# Patient Record
Sex: MALE | Race: WHITE | NOT HISPANIC OR LATINO | Employment: OTHER | URBAN - NONMETROPOLITAN AREA
[De-identification: names, ages, dates, MRNs, and addresses within clinical notes are randomized per-mention and may not be internally consistent; named-entity substitution may affect disease eponyms.]

---

## 2017-01-23 ENCOUNTER — MEDICAL CORRESPONDENCE (OUTPATIENT)
Dept: HEALTH INFORMATION MANAGEMENT | Facility: HOSPITAL | Age: 72
End: 2017-01-23

## 2017-01-26 ENCOUNTER — ANTICOAGULATION THERAPY VISIT (OUTPATIENT)
Dept: ANTICOAGULATION | Facility: OTHER | Age: 72
End: 2017-01-26
Attending: FAMILY MEDICINE
Payer: MEDICARE

## 2017-01-26 DIAGNOSIS — I82.409 ACUTE THROMBOEMBOLISM OF DEEP VEINS OF LOWER EXTREMITY, UNSPECIFIED LATERALITY (H): ICD-10-CM

## 2017-01-26 DIAGNOSIS — I26.99 PULMONARY EMBOLISM AND INFARCTION (H): ICD-10-CM

## 2017-01-26 DIAGNOSIS — Z79.01 LONG-TERM (CURRENT) USE OF ANTICOAGULANTS: Primary | ICD-10-CM

## 2017-01-26 LAB — INR POINT OF CARE: 2.3 (ref 0.86–1.14)

## 2017-01-26 PROCEDURE — 85610 PROTHROMBIN TIME: CPT | Mod: QW

## 2017-01-26 NOTE — PROGRESS NOTES
ANTICOAGULATION FOLLOW-UP CLINIC VISIT    Patient Name:  Clement Voss  Date:  1/26/2017  Contact Type:  Face to Face    SUBJECTIVE:     Patient Findings     Positives No Problem Findings           OBJECTIVE    INR PROTIME   Date Value Ref Range Status   01/26/2017 2.3* 0.86 - 1.14 Final       ASSESSMENT / PLAN  INR assessment THER    Recheck INR In: 6 WEEKS    INR Location Clinic      Anticoagulation Summary as of 1/26/2017     INR goal 2.0-3.0   Selected INR 2.3 (1/26/2017)   Maintenance plan 2.5 mg (5 mg x 0.5) on Mon, Wed, Fri; 5 mg (5 mg x 1) all other days   Full instructions 2.5 mg on Mon, Wed, Fri; 5 mg all other days   Weekly total 27.5 mg   No change documented Jodie Gill RN   Plan last modified Jodie Gill RN (8/4/2016)   Next INR check 3/9/2017   Priority INR   Target end date Indefinite    Indications   Pulmonary embolism and infarction (H) [I26.99]  Acute thromboembolism of deep veins of lower extremity (H) [I82.409]  Long-term (current) use of anticoagulants [Z79.01] [Z79.01]         Anticoagulation Episode Summary     INR check location     Preferred lab     Send INR reminders to [facility] POOL    Comments       Anticoagulation Care Providers     Provider Role Specialty Phone number    Lorelei Felix MD [facility] [phone]            See the Encounter Report to view Anticoagulation Flowsheet and Dosing Calendar (Go to Encounters tab in chart review, and find the Anticoagulation Therapy Visit)        Jodie Gill RN

## 2017-01-26 NOTE — MR AVS SNAPSHOT
Clement SOCRATES Voss   1/26/2017 8:30 AM   Anticoagulation Therapy Visit    Description:  71 year old male   Provider:  [facility] ANTI COAGULATION   Department:  [facility]           INR as of 1/26/2017     Selected INR 2.3 (1/26/2017)      Anticoagulation Summary as of 1/26/2017     INR goal 2.0-3.0   Selected INR 2.3 (1/26/2017)   Full instructions 2.5 mg on Mon, Wed, Fri; 5 mg all other days   Next INR check 3/9/2017    Indications   Pulmonary embolism and infarction (H) [I26.99]  Acute thromboembolism of deep veins of lower extremity (H) [I82.409]  Long-term (current) use of anticoagulants [Z79.01] [Z79.01]         Your next Anticoagulation Clinic appointment(s)     Jan 26, 2017  8:30 AM   Anticoagulation Visit with [facility]TI COAGUL[facility]   [facility] ([facility] )    [address]  [facility] [address]   [phone]              January 2017 Details    Sun Mon Tue Wed Thu Fri Sat     1               2               3               4               5               6               7                 8               9               10               11               12               13               14                 15               16               17               18               19               20               21                 22               23               24               25               26      5 mg   See details      27      2.5 mg         28      5 mg           29      5 mg         30      2.5 mg         31      5 mg              Date Details   01/26 This INR check               How to take your warfarin dose     To take:  2.5 mg Take 0.5 of a 5 mg tablet.    To take:  5 mg Take 1 of the 5 mg tablets.           February 2017 Details    Sun Mon Tue Wed Thu Fri Sat        1      2.5 mg         2      5 mg         3      2.5 mg         4      5 mg           5      5 mg         6      2.5 mg         7      5 mg         8      2.5 mg         9      5 mg         10       2.5 mg         11      5 mg           12      5 mg         13      2.5 mg         14      5 mg         15      2.5 mg         16      5 mg         17      2.5 mg         18      5 mg           19      5 mg         20      2.5 mg         21      5 mg         22      2.5 mg         23      5 mg         24      2.5 mg         25      5 mg           26      5 mg         27      2.5 mg         28      5 mg              Date Details   No additional details            How to take your warfarin dose     To take:  2.5 mg Take 0.5 of a 5 mg tablet.    To take:  5 mg Take 1 of the 5 mg tablets.           March 2017 Details    Sun Mon Tue Wed Thu Fri Sat        1      2.5 mg         2      5 mg         3      2.5 mg         4      5 mg           5      5 mg         6      2.5 mg         7      5 mg         8      2.5 mg         9            10               11                 12               13               14               15               16               17               18                 19               20               21               22               23               24               25                 26               27               28               29               30               31                 Date Details   No additional details    Date of next INR:  3/9/2017         How to take your warfarin dose     To take:  2.5 mg Take 0.5 of a 5 mg tablet.    To take:  5 mg Take 1 of the 5 mg tablets.

## 2017-02-23 ENCOUNTER — TELEPHONE (OUTPATIENT)
Dept: FAMILY MEDICINE | Facility: OTHER | Age: 72
End: 2017-02-23

## 2017-02-23 ENCOUNTER — APPOINTMENT (OUTPATIENT)
Dept: LAB | Facility: OTHER | Age: 72
End: 2017-02-23
Attending: FAMILY MEDICINE
Payer: MEDICARE

## 2017-02-23 DIAGNOSIS — E11.9 TYPE 2 DIABETES MELLITUS WITHOUT COMPLICATION, WITHOUT LONG-TERM CURRENT USE OF INSULIN (H): Primary | ICD-10-CM

## 2017-02-23 DIAGNOSIS — E78.5 HYPERLIPIDEMIA, UNSPECIFIED HYPERLIPIDEMIA TYPE: ICD-10-CM

## 2017-02-23 DIAGNOSIS — I10 BENIGN ESSENTIAL HYPERTENSION: ICD-10-CM

## 2017-02-23 LAB
ANION GAP SERPL CALCULATED.3IONS-SCNC: 9 MMOL/L (ref 3–14)
BUN SERPL-MCNC: 17 MG/DL (ref 7–30)
CALCIUM SERPL-MCNC: 9.1 MG/DL (ref 8.5–10.1)
CHLORIDE SERPL-SCNC: 109 MMOL/L (ref 94–109)
CHOLEST SERPL-MCNC: 166 MG/DL
CO2 SERPL-SCNC: 25 MMOL/L (ref 20–32)
CREAT SERPL-MCNC: 1.63 MG/DL (ref 0.66–1.25)
EST. AVERAGE GLUCOSE BLD GHB EST-MCNC: 200 MG/DL
GFR SERPL CREATININE-BSD FRML MDRD: 42 ML/MIN/1.7M2
GLUCOSE SERPL-MCNC: 229 MG/DL (ref 70–99)
HBA1C MFR BLD: 8.6 % (ref 4.3–6)
HDLC SERPL-MCNC: 39 MG/DL
LDLC SERPL CALC-MCNC: 82 MG/DL
NONHDLC SERPL-MCNC: 127 MG/DL
POTASSIUM SERPL-SCNC: 4.3 MMOL/L (ref 3.4–5.3)
SODIUM SERPL-SCNC: 143 MMOL/L (ref 133–144)
TRIGL SERPL-MCNC: 226 MG/DL

## 2017-02-23 PROCEDURE — 83036 HEMOGLOBIN GLYCOSYLATED A1C: CPT | Performed by: FAMILY MEDICINE

## 2017-02-23 PROCEDURE — 80048 BASIC METABOLIC PNL TOTAL CA: CPT | Performed by: FAMILY MEDICINE

## 2017-02-23 PROCEDURE — 80061 LIPID PANEL: CPT | Performed by: FAMILY MEDICINE

## 2017-02-23 PROCEDURE — 36415 COLL VENOUS BLD VENIPUNCTURE: CPT | Performed by: FAMILY MEDICINE

## 2017-02-23 PROCEDURE — 40000788 ZZHCL STATISTIC ESTIMATED AVERAGE GLUCOSE: Performed by: FAMILY MEDICINE

## 2017-02-28 ENCOUNTER — OFFICE VISIT (OUTPATIENT)
Dept: FAMILY MEDICINE | Facility: OTHER | Age: 72
End: 2017-02-28
Attending: FAMILY MEDICINE
Payer: COMMERCIAL

## 2017-02-28 VITALS
RESPIRATION RATE: 14 BRPM | HEART RATE: 76 BPM | BODY MASS INDEX: 35.41 KG/M2 | WEIGHT: 225.6 LBS | HEIGHT: 67 IN | TEMPERATURE: 97.8 F | SYSTOLIC BLOOD PRESSURE: 140 MMHG | DIASTOLIC BLOOD PRESSURE: 62 MMHG

## 2017-02-28 DIAGNOSIS — Z13.6 SCREENING FOR AAA (ABDOMINAL AORTIC ANEURYSM): ICD-10-CM

## 2017-02-28 DIAGNOSIS — E78.5 HYPERLIPIDEMIA, UNSPECIFIED HYPERLIPIDEMIA TYPE: ICD-10-CM

## 2017-02-28 DIAGNOSIS — I10 BENIGN ESSENTIAL HYPERTENSION: ICD-10-CM

## 2017-02-28 DIAGNOSIS — E11.9 TYPE 2 DIABETES MELLITUS WITHOUT COMPLICATION, WITHOUT LONG-TERM CURRENT USE OF INSULIN (H): Primary | ICD-10-CM

## 2017-02-28 DIAGNOSIS — Z12.11 COLON CANCER SCREENING: ICD-10-CM

## 2017-02-28 PROCEDURE — 99214 OFFICE O/P EST MOD 30 MIN: CPT | Performed by: FAMILY MEDICINE

## 2017-02-28 PROCEDURE — 99212 OFFICE O/P EST SF 10 MIN: CPT

## 2017-02-28 ASSESSMENT — PAIN SCALES - GENERAL: PAINLEVEL: NO PAIN (0)

## 2017-02-28 NOTE — NURSING NOTE
"Chief Complaint   Patient presents with     Diabetes     3 month follow up     *_* Health Care Directive *_*     on file       Initial /62 (BP Location: Left arm, Patient Position: Chair, Cuff Size: Adult Large)  Pulse 76  Temp 97.8  F (36.6  C) (Tympanic)  Resp 14  Ht 5' 7\" (1.702 m)  Wt 225 lb 9.6 oz (102.3 kg)  BMI 35.33 kg/m2 Estimated body mass index is 35.33 kg/(m^2) as calculated from the following:    Height as of this encounter: 5' 7\" (1.702 m).    Weight as of this encounter: 225 lb 9.6 oz (102.3 kg).  Medication Reconciliation: heber ARREDONDO      "

## 2017-02-28 NOTE — MR AVS SNAPSHOT
After Visit Summary   2/28/2017    Clement Voss    MRN: [id number]           Patient Information     Date Of Birth          1945        Visit Information        Provider Department      2/28/2017 10:15 AM Lorelei Felix MD [facility]        Today's Diagnoses     Type 2 diabetes mellitus without complication, without long-term current use of insulin (H)    -  1    Benign essential hypertension        Hyperlipidemia, unspecified hyperlipidemia type        Screening for AAA (abdominal aortic aneurysm)        Colon cancer screening          Care Instructions    There are a couple of newer medications on the market that can help with blood glucose readings.  They are NOT insulin.  The two that we use are Bydureon and Trulicity.  They are medications that you inject once a week and they can really help with blood glucose readings.  We would add this to your current medications.  If this is something you are willing to start, please let me know and I can send in a prescription.        Follow-ups after your visit        Follow-up notes from your care team     Return in about 3 months (around 5/28/2017).      Your next 10 appointments already scheduled     Mar 09, 2017  8:30 AM CST   Anticoagulation Visit with [facility] ANTI COAGULATION   [facility] ([facility] )    [address]  [place] [address]   [phone]            May 26, 2017 10:15 AM CDT   (Arrive by 10:00 AM)   SHORT with Lorelei Felix MD   [facility] ([facility] )    [address]  [place] [address]   [phone]              Future tests that were ordered for you today     Open Future Orders        Priority Expected Expires Ordered    Basic metabolic panel Routine 5/28/2017 2/28/2018 2/28/2017    Hemoglobin A1c Routine 5/28/2017 2/28/2018 2/28/2017    Lipid Profile Routine 5/28/2017 2/28/2018 2/28/2017    Immunos occult blood Routine   "2018            Who to contact     If you have questions or need follow up information about today's clinic visit or your schedule please contact [facility] ALINA directly at [phone].  Normal or non-critical lab and imaging results will be communicated to you by MyChart, letter or phone within 4 business days after the clinic has received the results. If you do not hear from us within 7 days, please contact the clinic through MyChart or phone. If you have a critical or abnormal lab result, we will notify you by phone as soon as possible.  Submit refill requests through [employer] or call your pharmacy and they will forward the refill request to us. Please allow 3 business days for your refill to be completed.          Additional Information About Your Visit        [employer] Information     [employer]isten lets you send messages to your doctor, view your test results, renew your prescriptions, schedule appointments and more. To sign up, go to [email or web address] . Click on \"Log in\" on the left side of the screen, which will take you to the Welcome page. Then click on \"Sign up Now\" on the right side of the page.     You will be asked to enter the access code listed below, as well as some personal information. Please follow the directions to create your username and password.     Your access code is: [email or web address]  Expires: 2017 10:45 AM     Your access code will  in 90 days. If you need help or a new code, please call your [facility] or [phone].        Care EveryWhere ID     This is your Care EveryWhere ID. This could be used by other organizations to access your [facility] medical records  [id number]        Your Vitals Were     Pulse Temperature Respirations Height BMI (Body Mass Index)       76 97.8  F (36.6  C) (Tympanic) 14 5' 7\" (1.702 m) 35.33 kg/m2        Blood Pressure from Last 3 Encounters:   17 140/62   16 134/58   16 120/62    Weight from Last 3 " Encounters:   02/28/17 225 lb 9.6 oz (102.3 kg)   11/28/16 226 lb (102.5 kg)   08/26/16 218 lb (98.9 kg)              We Performed the Following     FOOT EXAM     US Aortic Imaging        Primary Care Provider Office Phone # Fax #    Lorelei Felix [phone]       [facility] [address] [address]        Thank you!     Thank you for choosing [facility]  for your care. Our goal is always to provide you with excellent care. Hearing back from our patients is one way we can continue to improve our services. Please take a few minutes to complete the written survey that you may receive in the mail after your visit with us. Thank you!             Your Updated Medication List - Protect others around you: Learn how to safely use, store and throw away your medicines at www.disposemymeds.org.          This list is accurate as of: 2/28/17  1:57 PM.  Always use your most recent med list.                   Brand Name Dispense Instructions for use    ACCU-CHEK VLAD PLUS test strip   Generic drug:  blood glucose monitoring     100 each    USE TO TEST BLOOD SUGAR THREE TIMES DAILY       acetaminophen 500 MG tablet    TYLENOL     Take 1-2 tablets (500-1,000 mg) by mouth every 6 hours as needed       amLODIPine 10 MG tablet    NORVASC    90 tablet    Take 1 tablet (10 mg) by mouth daily       aspirin 81 MG EC tablet     90 tablet    Take 1 tablet (81 mg) by mouth daily       CLARITIN 10 MG tablet   Generic drug:  loratadine      Take 10 mg by mouth daily.       COMBIGAN 0.2-0.5 % ophthalmic solution   Generic drug:  brimonidine-timolol      1 drop 2 times daily Morning and evening, 12 hours apart       fenofibrate 48 MG tablet     90 tablet    Take 1 tablet (48 mg) by mouth daily       fish oil-omega-3 fatty acids 1000 MG capsule      Take 1 capsule by mouth 3 times daily.       fluticasone 50 MCG/ACT spray    FLONASE    1 Package    Spray 2 sprays into both  nostrils daily       glyBURIDE 5 MG tablet    DIABETA /MICRONASE    360 tablet    Take 2 tablets (10 mg) by mouth 2 times daily (with meals)       indomethacin 50 MG capsule    INDOCIN    30 capsule    Take 1 capsule (50 mg) by mouth 3 times daily With food as needed       lisinopril 40 MG tablet    PRINIVIL/ZESTRIL    90 tablet    Take 1 tablet (40 mg) by mouth daily       pilocarpine 1 % ophthalmic solution    PILOCAR     Place 1 drop into both eyes 4 times daily       sitagliptin 100 MG tablet    JANUVIA    90 tablet    Take 1 tablet (100 mg) by mouth daily       STATIN NOT PRESCRIBED (INTENTIONAL)     0 each    Statin not prescribed intentionally due to Intolerance (with supporting documentation of trying a statin at least once within the last 5 years)       terazosin 10 MG capsule    HYTRIN    90 capsule    Take 1 capsule (10 mg) by mouth At Bedtime       TRAVATAN Z 0.004 % ophthalmic solution   Generic drug:  travoprost (BAK Free)      Instill 1 drop by ophthalmic route every day into affected eye(s) in the evening       warfarin 5 MG tablet    COUMADIN    90 tablet    Take 2.5mg Mon/Wed/Fri; 5mg all other days or as directed by [facility]

## 2017-02-28 NOTE — LETTER
[facility]  [address]  [facility] [address]  [phone]      March 27, 2017      Clement Voss  [address] [facility] [address]        Dear Clement,      Normal/negative results.     Results for orders placed or performed in visit on 02/28/17   US Aortic Imaging    Narrative    ABDOMINAL AORTA ULTRASOUND    FINDINGS:  The abdominal aorta is normally tapering.  There is no  evidence of abdominal aortic aneurysm.  Exam Date: Mar 24, 2017 11:33:00 AM  Author: MELY KIRK  This report is final and signed             Sincerely, DR WILDE

## 2017-02-28 NOTE — PATIENT INSTRUCTIONS
There are a couple of newer medications on the market that can help with blood glucose readings.  They are NOT insulin.  The two that we use are Bydureon and Trulicity.  They are medications that you inject once a week and they can really help with blood glucose readings.  We would add this to your current medications.  If this is something you are willing to start, please let me know and I can send in a prescription.

## 2017-02-28 NOTE — PROGRESS NOTES
SUBJECTIVE:                                                    Clement Voss is a 71 year old male who presents to clinic today for the following health issues:        Diabetes Follow-up    Patient is checking blood sugars: twice daily.    Blood sugar testing frequency justification: Uncontrolled diabetes  Results are as follows:         See scanned report    Diabetic concerns: None and blood sugar frequently over 200     Symptoms of hypoglycemia (low blood sugar): shaky, dizzy     Paresthesias (numbness or burning in feet) or sores: No     Date of last diabetic eye exam: 4/2016     Hyperlipidemia Follow-Up      Rate your low fat/cholesterol diet?: good    Taking statin?  Refuses    Other lipid medications/supplements?:  Fish oil/Omega 3, without side effects     Hypertension Follow-up      Outpatient blood pressures are not being checked.    Low Salt Diet: no added salt         Amount of exercise or physical activity: tries to be active daily    Problems taking medications regularly: No    Medication side effects: none  Diet: diabetic    Patient is due for AAA and colon cancer screening.  He refuses colonoscopy, but is willing to complete ifobt.    Problem list and histories reviewed & adjusted, as indicated.  Additional history: as documented    Patient Active Problem List   Diagnosis     Diabetes mellitus, type 2 (H)     Hyperlipidemia     Benign essential hypertension     Gout     Pulmonary embolism and infarction (H)     Acute thromboembolism of deep veins of lower extremity (H)     Advanced care planning/counseling discussion     Long-term (current) use of anticoagulants [Z79.01]     Past Surgical History   Procedure Laterality Date     Appendectomy  2008     Cholecystectomy  1984     History of pe of right lung 3/2013[         Social History   Substance Use Topics     Smoking status: Former Smoker     Types: Cigarettes     Quit date: 8/12/1964     Smokeless tobacco: Never Used     Alcohol use No     Family  History   Problem Relation Age of Onset     HEART DISEASE Father 71     heart disease; cause of death     Other - See Comments Mother 79     old age; cause of death         Current Outpatient Prescriptions   Medication Sig Dispense Refill     amLODIPine (NORVASC) 10 MG tablet Take 1 tablet (10 mg) by mouth daily 90 tablet 3     lisinopril (PRINIVIL,ZESTRIL) 40 MG tablet Take 1 tablet (40 mg) by mouth daily 90 tablet 3     terazosin (HYTRIN) 10 MG capsule Take 1 capsule (10 mg) by mouth At Bedtime 90 capsule 3     warfarin (COUMADIN) 5 MG tablet Take 2.5mg Mon/Wed/Fri; 5mg all other days or as directed by [facility] 90 tablet 4     pilocarpine (PILOCAR) 1 % ophthalmic solution Place 1 drop into both eyes 4 times daily       fenofibrate 48 MG tablet Take 1 tablet (48 mg) by mouth daily 90 tablet 3     sitagliptin (JANUVIA) 100 MG tablet Take 1 tablet (100 mg) by mouth daily 90 tablet 3     glyBURIDE (DIABETA / MICRONASE) 5 MG tablet Take 2 tablets (10 mg) by mouth 2 times daily (with meals) 360 tablet 3     ACCU-CHEK VLAD PLUS test strip USE TO TEST BLOOD SUGAR THREE TIMES DAILY 100 each 10     STATIN NOT PRESCRIBED, INTENTIONAL, Statin not prescribed intentionally due to Intolerance (with supporting documentation of trying a statin at least once within the last 5 years) 0 each 0     fluticasone (FLONASE) 50 MCG/ACT nasal spray Spray 2 sprays into both nostrils daily (Patient taking differently: Spray 2 sprays into both nostrils daily ) 1 Package 0     indomethacin (INDOCIN) 50 MG capsule Take 1 capsule (50 mg) by mouth 3 times daily With food as needed 30 capsule 1     brimonidine-timolol (COMBIGAN) 0.2-0.5 % ophthalmic solution 1 drop 2 times daily Morning and evening, 12 hours apart       acetaminophen (TYLENOL) 500 MG tablet Take 1-2 tablets (500-1,000 mg) by mouth every 6 hours as needed       aspirin 81 MG EC tablet Take 1 tablet (81 mg) by mouth daily 90 tablet 3     loratadine (CLARITIN) 10 MG tablet  "Take 10 mg by mouth daily.       fish oil-omega-3 fatty acids (FISH OIL) 1000 MG capsule Take 1 capsule by mouth 3 times daily.       TRAVATAN Z (TRAVATAN Z) 0.004 % ophthalmic solution Instill 1 drop by ophthalmic route every day into affected eye(s) in the evening       Allergies   Allergen Reactions     Atorvastatin Calcium Other (See Comments)     Lipitor  Increase CR     Iodine      Penicillins      Sulfa Drugs      Sulfa (sulfonamide antibiotics)       Reviewed and updated as needed this visit by clinical staff  Tobacco  Allergies  Meds       Reviewed and updated as needed this visit by Provider         ROS:  Constitutional, HEENT, cardiovascular, pulmonary, gi and gu systems are negative, except as otherwise noted.    OBJECTIVE:                                                    /62 (BP Location: Left arm, Patient Position: Chair, Cuff Size: Adult Large)  Pulse 76  Temp 97.8  F (36.6  C) (Tympanic)  Resp 14  Ht 5' 7\" (1.702 m)  Wt 225 lb 9.6 oz (102.3 kg)  BMI 35.33 kg/m2  Body mass index is 35.33 kg/(m^2).  GENERAL: healthy, alert and no distress  PSYCH: mentation appears normal, affect normal/bright  Diabetic foot exam: normal DP and PT pulses, no trophic changes or ulcerative lesions and normal monofilament exam    Diagnostic Test Results:  Results for orders placed or performed in visit on 02/23/17   Basic metabolic panel   Result Value Ref [facility]    Sodium 143 133 - 144 mmol/L    Potassium 4.3 3.4 - 5.3 mmol/L    Chloride 109 94 - 109 mmol/L    Carbon Dioxide 25 20 - 32 mmol/L    Anion Gap 9 3 - 14 mmol/L    Glucose 229 (H) 70 - 99 mg/dL    Urea Nitrogen 17 7 - 30 mg/dL    Creatinine 1.63 (H) 0.66 - 1.25 mg/dL    GFR Estimate 42 (L) >60 mL/min/1.7m2    GFR Estimate If Black 50 (L) >60 mL/min/1.7m2    Calcium 9.1 8.5 - 10.1 mg/dL   Hemoglobin A1c   Result Value Ref Range    Hemoglobin A1C 8.6 (H) 4.3 - 6.0 %   Lipid Profile   Result Value Ref Range    Cholesterol 166 <200 mg/dL    Triglycerides " 226 (H) <150 mg/dL    HDL Cholesterol 39 (L) >39 mg/dL    LDL Cholesterol Calculated 82 <100 mg/dL    Non HDL Cholesterol 127 <130 mg/dL   Estimated Average Glucose   Result Value Ref Range    Estimated Average Glucose 200 mg/dL        ASSESSMENT/PLAN:                                                    Diabetes Type II, A1c Uncontrolled, non-insulin dependent   Associated with the following complications    Renal Complications:  None    Ophthalmologic Complications: None    Neurologic Complications: None    Peripheral Vascular Complications:  None    Other: None   Plan:  I would recommend starting either Bydureon or Trulicity.  Note written in AVS for patient and his wife to review.  They are to let me know if they are willing to try one of those medications.    Hyperlipidemia; controlled   Plan:  No changes in the patient's current treatment plan    Hypertension; controlled   Associated with the following complications:    Diabetes   Plan:  No changes in the patient's current treatment plan          ICD-10-CM    1. Type 2 diabetes mellitus without complication, without long-term current use of insulin (H) E11.9 FOOT EXAM     Hemoglobin A1c   2. Benign essential hypertension I10 Basic metabolic panel   3. Hyperlipidemia, unspecified hyperlipidemia type E78.5 Lipid Profile   4. Screening for AAA (abdominal aortic aneurysm) Z13.6 US Aortic Imaging     US Aortic Imaging     CANCELED: US Abdominal Aorta Limited     CANCELED: US Abdominal Aorta Limited   5. Colon cancer screening Z12.11 Immunos occult blood       FUTURE APPOINTMENTS:       - Follow-up visit in 3 months, future labs orders already placed.      Lorelei Felix MD  [facility]

## 2017-03-01 DIAGNOSIS — Z12.11 COLON CANCER SCREENING: ICD-10-CM

## 2017-03-01 LAB — HEMOCCULT SP1 STL QL: NEGATIVE

## 2017-03-01 PROCEDURE — 82274 ASSAY TEST FOR BLOOD FECAL: CPT | Performed by: FAMILY MEDICINE

## 2017-03-05 ENCOUNTER — HOSPITAL ENCOUNTER (EMERGENCY)
Facility: HOSPITAL | Age: 72
Discharge: HOME | End: 2017-03-05
Attending: PHYSICIAN ASSISTANT | Admitting: PHYSICIAN ASSISTANT
Payer: MEDICARE

## 2017-03-05 VITALS
HEIGHT: 67 IN | SYSTOLIC BLOOD PRESSURE: 144 MMHG | RESPIRATION RATE: 16 BRPM | DIASTOLIC BLOOD PRESSURE: 66 MMHG | HEART RATE: 97 BPM | OXYGEN SATURATION: 93 % | TEMPERATURE: 100.7 F

## 2017-03-05 DIAGNOSIS — J10.1 INFLUENZA A: ICD-10-CM

## 2017-03-05 LAB
FLUAV+FLUBV AG SPEC QL: ABNORMAL
FLUAV+FLUBV AG SPEC QL: ABNORMAL
SPECIMEN SOURCE: ABNORMAL

## 2017-03-05 PROCEDURE — 71020 ZZHC CHEST TWO VIEWS, FRONT/LAT: CPT | Mod: TC

## 2017-03-05 PROCEDURE — 99284 EMERGENCY DEPT VISIT MOD MDM: CPT | Performed by: PHYSICIAN ASSISTANT

## 2017-03-05 PROCEDURE — 87804 INFLUENZA ASSAY W/OPTIC: CPT | Mod: 59 | Performed by: FAMILY MEDICINE

## 2017-03-05 PROCEDURE — 99284 EMERGENCY DEPT VISIT MOD MDM: CPT | Mod: 25

## 2017-03-05 RX ORDER — OSELTAMIVIR PHOSPHATE 30 MG/1
30 CAPSULE ORAL 2 TIMES DAILY
Qty: 10 CAPSULE | Refills: 0 | Status: SHIPPED | OUTPATIENT
Start: 2017-03-05 | End: 2017-03-05

## 2017-03-05 RX ORDER — OSELTAMIVIR PHOSPHATE 30 MG/1
30 CAPSULE ORAL 2 TIMES DAILY
Qty: 10 CAPSULE | Refills: 0 | Status: SHIPPED | OUTPATIENT
Start: 2017-03-05 | End: 2017-03-16

## 2017-03-05 ASSESSMENT — ENCOUNTER SYMPTOMS
FATIGUE: 1
WEAKNESS: 1
SORE THROAT: 1
CHEST TIGHTNESS: 0
MYALGIAS: 1
RHINORRHEA: 1
NAUSEA: 0
PHOTOPHOBIA: 0
HEADACHES: 1
CHILLS: 1
SINUS PRESSURE: 1
LIGHT-HEADEDNESS: 0
SHORTNESS OF BREATH: 0
BRUISES/BLEEDS EASILY: 1
ABDOMINAL PAIN: 0
APPETITE CHANGE: 0
DIZZINESS: 0
COUGH: 1
ACTIVITY CHANGE: 1
EYE REDNESS: 0
VOMITING: 0
FEVER: 1

## 2017-03-05 NOTE — ED AVS SNAPSHOT
[facility]    [address]    [place] [address]    Phone:  [phone]                                       Clement Voss   MRN: [id number]    Department:  [facility]   Date of Visit:  3/5/2017           Patient Information     Date Of Birth          1945        Your diagnoses for this visit were:     Influenza A        You were seen by Justino Kunz PA-C.      Follow-up Information     Follow up with Lorelei Felix MD.    Specialty:  Family Practice    Why:  As needed    Contact information:    [facility]  [address] [facility] [id number] [phone]          Follow up with [facility].    Specialty:  EMERGENCY MEDICINE    Why:  If symptoms worsen    Contact information:    [address]  [place] [address] [phone]    Additional information:    From [facility]: Take [address]. Turn left at [address]. Turn left at the first stoplight on [address]. At the first stop sign, take a right onto [address]. Take a left into the parking lot and continue through until you reach the North enterance of the building.       From [place]: Take [address]. Take the [address] ramp towards [place]. Turn left onto [address]. Take a slight right onto [address]. Turn left at the first stoplight on [address]. At the first stop sign, take a right onto [address]. Take a left into the parking lot and continue through until you reach the North enterance of the building.       From [place]: Take [address]. Take a right at [address]. At the first stop sign, take a right onto [address]. Take a left into the parking lot and continue through until you reach the North enterance of the building.         Discharge Instructions         Influenza (Adult)    Influenza is also called the flu. It is a viral illness that affects the air  passages of your lungs. It is different from the common cold. The flu can easily be passed from one to person to another. It may be spread through the air by coughing and sneezing. Or it can be spread by touching the sick person and then touching your own eyes, nose, or mouth.  The flu starts 1 to 3 days after you are exposed to the flu virus. It may last for 1 to 2 weeks. You usually don t need to take antibiotics unless you have a complication. This might be an ear or sinus infection or pneumonia.  Symptoms of the flu may be mild or severe. They can include extreme tiredness (wanting to stay in bed all day), chills, fevers, muscle aches, soreness with eye movement, headache, and a dry, hacking cough.  Home care  Follow these guidelines when caring for yourself at home:    Avoid being around cigarette smoke, whether yours or other people s.    Acetaminophen or ibuprofen will help ease your fever, muscle aches, and headache. Don t give aspirin to anyone younger than 18 who has the flu. Aspirin can harm the liver.    Nausea and loss of appetite are common with the flu. Eat light meals. Drink 6 to 8 glasses of liquids every day. Good choices are water, sport drinks, soft drinks without caffeine, juices, tea, and soup. Extra fluids will also help loosen secretions in your nose and lungs.    Over-the-counter cold medicines will not make the flu go away faster. But the medicines may help with coughing, sore throat, and congestion in your nose and sinuses. Don t use a decongestant if you have high blood pressure.    Stay home until your fever has been gone for at least 24 hours without using medicine to reduce fever.  Follow-up care  Follow up with your health care provider, or as advised, if you are not getting better over the next week.  If you are 65 or older, talk with your provider about getting a pneumococcal vaccine every 5 years. You should also get this vaccine if you have chronic asthma or COPD. All adults  should get a flu vaccine every fall. Ask your provider about this.  When to seek medical advice  Call your health care provider right away if any of these occur:    Cough with lots of colored sputum (mucus) or blood in your sputum    Chest pain, shortness of breath, wheezing, or difficulty breathing    Severe headache, or face, neck, or ear pain    New rash with fever    Fever of 101 F (38 C) oral or higher that doesn t get better with fever medicine    Confusion, behavior change, or seizure    Severe weakness or dizziness    You get a fever or cough after getting better for a few days       6338-0323 [employer]. [address] [place] [address]. All rights reserved. This information is not intended as a substitute for professional medical care. Always follow your healthcare professional's instructions.        Please follow-up with Dr. Wang as needed.     Tamiflu as prescribed.     Good hand hygiene is very important.     Please return HERE for any worsening symptoms.     Rest and stay hydrated.         Future Appointments        Provider Department Dept Phone Center    3/8/2017 10:00 AM [facility] Ultrasound 2 [facility] Ul[facility]ound [phone] [place] [facility]    3/9/2017 8:30 AM [place] ANTI COAGULATION [facility] [phone] [facility]    5/26/2017 10:15 AM Lorelei Felix MD [facility] [phone] [facility]         Review of your medicines      START taking        Dose / Directions Last dose taken    oseltamivir 30 MG capsule   Commonly known as:  TAMIFLU   Dose:  30 mg   Quantity:  10 capsule        Take 1 capsule (30 mg) by mouth 2 times daily   Refills:  0          Our records show that you are taking the medicines listed below. If these are incorrect, please call your family doctor or clinic.        Dose / Directions Last dose taken    ACCU-CHEK VLAD PLUS test strip   Quantity:  100 each   Generic drug:  blood glucose monitoring        USE TO TEST BLOOD SUGAR THREE  TIMES DAILY   Refills:  10        acetaminophen 500 MG tablet   Commonly known as:  TYLENOL   Dose:  500-1000 mg        Take 1-2 tablets (500-1,000 mg) by mouth every 6 hours as needed   Refills:  0        amLODIPine 10 MG tablet   Commonly known as:  NORVASC   Dose:  10 mg   Quantity:  90 tablet        Take 1 tablet (10 mg) by mouth daily   Refills:  3        aspirin 81 MG EC tablet   Dose:  81 mg   Quantity:  90 tablet        Take 1 tablet (81 mg) by mouth daily   Refills:  3        CLARITIN 10 MG tablet   Dose:  10 mg   Generic drug:  loratadine        Take 10 mg by mouth daily.   Refills:  0        COMBIGAN 0.2-0.5 % ophthalmic solution   Dose:  1 drop   Generic drug:  brimonidine-timolol        1 drop 2 times daily Morning and evening, 12 hours apart   Refills:  0        fenofibrate 48 MG tablet   Dose:  48 mg   Quantity:  90 tablet        Take 1 tablet (48 mg) by mouth daily   Refills:  3        fish oil-omega-3 fatty acids 1000 MG capsule   Dose:  1 capsule        Take 1 capsule by mouth 3 times daily.   Refills:  0        fluticasone 50 MCG/ACT spray   Commonly known as:  FLONASE   Dose:  2 spray   Quantity:  1 Package        Spray 2 sprays into both nostrils daily   Refills:  0        glyBURIDE 5 MG tablet   Commonly known as:  DIABETA /MICRONASE   Dose:  10 mg   Quantity:  360 tablet        Take 2 tablets (10 mg) by mouth 2 times daily (with meals)   Refills:  3        indomethacin 50 MG capsule   Commonly known as:  INDOCIN   Dose:  50 mg   Quantity:  30 capsule        Take 1 capsule (50 mg) by mouth 3 times daily With food as needed   Refills:  1        lisinopril 40 MG tablet   Commonly known as:  PRINIVIL/ZESTRIL   Dose:  40 mg   Quantity:  90 tablet        Take 1 tablet (40 mg) by mouth daily   Refills:  3        pilocarpine 1 % ophthalmic solution   Commonly known as:  PILOCAR   Dose:  1 drop        Place 1 drop into both eyes 4 times daily   Refills:  0        sitagliptin 100 MG tablet   Commonly  known as:  JANUVIA   Dose:  100 mg   Quantity:  90 tablet        Take 1 tablet (100 mg) by mouth daily   Refills:  3        STATIN NOT PRESCRIBED (INTENTIONAL)   Quantity:  0 each        Statin not prescribed intentionally due to Intolerance (with supporting documentation of trying a statin at least once within the last 5 years)   Refills:  0        terazosin 10 MG capsule   Commonly known as:  HYTRIN   Dose:  10 mg   Quantity:  90 capsule        Take 1 capsule (10 mg) by mouth At Bedtime   Refills:  3        TRAVATAN Z 0.004 % ophthalmic solution   Generic drug:  travoprost (DARION Free)        Instill 1 drop by ophthalmic route every day into affected eye(s) in the evening   Refills:  0        warfarin 5 MG tablet   Commonly known as:  COUMADIN   Quantity:  90 tablet        Take 2.5mg Mon/Wed/Fri; 5mg all other days or as directed by [facility]   Refills:  4                Prescriptions were sent or printed at these locations (1 Prescription)                   [facility] [address] [place] [address] [address] [facility] [address]   [address] [facility] [address]    Telephone:  [phone]   Fax:  [phone]   Hours:                  E-Prescribed (1 of 1)         oseltamivir (TAMIFLU) 30 MG capsule                Procedures and tests performed during your visit     Influenza A/B antigen    XR Chest 2 Views      Orders Needing Specimen Collection     None      Pending Results     Date and Time Order Name Status Description    3/5/2017 1017 XR Chest 2 Views In process             Pending Culture Results     No orders found from 3/3/2017 to 3/6/2017.            Thank you for choosing [facility]       Thank you for choosing [facility] for your care. Our goal is always to provide you with excellent care. Hearing back from our patients is one way we can continue to improve our services. Please take a few minutes to complete the written survey that you may receive in the mail after  "you visit with us. Thank you!        [facility] [employer] Information     [employer] lets you send messages to your doctor, view your test results, renew your prescriptions, schedule appointments and more. To sign up, go to [email or web address] . Click on \"Log in\" on the left side of the screen, which will take you to the Welcome page. Then click on \"Sign up Now\" on the right side of the page.     You will be asked to enter the access code listed below, as well as some personal information. Please follow the directions to create your username and password.     Your access code is: [email or web address]  Expires: 2017 10:45 AM     Your access code will  in 90 days. If you need help or a new code, please call your [facility] clinic or [phone].        Care EveryWhere ID     This is your Care EveryWhere ID. This could be used by other organizations to access your [facility] medical records  [id number]        After Visit Summary       This is your record. Keep this with you and show to your community pharmacist(s) and doctor(s) at your next visit.                  "

## 2017-03-05 NOTE — DISCHARGE INSTRUCTIONS
Influenza (Adult)    Influenza is also called the flu. It is a viral illness that affects the air passages of your lungs. It is different from the common cold. The flu can easily be passed from one to person to another. It may be spread through the air by coughing and sneezing. Or it can be spread by touching the sick person and then touching your own eyes, nose, or mouth.  The flu starts 1 to 3 days after you are exposed to the flu virus. It may last for 1 to 2 weeks. You usually don t need to take antibiotics unless you have a complication. This might be an ear or sinus infection or pneumonia.  Symptoms of the flu may be mild or severe. They can include extreme tiredness (wanting to stay in bed all day), chills, fevers, muscle aches, soreness with eye movement, headache, and a dry, hacking cough.  Home care  Follow these guidelines when caring for yourself at home:    Avoid being around cigarette smoke, whether yours or other people s.    Acetaminophen or ibuprofen will help ease your fever, muscle aches, and headache. Don t give aspirin to anyone younger than 18 who has the flu. Aspirin can harm the liver.    Nausea and loss of appetite are common with the flu. Eat light meals. Drink 6 to 8 glasses of liquids every day. Good choices are water, sport drinks, soft drinks without caffeine, juices, tea, and soup. Extra fluids will also help loosen secretions in your nose and lungs.    Over-the-counter cold medicines will not make the flu go away faster. But the medicines may help with coughing, sore throat, and congestion in your nose and sinuses. Don t use a decongestant if you have high blood pressure.    Stay home until your fever has been gone for at least 24 hours without using medicine to reduce fever.  Follow-up care  Follow up with your health care provider, or as advised, if you are not getting better over the next week.  If you are 65 or older, talk with your provider about getting a pneumococcal vaccine  every 5 years. You should also get this vaccine if you have chronic asthma or COPD. All adults should get a flu vaccine every fall. Ask your provider about this.  When to seek medical advice  Call your health care provider right away if any of these occur:    Cough with lots of colored sputum (mucus) or blood in your sputum    Chest pain, shortness of breath, wheezing, or difficulty breathing    Severe headache, or face, neck, or ear pain    New rash with fever    Fever of 101 F (38 C) oral or higher that doesn t get better with fever medicine    Confusion, behavior change, or seizure    Severe weakness or dizziness    You get a fever or cough after getting better for a few days       3150-5895 The [email or web address]. [address] [place] [address], [place] [address]. All rights reserved. This information is not intended as a substitute for professional medical care. Always follow your healthcare professional's instructions.        Please follow-up with Dr. Wang as needed.     Tamiflu as prescribed.     Good hand hygiene is very important.     Please return HERE for any worsening symptoms.     Rest and stay hydrated.

## 2017-03-05 NOTE — ED PROVIDER NOTES
History     Chief Complaint   Patient presents with     Fever     fever for a couple weeks,  headache, for one week     The history is provided by the patient.     Clement Voss is a 71 year old male who presented to the ED ambulatory along with wife for evaluation of 2 weeks of URI symptoms.  These symptoms have been mild and were improving steadily.  Yesterday he developed acute onset of fevers, cough, headaches, body aches, and sore throat.  He presented here with his wife and daughter who have identical symptoms.  All three have tested positive for Influenza A here.  He has been eating and drinking fine.  States that he developed some mild flashes yesterday.  NO vision changes.  No current symptoms. No decreases in sight.  Has underlying glaucoma.  No eye pain or redness.     Past Medical History   Diagnosis Date     DVT (deep venous thrombosis) 9/24/2012     DVT (deep venous thrombosis) (H)      Gout, unspecified 1/10/2011     Other and unspecified hyperlipidemia 10/10/2005     Pulmonary embolism 9/24/2012     Pulmonary embolism (H)      Type II or unspecified type diabetes mellitus without mention of complication, not stated as uncontrolled 8/2/2004     Unspecified essential hypertension 1/3/2005      Past Surgical History   Procedure Laterality Date     Appendectomy  2008     Cholecystectomy  1984     History of pe of right lung 3/2013[        Social History     Social History     Marital status:      Spouse name: N/A     Number of children: N/A     Years of education: N/A     Occupational History      [employer]      Retired     Social History Main Topics     Smoking status: Former Smoker     Types: Cigarettes     Quit date: 8/12/1964     Smokeless tobacco: Never Used     Alcohol use No     Drug use: No     Sexual activity: No     Other Topics Concern      Service No     Blood Transfusions Yes     Permits if needed     Caffeine Concern Yes     coffee - 4 cups daily     Occupational  "Exposure No     Hobby Hazards No     Sleep Concern No     Stress Concern No     Weight Concern No     Special Diet No     Back Care Yes     chronic back pain due to arthritis     Exercise Yes     walking     Bike Helmet Yes     Seat Belt Yes     Self-Exams Yes     Parent/Sibling W/ Cabg, Mi Or Angioplasty Before 65f 55m? No     Social History Narrative      Family History   Problem Relation Age of Onset     HEART DISEASE Father 71     heart disease; cause of death     Other - See Comments Mother 79     old age; cause of death       I have reviewed the Medications, Allergies, Past Medical and Surgical History, and Social History in the Epic system.    Review of Systems   Constitutional: Positive for activity change, chills, fatigue and fever. Negative for appetite change.   HENT: Positive for congestion, rhinorrhea, sinus pressure and sore throat.    Eyes: Positive for visual disturbance. Negative for photophobia and redness.        Some mild flashes yesterday that have completely resolved    Respiratory: Positive for cough. Negative for chest tightness and shortness of breath.    Cardiovascular: Negative for chest pain.   Gastrointestinal: Negative for abdominal pain, nausea and vomiting.   Genitourinary: Negative.    Musculoskeletal: Positive for myalgias.   Skin: Negative.    Neurological: Positive for weakness and headaches. Negative for dizziness and light-headedness.   Hematological: Bruises/bleeds easily.       Physical Exam   BP: 137/72  Pulse: 97  Heart Rate: 97  Temp: 100.3  F (37.9  C)  Resp: 22  Height: 170.2 cm (5' 7\")  SpO2: 93 %  Physical Exam   Constitutional: He is oriented to person, place, and time. He appears well-developed and well-nourished. No distress.   HENT:   Head: Normocephalic and atraumatic.   Mild posterior pharyngitis    Eyes:   Extraocular movements are intact and smooth throughout the H.  Conjunctiva are normal.  There is no horizontal or vertical nystagmus.  Pupils are equil in size " and reactive to light. Lids are unremarkable.  Reports normal visual fields    Neck: Normal range of motion. Neck supple.   Cardiovascular: Normal rate and regular rhythm.    Pulmonary/Chest: Effort normal and breath sounds normal. No respiratory distress. He has no wheezes.   Abdominal: Soft. There is no tenderness. There is no guarding.   Musculoskeletal: He exhibits no edema.   Neurological: He is alert and oriented to person, place, and time.   Skin: Skin is warm and dry. No rash noted.   Psychiatric: He has a normal mood and affect.   Nursing note and vitals reviewed.      ED Course     ED Course     Procedures        CXR is negative for PTX or focal consolidation.      Critical Care time:  none               Labs Ordered and Resulted from Time of ED Arrival Up to the Time of Departure from the ED   INFLUENZA A/B ANTIGEN - Abnormal; Notable for the following:        Result Value    Influenza A   (*)     Value: Positive   Critical Value called to and read back by  Lenore Del Toro at 0955 by SG      All other components within normal limits       Assessments & Plan (with Medical Decision Making)   Findings as above.  Family has exact symptoms.  I believe that Clement and his wife each had a mild URI over the last few weeks that was actually improving nicely.  I also believe that they developed symptoms of the influenza yesterday and fit criteria for Tamiflu.  I stressed rest and hydration.  He needs close clinic follow-up. I stressed returning here for ANY worsening symptoms or other concerns whatsoever.  He voiced complete understanding and was agreeable.     I have reviewed the nursing notes.    I have reviewed the findings, diagnosis, plan and need for follow up with the patient.    New Prescriptions    OSELTAMIVIR (TAMIFLU) 30 MG CAPSULE    Take 1 capsule (30 mg) by mouth 2 times daily       Final diagnoses:   Influenza A       3/5/2017   [facility]     Justino Kunz PA-C  03/05/17 1107

## 2017-03-05 NOTE — ED NOTES
To ED with cough for the last few days and painful in chest when he coughs, non-productive cough. Fevers since Friday evening and got worse yesterday. Reports he has glaucoma and has had a few flashes in his eye, denies floaters, denies any eye pain and no problems with visual problems. Patient reports he is drinking and eating adequately. Denies diarrhea

## 2017-03-05 NOTE — ED NOTES
Pt comes to the ER with a week of a headache, fever for a few days, sore throat and cough. Generalized body aches. No nausea/vomiting. Flashing lights in his eyes couple times last night.

## 2017-03-05 NOTE — ED NOTES
Discharge instructions reviewed with patient. Encouraged to return with new or worsening symptoms.  No questions or concerns. Prescription e-scribed to pharmacy of choice.

## 2017-03-05 NOTE — ED AVS SNAPSHOT
[facility]    [address] [address]    Phone:  [phone]                                       Clement Voss   MRN: [id number]    Department:  [facility]   Date of Visit:  3/5/2017           After Visit Summary Signature Page     I have received my discharge instructions, and my questions have been answered. I have discussed any challenges I see with this plan with the nurse or doctor.    ..........................................................................................................................................  Patient/Patient Representative Signature      ..........................................................................................................................................  Patient Representative Print Name and Relationship to Patient    ..................................................               ................................................  Date                                            Time    ..........................................................................................................................................  Reviewed by Signature/Title    ...................................................              ..............................................  Date                                                            Time

## 2017-03-08 NOTE — PROGRESS NOTES
Chest X-Ray report routed to Dr Felix, IMPRESSION:  MINIMAL LEFT BASILAR ATELECTASIS, LESS LIKELY VERY SUBTLE PNEUMONIA. Prescribed Tamiflue, advised that he needs close follow up in the clinic.

## 2017-03-16 ENCOUNTER — ANTICOAGULATION THERAPY VISIT (OUTPATIENT)
Dept: ANTICOAGULATION | Facility: OTHER | Age: 72
End: 2017-03-16
Attending: FAMILY MEDICINE
Payer: MEDICARE

## 2017-03-16 DIAGNOSIS — I82.409 ACUTE THROMBOEMBOLISM OF DEEP VEINS OF LOWER EXTREMITY, UNSPECIFIED LATERALITY (H): ICD-10-CM

## 2017-03-16 DIAGNOSIS — I26.99 PULMONARY EMBOLISM AND INFARCTION (H): ICD-10-CM

## 2017-03-16 DIAGNOSIS — Z79.01 LONG-TERM (CURRENT) USE OF ANTICOAGULANTS: ICD-10-CM

## 2017-03-16 LAB — INR POINT OF CARE: 3.5 (ref 0.86–1.14)

## 2017-03-16 PROCEDURE — 85610 PROTHROMBIN TIME: CPT | Mod: QW

## 2017-03-16 NOTE — PROGRESS NOTES
ANTICOAGULATION FOLLOW-UP CLINIC VISIT    Patient Name:  Clement Voss  Date:  3/16/2017  Contact Type:  Face to Face    SUBJECTIVE:     Patient Findings     Positives Change in diet/appetite, Other complaints, Activity level change    Comments Has been ill with Influenza, cough, lack of energy/activity recently.  States has not been eating many greens, eating mostly soups with little greens.             OBJECTIVE    INR Protime   Date Value Ref Range Status   03/16/2017 3.5 (A) 0.86 - 1.14 Final       ASSESSMENT / PLAN  INR assessment SUPRA    Recheck INR In: 2 WEEKS    INR Location Clinic      Anticoagulation Summary as of 3/16/2017     INR goal 2.0-3.0   Today's INR 3.5!   Maintenance plan 2.5 mg (5 mg x 0.5) on Mon, Wed, Fri; 5 mg (5 mg x 1) all other days   Full instructions 3/16: Hold; Otherwise 2.5 mg on Mon, Wed, Fri; 5 mg all other days   Weekly total 27.5 mg   Plan last modified Jodie Gill RN (8/4/2016)   Next INR check 3/30/2017   Priority INR   Target end date Indefinite    Indications   Pulmonary embolism and infarction (H) [I26.99]  Acute thromboembolism of deep veins of lower extremity (H) [I82.409]  Long-term (current) use of anticoagulants [Z79.01] [Z79.01]         Anticoagulation Episode Summary     INR check location     Preferred lab     Send INR reminders to  ANTICOAG POOL    Comments       Anticoagulation Care Providers     Provider Role Specialty Phone number    Lorelei Felix MD [facility] [phone]            See the Encounter Report to view Anticoagulation Flowsheet and Dosing Calendar (Go to Encounters tab in chart review, and find the Anticoagulation Therapy Visit)        Jodie Gill RN

## 2017-03-16 NOTE — MR AVS SNAPSHOT
Clement Englandi   3/16/2017 10:30 AM   Anticoagulation Therapy Visit    Description:  71 year old male   Provider:  [facility]   Department:  [facility]           INR as of 3/16/2017     Today's INR 3.5!      Anticoagulation Summary as of 3/16/2017     INR goal 2.0-3.0   Today's INR 3.5!   Full instructions 3/16: Hold; Otherwise 2.5 mg on Mon, Wed, Fri; 5 mg all other days   Next INR check 3/30/2017    Indications   Pulmonary embolism and infarction (H) [I26.99]  Acute thromboembolism of deep veins of lower extremity (H) [I82.409]  Long-term (current) use of anticoagulants [Z79.01] [Z79.01]         Your next Anticoagulation Clinic appointment(s)     Mar 30, 2017 10:30 AM CDT   Anticoagulation Visit with [facility]   [facility]    [address]  [facility] [address]   [phone]              March 2017 Details    Sun Mon Tue Wed Thu Fri Sat        1               2               3               4                 5               6               7               8               9               10               11                 12               13               14               15               16      Hold   See details      17      2.5 mg         18      5 mg           19      5 mg         20      2.5 mg         21      5 mg         22      2.5 mg         23      5 mg         24      2.5 mg         25      5 mg           26      5 mg         27      2.5 mg         28      5 mg         29      2.5 mg         30            31                 Date Details   03/16 This INR check       Date of next INR:  3/30/2017         How to take your warfarin dose     To take:  2.5 mg Take 0.5 of a 5 mg tablet.    To take:  5 mg Take 1 of the 5 mg tablets.    Hold Do not take your warfarin dose. See the Details table to the right for additional instructions.

## 2017-03-24 ENCOUNTER — HOSPITAL ENCOUNTER (OUTPATIENT)
Dept: ULTRASOUND IMAGING | Facility: HOSPITAL | Age: 72
Discharge: HOME | End: 2017-03-24
Attending: FAMILY MEDICINE | Admitting: FAMILY MEDICINE
Payer: MEDICARE

## 2017-03-24 PROCEDURE — 76775 US EXAM ABDO BACK WALL LIM: CPT | Mod: TC

## 2017-03-30 ENCOUNTER — ANTICOAGULATION THERAPY VISIT (OUTPATIENT)
Dept: ANTICOAGULATION | Facility: OTHER | Age: 72
End: 2017-03-30
Attending: FAMILY MEDICINE
Payer: MEDICARE

## 2017-03-30 DIAGNOSIS — I26.99 PULMONARY EMBOLISM AND INFARCTION (H): ICD-10-CM

## 2017-03-30 DIAGNOSIS — Z79.01 LONG-TERM (CURRENT) USE OF ANTICOAGULANTS: ICD-10-CM

## 2017-03-30 DIAGNOSIS — I82.409 ACUTE THROMBOEMBOLISM OF DEEP VEINS OF LOWER EXTREMITY, UNSPECIFIED LATERALITY (H): ICD-10-CM

## 2017-03-30 LAB — INR POINT OF CARE: 2.8 (ref 0.86–1.14)

## 2017-03-30 PROCEDURE — 85610 PROTHROMBIN TIME: CPT | Mod: QW

## 2017-03-30 NOTE — PROGRESS NOTES
ANTICOAGULATION FOLLOW-UP CLINIC VISIT    Patient Name:  Clement Voss  Date:  3/30/2017  Contact Type:  Face to Face    SUBJECTIVE:     Patient Findings     Positives Change in diet/appetite, Intentional hold of therapy    Comments States increased greens to 2-3x/wk again.  Discussed the difference between lettuces and cabbage.  Pt thought iceberg lettuce had Vit K.             OBJECTIVE    INR Protime   Date Value Ref Range Status   03/30/2017 2.8 (A) 0.86 - 1.14 Final       ASSESSMENT / PLAN  INR assessment THER    Recheck INR In: 6 WEEKS    INR Location Clinic      Anticoagulation Summary as of 3/30/2017     INR goal 2.0-3.0   Today's INR 2.8   Maintenance plan 2.5 mg (5 mg x 0.5) on Mon, Wed, Fri; 5 mg (5 mg x 1) all other days   Full instructions 2.5 mg on Mon, Wed, Fri; 5 mg all other days   Weekly total 27.5 mg   No change documented Jodie Gill RN   Plan last modified Jodie Gill RN (8/4/2016)   Next INR check 5/11/2017   Priority INR   Target end date Indefinite    Indications   Pulmonary embolism and infarction (H) [I26.99]  Acute thromboembolism of deep veins of lower extremity (H) [I82.409]  Long-term (current) use of anticoagulants [Z79.01] [Z79.01]         Anticoagulation Episode Summary     INR check location     Preferred lab     Send INR reminders to  [facility] POOL    Comments       Anticoagulation Care Providers     Provider Role Specialty Phone number    Lorelei Felix MD [facility] [phone]            See the Encounter Report to view Anticoagulation Flowsheet and Dosing Calendar (Go to Encounters tab in chart review, and find the Anticoagulation Therapy Visit)        Jodie Gill RN

## 2017-03-30 NOTE — MR AVS SNAPSHOT
Clement R Shanika   3/30/2017 10:30 AM   Anticoagulation Therapy Visit    Description:  71 year old male   Provider:  [facility]   Department:  [facility]           INR as of 3/30/2017     Today's INR 2.8      Anticoagulation Summary as of 3/30/2017     INR goal 2.0-3.0   Today's INR 2.8   Full instructions 2.5 mg on Mon, Wed, Fri; 5 mg all other days   Next INR check 5/11/2017    Indications   Pulmonary embolism and infarction (H) [I26.99]  Acute thromboembolism of deep veins of lower extremity (H) [I82.409]  Long-term (current) use of anticoagulants [Z79.01] [Z79.01]         Your next Anticoagulation Clinic appointment(s)     May 11, 2017 10:30 AM CDT   Anticoagulation Visit with [facility]   [facility]    [address] [address]   [phone]              March 2017 Details    Sun Mon Tue Wed Thu Fri Sat        1               2               3               4                 5               6               7               8               9               10               11                 12               13               14               15               16               17               18                 19               20               21               22               23               24               25                 26               27               28               29               30      5 mg   See details      31      2.5 mg           Date Details   03/30 This INR check               How to take your warfarin dose     To take:  2.5 mg Take 0.5 of a 5 mg tablet.    To take:  5 mg Take 1 of the 5 mg tablets.           April 2017 Details    Sun Mon Tue Wed Thu Fri Sat           1      5 mg           2      5 mg         3      2.5 mg         4      5 mg         5      2.5 mg         6      5 mg         7      2.5 mg         8      5 mg           9      5 mg         10      2.5 mg         11      5 mg         12       2.5 mg         13      5 mg         14      2.5 mg         15      5 mg           16      5 mg         17      2.5 mg         18      5 mg         19      2.5 mg         20      5 mg         21      2.5 mg         22      5 mg           23      5 mg         24      2.5 mg         25      5 mg         26      2.5 mg         27      5 mg         28      2.5 mg         29      5 mg           30      5 mg                Date Details   No additional details            How to take your warfarin dose     To take:  2.5 mg Take 0.5 of a 5 mg tablet.    To take:  5 mg Take 1 of the 5 mg tablets.           May 2017 Details    Sun Mon Tue Wed Thu Fri Sat      1      2.5 mg         2      5 mg         3      2.5 mg         4      5 mg         5      2.5 mg         6      5 mg           7      5 mg         8      2.5 mg         9      5 mg         10      2.5 mg         11            12               13                 14               15               16               17               18               19               20                 21               22               23               24               25               26               27                 28               29               30               31                   Date Details   No additional details    Date of next INR:  5/11/2017         How to take your warfarin dose     To take:  2.5 mg Take 0.5 of a 5 mg tablet.    To take:  5 mg Take 1 of the 5 mg tablets.

## 2017-04-20 ENCOUNTER — TRANSFERRED RECORDS (OUTPATIENT)
Dept: HEALTH INFORMATION MANAGEMENT | Facility: HOSPITAL | Age: 72
End: 2017-04-20

## 2017-05-11 ENCOUNTER — ANTICOAGULATION THERAPY VISIT (OUTPATIENT)
Dept: ANTICOAGULATION | Facility: OTHER | Age: 72
End: 2017-05-11
Attending: FAMILY MEDICINE
Payer: MEDICARE

## 2017-05-11 DIAGNOSIS — Z79.01 LONG-TERM (CURRENT) USE OF ANTICOAGULANTS: ICD-10-CM

## 2017-05-11 DIAGNOSIS — I26.99 PULMONARY EMBOLISM AND INFARCTION (H): ICD-10-CM

## 2017-05-11 DIAGNOSIS — I82.409 ACUTE THROMBOEMBOLISM OF DEEP VEINS OF LOWER EXTREMITY, UNSPECIFIED LATERALITY (H): ICD-10-CM

## 2017-05-11 LAB — INR POINT OF CARE: 2.1 (ref 0.86–1.14)

## 2017-05-11 PROCEDURE — 85610 PROTHROMBIN TIME: CPT | Mod: QW,ZL

## 2017-05-11 NOTE — PROGRESS NOTES
ANTICOAGULATION FOLLOW-UP CLINIC VISIT    Patient Name:  Clement Voss  Date:  5/11/2017  Contact Type:  Face to Face    SUBJECTIVE:     Patient Findings     Positives No Problem Findings           OBJECTIVE    INR Protime   Date Value Ref Range Status   05/11/2017 2.1 (A) 0.86 - 1.14 Final       ASSESSMENT / PLAN  INR assessment THER    Recheck INR In: 6 WEEKS    INR Location Clinic      Anticoagulation Summary as of 5/11/2017     INR goal 2.0-3.0   Today's INR 2.1   Maintenance plan 2.5 mg (5 mg x 0.5) on Mon, Wed, Fri; 5 mg (5 mg x 1) all other days   Full instructions 2.5 mg on Mon, Wed, Fri; 5 mg all other days   Weekly total 27.5 mg   No change documented Jodie Gill RN   Plan last modified Jodie Gill RN (8/4/2016)   Next INR check 6/22/2017   Priority INR   Target end date Indefinite    Indications   Pulmonary embolism and infarction (H) [I26.99]  Acute thromboembolism of deep veins of lower extremity (H) [I82.409]  Long-term (current) use of anticoagulants [Z79.01] [Z79.01]         Anticoagulation Episode Summary     INR check location     Preferred lab     Send INR reminders to  [facility] POOL    Comments       Anticoagulation Care Providers     Provider Role Specialty Phone number    Lorelei Felix MD [facility] [phone]            See the Encounter Report to view Anticoagulation Flowsheet and Dosing Calendar (Go to Encounters tab in chart review, and find the Anticoagulation Therapy Visit)        Jodie Gill RN

## 2017-05-11 NOTE — MR AVS SNAPSHOT
Clement SOCRATES Voss   5/11/2017 10:30 AM   Anticoagulation Therapy Visit    Description:  71 year old male   Provider:  [facility]   Department:  [facility]           INR as of 5/11/2017     Today's INR 2.1      Anticoagulation Summary as of 5/11/2017     INR goal 2.0-3.0   Today's INR 2.1   Full instructions 2.5 mg on Mon, Wed, Fri; 5 mg all other days   Next INR check 6/22/2017    Indications   Pulmonary embolism and infarction (H) [I26.99]  Acute thromboembolism of deep veins of lower extremity (H) [I82.409]  Long-term (current) use of anticoagulants [Z79.01] [Z79.01]         Your next Anticoagulation Clinic appointment(s)     Jun 22, 2017 10:30 AM CDT   Anticoagulation Visit with [facility]   [facility] ([facility]    [address] [address]   [phone]              May 2017 Details    Sun Mon Tue Wed Thu Fri Sat      1               2               3               4               5               6                 7               8               9               10               11      5 mg   See details      12      2.5 mg         13      5 mg           14      5 mg         15      2.5 mg         16      5 mg         17      2.5 mg         18      5 mg         19      2.5 mg         20      5 mg           21      5 mg         22      2.5 mg         23      5 mg         24      2.5 mg         25      5 mg         26      2.5 mg         27      5 mg           28      5 mg         29      2.5 mg         30      5 mg         31      2.5 mg             Date Details   05/11 This INR check               How to take your warfarin dose     To take:  2.5 mg Take 0.5 of a 5 mg tablet.    To take:  5 mg Take 1 of the 5 mg tablets.           June 2017 Details    Sun Mon Tue Wed Thu Fri Sat         1      5 mg         2      2.5 mg         3      5 mg           4      5 mg         5      2.5 mg         6      5 mg         7      2.5 mg         8       5 mg         9      2.5 mg         10      5 mg           11      5 mg         12      2.5 mg         13      5 mg         14      2.5 mg         15      5 mg         16      2.5 mg         17      5 mg           18      5 mg         19      2.5 mg         20      5 mg         21      2.5 mg         22            23               24                 25               26               27               28               29               30                 Date Details   No additional details    Date of next INR:  6/22/2017         How to take your warfarin dose     To take:  2.5 mg Take 0.5 of a 5 mg tablet.    To take:  5 mg Take 1 of the 5 mg tablets.

## 2017-05-19 DIAGNOSIS — I10 BENIGN ESSENTIAL HYPERTENSION: ICD-10-CM

## 2017-05-19 DIAGNOSIS — E11.9 TYPE 2 DIABETES MELLITUS WITHOUT COMPLICATION, WITHOUT LONG-TERM CURRENT USE OF INSULIN (H): ICD-10-CM

## 2017-05-19 DIAGNOSIS — E78.5 HYPERLIPIDEMIA, UNSPECIFIED HYPERLIPIDEMIA TYPE: ICD-10-CM

## 2017-05-19 LAB
ANION GAP SERPL CALCULATED.3IONS-SCNC: 9 MMOL/L (ref 3–14)
BUN SERPL-MCNC: 17 MG/DL (ref 7–30)
CALCIUM SERPL-MCNC: 8.8 MG/DL (ref 8.5–10.1)
CHLORIDE SERPL-SCNC: 110 MMOL/L (ref 94–109)
CHOLEST SERPL-MCNC: 171 MG/DL
CO2 SERPL-SCNC: 24 MMOL/L (ref 20–32)
CREAT SERPL-MCNC: 1.61 MG/DL (ref 0.66–1.25)
EST. AVERAGE GLUCOSE BLD GHB EST-MCNC: 203 MG/DL
GFR SERPL CREATININE-BSD FRML MDRD: 42 ML/MIN/1.7M2
GLUCOSE SERPL-MCNC: 230 MG/DL (ref 70–99)
HBA1C MFR BLD: 8.7 % (ref 4.3–6)
HDLC SERPL-MCNC: 32 MG/DL
LDLC SERPL CALC-MCNC: 83 MG/DL
NONHDLC SERPL-MCNC: 139 MG/DL
POTASSIUM SERPL-SCNC: 4.3 MMOL/L (ref 3.4–5.3)
SODIUM SERPL-SCNC: 143 MMOL/L (ref 133–144)
TRIGL SERPL-MCNC: 279 MG/DL

## 2017-05-19 PROCEDURE — 80048 BASIC METABOLIC PNL TOTAL CA: CPT | Mod: ZL | Performed by: FAMILY MEDICINE

## 2017-05-19 PROCEDURE — 40000788 ZZHCL STATISTIC ESTIMATED AVERAGE GLUCOSE: Mod: ZL | Performed by: FAMILY MEDICINE

## 2017-05-19 PROCEDURE — 80061 LIPID PANEL: CPT | Mod: ZL | Performed by: FAMILY MEDICINE

## 2017-05-19 PROCEDURE — 83036 HEMOGLOBIN GLYCOSYLATED A1C: CPT | Mod: ZL | Performed by: FAMILY MEDICINE

## 2017-05-19 PROCEDURE — 36415 COLL VENOUS BLD VENIPUNCTURE: CPT | Mod: ZL | Performed by: FAMILY MEDICINE

## 2017-05-26 ENCOUNTER — OFFICE VISIT (OUTPATIENT)
Dept: FAMILY MEDICINE | Facility: OTHER | Age: 72
End: 2017-05-26
Attending: FAMILY MEDICINE
Payer: COMMERCIAL

## 2017-05-26 VITALS
HEIGHT: 67 IN | WEIGHT: 224 LBS | SYSTOLIC BLOOD PRESSURE: 118 MMHG | DIASTOLIC BLOOD PRESSURE: 50 MMHG | RESPIRATION RATE: 14 BRPM | HEART RATE: 60 BPM | TEMPERATURE: 98 F | BODY MASS INDEX: 35.16 KG/M2

## 2017-05-26 DIAGNOSIS — E78.5 HYPERLIPIDEMIA, UNSPECIFIED HYPERLIPIDEMIA TYPE: ICD-10-CM

## 2017-05-26 DIAGNOSIS — I10 BENIGN ESSENTIAL HYPERTENSION: ICD-10-CM

## 2017-05-26 DIAGNOSIS — E11.65 TYPE 2 DIABETES MELLITUS WITH HYPERGLYCEMIA, WITHOUT LONG-TERM CURRENT USE OF INSULIN (H): Primary | ICD-10-CM

## 2017-05-26 PROBLEM — E66.01 MORBID OBESITY (H): Status: ACTIVE | Noted: 2017-05-26

## 2017-05-26 PROCEDURE — 99212 OFFICE O/P EST SF 10 MIN: CPT

## 2017-05-26 PROCEDURE — 99214 OFFICE O/P EST MOD 30 MIN: CPT | Performed by: FAMILY MEDICINE

## 2017-05-26 NOTE — PROGRESS NOTES
SUBJECTIVE:                                                    Clement Voss is a 72 year old male who presents to clinic today for the following health issues:      Diabetes Follow-up    Patient is checking blood sugars: twice daily.    Blood sugar testing frequency justification: Uncontrolled diabetes  Results are as follows:         am - 176-218              postprandial after lunch- 146-190    Diabetic concerns: blood sugar frequently over 200     Symptoms of hypoglycemia (low blood sugar): shaky, dizzy     Paresthesias (numbness or burning in feet) or sores: No     Date of last diabetic eye exam: 12/2016     Hyperlipidemia Follow-Up      Rate your low fat/cholesterol diet?: fair    Taking statin?  No    Other lipid medications/supplements?:  Fenofibrate, without side effects     Hypertension Follow-up      Outpatient blood pressures are not being checked.    Low Salt Diet: no added salt         Problem list and histories reviewed & adjusted, as indicated.  Additional history: as documented    Patient Active Problem List   Diagnosis     Diabetes mellitus, type 2 (H)     Hyperlipidemia     Benign essential hypertension     Gout     Pulmonary embolism and infarction (H)     Acute thromboembolism of deep veins of lower extremity (H)     Advanced care planning/counseling discussion     Long-term (current) use of anticoagulants [Z79.01]     Morbid obesity (H)     Past Surgical History:   Procedure Laterality Date     APPENDECTOMY  2008     CHOLECYSTECTOMY  1984     History of PE of right lung 3/2013[         Social History   Substance Use Topics     Smoking status: Former Smoker     Types: Cigarettes     Quit date: 8/12/1964     Smokeless tobacco: Never Used     Alcohol use No     Family History   Problem Relation Age of Onset     HEART DISEASE Father 71     heart disease; cause of death     Other - See Comments Mother 79     old age; cause of death         Current Outpatient Prescriptions   Medication Sig Dispense  Refill     exenatide ER (BYDUREON) 2 MG pen Inject 2 mg Subcutaneous every 7 days 4 each 5     amLODIPine (NORVASC) 10 MG tablet Take 1 tablet (10 mg) by mouth daily 90 tablet 3     lisinopril (PRINIVIL,ZESTRIL) 40 MG tablet Take 1 tablet (40 mg) by mouth daily 90 tablet 3     terazosin (HYTRIN) 10 MG capsule Take 1 capsule (10 mg) by mouth At Bedtime 90 capsule 3     warfarin (COUMADIN) 5 MG tablet Take 2.5mg Mon/Wed/Fri; 5mg all other days or as directed by [facility] 90 tablet 4     pilocarpine (PILOCAR) 1 % ophthalmic solution Place 1 drop into both eyes 4 times daily       fenofibrate 48 MG tablet Take 1 tablet (48 mg) by mouth daily 90 tablet 3     sitagliptin (JANUVIA) 100 MG tablet Take 1 tablet (100 mg) by mouth daily 90 tablet 3     glyBURIDE (DIABETA / MICRONASE) 5 MG tablet Take 2 tablets (10 mg) by mouth 2 times daily (with meals) 360 tablet 3     ACCU-CHEK VLAD PLUS test strip USE TO TEST BLOOD SUGAR THREE TIMES DAILY 100 each 10     STATIN NOT PRESCRIBED, INTENTIONAL, Statin not prescribed intentionally due to Intolerance (with supporting documentation of trying a statin at least once within the last 5 years) 0 each 0     fluticasone (FLONASE) 50 MCG/ACT nasal spray Spray 2 sprays into both nostrils daily (Patient taking differently: Spray 2 sprays into both nostrils daily ) 1 Package 0     indomethacin (INDOCIN) 50 MG capsule Take 1 capsule (50 mg) by mouth 3 times daily With food as needed 30 capsule 1     brimonidine-timolol (COMBIGAN) 0.2-0.5 % ophthalmic solution 1 drop 2 times daily Morning and evening, 12 hours apart       acetaminophen (TYLENOL) 500 MG tablet Take 1-2 tablets (500-1,000 mg) by mouth every 6 hours as needed       aspirin 81 MG EC tablet Take 1 tablet (81 mg) by mouth daily 90 tablet 3     loratadine (CLARITIN) 10 MG tablet Take 10 mg by mouth daily.       fish oil-omega-3 fatty acids (FISH OIL) 1000 MG capsule Take 1 capsule by mouth 3 times daily.       TRAVATAN Z  "(TRAVATAN Z) 0.004 % ophthalmic solution Instill 1 drop by ophthalmic route every day into affected eye(s) in the evening       Allergies   Allergen Reactions     Atorvastatin Calcium Other (See Comments)     Lipitor  Increase CR     Iodine      Penicillins      Sulfa Drugs      Sulfa (sulfonamide antibiotics)       Reviewed and updated as needed this visit by clinical staff  Tobacco  Allergies  Meds  Problems  Med Hx  Surg Hx  Fam Hx  Soc Hx        Reviewed and updated as needed this visit by Provider  Allergies  Meds  Problems         ROS:  Constitutional, HEENT, cardiovascular, pulmonary, gi and gu systems are negative, except as otherwise noted.    OBJECTIVE:                                                    /50 (BP Location: Left arm, Patient Position: Chair, Cuff Size: Adult Large)  Pulse 60  Temp 98  F (36.7  C)  Resp 14  Ht 5' 7\" (1.702 m)  Wt 224 lb (101.6 kg)  BMI 35.08 kg/m2  Body mass index is 35.08 kg/(m^2).  GENERAL: healthy, alert and no distress  PSYCH: mentation appears normal, affect normal/bright    Diagnostic Test Results:  Results for orders placed or performed in visit on 05/19/17   Basic metabolic panel   Result Value Ref Range    Sodium 143 133 - 144 mmol/L    Potassium 4.3 3.4 - 5.3 mmol/L    Chloride 110 (H) 94 - 109 mmol/L    Carbon Dioxide 24 20 - 32 mmol/L    Anion Gap 9 3 - 14 mmol/L    Glucose 230 (H) 70 - 99 mg/dL    Urea Nitrogen 17 7 - 30 mg/dL    Creatinine 1.61 (H) 0.66 - 1.25 mg/dL    GFR Estimate 42 (L) >60 mL/min/1.7m2    GFR Estimate If Black 51 (L) >60 mL/min/1.7m2    Calcium 8.8 8.5 - 10.1 mg/dL   Hemoglobin A1c   Result Value Ref Range    Hemoglobin A1C 8.7 (H) 4.3 - 6.0 %   Lipid Profile   Result Value Ref Range    Cholesterol 171 <200 mg/dL    Triglycerides 279 (H) <150 mg/dL    HDL Cholesterol 32 (L) >39 mg/dL    LDL Cholesterol Calculated 83 <100 mg/dL    Non HDL Cholesterol 139 (H) <130 mg/dL   Estimated Average Glucose   Result Value Ref Range "    Estimated Average Glucose 203 mg/dL        ASSESSMENT/PLAN:                                                    Diabetes Type II, A1c Uncontrolled, non-insulin dependent   Associated with the following complications    Renal Complications:  None    Ophthalmologic Complications: None    Neurologic Complications: None    Peripheral Vascular Complications:  None    Other: None   Plan:  The following changes are made - Will add weekly or daily non-insulin injectable, wife called insurance company with names of medication for review with plan before prescribing.    Hyperlipidemia; controlled   Plan:  No changes in the patient's current treatment plan    Hypertension; controlled   Associated with the following complications:    Diabetes   Plan:  No changes in the patient's current treatment plan          ICD-10-CM    1. Type 2 diabetes mellitus with hyperglycemia, without long-term current use of insulin (H) E11.65 exenatide ER (BYDUREON) 2 MG pen     DIABETES EDUCATION REFERRAL ([place])     Hemoglobin A1c   2. Benign essential hypertension I10 Basic metabolic panel   3. Hyperlipidemia, unspecified hyperlipidemia type E78.5 Lipid Profile       FUTURE APPOINTMENTS:       - Follow-up visit in 3 months, sooner as needed.      Lorelei Felix MD  [facility]

## 2017-05-26 NOTE — MR AVS SNAPSHOT
After Visit Summary   5/26/2017    Clement Voss    MRN: [id number]           Patient Information     Date Of Birth          1945        Visit Information        Provider Department      5/26/2017 10:15 AM Lorelei Felix MD [facility]        Today's Diagnoses     Type 2 diabetes mellitus with hyperglycemia, without long-term current use of insulin (H)    -  1    Benign essential hypertension        Hyperlipidemia, unspecified hyperlipidemia type           Follow-ups after your visit        Additional Services     DIABETES EDUCATION REFERRAL ([facility])       DIABETES SELF-MANAGEMENT TRAINING (DSMT)  Type of training and number of hours requested:  Injection training - likely Bydureon        Please add if the patient has special educational need: None  (Patients with special needs requiring individual DSMT)    Please include the following DMST content: Medications      Additional Services Provided:  >>A1c will be completed upon referral and completion of program unless completed in clinic.  >>Influenza vaccination assessment (form #N228) as applicable.  >>Order for diabetes supplies will be faxed to patient's pharmacy.  >>If on insulin: Insulin dose adjustment per staged Diabetes Mgmt. Protocols    DIABETES RESOURCE CENTER  [facility]  Telephone:  [phone]   Fax:  [phone]                  Follow-up notes from your care team     Return in about 3 months (around 8/26/2017).      Your next 10 appointments already scheduled     Jun 22, 2017  9:00 AM CDT   LAB with [facility]   [facility] ([facility] )    [address]  [facility] [address]   [phone]              Who to contact     If you have questions or need follow up information about today's clinic visit or your schedule please contact [facility] directly at [phone].  Normal or non-critical lab and imaging results will be communicated to you by  "MyChart, letter or phone within 4 business days after the clinic has received the results. If you do not hear from us within 7 days, please contact the clinic through [email or web address] or phone. If you have a critical or abnormal lab result, we will notify you by phone as soon as possible.  Submit refill requests through AfterShip or call your pharmacy and they will forward the refill request to us. Please allow 3 business days for your refill to be completed.          Additional Information About Your Visit        [email or web address] Information     AfterShip lets you send messages to your doctor, view your test results, renew your prescriptions, schedule appointments and more. To sign up, go to [email or web address] . Click on \"Log in\" on the left side of the screen, which will take you to the Welcome page. Then click on \"Sign up Now\" on the right side of the page.     You will be asked to enter the access code listed below, as well as some personal information. Please follow the directions to create your username and password.     Your access code is: [email or web address]  Expires: 2017  5:16 PM     Your access code will  in 90 days. If you need help or a new code, please call your [place] clinic or [phone].        Care EveryWhere ID     This is your Care EveryWhere ID. This could be used by other organizations to access your [place] medical records  [id number]        Your Vitals Were     Pulse Temperature Respirations Height BMI (Body Mass Index)       60 98  F (36.7  C) 14 5' 7\" (1.702 m) 35.08 kg/m2        Blood Pressure from Last 3 Encounters:   17 118/50   17 144/66   17 140/62    Weight from Last 3 Encounters:   17 224 lb (101.6 kg)   17 225 lb 9.6 oz (102.3 kg)   16 226 lb (102.5 kg)              We Performed the Following     DIABETES EDUCATION REFERRAL ([place])          Today's Medication Changes          These changes are accurate as of: 17 11:59 PM.  If you have any " questions, ask your nurse or doctor.               Start taking these medicines.        Dose/Directions    exenatide ER 2 MG pen   Commonly known as:  BYDUREON   Used for:  Type 2 diabetes mellitus with hyperglycemia, without long-term current use of insulin (H)   Started by:  Lorelei Felix MD        Dose:  2 mg   Inject 2 mg Subcutaneous every 7 days   Quantity:  4 each   Refills:  5            Where to get your medicines      These medications were sent to [facility] [address]  AT [facility] [address] [place] [address]     Phone:  [phone]     exenatide ER 2 MG pen                Primary Care Provider Office Phone # Fax #    Lorelei Felix [phone]       [facility] [address] [facility] [address]        Thank you!     Thank you for choosing [facility]  for your care. Our goal is always to provide you with excellent care. Hearing back from our patients is one way we can continue to improve our services. Please take a few minutes to complete the written survey that you may receive in the mail after your visit with us. Thank you!             Your Updated Medication List - Protect others around you: Learn how to safely use, store and throw away your medicines at www.disposemymeds.org.          This list is accurate as of: 5/26/17 11:59 PM.  Always use your most recent med list.                   Brand Name Dispense Instructions for use    ACCU-CHEK VLAD PLUS test strip   Generic drug:  blood glucose monitoring     100 each    USE TO TEST BLOOD SUGAR THREE TIMES DAILY       acetaminophen 500 MG tablet    TYLENOL     Take 1-2 tablets (500-1,000 mg) by mouth every 6 hours as needed       amLODIPine 10 MG tablet    NORVASC    90 tablet    Take 1 tablet (10 mg) by mouth daily       aspirin 81 MG EC tablet     90 tablet    Take 1 tablet (81 mg) by mouth daily       CLARITIN  10 MG tablet   Generic drug:  loratadine      Take 10 mg by mouth daily.       COMBIGAN 0.2-0.5 % ophthalmic solution   Generic drug:  brimonidine-timolol      1 drop 2 times daily Morning and evening, 12 hours apart       exenatide ER 2 MG pen    BYDUREON    4 each    Inject 2 mg Subcutaneous every 7 days       fenofibrate 48 MG tablet     90 tablet    Take 1 tablet (48 mg) by mouth daily       fish oil-omega-3 fatty acids 1000 MG capsule      Take 1 capsule by mouth 3 times daily.       fluticasone 50 MCG/ACT spray    FLONASE    1 Package    Spray 2 sprays into both nostrils daily       glyBURIDE 5 MG tablet    DIABETA /MICRONASE    360 tablet    Take 2 tablets (10 mg) by mouth 2 times daily (with meals)       indomethacin 50 MG capsule    INDOCIN    30 capsule    Take 1 capsule (50 mg) by mouth 3 times daily With food as needed       lisinopril 40 MG tablet    PRINIVIL/ZESTRIL    90 tablet    Take 1 tablet (40 mg) by mouth daily       pilocarpine 1 % ophthalmic solution    PILOCAR     Place 1 drop into both eyes 4 times daily       sitagliptin 100 MG tablet    JANUVIA    90 tablet    Take 1 tablet (100 mg) by mouth daily       STATIN NOT PRESCRIBED (INTENTIONAL)     0 each    Statin not prescribed intentionally due to Intolerance (with supporting documentation of trying a statin at least once within the last 5 years)       terazosin 10 MG capsule    HYTRIN    90 capsule    Take 1 capsule (10 mg) by mouth At Bedtime       TRAVATAN Z 0.004 % ophthalmic solution   Generic drug:  travoprost (BAK Free)      Instill 1 drop by ophthalmic route every day into affected eye(s) in the evening       warfarin 5 MG tablet    COUMADIN    90 tablet    Take 2.5mg Mon/Wed/Fri; 5mg all other days or as directed by [facility]

## 2017-05-26 NOTE — NURSING NOTE
"Chief Complaint   Patient presents with     Chronic Disease Management     morning blood sugars, upper 100's to low 200's       Initial /50 (BP Location: Left arm, Patient Position: Chair, Cuff Size: Adult Large)  Pulse 60  Temp 98  F (36.7  C)  Resp 14  Ht 5' 7\" (1.702 m)  Wt 224 lb (101.6 kg)  BMI 35.08 kg/m2 Estimated body mass index is 35.08 kg/(m^2) as calculated from the following:    Height as of this encounter: 5' 7\" (1.702 m).    Weight as of this encounter: 224 lb (101.6 kg).  Medication Reconciliation: complete     Maryjane Salinas      "

## 2017-05-27 ASSESSMENT — PATIENT HEALTH QUESTIONNAIRE - PHQ9: SUM OF ALL RESPONSES TO PHQ QUESTIONS 1-9: 0

## 2017-06-06 ENCOUNTER — HOSPITAL ENCOUNTER (OUTPATIENT)
Dept: EDUCATION SERVICES | Facility: HOSPITAL | Age: 72
Discharge: HOME | End: 2017-06-06
Attending: FAMILY MEDICINE | Admitting: FAMILY MEDICINE
Payer: MEDICARE

## 2017-06-06 VITALS
HEIGHT: 67 IN | OXYGEN SATURATION: 94 % | BODY MASS INDEX: 34.75 KG/M2 | SYSTOLIC BLOOD PRESSURE: 138 MMHG | DIASTOLIC BLOOD PRESSURE: 58 MMHG | HEART RATE: 79 BPM | WEIGHT: 221.4 LBS

## 2017-06-06 DIAGNOSIS — E11.65 TYPE 2 DIABETES MELLITUS WITH HYPERGLYCEMIA, WITHOUT LONG-TERM CURRENT USE OF INSULIN (H): Primary | ICD-10-CM

## 2017-06-06 PROCEDURE — G0108 DIAB MANAGE TRN  PER INDIV: HCPCS

## 2017-06-06 ASSESSMENT — PAIN SCALES - GENERAL: PAINLEVEL: MILD PAIN (2)

## 2017-06-06 NOTE — IP AVS SNAPSHOT
MRN:[id number]                      After Visit Summary   6/6/2017    Clement Voss    MRN: [id number]           Thank you!     Thank you for choosing [facility] for your care. Our goal is always to provide you with excellent care. Hearing back from our patients is one way we can continue to improve our services. Please take a few minutes to complete the written survey that you may receive in the mail after you visit with us. Thank you!        Patient Information     Date Of Birth          1945        About your hospital stay     You were admitted on:  June 6, 2017 You last received care in the:  [facility]    You were discharged on:  June 6, 2017       Who to Call     For medical emergencies, please call 911.  For non-urgent questions about your medical care, please call your primary care provider or clinic, [phone]          Attending Provider     Provider Specialty    Lorelei Felix MD Family Practice       Primary Care Provider Office Phone # Fax #    Lorelei Felix [phone]      Your next 10 appointments already scheduled     Jun 22, 2017  9:00 AM CDT   LAB with [facility] ([facility] )    [address]  [facility] [address]   [phone]              Further instructions from your care team       I will contact Dr. Wang about instructions for stopping your Glyburide and when to start Bydureon.    When you start Bydureon, you will take one injection of 2 mg once a week (pick a day of the week).    Please contact Dr. Wang or Anamaria ([phone]) if you have any side effects, questions or concerns.        Pending Results     No orders found from 6/4/2017 to 6/7/2017.            Admission Information     Date & Time Provider Department Dept. Phone    6/6/2017 Lorelei Felix MD [facility] [phone]      Your Vitals Were     Blood Pressure Pulse Height Weight Pulse  "Oximetry BMI (Body Mass Index)    138/58 (BP Location: Right arm, Patient Position: Chair, Cuff Size: Adult Large) 79 1.702 m (5' 7\") 100.4 kg (221 lb 6.4 oz) 94% 34.68 kg/m2      [employer] Information     [employer] lets you send messages to your doctor, view your test results, renew your prescriptions, schedule appointments and more. To sign up, go to [email or web address] . Click on \"Log in\" on the left side of the screen, which will take you to the Welcome page. Then click on \"Sign up Now\" on the right side of the page.     You will be asked to enter the access code listed below, as well as some personal information. Please follow the directions to create your username and password.     Your access code is: [email or web address]  Expires: 2017  5:16 PM     Your access code will  in 90 days. If you need help or a new code, please call your [place] clinic or [phone].        Care EveryWhere ID     This is your Care EveryWhere ID. This could be used by other organizations to access your [place] medical records  [id number]           Review of your medicines      UNREVIEWED medicines. Ask your doctor about these medicines        Dose / Directions    acetaminophen 500 MG tablet   Commonly known as:  TYLENOL   Used for:  Pulmonary embolism (H)        Dose:  500-1000 mg   Take 1-2 tablets (500-1,000 mg) by mouth every 6 hours as needed   Refills:  0       amLODIPine 10 MG tablet   Commonly known as:  NORVASC   Used for:  Benign essential hypertension        Dose:  10 mg   Take 1 tablet (10 mg) by mouth daily   Quantity:  90 tablet   Refills:  3       aspirin 81 MG EC tablet   Used for:  Diabetes mellitus, type 2 (H)        Dose:  81 mg   Take 1 tablet (81 mg) by mouth daily   Quantity:  90 tablet   Refills:  3       CLARITIN 10 MG tablet   Generic drug:  loratadine        Dose:  10 mg   Take 10 mg by mouth daily.   Refills:  0       COMBIGAN 0.2-0.5 % ophthalmic solution   Generic drug:  brimonidine-timolol     "    Dose:  1 drop   1 drop 2 times daily Morning and evening, 12 hours apart   Refills:  0       exenatide ER 2 MG pen   Commonly known as:  BYDUREON   Used for:  Type 2 diabetes mellitus with hyperglycemia, without long-term current use of insulin (H)        Dose:  2 mg   Inject 2 mg Subcutaneous every 7 days   Quantity:  4 each   Refills:  5       fenofibrate 48 MG tablet   Used for:  Hyperlipidemia, unspecified hyperlipidemia type        Dose:  48 mg   Take 1 tablet (48 mg) by mouth daily   Quantity:  90 tablet   Refills:  3       fish oil-omega-3 fatty acids 1000 MG capsule        Dose:  1 capsule   Take 1 capsule by mouth 3 times daily.   Refills:  0       fluticasone 50 MCG/ACT spray   Commonly known as:  FLONASE   Used for:  Acute maxillary sinusitis        Dose:  2 spray   Spray 2 sprays into both nostrils daily   Quantity:  1 Package   Refills:  0       glyBURIDE 5 MG tablet   Commonly known as:  DIABETA /MICRONASE   Used for:  Type 2 diabetes mellitus without complication (H)        Dose:  10 mg   Take 2 tablets (10 mg) by mouth 2 times daily (with meals)   Quantity:  360 tablet   Refills:  3       indomethacin 50 MG capsule   Commonly known as:  INDOCIN   Used for:  Gout, unspecified        Dose:  50 mg   Take 1 capsule (50 mg) by mouth 3 times daily With food as needed   Quantity:  30 capsule   Refills:  1       lisinopril 40 MG tablet   Commonly known as:  PRINIVIL/ZESTRIL   Used for:  Benign essential hypertension        Dose:  40 mg   Take 1 tablet (40 mg) by mouth daily   Quantity:  90 tablet   Refills:  3       pilocarpine 1 % ophthalmic solution   Commonly known as:  PILOCAR        Dose:  1 drop   Place 1 drop into both eyes 4 times daily   Refills:  0       sitagliptin 100 MG tablet   Commonly known as:  JANUVIA   Used for:  Type 2 diabetes mellitus without complication (H)        Dose:  100 mg   Take 1 tablet (100 mg) by mouth daily   Quantity:  90 tablet   Refills:  3       STATIN NOT PRESCRIBED  (INTENTIONAL)   Used for:  Hyperlipidemia, unspecified hyperlipidemia        Statin not prescribed intentionally due to Intolerance (with supporting documentation of trying a statin at least once within the last 5 years)   Quantity:  0 each   Refills:  0       terazosin 10 MG capsule   Commonly known as:  HYTRIN   Used for:  Benign essential hypertension        Dose:  10 mg   Take 1 capsule (10 mg) by mouth At Bedtime   Quantity:  90 capsule   Refills:  3       TRAVATAN Z 0.004 % ophthalmic solution   Generic drug:  travoprost (BAK Free)        Instill 1 drop by ophthalmic route every day into affected eye(s) in the evening   Refills:  0       warfarin 5 MG tablet   Commonly known as:  COUMADIN   Used for:  Pulmonary embolism and infarction (H)        Take 2.5mg Mon/Wed/Fri; 5mg all other days or as directed by [facility]   Quantity:  90 tablet   Refills:  4         CONTINUE these medicines which have NOT CHANGED        Dose / Directions    ACCU-CHEK VLAD PLUS test strip   Used for:  Diabetes mellitus, type 2 (H)   Generic drug:  blood glucose monitoring        USE TO TEST BLOOD SUGAR THREE TIMES DAILY   Quantity:  100 each   Refills:  10                Protect others around you: Learn how to safely use, store and throw away your medicines at www.disposemymeds.org.             Medication List: This is a list of all your medications and when to take them. Check marks below indicate your daily home schedule. Keep this list as a reference.      Medications           Morning Afternoon Evening Bedtime As Needed    ACCU-CHEK VLAD PLUS test strip   USE TO TEST BLOOD SUGAR THREE TIMES DAILY   Generic drug:  blood glucose monitoring                                acetaminophen 500 MG tablet   Commonly known as:  TYLENOL   Take 1-2 tablets (500-1,000 mg) by mouth every 6 hours as needed                                amLODIPine 10 MG tablet   Commonly known as:  NORVASC   Take 1 tablet (10 mg) by mouth daily                                 aspirin 81 MG EC tablet   Take 1 tablet (81 mg) by mouth daily                                CLARITIN 10 MG tablet   Take 10 mg by mouth daily.   Generic drug:  loratadine                                COMBIGAN 0.2-0.5 % ophthalmic solution   1 drop 2 times daily Morning and evening, 12 hours apart   Generic drug:  brimonidine-timolol                                exenatide ER 2 MG pen   Commonly known as:  BYDUREON   Inject 2 mg Subcutaneous every 7 days                                fenofibrate 48 MG tablet   Take 1 tablet (48 mg) by mouth daily                                fish oil-omega-3 fatty acids 1000 MG capsule   Take 1 capsule by mouth 3 times daily.                                fluticasone 50 MCG/ACT spray   Commonly known as:  FLONASE   Spray 2 sprays into both nostrils daily                                glyBURIDE 5 MG tablet   Commonly known as:  DIABETA /MICRONASE   Take 2 tablets (10 mg) by mouth 2 times daily (with meals)                                indomethacin 50 MG capsule   Commonly known as:  INDOCIN   Take 1 capsule (50 mg) by mouth 3 times daily With food as needed                                lisinopril 40 MG tablet   Commonly known as:  PRINIVIL/ZESTRIL   Take 1 tablet (40 mg) by mouth daily                                pilocarpine 1 % ophthalmic solution   Commonly known as:  PILOCAR   Place 1 drop into both eyes 4 times daily                                sitagliptin 100 MG tablet   Commonly known as:  JANUVIA   Take 1 tablet (100 mg) by mouth daily                                STATIN NOT PRESCRIBED (INTENTIONAL)   Statin not prescribed intentionally due to Intolerance (with supporting documentation of trying a statin at least once within the last 5 years)                                terazosin 10 MG capsule   Commonly known as:  HYTRIN   Take 1 capsule (10 mg) by mouth At Bedtime                                TRAVATAN Z 0.004 % ophthalmic  solution   Instill 1 drop by ophthalmic route every day into affected eye(s) in the evening   Generic drug:  travoprost (DARION Free)                                warfarin 5 MG tablet   Commonly known as:  COUMADIN   Take 2.5mg Mon/Wed/Fri; 5mg all other days or as directed by [facility]

## 2017-06-06 NOTE — NURSING NOTE
"Chief Complaint   Patient presents with     Diabetes     annual       Initial /58 (BP Location: Right arm, Patient Position: Chair, Cuff Size: Adult Large)  Pulse 79  Ht 1.702 m (5' 7\")  Wt 100.4 kg (221 lb 6.4 oz)  SpO2 94%  BMI 34.68 kg/m2 Estimated body mass index is 34.68 kg/(m^2) as calculated from the following:    Height as of this encounter: 1.702 m (5' 7\").    Weight as of this encounter: 100.4 kg (221 lb 6.4 oz).  Medication Reconciliation: complete   Jodie Gomez      "

## 2017-06-06 NOTE — DISCHARGE INSTRUCTIONS
I will contact Dr. Wang about instructions for stopping your Glyburide and when to start Bydureon.    When you start Bydureon, you will take one injection of 2 mg once a week (pick a day of the week).    Please contact Dr. Wang or Anamaria ([phone]) if you have any side effects, questions or concerns.

## 2017-06-08 ENCOUNTER — TELEPHONE (OUTPATIENT)
Dept: EDUCATION SERVICES | Facility: HOSPITAL | Age: 72
End: 2017-06-08

## 2017-06-08 NOTE — TELEPHONE ENCOUNTER
Called Clement to tell him that he is to stop his Glyburide when he starts his Bydureon. I told him that his prescription should be at the pharmacy. He plans to take it every Wednesday.

## 2017-06-08 NOTE — TELEPHONE ENCOUNTER
I have instructed Clement about Bydureon. Do you want to decrease or stop his Glyburide when he starts the Bydureon? Thanks!

## 2017-06-08 NOTE — TELEPHONE ENCOUNTER
We will stop Glyburide when starting Bydureon, can restart if blood glucose still high after a few weeks.  Thank you.

## 2017-06-09 NOTE — PROGRESS NOTES
"Clement is here today to start Bydureon. /58 (BP Location: Right arm, Patient Position: Chair, Cuff Size: Adult Large)  Pulse 79  Ht 1.702 m (5' 7\")  Wt 100.4 kg (221 lb 6.4 oz)  SpO2 94%  BMI 34.68 kg/m2  He is currently taking Glyburide 10 mg bid and Januvia 100 mg daily. Readings range as follows: fastin-218, pre-supper: 134-223.  Lab Results   Component Value Date    A1C 8.7 2017    A1C 8.6 2017    A1C 8.0 2016    A1C 7.6 2016    A1C 7.5 2016     Instructed Clement about the storage of Bydureon, it's action, disposal of pen/pen needles, how to use the Bydureon pen and to be sure that it is well mixed, potential side effects, when to call. Discussed the potential for low BG readings. Clement states that Dr. Wang discussed with him that we probably will stop his Glyburide. Clement is comfortable giving himself injections as he had taken Lovenox in the past. Reviewed hypoglycemia symptoms and treatment.    Plan:  I will contact Dr. Wang about instructions for stopping your Glyburide and when to start Bydureon.    When you start Bydureon, you will take one injection of 2 mg once a week (pick a day of the week).    Please contact Dr. Wang or Anamaria ([phone]) if you have any side effects, questions or concerns.    Time spent with patient 45 minutes.      "

## 2017-06-19 ENCOUNTER — TELEPHONE (OUTPATIENT)
Dept: EDUCATION SERVICES | Facility: HOSPITAL | Age: 72
End: 2017-06-19

## 2017-06-19 DIAGNOSIS — E11.9 DIABETES MELLITUS, TYPE 2 (H): ICD-10-CM

## 2017-06-19 NOTE — TELEPHONE ENCOUNTER
Called Clement to see how he is doing with his Bydureon injections. He states that he took his second weekly injection today. He feels that he is doing well. States has had a little nausea. Clement tells me that his BG readings really haven't come down. I asked him to give it a couple more weeks. I will call him again after July 4th. I did ask him to me or Dr. Wang if he has concerns.

## 2017-06-22 ENCOUNTER — ANTICOAGULATION THERAPY VISIT (OUTPATIENT)
Dept: ANTICOAGULATION | Facility: OTHER | Age: 72
End: 2017-06-22

## 2017-06-22 DIAGNOSIS — I26.99 PULMONARY EMBOLISM AND INFARCTION (H): ICD-10-CM

## 2017-06-22 DIAGNOSIS — Z79.01 LONG-TERM (CURRENT) USE OF ANTICOAGULANTS: ICD-10-CM

## 2017-06-22 DIAGNOSIS — I82.409 ACUTE THROMBOEMBOLISM OF DEEP VEINS OF LOWER EXTREMITY, UNSPECIFIED LATERALITY (H): ICD-10-CM

## 2017-06-22 LAB — INR BLD: 2.9 (ref 0.86–1.14)

## 2017-06-22 PROCEDURE — 85610 PROTHROMBIN TIME: CPT | Mod: QW,ZL | Performed by: FAMILY MEDICINE

## 2017-06-22 PROCEDURE — 36416 COLLJ CAPILLARY BLOOD SPEC: CPT | Mod: ZL | Performed by: FAMILY MEDICINE

## 2017-06-22 RX ORDER — BLOOD SUGAR DIAGNOSTIC
STRIP MISCELLANEOUS
Qty: 100 STRIP | Refills: 2 | Status: SHIPPED | OUTPATIENT
Start: 2017-06-22 | End: 2018-01-08

## 2017-06-22 NOTE — MR AVS SNAPSHOT
Clement CROWELL Shanika   6/22/2017   Anticoagulation Therapy Visit    Description:  72 year old male   Provider:  Lorelei Felix MD   Department:  [facility] Anti Coagulation           INR as of 6/22/2017     Today's INR 2.9      Anticoagulation Summary as of 6/22/2017     INR goal 2.0-3.0   Today's INR 2.9   Full instructions 2.5 mg on Mon, Wed, Fri; 5 mg all other days   Next INR check 8/3/2017    Indications   Pulmonary embolism and infarction (H) [I26.99]  Acute thromboembolism of deep veins of lower extremity (H) [I82.409]  Long-term (current) use of anticoagulants [Z79.01] [Z79.01]         June 2017 Details    Sun Mon Tue Wed Thu Fri Sat         1               2               3                 4               5               6               7               8               9               10                 11               12               13               14               15               16               17                 18               19               20               21               22      5 mg   See details      23      2.5 mg         24      5 mg           25      5 mg         26      2.5 mg         27      5 mg         28      2.5 mg         29      5 mg         30      2.5 mg           Date Details   06/22 This INR check               How to take your warfarin dose     To take:  2.5 mg Take 0.5 of a 5 mg tablet.    To take:  5 mg Take 1 of the 5 mg tablets.           July 2017 Details    Sun Mon Tue Wed Thu Fri Sat           1      5 mg           2      5 mg         3      2.5 mg         4      5 mg         5      2.5 mg         6      5 mg         7      2.5 mg         8      5 mg           9      5 mg         10      2.5 mg         11      5 mg         12      2.5 mg         13      5 mg         14      2.5 mg         15      5 mg           16      5 mg         17      2.5 mg         18      5 mg         19      2.5 mg         20      5 mg         21      2.5 mg         22      5 mg           23       5 mg         24      2.5 mg         25      5 mg         26      2.5 mg         27      5 mg         28      2.5 mg         29      5 mg           30      5 mg         31      2.5 mg               Date Details   No additional details            How to take your warfarin dose     To take:  2.5 mg Take 0.5 of a 5 mg tablet.    To take:  5 mg Take 1 of the 5 mg tablets.           August 2017 Details    Sun Mon Tue Wed Thu Fri Sat       1      5 mg         2      2.5 mg         3            4               5                 6               7               8               9               10               11               12                 13               14               15               16               17               18               19                 20               21               22               23               24               25               26                 27               28               29               30               31                  Date Details   No additional details    Date of next INR:  8/3/2017         How to take your warfarin dose     To take:  2.5 mg Take 0.5 of a 5 mg tablet.    To take:  5 mg Take 1 of the 5 mg tablets.

## 2017-07-03 ENCOUNTER — TELEPHONE (OUTPATIENT)
Dept: FAMILY MEDICINE | Facility: OTHER | Age: 72
End: 2017-07-03

## 2017-07-03 NOTE — TELEPHONE ENCOUNTER
12:53 PM    Reason for Call: Phone Call    Description: Pt called and stated his glucose reading have been going up instead of down since starting the BYDUREON. Please call him back at [phone]    Was an appointment offered for this call? No    Preferred method for responding to this message: Telephone Call    If we cannot reach you directly, may we leave a detailed response at the number you provided? Yes    Can this message wait until your PCP/provider returns, if available today? Not applicable    Lenore Martins

## 2017-07-03 NOTE — TELEPHONE ENCOUNTER
Call back to patient and his average has gone up from 165 to 180, transfer to scheduling with appt with rene newton Wednesday morning

## 2017-07-05 ENCOUNTER — OFFICE VISIT (OUTPATIENT)
Dept: FAMILY MEDICINE | Facility: OTHER | Age: 72
End: 2017-07-05
Attending: NURSE PRACTITIONER
Payer: COMMERCIAL

## 2017-07-05 VITALS
WEIGHT: 213.6 LBS | BODY MASS INDEX: 33.53 KG/M2 | SYSTOLIC BLOOD PRESSURE: 124 MMHG | HEIGHT: 67 IN | HEART RATE: 74 BPM | TEMPERATURE: 97.2 F | OXYGEN SATURATION: 95 % | RESPIRATION RATE: 16 BRPM | DIASTOLIC BLOOD PRESSURE: 54 MMHG

## 2017-07-05 DIAGNOSIS — N18.30 TYPE 2 DIABETES MELLITUS WITH STAGE 3 CHRONIC KIDNEY DISEASE, WITH LONG-TERM CURRENT USE OF INSULIN (H): Primary | ICD-10-CM

## 2017-07-05 DIAGNOSIS — E11.22 TYPE 2 DIABETES MELLITUS WITH STAGE 3 CHRONIC KIDNEY DISEASE, WITH LONG-TERM CURRENT USE OF INSULIN (H): Primary | ICD-10-CM

## 2017-07-05 DIAGNOSIS — Z79.4 TYPE 2 DIABETES MELLITUS WITH STAGE 3 CHRONIC KIDNEY DISEASE, WITH LONG-TERM CURRENT USE OF INSULIN (H): Primary | ICD-10-CM

## 2017-07-05 PROCEDURE — 99212 OFFICE O/P EST SF 10 MIN: CPT

## 2017-07-05 PROCEDURE — 99213 OFFICE O/P EST LOW 20 MIN: CPT | Performed by: NURSE PRACTITIONER

## 2017-07-05 ASSESSMENT — PAIN SCALES - GENERAL: PAINLEVEL: NO PAIN (0)

## 2017-07-05 NOTE — MR AVS SNAPSHOT
After Visit Summary   7/5/2017    Clement Voss    MRN: [id number]           Patient Information     Date Of Birth          1945        Visit Information        Provider Department      7/5/2017 9:30 AM Portia Joseph NP [facility]        Today's Diagnoses     Type 2 diabetes mellitus with stage 3 chronic kidney disease, with long-term current use of insulin (H)    -  1      Care Instructions      ASSESSMENT/PLAN:         1. Type 2 diabetes mellitus with stage 3 chronic kidney disease, with long-term current use of insulin (H)  Stop bydureon  Continue januvia  - start insulin glargine (LANTUS SOLOSTAR) 100 UNIT/ML injection; Inject 14 Units Subcutaneous At Bedtime  Dispense: 15 mL; Refill: 1  - insulin pen needle 31G X 6 MM; Use 1 daily or as directed.  Dispense: 100 each; Refill: prn  - DIABETES EDUCATION REFERRAL ([place]) for insulin start and management    FUTURE APPOINTMENTS:       - Follow-up visit in 1 month or as needed for acute concerns.     Portia Joseph NP  [facility]            Follow-ups after your visit        Additional Services     DIABETES EDUCATION REFERRAL ([place])       DIABETES SELF-MANAGEMENT TRAINING (DSMT)  Type of training and number of hours requested:  Follow-up insulin start and titration; 10    (Medicare will cover:10 hours initial DSMT in 12-month period, plus 2 hours follow-up DSMT annually)    Please add if the patient has special educational need: Additional Insulin Training  (Patients with special needs requiring individual DSMT)    Please include the following DMST content: All ten content areas, as appropriate and Insulin start/management    Patient has the following:Hypertension and Renal Disease    Please begin Medical Nutritional Therapy.  Annual Follow Up Medical Nutitional Therapy (MNT)  ([employer] allows 3 hours initial MNT in the first calendar year, plus two hours follow up MNT annually.  Additional MNT  "hours are available for change in medical condition treatment and/or diagnosis)    Additional Services Provided:  >>A1c will be completed upon referral and completion of program unless completed in clinic.  >>Influenza vaccination assessment (form #N228) as applicable.  >>Order for diabetes supplies will be faxed to patient's pharmacy.  >>If on insulin: Insulin dose adjustment per staged Diabetes Mgmt. Protocols    [facility]  [facility]  Telephone:  [phone]   Fax:  [phone]                  Your next 10 appointments already scheduled     Aug 31, 2017 11:00 AM CDT   (Arrive by 10:45 AM)   SHORT with Lorelei Felix MD   [facility] ([facility] )    [address]  [facility] [address]   [phone]              Who to contact     If you have questions or need follow up information about today's clinic visit or your schedule please contact [facility] directly at [phone].  Normal or non-critical lab and imaging results will be communicated to you by [employer], letter or phone within 4 business days after the clinic has received the results. If you do not hear from us within 7 days, please contact the clinic through [employer] or phone. If you have a critical or abnormal lab result, we will notify you by phone as soon as possible.  Submit refill requests through [email or web address] or call your pharmacy and they will forward the refill request to us. Please allow 3 business days for your refill to be completed.          Additional Information About Your Visit        [email or web address] Information     [email or web address] lets you send messages to your doctor, view your test results, renew your prescriptions, schedule appointments and more. To sign up, go to [email or web address] . Click on \"Log in\" on the left side of the screen, which will take you to the Welcome page. Then click on \"Sign up Now\" on the right side of the page.     You will " "be asked to enter the access code listed below, as well as some personal information. Please follow the directions to create your username and password.     Your access code is: [email or web address]  Expires: 2017  5:16 PM     Your access code will  in 90 days. If you need help or a new code, please call your [facility] clinic or [phone].        Care EveryWhere ID     This is your Care EveryWhere ID. This could be used by other organizations to access your [facility] medical records  [id number]        Your Vitals Were     Pulse Temperature Respirations Height Pulse Oximetry BMI (Body Mass Index)    74 97.2  F (36.2  C) (Tympanic) 16 5' 7\" (1.702 m) 95% 33.45 kg/m2       Blood Pressure from Last 3 Encounters:   17 124/54   17 138/58   17 118/50    Weight from Last 3 Encounters:   17 213 lb 9.6 oz (96.9 kg)   17 221 lb 6.4 oz (100.4 kg)   17 224 lb (101.6 kg)              We Performed the Following     DIABETES EDUCATION REFERRAL ([place])          Today's Medication Changes          These changes are accurate as of: 17 10:01 AM.  If you have any questions, ask your nurse or doctor.               Start taking these medicines.        Dose/Directions    insulin glargine 100 UNIT/ML injection   Commonly known as:  LANTUS SOLOSTAR   Used for:  Type 2 diabetes mellitus with stage 3 chronic kidney disease, with long-term current use of insulin (H)   Started by:  Portia Joseph NP        Dose:  14 Units   Inject 14 Units Subcutaneous At Bedtime   Quantity:  15 mL   Refills:  1       insulin pen needle 31G X 6 MM   Used for:  Type 2 diabetes mellitus with stage 3 chronic kidney disease, with long-term current use of insulin (H)   Started by:  Portia Joseph NP        Use 1 daily or as directed.   Quantity:  100 each   Refills:  prn         Stop taking these medicines if you haven't already. Please contact your care team if you have questions.     exenatide ER " 2 MG pen   Commonly known as:  BYDUREON   Stopped by:  Portia Joseph NP           glyBURIDE 5 MG tablet   Commonly known as:  DIABETA /MICRONASE   Stopped by:  Portia Joseph NP                Where to get your medicines      These medications were sent to [facility] [id number] [place] [address]  AT [facility] [address] [facility] [address]     Phone:  [phone]     insulin glargine 100 UNIT/ML injection    insulin pen needle 31G X 6 MM                Primary Care Provider Office Phone # Fax #    Lorelei GILLESPIE [facility] [phone]       [facility] [address] [place] [address]        Equal Access to Services     CHRISTAL LUCAS AH: Hadii robb lao hadasho Soomaali, waaxda luqadaha, qaybta kaalmada adeegyada, waxay andrein hayesperanza aviles. So [facility] [phone].    ATENCIÓN: Si habla español, tiene a breaux disposición servicios gratuitos de asistencia lingüística. Haroon al [phone].    We comply with applicable federal civil rights laws and [place] laws. We do not discriminate on the basis of race, color, national origin, age, disability sex, sexual orientation or gender identity.            Thank you!     Thank you for choosing [facility]  for your care. Our goal is always to provide you with excellent care. Hearing back from our patients is one way we can continue to improve our services. Please take a few minutes to complete the written survey that you may receive in the mail after your visit with us. Thank you!             Your Updated Medication List - Protect others around you: Learn how to safely use, store and throw away your medicines at www.disposemymeds.org.          This list is accurate as of: 7/5/17 10:01 AM.  Always use your most recent med list.                   Brand Name Dispense Instructions for use Diagnosis    ACCU-CHEK VLAD PLUS test strip   Generic drug:   blood glucose monitoring     100 strip    USE TO TEST BLOOD SUGARS THREE TIMES DAILY    Diabetes mellitus, type 2 (H)       acetaminophen 500 MG tablet    TYLENOL     Take 1-2 tablets (500-1,000 mg) by mouth every 6 hours as needed    Pulmonary embolism (H)       amLODIPine 10 MG tablet    NORVASC    90 tablet    Take 1 tablet (10 mg) by mouth daily    Benign essential hypertension       aspirin 81 MG EC tablet     90 tablet    Take 1 tablet (81 mg) by mouth daily    Diabetes mellitus, type 2 (H)       CLARITIN 10 MG tablet   Generic drug:  loratadine      Take 10 mg by mouth daily.        COMBIGAN 0.2-0.5 % ophthalmic solution   Generic drug:  brimonidine-timolol      1 drop 2 times daily Morning and evening, 12 hours apart        fenofibrate 48 MG tablet     90 tablet    Take 1 tablet (48 mg) by mouth daily    Hyperlipidemia, unspecified hyperlipidemia type       fish oil-omega-3 fatty acids 1000 MG capsule      Take 1 capsule by mouth 3 times daily.        fluticasone 50 MCG/ACT spray    FLONASE    1 Package    Spray 2 sprays into both nostrils daily    Acute maxillary sinusitis       indomethacin 50 MG capsule    INDOCIN    30 capsule    Take 1 capsule (50 mg) by mouth 3 times daily With food as needed    Gout, unspecified       insulin glargine 100 UNIT/ML injection    LANTUS SOLOSTAR    15 mL    Inject 14 Units Subcutaneous At Bedtime    Type 2 diabetes mellitus with stage 3 chronic kidney disease, with long-term current use of insulin (H)       insulin pen needle 31G X 6 MM     100 each    Use 1 daily or as directed.    Type 2 diabetes mellitus with stage 3 chronic kidney disease, with long-term current use of insulin (H)       lisinopril 40 MG tablet    PRINIVIL/ZESTRIL    90 tablet    Take 1 tablet (40 mg) by mouth daily    Benign essential hypertension       pilocarpine 1 % ophthalmic solution    PILOCAR     Place 1 drop into both eyes 4 times daily        sitagliptin 100 MG tablet    JANUVIA    90 tablet     Take 1 tablet (100 mg) by mouth daily    Type 2 diabetes mellitus without complication (H)       STATIN NOT PRESCRIBED (INTENTIONAL)     0 each    Statin not prescribed intentionally due to Intolerance (with supporting documentation of trying a statin at least once within the last 5 years)    Hyperlipidemia, unspecified hyperlipidemia       terazosin 10 MG capsule    HYTRIN    90 capsule    Take 1 capsule (10 mg) by mouth At Bedtime    Benign essential hypertension       TRAVATAN Z 0.004 % ophthalmic solution   Generic drug:  travoprost (DARINO Free)      Instill 1 drop by ophthalmic route every day into affected eye(s) in the evening        warfarin 5 MG tablet    COUMADIN    90 tablet    Take 2.5mg Mon/Wed/Fri; 5mg all other days or as directed by [facility]    Pulmonary embolism and infarction (H)

## 2017-07-05 NOTE — PATIENT INSTRUCTIONS
ASSESSMENT/PLAN:         1. Type 2 diabetes mellitus with stage 3 chronic kidney disease, with long-term current use of insulin (H)  Stop bydureon  Continue januvia  - start insulin glargine (LANTUS SOLOSTAR) 100 UNIT/ML injection; Inject 14 Units Subcutaneous At Bedtime  Dispense: 15 mL; Refill: 1  - insulin pen needle 31G X 6 MM; Use 1 daily or as directed.  Dispense: 100 each; Refill: prn  - DIABETES EDUCATION REFERRAL ([place]) for insulin start and management    FUTURE APPOINTMENTS:       - Follow-up visit in 1 month or as needed for acute concerns.     Portia Joseph, NP  [facility]

## 2017-07-05 NOTE — PROGRESS NOTES
SUBJECTIVE:                                                    Clement Voss is a 72 year old male who presents to clinic today for the following health issues:  Chief Complaint   Patient presents with     Diabetes     has been taking bydurin had constipation and then diarrhea. and bs going up and not eating no appetite          Diabetes Follow-up    Patient is checking blood sugars: twice daily.    Blood sugar testing frequency justification: Uncontrolled diabetes  Results are as follows:         am - 194-248         suppertime - 192-466    Diabetic concerns: other - side effects     Symptoms of hypoglycemia (low blood sugar): none     Paresthesias (numbness or burning in feet) or sores: No     Date of last diabetic eye exam: current  He is wondering other treatment options.    Problem list and histories reviewed & adjusted, as indicated.  Additional history: as documented    Patient Active Problem List   Diagnosis     Diabetes mellitus, type 2 (H)     Hyperlipidemia     Benign essential hypertension     Gout     Pulmonary embolism and infarction (H)     Acute thromboembolism of deep veins of lower extremity (H)     Advanced care planning/counseling discussion     Long-term (current) use of anticoagulants [Z79.01]     Morbid obesity (H)     Past Surgical History:   Procedure Laterality Date     APPENDECTOMY  2008     CHOLECYSTECTOMY  1984     History of PE of right lung 3/2013[         Social History   Substance Use Topics     Smoking status: Former Smoker     Types: Cigarettes     Quit date: 8/12/1964     Smokeless tobacco: Never Used     Alcohol use No     Family History   Problem Relation Age of Onset     HEART DISEASE Father 71     heart disease; cause of death     Other - See Comments Mother 79     old age; cause of death         Current Outpatient Prescriptions   Medication Sig Dispense Refill     ACCU-CHEK VLAD PLUS test strip USE TO TEST BLOOD SUGARS THREE TIMES DAILY 100 strip 2     exenatide ER  (BYDUREON) 2 MG pen Inject 2 mg Subcutaneous every 7 days 4 each 5     amLODIPine (NORVASC) 10 MG tablet Take 1 tablet (10 mg) by mouth daily 90 tablet 3     lisinopril (PRINIVIL,ZESTRIL) 40 MG tablet Take 1 tablet (40 mg) by mouth daily 90 tablet 3     terazosin (HYTRIN) 10 MG capsule Take 1 capsule (10 mg) by mouth At Bedtime 90 capsule 3     warfarin (COUMADIN) 5 MG tablet Take 2.5mg Mon/Wed/Fri; 5mg all other days or as directed by [facility] 90 tablet 4     pilocarpine (PILOCAR) 1 % ophthalmic solution Place 1 drop into both eyes 4 times daily       fenofibrate 48 MG tablet Take 1 tablet (48 mg) by mouth daily 90 tablet 3     sitagliptin (JANUVIA) 100 MG tablet Take 1 tablet (100 mg) by mouth daily 90 tablet 3     STATIN NOT PRESCRIBED, INTENTIONAL, Statin not prescribed intentionally due to Intolerance (with supporting documentation of trying a statin at least once within the last 5 years) 0 each 0     fluticasone (FLONASE) 50 MCG/ACT nasal spray Spray 2 sprays into both nostrils daily (Patient taking differently: Spray 2 sprays into both nostrils daily ) 1 Package 0     indomethacin (INDOCIN) 50 MG capsule Take 1 capsule (50 mg) by mouth 3 times daily With food as needed 30 capsule 1     brimonidine-timolol (COMBIGAN) 0.2-0.5 % ophthalmic solution 1 drop 2 times daily Morning and evening, 12 hours apart       acetaminophen (TYLENOL) 500 MG tablet Take 1-2 tablets (500-1,000 mg) by mouth every 6 hours as needed       aspirin 81 MG EC tablet Take 1 tablet (81 mg) by mouth daily 90 tablet 3     loratadine (CLARITIN) 10 MG tablet Take 10 mg by mouth daily.       fish oil-omega-3 fatty acids (FISH OIL) 1000 MG capsule Take 1 capsule by mouth 3 times daily.       TRAVATAN Z (TRAVATAN Z) 0.004 % ophthalmic solution Instill 1 drop by ophthalmic route every day into affected eye(s) in the evening       glyBURIDE (DIABETA / MICRONASE) 5 MG tablet Take 2 tablets (10 mg) by mouth 2 times daily (with meals)  "(Patient not taking: Reported on 7/5/2017) 360 tablet 3     Allergies   Allergen Reactions     Atorvastatin Calcium Other (See Comments)     Lipitor  Increase CR     Iodine      Penicillins      Sulfa Drugs      Sulfa (sulfonamide antibiotics)     Recent Labs   Lab Test  05/19/17   0832  02/23/17   0815  11/28/16   0811  08/22/16   0903  07/12/16   1502   11/18/15   0938   A1C  8.7*  8.6*  8.0*  7.6*   --    < >  7.9*   LDL  83  82  77  77   --    < >  79   HDL  32*  39*  36*  37*   --    < >  38*   TRIG  279*  226*  296*  222*   --    < >  156*   ALT   --    --   32  30  32   < >  30   CR  1.61*  1.63*  1.46*  1.45*  1.47*   < >  1.47*   GFRESTIMATED  42*  42*  47*  48*  47*   < >  47*   GFRESTBLACK  51*  50*  57*  58*  57*   < >  57*   POTASSIUM  4.3  4.3  3.9  4.0  4.0   < >  4.4   TSH   --    --   3.17   --    --    --   2.79    < > = values in this interval not displayed.      BP Readings from Last 3 Encounters:   07/05/17 124/54   06/06/17 138/58   05/26/17 118/50    Wt Readings from Last 3 Encounters:   07/05/17 213 lb 9.6 oz (96.9 kg)   06/06/17 221 lb 6.4 oz (100.4 kg)   05/26/17 224 lb (101.6 kg)                    Reviewed and updated as needed this visit by clinical staff  Tobacco  Allergies  Meds  Problems       Reviewed and updated as needed this visit by Provider         ROS:  Constitutional, HEENT, cardiovascular, pulmonary, gi and gu systems are negative, except as otherwise noted.    OBJECTIVE:     /54 (BP Location: Left arm, Patient Position: Chair, Cuff Size: Adult Regular)  Pulse 74  Temp 97.2  F (36.2  C) (Tympanic)  Resp 16  Ht 5' 7\" (1.702 m)  Wt 213 lb 9.6 oz (96.9 kg)  SpO2 95%  BMI 33.45 kg/m2  Body mass index is 33.45 kg/(m^2).  GENERAL: healthy, alert and no distress  NECK: no adenopathy, no asymmetry, masses, or scars and thyroid normal to palpation  RESP: lungs clear to auscultation - no rales, rhonchi or wheezes  CV: regular rate and rhythm, normal S1 S2, no S3 or S4, " no murmur, click or rub, no peripheral edema and peripheral pulses strong  MS: no gross musculoskeletal defects noted, no edema  PSYCH: mentation appears normal, affect normal/bright      ASSESSMENT/PLAN:     1. Type 2 diabetes mellitus with stage 3 chronic kidney disease, with long-term current use of insulin (H)  Stop bydureon  Continue januvia  - start insulin glargine (LANTUS SOLOSTAR) 100 UNIT/ML injection; Inject 14 Units Subcutaneous At Bedtime  Dispense: 15 mL; Refill: 1  - insulin pen needle 31G X 6 MM; Use 1 daily or as directed.  Dispense: 100 each; Refill: prn  - DIABETES EDUCATION REFERRAL ([place]) for insulin start and management    FUTURE APPOINTMENTS:       - Follow-up visit in 1 month or as needed for acute concerns.     Portia Joseph, NP  [facility]

## 2017-07-05 NOTE — NURSING NOTE
"Chief Complaint   Patient presents with     Diabetes     has been taking bydurin had constipation and then diarrhea. and bs going up and not eating no appetite        Initial /54 (BP Location: Left arm, Patient Position: Chair, Cuff Size: Adult Regular)  Pulse 74  Temp 97.2  F (36.2  C) (Tympanic)  Resp 16  Ht 5' 7\" (1.702 m)  Wt 213 lb 9.6 oz (96.9 kg)  SpO2 95%  BMI 33.45 kg/m2 Estimated body mass index is 33.45 kg/(m^2) as calculated from the following:    Height as of this encounter: 5' 7\" (1.702 m).    Weight as of this encounter: 213 lb 9.6 oz (96.9 kg).  Medication Reconciliation: complete   Pamela M Lechevalier LPN      "

## 2017-07-07 ENCOUNTER — HOSPITAL ENCOUNTER (OUTPATIENT)
Dept: EDUCATION SERVICES | Facility: HOSPITAL | Age: 72
Discharge: HOME | End: 2017-07-07
Attending: FAMILY MEDICINE | Admitting: FAMILY MEDICINE
Payer: MEDICARE

## 2017-07-07 VITALS
DIASTOLIC BLOOD PRESSURE: 58 MMHG | HEART RATE: 71 BPM | HEIGHT: 67 IN | WEIGHT: 217.2 LBS | SYSTOLIC BLOOD PRESSURE: 132 MMHG | BODY MASS INDEX: 34.09 KG/M2 | OXYGEN SATURATION: 92 %

## 2017-07-07 DIAGNOSIS — E11.65 TYPE 2 DIABETES MELLITUS WITH HYPERGLYCEMIA, WITHOUT LONG-TERM CURRENT USE OF INSULIN (H): Primary | ICD-10-CM

## 2017-07-07 PROCEDURE — G0108 DIAB MANAGE TRN  PER INDIV: HCPCS

## 2017-07-07 ASSESSMENT — PAIN SCALES - GENERAL: PAINLEVEL: NO PAIN (0)

## 2017-07-07 NOTE — NURSING NOTE
"Chief Complaint   Patient presents with     Diabetes     insulin adjustment       Initial /58 (BP Location: Right arm, Patient Position: Chair, Cuff Size: Adult Large)  Pulse 71  Ht 1.702 m (5' 7\")  Wt 98.5 kg (217 lb 3.2 oz)  SpO2 92%  BMI 34.02 kg/m2 Estimated body mass index is 34.02 kg/(m^2) as calculated from the following:    Height as of this encounter: 1.702 m (5' 7\").    Weight as of this encounter: 98.5 kg (217 lb 3.2 oz).  Medication Reconciliation: complete   Jodie Gomez      "

## 2017-07-07 NOTE — DISCHARGE INSTRUCTIONS
Start Lantus 14 units daily at bedtime.    Please check your BG every morning and later in the day: alternate between before supper and 2 hours after supper.    Beata will call you on Tuesday, 7/11/17 for your BG readings so she can adjust your Lantus

## 2017-07-07 NOTE — IP AVS SNAPSHOT
MRN:[id number]                      After Visit Summary   7/7/2017    Clement Voss    MRN: [id number]           Thank you!     Thank you for choosing [facility] for your care. Our goal is always to provide you with excellent care. Hearing back from our patients is one way we can continue to improve our services. Please take a few minutes to complete the written survey that you may receive in the mail after you visit with us. Thank you!        Patient Information     Date Of Birth          1945        About your hospital stay     You were admitted on:  July 7, 2017 You last received care in the:  [facility]    You were discharged on:  July 7, 2017       Who to Call     For medical emergencies, please call 911.  For non-urgent questions about your medical care, please call your primary care provider or clinic, [phone]          Attending Provider     Provider Specialty    Lorelei Felix MD [facility]       Primary Care Provider Office Phone # Fax #    Lorelei Felix [phone]      Your next 10 appointments already scheduled     Jul 21, 2017 11:30 AM CDT   (Arrive by 11:15 AM)   Diabetic Education with Anamaria Benítez RN   [facility] ([facility] )    [address] [place] [id number] [phone]            Aug 31, 2017 11:00 AM CDT   (Arrive by 10:45 AM)   SHORT with Lorelei Felix MD   [facility] ([facility] )    [address] [facility] [id number] [phone]              Further instructions from your care team       Start Lantus 14 units daily at bedtime.    Please check your BG every morning and later in the day: alternate between before supper and 2 hours after supper.    Beata will call you on Tuesday, 7/11/17 for your BG readings so she can adjust your Lantus        Pending Results     No orders found from 7/5/2017 to 7/8/2017.            Admission  "Information     Date & Time Provider Department Dept. Phone    2017 Lorelei Felix MD [facility] [phone]      Your Vitals Were     Blood Pressure Pulse Height Weight Pulse Oximetry BMI (Body Mass Index)    132/58 (BP Location: Right arm, Patient Position: Chair, Cuff Size: Adult Large) 71 1.702 m (5' 7\") 98.5 kg (217 lb 3.2 oz) 92% 34.02 kg/m2      [employer] Information     [employer] lets you send messages to your doctor, view your test results, renew your prescriptions, schedule appointments and more. To sign up, go to [email or web address] . Click on \"Log in\" on the left side of the screen, which will take you to the Welcome page. Then click on \"Sign up Now\" on the right side of the page.     You will be asked to enter the access code listed below, as well as some personal information. Please follow the directions to create your username and password.     Your access code is: [email or web address]  Expires: 2017  5:16 PM     Your access code will  in 90 days. If you need help or a new code, please call your [facility] clinic or [phone].        Care EveryWhere ID     This is your Care EveryWhere ID. This could be used by other organizations to access your [facility] medical records  [id number]        Equal Access to Services     [facility] AH: Hadii robb de luna Sokerrie, waaxda luqadaha, qaybta kaalmada christiano, manan campbell . So [facility] [phone].    ATENCIÓN: Si habla español, tiene a breaux disposición servicios gratuitos de asistencia lingüística. Haroon al [phone].    We comply with applicable federal civil rights laws and [place] laws. We do not discriminate on the basis of race, color, national origin, age, disability sex, sexual orientation or gender identity.               Review of your medicines      UNREVIEWED medicines. Ask your doctor about these medicines        Dose / Directions    acetaminophen 500 MG tablet   Commonly known as:  " TYLENOL   Used for:  Pulmonary embolism (H)        Dose:  500-1000 mg   Take 1-2 tablets (500-1,000 mg) by mouth every 6 hours as needed   Refills:  0       amLODIPine 10 MG tablet   Commonly known as:  NORVASC   Used for:  Benign essential hypertension        Dose:  10 mg   Take 1 tablet (10 mg) by mouth daily   Quantity:  90 tablet   Refills:  3       aspirin 81 MG EC tablet   Used for:  Diabetes mellitus, type 2 (H)        Dose:  81 mg   Take 1 tablet (81 mg) by mouth daily   Quantity:  90 tablet   Refills:  3       CLARITIN 10 MG tablet   Generic drug:  loratadine        Dose:  10 mg   Take 10 mg by mouth daily.   Refills:  0       COMBIGAN 0.2-0.5 % ophthalmic solution   Generic drug:  brimonidine-timolol        Dose:  1 drop   1 drop 2 times daily Morning and evening, 12 hours apart   Refills:  0       fenofibrate 48 MG tablet   Used for:  Hyperlipidemia, unspecified hyperlipidemia type        Dose:  48 mg   Take 1 tablet (48 mg) by mouth daily   Quantity:  90 tablet   Refills:  3       fish oil-omega-3 fatty acids 1000 MG capsule        Dose:  1 capsule   Take 1 capsule by mouth 3 times daily.   Refills:  0       fluticasone 50 MCG/ACT spray   Commonly known as:  FLONASE   Used for:  Acute maxillary sinusitis        Dose:  2 spray   Spray 2 sprays into both nostrils daily   Quantity:  1 Package   Refills:  0       indomethacin 50 MG capsule   Commonly known as:  INDOCIN   Used for:  Gout, unspecified        Dose:  50 mg   Take 1 capsule (50 mg) by mouth 3 times daily With food as needed   Quantity:  30 capsule   Refills:  1       insulin glargine 100 UNIT/ML injection   Commonly known as:  LANTUS SOLOSTAR   Used for:  Type 2 diabetes mellitus with stage 3 chronic kidney disease, with long-term current use of insulin (H)        Dose:  14 Units   Inject 14 Units Subcutaneous At Bedtime   Quantity:  15 mL   Refills:  1       lisinopril 40 MG tablet   Commonly known as:  PRINIVIL/ZESTRIL   Used for:  Benign  essential hypertension        Dose:  40 mg   Take 1 tablet (40 mg) by mouth daily   Quantity:  90 tablet   Refills:  3       pilocarpine 1 % ophthalmic solution   Commonly known as:  PILOCAR        Dose:  1 drop   Place 1 drop into both eyes 4 times daily   Refills:  0       sitagliptin 100 MG tablet   Commonly known as:  JANUVIA   Used for:  Type 2 diabetes mellitus without complication (H)        Dose:  100 mg   Take 1 tablet (100 mg) by mouth daily   Quantity:  90 tablet   Refills:  3       STATIN NOT PRESCRIBED (INTENTIONAL)   Used for:  Hyperlipidemia, unspecified hyperlipidemia        Statin not prescribed intentionally due to Intolerance (with supporting documentation of trying a statin at least once within the last 5 years)   Quantity:  0 each   Refills:  0       terazosin 10 MG capsule   Commonly known as:  HYTRIN   Used for:  Benign essential hypertension        Dose:  10 mg   Take 1 capsule (10 mg) by mouth At Bedtime   Quantity:  90 capsule   Refills:  3       TRAVATAN Z 0.004 % ophthalmic solution   Generic drug:  travoprost (DARION Free)        Instill 1 drop by ophthalmic route every day into affected eye(s) in the evening   Refills:  0       warfarin 5 MG tablet   Commonly known as:  COUMADIN   Used for:  Pulmonary embolism and infarction (H)        Take 2.5mg Mon/Wed/Fri; 5mg all other days or as directed by [facility] the [facility]   Quantity:  90 tablet   Refills:  4         CONTINUE these medicines which have NOT CHANGED        Dose / Directions    ACCU-CHEK VLAD PLUS test strip   Used for:  Diabetes mellitus, type 2 (H)   Generic drug:  blood glucose monitoring        USE TO TEST BLOOD SUGARS THREE TIMES DAILY   Quantity:  100 strip   Refills:  2       insulin pen needle 31G X 6 MM   Used for:  Type 2 diabetes mellitus with stage 3 chronic kidney disease, with long-term current use of insulin (H)        Use 1 daily or as directed.   Quantity:  100 each   Refills:  prn                Protect others  around you: Learn how to safely use, store and throw away your medicines at www.disposemymeds.org.             Medication List: This is a list of all your medications and when to take them. Check marks below indicate your daily home schedule. Keep this list as a reference.      Medications           Morning Afternoon Evening Bedtime As Needed    ACCU-CHEK VLAD PLUS test strip   USE TO TEST BLOOD SUGARS THREE TIMES DAILY   Generic drug:  blood glucose monitoring                                acetaminophen 500 MG tablet   Commonly known as:  TYLENOL   Take 1-2 tablets (500-1,000 mg) by mouth every 6 hours as needed                                amLODIPine 10 MG tablet   Commonly known as:  NORVASC   Take 1 tablet (10 mg) by mouth daily                                aspirin 81 MG EC tablet   Take 1 tablet (81 mg) by mouth daily                                CLARITIN 10 MG tablet   Take 10 mg by mouth daily.   Generic drug:  loratadine                                COMBIGAN 0.2-0.5 % ophthalmic solution   1 drop 2 times daily Morning and evening, 12 hours apart   Generic drug:  brimonidine-timolol                                fenofibrate 48 MG tablet   Take 1 tablet (48 mg) by mouth daily                                fish oil-omega-3 fatty acids 1000 MG capsule   Take 1 capsule by mouth 3 times daily.                                fluticasone 50 MCG/ACT spray   Commonly known as:  FLONASE   Spray 2 sprays into both nostrils daily                                indomethacin 50 MG capsule   Commonly known as:  INDOCIN   Take 1 capsule (50 mg) by mouth 3 times daily With food as needed                                insulin glargine 100 UNIT/ML injection   Commonly known as:  LANTUS SOLOSTAR   Inject 14 Units Subcutaneous At Bedtime                                insulin pen needle 31G X 6 MM   Use 1 daily or as directed.                                lisinopril 40 MG tablet   Commonly known as:   PRINIVIL/ZESTRIL   Take 1 tablet (40 mg) by mouth daily                                pilocarpine 1 % ophthalmic solution   Commonly known as:  PILOCAR   Place 1 drop into both eyes 4 times daily                                sitagliptin 100 MG tablet   Commonly known as:  JANUVIA   Take 1 tablet (100 mg) by mouth daily                                STATIN NOT PRESCRIBED (INTENTIONAL)   Statin not prescribed intentionally due to Intolerance (with supporting documentation of trying a statin at least once within the last 5 years)                                terazosin 10 MG capsule   Commonly known as:  HYTRIN   Take 1 capsule (10 mg) by mouth At Bedtime                                TRAVATAN Z 0.004 % ophthalmic solution   Instill 1 drop by ophthalmic route every day into affected eye(s) in the evening   Generic drug:  travoprost (DARION Free)                                warfarin 5 MG tablet   Commonly known as:  COUMADIN   Take 2.5mg Mon/Wed/Fri; 5mg all other days or as directed by [facility]

## 2017-07-08 NOTE — PROGRESS NOTES
"Clement is here for an insulin start. /58 (BP Location: Right arm, Patient Position: Chair, Cuff Size: Adult Large)  Pulse 71  Ht 1.702 m (5' 7\")  Wt 98.5 kg (217 lb 3.2 oz)  SpO2 92%  BMI 34.02 kg/m2 He had been recently taking Bydureon and it has been discontinued as it did not decrease his BG readings. He continues to take Januvia 100 mg daily. Readings range as follows: fastin, 211-248, pre-supper: 191-262.    Instructed milagros on insulin action, dosage, injection technique, site rotation, sharps disposal, insulin storage, hypoglycemia symptoms and treatment and when to call. Clement does carry glucose tablets.  Clement appropriately demonstrated proper injection technique with minimal cueing.       Plan:  Start Lantus 14 units daily at bedtime.    Please check your BG every morning and later in the day: alternate between before supper and 2 hours after supper.    Beata will call you on Tuesday, 17 for your BG readings so she can adjust your Lantus      Time spent with patient 40 minutes.  "

## 2017-07-10 DIAGNOSIS — E11.9 TYPE 2 DIABETES MELLITUS WITHOUT COMPLICATION (H): ICD-10-CM

## 2017-07-11 ENCOUNTER — TELEPHONE (OUTPATIENT)
Dept: EDUCATION SERVICES | Facility: HOSPITAL | Age: 72
End: 2017-07-11

## 2017-07-11 RX ORDER — SITAGLIPTIN 100 MG/1
TABLET, FILM COATED ORAL
Qty: 90 TABLET | Refills: 0 | Status: SHIPPED | OUTPATIENT
Start: 2017-07-11 | End: 2017-10-10

## 2017-07-11 NOTE — TELEPHONE ENCOUNTER
Called pt regarding glucose levels.  Current diabetes medications:   Januvia 100 mg am, Lantus 14 units hs.  Current glucose levels:  Fasting-147, 164, 136, 150  Before supper-11, 131, 102  Pt will increase Lantus to 16 units and we will speak with him again next week.

## 2017-07-12 ENCOUNTER — TELEPHONE (OUTPATIENT)
Dept: FAMILY MEDICINE | Facility: OTHER | Age: 72
End: 2017-07-12

## 2017-07-12 NOTE — TELEPHONE ENCOUNTER
Wife came in and was upset that medication Januvia was so expensive.  Did pay pay for 3 months worth wants pcp to think about medications he's on and if he should change back to Amaryl she states does not want new medications that came on the market. That does not have a generic.    Pamela M Lechevalier LPN

## 2017-07-21 ENCOUNTER — TELEPHONE (OUTPATIENT)
Dept: EDUCATION SERVICES | Facility: HOSPITAL | Age: 72
End: 2017-07-21

## 2017-07-21 NOTE — TELEPHONE ENCOUNTER
Called Clement for BG review. He is taking Lantus 16 units daily and Januvia 100 mg daily. Readings range as follows: fastin (most recent reading) , 128, 142-149, pre-supper: 125-141 and post-supper: 209, 229. No change in insulin dose. Will follow by phone in 2 weeks.

## 2017-07-24 ENCOUNTER — HOSPITAL ENCOUNTER (EMERGENCY)
Facility: HOSPITAL | Age: 72
Discharge: ANOTHER HOSPITAL | End: 2017-07-24
Attending: EMERGENCY MEDICINE | Admitting: EMERGENCY MEDICINE
Payer: MEDICARE

## 2017-07-24 ENCOUNTER — TELEPHONE (OUTPATIENT)
Dept: FAMILY MEDICINE | Facility: OTHER | Age: 72
End: 2017-07-24

## 2017-07-24 ENCOUNTER — TRANSFERRED RECORDS (OUTPATIENT)
Dept: HEALTH INFORMATION MANAGEMENT | Facility: HOSPITAL | Age: 72
End: 2017-07-24

## 2017-07-24 VITALS
OXYGEN SATURATION: 93 % | TEMPERATURE: 97.5 F | HEIGHT: 67 IN | HEART RATE: 75 BPM | DIASTOLIC BLOOD PRESSURE: 75 MMHG | SYSTOLIC BLOOD PRESSURE: 135 MMHG | RESPIRATION RATE: 20 BRPM

## 2017-07-24 DIAGNOSIS — I20.0 UNSTABLE ANGINA (H): ICD-10-CM

## 2017-07-24 LAB
ANION GAP SERPL CALCULATED.3IONS-SCNC: 9 MMOL/L (ref 3–14)
BASOPHILS # BLD AUTO: 0 10E9/L (ref 0–0.2)
BASOPHILS NFR BLD AUTO: 0.5 %
BUN SERPL-MCNC: 13 MG/DL (ref 7–30)
CALCIUM SERPL-MCNC: 8.9 MG/DL (ref 8.5–10.1)
CHLORIDE SERPL-SCNC: 111 MMOL/L (ref 94–109)
CO2 SERPL-SCNC: 23 MMOL/L (ref 20–32)
CREAT SERPL-MCNC: 1.4 MG/DL (ref 0.66–1.25)
D DIMER PPP DDU-MCNC: 207 NG/ML D-DU (ref 0–300)
DIFFERENTIAL METHOD BLD: ABNORMAL
EOSINOPHIL # BLD AUTO: 0.2 10E9/L (ref 0–0.7)
EOSINOPHIL NFR BLD AUTO: 4.3 %
ERYTHROCYTE [DISTWIDTH] IN BLOOD BY AUTOMATED COUNT: 14.6 % (ref 10–15)
GFR SERPL CREATININE-BSD FRML MDRD: 50 ML/MIN/1.7M2
GLUCOSE SERPL-MCNC: 116 MG/DL (ref 70–99)
HCT VFR BLD AUTO: 35.1 % (ref 40–53)
HGB BLD-MCNC: 12.8 G/DL (ref 13.3–17.7)
IMM GRANULOCYTES # BLD: 0 10E9/L (ref 0–0.4)
IMM GRANULOCYTES NFR BLD: 0.4 %
INR PPP: 1.58 (ref 0.8–1.2)
LYMPHOCYTES # BLD AUTO: 0.9 10E9/L (ref 0.8–5.3)
LYMPHOCYTES NFR BLD AUTO: 16.1 %
MCH RBC QN AUTO: 33.4 PG (ref 26.5–33)
MCHC RBC AUTO-ENTMCNC: 36.5 G/DL (ref 31.5–36.5)
MCV RBC AUTO: 92 FL (ref 78–100)
MONOCYTES # BLD AUTO: 0.4 10E9/L (ref 0–1.3)
MONOCYTES NFR BLD AUTO: 7.7 %
NEUTROPHILS # BLD AUTO: 4 10E9/L (ref 1.6–8.3)
NEUTROPHILS NFR BLD AUTO: 71 %
NRBC # BLD AUTO: 0 10*3/UL
NRBC BLD AUTO-RTO: 0 /100
PLATELET # BLD AUTO: 108 10E9/L (ref 150–450)
POTASSIUM SERPL-SCNC: 3.9 MMOL/L (ref 3.4–5.3)
RBC # BLD AUTO: 3.83 10E12/L (ref 4.4–5.9)
SODIUM SERPL-SCNC: 143 MMOL/L (ref 133–144)
TROPONIN I SERPL-MCNC: NORMAL UG/L (ref 0–0.04)
WBC # BLD AUTO: 5.6 10E9/L (ref 4–11)

## 2017-07-24 PROCEDURE — 80048 BASIC METABOLIC PNL TOTAL CA: CPT | Performed by: EMERGENCY MEDICINE

## 2017-07-24 PROCEDURE — 99285 EMERGENCY DEPT VISIT HI MDM: CPT | Mod: 25

## 2017-07-24 PROCEDURE — 85025 COMPLETE CBC W/AUTO DIFF WBC: CPT | Performed by: EMERGENCY MEDICINE

## 2017-07-24 PROCEDURE — 93005 ELECTROCARDIOGRAM TRACING: CPT

## 2017-07-24 PROCEDURE — 25000128 H RX IP 250 OP 636

## 2017-07-24 PROCEDURE — 99285 EMERGENCY DEPT VISIT HI MDM: CPT | Performed by: EMERGENCY MEDICINE

## 2017-07-24 PROCEDURE — 71010 ZZHC CHEST ONE VIEW: CPT | Mod: TC

## 2017-07-24 PROCEDURE — 36415 COLL VENOUS BLD VENIPUNCTURE: CPT | Performed by: EMERGENCY MEDICINE

## 2017-07-24 PROCEDURE — 84484 ASSAY OF TROPONIN QUANT: CPT | Performed by: EMERGENCY MEDICINE

## 2017-07-24 PROCEDURE — 85610 PROTHROMBIN TIME: CPT | Performed by: EMERGENCY MEDICINE

## 2017-07-24 PROCEDURE — 96365 THER/PROPH/DIAG IV INF INIT: CPT

## 2017-07-24 PROCEDURE — 85379 FIBRIN DEGRADATION QUANT: CPT | Performed by: EMERGENCY MEDICINE

## 2017-07-24 PROCEDURE — 25000132 ZZH RX MED GY IP 250 OP 250 PS 637: Mod: GY | Performed by: EMERGENCY MEDICINE

## 2017-07-24 PROCEDURE — 93010 ELECTROCARDIOGRAM REPORT: CPT | Performed by: INTERNAL MEDICINE

## 2017-07-24 PROCEDURE — A9270 NON-COVERED ITEM OR SERVICE: HCPCS | Mod: GY | Performed by: EMERGENCY MEDICINE

## 2017-07-24 RX ORDER — NITROGLYCERIN 20 MG/100ML
INJECTION INTRAVENOUS
Status: COMPLETED
Start: 2017-07-24 | End: 2017-07-24

## 2017-07-24 RX ORDER — ASPIRIN 81 MG/1
324 TABLET, CHEWABLE ORAL DAILY
Status: DISCONTINUED | OUTPATIENT
Start: 2017-07-24 | End: 2017-07-24 | Stop reason: HOSPADM

## 2017-07-24 RX ORDER — NITROGLYCERIN 20 MG/100ML
0.07 INJECTION INTRAVENOUS CONTINUOUS
Status: DISCONTINUED | OUTPATIENT
Start: 2017-07-24 | End: 2017-07-24 | Stop reason: HOSPADM

## 2017-07-24 RX ORDER — NITROGLYCERIN 0.4 MG/1
0.4 TABLET SUBLINGUAL EVERY 5 MIN PRN
Status: DISCONTINUED | OUTPATIENT
Start: 2017-07-24 | End: 2017-07-24 | Stop reason: HOSPADM

## 2017-07-24 RX ADMIN — NITROGLYCERIN 0.07 MCG/KG/MIN: 20 INJECTION INTRAVENOUS at 19:49

## 2017-07-24 RX ADMIN — ASPIRIN 81 MG 324 MG: 81 TABLET ORAL at 17:15

## 2017-07-24 RX ADMIN — NITROGLYCERIN 0.4 MG: 0.4 TABLET SUBLINGUAL at 17:17

## 2017-07-24 ASSESSMENT — ENCOUNTER SYMPTOMS
CONSTITUTIONAL NEGATIVE: 1
MUSCULOSKELETAL NEGATIVE: 1
ABDOMINAL PAIN: 0
PALPITATIONS: 0
NEUROLOGICAL NEGATIVE: 1
PSYCHIATRIC NEGATIVE: 1
ABDOMINAL DISTENTION: 0
RESPIRATORY NEGATIVE: 1
GASTROINTESTINAL NEGATIVE: 1
EYES NEGATIVE: 1
HEMATOLOGIC/LYMPHATIC NEGATIVE: 1
ENDOCRINE NEGATIVE: 1
SHORTNESS OF BREATH: 0

## 2017-07-24 NOTE — ED NOTES
First dose of Nitro given. Patient rating pain 1/10. He states its more like pressure around chest and jaw.

## 2017-07-24 NOTE — TELEPHONE ENCOUNTER
8:55 AM    Reason for Call: Phone Call    Description: wife of patient would like to discuss a few things that are concerning her, please call    Was an appointment offered for this call? No    Preferred method for responding to this message: Telephone Call    If we cannot reach you directly, may we leave a detailed response at the number you provided? Yes    Can this message wait until your PCP/provider returns, if available today? Not applicable, provider in    Eleanor Puri

## 2017-07-24 NOTE — ED NOTES
"Patient presents accompanied by wife with complaint of chest pain. Patient reports the pain comes and goes over the last week. Today the patient says the pain is more of a \"pressure.\" Currently rating it 1/10, and radiates to jaw and shoulder at times. Patient's wife called his PCP who recommended he come in. Patient denies palpitations, dizziness. Patient does have mild shortness of breath, sats 90-91% on RA. Denies a cardiac history. IV placed, attached to monitors, and call light within reach.   "

## 2017-07-24 NOTE — ED PROVIDER NOTES
History     Chief Complaint   Patient presents with     Chest Pain     States has been having chest pain on and off during the past week. States radiation to jaw within the last hour. States activity makes pain worse.     HPI  Clement Voss is a 72 year old male who presents with cc as above. Pain largely resolved at this time but states he has some residual pain in his neck. Patient has been in usual state of health. States he has never had chest pain of this type. No additional complaints.Denies any recent trauma.     I have reviewed the Medications, Allergies, Past Medical and Surgical History, and Social History in the Epic system.    Allergies:   Allergies   Allergen Reactions     Atorvastatin Calcium Other (See Comments)     Lipitor  Increase CR     Iodine      Penicillins      Sulfa Drugs      Sulfa (sulfonamide antibiotics)         No current facility-administered medications on file prior to encounter.   Current Outpatient Prescriptions on File Prior to Encounter:  JANUVIA 100 MG tablet TAKE 1 TABLET(100 MG) BY MOUTH DAILY   insulin glargine (LANTUS SOLOSTAR) 100 UNIT/ML injection Inject 14 Units Subcutaneous At Bedtime   insulin pen needle 31G X 6 MM Use 1 daily or as directed.   ACCU-CHEK VLAD PLUS test strip USE TO TEST BLOOD SUGARS THREE TIMES DAILY   amLODIPine (NORVASC) 10 MG tablet Take 1 tablet (10 mg) by mouth daily   lisinopril (PRINIVIL,ZESTRIL) 40 MG tablet Take 1 tablet (40 mg) by mouth daily   terazosin (HYTRIN) 10 MG capsule Take 1 capsule (10 mg) by mouth At Bedtime   warfarin (COUMADIN) 5 MG tablet Take 2.5mg Mon/Wed/Fri; 5mg all other days or as directed by [facility]   pilocarpine (PILOCAR) 1 % ophthalmic solution Place 1 drop into both eyes 4 times daily   fenofibrate 48 MG tablet Take 1 tablet (48 mg) by mouth daily   fluticasone (FLONASE) 50 MCG/ACT nasal spray Spray 2 sprays into both nostrils daily (Patient taking differently: Spray 2 sprays into both nostrils daily )    brimonidine-timolol (COMBIGAN) 0.2-0.5 % ophthalmic solution 1 drop 2 times daily Morning and evening, 12 hours apart   acetaminophen (TYLENOL) 500 MG tablet Take 1-2 tablets (500-1,000 mg) by mouth every 6 hours as needed   aspirin 81 MG EC tablet Take 1 tablet (81 mg) by mouth daily   loratadine (CLARITIN) 10 MG tablet Take 10 mg by mouth daily.   fish oil-omega-3 fatty acids (FISH OIL) 1000 MG capsule Take 1 capsule by mouth 3 times daily.   TRAVATAN Z (TRAVATAN Z) 0.004 % ophthalmic solution Instill 1 drop by ophthalmic route every day into affected eye(s) in the evening   STATIN NOT PRESCRIBED, INTENTIONAL, Statin not prescribed intentionally due to Intolerance (with supporting documentation of trying a statin at least once within the last 5 years)   indomethacin (INDOCIN) 50 MG capsule Take 1 capsule (50 mg) by mouth 3 times daily With food as needed       Patient Active Problem List   Diagnosis     Type 2 diabetes mellitus with chronic kidney disease, with long-term current use of insulin (H)     Hyperlipidemia     Benign essential hypertension     Gout     Pulmonary embolism and infarction (H)     Acute thromboembolism of deep veins of lower extremity (H)     Advanced care planning/counseling discussion     Long-term (current) use of anticoagulants [Z79.01]     Morbid obesity (H)       Past Surgical History:   Procedure Laterality Date     APPENDECTOMY  2008     CHOLECYSTECTOMY  1984     History of PE of right lung 3/2013[         Social History   Substance Use Topics     Smoking status: Former Smoker     Types: Cigarettes     Quit date: 8/12/1964     Smokeless tobacco: Never Used     Alcohol use No       Most Recent Immunizations   Administered Date(s) Administered     Influenza (High Dose) 3 valent vaccine 11/17/2013     Influenza (IIV3) 09/24/2012     Pneumococcal (PCV 13) 11/28/2016     Pneumococcal 23 valent 05/13/2014     TDAP Vaccine (Boostrix) 11/09/2012       BMI: Estimated body mass index is  "34.02 kg/(m^2) as calculated from the following:    Height as of 7/7/17: 1.702 m (5' 7\").    Weight as of 7/7/17: 98.5 kg (217 lb 3.2 oz).      Review of Systems   Constitutional: Negative.    HENT: Negative.    Eyes: Negative.    Respiratory: Negative.  Negative for shortness of breath.    Cardiovascular: Positive for chest pain. Negative for palpitations and leg swelling.   Gastrointestinal: Negative.  Negative for abdominal distention and abdominal pain.   Endocrine: Negative.    Genitourinary: Negative.    Musculoskeletal: Negative.    Neurological: Negative.    Hematological: Negative.    Psychiatric/Behavioral: Negative.        Physical Exam   BP: 138/65  Heart Rate: 77  Temp: 97.5  F (36.4  C)  Resp: 16  Height: 170.2 cm (5' 7\")  SpO2: 97 %  Physical Exam   Constitutional: He is oriented to person, place, and time. He appears well-developed and well-nourished.   HENT:   Head: Normocephalic and atraumatic.   Eyes: EOM are normal.   Neck: Normal range of motion. Neck supple.   Cardiovascular: Normal rate, regular rhythm and normal heart sounds.    Pulmonary/Chest: Effort normal and breath sounds normal. No respiratory distress. He has no wheezes. He has no rales.   Abdominal: Soft. He exhibits no distension. There is no tenderness. There is no rebound.   Musculoskeletal: Normal range of motion.   Neurological: He is alert and oriented to person, place, and time. No cranial nerve deficit. Coordination normal.   Skin: Skin is warm and dry.   Psychiatric: He has a normal mood and affect. His behavior is normal. Judgment and thought content normal.       ED Course     ED Course     Procedures        Ekg: NSR with 1st degree AV block.   CXR: No acute infiltrate or process seen (unofficial)       Labs Ordered and Resulted from Time of ED Arrival Up to the Time of Departure from the ED   CBC WITH PLATELETS DIFFERENTIAL - Abnormal; Notable for the following:        Result Value    RBC Count 3.83 (*)     Hemoglobin 12.8 " (*)     Hematocrit 35.1 (*)     MCH 33.4 (*)     Platelet Count 108 (*)     All other components within normal limits   INR - Abnormal; Notable for the following:     INR 1.58 (*)     All other components within normal limits   BASIC METABOLIC PANEL - Abnormal; Notable for the following:     Chloride 111 (*)     Glucose 116 (*)     Creatinine 1.40 (*)     GFR Estimate 50 (*)     GFR Estimate If Black 60 (*)     All other components within normal limits   TROPONIN I   D-DIMER (FV RANGE)       Assessments & Plan (with Medical Decision Making)     I have reviewed the nursing notes.    I have reviewed the findings, diagnosis, plan and need for follow up with the patient.    New Prescriptions    No medications on file       Final diagnoses:   Unstable angina (H)       7/24/2017   [facility]  72 year old with complaints of intermittent chest pain. Risk factors include HTN and diabetes. Of note, patient does have a history of blood clots and has never been risk stratified. At 19.24: Spoke with Dr. Marc of [facility]. Plan: Admit for evaluation and workup. To be started on nitro drip to be titrated to pain.      Lennox Marin MD  07/24/17 3559

## 2017-07-24 NOTE — TELEPHONE ENCOUNTER
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

## 2017-07-25 ENCOUNTER — TRANSFERRED RECORDS (OUTPATIENT)
Dept: HEALTH INFORMATION MANAGEMENT | Facility: HOSPITAL | Age: 72
End: 2017-07-25

## 2017-07-25 LAB — HBA1C MFR BLD: 8 % (ref 4–6)

## 2017-07-25 NOTE — ED NOTES
Face to face report given with opportunity to observe patient.    Report given to ROBI Cummins   7/24/2017  7:14 PM

## 2017-07-25 NOTE — ED NOTES
Images pushed to [facility] and disk being made to be sent with pt.

## 2017-07-25 NOTE — ED NOTES
Notified [facility] that pt is leaving now. Spouse with follow to [facility].  Pt has glasses, 's license, insurance card, shoes and jeans with him.

## 2017-07-31 ENCOUNTER — ANTICOAGULATION THERAPY VISIT (OUTPATIENT)
Dept: ANTICOAGULATION | Facility: OTHER | Age: 72
End: 2017-07-31

## 2017-07-31 ENCOUNTER — OFFICE VISIT (OUTPATIENT)
Dept: FAMILY MEDICINE | Facility: OTHER | Age: 72
End: 2017-07-31
Attending: FAMILY MEDICINE
Payer: COMMERCIAL

## 2017-07-31 VITALS
TEMPERATURE: 96.4 F | RESPIRATION RATE: 16 BRPM | HEART RATE: 80 BPM | BODY MASS INDEX: 32.49 KG/M2 | DIASTOLIC BLOOD PRESSURE: 54 MMHG | SYSTOLIC BLOOD PRESSURE: 120 MMHG | OXYGEN SATURATION: 94 % | WEIGHT: 207 LBS | HEIGHT: 67 IN

## 2017-07-31 DIAGNOSIS — G89.29 CHRONIC BILATERAL LOW BACK PAIN WITHOUT SCIATICA: Primary | ICD-10-CM

## 2017-07-31 DIAGNOSIS — I26.99 PULMONARY EMBOLISM AND INFARCTION (H): ICD-10-CM

## 2017-07-31 DIAGNOSIS — Z79.01 LONG-TERM (CURRENT) USE OF ANTICOAGULANTS: ICD-10-CM

## 2017-07-31 DIAGNOSIS — I82.409 ACUTE THROMBOEMBOLISM OF DEEP VEINS OF LOWER EXTREMITY, UNSPECIFIED LATERALITY (H): ICD-10-CM

## 2017-07-31 DIAGNOSIS — M54.50 CHRONIC BILATERAL LOW BACK PAIN WITHOUT SCIATICA: Primary | ICD-10-CM

## 2017-07-31 LAB — INR BLD: 2.2 (ref 0.86–1.14)

## 2017-07-31 PROCEDURE — 36416 COLLJ CAPILLARY BLOOD SPEC: CPT | Mod: ZL | Performed by: FAMILY MEDICINE

## 2017-07-31 PROCEDURE — 85610 PROTHROMBIN TIME: CPT | Mod: QW,ZL | Performed by: FAMILY MEDICINE

## 2017-07-31 PROCEDURE — 99213 OFFICE O/P EST LOW 20 MIN: CPT

## 2017-07-31 PROCEDURE — 99214 OFFICE O/P EST MOD 30 MIN: CPT | Performed by: FAMILY MEDICINE

## 2017-07-31 ASSESSMENT — ANXIETY QUESTIONNAIRES
2. NOT BEING ABLE TO STOP OR CONTROL WORRYING: NOT AT ALL
IF YOU CHECKED OFF ANY PROBLEMS ON THIS QUESTIONNAIRE, HOW DIFFICULT HAVE THESE PROBLEMS MADE IT FOR YOU TO DO YOUR WORK, TAKE CARE OF THINGS AT HOME, OR GET ALONG WITH OTHER PEOPLE: NOT DIFFICULT AT ALL
5. BEING SO RESTLESS THAT IT IS HARD TO SIT STILL: NOT AT ALL
4. TROUBLE RELAXING: NOT AT ALL
GAD7 TOTAL SCORE: 1
7. FEELING AFRAID AS IF SOMETHING AWFUL MIGHT HAPPEN: NOT AT ALL
1. FEELING NERVOUS, ANXIOUS, OR ON EDGE: NOT AT ALL
3. WORRYING TOO MUCH ABOUT DIFFERENT THINGS: NOT AT ALL
6. BECOMING EASILY ANNOYED OR IRRITABLE: SEVERAL DAYS

## 2017-07-31 NOTE — PROGRESS NOTES
SUBJECTIVE:                                                    Clement Voss is a 72 year old male who presents to clinic today for the following health issues:        Hospital Follow-up Visit:    Hospital/Nursing Home/IP Rehab Facility: [facility]  Date of Admission: 7/24/17  Date of Discharge: 7/26/17  Reason(s) for Admission: Chest pain            Problems taking medications regularly:  None       Medication changes since discharge: None       Problems adhering to non-medication therapy:  None    Summary of hospitalization:  See outside records, reviewed and scanned  Diagnostic Tests/Treatments reviewed.  Follow up needed: none  Other Healthcare Providers Involved in Patient s Care:         None  Update since discharge: improved.     Post Discharge Medication Reconciliation: discharge medications reconciled, continue medications without change.  Plan of care communicated with patient and family     Coding guidelines for this visit:  Type of Medical   Decision Making Face-to-Face Visit       within 7 Days of discharge Face-to-Face Visit        within 14 days of discharge   Moderate Complexity [id number]   High Complexity [id number]              Patient states his back has really been acting up since the ambulance ride to [place].  He notes his back has been worsening for quite some time.  His wife is concerned that patient is unable to be active due to pain, and she is also afraid he will fall as his legs are often weak in the morning.  They both agree that his back needs to be looked into further.    Problem list and histories reviewed & adjusted, as indicated.  Additional history: as documented    Patient Active Problem List   Diagnosis     Type 2 diabetes mellitus with chronic kidney disease, with long-term current use of insulin (H)     Hyperlipidemia     Benign essential hypertension     Gout     Pulmonary embolism and infarction (H)     Acute thromboembolism of deep veins of lower extremity  (H)     ACP (advance care planning)     Long-term (current) use of anticoagulants [Z79.01]     Morbid obesity (H)     Past Surgical History:   Procedure Laterality Date     APPENDECTOMY  2008     CHOLECYSTECTOMY  1984     History of PE of right lung 3/2013[         Social History   Substance Use Topics     Smoking status: Former Smoker     Types: Cigarettes     Quit date: 8/12/1964     Smokeless tobacco: Never Used     Alcohol use No     Family History   Problem Relation Age of Onset     HEART DISEASE Father 71     heart disease; cause of death     Other - See Comments Mother 79     old age; cause of death         Current Outpatient Prescriptions   Medication Sig Dispense Refill     JANUVIA 100 MG tablet TAKE 1 TABLET(100 MG) BY MOUTH DAILY 90 tablet 0     insulin glargine (LANTUS SOLOSTAR) 100 UNIT/ML injection Inject 14 Units Subcutaneous At Bedtime (Patient taking differently: Inject 16 Units Subcutaneous At Bedtime ) 15 mL 1     insulin pen needle 31G X 6 MM Use 1 daily or as directed. 100 each prn     ACCU-CHEK VLAD PLUS test strip USE TO TEST BLOOD SUGARS THREE TIMES DAILY 100 strip 2     amLODIPine (NORVASC) 10 MG tablet Take 1 tablet (10 mg) by mouth daily 90 tablet 3     lisinopril (PRINIVIL,ZESTRIL) 40 MG tablet Take 1 tablet (40 mg) by mouth daily 90 tablet 3     terazosin (HYTRIN) 10 MG capsule Take 1 capsule (10 mg) by mouth At Bedtime 90 capsule 3     warfarin (COUMADIN) 5 MG tablet Take 2.5mg Mon/Wed/Fri; 5mg all other days or as directed by [facility] 90 tablet 4     pilocarpine (PILOCAR) 1 % ophthalmic solution Place 1 drop into both eyes 4 times daily       fenofibrate 48 MG tablet Take 1 tablet (48 mg) by mouth daily 90 tablet 3     STATIN NOT PRESCRIBED, INTENTIONAL, Statin not prescribed intentionally due to Intolerance (with supporting documentation of trying a statin at least once within the last 5 years) 0 each 0     fluticasone (FLONASE) 50 MCG/ACT nasal spray Spray 2 sprays into  "both nostrils daily (Patient taking differently: Spray 2 sprays into both nostrils daily as needed ) 1 Package 0     indomethacin (INDOCIN) 50 MG capsule Take 1 capsule (50 mg) by mouth 3 times daily With food as needed 30 capsule 1     brimonidine-timolol (COMBIGAN) 0.2-0.5 % ophthalmic solution 1 drop 2 times daily Morning and evening, 12 hours apart       acetaminophen (TYLENOL) 500 MG tablet Take 1-2 tablets (500-1,000 mg) by mouth every 6 hours as needed       aspirin 81 MG EC tablet Take 1 tablet (81 mg) by mouth daily 90 tablet 3     loratadine (CLARITIN) 10 MG tablet Take 10 mg by mouth daily.       fish oil-omega-3 fatty acids (FISH OIL) 1000 MG capsule Take 1 capsule by mouth 3 times daily.       TRAVATAN Z (TRAVATAN Z) 0.004 % ophthalmic solution Instill 1 drop by ophthalmic route every day into affected eye(s) in the evening       Allergies   Allergen Reactions     Atorvastatin Calcium Other (See Comments)     Lipitor  Increase CR     Iodine      Penicillins      Sulfa Drugs      Sulfa (sulfonamide antibiotics)         Reviewed and updated as needed this visit by clinical staffTobacco  Allergies  Meds  Problems  Med Hx  Surg Hx  Fam Hx  Soc Hx        Reviewed and updated as needed this visit by Provider         ROS:  Constitutional, HEENT, cardiovascular, pulmonary, gi and gu systems are negative, except as otherwise noted.      OBJECTIVE:   /54 (BP Location: Left arm, Patient Position: Sitting, Cuff Size: Adult Regular)  Pulse 80  Temp 96.4  F (35.8  C) (Tympanic)  Resp 16  Ht 5' 7\" (1.702 m)  Wt 207 lb (93.9 kg)  SpO2 94%  BMI 32.42 kg/m2  Body mass index is 32.42 kg/(m^2).  GENERAL: healthy, alert and no distress  PSYCH: mentation appears normal, affect normal/bright    Diagnostic Test Results:  none     ASSESSMENT/PLAN:     1. Chronic bilateral low back pain without sciatica  MRI ordered, follow-up with results when available.  - MR Lumbar Spine w/o Contrast; Future  - MR Lumbar " Spine w/o Contrast    2. Long-term (current) use of anticoagulants  - INR point of care ( [facility] clinic ONLY)    3. Pulmonary embolism and infarction (H)  - INR point of care ( [facility] clinic ONLY)    4. Acute thromboembolism of deep veins of lower extremity, unspecified laterality (H)  - INR point of care ( [facility] clinic ONLY)    Over 25 minutes are spent with the patient, over 50% of which was in education and counseling regarding current conditions and treatment/therapy options/risks/benefits/etc.      Lorelei Felix MD  [facility]

## 2017-07-31 NOTE — MR AVS SNAPSHOT
"              After Visit Summary   7/31/2017    Clement Voss    MRN: [id number]           Patient Information     Date Of Birth          1945        Visit Information        Provider Department      7/31/2017 2:30 PM Lorelei Felix MD [facility]        Today's Diagnoses     Chronic bilateral low back pain without sciatica    -  1    Long-term (current) use of anticoagulants        Pulmonary embolism and infarction (H)        Acute thromboembolism of deep veins of lower extremity, unspecified laterality (H)           Follow-ups after your visit        Your next 10 appointments already scheduled     Aug 31, 2017 11:00 AM CDT   (Arrive by 10:45 AM)   SHORT with Lorelei Felix MD   [facility] ([facility] )    [address]  [place] [address]   [phone]              Who to contact     If you have questions or need follow up information about today's clinic visit or your schedule please contact [facility] directly at [phone].  Normal or non-critical lab and imaging results will be communicated to you by [employer], letter or phone within 4 business days after the clinic has received the results. If you do not hear from us within 7 days, please contact the clinic through [employer] or phone. If you have a critical or abnormal lab result, we will notify you by phone as soon as possible.  Submit refill requests through [employer] or call your pharmacy and they will forward the refill request to us. Please allow 3 business days for your refill to be completed.          Additional Information About Your Visit        [employer] Information     Sykio lets you send messages to your doctor, view your test results, renew your prescriptions, schedule appointments and more. To sign up, go to [email or web address] . Click on \"Log in\" on the left side of the screen, which will take you to the Welcome page. Then click on \"Sign up Now\" " "on the right side of the page.     You will be asked to enter the access code listed below, as well as some personal information. Please follow the directions to create your username and password.     Your access code is: [email or web address]  Expires: 2017  5:16 PM     Your access code will  in 90 days. If you need help or a new code, please call your [facility] clinic or [phone].        Care EveryWhere ID     This is your Care EveryWhere ID. This could be used by other organizations to access your [facility] medical records  [id number]        Your Vitals Were     Pulse Temperature Respirations Height Pulse Oximetry BMI (Body Mass Index)    80 96.4  F (35.8  C) (Tympanic) 16 5' 7\" (1.702 m) 94% 32.42 kg/m2       Blood Pressure from Last 3 Encounters:   17 120/54   17 135/75   17 132/58    Weight from Last 3 Encounters:   17 207 lb (93.9 kg)   17 217 lb 3.2 oz (98.5 kg)   17 213 lb 9.6 oz (96.9 kg)              We Performed the Following     INR point of care (  clinic ONLY)          Today's Medication Changes          These changes are accurate as of: 17  3:53 PM.  If you have any questions, ask your nurse or doctor.               These medicines have changed or have updated prescriptions.        Dose/Directions    fluticasone 50 MCG/ACT spray   Commonly known as:  FLONASE   This may have changed:    - when to take this  - reasons to take this   Used for:  Acute maxillary sinusitis        Dose:  2 spray   Spray 2 sprays into both nostrils daily   Quantity:  1 Package   Refills:  0       insulin glargine 100 UNIT/ML injection   Commonly known as:  LANTUS SOLOSTAR   This may have changed:  how much to take   Used for:  Type 2 diabetes mellitus with stage 3 chronic kidney disease, with long-term current use of insulin (H)        Dose:  14 Units   Inject 14 Units Subcutaneous At Bedtime   Quantity:  15 mL   Refills:  1                Primary Care Provider Office Phone # " Fax #    Lorelei VERNA Felix [phone]       [facility] [address] [address]        Equal Access to Services     CHRISTAL LUCAS : Cassi Marinelli, waelliotda kearaadaha, qaybta kaalmada christiano, manan andrein hayaaolivia mraquesacostalouise aviles. So [facility] [phone].    ATENCIÓN: Si habla español, tiene a breaux disposición servicios gratuitos de asistencia lingüística. Llame al [phone].    We comply with applicable federal civil rights laws and [place] laws. We do not discriminate on the basis of race, color, national origin, age, disability sex, sexual orientation or gender identity.            Thank you!     Thank you for choosing [facility]  for your care. Our goal is always to provide you with excellent care. Hearing back from our patients is one way we can continue to improve our services. Please take a few minutes to complete the written survey that you may receive in the mail after your visit with us. Thank you!             Your Updated Medication List - Protect others around you: Learn how to safely use, store and throw away your medicines at www.disposemymeds.org.          This list is accurate as of: 7/31/17  3:53 PM.  Always use your most recent med list.                   Brand Name Dispense Instructions for use Diagnosis    ACCU-CHEK VLAD PLUS test strip   Generic drug:  blood glucose monitoring     100 strip    USE TO TEST BLOOD SUGARS THREE TIMES DAILY    Diabetes mellitus, type 2 (H)       acetaminophen 500 MG tablet    TYLENOL     Take 1-2 tablets (500-1,000 mg) by mouth every 6 hours as needed    Pulmonary embolism (H)       amLODIPine 10 MG tablet    NORVASC    90 tablet    Take 1 tablet (10 mg) by mouth daily    Benign essential hypertension       aspirin 81 MG EC tablet     90 tablet    Take 1 tablet (81 mg) by mouth daily    Diabetes mellitus, type 2 (H)       CLARITIN 10 MG tablet   Generic drug:  loratadine      Take  10 mg by mouth daily.        COMBIGAN 0.2-0.5 % ophthalmic solution   Generic drug:  brimonidine-timolol      1 drop 2 times daily Morning and evening, 12 hours apart        fenofibrate 48 MG tablet     90 tablet    Take 1 tablet (48 mg) by mouth daily    Hyperlipidemia, unspecified hyperlipidemia type       fish oil-omega-3 fatty acids 1000 MG capsule      Take 1 capsule by mouth 3 times daily.        fluticasone 50 MCG/ACT spray    FLONASE    1 Package    Spray 2 sprays into both nostrils daily    Acute maxillary sinusitis       indomethacin 50 MG capsule    INDOCIN    30 capsule    Take 1 capsule (50 mg) by mouth 3 times daily With food as needed    Gout, unspecified       insulin glargine 100 UNIT/ML injection    LANTUS SOLOSTAR    15 mL    Inject 14 Units Subcutaneous At Bedtime    Type 2 diabetes mellitus with stage 3 chronic kidney disease, with long-term current use of insulin (H)       insulin pen needle 31G X 6 MM     100 each    Use 1 daily or as directed.    Type 2 diabetes mellitus with stage 3 chronic kidney disease, with long-term current use of insulin (H)       JANUVIA 100 MG tablet   Generic drug:  sitagliptin     90 tablet    TAKE 1 TABLET(100 MG) BY MOUTH DAILY    Type 2 diabetes mellitus without complication (H)       lisinopril 40 MG tablet    PRINIVIL/ZESTRIL    90 tablet    Take 1 tablet (40 mg) by mouth daily    Benign essential hypertension       pilocarpine 1 % ophthalmic solution    PILOCAR     Place 1 drop into both eyes 4 times daily        STATIN NOT PRESCRIBED (INTENTIONAL)     0 each    Statin not prescribed intentionally due to Intolerance (with supporting documentation of trying a statin at least once within the last 5 years)    Hyperlipidemia, unspecified hyperlipidemia       terazosin 10 MG capsule    HYTRIN    90 capsule    Take 1 capsule (10 mg) by mouth At Bedtime    Benign essential hypertension       TRAVATAN Z 0.004 % ophthalmic solution   Generic drug:  travoprost (BAK  Free)      Instill 1 drop by ophthalmic route every day into affected eye(s) in the evening        warfarin 5 MG tablet    COUMADIN    90 tablet    Take 2.5mg Mon/Wed/Fri; 5mg all other days or as directed by [facility]    Pulmonary embolism and infarction (H)

## 2017-07-31 NOTE — LETTER
August 8, 2017        Clement ESTES   [address] [address]        Dear Clement,   Results for orders placed or performed in visit on 07/31/17   MR Lumbar Spine w/o Contrast    Narrative    MRI OF LUMBAR SPINE    CLINICAL HISTORY:  A 72-year-old male with chronic intermittent back  pain and right leg discomfort.    COMPARISON:  Today's study is compared to a prior examination which is  dated August 6, 2012.    FINDINGS:  Lumbar spine demonstrates a normal lordotic curvature.  Conus medullaris is again seen at T12-L1 and is normal.    There is again mild to moderate disk and endplate degenerative changes  at T11-T12 and T12-L1.    L1-L2:  Normal disk, small L2 superior endplate Schmorl's node and  mild degenerative changes of the facet joints are similar in  appearance.    L2-L3:  Normally contoured disk. Mild bilateral facet joint  degeneration.    L3-L4:  Chronic bulge is again seen.  Endplate osteophytic spurring is  worsened slightly.  There is now left lateral recess narrowing with  impingement of the intrathecal portions of the left L4 nerve roots.  Neural foramina are mildly narrowed, with bilateral L3 ganglionic  abutment, asymmetrically worse on the left.    L4-L5:  Moderate disk degeneration with slight bulge is again seen.  Endplate osteophytic spurring is also similar in appearance.  Hypertrophic changes of the ligamentum flavum and facet joints in  addition to the annular bulge again contribute to moderately-severe  central canal stenosis and lateral recess narrowing, with impingement  of the exiting L5 root sleeves.   The right neural foramen is mildly  narrowed, with right L4 ganglionic abutment.    Continued on Page 2    L5-S1:  Moderate to severe disk degeneration has worsened and there  has been interval enlargement of the chronic bulge. Annular fissuring  is again seen along the dorsal aspect of the disk.  The right neural  foramen is mildly narrowed.  Left neural foramen is  moderately  narrowed, with left L5 ganglionic impingement and right L5 ganglionic  abutment.    Sacroiliac joints are congruent.  Paraspinous musculature demonstrates  mild atrophy.  Retroperitoneal soft tissues are grossly normal.    IMPRESSION:  MODERATE MULTILEVEL DEGENERATIVE CHANGE THAT HAS WORSENED  WHEN COMPARED TO THE PREVIOUS STUDY FROM 2012.  CHRONIC  BULGING AS WELL AS HYPERTROPHIC CHANGE OF THE ENDPLATES AND FACET  JOINTS CONTRIBUTE TO A NUMBER OF SITES OF POTENTIAL NERVE ROOT  ABUTMENT/IMPINGEMENT INCLUDING THE FOLLOWIN.  MODERATE LEFT AND MILD RIGHT FORAMINAL STENOSIS AT L5-S1 WITH LEFT  L4 GANGLIONIC IMPINGEMENT AND RIGHT L5 GANGLIONIC ABUTMENT.    2.  MILD BILATERAL FORAMINAL STENOSIS AT L4-L5 AND L3-L4 WITH  BILATERAL L4 AND L3 GANGLIONIC ABUTMENT AT EACH OF THESE LEVELS  RESPECTIVELY.    3.  WORSENING LEFT LATERAL RECESS NARROWING AT L3-L4 WITH IMPINGEMENT  OF THE INTRATHECAL PORTIONS OF THE LEFT L4 NERVE ROOTS.    4.  LATERAL RECESS NARROWING AT L4-L5 WITH IMPINGEMENT OF THE EXITING  L5 ROOT SLEEVES.      Continued on Page 3      IMPRESSION (continued):    5.  THE PATIENT MAY BENEFIT FROM AN L4-L5 INTERLAMINAR EPIDURAL  STEROID INJECTION.  THE MEDICATION ADMINISTERED AT L4-L5 TYPICALLY  WILL DEMONSTRATE DISPERSAL BOTH CEPHALAD AND CAUDAL, ADDRESSING ISSUES  AT THE L3-L4 AND L5-S1 LEVELS IN ADDITION TO L4-L5.  Exam Date: Aug 07, 2017 11:19:07 AM  Author: BALBIR COLE  This report is final and signed     INR point of care ( [facility] clinic ONLY)   Result Value Ref Range    INR Point of Care 2.2 (H) 0.86 - 1.14             Sincerely,        Lorelei Felix MD

## 2017-07-31 NOTE — NURSING NOTE
"Chief Complaint   Patient presents with     Hospital F/U     needs INR draw       Initial /54 (BP Location: Left arm, Patient Position: Sitting, Cuff Size: Adult Regular)  Pulse 80  Temp 96.4  F (35.8  C) (Tympanic)  Resp 16  Ht 5' 7\" (1.702 m)  Wt 207 lb (93.9 kg)  SpO2 94%  BMI 32.42 kg/m2 Estimated body mass index is 32.42 kg/(m^2) as calculated from the following:    Height as of this encounter: 5' 7\" (1.702 m).    Weight as of this encounter: 207 lb (93.9 kg).  Medication Reconciliation: complete     Carol Sheehan      "

## 2017-07-31 NOTE — PROGRESS NOTES
ANTICOAGULATION FOLLOW-UP CLINIC VISIT    Patient Name:  Clement Voss  Date:  7/31/2017  Contact Type:  Telephone/ message left on patient home phone    SUBJECTIVE:     Patient Findings     Comments Patient called and message left on home phone re: INR result, warfarin dosing and INR recheck date. He is to call if he has any bleeding/bruising, changes in diet/meds/activity or questions.            OBJECTIVE    INR Point of Care   Date Value Ref Range Status   07/31/2017 2.2 (H) 0.86 - 1.14 Final     Comment:     This test is intended for monitoring Coumadin therapy.  Results are not   accurate   in patients with prolonged INR due to factor deficiency.         ASSESSMENT / PLAN  INR assessment THER    Recheck INR In: 3 WEEKS    INR Location Clinic      Anticoagulation Summary as of 7/31/2017     INR goal 2.0-3.0   Today's INR 2.2   Maintenance plan 2.5 mg (5 mg x 0.5) on Mon, Wed, Fri; 5 mg (5 mg x 1) all other days   Full instructions 2.5 mg on Mon, Wed, Fri; 5 mg all other days   Weekly total 27.5 mg   Plan last modified Jodie Gill RN (8/4/2016)   Next INR check 8/21/2017   Priority INR   Target end date Indefinite    Indications   Pulmonary embolism and infarction (H) [I26.99]  Acute thromboembolism of deep veins of lower extremity (H) [I82.409]  Long-term (current) use of anticoagulants [Z79.01] [Z79.01]         Anticoagulation Episode Summary     INR check location     Preferred lab     Send INR reminders to  ANTICOAG POOL    Comments       Anticoagulation Care Providers     Provider Role Specialty Phone number    Lorelei Felix MD [facility] [phone]            See the Encounter Report to view Anticoagulation Flowsheet and Dosing Calendar (Go to Encounters tab in chart review, and find the Anticoagulation Therapy Visit)        Jodie Elena RN

## 2017-07-31 NOTE — MR AVS SNAPSHOT
Clement CROWELL Shanika   7/31/2017   Anticoagulation Therapy Visit    Description:  72 year old male   Provider:  Lorelei Felix MD   Department:  H[facility] Anti Coagulation           INR as of 7/31/2017     Today's INR 2.2      Anticoagulation Summary as of 7/31/2017     INR goal 2.0-3.0   Today's INR 2.2   Full instructions 2.5 mg on Mon, Wed, Fri; 5 mg all other days   Next INR check 8/21/2017    Indications   Pulmonary embolism and infarction (H) [I26.99]  Acute thromboembolism of deep veins of lower extremity (H) [I82.409]  Long-term (current) use of anticoagulants [Z79.01] [Z79.01]         July 2017 Details    Sun Mon Tue Wed Thu Fri Sat           1                 2               3               4               5               6               7               8                 9               10               11               12               13               14               15                 16               17               18               19               20               21               22                 23               24               25               26               27               28               29                 30               31      2.5 mg   See details            Date Details   07/31 This INR check               How to take your warfarin dose     To take:  2.5 mg Take 0.5 of a 5 mg tablet.           August 2017 Details    Sun Mon Tue Wed Thu Fri Sat       1      5 mg         2      2.5 mg         3      5 mg         4      2.5 mg         5      5 mg           6      5 mg         7      2.5 mg         8      5 mg         9      2.5 mg         10      5 mg         11      2.5 mg         12      5 mg           13      5 mg         14      2.5 mg         15      5 mg         16      2.5 mg         17      5 mg         18      2.5 mg         19      5 mg           20      5 mg         21            22               23               24               25               26                 27                28               29               30               31                  Date Details   No additional details    Date of next INR:  8/21/2017         How to take your warfarin dose     To take:  2.5 mg Take 0.5 of a 5 mg tablet.    To take:  5 mg Take 1 of the 5 mg tablets.

## 2017-08-01 ASSESSMENT — PATIENT HEALTH QUESTIONNAIRE - PHQ9: SUM OF ALL RESPONSES TO PHQ QUESTIONS 1-9: 2

## 2017-08-01 ASSESSMENT — ANXIETY QUESTIONNAIRES: GAD7 TOTAL SCORE: 1

## 2017-08-04 ENCOUNTER — TELEPHONE (OUTPATIENT)
Dept: EDUCATION SERVICES | Facility: HOSPITAL | Age: 72
End: 2017-08-04

## 2017-08-04 DIAGNOSIS — N18.30 TYPE 2 DIABETES MELLITUS WITH STAGE 3 CHRONIC KIDNEY DISEASE, WITH LONG-TERM CURRENT USE OF INSULIN (H): ICD-10-CM

## 2017-08-04 DIAGNOSIS — Z79.4 TYPE 2 DIABETES MELLITUS WITH STAGE 3 CHRONIC KIDNEY DISEASE, WITH LONG-TERM CURRENT USE OF INSULIN (H): ICD-10-CM

## 2017-08-04 DIAGNOSIS — E11.22 TYPE 2 DIABETES MELLITUS WITH STAGE 3 CHRONIC KIDNEY DISEASE, WITH LONG-TERM CURRENT USE OF INSULIN (H): ICD-10-CM

## 2017-08-04 NOTE — TELEPHONE ENCOUNTER
Called Clement for BG review. He is taking Lantus 16 units daily and Januvia 100 mg daily. Readings range as follows: fastin-165, pre-supper: 109, 118, 168 and post-supper: 255. Will increase Lantus to 18 units daily and follow by phone in 1 week.

## 2017-08-07 ENCOUNTER — HOSPITAL ENCOUNTER (OUTPATIENT)
Dept: MRI IMAGING | Facility: HOSPITAL | Age: 72
Discharge: HOME | End: 2017-08-07
Attending: FAMILY MEDICINE | Admitting: FAMILY MEDICINE
Payer: MEDICARE

## 2017-08-07 PROCEDURE — 72148 MRI LUMBAR SPINE W/O DYE: CPT | Mod: TC

## 2017-08-22 DIAGNOSIS — E78.5 HYPERLIPIDEMIA, UNSPECIFIED HYPERLIPIDEMIA TYPE: ICD-10-CM

## 2017-08-22 RX ORDER — FENOFIBRATE 48 MG/1
TABLET, COATED ORAL
Qty: 90 TABLET | Refills: 2 | Status: SHIPPED | OUTPATIENT
Start: 2017-08-22 | End: 2018-05-23

## 2017-08-22 NOTE — TELEPHONE ENCOUNTER
fenofibrate 48 MG tablet     Last Written Prescription Date: 8/26/16  Last Fill Quantity: 90, # refills: 0    Last Office Visit with G, P or [facility] prescribing provider:  7/31/17   Future Office Visit:    Next 5 appointments (look out 90 days)     Aug 31, 2017 11:00 AM CDT   (Arrive by 10:45 AM)   SHORT with Lorelei Felix MD   [facility] ([facility] )    [address]  [facility] [address]   [phone]                  Cholesterol   Date Value Ref Range Status   05/19/2017 171 <200 mg/dL Final     HDL Cholesterol   Date Value Ref Range Status   05/19/2017 32 (L) >39 mg/dL Final     LDL Cholesterol Calculated   Date Value Ref Range Status   05/19/2017 83 <100 mg/dL Final     Comment:     Desirable:       <100 mg/dl     Triglycerides   Date Value Ref Range Status   05/19/2017 279 (H) <150 mg/dL Final     Comment:     Borderline high:  150-199 mg/dl   High:             200-499 mg/dl   Very high:       >499 mg/dl   Fasting specimen       Cholesterol/HDL Ratio   Date Value Ref Range Status   08/14/2015 4.4 0.0 - 5.0 Final     ALT   Date Value Ref Range Status   11/28/2016 32 0 - 70 U/L Final

## 2017-08-24 ENCOUNTER — ANTICOAGULATION THERAPY VISIT (OUTPATIENT)
Dept: ANTICOAGULATION | Facility: OTHER | Age: 72
End: 2017-08-24

## 2017-08-24 DIAGNOSIS — E11.65 TYPE 2 DIABETES MELLITUS WITH HYPERGLYCEMIA, WITHOUT LONG-TERM CURRENT USE OF INSULIN (H): ICD-10-CM

## 2017-08-24 DIAGNOSIS — I26.99 PULMONARY EMBOLISM AND INFARCTION (H): ICD-10-CM

## 2017-08-24 DIAGNOSIS — I10 BENIGN ESSENTIAL HYPERTENSION: ICD-10-CM

## 2017-08-24 DIAGNOSIS — Z79.01 LONG-TERM (CURRENT) USE OF ANTICOAGULANTS: ICD-10-CM

## 2017-08-24 DIAGNOSIS — I82.409 ACUTE THROMBOEMBOLISM OF DEEP VEINS OF LOWER EXTREMITY, UNSPECIFIED LATERALITY (H): ICD-10-CM

## 2017-08-24 DIAGNOSIS — E78.5 HYPERLIPIDEMIA, UNSPECIFIED HYPERLIPIDEMIA TYPE: ICD-10-CM

## 2017-08-24 LAB
ANION GAP SERPL CALCULATED.3IONS-SCNC: 6 MMOL/L (ref 3–14)
BUN SERPL-MCNC: 13 MG/DL (ref 7–30)
CALCIUM SERPL-MCNC: 8.8 MG/DL (ref 8.5–10.1)
CHLORIDE SERPL-SCNC: 110 MMOL/L (ref 94–109)
CHOLEST SERPL-MCNC: 131 MG/DL
CO2 SERPL-SCNC: 27 MMOL/L (ref 20–32)
CREAT SERPL-MCNC: 1.51 MG/DL (ref 0.66–1.25)
EST. AVERAGE GLUCOSE BLD GHB EST-MCNC: 180 MG/DL
GFR SERPL CREATININE-BSD FRML MDRD: 45 ML/MIN/1.7M2
GLUCOSE SERPL-MCNC: 120 MG/DL (ref 70–99)
HBA1C MFR BLD: 7.9 % (ref 4.3–6)
HDLC SERPL-MCNC: 36 MG/DL
INR BLD: 2.8 (ref 0.86–1.14)
LDLC SERPL CALC-MCNC: 69 MG/DL
NONHDLC SERPL-MCNC: 95 MG/DL
POTASSIUM SERPL-SCNC: 4 MMOL/L (ref 3.4–5.3)
SODIUM SERPL-SCNC: 143 MMOL/L (ref 133–144)
TRIGL SERPL-MCNC: 131 MG/DL

## 2017-08-24 PROCEDURE — 80061 LIPID PANEL: CPT | Mod: ZL | Performed by: FAMILY MEDICINE

## 2017-08-24 PROCEDURE — 36416 COLLJ CAPILLARY BLOOD SPEC: CPT | Mod: ZL | Performed by: FAMILY MEDICINE

## 2017-08-24 PROCEDURE — 85610 PROTHROMBIN TIME: CPT | Mod: QW,ZL | Performed by: FAMILY MEDICINE

## 2017-08-24 PROCEDURE — 36415 COLL VENOUS BLD VENIPUNCTURE: CPT | Mod: ZL | Performed by: FAMILY MEDICINE

## 2017-08-24 PROCEDURE — 83036 HEMOGLOBIN GLYCOSYLATED A1C: CPT | Mod: ZL | Performed by: FAMILY MEDICINE

## 2017-08-24 PROCEDURE — 40000788 ZZHCL STATISTIC ESTIMATED AVERAGE GLUCOSE: Mod: ZL | Performed by: FAMILY MEDICINE

## 2017-08-24 PROCEDURE — 80048 BASIC METABOLIC PNL TOTAL CA: CPT | Mod: ZL | Performed by: FAMILY MEDICINE

## 2017-08-24 RX ORDER — GLYBURIDE 5 MG/1
TABLET ORAL
COMMUNITY
Start: 2014-10-28 | End: 2017-11-28

## 2017-08-24 NOTE — PROGRESS NOTES
ANTICOAGULATION FOLLOW-UP CLINIC VISIT    Patient Name:  Clement Voss  Date:  8/24/2017  Contact Type:  Telephone/ message left on patient home phone    SUBJECTIVE:     Patient Findings     Comments Call placed to patient home phone and message left re: INR result, warfarin dosing and INR recheck date. He is to notify warfarin clinic if any bleeding/bruising, changes in diet/meds/activity or questions.            OBJECTIVE    INR Point of Care   Date Value Ref Range Status   08/24/2017 2.8 (H) 0.86 - 1.14 Final     Comment:     This test is intended for monitoring Coumadin therapy.  Results are not   accurate in patients with prolonged INR due to factor deficiency.         ASSESSMENT / PLAN  INR assessment THER    Recheck INR In: 6 WEEKS    INR Location Clinic      Anticoagulation Summary as of 8/24/2017     INR goal 2.0-3.0   Today's INR 2.8   Maintenance plan 2.5 mg (5 mg x 0.5) on Mon, Wed, Fri; 5 mg (5 mg x 1) all other days   Full instructions 2.5 mg on Mon, Wed, Fri; 5 mg all other days   Weekly total 27.5 mg   No change documented Jodie Elena RN   Plan last modified Jodie Gill RN (8/4/2016)   Next INR check 10/5/2017   Priority INR   Target end date Indefinite    Indications   Pulmonary embolism and infarction (H) [I26.99]  Acute thromboembolism of deep veins of lower extremity (H) [I82.409]  Long-term (current) use of anticoagulants [Z79.01] [Z79.01]         Anticoagulation Episode Summary     INR check location     Preferred lab     Send INR reminders to  ANTICOAG POOL    Comments       Anticoagulation Care Providers     Provider Role Specialty Phone number    Lorelei Felix MD [facility] [phone]            See the Encounter Report to view Anticoagulation Flowsheet and Dosing Calendar (Go to Encounters tab in chart review, and find the Anticoagulation Therapy Visit)        Jodie Elena RN

## 2017-08-24 NOTE — MR AVS SNAPSHOT
Clement CROWELL Shanika   8/24/2017   Anticoagulation Therapy Visit    Description:  72 year old male   Provider:  Lorelei Felix MD   Department:  [facility] Anti Coagulation           INR as of 8/24/2017     Today's INR 2.8      Anticoagulation Summary as of 8/24/2017     INR goal 2.0-3.0   Today's INR 2.8   Full instructions 2.5 mg on Mon, Wed, Fri; 5 mg all other days   Next INR check 10/5/2017    Indications   Pulmonary embolism and infarction (H) [I26.99]  Acute thromboembolism of deep veins of lower extremity (H) [I82.409]  Long-term (current) use of anticoagulants [Z79.01] [Z79.01]         August 2017 Details    Sun Mon Tue Wed Thu Fri Sat       1               2               3               4               5                 6               7               8               9               10               11               12                 13               14               15               16               17               18               19                 20               21               22               23               24      5 mg   See details      25      2.5 mg         26      5 mg           27      5 mg         28      2.5 mg         29      5 mg         30      2.5 mg         31      5 mg            Date Details   08/24 This INR check               How to take your warfarin dose     To take:  2.5 mg Take 0.5 of a 5 mg tablet.    To take:  5 mg Take 1 of the 5 mg tablets.           September 2017 Details    Sun Mon Tue Wed Thu Fri Sat          1      2.5 mg         2      5 mg           3      5 mg         4      2.5 mg         5      5 mg         6      2.5 mg         7      5 mg         8      2.5 mg         9      5 mg           10      5 mg         11      2.5 mg         12      5 mg         13      2.5 mg         14      5 mg         15      2.5 mg         16      5 mg           17      5 mg         18      2.5 mg         19      5 mg         20      2.5 mg         21      5 mg         22      2.5  mg         23      5 mg           24      5 mg         25      2.5 mg         26      5 mg         27      2.5 mg         28      5 mg         29      2.5 mg         30      5 mg          Date Details   No additional details            How to take your warfarin dose     To take:  2.5 mg Take 0.5 of a 5 mg tablet.    To take:  5 mg Take 1 of the 5 mg tablets.           October 2017 Details    Sun Mon Tue Wed Thu Fri Sat     1      5 mg         2      2.5 mg         3      5 mg         4      2.5 mg         5            6               7                 8               9               10               11               12               13               14                 15               16               17               18               19               20               21                 22               23               24               25               26               27               28                 29               30               31                    Date Details   No additional details    Date of next INR:  10/5/2017         How to take your warfarin dose     To take:  2.5 mg Take 0.5 of a 5 mg tablet.    To take:  5 mg Take 1 of the 5 mg tablets.

## 2017-08-31 ENCOUNTER — OFFICE VISIT (OUTPATIENT)
Dept: FAMILY MEDICINE | Facility: OTHER | Age: 72
End: 2017-08-31
Attending: FAMILY MEDICINE
Payer: COMMERCIAL

## 2017-08-31 VITALS
HEART RATE: 56 BPM | WEIGHT: 216 LBS | RESPIRATION RATE: 16 BRPM | DIASTOLIC BLOOD PRESSURE: 52 MMHG | BODY MASS INDEX: 33.9 KG/M2 | OXYGEN SATURATION: 96 % | HEIGHT: 67 IN | TEMPERATURE: 97.6 F | SYSTOLIC BLOOD PRESSURE: 130 MMHG

## 2017-08-31 DIAGNOSIS — E78.5 HYPERLIPIDEMIA, UNSPECIFIED HYPERLIPIDEMIA TYPE: ICD-10-CM

## 2017-08-31 DIAGNOSIS — N18.30 TYPE 2 DIABETES MELLITUS WITH STAGE 3 CHRONIC KIDNEY DISEASE, WITH LONG-TERM CURRENT USE OF INSULIN (H): Primary | ICD-10-CM

## 2017-08-31 DIAGNOSIS — E11.22 TYPE 2 DIABETES MELLITUS WITH STAGE 3 CHRONIC KIDNEY DISEASE, WITH LONG-TERM CURRENT USE OF INSULIN (H): Primary | ICD-10-CM

## 2017-08-31 DIAGNOSIS — I10 BENIGN ESSENTIAL HYPERTENSION: ICD-10-CM

## 2017-08-31 DIAGNOSIS — Z79.4 TYPE 2 DIABETES MELLITUS WITH STAGE 3 CHRONIC KIDNEY DISEASE, WITH LONG-TERM CURRENT USE OF INSULIN (H): Primary | ICD-10-CM

## 2017-08-31 PROCEDURE — 99212 OFFICE O/P EST SF 10 MIN: CPT

## 2017-08-31 PROCEDURE — 99214 OFFICE O/P EST MOD 30 MIN: CPT | Performed by: FAMILY MEDICINE

## 2017-08-31 ASSESSMENT — ANXIETY QUESTIONNAIRES
GAD7 TOTAL SCORE: 0
2. NOT BEING ABLE TO STOP OR CONTROL WORRYING: NOT AT ALL
4. TROUBLE RELAXING: NOT AT ALL
1. FEELING NERVOUS, ANXIOUS, OR ON EDGE: NOT AT ALL
7. FEELING AFRAID AS IF SOMETHING AWFUL MIGHT HAPPEN: NOT AT ALL
5. BEING SO RESTLESS THAT IT IS HARD TO SIT STILL: NOT AT ALL
6. BECOMING EASILY ANNOYED OR IRRITABLE: NOT AT ALL
3. WORRYING TOO MUCH ABOUT DIFFERENT THINGS: NOT AT ALL

## 2017-08-31 ASSESSMENT — PATIENT HEALTH QUESTIONNAIRE - PHQ9: SUM OF ALL RESPONSES TO PHQ QUESTIONS 1-9: 0

## 2017-08-31 NOTE — NURSING NOTE
"Chief Complaint   Patient presents with     Diabetes     brought in list of blood sugar reading     Hypertension     Lipids       Initial /52 (BP Location: Left arm, Patient Position: Sitting, Cuff Size: Adult Regular)  Pulse 56  Temp 97.6  F (36.4  C) (Tympanic)  Resp 16  Ht 5' 7\" (1.702 m)  Wt 216 lb (98 kg)  SpO2 96%  BMI 33.83 kg/m2 Estimated body mass index is 33.83 kg/(m^2) as calculated from the following:    Height as of this encounter: 5' 7\" (1.702 m).    Weight as of this encounter: 216 lb (98 kg).  Medication Reconciliation: complete     Carol Sheehan      "

## 2017-08-31 NOTE — MR AVS SNAPSHOT
After Visit Summary   8/31/2017    Clement Voss    MRN: [id number]           Patient Information     Date Of Birth          1945        Visit Information        Provider Department      8/31/2017 11:00 AM Lorelei Felix MD [facility]        Today's Diagnoses     Type 2 diabetes mellitus with stage 3 chronic kidney disease, with long-term current use of insulin (H)    -  1    Benign essential hypertension        Hyperlipidemia, unspecified hyperlipidemia type           Follow-ups after your visit        Follow-up notes from your care team     Return in about 3 months (around 11/30/2017).      Your next 10 appointments already scheduled     Nov 30, 2017 10:15 AM CST   (Arrive by 10:00 AM)   SHORT with Lorelei Feilx MD   [facility] ([facility] )    [address]  [facility] [place] [address]   [phone]              Future tests that were ordered for you today     Open Future Orders        Priority Expected Expires Ordered    Albumin Random Urine Quantitative with Creat Ratio Routine 11/30/2017 8/31/2018 8/31/2017    Basic metabolic panel Routine 11/30/2017 8/31/2018 8/31/2017    Hemoglobin A1c Routine 11/30/2017 8/31/2018 8/31/2017    Lipid Profile Routine 11/30/2017 8/31/2018 8/31/2017    TSH Routine 11/30/2017 8/31/2018 8/31/2017            Who to contact     If you have questions or need follow up information about today's clinic visit or your schedule please contact [facility] directly at [phone].  Normal or non-critical lab and imaging results will be communicated to you by MyChart, letter or phone within 4 business days after the clinic has received the results. If you do not hear from us within 7 days, please contact the clinic through MyChart or phone. If you have a critical or abnormal lab result, we will notify you by phone as soon as possible.  Submit refill requests through [employer] or call your  "pharmacy and they will forward the refill request to us. Please allow 3 business days for your refill to be completed.          Additional Information About Your Visit        MyChart Information     [employer] lets you send messages to your doctor, view your test results, renew your prescriptions, schedule appointments and more. To sign up, go to [email or web address] . Click on \"Log in\" on the left side of the screen, which will take you to the Welcome page. Then click on \"Sign up Now\" on the right side of the page.     You will be asked to enter the access code listed below, as well as some personal information. Please follow the directions to create your username and password.     Your access code is: [email or web address]  Expires: 2017 11:50 AM     Your access code will  in 90 days. If you need help or a new code, please call your [place] clinic or [phone].        Care EveryWhere ID     This is your Care EveryWhere ID. This could be used by other organizations to access your [place] medical records  [id number]        Your Vitals Were     Pulse Temperature Respirations Height Pulse Oximetry BMI (Body Mass Index)    56 97.6  F (36.4  C) (Tympanic) 16 5' 7\" (1.702 m) 96% 33.83 kg/m2       Blood Pressure from Last 3 Encounters:   17 130/52   17 120/54   17 135/75    Weight from Last 3 Encounters:   17 216 lb (98 kg)   17 207 lb (93.9 kg)   17 217 lb 3.2 oz (98.5 kg)                 Today's Medication Changes          These changes are accurate as of: 17 11:50 AM.  If you have any questions, ask your nurse or doctor.               These medicines have changed or have updated prescriptions.        Dose/Directions    fluticasone 50 MCG/ACT spray   Commonly known as:  FLONASE   This may have changed:    - when to take this  - reasons to take this   Used for:  Acute maxillary sinusitis        Dose:  2 spray   Spray 2 sprays into both nostrils daily   Quantity:  1 " Package   Refills:  0                Primary Care Provider Office Phone # Fax #    Lorelei GILLESPIE [address] [phone] [address] [facility] [address]        Equal Access to Services     CHRISTAL LUCAS : Hadii aad ku hadblanquitangoc Ciaramarisabelali, waaxda luqadaha, qaybta kaalmada adeyeyo, manan jaimesolivia marquesacostalouise aviles. So [facility] [phone].    ATENCIÓN: Si habla español, tiene a breaux disposición servicios gratuitos de asistencia lingüística. Llame al [phone].    We comply with applicable federal civil rights laws and [place] laws. We do not discriminate on the basis of race, color, national origin, age, disability sex, sexual orientation or gender identity.            Thank you!     Thank you for choosing [facility]  for your care. Our goal is always to provide you with excellent care. Hearing back from our patients is one way we can continue to improve our services. Please take a few minutes to complete the written survey that you may receive in the mail after your visit with us. Thank you!             Your Updated Medication List - Protect others around you: Learn how to safely use, store and throw away your medicines at www.disposemymeds.org.          This list is accurate as of: 8/31/17 11:50 AM.  Always use your most recent med list.                   Brand Name Dispense Instructions for use Diagnosis    ACCU-CHEK VLAD PLUS test strip   Generic drug:  blood glucose monitoring     100 strip    USE TO TEST BLOOD SUGARS THREE TIMES DAILY    Diabetes mellitus, type 2 (H)       acetaminophen 500 MG tablet    TYLENOL     Take 1-2 tablets (500-1,000 mg) by mouth every 6 hours as needed    Pulmonary embolism (H)       amLODIPine 10 MG tablet    NORVASC    90 tablet    Take 1 tablet (10 mg) by mouth daily    Benign essential hypertension       aspirin 81 MG EC tablet     90 tablet    Take 1 tablet (81 mg) by mouth daily    Diabetes mellitus, type 2 (H)       CLARITIN  10 MG tablet   Generic drug:  loratadine      Take 10 mg by mouth daily.        COMBIGAN 0.2-0.5 % ophthalmic solution   Generic drug:  brimonidine-timolol      1 drop 2 times daily Morning and evening, 12 hours apart        fenofibrate 48 MG tablet     90 tablet    TAKE 1 TABLET(48 MG) BY MOUTH DAILY    Hyperlipidemia, unspecified hyperlipidemia type       fish oil-omega-3 fatty acids 1000 MG capsule      Take 1 capsule by mouth 3 times daily.        fluticasone 50 MCG/ACT spray    FLONASE    1 Package    Spray 2 sprays into both nostrils daily    Acute maxillary sinusitis       glyBURIDE 5 MG tablet    DIABETA /MICRONASE     1 tablet in the am 1 tablet in the pm        indomethacin 50 MG capsule    INDOCIN    30 capsule    Take 1 capsule (50 mg) by mouth 3 times daily With food as needed    Gout, unspecified       insulin glargine 100 UNIT/ML injection    LANTUS SOLOSTAR    15 mL    Inject 18 Units Subcutaneous At Bedtime    Type 2 diabetes mellitus with stage 3 chronic kidney disease, with long-term current use of insulin (H)       insulin pen needle 31G X 6 MM     100 each    Use 1 daily or as directed.    Type 2 diabetes mellitus with stage 3 chronic kidney disease, with long-term current use of insulin (H)       JANUVIA 100 MG tablet   Generic drug:  sitagliptin     90 tablet    TAKE 1 TABLET(100 MG) BY MOUTH DAILY    Type 2 diabetes mellitus without complication (H)       lisinopril 40 MG tablet    PRINIVIL/ZESTRIL    90 tablet    Take 1 tablet (40 mg) by mouth daily    Benign essential hypertension       pilocarpine 1 % ophthalmic solution    PILOCAR     Place 1 drop into both eyes 4 times daily        STATIN NOT PRESCRIBED (INTENTIONAL)     0 each    Statin not prescribed intentionally due to Intolerance (with supporting documentation of trying a statin at least once within the last 5 years)    Hyperlipidemia, unspecified hyperlipidemia       terazosin 10 MG capsule    HYTRIN    90 capsule    Take 1 capsule  (10 mg) by mouth At Bedtime    Benign essential hypertension       TRAVATAN Z 0.004 % ophthalmic solution   Generic drug:  travoprost (DARION Free)      Instill 1 drop by ophthalmic route every day into affected eye(s) in the evening        warfarin 5 MG tablet    COUMADIN    90 tablet    Take 2.5mg Mon/Wed/Fri; 5mg all other days or as directed by [facility]    Pulmonary embolism and infarction (H)

## 2017-09-01 ASSESSMENT — ANXIETY QUESTIONNAIRES: GAD7 TOTAL SCORE: 0

## 2017-09-18 ENCOUNTER — OFFICE VISIT (OUTPATIENT)
Dept: FAMILY MEDICINE | Facility: OTHER | Age: 72
End: 2017-09-18
Attending: NURSE PRACTITIONER
Payer: COMMERCIAL

## 2017-09-18 VITALS
SYSTOLIC BLOOD PRESSURE: 138 MMHG | TEMPERATURE: 95.6 F | DIASTOLIC BLOOD PRESSURE: 66 MMHG | HEART RATE: 72 BPM | WEIGHT: 216 LBS | HEIGHT: 67 IN | BODY MASS INDEX: 33.9 KG/M2 | RESPIRATION RATE: 16 BRPM

## 2017-09-18 DIAGNOSIS — L03.032 CELLULITIS OF GREAT TOE OF LEFT FOOT: ICD-10-CM

## 2017-09-18 DIAGNOSIS — L60.0 INGROWING NAIL, LEFT GREAT TOE: Primary | ICD-10-CM

## 2017-09-18 PROCEDURE — 99212 OFFICE O/P EST SF 10 MIN: CPT

## 2017-09-18 PROCEDURE — 99213 OFFICE O/P EST LOW 20 MIN: CPT | Performed by: NURSE PRACTITIONER

## 2017-09-18 RX ORDER — CEPHALEXIN 500 MG/1
500 CAPSULE ORAL 3 TIMES DAILY
Qty: 30 CAPSULE | Refills: 0 | Status: SHIPPED | OUTPATIENT
Start: 2017-09-18 | End: 2017-10-26

## 2017-09-18 ASSESSMENT — PATIENT HEALTH QUESTIONNAIRE - PHQ9
SUM OF ALL RESPONSES TO PHQ QUESTIONS 1-9: 0
5. POOR APPETITE OR OVEREATING: NOT AT ALL

## 2017-09-18 ASSESSMENT — ANXIETY QUESTIONNAIRES
7. FEELING AFRAID AS IF SOMETHING AWFUL MIGHT HAPPEN: NOT AT ALL
6. BECOMING EASILY ANNOYED OR IRRITABLE: NOT AT ALL
3. WORRYING TOO MUCH ABOUT DIFFERENT THINGS: NOT AT ALL
GAD7 TOTAL SCORE: 0
1. FEELING NERVOUS, ANXIOUS, OR ON EDGE: NOT AT ALL
2. NOT BEING ABLE TO STOP OR CONTROL WORRYING: NOT AT ALL
IF YOU CHECKED OFF ANY PROBLEMS ON THIS QUESTIONNAIRE, HOW DIFFICULT HAVE THESE PROBLEMS MADE IT FOR YOU TO DO YOUR WORK, TAKE CARE OF THINGS AT HOME, OR GET ALONG WITH OTHER PEOPLE: NOT DIFFICULT AT ALL
5. BEING SO RESTLESS THAT IT IS HARD TO SIT STILL: NOT AT ALL

## 2017-09-18 ASSESSMENT — PAIN SCALES - GENERAL: PAINLEVEL: MODERATE PAIN (4)

## 2017-09-18 NOTE — PROGRESS NOTES
SUBJECTIVE:                                                    Clement Voss is a 72 year old male who presents to clinic today for the following health issues:    HPI    Toe pain       Toe pain      Duration: about 1 month    Description (location/character/radiation): toe nail, ingrown    Intensity:  mild    Accompanying signs and symptoms: [place]    History (similar episodes/previous evaluation): None    Precipitating or alleviating factors: None    Therapies tried and outcome: cleaning and soaking foot     He does have diabetes, no changes in glucose levels.        Problem list and histories reviewed & adjusted, as indicated.  Additional history: as documented    Patient Active Problem List   Diagnosis     Type 2 diabetes mellitus with chronic kidney disease, with long-term current use of insulin (H)     Hyperlipidemia     Benign essential hypertension     Gout     Pulmonary embolism and infarction (H)     Acute thromboembolism of deep veins of lower extremity (H)     ACP (advance care planning)     Long-term (current) use of anticoagulants [Z79.01]     Morbid obesity (H)     Past Surgical History:   Procedure Laterality Date     APPENDECTOMY  2008     CHOLECYSTECTOMY  1984     History of PE of right lung 3/2013[         Social History   Substance Use Topics     Smoking status: Former Smoker     Types: Cigarettes     Quit date: 8/12/1964     Smokeless tobacco: Never Used     Alcohol use No     Family History   Problem Relation Age of Onset     HEART DISEASE Father 71     heart disease; cause of death     Other - See Comments Mother 79     old age; cause of death         Current Outpatient Prescriptions   Medication Sig Dispense Refill     fenofibrate 48 MG tablet TAKE 1 TABLET(48 MG) BY MOUTH DAILY 90 tablet 2     insulin glargine (LANTUS SOLOSTAR) 100 UNIT/ML injection Inject 18 Units Subcutaneous At Bedtime 15 mL 1     JANUVIA 100 MG tablet TAKE 1 TABLET(100 MG) BY MOUTH DAILY 90 tablet 0     insulin pen  needle 31G X 6 MM Use 1 daily or as directed. 100 each prn     ACCU-CHEK VLAD PLUS test strip USE TO TEST BLOOD SUGARS THREE TIMES DAILY 100 strip 2     amLODIPine (NORVASC) 10 MG tablet Take 1 tablet (10 mg) by mouth daily 90 tablet 3     lisinopril (PRINIVIL,ZESTRIL) 40 MG tablet Take 1 tablet (40 mg) by mouth daily 90 tablet 3     terazosin (HYTRIN) 10 MG capsule Take 1 capsule (10 mg) by mouth At Bedtime 90 capsule 3     warfarin (COUMADIN) 5 MG tablet Take 2.5mg Mon/Wed/Fri; 5mg all other days or as directed by [facility] 90 tablet 4     pilocarpine (PILOCAR) 1 % ophthalmic solution Place 1 drop into both eyes 4 times daily       STATIN NOT PRESCRIBED, INTENTIONAL, Statin not prescribed intentionally due to Intolerance (with supporting documentation of trying a statin at least once within the last 5 years) 0 each 0     fluticasone (FLONASE) 50 MCG/ACT nasal spray Spray 2 sprays into both nostrils daily (Patient taking differently: Spray 2 sprays into both nostrils daily as needed ) 1 Package 0     indomethacin (INDOCIN) 50 MG capsule Take 1 capsule (50 mg) by mouth 3 times daily With food as needed 30 capsule 1     brimonidine-timolol (COMBIGAN) 0.2-0.5 % ophthalmic solution 1 drop 2 times daily Morning and evening, 12 hours apart       acetaminophen (TYLENOL) 500 MG tablet Take 1-2 tablets (500-1,000 mg) by mouth every 6 hours as needed       aspirin 81 MG EC tablet Take 1 tablet (81 mg) by mouth daily 90 tablet 3     loratadine (CLARITIN) 10 MG tablet Take 10 mg by mouth daily.       fish oil-omega-3 fatty acids (FISH OIL) 1000 MG capsule Take 1 capsule by mouth 3 times daily.       TRAVATAN Z (TRAVATAN Z) 0.004 % ophthalmic solution Instill 1 drop by ophthalmic route every day into affected eye(s) in the evening       glyBURIDE (DIABETA /MICRONASE) 5 MG tablet 1 tablet in the am 1 tablet in the pm       Allergies   Allergen Reactions     Atorvastatin Calcium Other (See Comments)      "Lipitor  Increase CR     Iodine      Penicillins      Sulfa Drugs      Sulfa (sulfonamide antibiotics)     Recent Labs   Lab Test  08/24/17   0806 07/25/17 07/24/17   1710  05/19/17   0832  02/23/17   0815  11/28/16   0811  08/22/16   0903  07/12/16   1502   11/18/15   0938   A1C  7.9*  8.0*   --   8.7*  8.6*  8.0*  7.6*   --    < >  7.9*   LDL  69   --    --   83  82  77  77   --    < >  79   HDL  36*   --    --   32*  39*  36*  37*   --    < >  38*   TRIG  131   --    --   279*  226*  296*  222*   --    < >  156*   ALT   --    --    --    --    --   32  30  32   < >  30   CR  1.51*   --   1.40*  1.61*  1.63*  1.46*  1.45*  1.47*   < >  1.47*   GFRESTIMATED  45*   --   50*  42*  42*  47*  48*  47*   < >  47*   GFRESTBLACK  55*   --   60*  51*  50*  57*  58*  57*   < >  57*   POTASSIUM  4.0   --   3.9  4.3  4.3  3.9  4.0  4.0   < >  4.4   TSH   --    --    --    --    --   3.17   --    --    --   2.79    < > = values in this interval not displayed.      BP Readings from Last 3 Encounters:   09/18/17 138/66   08/31/17 130/52   07/31/17 120/54    Wt Readings from Last 3 Encounters:   09/18/17 216 lb (98 kg)   08/31/17 216 lb (98 kg)   07/31/17 207 lb (93.9 kg)                          ROS:  Constitutional, HEENT, cardiovascular, pulmonary, gi and gu systems are negative, except as otherwise noted.      OBJECTIVE:   /66 (BP Location: Right arm, Patient Position: Chair, Cuff Size: Adult Regular)  Pulse 72  Temp 95.6  F (35.3  C) (Tympanic)  Resp 16  Ht 5' 7\" (1.702 m)  Wt 216 lb (98 kg)  BMI 33.83 kg/m2  Body mass index is 33.83 kg/(m^2).  GENERAL: healthy, alert and no distress  NECK: no adenopathy, no asymmetry, masses, or scars and thyroid normal to palpation  RESP: lungs clear to auscultation - no rales, rhonchi or wheezes  CV: regular rate and rhythm, normal S1 S2, no S3 or S4, no murmur, click or rub, no peripheral edema and peripheral pulses strong  SKIN: left great toe is edematous and erythematous. "  Nail ingrowing medially with scabbed over lesions in medial edge  PSYCH: mentation appears normal, affect normal/bright        ASSESSMENT/PLAN:       1. Ingrowing nail, left great toe  symptomatic  - cephALEXin (KEFLEX) 500 MG capsule; Take 1 capsule (500 mg) by mouth 3 times daily  Dispense: 30 capsule; Refill: 0    2. Cellulitis of great toe of left foot  As above  - cephALEXin (KEFLEX) 500 MG capsule; Take 1 capsule (500 mg) by mouth 3 times daily  Dispense: 30 capsule; Refill: 0    FUTURE APPOINTMENTS:       - Follow-up visit if no improvement, may consider referral to podiatry if no improvement     Portia Joseph NP  [facility]

## 2017-09-18 NOTE — NURSING NOTE
"Chief Complaint   Patient presents with     Toe Pain     possible ingrown toenail, left great toe       Initial /66 (BP Location: Right arm, Patient Position: Chair, Cuff Size: Adult Regular)  Pulse 72  Temp 95.6  F (35.3  C) (Tympanic)  Resp 16  Ht 5' 7\" (1.702 m)  Wt 216 lb (98 kg)  BMI 33.83 kg/m2 Estimated body mass index is 33.83 kg/(m^2) as calculated from the following:    Height as of this encounter: 5' 7\" (1.702 m).    Weight as of this encounter: 216 lb (98 kg).  Medication Reconciliation: complete     Ingrid Blair LPN      "

## 2017-09-18 NOTE — PATIENT INSTRUCTIONS
ASSESSMENT/PLAN:       1. Ingrowing nail, left great toe  symptomatic  - cephALEXin (KEFLEX) 500 MG capsule; Take 1 capsule (500 mg) by mouth 3 times daily  Dispense: 30 capsule; Refill: 0    2. Cellulitis of great toe of left foot  As above  - cephALEXin (KEFLEX) 500 MG capsule; Take 1 capsule (500 mg) by mouth 3 times daily  Dispense: 30 capsule; Refill: 0    FUTURE APPOINTMENTS:       - Follow-up visit if no improvement, may consider referral to podiatry if no improvement     Portia Joseph, NP  [facility]  Ingrown Toenail, Infected (Antibiotics, No Excision)  An ingrown toenail occurs when the nail grows sideways into the skin alongside the nail. This can cause pain. It can also lead to an infection with redness, swelling, and sometimes drainage.  The most common cause of an ingrown toenail is trimming your nails wrong. Most people trim the nails too close to the skin and try to round the nail too tightly around the shape of the toe. When you do this, the nail can grow into the skin of your toe. It is safer to trim the nail ending in a straight line rather than a curve.  Other causes include injury or wearing shoes that are too short or tight. This can cause the same problem that happens when trimming your nails. Your genetics can also make this more likely to happen.  The following are the most common symptoms of an ingrown toenail:     Pain    Redness    Swelling    Drainage  If the infection is mild, you may be able to take care of it at home with the following measures:    Frequent warm water soaks    Keeping it clean    Wearing loose, comfortable shoes or sandals  Another method involves using a small piece of cotton or waxed dental floss to gently lift up the corner of the problem nail. Change the cotton or floss frequently, especially if it gets dirty.  If your infection is mild, and the above methods aren t working, or if the infection gets worse, see your healthcare provider.  Signs of worsening infection include:    Swelling    Redness    Pus drainage  In some cases, you may need antibiotics along with warm soaks. If after 2 to 3 days of antibiotics the toenail doesn't get better or gets worse, part of the nail may need to be removed to drain the infection. With treatment, it can take 1 to 2 weeks to clear up completely.  Home care  Wound care  For the next 3 days, soak and clean your toe in warm water a few times a day.    Twice a day for the first 3 days, clean and soak the toe as follows:  1. Soak your foot in a tub of warm water for 5 minutes. Or, hold your toe under a faucet of warm running water for 5 minute  2. Clean any remaining crust away with soap and water using a cotton swab.  3. Put a small amount of antibiotic ointment on the infected area.    Change the dressing or bandage every time you soak or clean it, or whenever it becomes wet or dirty.    If you were prescribed antibiotics, take them as directed until they are all gone.    Wear comfortable shoes with a lot of toe room, or open-toe sandals, while your toe is healing.  Medicines    You can take over-the-counter medicine for pain, unless you were given a different pain medicine to use. Note: Talk with your provider before using these medicines if you have chronic liver or kidney disease, ever had a stomach ulcer or GI (gastrointestinal) bleeding, or are taking blood thinner medicines.    If you were given antibiotics, take them until they are used up or your provider tells you to stop, even if the wound looks better. This ensures that the infection clears up.  Prevention  To prevent ingrown toenails:    Wear shoes that fit well. Avoid shoes that pinch the toes together.    When you trim your toenails, do not cut them too short. Cut straight across at the top and don t round the edges.    Don t use a sharp object to clean under your nail since this might cause an infection.    If the toenail starts to grow into the skin  again, put a small piece of waxed dental floss or cotton under that side of the nail to help it grow out straight.  Follow-up care  Follow up with your healthcare provider, or as advised.  When to seek medical advice  Call your healthcare provider right away if any of the following occur:    Increasing redness, pain, or swelling of the toe    Red streaks in the skin leading away from the wound    Pus or fluid drainage    Fever of 100.4 F (38 C) or higher, or as directed by your provider  Date Last Reviewed: 12/1/2016 2000-2017 [employer]. [address] [place] [address]. All rights reserved. This information is not intended as a substitute for professional medical care. Always follow your healthcare professional's instructions.

## 2017-09-18 NOTE — MR AVS SNAPSHOT
After Visit Summary   9/18/2017    Clement Voss    MRN: [id number]           Patient Information     Date Of Birth          1945        Visit Information        Provider Department      9/18/2017 11:30 AM Portia Joseph NP [facility]        Today's Diagnoses     Ingrowing nail, left great toe    -  1    Cellulitis of great toe of left foot          Care Instructions      ASSESSMENT/PLAN:       1. Ingrowing nail, left great toe  symptomatic  - cephALEXin (KEFLEX) 500 MG capsule; Take 1 capsule (500 mg) by mouth 3 times daily  Dispense: 30 capsule; Refill: 0    2. Cellulitis of great toe of left foot  As above  - cephALEXin (KEFLEX) 500 MG capsule; Take 1 capsule (500 mg) by mouth 3 times daily  Dispense: 30 capsule; Refill: 0    FUTURE APPOINTMENTS:       - Follow-up visit if no improvement, may consider referral to podiatry if no improvement     Portia Joseph NP  [facility]  Ingrown Toenail, Infected (Antibiotics, No Excision)  An ingrown toenail occurs when the nail grows sideways into the skin alongside the nail. This can cause pain. It can also lead to an infection with redness, swelling, and sometimes drainage.  The most common cause of an ingrown toenail is trimming your nails wrong. Most people trim the nails too close to the skin and try to round the nail too tightly around the shape of the toe. When you do this, the nail can grow into the skin of your toe. It is safer to trim the nail ending in a straight line rather than a curve.  Other causes include injury or wearing shoes that are too short or tight. This can cause the same problem that happens when trimming your nails. Your genetics can also make this more likely to happen.  The following are the most common symptoms of an ingrown toenail:     Pain    Redness    Swelling    Drainage  If the infection is mild, you may be able to take care of it at home with the following  measures:    Frequent warm water soaks    Keeping it clean    Wearing loose, comfortable shoes or sandals  Another method involves using a small piece of cotton or waxed dental floss to gently lift up the corner of the problem nail. Change the cotton or floss frequently, especially if it gets dirty.  If your infection is mild, and the above methods aren t working, or if the infection gets worse, see your healthcare provider. Signs of worsening infection include:    Swelling    Redness    Pus drainage  In some cases, you may need antibiotics along with warm soaks. If after 2 to 3 days of antibiotics the toenail doesn't get better or gets worse, part of the nail may need to be removed to drain the infection. With treatment, it can take 1 to 2 weeks to clear up completely.  Home care  Wound care  For the next 3 days, soak and clean your toe in warm water a few times a day.    Twice a day for the first 3 days, clean and soak the toe as follows:  1. Soak your foot in a tub of warm water for 5 minutes. Or, hold your toe under a faucet of warm running water for 5 minute  2. Clean any remaining crust away with soap and water using a cotton swab.  3. Put a small amount of antibiotic ointment on the infected area.    Change the dressing or bandage every time you soak or clean it, or whenever it becomes wet or dirty.    If you were prescribed antibiotics, take them as directed until they are all gone.    Wear comfortable shoes with a lot of toe room, or open-toe sandals, while your toe is healing.  Medicines    You can take over-the-counter medicine for pain, unless you were given a different pain medicine to use. Note: Talk with your provider before using these medicines if you have chronic liver or kidney disease, ever had a stomach ulcer or GI (gastrointestinal) bleeding, or are taking blood thinner medicines.    If you were given antibiotics, take them until they are used up or your provider tells you to stop, even if the  wound looks better. This ensures that the infection clears up.  Prevention  To prevent ingrown toenails:    Wear shoes that fit well. Avoid shoes that pinch the toes together.    When you trim your toenails, do not cut them too short. Cut straight across at the top and don t round the edges.    Don t use a sharp object to clean under your nail since this might cause an infection.    If the toenail starts to grow into the skin again, put a small piece of waxed dental floss or cotton under that side of the nail to help it grow out straight.  Follow-up care  Follow up with your healthcare provider, or as advised.  When to seek medical advice  Call your healthcare provider right away if any of the following occur:    Increasing redness, pain, or swelling of the toe    Red streaks in the skin leading away from the wound    Pus or fluid drainage    Fever of 100.4 F (38 C) or higher, or as directed by your provider  Date Last Reviewed: 12/1/2016 2000-2017 [employer]. [address] [place] [address]. All rights reserved. This information is not intended as a substitute for professional medical care. Always follow your healthcare professional's instructions.                Follow-ups after your visit        Your next 10 appointments already scheduled     Nov 30, 2017 10:15 AM CST   (Arrive by 10:00 AM)   SHORT with Lorelei Felix MD   [facility] ([facility] )    [address]  [facility] [address]   [phone]              Who to contact     If you have questions or need follow up information about today's clinic visit or your schedule please contact [facility] directly at [phone].  Normal or non-critical lab and imaging results will be communicated to you by MyChart, letter or phone within 4 business days after the clinic has received the results. If you do not hear from us within 7 days, please contact the clinic  "through [employer] or phone. If you have a critical or abnormal lab result, we will notify you by phone as soon as possible.  Submit refill requests through [employer] or call your pharmacy and they will forward the refill request to us. Please allow 3 business days for your refill to be completed.          Additional Information About Your Visit        [employer] Information     [employer] lets you send messages to your doctor, view your test results, renew your prescriptions, schedule appointments and more. To sign up, go to [email or web address] . Click on \"Log in\" on the left side of the screen, which will take you to the Welcome page. Then click on \"Sign up Now\" on the right side of the page.     You will be asked to enter the access code listed below, as well as some personal information. Please follow the directions to create your username and password.     Your access code is: [email or web address]  Expires: 2017 11:50 AM     Your access code will  in 90 days. If you need help or a new code, please call your [place] clinic or [phone].        Care EveryWhere ID     This is your Care EveryWhere ID. This could be used by other organizations to access your [place] medical records  [id number]        Your Vitals Were     Pulse Temperature Respirations Height BMI (Body Mass Index)       72 95.6  F (35.3  C) (Tympanic) 16 5' 7\" (1.702 m) 33.83 kg/m2        Blood Pressure from Last 3 Encounters:   17 138/66   17 130/52   17 120/54    Weight from Last 3 Encounters:   17 216 lb (98 kg)   17 216 lb (98 kg)   17 207 lb (93.9 kg)              Today, you had the following     No orders found for display         Today's Medication Changes          These changes are accurate as of: 17 12:05 PM.  If you have any questions, ask your nurse or doctor.               Start taking these medicines.        Dose/Directions    cephALEXin 500 MG capsule   Commonly known as:  KEFLEX   Used for:  " Ingrowing nail, left great toe, Cellulitis of great toe of left foot   Started by:  Portia Joseph NP        Dose:  500 mg   Take 1 capsule (500 mg) by mouth 3 times daily   Quantity:  30 capsule   Refills:  0         These medicines have changed or have updated prescriptions.        Dose/Directions    fluticasone 50 MCG/ACT spray   Commonly known as:  FLONASE   This may have changed:    - when to take this  - reasons to take this   Used for:  Acute maxillary sinusitis        Dose:  2 spray   Spray 2 sprays into both nostrils daily   Quantity:  1 Package   Refills:  0            Where to get your medicines      These medications were sent to [facility] [address] [address]  AT [facility] [address] [facility] [address]     Phone:  [phone]     cephALEXin 500 MG capsule                Primary Care Provider Office Phone # Fax #    Lorelei VERNA Felix [phone] [address] [facility] [address]        Equal Access to Services     BALDEV LUCAS AH: Hadii robb ku hadasho Soomaali, waaxda luqadaha, qaybta kaalmada adeegyada, waxay idiin haychadn benny campbell . So [facility] [place] [phone].    ATENCIÓN: Si habla español, tiene a breaux disposición servicios gratuitos de asistencia lingüística. [facility] [phone].    We comply with applicable federal civil rights laws and [place] laws. We do not discriminate on the basis of race, color, national origin, age, disability sex, sexual orientation or gender identity.            Thank you!     Thank you for choosing [facility]  for your care. Our goal is always to provide you with excellent care. Hearing back from our patients is one way we can continue to improve our services. Please take a few minutes to complete the written survey that you may receive in the mail after your visit with us. Thank you!             Your Updated Medication List - Protect others  around you: Learn how to safely use, store and throw away your medicines at www.disposemymeds.org.          This list is accurate as of: 9/18/17 12:05 PM.  Always use your most recent med list.                   Brand Name Dispense Instructions for use Diagnosis    ACCU-CHEK VLAD PLUS test strip   Generic drug:  blood glucose monitoring     100 strip    USE TO TEST BLOOD SUGARS THREE TIMES DAILY    Diabetes mellitus, type 2 (H)       acetaminophen 500 MG tablet    TYLENOL     Take 1-2 tablets (500-1,000 mg) by mouth every 6 hours as needed    Pulmonary embolism (H)       amLODIPine 10 MG tablet    NORVASC    90 tablet    Take 1 tablet (10 mg) by mouth daily    Benign essential hypertension       aspirin 81 MG EC tablet     90 tablet    Take 1 tablet (81 mg) by mouth daily    Diabetes mellitus, type 2 (H)       cephALEXin 500 MG capsule    KEFLEX    30 capsule    Take 1 capsule (500 mg) by mouth 3 times daily    Ingrowing nail, left great toe, Cellulitis of great toe of left foot       CLARITIN 10 MG tablet   Generic drug:  loratadine      Take 10 mg by mouth daily.        COMBIGAN 0.2-0.5 % ophthalmic solution   Generic drug:  brimonidine-timolol      1 drop 2 times daily Morning and evening, 12 hours apart        fenofibrate 48 MG tablet     90 tablet    TAKE 1 TABLET(48 MG) BY MOUTH DAILY    Hyperlipidemia, unspecified hyperlipidemia type       fish oil-omega-3 fatty acids 1000 MG capsule      Take 1 capsule by mouth 3 times daily.        fluticasone 50 MCG/ACT spray    FLONASE    1 Package    Spray 2 sprays into both nostrils daily    Acute maxillary sinusitis       glyBURIDE 5 MG tablet    DIABETA /MICRONASE     1 tablet in the am 1 tablet in the pm        indomethacin 50 MG capsule    INDOCIN    30 capsule    Take 1 capsule (50 mg) by mouth 3 times daily With food as needed    Gout, unspecified       insulin glargine 100 UNIT/ML injection    LANTUS SOLOSTAR    15 mL    Inject 18 Units Subcutaneous At Bedtime     Type 2 diabetes mellitus with stage 3 chronic kidney disease, with long-term current use of insulin (H)       insulin pen needle 31G X 6 MM     100 each    Use 1 daily or as directed.    Type 2 diabetes mellitus with stage 3 chronic kidney disease, with long-term current use of insulin (H)       JANUVIA 100 MG tablet   Generic drug:  sitagliptin     90 tablet    TAKE 1 TABLET(100 MG) BY MOUTH DAILY    Type 2 diabetes mellitus without complication (H)       lisinopril 40 MG tablet    PRINIVIL/ZESTRIL    90 tablet    Take 1 tablet (40 mg) by mouth daily    Benign essential hypertension       pilocarpine 1 % ophthalmic solution    PILOCAR     Place 1 drop into both eyes 4 times daily        STATIN NOT PRESCRIBED (INTENTIONAL)     0 each    Statin not prescribed intentionally due to Intolerance (with supporting documentation of trying a statin at least once within the last 5 years)    Hyperlipidemia, unspecified hyperlipidemia       terazosin 10 MG capsule    HYTRIN    90 capsule    Take 1 capsule (10 mg) by mouth At Bedtime    Benign essential hypertension       TRAVATAN Z 0.004 % ophthalmic solution   Generic drug:  travoprost (DARION Free)      Instill 1 drop by ophthalmic route every day into affected eye(s) in the evening        warfarin 5 MG tablet    COUMADIN    90 tablet    Take 2.5mg Mon/Wed/Fri; 5mg all other days or as directed by [facility]    Pulmonary embolism and infarction (H)

## 2017-09-19 ASSESSMENT — ANXIETY QUESTIONNAIRES: GAD7 TOTAL SCORE: 0

## 2017-10-05 ENCOUNTER — ANTICOAGULATION THERAPY VISIT (OUTPATIENT)
Dept: ANTICOAGULATION | Facility: OTHER | Age: 72
End: 2017-10-05

## 2017-10-05 DIAGNOSIS — Z79.01 LONG-TERM (CURRENT) USE OF ANTICOAGULANTS: ICD-10-CM

## 2017-10-05 DIAGNOSIS — I26.99 PULMONARY EMBOLISM AND INFARCTION (H): ICD-10-CM

## 2017-10-05 DIAGNOSIS — I82.409 ACUTE THROMBOEMBOLISM OF DEEP VEINS OF LOWER EXTREMITY, UNSPECIFIED LATERALITY (H): ICD-10-CM

## 2017-10-05 LAB — INR BLD: 1.8 (ref 0.86–1.14)

## 2017-10-05 PROCEDURE — 36416 COLLJ CAPILLARY BLOOD SPEC: CPT | Mod: ZL | Performed by: FAMILY MEDICINE

## 2017-10-05 PROCEDURE — 85610 PROTHROMBIN TIME: CPT | Mod: QW,ZL | Performed by: FAMILY MEDICINE

## 2017-10-05 NOTE — PROGRESS NOTES
ANTICOAGULATION FOLLOW-UP CLINIC VISIT    Patient Name:  Clement Voss  Date:  10/5/2017  Contact Type:  Telephone/ message left on listed phone voicemail    SUBJECTIVE:     Patient Findings     Positives No Problem Findings    Comments Call placed to patient home and message left on voicemail re: INR result, warfarin dosing and INR recheck date. Patient to call warfarin clinic if he has any bleeding/bruising, changes in diet/meds/activity or questions.            OBJECTIVE    INR Point of Care   Date Value Ref Range Status   10/05/2017 1.8 (H) 0.86 - 1.14 Final     Comment:     This test is intended for monitoring Coumadin therapy.  Results are not   accurate in patients with prolonged INR due to factor deficiency.         ASSESSMENT / PLAN  INR assessment SUB    Recheck INR In: 3 WEEKS    INR Location Clinic      Anticoagulation Summary as of 10/5/2017     INR goal 2.0-3.0   Today's INR 1.8!   Maintenance plan 2.5 mg (5 mg x 0.5) on Mon, Wed, Fri; 5 mg (5 mg x 1) all other days   Full instructions 10/5: 7.5 mg; Otherwise 2.5 mg on Mon, Wed, Fri; 5 mg all other days   Weekly total 27.5 mg   Plan last modified Jodie Gill RN (8/4/2016)   Next INR check 10/26/2017   Priority INR   Target end date Indefinite    Indications   Pulmonary embolism and infarction (H) [I26.99]  Acute thromboembolism of deep veins of lower extremity (H) [I82.409]  Long-term (current) use of anticoagulants [Z79.01] [Z79.01]         Anticoagulation Episode Summary     INR check location     Preferred lab     Send INR reminders to  ANTICOAG POOL    Comments       Anticoagulation Care Providers     Provider Role Specialty Phone number    Lorelei Felix MD [facility] [phone]            See the Encounter Report to view Anticoagulation Flowsheet and Dosing Calendar (Go to Encounters tab in chart review, and find the Anticoagulation Therapy Visit)        Jodie Elena, RN

## 2017-10-05 NOTE — MR AVS SNAPSHOT
Clement CROWELL Shanika   10/5/2017   Anticoagulation Therapy Visit    Description:  72 year old male   Provider:  Lorelei Felix MD   Department:  [facility] Anti Coagulation           INR as of 10/5/2017     Today's INR 1.8!      Anticoagulation Summary as of 10/5/2017     INR goal 2.0-3.0   Today's INR 1.8!   Full instructions 10/5: 7.5 mg; Otherwise 2.5 mg on Mon, Wed, Fri; 5 mg all other days   Next INR check 10/26/2017    Indications   Pulmonary embolism and infarction (H) [I26.99]  Acute thromboembolism of deep veins of lower extremity (H) [I82.409]  Long-term (current) use of anticoagulants [Z79.01] [Z79.01]         October 2017 Details    Sun Mon Tue Wed Thu Fri Sat     1               2               3               4               5      7.5 mg   See details      6      2.5 mg         7      5 mg           8      5 mg         9      2.5 mg         10      5 mg         11      2.5 mg         12      5 mg         13      2.5 mg         14      5 mg           15      5 mg         16      2.5 mg         17      5 mg         18      2.5 mg         19      5 mg         20      2.5 mg         21      5 mg           22      5 mg         23      2.5 mg         24      5 mg         25      2.5 mg         26            27               28                 29               30               31                    Date Details   10/05 This INR check       Date of next INR:  10/26/2017         How to take your warfarin dose     To take:  2.5 mg Take 0.5 of a 5 mg tablet.    To take:  5 mg Take 1 of the 5 mg tablets.    To take:  7.5 mg Take 1.5 of the 5 mg tablets.

## 2017-10-10 DIAGNOSIS — E11.9 TYPE 2 DIABETES MELLITUS WITHOUT COMPLICATION (H): ICD-10-CM

## 2017-10-10 NOTE — TELEPHONE ENCOUNTER
JANUVIA 100 MG tablet  Last Written Prescription Date: 7-  Last Fill Quantity: 90, # refills: 0  Last Office Visit with G, P or  Health prescribing provider:  8-   Next 5 appointments (look out 90 days)     Nov 30, 2017 10:15 AM CST   (Arrive by 10:00 AM)   SHORT with Lorelei Felix MD   [facility] ([facility] )    [address]  [facility] [address]   [phone]                   BP Readings from Last 3 Encounters:   09/18/17 138/66   08/31/17 130/52   07/31/17 120/54     Lab Results   Component Value Date    MICROL 7 11/28/2016     Lab Results   Component Value Date    UMALCR 5.50 11/28/2016     Creatinine   Date Value Ref Range Status   08/24/2017 1.51 (H) 0.66 - 1.25 mg/dL Final   ]  GFR Estimate   Date Value Ref Range Status   08/24/2017 45 (L) >60 mL/min/1.7m2 Final     Comment:     Non  GFR Calc   07/24/2017 50 (L) >60 mL/min/1.7m2 Final     Comment:     Non  GFR Calc   05/19/2017 42 (L) >60 mL/min/1.7m2 Final     Comment:     Non  GFR Calc     GFR Estimate If Black   Date Value Ref Range Status   08/24/2017 55 (L) >60 mL/min/1.7m2 Final     Comment:      GFR Calc   07/24/2017 60 (L) >60 mL/min/1.7m2 Final     Comment:      GFR Calc   05/19/2017 51 (L) >60 mL/min/1.7m2 Final     Comment:      GFR Calc     Lab Results   Component Value Date    CHOL 131 08/24/2017     Lab Results   Component Value Date    HDL 36 08/24/2017     Lab Results   Component Value Date    LDL 69 08/24/2017     Lab Results   Component Value Date    TRIG 131 08/24/2017     Lab Results   Component Value Date    CHOLHDLRATIO 4.4 08/14/2015     Lab Results   Component Value Date    AST 24 07/12/2016     Lab Results   Component Value Date    ALT 32 11/28/2016     Lab Results   Component Value Date    A1C 7.9 08/24/2017    A1C 8.0 07/25/2017    A1C 8.7 05/19/2017     A1C 8.6 02/23/2017    A1C 8.0 11/28/2016     Potassium   Date Value Ref Range Status   08/24/2017 4.0 3.4 - 5.3 mmol/L Final

## 2017-10-13 RX ORDER — SITAGLIPTIN 100 MG/1
TABLET, FILM COATED ORAL
Qty: 90 TABLET | Refills: 0 | Status: SHIPPED | OUTPATIENT
Start: 2017-10-13 | End: 2018-01-16

## 2017-10-26 ENCOUNTER — ANTICOAGULATION THERAPY VISIT (OUTPATIENT)
Dept: ANTICOAGULATION | Facility: OTHER | Age: 72
End: 2017-10-26

## 2017-10-26 DIAGNOSIS — Z79.01 LONG-TERM (CURRENT) USE OF ANTICOAGULANTS: ICD-10-CM

## 2017-10-26 DIAGNOSIS — I26.99 PULMONARY EMBOLISM AND INFARCTION (H): ICD-10-CM

## 2017-10-26 DIAGNOSIS — I82.409 ACUTE THROMBOEMBOLISM OF DEEP VEINS OF LOWER EXTREMITY, UNSPECIFIED LATERALITY (H): ICD-10-CM

## 2017-10-26 LAB — INR BLD: 1.9 (ref 0.86–1.14)

## 2017-10-26 PROCEDURE — 36416 COLLJ CAPILLARY BLOOD SPEC: CPT | Mod: ZL | Performed by: FAMILY MEDICINE

## 2017-10-26 PROCEDURE — 85610 PROTHROMBIN TIME: CPT | Mod: QW,ZL | Performed by: FAMILY MEDICINE

## 2017-10-26 NOTE — MR AVS SNAPSHOT
Clement Englandi   10/26/2017   Anticoagulation Therapy Visit    Description:  72 year old male   Provider:  Lorelei Felix MD   Department:  [facility]           INR as of 10/26/2017     Today's INR 1.9!      Anticoagulation Summary as of 10/26/2017     INR goal 2.0-3.0   Today's INR 1.9!   Full instructions 10/26: 7.5 mg; Otherwise 2.5 mg on Mon, Wed, Fri; 5 mg all other days   Next INR check 11/30/2017    Indications   Pulmonary embolism and infarction (H) [I26.99]  Acute thromboembolism of deep veins of lower extremity (H) [I82.409]  Long-term (current) use of anticoagulants [Z79.01] [Z79.01]         October 2017 Details    Sun Mon Tue Wed Thu Fri Sat     1               2               3               4               5               6               7                 8               9               10               11               12               13               14                 15               16               17               18               19               20               21                 22               23               24               25               26      7.5 mg   See details      27      2.5 mg         28      5 mg           29      5 mg         30      2.5 mg         31      5 mg              Date Details   10/26 This INR check               How to take your warfarin dose     To take:  2.5 mg Take 0.5 of a 5 mg tablet.    To take:  5 mg Take 1 of the 5 mg tablets.    To take:  7.5 mg Take 1.5 of the 5 mg tablets.           November 2017 Details    Sun Mon Tue Wed Thu Fri Sat        1      2.5 mg         2      5 mg         3      2.5 mg         4      5 mg           5      5 mg         6      2.5 mg         7      5 mg         8      2.5 mg         9      5 mg         10      2.5 mg         11      5 mg           12      5 mg         13      2.5 mg         14      5 mg         15      2.5 mg         16      5 mg         17      2.5 mg         18      5 mg           19       5 mg         20      2.5 mg         21      5 mg         22      2.5 mg         23      5 mg         24      2.5 mg         25      5 mg           26      5 mg         27      2.5 mg         28      5 mg         29      2.5 mg         30               Date Details   No additional details    Date of next INR:  11/30/2017         How to take your warfarin dose     To take:  2.5 mg Take 0.5 of a 5 mg tablet.    To take:  5 mg Take 1 of the 5 mg tablets.

## 2017-10-26 NOTE — PROGRESS NOTES
ANTICOAGULATION FOLLOW-UP CLINIC VISIT    Patient Name:  Clement Voss  Date:  10/26/2017  Contact Type:  Telephone/ message left on patient home phone voicemail    SUBJECTIVE:     Patient Findings     Positives No Problem Findings    Comments Call placed to patient and message left on patient listed home phone voicemail re: INR result, warfarin dosing and INR recheck date. Also reminded patient to call us if any new medications as noted in med list is Keflex started on 9/18 x 15 days and not sure we were notified of that.  He is to call warfarin clinic if any bleeding/bruising, changes in diet/meds/activity or questions.           OBJECTIVE    INR Point of Care   Date Value Ref Range Status   10/26/2017 1.9 (H) 0.86 - 1.14 Final     Comment:     This test is intended for monitoring Coumadin therapy.  Results are not   accurate in patients with prolonged INR due to factor deficiency.         ASSESSMENT / PLAN  INR assessment THER    Recheck INR In: 5 WEEKS    INR Location Clinic      Anticoagulation Summary as of 10/26/2017     INR goal 2.0-3.0   Today's INR 1.9!   Maintenance plan 2.5 mg (5 mg x 0.5) on Mon, Wed, Fri; 5 mg (5 mg x 1) all other days   Full instructions 10/26: 7.5 mg; Otherwise 2.5 mg on Mon, Wed, Fri; 5 mg all other days   Weekly total 27.5 mg   Plan last modified Jodie Gill RN (8/4/2016)   Next INR check 11/30/2017   Priority INR   Target end date Indefinite    Indications   Pulmonary embolism and infarction (H) [I26.99]  Acute thromboembolism of deep veins of lower extremity (H) [I82.409]  Long-term (current) use of anticoagulants [Z79.01] [Z79.01]         Anticoagulation Episode Summary     INR check location     Preferred lab     Send INR reminders to  ANTICOAG POOL    Comments       Anticoagulation Care Providers     Provider Role Specialty Phone number    Lorelei Felix MD [facility] [phone]            See the Encounter Report to view Anticoagulation  Flowsheet and Dosing Calendar (Go to Encounters tab in chart review, and find the Anticoagulation Therapy Visit)        Jodie Elena RN

## 2017-10-30 DIAGNOSIS — I26.99 PULMONARY EMBOLISM AND INFARCTION (H): ICD-10-CM

## 2017-10-31 NOTE — TELEPHONE ENCOUNTER
Coumadin      Last Written Prescription Date: 9/15/17  Last Fill Quantity: 90,  # refills: 4   Last Office Visit with G, [facility] or [facility] prescribing provider: 10/26/17

## 2017-11-01 RX ORDER — WARFARIN SODIUM 5 MG/1
TABLET ORAL
Qty: 90 TABLET | Refills: 3 | Status: SHIPPED | OUTPATIENT
Start: 2017-11-01 | End: 2018-12-21

## 2017-11-28 ENCOUNTER — HOSPITAL ENCOUNTER (OUTPATIENT)
Dept: EDUCATION SERVICES | Facility: HOSPITAL | Age: 72
Discharge: HOME | End: 2017-11-28
Attending: FAMILY MEDICINE | Admitting: FAMILY MEDICINE
Payer: MEDICARE

## 2017-11-28 ENCOUNTER — ANTICOAGULATION THERAPY VISIT (OUTPATIENT)
Dept: ANTICOAGULATION | Facility: OTHER | Age: 72
End: 2017-11-28

## 2017-11-28 VITALS
SYSTOLIC BLOOD PRESSURE: 143 MMHG | DIASTOLIC BLOOD PRESSURE: 62 MMHG | WEIGHT: 222.6 LBS | HEART RATE: 67 BPM | OXYGEN SATURATION: 96 % | BODY MASS INDEX: 34.94 KG/M2 | HEIGHT: 67 IN

## 2017-11-28 DIAGNOSIS — Z79.01 LONG-TERM (CURRENT) USE OF ANTICOAGULANTS: ICD-10-CM

## 2017-11-28 DIAGNOSIS — I26.99 PULMONARY EMBOLISM AND INFARCTION (H): ICD-10-CM

## 2017-11-28 DIAGNOSIS — N18.30 TYPE 2 DIABETES MELLITUS WITH STAGE 3 CHRONIC KIDNEY DISEASE, WITH LONG-TERM CURRENT USE OF INSULIN (H): ICD-10-CM

## 2017-11-28 DIAGNOSIS — I82.409 ACUTE THROMBOEMBOLISM OF DEEP VEINS OF LOWER EXTREMITY, UNSPECIFIED LATERALITY (H): ICD-10-CM

## 2017-11-28 DIAGNOSIS — E78.5 HYPERLIPIDEMIA, UNSPECIFIED HYPERLIPIDEMIA TYPE: ICD-10-CM

## 2017-11-28 DIAGNOSIS — I10 BENIGN ESSENTIAL HYPERTENSION: ICD-10-CM

## 2017-11-28 DIAGNOSIS — Z79.4 TYPE 2 DIABETES MELLITUS WITH HYPERGLYCEMIA, WITH LONG-TERM CURRENT USE OF INSULIN (H): Primary | ICD-10-CM

## 2017-11-28 DIAGNOSIS — E11.22 TYPE 2 DIABETES MELLITUS WITH STAGE 3 CHRONIC KIDNEY DISEASE, WITH LONG-TERM CURRENT USE OF INSULIN (H): ICD-10-CM

## 2017-11-28 DIAGNOSIS — E11.65 TYPE 2 DIABETES MELLITUS WITH HYPERGLYCEMIA, WITH LONG-TERM CURRENT USE OF INSULIN (H): Primary | ICD-10-CM

## 2017-11-28 DIAGNOSIS — Z79.4 TYPE 2 DIABETES MELLITUS WITH STAGE 3 CHRONIC KIDNEY DISEASE, WITH LONG-TERM CURRENT USE OF INSULIN (H): ICD-10-CM

## 2017-11-28 LAB
ANION GAP SERPL CALCULATED.3IONS-SCNC: 7 MMOL/L (ref 3–14)
BUN SERPL-MCNC: 20 MG/DL (ref 7–30)
CALCIUM SERPL-MCNC: 8.8 MG/DL (ref 8.5–10.1)
CHLORIDE SERPL-SCNC: 111 MMOL/L (ref 94–109)
CHOLEST SERPL-MCNC: 141 MG/DL
CO2 SERPL-SCNC: 25 MMOL/L (ref 20–32)
CREAT SERPL-MCNC: 1.46 MG/DL (ref 0.66–1.25)
CREAT UR-MCNC: 93 MG/DL
EST. AVERAGE GLUCOSE BLD GHB EST-MCNC: 171 MG/DL
GFR SERPL CREATININE-BSD FRML MDRD: 47 ML/MIN/1.7M2
GLUCOSE SERPL-MCNC: 146 MG/DL (ref 70–99)
HBA1C MFR BLD: 7.6 % (ref 4.3–6)
HDLC SERPL-MCNC: 42 MG/DL
INR BLD: 2.1 (ref 0.86–1.14)
LDLC SERPL CALC-MCNC: 78 MG/DL
MICROALBUMIN UR-MCNC: <5 MG/L
MICROALBUMIN/CREAT UR: NORMAL MG/G CR (ref 0–17)
NONHDLC SERPL-MCNC: 99 MG/DL
POTASSIUM SERPL-SCNC: 4 MMOL/L (ref 3.4–5.3)
SODIUM SERPL-SCNC: 143 MMOL/L (ref 133–144)
TRIGL SERPL-MCNC: 107 MG/DL
TSH SERPL DL<=0.005 MIU/L-ACNC: 2.53 MU/L (ref 0.4–4)

## 2017-11-28 PROCEDURE — 40000788 ZZHCL STATISTIC ESTIMATED AVERAGE GLUCOSE: Mod: ZL | Performed by: FAMILY MEDICINE

## 2017-11-28 PROCEDURE — 36416 COLLJ CAPILLARY BLOOD SPEC: CPT | Mod: ZL | Performed by: FAMILY MEDICINE

## 2017-11-28 PROCEDURE — G0108 DIAB MANAGE TRN  PER INDIV: HCPCS

## 2017-11-28 PROCEDURE — 85610 PROTHROMBIN TIME: CPT | Mod: QW,ZL | Performed by: FAMILY MEDICINE

## 2017-11-28 PROCEDURE — 82043 UR ALBUMIN QUANTITATIVE: CPT | Mod: ZL | Performed by: FAMILY MEDICINE

## 2017-11-28 PROCEDURE — 83036 HEMOGLOBIN GLYCOSYLATED A1C: CPT | Mod: ZL | Performed by: FAMILY MEDICINE

## 2017-11-28 PROCEDURE — 36415 COLL VENOUS BLD VENIPUNCTURE: CPT | Mod: ZL | Performed by: FAMILY MEDICINE

## 2017-11-28 PROCEDURE — 80048 BASIC METABOLIC PNL TOTAL CA: CPT | Mod: ZL | Performed by: FAMILY MEDICINE

## 2017-11-28 PROCEDURE — 80061 LIPID PANEL: CPT | Mod: ZL | Performed by: FAMILY MEDICINE

## 2017-11-28 PROCEDURE — 84443 ASSAY THYROID STIM HORMONE: CPT | Mod: ZL | Performed by: FAMILY MEDICINE

## 2017-11-28 ASSESSMENT — PAIN SCALES - GENERAL: PAINLEVEL: NO PAIN (0)

## 2017-11-28 NOTE — PROGRESS NOTES
"Clement is here today for BG review and diabetes follow-up. /62  Pulse 67  Ht 1.702 m (5' 7\")  Wt 101 kg (222 lb 9.6 oz)  SpO2 96%  BMI 34.86 kg/m2 He is accompanied by his wife, Jodie. Clement is taking Lantus 18 units daily and Januvia 100 mg daily. Readings range as follows: fastin, 106, 123-165, pre-supper: 130-152 and post-supper: 245, 262.     Lab Results   Component Value Date    A1C 7.6 2017    A1C 7.9 2017    A1C 8.0 2017    A1C 8.7 2017    A1C 8.6 2017     A1C is trending downward. I did ask Clement if we could increase his Lantus. He stated that he would go through the pens faster then. He would agree to go up to 19 units daily.    Reviewed usual foods eaten. Breakfast: oatmeal or cheerios with milk and black coffee, lunch: sandwich or lefse with turkey/cheese/callahan and 8 oz chocolate milk, supper: rice a sherwin, hamburger hot dish, vegetables, milk. HS snack: beef sticks.    Will follow in 3 months.    Time spent with patient 30 minutes.       "

## 2017-11-28 NOTE — MR AVS SNAPSHOT
Clement CROWELL Shanika   11/28/2017   Anticoagulation Therapy Visit    Description:  72 year old male   Provider:  Lorelei Felix MD   Department:  [facility]           INR as of 11/28/2017     Today's INR 2.1      Anticoagulation Summary as of 11/28/2017     INR goal 2.0-3.0   Today's INR 2.1   Full instructions 2.5 mg on Mon, Wed, Fri; 5 mg all other days   Next INR check 1/9/2018    Indications   Pulmonary embolism and infarction (H) [I26.99]  Acute thromboembolism of deep veins of lower extremity (H) [I82.409]  Long-term (current) use of anticoagulants [Z79.01] [Z79.01]         November 2017 Details    Sun Mon Tue Wed Thu Fri Sat        1               2               3               4                 5               6               7               8               9               10               11                 12               13               14               15               16               17               18                 19               20               21               22               23               24               25                 26               27               28      5 mg   See details      29      2.5 mg         30      5 mg            Date Details   11/28 This INR check               How to take your warfarin dose     To take:  2.5 mg Take 0.5 of a 5 mg tablet.    To take:  5 mg Take 1 of the 5 mg tablets.           December 2017 Details    Sun Mon Tue Wed Thu Fri Sat          1      2.5 mg         2      5 mg           3      5 mg         4      2.5 mg         5      5 mg         6      2.5 mg         7      5 mg         8      2.5 mg         9      5 mg           10      5 mg         11      2.5 mg         12      5 mg         13      2.5 mg         14      5 mg         15      2.5 mg         16      5 mg           17      5 mg         18      2.5 mg         19      5 mg         20      2.5 mg         21      5 mg         22      2.5 mg         23      5 mg           24       5 mg         25      2.5 mg         26      5 mg         27      2.5 mg         28      5 mg         29      2.5 mg         30      5 mg           31      5 mg                Date Details   No additional details            How to take your warfarin dose     To take:  2.5 mg Take 0.5 of a 5 mg tablet.    To take:  5 mg Take 1 of the 5 mg tablets.           January 2018 Details    Sun Mon Tue Wed Thu Fri Sat      1      2.5 mg         2      5 mg         3      2.5 mg         4      5 mg         5      2.5 mg         6      5 mg           7      5 mg         8      2.5 mg         9            10               11               12               13                 14               15               16               17               18               19               20                 21               22               23               24               25               26               27                 28               29               30               31                   Date Details   No additional details    Date of next INR:  1/9/2018         How to take your warfarin dose     To take:  2.5 mg Take 0.5 of a 5 mg tablet.    To take:  5 mg Take 1 of the 5 mg tablets.

## 2017-11-28 NOTE — NURSING NOTE
"Chief Complaint   Patient presents with     Diabetes     follow up       Initial /62  Pulse 67  Ht 1.702 m (5' 7\")  Wt 101 kg (222 lb 9.6 oz)  SpO2 96%  BMI 34.86 kg/m2 Estimated body mass index is 34.86 kg/(m^2) as calculated from the following:    Height as of this encounter: 1.702 m (5' 7\").    Weight as of this encounter: 101 kg (222 lb 9.6 oz).  Medication Reconciliation: complete   Jodie Gomez      "

## 2017-11-30 ENCOUNTER — OFFICE VISIT (OUTPATIENT)
Dept: FAMILY MEDICINE | Facility: OTHER | Age: 72
End: 2017-11-30
Attending: FAMILY MEDICINE
Payer: COMMERCIAL

## 2017-11-30 VITALS
RESPIRATION RATE: 14 BRPM | HEART RATE: 56 BPM | DIASTOLIC BLOOD PRESSURE: 60 MMHG | BODY MASS INDEX: 35.16 KG/M2 | OXYGEN SATURATION: 96 % | SYSTOLIC BLOOD PRESSURE: 128 MMHG | TEMPERATURE: 97.6 F | WEIGHT: 224 LBS | HEIGHT: 67 IN

## 2017-11-30 DIAGNOSIS — E78.5 HYPERLIPIDEMIA, UNSPECIFIED HYPERLIPIDEMIA TYPE: ICD-10-CM

## 2017-11-30 DIAGNOSIS — Z79.4 TYPE 2 DIABETES MELLITUS WITH STAGE 3 CHRONIC KIDNEY DISEASE, WITH LONG-TERM CURRENT USE OF INSULIN (H): Primary | ICD-10-CM

## 2017-11-30 DIAGNOSIS — E11.22 TYPE 2 DIABETES MELLITUS WITH STAGE 3 CHRONIC KIDNEY DISEASE, WITH LONG-TERM CURRENT USE OF INSULIN (H): Primary | ICD-10-CM

## 2017-11-30 DIAGNOSIS — I10 BENIGN ESSENTIAL HYPERTENSION: ICD-10-CM

## 2017-11-30 DIAGNOSIS — N18.30 TYPE 2 DIABETES MELLITUS WITH STAGE 3 CHRONIC KIDNEY DISEASE, WITH LONG-TERM CURRENT USE OF INSULIN (H): Primary | ICD-10-CM

## 2017-11-30 PROCEDURE — 99214 OFFICE O/P EST MOD 30 MIN: CPT | Performed by: FAMILY MEDICINE

## 2017-11-30 PROCEDURE — 99212 OFFICE O/P EST SF 10 MIN: CPT

## 2017-11-30 NOTE — PROGRESS NOTES
SUBJECTIVE:   Clement Voss is a 72 year old male who presents to clinic today for the following health issues:      Diabetes Follow-up    Patient is checking blood sugars: twice daily.    Blood sugar testing frequency justification: Adjustment of medication(s)  Results are as follows:       am - 150's       suppertime - 130's    Diabetic concerns: None      Symptoms of hypoglycemia (low blood sugar): no lows     Paresthesias (numbness or burning in feet) or sores: No     Date of last diabetic eye exam: Every 3 months    Hyperlipidemia Follow-Up      Rate your low fat/cholesterol diet?: good    Taking statin?  No    Other lipid medications/supplements?:  Fenofibrate, without side effects    Hypertension Follow-up      Outpatient blood pressures checked off and on  Low Salt Diet: low salt      Amount of exercise or physical activity: No    Problems taking medications regularly: No    Medication side effects: none    Diet: low salt and low fat/cholesterol          Problem list and histories reviewed & adjusted, as indicated.  Additional history: as documented    Patient Active Problem List   Diagnosis     Type 2 diabetes mellitus with chronic kidney disease, with long-term current use of insulin (H)     Hyperlipidemia     Benign essential hypertension     Gout     Pulmonary embolism and infarction (H)     Acute thromboembolism of deep veins of lower extremity (H)     ACP (advance care planning)     Long-term (current) use of anticoagulants [Z79.01]     Morbid obesity (H)     Past Surgical History:   Procedure Laterality Date     APPENDECTOMY  2008     CHOLECYSTECTOMY  1984     History of PE of right lung 3/2013[         Social History   Substance Use Topics     Smoking status: Former Smoker     Types: Cigarettes     Quit date: 8/12/1964     Smokeless tobacco: Never Used     Alcohol use No     Family History   Problem Relation Age of Onset     HEART DISEASE Father 71     heart disease; cause of death     Other - See  Comments Mother 79     old age; cause of death         Current Outpatient Prescriptions   Medication Sig Dispense Refill     warfarin (COUMADIN) 5 MG tablet SEE NOTES 90 tablet 3     JANUVIA 100 MG tablet TAKE 1 TABLET(100 MG) BY MOUTH DAILY 90 tablet 0     fenofibrate 48 MG tablet TAKE 1 TABLET(48 MG) BY MOUTH DAILY 90 tablet 2     insulin glargine (LANTUS SOLOSTAR) 100 UNIT/ML injection Inject 18 Units Subcutaneous At Bedtime (Patient taking differently: Inject 19 Units Subcutaneous At Bedtime ) 15 mL 1     insulin pen needle 31G X 6 MM Use 1 daily or as directed. 100 each prn     ACCU-CHEK VLAD PLUS test strip USE TO TEST BLOOD SUGARS THREE TIMES DAILY 100 strip 2     amLODIPine (NORVASC) 10 MG tablet Take 1 tablet (10 mg) by mouth daily 90 tablet 3     lisinopril (PRINIVIL,ZESTRIL) 40 MG tablet Take 1 tablet (40 mg) by mouth daily 90 tablet 3     terazosin (HYTRIN) 10 MG capsule Take 1 capsule (10 mg) by mouth At Bedtime 90 capsule 3     pilocarpine (PILOCAR) 1 % ophthalmic solution Place 1 drop into both eyes 4 times daily       STATIN NOT PRESCRIBED, INTENTIONAL, Statin not prescribed intentionally due to Intolerance (with supporting documentation of trying a statin at least once within the last 5 years) 0 each 0     fluticasone (FLONASE) 50 MCG/ACT nasal spray Spray 2 sprays into both nostrils daily (Patient taking differently: Spray 2 sprays into both nostrils daily as needed ) 1 Package 0     indomethacin (INDOCIN) 50 MG capsule Take 1 capsule (50 mg) by mouth 3 times daily With food as needed 30 capsule 1     brimonidine-timolol (COMBIGAN) 0.2-0.5 % ophthalmic solution 1 drop 2 times daily Morning and evening, 12 hours apart       acetaminophen (TYLENOL) 500 MG tablet Take 1-2 tablets (500-1,000 mg) by mouth every 6 hours as needed       aspirin 81 MG EC tablet Take 1 tablet (81 mg) by mouth daily 90 tablet 3     loratadine (CLARITIN) 10 MG tablet Take 10 mg by mouth daily.       fish oil-omega-3 fatty  "acids (FISH OIL) 1000 MG capsule Take 1 capsule by mouth 3 times daily.       TRAVATAN Z (TRAVATAN Z) 0.004 % ophthalmic solution Instill 1 drop by ophthalmic route every day into affected eye(s) in the evening       Allergies   Allergen Reactions     Atorvastatin Calcium Other (See Comments)     Lipitor  Increase CR     Iodine      Penicillins      Sulfa Drugs      Sulfa (sulfonamide antibiotics)         Reviewed and updated as needed this visit by clinical staffTobacco  Allergies  Meds       Reviewed and updated as needed this visit by Provider         ROS:  Constitutional, HEENT, cardiovascular, pulmonary, gi and gu systems are negative, except as otherwise noted.      OBJECTIVE:   /60 (BP Location: Left arm, Patient Position: Sitting, Cuff Size: Adult Large)  Pulse 56  Temp 97.6  F (36.4  C) (Tympanic)  Resp 14  Ht 5' 7\" (1.702 m)  Wt 224 lb (101.6 kg)  SpO2 96%  BMI 35.08 kg/m2  Body mass index is 35.08 kg/(m^2).  GENERAL: healthy, alert and no distress  PSYCH: mentation appears normal, affect normal/bright    Diagnostic Test Results:  Results for orders placed or performed in visit on 11/28/17   INR point of care ( [facility] clinic ONLY)   Result Value Ref Range    INR Point of Care 2.1 (H) 0.86 - 1.14   Albumin Random Urine Quantitative with Creat Ratio   Result Value Ref Range    Creatinine Urine 93 mg/dL    Albumin Urine mg/L <5 mg/L    Albumin Urine mg/g Cr Unable to calculate due to low value 0 - 17 mg/g Cr   Basic metabolic panel   Result Value Ref Range    Sodium 143 133 - 144 mmol/L    Potassium 4.0 3.4 - 5.3 mmol/L    Chloride 111 (H) 94 - 109 mmol/L    Carbon Dioxide 25 20 - 32 mmol/L    Anion Gap 7 3 - 14 mmol/L    Glucose 146 (H) 70 - 99 mg/dL    Urea Nitrogen 20 7 - 30 mg/dL    Creatinine 1.46 (H) 0.66 - 1.25 mg/dL    GFR Estimate 47 (L) >60 mL/min/1.7m2    GFR Estimate If Black 57 (L) >60 mL/min/1.7m2    Calcium 8.8 8.5 - 10.1 mg/dL   Hemoglobin A1c   Result Value Ref Range    Hemoglobin " A1C 7.6 (H) 4.3 - 6.0 %   Lipid Profile   Result Value Ref Range    Cholesterol 141 <200 mg/dL    Triglycerides 107 <150 mg/dL    HDL Cholesterol 42 >39 mg/dL    LDL Cholesterol Calculated 78 <100 mg/dL    Non HDL Cholesterol 99 <130 mg/dL   TSH   Result Value Ref Range    TSH 2.53 0.40 - 4.00 mU/L   Estimated Average Glucose   Result Value Ref Range    Estimated Average Glucose 171 mg/dL        ASSESSMENT/PLAN:     Diabetes Type II, A1c Controlled, insulin dependent   Associated with the following complications    Renal Complications:  CKD    Ophthalmologic Complications: None    Neurologic Complications: None    Peripheral Vascular Complications:  None    Other: None   Plan:  No changes in the patient's current treatment plan    Hyperlipidemia; controlled   Plan:  No changes in the patient's current treatment plan    Hypertension; controlled   Associated with the following complications:    CKD and Diabetes   Plan:  No changes in the patient's current treatment plan              ICD-10-CM    1. Type 2 diabetes mellitus with stage 3 chronic kidney disease, with long-term current use of insulin (H) E11.22     N18.3     Z79.4    2. Benign essential hypertension I10    3. Hyperlipidemia, unspecified hyperlipidemia type E78.5        FUTURE APPOINTMENTS:       - Follow-up visit in 3 months with labs prior      Lorelei Felix MD  [facility]

## 2017-11-30 NOTE — MR AVS SNAPSHOT
After Visit Summary   11/30/2017    Clement Voss    MRN: [id number]           Patient Information     Date Of Birth          1945        Visit Information        Provider Department      11/30/2017 10:15 AM Lorelei Felix MD [facility]        Today's Diagnoses     Type 2 diabetes mellitus with stage 3 chronic kidney disease, with long-term current use of insulin (H)    -  1    Benign essential hypertension        Hyperlipidemia, unspecified hyperlipidemia type           Follow-ups after your visit        Follow-up notes from your care team     Return in about 3 months (around 2/28/2018).      Your next 10 appointments already scheduled     Mar 01, 2018 10:15 AM CST   (Arrive by 10:00 AM)   SHORT with Lorelei Felix MD   [facility] ([facility] )    [address]  [facility] [address]   [phone]              Future tests that were ordered for you today     Open Future Orders        Priority Expected Expires Ordered    Basic metabolic panel Routine 2/28/2018 11/28/2018 11/30/2017    Hemoglobin A1c Routine 2/28/2018 11/28/2018 11/30/2017    Lipid Profile Routine 2/28/2018 11/28/2018 11/30/2017            Who to contact     If you have questions or need follow up information about today's clinic visit or your schedule please contact [facility] (formerly [facility]] directly at [phone].  Normal or non-critical lab and imaging results will be communicated to you by MyChart, letter or phone within 4 business days after the clinic has received the results. If you do not hear from us within 7 days, please contact the clinic through MyChart or phone. If you have a critical or abnormal lab result, we will notify you by phone as soon as possible.  Submit refill requests through [employer] or call your pharmacy and they will forward the refill request to us. Please allow 3 business days for your refill to be completed.           "Additional Information About Your Visit        MyChart Information     [employer] lets you send messages to your doctor, view your test results, renew your prescriptions, schedule appointments and more. To sign up, go to [email or web address] . Click on \"Log in\" on the left side of the screen, which will take you to the Welcome page. Then click on \"Sign up Now\" on the right side of the page.     You will be asked to enter the access code listed below, as well as some personal information. Please follow the directions to create your username and password.     Your access code is: [email or web address]  Expires: 2018  1:17 PM     Your access code will  in 90 days. If you need help or a new code, please call your [place] clinic or [phone].        Care EveryWhere ID     This is your Care EveryWhere ID. This could be used by other organizations to access your [place] medical records  [id number]        Your Vitals Were     Pulse Temperature Respirations Height Pulse Oximetry BMI (Body Mass Index)    56 97.6  F (36.4  C) (Tympanic) 14 5' 7\" (1.702 m) 96% 35.08 kg/m2       Blood Pressure from Last 3 Encounters:   17 128/60   17 143/62   17 138/66    Weight from Last 3 Encounters:   17 224 lb (101.6 kg)   17 222 lb 9.6 oz (101 kg)   17 216 lb (98 kg)                 Today's Medication Changes          These changes are accurate as of: 17  1:17 PM.  If you have any questions, ask your nurse or doctor.               These medicines have changed or have updated prescriptions.        Dose/Directions    fluticasone 50 MCG/ACT spray   Commonly known as:  FLONASE   This may have changed:    - when to take this  - reasons to take this   Used for:  Acute maxillary sinusitis        Dose:  2 spray   Spray 2 sprays into both nostrils daily   Quantity:  1 Package   Refills:  0       insulin glargine 100 UNIT/ML injection   Commonly known as:  LANTUS SOLOSTAR   This may have changed:  " how much to take   Used for:  Type 2 diabetes mellitus with stage 3 chronic kidney disease, with long-term current use of insulin (H)        Dose:  18 Units   Inject 18 Units Subcutaneous At Bedtime   Quantity:  15 mL   Refills:  1                Primary Care Provider Office Phone # Fax #    Lorelei Felix [phone] [address] [facility] [address]        Equal Access to Services     BALDEV LUCAS : Hadii aad ku hadasho Soomaali, waaxda luqadaha, qaybta kaalmada adeegyada, waxay idiin hayaan adeeg khacostash lalinan ah. So [facility] [phone].    ATENCIÓN: Si habla español, tiene a breaux disposición servicios gratuitos de asistencia lingüística. Llame al [phone].    We comply with applicable federal civil rights laws and [place] laws. We do not discriminate on the basis of race, color, national origin, age, disability, sex, sexual orientation, or gender identity.            Thank you!     Thank you for choosing [facility]  for your care. Our goal is always to provide you with excellent care. Hearing back from our patients is one way we can continue to improve our services. Please take a few minutes to complete the written survey that you may receive in the mail after your visit with us. Thank you!             Your Updated Medication List - Protect others around you: Learn how to safely use, store and throw away your medicines at www.disposemymeds.org.          This list is accurate as of: 11/30/17  1:17 PM.  Always use your most recent med list.                   Brand Name Dispense Instructions for use Diagnosis    ACCU-CHEK VLAD PLUS test strip   Generic drug:  blood glucose monitoring     100 strip    USE TO TEST BLOOD SUGARS THREE TIMES DAILY    Diabetes mellitus, type 2 (H)       acetaminophen 500 MG tablet    TYLENOL     Take 1-2 tablets (500-1,000 mg) by mouth every 6 hours as needed    Pulmonary embolism (H)       amLODIPine 10 MG tablet    NORVASC     90 tablet    Take 1 tablet (10 mg) by mouth daily    Benign essential hypertension       aspirin 81 MG EC tablet     90 tablet    Take 1 tablet (81 mg) by mouth daily    Diabetes mellitus, type 2 (H)       CLARITIN 10 MG tablet   Generic drug:  loratadine      Take 10 mg by mouth daily.        COMBIGAN 0.2-0.5 % ophthalmic solution   Generic drug:  brimonidine-timolol      1 drop 2 times daily Morning and evening, 12 hours apart        fenofibrate 48 MG tablet     90 tablet    TAKE 1 TABLET(48 MG) BY MOUTH DAILY    Hyperlipidemia, unspecified hyperlipidemia type       fish oil-omega-3 fatty acids 1000 MG capsule      Take 1 capsule by mouth 3 times daily.        fluticasone 50 MCG/ACT spray    FLONASE    1 Package    Spray 2 sprays into both nostrils daily    Acute maxillary sinusitis       indomethacin 50 MG capsule    INDOCIN    30 capsule    Take 1 capsule (50 mg) by mouth 3 times daily With food as needed    Gout, unspecified       insulin glargine 100 UNIT/ML injection    LANTUS SOLOSTAR    15 mL    Inject 18 Units Subcutaneous At Bedtime    Type 2 diabetes mellitus with stage 3 chronic kidney disease, with long-term current use of insulin (H)       insulin pen needle 31G X 6 MM     100 each    Use 1 daily or as directed.    Type 2 diabetes mellitus with stage 3 chronic kidney disease, with long-term current use of insulin (H)       JANUVIA 100 MG tablet   Generic drug:  sitagliptin     90 tablet    TAKE 1 TABLET(100 MG) BY MOUTH DAILY    Type 2 diabetes mellitus without complication (H)       lisinopril 40 MG tablet    PRINIVIL/ZESTRIL    90 tablet    Take 1 tablet (40 mg) by mouth daily    Benign essential hypertension       pilocarpine 1 % ophthalmic solution    PILOCAR     Place 1 drop into both eyes 4 times daily        STATIN NOT PRESCRIBED (INTENTIONAL)     0 each    Statin not prescribed intentionally due to Intolerance (with supporting documentation of trying a statin at least once within the last 5  years)    Hyperlipidemia, unspecified hyperlipidemia       terazosin 10 MG capsule    HYTRIN    90 capsule    Take 1 capsule (10 mg) by mouth At Bedtime    Benign essential hypertension       TRAVATAN Z 0.004 % ophthalmic solution   Generic drug:  travoprost (DARION Free)      Instill 1 drop by ophthalmic route every day into affected eye(s) in the evening        warfarin 5 MG tablet    COUMADIN    90 tablet    SEE NOTES    Pulmonary embolism and infarction (H)

## 2017-12-05 DIAGNOSIS — I10 BENIGN ESSENTIAL HYPERTENSION: ICD-10-CM

## 2017-12-06 NOTE — TELEPHONE ENCOUNTER
Lisinopril      Last Written Prescription Date: 11/28/16  Last Fill Quantity: 90,  # refills: 3   Last Office Visit with [facility], [facility] or [facility] prescribing provider: 11/30/17                                         Next 5 appointments (look out 90 days)     Mar 01, 2018 10:15 AM CST   (Arrive by 10:00 AM)   SHORT with Lorelei Felix MD   [facility] ([facility] )    [address]  [facility] [address]   [phone]

## 2017-12-07 RX ORDER — LISINOPRIL 40 MG/1
TABLET ORAL
Qty: 90 TABLET | Refills: 0 | Status: SHIPPED | OUTPATIENT
Start: 2017-12-07 | End: 2018-03-06

## 2017-12-13 DIAGNOSIS — Z79.4 TYPE 2 DIABETES MELLITUS WITH STAGE 3 CHRONIC KIDNEY DISEASE, WITH LONG-TERM CURRENT USE OF INSULIN (H): ICD-10-CM

## 2017-12-13 DIAGNOSIS — E11.22 TYPE 2 DIABETES MELLITUS WITH STAGE 3 CHRONIC KIDNEY DISEASE, WITH LONG-TERM CURRENT USE OF INSULIN (H): ICD-10-CM

## 2017-12-13 DIAGNOSIS — N18.30 TYPE 2 DIABETES MELLITUS WITH STAGE 3 CHRONIC KIDNEY DISEASE, WITH LONG-TERM CURRENT USE OF INSULIN (H): ICD-10-CM

## 2017-12-19 DIAGNOSIS — I10 BENIGN ESSENTIAL HYPERTENSION: ICD-10-CM

## 2017-12-20 RX ORDER — TERAZOSIN 10 MG/1
CAPSULE ORAL
Qty: 90 CAPSULE | Refills: 3 | Status: SHIPPED | OUTPATIENT
Start: 2017-12-20 | End: 2018-12-14

## 2017-12-20 NOTE — TELEPHONE ENCOUNTER
TERAZOSIN 10MG (TEN MG) CAPSULES        Last Written Prescription Date: 11/28/2016  Last Fill Quantity: 90,  # refills: 3   Last Office Visit with [facility], [facility] or [facility] prescribing provider: 11/30/17                                         Next 5 appointments (look out 90 days)     Mar 01, 2018 10:15 AM CST   (Arrive by 10:00 AM)   SHORT with Lorelei Felix MD   [facility] ([facility] )    [address]  [facility] [address]   [phone]

## 2018-01-08 DIAGNOSIS — I10 BENIGN ESSENTIAL HYPERTENSION: ICD-10-CM

## 2018-01-08 DIAGNOSIS — E11.9 DIABETES MELLITUS, TYPE 2 (H): ICD-10-CM

## 2018-01-09 ENCOUNTER — ANTICOAGULATION THERAPY VISIT (OUTPATIENT)
Dept: ANTICOAGULATION | Facility: OTHER | Age: 73
End: 2018-01-09
Payer: COMMERCIAL

## 2018-01-09 DIAGNOSIS — Z79.01 LONG-TERM (CURRENT) USE OF ANTICOAGULANTS: ICD-10-CM

## 2018-01-09 DIAGNOSIS — I26.99 PULMONARY EMBOLISM AND INFARCTION (H): ICD-10-CM

## 2018-01-09 DIAGNOSIS — I82.409 ACUTE THROMBOEMBOLISM OF DEEP VEINS OF LOWER EXTREMITY, UNSPECIFIED LATERALITY (H): ICD-10-CM

## 2018-01-09 LAB — INR BLD: 1.5 (ref 0.86–1.14)

## 2018-01-09 PROCEDURE — 85610 PROTHROMBIN TIME: CPT | Mod: QW,ZL | Performed by: FAMILY MEDICINE

## 2018-01-09 PROCEDURE — 36416 COLLJ CAPILLARY BLOOD SPEC: CPT | Mod: ZL | Performed by: FAMILY MEDICINE

## 2018-01-09 NOTE — PROGRESS NOTES
ANTICOAGULATION FOLLOW-UP CLINIC VISIT    Patient Name:  Clement Voss  Date:  1/9/2018  Contact Type  Telephone  message left on home phone voicemail    SUBJECTIVE:     Patient Findings     Positives Unexplained INR or factor level change    Comments Call placed to patient home and message left re: INR result, warfarin dosing and INR recheck date. He is to notify warfarin clinic if he has any bleeding/bruising, changes in diet/meds/activity or questions.            OBJECTIVE    INR Point of Care   Date Value Ref Range Status   01/09/2018 1.5 (H) 0.86 - 1.14 Final     Comment:     This test is intended for monitoring Coumadin therapy.  Results are not   accurate in patients with prolonged INR due to factor deficiency.         ASSESSMENT / PLAN  INR assessment SUB    Recheck INR In: 1 WEEK    INR Location Clinic      Anticoagulation Summary as of 1/9/2018     INR goal 2.0-3.0   Today's INR 1.5!   Maintenance plan 2.5 mg (5 mg x 0.5) on Mon, Wed, Fri; 5 mg (5 mg x 1) all other days   Full instructions 1/9: 10 mg; 1/10: 5 mg; Otherwise 2.5 mg on Mon, Wed, Fri; 5 mg all other days   Weekly total 27.5 mg   Plan last modified Jodie Gill RN (8/4/2016)   Next INR check 1/16/2018   Priority INR   Target end date Indefinite    Indications   Pulmonary embolism and infarction (H) [I26.99]  Acute thromboembolism of deep veins of lower extremity (H) [I82.409]  Long-term (current) use of anticoagulants [Z79.01] [Z79.01]         Anticoagulation Episode Summary     INR check location     Preferred lab     Send INR reminders to  ANTICOAG POOL    Comments       Anticoagulation Care Providers     Provider Role Specialty Phone number    Lorelei Felix MD [facility] [phone]            See the Encounter Report to view Anticoagulation Flowsheet and Dosing Calendar (Go to Encounters tab in chart review, and find the Anticoagulation Therapy Visit)        Jodie Elena, RN

## 2018-01-09 NOTE — MR AVS SNAPSHOT
Clement Englandi   1/9/2018   Anticoagulation Therapy Visit    Description:  72 year old male   Provider:  Lorelei Felix MD   Department:  Hc Anti Coagulation           INR as of 1/9/2018     Today's INR 1.5!      Anticoagulation Summary as of 1/9/2018     INR goal 2.0-3.0   Today's INR 1.5!   Full instructions 1/9: 10 mg; 1/10: 5 mg; Otherwise 2.5 mg on Mon, Wed, Fri; 5 mg all other days   Next INR check 1/16/2018    Indications   Pulmonary embolism and infarction (H) [I26.99]  Acute thromboembolism of deep veins of lower extremity (H) [I82.409]  Long-term (current) use of anticoagulants [Z79.01] [Z79.01]         January 2018 Details    Sun Mon Tue Wed Thu Fri Sat      1               2               3               4               5               6                 7               8               9      10 mg   See details      10      5 mg         11      5 mg         12      2.5 mg         13      5 mg           14      5 mg         15      2.5 mg         16            17               18               19               20                 21               22               23               24               25               26               27                 28               29               30               31                   Date Details   01/09 This INR check       Date of next INR:  1/16/2018         How to take your warfarin dose     To take:  2.5 mg Take 0.5 of a 5 mg tablet.    To take:  5 mg Take 1 of the 5 mg tablets.    To take:  10 mg Take 2 of the 5 mg tablets.

## 2018-01-10 RX ORDER — BLOOD SUGAR DIAGNOSTIC
STRIP MISCELLANEOUS
Qty: 100 STRIP | Refills: 2 | Status: SHIPPED | OUTPATIENT
Start: 2018-01-10 | End: 2018-06-07

## 2018-01-10 RX ORDER — AMLODIPINE BESYLATE 10 MG/1
TABLET ORAL
Qty: 90 TABLET | Refills: 0 | Status: SHIPPED | OUTPATIENT
Start: 2018-01-10 | End: 2018-04-03

## 2018-01-10 NOTE — TELEPHONE ENCOUNTER
accu chek rahul test strips      Last Written Prescription Date:  6/22/17  Last Fill Quantity: 100,   # refills: 2  Last Office Visit: 11/30/17  Future Office visit:     norvasc      Last Written Prescription Date:  11/28/16  Last Fill Quantity: 90,   # refills: 3  Last Office Visit: 11/30/17  Future Office visit:    Next 5 appointments (look out 90 days)     Mar 01, 2018 10:15 AM CST   (Arrive by 10:00 AM)   SHORT with Lorelei Felix MD   [facility] ([facility] )    [address]  [place] [address]   [phone]                        Next 5 appointments (look out 90 days)     Mar 01, 2018 10:15 AM CST   (Arrive by 10:00 AM)   SHORT with Lorelei Felix MD   [facility] ([facility] )    [address]  [place] [address]   [phone]

## 2018-01-16 ENCOUNTER — ANTICOAGULATION THERAPY VISIT (OUTPATIENT)
Dept: ANTICOAGULATION | Facility: OTHER | Age: 73
End: 2018-01-16
Payer: COMMERCIAL

## 2018-01-16 DIAGNOSIS — Z79.01 LONG-TERM (CURRENT) USE OF ANTICOAGULANTS: ICD-10-CM

## 2018-01-16 DIAGNOSIS — I26.99 PULMONARY EMBOLISM AND INFARCTION (H): ICD-10-CM

## 2018-01-16 DIAGNOSIS — I82.409 ACUTE THROMBOEMBOLISM OF DEEP VEINS OF LOWER EXTREMITY (H): ICD-10-CM

## 2018-01-16 DIAGNOSIS — I82.409 ACUTE THROMBOEMBOLISM OF DEEP VEINS OF LOWER EXTREMITY, UNSPECIFIED LATERALITY (H): ICD-10-CM

## 2018-01-16 LAB — INR BLD: 2 (ref 0.86–1.14)

## 2018-01-16 PROCEDURE — 85610 PROTHROMBIN TIME: CPT | Mod: QW,ZL | Performed by: FAMILY MEDICINE

## 2018-01-16 PROCEDURE — 36416 COLLJ CAPILLARY BLOOD SPEC: CPT | Mod: ZL | Performed by: FAMILY MEDICINE

## 2018-01-16 NOTE — MR AVS SNAPSHOT
Clement CROWELL Shanika   1/16/2018   Anticoagulation Therapy Visit    Description:  72 year old male   Provider:  Lorelei Felix MD   Department:  Hc Anti Coagulation           INR as of 1/16/2018     Today's INR 2.0      Anticoagulation Summary as of 1/16/2018     INR goal 2.0-3.0   Today's INR 2.0   Full instructions 2.5 mg on Mon, Wed, Fri; 5 mg all other days   Next INR check 2/6/2018    Indications   Pulmonary embolism and infarction (H) [I26.99]  Acute thromboembolism of deep veins of lower extremity (H) [I82.409]  Long-term (current) use of anticoagulants [Z79.01] [Z79.01]         January 2018 Details    Sun Mon Tue Wed Thu Fri Sat      1               2               3               4               5               6                 7               8               9               10               11               12               13                 14               15               16      5 mg   See details      17      2.5 mg         18      5 mg         19      2.5 mg         20      5 mg           21      5 mg         22      2.5 mg         23      5 mg         24      2.5 mg         25      5 mg         26      2.5 mg         27      5 mg           28      5 mg         29      2.5 mg         30      5 mg         31      2.5 mg             Date Details   01/16 This INR check               How to take your warfarin dose     To take:  2.5 mg Take 0.5 of a 5 mg tablet.    To take:  5 mg Take 1 of the 5 mg tablets.           February 2018 Details    Sun Mon Tue Wed Thu Fri Sat         1      5 mg         2      2.5 mg         3      5 mg           4      5 mg         5      2.5 mg         6            7               8               9               10                 11               12               13               14               15               16               17                 18               19               20               21               22               23               24                 25                26               27               28                   Date Details   No additional details    Date of next INR:  2/6/2018         How to take your warfarin dose     To take:  2.5 mg Take 0.5 of a 5 mg tablet.    To take:  5 mg Take 1 of the 5 mg tablets.

## 2018-01-16 NOTE — PROGRESS NOTES
ANTICOAGULATION FOLLOW-UP CLINIC VISIT    Patient Name:  Clement Voss  Date:  1/16/2018  Contact Type:  Telephone/ message left on home phone voicemail    SUBJECTIVE:     Patient Findings     Positives No Problem Findings    Comments INR done by clinic lab and result noted by warfarin clinic. Call placed to patient and message left re: INR result, warfarin dosing and INR recheck date. He is to notify warfarin  Clinic if he has any bleeding/bruising, changes in diet/meds/activity or questions.            OBJECTIVE    INR Point of Care   Date Value Ref Range Status   01/16/2018 2.0 (H) 0.86 - 1.14 Final     Comment:     This test is intended for monitoring Coumadin therapy.  Results are not   accurate in patients with prolonged INR due to factor deficiency.         ASSESSMENT / PLAN  INR assessment THER    Recheck INR In: 3 WEEKS    INR Location Clinic      Anticoagulation Summary as of 1/16/2018     INR goal 2.0-3.0   Today's INR 2.0   Maintenance plan 2.5 mg (5 mg x 0.5) on Mon, Wed, Fri; 5 mg (5 mg x 1) all other days   Full instructions 2.5 mg on Mon, Wed, Fri; 5 mg all other days   Weekly total 27.5 mg   No change documented Jodie Elena, RN   Plan last modified Jodie Gill, RN (8/4/2016)   Next INR check 2/6/2018   Priority INR   Target end date Indefinite    Indications   Pulmonary embolism and infarction (H) [I26.99]  Acute thromboembolism of deep veins of lower extremity (H) [I82.409]  Long-term (current) use of anticoagulants [Z79.01] [Z79.01]         Anticoagulation Episode Summary     INR check location     Preferred lab     Send INR reminders to  ANTICOAG POOL    Comments       Anticoagulation Care Providers     Provider Role Specialty Phone number    Lorelei Felix MD [facility] [phone]            See the Encounter Report to view Anticoagulation Flowsheet and Dosing Calendar (Go to Encounters tab in chart review, and find the Anticoagulation Therapy  Visit)        Jodie Elena RN

## 2018-01-21 ENCOUNTER — APPOINTMENT (OUTPATIENT)
Dept: GENERAL RADIOLOGY | Facility: HOSPITAL | Age: 73
End: 2018-01-21
Attending: PHYSICIAN ASSISTANT
Payer: MEDICARE

## 2018-01-21 ENCOUNTER — HOSPITAL ENCOUNTER (EMERGENCY)
Facility: HOSPITAL | Age: 73
Discharge: HOME | End: 2018-01-21
Attending: PHYSICIAN ASSISTANT | Admitting: PHYSICIAN ASSISTANT
Payer: MEDICARE

## 2018-01-21 VITALS
RESPIRATION RATE: 16 BRPM | HEART RATE: 63 BPM | OXYGEN SATURATION: 94 % | TEMPERATURE: 97 F | DIASTOLIC BLOOD PRESSURE: 62 MMHG | SYSTOLIC BLOOD PRESSURE: 131 MMHG

## 2018-01-21 DIAGNOSIS — L03.115 CELLULITIS OF RIGHT LOWER EXTREMITY: ICD-10-CM

## 2018-01-21 LAB — INR PPP: 1.94 (ref 0.8–1.2)

## 2018-01-21 PROCEDURE — 73590 X-RAY EXAM OF LOWER LEG: CPT | Mod: TC,RT

## 2018-01-21 PROCEDURE — 99284 EMERGENCY DEPT VISIT MOD MDM: CPT | Mod: 25

## 2018-01-21 PROCEDURE — 99283 EMERGENCY DEPT VISIT LOW MDM: CPT

## 2018-01-21 PROCEDURE — A9270 NON-COVERED ITEM OR SERVICE: HCPCS | Mod: GY | Performed by: PHYSICIAN ASSISTANT

## 2018-01-21 PROCEDURE — 99283 EMERGENCY DEPT VISIT LOW MDM: CPT | Performed by: PHYSICIAN ASSISTANT

## 2018-01-21 PROCEDURE — 25000132 ZZH RX MED GY IP 250 OP 250 PS 637: Mod: GY | Performed by: PHYSICIAN ASSISTANT

## 2018-01-21 PROCEDURE — 36415 COLL VENOUS BLD VENIPUNCTURE: CPT | Performed by: PHYSICIAN ASSISTANT

## 2018-01-21 PROCEDURE — 85610 PROTHROMBIN TIME: CPT | Performed by: PHYSICIAN ASSISTANT

## 2018-01-21 RX ORDER — HYDROCODONE BITARTRATE AND ACETAMINOPHEN 5; 325 MG/1; MG/1
1-2 TABLET ORAL EVERY 6 HOURS PRN
Qty: 15 TABLET | Refills: 0 | Status: SHIPPED | OUTPATIENT
Start: 2018-01-21 | End: 2018-01-25

## 2018-01-21 RX ORDER — CLINDAMYCIN HCL 300 MG
300 CAPSULE ORAL 4 TIMES DAILY
Qty: 40 CAPSULE | Refills: 0 | Status: SHIPPED | OUTPATIENT
Start: 2018-01-21 | End: 2018-01-31

## 2018-01-21 RX ORDER — CLINDAMYCIN HCL 150 MG
300 CAPSULE ORAL ONCE
Status: COMPLETED | OUTPATIENT
Start: 2018-01-21 | End: 2018-01-21

## 2018-01-21 RX ADMIN — CLINDAMYCIN HYDROCHLORIDE 300 MG: 150 CAPSULE ORAL at 13:38

## 2018-01-21 ASSESSMENT — ENCOUNTER SYMPTOMS
COLOR CHANGE: 1
CHILLS: 0
FEVER: 0
WOUND: 1

## 2018-01-21 NOTE — ED NOTES
"Patient arrives with significant other with complaint of right leg/knee pain with increasing redness. Patient reports having a fall last Tuesday and was seen in urgent care in [place] where they state \"Nothing was done.\" Patient reports the last 2 days has noticed increasing redness to abrasion on right knee as well as increasing pain. Currently rates his pain 5/10. Denies any fevers. Call light within reach.   "

## 2018-01-21 NOTE — ED AVS SNAPSHOT
[facility]    [address]    [facility] [address]    Phone:  [phone]                                       Clement Voss   MRN: [id number]    Department:  [facility]   Date of Visit:  1/21/2018           Patient Information     Date Of Birth          1945        Your diagnoses for this visit were:     Cellulitis of right lower extremity        You were seen by Justino Kunz PA-C.      Follow-up Information     Follow up with Lorelei Felix MD In 3 days.    Specialty:  Family Practice    Why:  For wound re-check    Contact information:    [address] [facility] [address]  [phone]          Follow up with [facility].    Specialty:  EMERGENCY MEDICINE    Why:  If symptoms worsen    Contact information:    [address]  [facility] [address] [phone]    Additional information:    From [facility]: Take [address]. Turn left at [address]. Turn left at the first stoplight on [address]. At the first stop sign, take a right onto [address]. Take a left into the parking lot and continue through until you reach the North enterance of the building.       From [place]: Take [address]. Take the [address] ramp towards [place]. Turn left onto [address]. Take a slight right onto [address]. Turn left at the first stoplight on [address]. At the first stop sign, take a right onto [address]. Take a left into the parking lot and continue through until you reach the North enterance of the building.       From [place]: Take [address]. Take a right at [address]. At the first stop sign, take a right onto [address]. Take a left into the parking lot and continue through until you reach the North enterance of the building.         Discharge Instructions         Discharge Instructions for Cellulitis  You have been diagnosed with cellulitis. This  is an infection in the deepest layer of the skin. In some cases, the infection also affects the muscle. Cellulitis is caused by bacteria. The bacteria can enter the body through broken skin. This can happen with a cut, scratch, animal bite, or an insect bite that has been scratched. You may have been treated in the hospital with antibiotics and fluids. You will likely be given a prescription for antibiotics to take at home. This sheet will help you take care of yourself at home.  Home care  When you are home:    Take the prescribed antibiotic medicine you are given as directed until it is gone. Take it even if you feel better. It treats the infection and stops it from returning. Not taking all the medicine can make future infections hard to treat.    Keep the infected area clean.    When possible, raise the infected area above the level of your heart. This helps keep swelling down.    Talk with your healthcare provider if you are in pain. Ask what kind of over-the-counter medicine you can take for pain.    Apply clean bandages as advised.    Take your temperature once a day for a week.    Wash your hands often to prevent spreading the infection.  In the future, wash your hands before and after you touch cuts, scratches, or bandages. This will help prevent infection.   When to call your healthcare provider  Call your healthcare provider immediately if you have any of the following:    Difficulty or pain when moving the joints above or below the infected area    Discharge or pus draining from the area    Fever of 100.4 F (38 C) or higher, or as directed by your healthcare provider    Pain that gets worse in or around the infected     Redness that gets worse in or around the infected area, particularly if the area of redness expands to a wider area    Shaking chills    Swelling of the infected area    Vomiting   Date Last Reviewed: 8/1/2016 2000-2017 [employer]. [address] [facility], [facility] [place]  [address]. All rights reserved. This information is not intended as a substitute for professional medical care. Always follow your healthcare professional's instructions.      Clindamycin as prescribed.     Keep covered and leg elevated.     Pain medications as needed.     Please follow-up in the clinic for recheck in 2-3 days.     Please return here for ANY other concerns or questions.     Future Appointments        Provider Department Dept Phone Center    3/1/2018 10:15 AM Lorelei Felix MD [facility] [phone] [facility]    3/6/2018 11:00 AM Anamaria Benítez RN [facility] [phone] [facility]         Review of your medicines      START taking        Dose / Directions Last dose taken    clindamycin 300 MG capsule   Commonly known as:  CLEOCIN   Dose:  300 mg   Quantity:  40 capsule        Take 1 capsule (300 mg) by mouth 4 times daily for 10 days   Refills:  0        HYDROcodone-acetaminophen 5-325 MG per tablet   Commonly known as:  NORCO   Dose:  1-2 tablet   Quantity:  15 tablet        Take 1-2 tablets by mouth every 6 hours as needed for moderate to severe pain   Refills:  0          CONTINUE these medicines which may have CHANGED, or have new prescriptions. If we are uncertain of the size of tablets/capsules you have at home, strength may be listed as something that might have changed.        Dose / Directions Last dose taken    fluticasone 50 MCG/ACT spray   Commonly known as:  FLONASE   Dose:  2 spray   What changed:    - when to take this  - reasons to take this   Quantity:  1 Package        Spray 2 sprays into both nostrils daily   Refills:  0        insulin glargine 100 UNIT/ML injection   Commonly known as:  LANTUS SOLOSTAR   Dose:  18 Units   What changed:  how much to take   Quantity:  15 mL        Inject 18 Units Subcutaneous At Bedtime   Refills:  1          Our records show that you are taking the medicines listed below. If these are incorrect, please call your family  doctor or clinic.        Dose / Directions Last dose taken    ACCU-CHEK VLAD PLUS test strip   Quantity:  100 strip   Generic drug:  blood glucose monitoring        USE TO TEST BLOOD SUGARS THREE TIMES DAILY   Refills:  2        acetaminophen 500 MG tablet   Commonly known as:  TYLENOL   Dose:  500-1000 mg        Take 1-2 tablets (500-1,000 mg) by mouth every 6 hours as needed   Refills:  0        amLODIPine 10 MG tablet   Commonly known as:  NORVASC   Quantity:  90 tablet        TAKE 1 TABLET(10 MG) BY MOUTH DAILY   Refills:  0        aspirin 81 MG EC tablet   Dose:  81 mg   Quantity:  90 tablet        Take 1 tablet (81 mg) by mouth daily   Refills:  3        CLARITIN 10 MG tablet   Dose:  10 mg   Generic drug:  loratadine        Take 10 mg by mouth daily.   Refills:  0        COMBIGAN 0.2-0.5 % ophthalmic solution   Dose:  1 drop   Generic drug:  brimonidine-timolol        1 drop 2 times daily Morning and evening, 12 hours apart   Refills:  0        fenofibrate 48 MG tablet   Quantity:  90 tablet        TAKE 1 TABLET(48 MG) BY MOUTH DAILY   Refills:  2        fish oil-omega-3 fatty acids 1000 MG capsule   Dose:  1 capsule        Take 1 capsule by mouth 3 times daily.   Refills:  0        indomethacin 50 MG capsule   Commonly known as:  INDOCIN   Dose:  50 mg   Quantity:  30 capsule        Take 1 capsule (50 mg) by mouth 3 times daily With food as needed   Refills:  1        insulin pen needle 31G X 6 MM   Quantity:  100 each        Use 1 daily or as directed.   Refills:  prn        JANUVIA 100 MG tablet   Quantity:  90 tablet   Generic drug:  sitagliptin        TAKE 1 TABLET(100 MG) BY MOUTH DAILY   Refills:  0        lisinopril 40 MG tablet   Commonly known as:  PRINIVIL/ZESTRIL   Quantity:  90 tablet        TAKE 1 TABLET(40 MG) BY MOUTH DAILY   Refills:  0        pilocarpine 1 % ophthalmic solution   Commonly known as:  PILOCAR   Dose:  1 drop        Place 1 drop into both eyes 4 times daily   Refills:  0         STATIN NOT PRESCRIBED (INTENTIONAL)   Quantity:  0 each        Statin not prescribed intentionally due to Intolerance (with supporting documentation of trying a statin at least once within the last 5 years)   Refills:  0        terazosin 10 MG capsule   Commonly known as:  HYTRIN   Quantity:  90 capsule        TAKE 1 CAPSULE(10 MG) BY MOUTH AT BEDTIME   Refills:  3        TRAVATAN Z 0.004 % ophthalmic solution   Generic drug:  travoprost (DARION Free)        Instill 1 drop by ophthalmic route every day into affected eye(s) in the evening   Refills:  0        warfarin 5 MG tablet   Commonly known as:  COUMADIN   Quantity:  90 tablet        SEE NOTES   Refills:  3                Prescriptions were sent or printed at these locations (2 Prescriptions)                   [facility] [id number] [place] [address]  AT [facility] [address]   [address] [place] [address]    Telephone:  [phone]   Fax:  [phone]   Hours:                  E-Prescribed (1 of 2)         clindamycin (CLEOCIN) 300 MG capsule                 Printed at Department/Unit [facility] (1 of 2)         HYDROcodone-acetaminophen (NORCO) 5-325 MG per tablet                Procedures and tests performed during your visit     INR    XR Tibia & Fibula Right 2 Views      Orders Needing Specimen Collection     None      Pending Results     Date and Time Order Name Status Description    1/21/2018 1327 INR In process             Pending Culture Results     No orders found from 1/19/2018 to 1/22/2018.            Thank you for choosing [facility]       Thank you for choosing [facility] for your care. Our goal is always to provide you with excellent care. Hearing back from our patients is one way we can continue to improve our services. Please take a few minutes to complete the written survey that you may receive in the mail after you visit with us. Thank you!        [facility] Information     [employer] lets you send messages to  "your doctor, view your test results, renew your prescriptions, schedule appointments and more. To sign up, go to [email or web address] . Click on \"Log in\" on the left side of the screen, which will take you to the Welcome page. Then click on \"Sign up Now\" on the right side of the page.     You will be asked to enter the access code listed below, as well as some personal information. Please follow the directions to create your username and password.     Your access code is: [email or web address]  Expires: 2018  1:17 PM     Your access code will  in 90 days. If you need help or a new code, please call your [facility] clinic or [phone].        Care EveryWhere ID     This is your Care EveryWhere ID. This could be used by other organizations to access your [facility] medical records  [id number]        Equal Access to Services     [facility] : Hadsierra Marinelli, wasosa guzman, pamela alvarado, manan campbell . So [facility] [phone].    ATENCIÓN: Si habla español, tiene a breaux disposición servicios gratuitos de asistencia lingüística. Llame al [phone].    We comply with applicable federal civil rights laws and [place] laws. We do not discriminate on the basis of race, color, national origin, age, disability, sex, sexual orientation, or gender identity.            After Visit Summary       This is your record. Keep this with you and show to your community pharmacist(s) and doctor(s) at your next visit.                  "

## 2018-01-21 NOTE — ED NOTES
Pt given verbal and written d/c instructions and verbalizes understanding. Pt advised that cleocin was e-scribed to [facility] in [place] and was given written prescription for norco. Pt left department ambulatory with significant other.

## 2018-01-21 NOTE — ED PROVIDER NOTES
History     Chief Complaint   Patient presents with     Cellulitis     right knee, had a fall on Tuesday, was seen at [facility], redness worsening, has open abrasion     The history is provided by the patient.     Clement Voss is a 72 year old male who presented to the ED ambulatory along with wife for evaluation of right knee pain.  He fell on his stairs 5 days ago and injured his right shin.  He was seen in [place] for treatment.  Laceration was not closed.  Two days ago he started to develop some redness and swelling around the site.  Some increased pain.      Problem List:    Patient Active Problem List    Diagnosis Date Noted     Morbid obesity (H) 05/26/2017     Priority: Medium     Long-term (current) use of anticoagulants [Z79.01] 08/04/2016     Priority: Medium     ACP (advance care planning) 03/14/2013     Priority: Medium     Advance Care Planning 8/26/2016: ACP Review of Chart / Resources Provided:  Reviewed chart for advance care plan.  Clement Voss has no plan or code status on file. Discussed available resources and provided with information. Confirmed code status reflects current choices pending further ACP discussions.  Confirmed/documented legally designated decision makers.  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/2004     Priority: Medium        Past Medical History:    Past Medical History:   Diagnosis Date     DVT (deep venous thrombosis) 9/24/2012     DVT (deep venous thrombosis) (H)      Gout, unspecified 1/10/2011     Other and unspecified hyperlipidemia 10/10/2005      Pulmonary embolism 9/24/2012     Pulmonary embolism (H)      Type II or unspecified type diabetes mellitus without mention of complication, not stated as uncontrolled 8/2/2004     Unspecified essential hypertension 1/3/2005       Past Surgical History:    Past Surgical History:   Procedure Laterality Date     APPENDECTOMY  2008     CHOLECYSTECTOMY  1984     History of PE of right lung 3/2013[         Family History:    Family History   Problem Relation Age of Onset     HEART DISEASE Father 71     heart disease; cause of death     Other - See Comments Mother 79     old age; cause of death       Social History:  Marital Status:   [2]  Social History   Substance Use Topics     Smoking status: Former Smoker     Types: Cigarettes     Quit date: 8/12/1964     Smokeless tobacco: Never Used     Alcohol use No        Medications:      clindamycin (CLEOCIN) 300 MG capsule   HYDROcodone-acetaminophen (NORCO) 5-325 MG per tablet   JANUVIA 100 MG tablet   amLODIPine (NORVASC) 10 MG tablet   terazosin (HYTRIN) 10 MG capsule   warfarin (COUMADIN) 5 MG tablet   fenofibrate 48 MG tablet   insulin glargine (LANTUS SOLOSTAR) 100 UNIT/ML injection   pilocarpine (PILOCAR) 1 % ophthalmic solution   STATIN NOT PRESCRIBED, INTENTIONAL,   fluticasone (FLONASE) 50 MCG/ACT nasal spray   brimonidine-timolol (COMBIGAN) 0.2-0.5 % ophthalmic solution   acetaminophen (TYLENOL) 500 MG tablet   aspirin 81 MG EC tablet   loratadine (CLARITIN) 10 MG tablet   fish oil-omega-3 fatty acids (FISH OIL) 1000 MG capsule   TRAVATAN Z (TRAVATAN Z) 0.004 % ophthalmic solution   ACCU-CHEK VLAD PLUS test strip   [DISCONTINUED] insulin glargine (LANTUS SOLOSTAR) 100 UNIT/ML injection   lisinopril (PRINIVIL/ZESTRIL) 40 MG tablet   insulin pen needle 31G X 6 MM   indomethacin (INDOCIN) 50 MG capsule         Review of Systems   Constitutional: Negative for chills and fever.   Musculoskeletal:        Right leg erythema and pain    Skin: Positive for color  change and wound.       Physical Exam   BP: 152/59  Pulse: 81  Temp: 97  F (36.1  C)  Resp: 18  SpO2: 96 %      Physical Exam   Constitutional: He is oriented to person, place, and time. He appears well-developed and well-nourished.   Pleasant and talkative.    Cardiovascular: Normal rate.    Musculoskeletal: He exhibits edema.        Legs:  10x7cm area of cellulitis with central scabbed laceration.  No abscess. Proximal tibia.  Normal Knee ROM.    Neurological: He is alert and oriented to person, place, and time.   Skin: Skin is warm and dry.   Nursing note and vitals reviewed.      ED Course     ED Course     Procedures             Medications   clindamycin (CLEOCIN) capsule 300 mg (300 mg Oral Given 1/21/18 1338)     Results for orders placed or performed during the hospital encounter of 01/21/18   XR Tibia & Fibula Right 2 Views    Narrative    PROCEDURE:  XR TIBIA & FIBULA RT 2 VW    HISTORY: injury to proximal tibia;     COMPARISON:  None.    TECHNIQUE:  2 views of the right tibia and fibula were obtained.    FINDINGS:  No fracture or dislocation is identified. The joint spaces  are preserved.        Impression    IMPRESSION: No acute fracture.      MELY KIRK MD   INR   Result Value Ref Range    INR 1.94 (H) 0.80 - 1.20       Critical Care time:  none               Labs Ordered and Resulted from Time of ED Arrival Up to the Time of Departure from the ED   INR - Abnormal; Notable for the following:        Result Value    INR 1.94 (*)     All other components within normal limits       Assessments & Plan (with Medical Decision Making)   Up-to-date on tetanus.  Bandage in the usual fashion.  Start clindamycin and pain medications.  Follow-up in the clinic for recheck in the next 2 days.  Return here for any worsening.    I have reviewed the nursing notes.    I have reviewed the findings, diagnosis, plan and need for follow up with the patient.       Discharge Medication List as of 1/21/2018  1:55 PM       START taking these medications    Details   clindamycin (CLEOCIN) 300 MG capsule Take 1 capsule (300 mg) by mouth 4 times daily for 10 days, Disp-40 capsule, R-0, E-Prescribe      HYDROcodone-acetaminophen (NORCO) 5-325 MG per tablet Take 1-2 tablets by mouth every 6 hours as needed for moderate to severe pain, Disp-15 tablet, R-0, Local Print             Final diagnoses:   Cellulitis of right lower extremity       1/21/2018   [facility]     Justino Kunz PA-C  01/21/18 1508

## 2018-01-21 NOTE — ED AVS SNAPSHOT
[facility]    [address] [address]    Phone:  [phone]                                       Clement Voss   MRN: [id number]    Department:  [facility]   Date of Visit:  1/21/2018           After Visit Summary Signature Page     I have received my discharge instructions, and my questions have been answered. I have discussed any challenges I see with this plan with the nurse or doctor.    ..........................................................................................................................................  Patient/Patient Representative Signature      ..........................................................................................................................................  Patient Representative Print Name and Relationship to Patient    ..................................................               ................................................  Date                                            Time    ..........................................................................................................................................  Reviewed by Signature/Title    ...................................................              ..............................................  Date                                                            Time

## 2018-01-21 NOTE — DISCHARGE INSTRUCTIONS
Discharge Instructions for Cellulitis  You have been diagnosed with cellulitis. This is an infection in the deepest layer of the skin. In some cases, the infection also affects the muscle. Cellulitis is caused by bacteria. The bacteria can enter the body through broken skin. This can happen with a cut, scratch, animal bite, or an insect bite that has been scratched. You may have been treated in the hospital with antibiotics and fluids. You will likely be given a prescription for antibiotics to take at home. This sheet will help you take care of yourself at home.  Home care  When you are home:    Take the prescribed antibiotic medicine you are given as directed until it is gone. Take it even if you feel better. It treats the infection and stops it from returning. Not taking all the medicine can make future infections hard to treat.    Keep the infected area clean.    When possible, raise the infected area above the level of your heart. This helps keep swelling down.    Talk with your healthcare provider if you are in pain. Ask what kind of over-the-counter medicine you can take for pain.    Apply clean bandages as advised.    Take your temperature once a day for a week.    Wash your hands often to prevent spreading the infection.  In the future, wash your hands before and after you touch cuts, scratches, or bandages. This will help prevent infection.   When to call your healthcare provider  Call your healthcare provider immediately if you have any of the following:    Difficulty or pain when moving the joints above or below the infected area    Discharge or pus draining from the area    Fever of 100.4 F (38 C) or higher, or as directed by your healthcare provider    Pain that gets worse in or around the infected     Redness that gets worse in or around the infected area, particularly if the area of redness expands to a wider area    Shaking chills    Swelling of the infected area    Vomiting   Date Last Reviewed:  8/1/2016 2000-2017 [employer]. [address] [place] [address], [place] [address]. All rights reserved. This information is not intended as a substitute for professional medical care. Always follow your healthcare professional's instructions.      Clindamycin as prescribed.     Keep covered and leg elevated.     Pain medications as needed.     Please follow-up in the clinic for recheck in 2-3 days.     Please return here for ANY other concerns or questions.

## 2018-01-22 ENCOUNTER — ANTICOAGULATION THERAPY VISIT (OUTPATIENT)
Dept: ANTICOAGULATION | Facility: OTHER | Age: 73
End: 2018-01-22
Payer: COMMERCIAL

## 2018-01-22 DIAGNOSIS — I26.99 PULMONARY EMBOLISM AND INFARCTION (H): ICD-10-CM

## 2018-01-22 DIAGNOSIS — Z79.01 LONG-TERM (CURRENT) USE OF ANTICOAGULANTS: ICD-10-CM

## 2018-01-22 NOTE — PROGRESS NOTES
ANTICOAGULATION FOLLOW-UP CLINIC VISIT    Patient Name:  Clement Voss  Date:  1/22/2018  Contact Type:  Telephone    SUBJECTIVE:     Patient Findings     Positives Antibiotic use or infection    Comments Call from patient. States started clindamycin 300mg QID.x 10 days  Started on 1/21/18.  We discussed warfarin dosing and new INR recheck date and he verbalized understanding and has no questions.           OBJECTIVE    INR   Date Value Ref Range Status   01/21/2018 1.94 (H) 0.80 - 1.20 Final       ASSESSMENT / PLAN  No question data found.  Anticoagulation Summary as of 1/22/2018     INR goal 2.0-3.0   Today's INR No new INR was available at the time of this encounter.   Maintenance plan 2.5 mg (5 mg x 0.5) on Mon, Wed, Fri; 5 mg (5 mg x 1) all other days   Full instructions 1/23: 2.5 mg; 1/25: 2.5 mg; 1/27: 2.5 mg; 1/28: 2.5 mg; 1/30: 2.5 mg; Otherwise 2.5 mg on Mon, Wed, Fri; 5 mg all other days   Weekly total 27.5 mg   Plan last modified Jodie Gill RN (8/4/2016)   Next INR check 1/29/2018   Priority INR   Target end date Indefinite    Indications   Pulmonary embolism and infarction (H) [I26.99]  Acute thromboembolism of deep veins of lower extremity (H) [I82.409]  Long-term (current) use of anticoagulants [Z79.01] [Z79.01]         Anticoagulation Episode Summary     INR check location     Preferred lab     Send INR reminders to  ANTICOAG POOL    Comments       Anticoagulation Care Providers     Provider Role Specialty Phone number    Lorelei Felix MD [facility] [phone]            See the Encounter Report to view Anticoagulation Flowsheet and Dosing Calendar (Go to Encounters tab in chart review, and find the Anticoagulation Therapy Visit)        Jodie Elena RN

## 2018-01-22 NOTE — MR AVS SNAPSHOT
Clement CROWELL Shanika   1/22/2018   Anticoagulation Therapy Visit    Description:  72 year old male   Provider:  Lorelei Felix MD   Department:  [facility] Anti Coagulation           INR as of 1/22/2018     Today's INR No new INR was available at the time of this encounter.      Anticoagulation Summary as of 1/22/2018     INR goal 2.0-3.0   Today's INR No new INR was available at the time of this encounter.   Full instructions 1/23: 2.5 mg; 1/25: 2.5 mg; 1/27: 2.5 mg; 1/28: 2.5 mg; 1/30: 2.5 mg; Otherwise 2.5 mg on Mon, Wed, Fri; 5 mg all other days   Next INR check 1/29/2018    Indications   Pulmonary embolism and infarction (H) [I26.99]  Acute thromboembolism of deep veins of lower extremity (H) [I82.409]  Long-term (current) use of anticoagulants [Z79.01] [Z79.01]         January 2018 Details    Sun Mon Tue Wed Thu Fri Sat      1               2               3               4               5               6                 7               8               9               10               11               12               13                 14               15               16               17               18               19               20                 21               22      2.5 mg   See details      23      2.5 mg         24      2.5 mg         25      2.5 mg         26      2.5 mg         27      2.5 mg           28      2.5 mg         29            30               31                   Date Details   01/22 This INR check       Date of next INR:  1/29/2018         How to take your warfarin dose     To take:  2.5 mg Take 0.5 of a 5 mg tablet.

## 2018-01-25 ENCOUNTER — OFFICE VISIT (OUTPATIENT)
Dept: FAMILY MEDICINE | Facility: OTHER | Age: 73
End: 2018-01-25
Attending: FAMILY MEDICINE
Payer: COMMERCIAL

## 2018-01-25 VITALS
WEIGHT: 225 LBS | HEIGHT: 67 IN | SYSTOLIC BLOOD PRESSURE: 128 MMHG | RESPIRATION RATE: 14 BRPM | BODY MASS INDEX: 35.31 KG/M2 | HEART RATE: 58 BPM | OXYGEN SATURATION: 96 % | DIASTOLIC BLOOD PRESSURE: 58 MMHG | TEMPERATURE: 97.2 F

## 2018-01-25 DIAGNOSIS — S81.001D OPEN WOUND OF RIGHT KNEE, LEG, AND ANKLE, SUBSEQUENT ENCOUNTER: ICD-10-CM

## 2018-01-25 DIAGNOSIS — L03.115 CELLULITIS OF RIGHT LOWER LEG: Primary | ICD-10-CM

## 2018-01-25 DIAGNOSIS — S81.801D OPEN WOUND OF RIGHT KNEE, LEG, AND ANKLE, SUBSEQUENT ENCOUNTER: ICD-10-CM

## 2018-01-25 DIAGNOSIS — S91.001D OPEN WOUND OF RIGHT KNEE, LEG, AND ANKLE, SUBSEQUENT ENCOUNTER: ICD-10-CM

## 2018-01-25 PROCEDURE — 99214 OFFICE O/P EST MOD 30 MIN: CPT | Performed by: FAMILY MEDICINE

## 2018-01-25 PROCEDURE — G0463 HOSPITAL OUTPT CLINIC VISIT: HCPCS

## 2018-01-25 ASSESSMENT — PATIENT HEALTH QUESTIONNAIRE - PHQ9: 5. POOR APPETITE OR OVEREATING: NOT AT ALL

## 2018-01-25 ASSESSMENT — ANXIETY QUESTIONNAIRES
2. NOT BEING ABLE TO STOP OR CONTROL WORRYING: NOT AT ALL
1. FEELING NERVOUS, ANXIOUS, OR ON EDGE: NOT AT ALL
GAD7 TOTAL SCORE: 0
3. WORRYING TOO MUCH ABOUT DIFFERENT THINGS: NOT AT ALL
7. FEELING AFRAID AS IF SOMETHING AWFUL MIGHT HAPPEN: NOT AT ALL
5. BEING SO RESTLESS THAT IT IS HARD TO SIT STILL: NOT AT ALL
6. BECOMING EASILY ANNOYED OR IRRITABLE: NOT AT ALL

## 2018-01-25 NOTE — MR AVS SNAPSHOT
After Visit Summary   1/25/2018    Clement Voss    MRN: [id number]           Patient Information     Date Of Birth          1945        Visit Information        Provider Department      1/25/2018 10:00 AM Lorelei Felix MD [facility]        Today's Diagnoses     Cellulitis of right lower leg    -  1    Open wound of right knee, leg, and ankle, subsequent encounter           Follow-ups after your visit        Follow-up notes from your care team     Return if symptoms worsen or fail to improve.      Your next 10 appointments already scheduled     Mar 01, 2018 10:15 AM CST   (Arrive by 10:00 AM)   SHORT with Lorelei Felix MD   [facility] ([facility] )    [address] [facility] [id number] [phone]            Mar 06, 2018 11:00 AM CST   (Arrive by 10:45 AM)   Return Visit with Anamaria Benítez RN   [facility] ([facility] )    [address] [place] [id number] [phone]              Who to contact     If you have questions or need follow up information about today's clinic visit or your schedule please contact [facility] directly at [phone].  Normal or non-critical lab and imaging results will be communicated to you by MyChart, letter or phone within 4 business days after the clinic has received the results. If you do not hear from us within 7 days, please contact the clinic through MyChart or phone. If you have a critical or abnormal lab result, we will notify you by phone as soon as possible.  Submit refill requests through [employer] or call your pharmacy and they will forward the refill request to us. Please allow 3 business days for your refill to be completed.          Additional Information About Your Visit        [employer] Information     [employer] lets you send messages to your doctor, view your test results, renew your prescriptions, schedule appointments and more.  "To sign up, go to [email or web address] . Click on \"Log in\" on the left side of the screen, which will take you to the Welcome page. Then click on \"Sign up Now\" on the right side of the page.     You will be asked to enter the access code listed below, as well as some personal information. Please follow the directions to create your username and password.     Your access code is: [email or web address]  Expires: 2018  1:17 PM     Your access code will  in 90 days. If you need help or a new code, please call your [place] clinic or [phone].        Care EveryWhere ID     This is your Care EveryWhere ID. This could be used by other organizations to access your [place] medical records  [id number]        Your Vitals Were     Pulse Temperature Respirations Height Pulse Oximetry BMI (Body Mass Index)    58 97.2  F (36.2  C) (Tympanic) 14 5' 7\" (1.702 m) 96% 35.24 kg/m2       Blood Pressure from Last 3 Encounters:   18 128/58   18 131/62   17 128/60    Weight from Last 3 Encounters:   18 225 lb (102.1 kg)   17 224 lb (101.6 kg)   17 222 lb 9.6 oz (101 kg)              Today, you had the following     No orders found for display         Today's Medication Changes          These changes are accurate as of 18 12:18 PM.  If you have any questions, ask your nurse or doctor.               These medicines have changed or have updated prescriptions.        Dose/Directions    fluticasone 50 MCG/ACT spray   Commonly known as:  FLONASE   This may have changed:    - when to take this  - reasons to take this   Used for:  Acute maxillary sinusitis        Dose:  2 spray   Spray 2 sprays into both nostrils daily   Quantity:  1 Package   Refills:  0       insulin glargine 100 UNIT/ML injection   Commonly known as:  LANTUS SOLOSTAR   This may have changed:  how much to take   Used for:  Type 2 diabetes mellitus with stage 3 chronic kidney disease, with long-term current use of insulin (H) "        Dose:  18 Units   Inject 18 Units Subcutaneous At Bedtime   Quantity:  15 mL   Refills:  1                Primary Care Provider Office Phone # Fax #    Lorelei Felix [phone] [facility] [address]        Equal Access to Services     BALDEV SHAYY AH: Hadii robb lao hadblanquitao Soomaali, waaxda luqadaha, qaybta kaalmada adeegyada, waxmarissa flannery fionaolivia marquesacostalouise aviles. So [facility] [phone].    ATENCIÓN: Si habla español, tiene a breaux disposición servicios gratuitos de asistencia lingüística. Llame al [phone].    We comply with applicable federal civil rights laws and [place] laws. We do not discriminate on the basis of race, color, national origin, age, disability, sex, sexual orientation, or gender identity.            Thank you!     Thank you for choosing [facility]  for your care. Our goal is always to provide you with excellent care. Hearing back from our patients is one way we can continue to improve our services. Please take a few minutes to complete the written survey that you may receive in the mail after your visit with us. Thank you!             Your Updated Medication List - Protect others around you: Learn how to safely use, store and throw away your medicines at www.disposemymeds.org.          This list is accurate as of 1/25/18 12:18 PM.  Always use your most recent med list.                   Brand Name Dispense Instructions for use Diagnosis    ACCU-CHEK VLAD PLUS test strip   Generic drug:  blood glucose monitoring     100 strip    USE TO TEST BLOOD SUGARS THREE TIMES DAILY    Diabetes mellitus, type 2 (H)       acetaminophen 500 MG tablet    TYLENOL     Take 1-2 tablets (500-1,000 mg) by mouth every 6 hours as needed    Pulmonary embolism (H)       amLODIPine 10 MG tablet    NORVASC    90 tablet    TAKE 1 TABLET(10 MG) BY MOUTH DAILY    Benign essential hypertension       aspirin 81 MG EC tablet     90 tablet    Take 1  tablet (81 mg) by mouth daily    Diabetes mellitus, type 2 (H)       CLARITIN 10 MG tablet   Generic drug:  loratadine      Take 10 mg by mouth daily.        clindamycin 300 MG capsule    CLEOCIN    40 capsule    Take 1 capsule (300 mg) by mouth 4 times daily for 10 days        COMBIGAN 0.2-0.5 % ophthalmic solution   Generic drug:  brimonidine-timolol      1 drop 2 times daily Morning and evening, 12 hours apart        fenofibrate 48 MG tablet     90 tablet    TAKE 1 TABLET(48 MG) BY MOUTH DAILY    Hyperlipidemia, unspecified hyperlipidemia type       fish oil-omega-3 fatty acids 1000 MG capsule      Take 1 capsule by mouth 3 times daily.        fluticasone 50 MCG/ACT spray    FLONASE    1 Package    Spray 2 sprays into both nostrils daily    Acute maxillary sinusitis       indomethacin 50 MG capsule    INDOCIN    30 capsule    Take 1 capsule (50 mg) by mouth 3 times daily With food as needed    Gout, unspecified       insulin glargine 100 UNIT/ML injection    LANTUS SOLOSTAR    15 mL    Inject 18 Units Subcutaneous At Bedtime    Type 2 diabetes mellitus with stage 3 chronic kidney disease, with long-term current use of insulin (H)       insulin pen needle 31G X 6 MM     100 each    Use 1 daily or as directed.    Type 2 diabetes mellitus with stage 3 chronic kidney disease, with long-term current use of insulin (H)       JANUVIA 100 MG tablet   Generic drug:  sitagliptin     90 tablet    TAKE 1 TABLET(100 MG) BY MOUTH DAILY    Type 2 diabetes mellitus without complication (H)       lisinopril 40 MG tablet    PRINIVIL/ZESTRIL    90 tablet    TAKE 1 TABLET(40 MG) BY MOUTH DAILY    Benign essential hypertension       pilocarpine 1 % ophthalmic solution    PILOCAR     Place 1 drop into both eyes 4 times daily        STATIN NOT PRESCRIBED (INTENTIONAL)     0 each    Statin not prescribed intentionally due to Intolerance (with supporting documentation of trying a statin at least once within the last 5 years)     Hyperlipidemia, unspecified hyperlipidemia       terazosin 10 MG capsule    HYTRIN    90 capsule    TAKE 1 CAPSULE(10 MG) BY MOUTH AT BEDTIME    Benign essential hypertension       TRAVATAN Z 0.004 % ophthalmic solution   Generic drug:  travoprost (DARION Free)      Instill 1 drop by ophthalmic route every day into affected eye(s) in the evening        warfarin 5 MG tablet    COUMADIN    90 tablet    SEE NOTES    Pulmonary embolism and infarction (H)

## 2018-01-25 NOTE — NURSING NOTE
"Chief Complaint   Patient presents with     ER F/U       Initial /58 (BP Location: Left arm, Patient Position: Sitting, Cuff Size: Adult Large)  Pulse 58  Temp 97.2  F (36.2  C) (Tympanic)  Resp 14  Ht 5' 7\" (1.702 m)  Wt 225 lb (102.1 kg)  SpO2 96%  BMI 35.24 kg/m2 Estimated body mass index is 35.24 kg/(m^2) as calculated from the following:    Height as of this encounter: 5' 7\" (1.702 m).    Weight as of this encounter: 225 lb (102.1 kg).  Medication Reconciliation: complete   Jenifer Villa      "

## 2018-01-25 NOTE — PROGRESS NOTES
SUBJECTIVE:   Clement Voss is a 72 year old male who presents to clinic today for the following health issues:      ED/UC Followup:    Facility:  [facility] 1/16 and then [facility] on 1/21  Date of visit: 1/16 - 1/21  Reason for visit: Injured right knee, fell down the stairs  Current Status: Leg wound still open, but is overall improving.  ER notes are reviewed.  Patient's wife has been changing dressings daily.         Problem list and histories reviewed & adjusted, as indicated.  Additional history: as documented    Patient Active Problem List   Diagnosis     Type 2 diabetes mellitus with chronic kidney disease, with long-term current use of insulin (H)     Hyperlipidemia     Benign essential hypertension     Gout     Pulmonary embolism and infarction (H)     Acute thromboembolism of deep veins of lower extremity (H)     ACP (advance care planning)     Long-term (current) use of anticoagulants [Z79.01]     Morbid obesity (H)     Past Surgical History:   Procedure Laterality Date     APPENDECTOMY  2008     CHOLECYSTECTOMY  1984     History of PE of right lung 3/2013[         Social History   Substance Use Topics     Smoking status: Former Smoker     Types: Cigarettes     Quit date: 8/12/1964     Smokeless tobacco: Never Used     Alcohol use No     Family History   Problem Relation Age of Onset     HEART DISEASE Father 71     heart disease; cause of death     Other - See Comments Mother 79     old age; cause of death         Current Outpatient Prescriptions   Medication Sig Dispense Refill     clindamycin (CLEOCIN) 300 MG capsule Take 1 capsule (300 mg) by mouth 4 times daily for 10 days 40 capsule 0     JANUVIA 100 MG tablet TAKE 1 TABLET(100 MG) BY MOUTH DAILY 90 tablet 0     amLODIPine (NORVASC) 10 MG tablet TAKE 1 TABLET(10 MG) BY MOUTH DAILY 90 tablet 0     ACCU-CHEK VLAD PLUS test strip USE TO TEST BLOOD SUGARS THREE TIMES DAILY 100 strip 2     terazosin (HYTRIN) 10 MG capsule TAKE 1 CAPSULE(10 MG) BY  MOUTH AT BEDTIME 90 capsule 3     lisinopril (PRINIVIL/ZESTRIL) 40 MG tablet TAKE 1 TABLET(40 MG) BY MOUTH DAILY 90 tablet 0     warfarin (COUMADIN) 5 MG tablet SEE NOTES 90 tablet 3     fenofibrate 48 MG tablet TAKE 1 TABLET(48 MG) BY MOUTH DAILY 90 tablet 2     insulin glargine (LANTUS SOLOSTAR) 100 UNIT/ML injection Inject 18 Units Subcutaneous At Bedtime (Patient taking differently: Inject 19 Units Subcutaneous At Bedtime ) 15 mL 1     insulin pen needle 31G X 6 MM Use 1 daily or as directed. 100 each prn     pilocarpine (PILOCAR) 1 % ophthalmic solution Place 1 drop into both eyes 4 times daily       STATIN NOT PRESCRIBED, INTENTIONAL, Statin not prescribed intentionally due to Intolerance (with supporting documentation of trying a statin at least once within the last 5 years) 0 each 0     fluticasone (FLONASE) 50 MCG/ACT nasal spray Spray 2 sprays into both nostrils daily (Patient taking differently: Spray 2 sprays into both nostrils daily as needed ) 1 Package 0     indomethacin (INDOCIN) 50 MG capsule Take 1 capsule (50 mg) by mouth 3 times daily With food as needed 30 capsule 1     brimonidine-timolol (COMBIGAN) 0.2-0.5 % ophthalmic solution 1 drop 2 times daily Morning and evening, 12 hours apart       aspirin 81 MG EC tablet Take 1 tablet (81 mg) by mouth daily 90 tablet 3     loratadine (CLARITIN) 10 MG tablet Take 10 mg by mouth daily.       fish oil-omega-3 fatty acids (FISH OIL) 1000 MG capsule Take 1 capsule by mouth 3 times daily.       TRAVATAN Z (TRAVATAN Z) 0.004 % ophthalmic solution Instill 1 drop by ophthalmic route every day into affected eye(s) in the evening       acetaminophen (TYLENOL) 500 MG tablet Take 1-2 tablets (500-1,000 mg) by mouth every 6 hours as needed       Allergies   Allergen Reactions     Atorvastatin      Other reaction(s): Other  Caused increased creatinine.      Atorvastatin Calcium Other (See Comments)     Lipitor  Increase CR     Iodine      Penicillins      Sulfa  "Drugs      Sulfa (sulfonamide antibiotics)     Sulfacetamide        Reviewed and updated as needed this visit by clinical staff  Tobacco  Allergies  Meds       Reviewed and updated as needed this visit by Provider         ROS:  Constitutional, HEENT, cardiovascular, pulmonary, gi and gu systems are negative, except as otherwise noted.    OBJECTIVE:     /58 (BP Location: Left arm, Patient Position: Sitting, Cuff Size: Adult Large)  Pulse 58  Temp 97.2  F (36.2  C) (Tympanic)  Resp 14  Ht 5' 7\" (1.702 m)  Wt 225 lb (102.1 kg)  SpO2 96%  BMI 35.24 kg/m2  Body mass index is 35.24 kg/(m^2).  GENERAL: healthy, alert and no distress  SKIN: open wound, measuring approximately 3x3cm, mild surrounding erythema, but in reading ER note and talking to patient, much improved.  No fluctuance noted  PSYCH: mentation appears normal, affect normal/bright        ASSESSMENT/PLAN:     1. Cellulitis of right lower leg  Complete course of antibiotics as prescribed in ER.  Monitor for signs of worsening infection, either bone pain or swelling of prepatellar bursitis specifically.  Follow-up as needed.    2. Open wound of right knee, leg, and ankle, subsequent encounter  Continue with current wound cares, new dressing applied here.      Over 25 minutes are spent with the patient and his wife, over 50% of which was in education and counseling regarding current conditions and treatment/therapy options/risks/benefits/etc.      Lorelei Felix MD  [facility]"

## 2018-01-26 ASSESSMENT — PATIENT HEALTH QUESTIONNAIRE - PHQ9: SUM OF ALL RESPONSES TO PHQ QUESTIONS 1-9: 0

## 2018-01-26 ASSESSMENT — ANXIETY QUESTIONNAIRES: GAD7 TOTAL SCORE: 0

## 2018-01-29 ENCOUNTER — ANTICOAGULATION THERAPY VISIT (OUTPATIENT)
Dept: ANTICOAGULATION | Facility: OTHER | Age: 73
End: 2018-01-29
Payer: COMMERCIAL

## 2018-01-29 DIAGNOSIS — I26.99 PULMONARY EMBOLISM AND INFARCTION (H): ICD-10-CM

## 2018-01-29 DIAGNOSIS — I82.409 ACUTE THROMBOEMBOLISM OF DEEP VEINS OF LOWER EXTREMITY, UNSPECIFIED LATERALITY (H): ICD-10-CM

## 2018-01-29 DIAGNOSIS — Z79.01 LONG-TERM (CURRENT) USE OF ANTICOAGULANTS: ICD-10-CM

## 2018-01-29 LAB — INR BLD: 1.4 (ref 0.86–1.14)

## 2018-01-29 PROCEDURE — 36416 COLLJ CAPILLARY BLOOD SPEC: CPT | Mod: ZL | Performed by: FAMILY MEDICINE

## 2018-01-29 PROCEDURE — 85610 PROTHROMBIN TIME: CPT | Mod: QW,ZL | Performed by: FAMILY MEDICINE

## 2018-01-29 NOTE — MR AVS SNAPSHOT
Clement Englandi   1/29/2018   Anticoagulation Therapy Visit    Description:  72 year old male   Provider:  Lorelei Felix MD   Department:  [facility] Anti Coagulation           INR as of 1/29/2018     Today's INR 1.4!      Anticoagulation Summary as of 1/29/2018     INR goal 2.0-3.0   Today's INR 1.4!   Full instructions 1/29: 7.5 mg; 1/30: 7.5 mg; Otherwise 2.5 mg on Mon, Wed, Fri; 5 mg all other days   Next INR check 2/8/2018    Indications   Pulmonary embolism and infarction (H) [I26.99]  Acute thromboembolism of deep veins of lower extremity (H) [I82.409]  Long-term (current) use of anticoagulants [Z79.01] [Z79.01]         January 2018 Details    Sun Mon Tue Wed Thu Fri Sat      1               2               3               4               5               6                 7               8               9               10               11               12               13                 14               15               16               17               18               19               20                 21               22               23               24               25               26               27                 28               29      7.5 mg   See details      30      7.5 mg         31      2.5 mg             Date Details   01/29 This INR check               How to take your warfarin dose     To take:  2.5 mg Take 0.5 of a 5 mg tablet.    To take:  7.5 mg Take 1.5 of the 5 mg tablets.           February 2018 Details    Sun Mon Tue Wed Thu Fri Sat         1      5 mg         2      2.5 mg         3      5 mg           4      5 mg         5      2.5 mg         6      5 mg         7      2.5 mg         8            9               10                 11               12               13               14               15               16               17                 18               19               20               21               22               23               24                 25                26               27               28                   Date Details   No additional details    Date of next INR:  2/8/2018         How to take your warfarin dose     To take:  2.5 mg Take 0.5 of a 5 mg tablet.    To take:  5 mg Take 1 of the 5 mg tablets.

## 2018-01-29 NOTE — PROGRESS NOTES
ANTICOAGULATION FOLLOW-UP CLINIC VISIT    Patient Name:  Clement Voss  Date:  1/29/2018  Contact Type:  Telephone/ message left on home phone voicemail.    SUBJECTIVE:     Patient Findings     Positives Antibiotic use or infection    Comments INR done by lab and result noted by warfarin clinic nurse. He should have a few days left of the clindamycin to take yet. Call placed to patient and message left on home phone voicemail re: INR result, warfarin dosing and INR recheck date. He is to notify warfarin clinic if any new changes to medications, diet/activity or any bleeding/bruising.            OBJECTIVE    INR Point of Care   Date Value Ref Range Status   01/29/2018 1.4 (H) 0.86 - 1.14 Final     Comment:     This test is intended for monitoring Coumadin therapy.  Results are not   accurate in patients with prolonged INR due to factor deficiency.         ASSESSMENT / PLAN  INR assessment SUB    Recheck INR In: 10 DAYS    INR Location Clinic      Anticoagulation Summary as of 1/29/2018     INR goal 2.0-3.0   Today's INR 1.4!   Maintenance plan 2.5 mg (5 mg x 0.5) on Mon, Wed, Fri; 5 mg (5 mg x 1) all other days   Full instructions 1/29: 7.5 mg; 1/30: 7.5 mg; Otherwise 2.5 mg on Mon, Wed, Fri; 5 mg all other days   Weekly total 27.5 mg   Plan last modified Jodie Gill RN (8/4/2016)   Next INR check 2/8/2018   Priority INR   Target end date Indefinite    Indications   Pulmonary embolism and infarction (H) [I26.99]  Acute thromboembolism of deep veins of lower extremity (H) [I82.409]  Long-term (current) use of anticoagulants [Z79.01] [Z79.01]         Anticoagulation Episode Summary     INR check location     Preferred lab     Send INR reminders to  ANTICOAG POOL    Comments       Anticoagulation Care Providers     Provider Role Specialty Phone number    Lorelei Felix MD [facility] [phone]            See the Encounter Report to view Anticoagulation Flowsheet and Dosing Calendar (Go  to Encounters tab in chart review, and find the Anticoagulation Therapy Visit)        Jodie Elena RN

## 2018-02-04 ENCOUNTER — HEALTH MAINTENANCE LETTER (OUTPATIENT)
Age: 73
End: 2018-02-04

## 2018-02-07 ENCOUNTER — ANTICOAGULATION THERAPY VISIT (OUTPATIENT)
Dept: ANTICOAGULATION | Facility: OTHER | Age: 73
End: 2018-02-07
Payer: COMMERCIAL

## 2018-02-07 DIAGNOSIS — Z79.01 LONG-TERM (CURRENT) USE OF ANTICOAGULANTS: ICD-10-CM

## 2018-02-07 DIAGNOSIS — I26.99 PULMONARY EMBOLISM AND INFARCTION (H): ICD-10-CM

## 2018-02-07 DIAGNOSIS — I82.409 ACUTE THROMBOEMBOLISM OF DEEP VEINS OF LOWER EXTREMITY, UNSPECIFIED LATERALITY (H): ICD-10-CM

## 2018-02-07 LAB — INR BLD: 1.8 (ref 0.86–1.14)

## 2018-02-07 PROCEDURE — 36416 COLLJ CAPILLARY BLOOD SPEC: CPT | Mod: ZL | Performed by: FAMILY MEDICINE

## 2018-02-07 PROCEDURE — 85610 PROTHROMBIN TIME: CPT | Mod: QW,ZL | Performed by: FAMILY MEDICINE

## 2018-02-07 NOTE — MR AVS SNAPSHOT
Clement Englandi   2/7/2018   Anticoagulation Therapy Visit    Description:  72 year old male   Provider:  Lorelei Felix MD   Department:  [facility]c Anti Coagulation           INR as of 2/7/2018     Today's INR 1.8!      Anticoagulation Summary as of 2/7/2018     INR goal 2.0-3.0   Today's INR 1.8!   Full instructions 2/14: 5 mg; Otherwise 2.5 mg on Mon, Fri; 5 mg all other days   Next INR check 2/21/2018    Indications   Pulmonary embolism and infarction (H) [I26.99]  Acute thromboembolism of deep veins of lower extremity (H) [I82.409]  Long-term (current) use of anticoagulants [Z79.01] [Z79.01]         February 2018 Details    Sun Mon Tue Wed Thu Fri Sat         1               2               3                 4               5               6               7      5 mg   See details      8      5 mg         9      2.5 mg         10      5 mg           11      5 mg         12      2.5 mg         13      5 mg         14      5 mg         15      5 mg         16      2.5 mg         17      5 mg           18      5 mg         19      2.5 mg         20      5 mg         21            22               23               24                 25               26               27               28                   Date Details   02/07 This INR check       Date of next INR:  2/21/2018         How to take your warfarin dose     To take:  2.5 mg Take 0.5 of a 5 mg tablet.    To take:  5 mg Take 1 of the 5 mg tablets.

## 2018-02-07 NOTE — PROGRESS NOTES
ANTICOAGULATION FOLLOW-UP CLINIC VISIT    Patient Name:  Clement Voss  Date:  2/7/2018  Contact Type:  Telephone    SUBJECTIVE:     Patient Findings     Positives No Problem Findings    Comments INR done by lab and result noted by warfarin clinic. Call placed to patient and spoke to him re: INR result, warfarin dosing and INR recheck date. He states he has no bleeding/bruising and no med/diet changes. He verbalized understanding of warfarin dosing and INR recheck date and has no questions.            OBJECTIVE    INR Point of Care   Date Value Ref Range Status   02/07/2018 1.8 (H) 0.86 - 1.14 Final     Comment:     This test is intended for monitoring Coumadin therapy.  Results are not   accurate in patients with prolonged INR due to factor deficiency.         ASSESSMENT / PLAN  INR assessment SUB    Recheck INR In: 2 WEEKS    INR Location Clinic      Anticoagulation Summary as of 2/7/2018     INR goal 2.0-3.0   Today's INR 1.8!   Maintenance plan 2.5 mg (5 mg x 0.5) on Mon, Fri; 5 mg (5 mg x 1) all other days   Full instructions 2/14: 5 mg; Otherwise 2.5 mg on Mon, Fri; 5 mg all other days   Weekly total 30 mg   Plan last modified Jodie Elena RN (2/7/2018)   Next INR check 2/21/2018   Priority INR   Target end date Indefinite    Indications   Pulmonary embolism and infarction (H) [I26.99]  Acute thromboembolism of deep veins of lower extremity (H) [I82.409]  Long-term (current) use of anticoagulants [Z79.01] [Z79.01]         Anticoagulation Episode Summary     INR check location     Preferred lab     Send INR reminders to  ANTICOAG POOL    Comments       Anticoagulation Care Providers     Provider Role Specialty Phone number    Lorelei Felix MD [facility] [phone]            See the Encounter Report to view Anticoagulation Flowsheet and Dosing Calendar (Go to Encounters tab in chart review, and find the Anticoagulation Therapy Visit)        Jodie Elena, RN

## 2018-02-15 ENCOUNTER — TRANSFERRED RECORDS (OUTPATIENT)
Dept: HEALTH INFORMATION MANAGEMENT | Facility: HOSPITAL | Age: 73
End: 2018-02-15

## 2018-02-21 ENCOUNTER — ANTICOAGULATION THERAPY VISIT (OUTPATIENT)
Dept: ANTICOAGULATION | Facility: OTHER | Age: 73
End: 2018-02-21
Payer: COMMERCIAL

## 2018-02-21 DIAGNOSIS — E11.22 TYPE 2 DIABETES MELLITUS WITH STAGE 3 CHRONIC KIDNEY DISEASE, WITH LONG-TERM CURRENT USE OF INSULIN (H): ICD-10-CM

## 2018-02-21 DIAGNOSIS — Z79.01 LONG-TERM (CURRENT) USE OF ANTICOAGULANTS: ICD-10-CM

## 2018-02-21 DIAGNOSIS — E78.5 HYPERLIPIDEMIA, UNSPECIFIED HYPERLIPIDEMIA TYPE: ICD-10-CM

## 2018-02-21 DIAGNOSIS — I26.99 PULMONARY EMBOLISM AND INFARCTION (H): ICD-10-CM

## 2018-02-21 DIAGNOSIS — N18.30 TYPE 2 DIABETES MELLITUS WITH STAGE 3 CHRONIC KIDNEY DISEASE, WITH LONG-TERM CURRENT USE OF INSULIN (H): ICD-10-CM

## 2018-02-21 DIAGNOSIS — I10 BENIGN ESSENTIAL HYPERTENSION: ICD-10-CM

## 2018-02-21 DIAGNOSIS — I82.409 ACUTE THROMBOEMBOLISM OF DEEP VEINS OF LOWER EXTREMITY, UNSPECIFIED LATERALITY (H): ICD-10-CM

## 2018-02-21 DIAGNOSIS — Z79.4 TYPE 2 DIABETES MELLITUS WITH STAGE 3 CHRONIC KIDNEY DISEASE, WITH LONG-TERM CURRENT USE OF INSULIN (H): ICD-10-CM

## 2018-02-21 LAB
ANION GAP SERPL CALCULATED.3IONS-SCNC: 7 MMOL/L (ref 3–14)
BUN SERPL-MCNC: 17 MG/DL (ref 7–30)
CALCIUM SERPL-MCNC: 8.5 MG/DL (ref 8.5–10.1)
CHLORIDE SERPL-SCNC: 112 MMOL/L (ref 94–109)
CHOLEST SERPL-MCNC: 153 MG/DL
CO2 SERPL-SCNC: 25 MMOL/L (ref 20–32)
CREAT SERPL-MCNC: 1.48 MG/DL (ref 0.66–1.25)
EST. AVERAGE GLUCOSE BLD GHB EST-MCNC: 163 MG/DL
GFR SERPL CREATININE-BSD FRML MDRD: 47 ML/MIN/1.7M2
GLUCOSE SERPL-MCNC: 170 MG/DL (ref 70–99)
HBA1C MFR BLD: 7.3 % (ref 4.3–6)
HDLC SERPL-MCNC: 40 MG/DL
INR BLD: 1.9 (ref 0.86–1.14)
LDLC SERPL CALC-MCNC: 89 MG/DL
NONHDLC SERPL-MCNC: 113 MG/DL
POTASSIUM SERPL-SCNC: 4.2 MMOL/L (ref 3.4–5.3)
SODIUM SERPL-SCNC: 144 MMOL/L (ref 133–144)
TRIGL SERPL-MCNC: 118 MG/DL

## 2018-02-21 PROCEDURE — 36416 COLLJ CAPILLARY BLOOD SPEC: CPT | Mod: ZL | Performed by: FAMILY MEDICINE

## 2018-02-21 PROCEDURE — 83036 HEMOGLOBIN GLYCOSYLATED A1C: CPT | Mod: ZL | Performed by: FAMILY MEDICINE

## 2018-02-21 PROCEDURE — 40000788 ZZHCL STATISTIC ESTIMATED AVERAGE GLUCOSE: Mod: ZL | Performed by: FAMILY MEDICINE

## 2018-02-21 PROCEDURE — 36415 COLL VENOUS BLD VENIPUNCTURE: CPT | Mod: ZL | Performed by: FAMILY MEDICINE

## 2018-02-21 PROCEDURE — 85610 PROTHROMBIN TIME: CPT | Mod: QW,ZL | Performed by: FAMILY MEDICINE

## 2018-02-21 PROCEDURE — 80061 LIPID PANEL: CPT | Mod: ZL | Performed by: FAMILY MEDICINE

## 2018-02-21 PROCEDURE — 80048 BASIC METABOLIC PNL TOTAL CA: CPT | Mod: ZL | Performed by: FAMILY MEDICINE

## 2018-02-21 NOTE — MR AVS SNAPSHOT
Clement CROWELL Shanika   2/21/2018   Anticoagulation Therapy Visit    Description:  72 year old male   Provider:  Lorelei Felix MD   Department:  [facility]agulation           INR as of 2/21/2018     Today's INR 1.9!      Anticoagulation Summary as of 2/21/2018     INR goal 2.0-3.0   Today's INR 1.9!   Full instructions 2/21: 7.5 mg; Otherwise 2.5 mg on Mon, Fri; 5 mg all other days   Next INR check 3/14/2018    Indications   Pulmonary embolism and infarction (H) [I26.99]  Acute thromboembolism of deep veins of lower extremity (H) [I82.409]  Long-term (current) use of anticoagulants [Z79.01] [Z79.01]         February 2018 Details    Sun Mon Tue Wed Thu Fri Sat         1               2               3                 4               5               6               7               8               9               10                 11               12               13               14               15               16               17                 18               19               20               21      7.5 mg   See details      22      5 mg         23      2.5 mg         24      5 mg           25      5 mg         26      2.5 mg         27      5 mg         28      5 mg             Date Details   02/21 This INR check               How to take your warfarin dose     To take:  2.5 mg Take 0.5 of a 5 mg tablet.    To take:  5 mg Take 1 of the 5 mg tablets.    To take:  7.5 mg Take 1.5 of the 5 mg tablets.           March 2018 Details    Sun Mon Tue Wed Thu Fri Sat         1      5 mg         2      2.5 mg         3      5 mg           4      5 mg         5      2.5 mg         6      5 mg         7      5 mg         8      5 mg         9      2.5 mg         10      5 mg           11      5 mg         12      2.5 mg         13      5 mg         14            15               16               17                 18               19               20               21               22               23               24                  25               26               27               28               29               30               31                Date Details   No additional details    Date of next INR:  3/14/2018         How to take your warfarin dose     To take:  2.5 mg Take 0.5 of a 5 mg tablet.    To take:  5 mg Take 1 of the 5 mg tablets.

## 2018-02-21 NOTE — PROGRESS NOTES
ANTICOAGULATION FOLLOW-UP CLINIC VISIT    Patient Name:  Clement Voss  Date:  2/21/2018  Contact Type:  Telephone/ message left on patient home phone    SUBJECTIVE:     Patient Findings     Positives No Problem Findings    Comments INR done by lab and result noted by warfarin clinic. Call placed to patient and message left re: INR result, warfarin dosing and INR recheck date. He is to notify warfarin clinic if he has any bleeding/bruising, changes in diet/meds/activity or questions           OBJECTIVE    INR Point of Care   Date Value Ref Range Status   02/21/2018 1.9 (H) 0.86 - 1.14 Final     Comment:     This test is intended for monitoring Coumadin therapy.  Results are not   accurate in patients with prolonged INR due to factor deficiency.         ASSESSMENT / PLAN  INR assessment THER    Recheck INR In: 3 WEEKS    INR Location Clinic      Anticoagulation Summary as of 2/21/2018     INR goal 2.0-3.0   Today's INR 1.9!   Maintenance plan 2.5 mg (5 mg x 0.5) on Mon, Fri; 5 mg (5 mg x 1) all other days   Full instructions 2/21: 7.5 mg; Otherwise 2.5 mg on Mon, Fri; 5 mg all other days   Weekly total 30 mg   Plan last modified Jodie Elena RN (2/7/2018)   Next INR check 3/14/2018   Priority INR   Target end date Indefinite    Indications   Pulmonary embolism and infarction (H) [I26.99]  Acute thromboembolism of deep veins of lower extremity (H) [I82.409]  Long-term (current) use of anticoagulants [Z79.01] [Z79.01]         Anticoagulation Episode Summary     INR check location     Preferred lab     Send INR reminders to  ANTICOAG POOL    Comments       Anticoagulation Care Providers     Provider Role Specialty Phone number    Lorelei Felix MD [facility] [phone]            See the Encounter Report to view Anticoagulation Flowsheet and Dosing Calendar (Go to Encounters tab in chart review, and find the Anticoagulation Therapy Visit)        Jodie Elena, RN

## 2018-03-01 ENCOUNTER — OFFICE VISIT (OUTPATIENT)
Dept: FAMILY MEDICINE | Facility: OTHER | Age: 73
End: 2018-03-01
Attending: FAMILY MEDICINE
Payer: COMMERCIAL

## 2018-03-01 VITALS
OXYGEN SATURATION: 96 % | BODY MASS INDEX: 34.69 KG/M2 | DIASTOLIC BLOOD PRESSURE: 60 MMHG | SYSTOLIC BLOOD PRESSURE: 136 MMHG | HEIGHT: 67 IN | WEIGHT: 221 LBS | RESPIRATION RATE: 14 BRPM | TEMPERATURE: 97.2 F | HEART RATE: 59 BPM

## 2018-03-01 DIAGNOSIS — N18.30 TYPE 2 DIABETES MELLITUS WITH STAGE 3 CHRONIC KIDNEY DISEASE, WITH LONG-TERM CURRENT USE OF INSULIN (H): Primary | ICD-10-CM

## 2018-03-01 DIAGNOSIS — Z79.4 TYPE 2 DIABETES MELLITUS WITH STAGE 3 CHRONIC KIDNEY DISEASE, WITH LONG-TERM CURRENT USE OF INSULIN (H): Primary | ICD-10-CM

## 2018-03-01 DIAGNOSIS — E11.22 TYPE 2 DIABETES MELLITUS WITH STAGE 3 CHRONIC KIDNEY DISEASE, WITH LONG-TERM CURRENT USE OF INSULIN (H): Primary | ICD-10-CM

## 2018-03-01 DIAGNOSIS — Z12.11 SPECIAL SCREENING FOR MALIGNANT NEOPLASMS, COLON: ICD-10-CM

## 2018-03-01 DIAGNOSIS — I26.99 PULMONARY EMBOLISM AND INFARCTION (H): ICD-10-CM

## 2018-03-01 DIAGNOSIS — E78.5 HYPERLIPIDEMIA, UNSPECIFIED HYPERLIPIDEMIA TYPE: ICD-10-CM

## 2018-03-01 DIAGNOSIS — I10 BENIGN ESSENTIAL HYPERTENSION: ICD-10-CM

## 2018-03-01 PROCEDURE — 99214 OFFICE O/P EST MOD 30 MIN: CPT | Performed by: FAMILY MEDICINE

## 2018-03-01 PROCEDURE — G0463 HOSPITAL OUTPT CLINIC VISIT: HCPCS

## 2018-03-01 ASSESSMENT — ANXIETY QUESTIONNAIRES
GAD7 TOTAL SCORE: 0
7. FEELING AFRAID AS IF SOMETHING AWFUL MIGHT HAPPEN: NOT AT ALL
3. WORRYING TOO MUCH ABOUT DIFFERENT THINGS: NOT AT ALL
6. BECOMING EASILY ANNOYED OR IRRITABLE: NOT AT ALL
1. FEELING NERVOUS, ANXIOUS, OR ON EDGE: NOT AT ALL
2. NOT BEING ABLE TO STOP OR CONTROL WORRYING: NOT AT ALL
4. TROUBLE RELAXING: NOT AT ALL
5. BEING SO RESTLESS THAT IT IS HARD TO SIT STILL: NOT AT ALL

## 2018-03-01 NOTE — MR AVS SNAPSHOT
After Visit Summary   3/1/2018    Clement Voss    MRN: [id number]           Patient Information     Date Of Birth          1945        Visit Information        Provider Department      3/1/2018 10:15 AM Lorelei Felix MD [facility]        Today's Diagnoses     Type 2 diabetes mellitus with stage 3 chronic kidney disease, with long-term current use of insulin (H)    -  1    Hyperlipidemia, unspecified hyperlipidemia type        Benign essential hypertension        Special screening for malignant neoplasms, colon          Care Instructions    Results for orders placed or performed in visit on 02/21/18   INR point of care (  clinic ONLY)   Result Value Ref Range    INR Point of Care 1.9 (H) 0.86 - 1.14   Basic metabolic panel   Result Value Ref Range    Sodium 144 133 - 144 mmol/L    Potassium 4.2 3.4 - 5.3 mmol/L    Chloride 112 (H) 94 - 109 mmol/L    Carbon Dioxide 25 20 - 32 mmol/L    Anion Gap 7 3 - 14 mmol/L    Glucose 170 (H) 70 - 99 mg/dL    Urea Nitrogen 17 7 - 30 mg/dL    Creatinine 1.48 (H) 0.66 - 1.25 mg/dL    GFR Estimate 47 (L) >60 mL/min/1.7m2    GFR Estimate If Black 56 (L) >60 mL/min/1.7m2    Calcium 8.5 8.5 - 10.1 mg/dL   Hemoglobin A1c   Result Value Ref Range    Hemoglobin A1C 7.3 (H) 4.3 - 6.0 %   Lipid Profile   Result Value Ref Range    Cholesterol 153 <200 mg/dL    Triglycerides 118 <150 mg/dL    HDL Cholesterol 40 >39 mg/dL    LDL Cholesterol Calculated 89 <100 mg/dL    Non HDL Cholesterol 113 <130 mg/dL   Estimated Average Glucose   Result Value Ref Range    Estimated Average Glucose 163 mg/dL               Follow-ups after your visit        Your next 10 appointments already scheduled     Mar 06, 2018 11:00 AM CST   (Arrive by 10:45 AM)   Return Visit with Anamaria Benítez RN   [facility] ([facility] )    [address] [place] [id number] [phone]            Apr 12, 2018 10:30 AM CDT   (Arrive by 10:15 AM)   Pre-Op  "physical with Lorelei Felix MD   [facility] ([facility] )    [address]  [facility] [address]   [phone]              Future tests that were ordered for you today     Open Future Orders        Priority Expected Expires Ordered    Immunos occult blood Routine  3/1/2019 3/1/2018            Who to contact     If you have questions or need follow up information about today's clinic visit or your schedule please contact [facility] directly at [phone].  Normal or non-critical lab and imaging results will be communicated to you by Tailsterhart, letter or phone within 4 business days after the clinic has received the results. If you do not hear from us within 7 days, please contact the clinic through [employer] or phone. If you have a critical or abnormal lab result, we will notify you by phone as soon as possible.  Submit refill requests through [employer] or call your pharmacy and they will forward the refill request to us. Please allow 3 business days for your refill to be completed.          Additional Information About Your Visit        [employer] Information     [employer] lets you send messages to your doctor, view your test results, renew your prescriptions, schedule appointments and more. To sign up, go to [email or web address] . Click on \"Log in\" on the left side of the screen, which will take you to the Welcome page. Then click on \"Sign up Now\" on the right side of the page.     You will be asked to enter the access code listed below, as well as some personal information. Please follow the directions to create your username and password.     Your access code is: [email or web address]  Expires: 2018 10:44 AM     Your access code will  in 90 days. If you need help or a new code, please call your [facility] or [phone].        Care EveryWhere ID     This is your Care EveryWhere ID. This could be used by other organizations to access your " "[facility] medical records  [id number]        Your Vitals Were     Pulse Temperature Respirations Height Pulse Oximetry BMI (Body Mass Index)    59 97.2  F (36.2  C) (Tympanic) 14 5' 7\" (1.702 m) 96% 34.61 kg/m2       Blood Pressure from Last 3 Encounters:   03/01/18 136/60   01/25/18 128/58   01/21/18 131/62    Weight from Last 3 Encounters:   03/01/18 221 lb (100.2 kg)   01/25/18 225 lb (102.1 kg)   11/30/17 224 lb (101.6 kg)              We Performed the Following     C FOOT EXAM  NO CHARGE          Today's Medication Changes          These changes are accurate as of 3/1/18 10:44 AM.  If you have any questions, ask your nurse or doctor.               These medicines have changed or have updated prescriptions.        Dose/Directions    fluticasone 50 MCG/ACT spray   Commonly known as:  FLONASE   This may have changed:    - when to take this  - reasons to take this   Used for:  Acute maxillary sinusitis        Dose:  2 spray   Spray 2 sprays into both nostrils daily   Quantity:  1 Package   Refills:  0       insulin glargine 100 UNIT/ML injection   Commonly known as:  LANTUS SOLOSTAR   This may have changed:  how much to take   Used for:  Type 2 diabetes mellitus with stage 3 chronic kidney disease, with long-term current use of insulin (H)        Dose:  18 Units   Inject 18 Units Subcutaneous At Bedtime   Quantity:  15 mL   Refills:  1                Primary Care Provider Office Phone # Fax #    Lorelei VERNA Felix [phone] [address] [facility] [address]        Equal Access to Services     [facility] AH: Hadii robb de luna Sokerrie, waaxda luqadaha, qaybta kaalmada manan alvarado idilogan aviles. So [facility] [phone].    ATENCIÓN: Si habla español, tiene a breaux disposición servicios gratuitos de asistencia lingüística. Llame al [phone].    We comply with applicable federal civil rights laws and [place] laws. We do not discriminate on the basis " of race, color, national origin, age, disability, sex, sexual orientation, or gender identity.            Thank you!     Thank you for choosing [facility]  for your care. Our goal is always to provide you with excellent care. Hearing back from our patients is one way we can continue to improve our services. Please take a few minutes to complete the written survey that you may receive in the mail after your visit with us. Thank you!             Your Updated Medication List - Protect others around you: Learn how to safely use, store and throw away your medicines at www.disposemymeds.org.          This list is accurate as of 3/1/18 10:44 AM.  Always use your most recent med list.                   Brand Name Dispense Instructions for use Diagnosis    ACCU-CHEK VLAD PLUS test strip   Generic drug:  blood glucose monitoring     100 strip    USE TO TEST BLOOD SUGARS THREE TIMES DAILY    Diabetes mellitus, type 2 (H)       acetaminophen 500 MG tablet    TYLENOL     Take 1-2 tablets (500-1,000 mg) by mouth every 6 hours as needed    Pulmonary embolism (H)       amLODIPine 10 MG tablet    NORVASC    90 tablet    TAKE 1 TABLET(10 MG) BY MOUTH DAILY    Benign essential hypertension       aspirin 81 MG EC tablet     90 tablet    Take 1 tablet (81 mg) by mouth daily    Diabetes mellitus, type 2 (H)       CLARITIN 10 MG tablet   Generic drug:  loratadine      Take 10 mg by mouth daily.        COMBIGAN 0.2-0.5 % ophthalmic solution   Generic drug:  brimonidine-timolol      1 drop 2 times daily Morning and evening, 12 hours apart        fenofibrate 48 MG tablet     90 tablet    TAKE 1 TABLET(48 MG) BY MOUTH DAILY    Hyperlipidemia, unspecified hyperlipidemia type       fish oil-omega-3 fatty acids 1000 MG capsule      Take 1 capsule by mouth 3 times daily.        fluticasone 50 MCG/ACT spray    FLONASE    1 Package    Spray 2 sprays into both nostrils daily    Acute maxillary sinusitis       indomethacin 50 MG  capsule    INDOCIN    30 capsule    Take 1 capsule (50 mg) by mouth 3 times daily With food as needed    Gout, unspecified       insulin glargine 100 UNIT/ML injection    LANTUS SOLOSTAR    15 mL    Inject 18 Units Subcutaneous At Bedtime    Type 2 diabetes mellitus with stage 3 chronic kidney disease, with long-term current use of insulin (H)       insulin pen needle 31G X 6 MM     100 each    Use 1 daily or as directed.    Type 2 diabetes mellitus with stage 3 chronic kidney disease, with long-term current use of insulin (H)       JANUVIA 100 MG tablet   Generic drug:  sitagliptin     90 tablet    TAKE 1 TABLET(100 MG) BY MOUTH DAILY    Type 2 diabetes mellitus without complication (H)       lisinopril 40 MG tablet    PRINIVIL/ZESTRIL    90 tablet    TAKE 1 TABLET(40 MG) BY MOUTH DAILY    Benign essential hypertension       pilocarpine 1 % ophthalmic solution    PILOCAR     Place 1 drop into both eyes 4 times daily        STATIN NOT PRESCRIBED (INTENTIONAL)     0 each    Statin not prescribed intentionally due to Intolerance (with supporting documentation of trying a statin at least once within the last 5 years)    Hyperlipidemia, unspecified hyperlipidemia       terazosin 10 MG capsule    HYTRIN    90 capsule    TAKE 1 CAPSULE(10 MG) BY MOUTH AT BEDTIME    Benign essential hypertension       TRAVATAN Z 0.004 % ophthalmic solution   Generic drug:  travoprost (BAK Free)      Instill 1 drop by ophthalmic route every day into affected eye(s) in the evening        warfarin 5 MG tablet    COUMADIN    90 tablet    SEE NOTES    Pulmonary embolism and infarction (H)

## 2018-03-01 NOTE — PROGRESS NOTES
SUBJECTIVE:   Clement Voss is a 72 year old male who presents to clinic today for the following health issues:      Diabetes Follow-up    Patient is checking blood sugars: twice daily.    Blood sugar testing frequency justification: On insulin, frequency appropriate   Results are as follows:         am - 145         suppertime - 261    Diabetic concerns: None     Symptoms of hypoglycemia (low blood sugar): none     Paresthesias (numbness or burning in feet) or sores: No     Date of last diabetic eye exam: surgery next month    Hyperlipidemia Follow-Up      Rate your low fat/cholesterol diet?: good    Taking statin?  yes    Other lipid medications/supplements?:  none    Hypertension Follow-up      Outpatient blood pressures are not being checked.    Low Salt Diet: no added salt    BP Readings from Last 2 Encounters:   03/01/18 136/60   01/25/18 128/58     Hemoglobin A1C (%)   Date Value   02/21/2018 7.3 (H)   11/28/2017 7.6 (H)     LDL Cholesterol Calculated (mg/dL)   Date Value   02/21/2018 89   11/28/2017 78       Amount of exercise or physical activity: not enough    Problems taking medications regularly: No    Medication side effects: none    Diet: low salt and low fat/cholesterol        Problem list and histories reviewed & adjusted, as indicated.  Additional history: as documented    Patient Active Problem List   Diagnosis     Type 2 diabetes mellitus with chronic kidney disease, with long-term current use of insulin (H)     Hyperlipidemia     Benign essential hypertension     Gout     Pulmonary embolism and infarction (H)     Acute thromboembolism of deep veins of lower extremity (H)     ACP (advance care planning)     Long-term (current) use of anticoagulants [Z79.01]     Morbid obesity (H)     Past Surgical History:   Procedure Laterality Date     APPENDECTOMY  2008     CHOLECYSTECTOMY  1984     History of PE of right lung 3/2013[         Social History   Substance Use Topics     Smoking status: Former  Smoker     Types: Cigarettes     Quit date: 8/12/1964     Smokeless tobacco: Never Used     Alcohol use No     Family History   Problem Relation Age of Onset     HEART DISEASE Father 71     heart disease; cause of death     Other - See Comments Mother 79     old age; cause of death         Current Outpatient Prescriptions   Medication Sig Dispense Refill     JANUVIA 100 MG tablet TAKE 1 TABLET(100 MG) BY MOUTH DAILY 90 tablet 0     amLODIPine (NORVASC) 10 MG tablet TAKE 1 TABLET(10 MG) BY MOUTH DAILY 90 tablet 0     ACCU-CHEK VLAD PLUS test strip USE TO TEST BLOOD SUGARS THREE TIMES DAILY 100 strip 2     terazosin (HYTRIN) 10 MG capsule TAKE 1 CAPSULE(10 MG) BY MOUTH AT BEDTIME 90 capsule 3     lisinopril (PRINIVIL/ZESTRIL) 40 MG tablet TAKE 1 TABLET(40 MG) BY MOUTH DAILY 90 tablet 0     warfarin (COUMADIN) 5 MG tablet SEE NOTES 90 tablet 3     fenofibrate 48 MG tablet TAKE 1 TABLET(48 MG) BY MOUTH DAILY 90 tablet 2     insulin glargine (LANTUS SOLOSTAR) 100 UNIT/ML injection Inject 18 Units Subcutaneous At Bedtime (Patient taking differently: Inject 19 Units Subcutaneous At Bedtime ) 15 mL 1     insulin pen needle 31G X 6 MM Use 1 daily or as directed. 100 each prn     pilocarpine (PILOCAR) 1 % ophthalmic solution Place 1 drop into both eyes 4 times daily       STATIN NOT PRESCRIBED, INTENTIONAL, Statin not prescribed intentionally due to Intolerance (with supporting documentation of trying a statin at least once within the last 5 years) 0 each 0     fluticasone (FLONASE) 50 MCG/ACT nasal spray Spray 2 sprays into both nostrils daily (Patient taking differently: Spray 2 sprays into both nostrils daily as needed ) 1 Package 0     indomethacin (INDOCIN) 50 MG capsule Take 1 capsule (50 mg) by mouth 3 times daily With food as needed 30 capsule 1     brimonidine-timolol (COMBIGAN) 0.2-0.5 % ophthalmic solution 1 drop 2 times daily Morning and evening, 12 hours apart       acetaminophen (TYLENOL) 500 MG tablet Take 1-2  "tablets (500-1,000 mg) by mouth every 6 hours as needed       aspirin 81 MG EC tablet Take 1 tablet (81 mg) by mouth daily 90 tablet 3     loratadine (CLARITIN) 10 MG tablet Take 10 mg by mouth daily.       fish oil-omega-3 fatty acids (FISH OIL) 1000 MG capsule Take 1 capsule by mouth 3 times daily.       TRAVATAN Z (TRAVATAN Z) 0.004 % ophthalmic solution Instill 1 drop by ophthalmic route every day into affected eye(s) in the evening       Allergies   Allergen Reactions     Atorvastatin      Other reaction(s): Other  Caused increased creatinine.      Atorvastatin Calcium Other (See Comments)     Lipitor  Increase CR     Iodine      Penicillins      Sulfa Drugs      Sulfa (sulfonamide antibiotics)     Sulfacetamide        Reviewed and updated as needed this visit by clinical staff  Tobacco  Allergies  Meds  Problems  Med Hx  Surg Hx  Fam Hx  Soc Hx        Reviewed and updated as needed this visit by Provider         ROS:  Constitutional, HEENT, cardiovascular, pulmonary, gi and gu systems are negative, except as otherwise noted.    OBJECTIVE:     /60 (BP Location: Left arm, Patient Position: Sitting, Cuff Size: Adult Large)  Pulse 59  Temp 97.2  F (36.2  C) (Tympanic)  Resp 14  Ht 5' 7\" (1.702 m)  Wt 221 lb (100.2 kg)  SpO2 96%  BMI 34.61 kg/m2  Body mass index is 34.61 kg/(m^2).  GENERAL: healthy, alert and no distress  RESP: lungs clear to auscultation - no rales, rhonchi or wheezes  CV: regular rate and rhythm, normal S1 S2, no S3 or S4, no murmur, click or rub, no peripheral edema and peripheral pulses strong  PSYCH: mentation appears normal, affect normal/bright  Diabetic foot exam: normal DP and PT pulses, no trophic changes or ulcerative lesions and normal monofilament exam    Diagnostic Test Results:  Results for orders placed or performed in visit on 02/21/18   INR point of care (  clinic ONLY)   Result Value Ref Range    INR Point of Care 1.9 (H) 0.86 - 1.14   Basic metabolic panel "   Result Value Ref Range    Sodium 144 133 - 144 mmol/L    Potassium 4.2 3.4 - 5.3 mmol/L    Chloride 112 (H) 94 - 109 mmol/L    Carbon Dioxide 25 20 - 32 mmol/L    Anion Gap 7 3 - 14 mmol/L    Glucose 170 (H) 70 - 99 mg/dL    Urea Nitrogen 17 7 - 30 mg/dL    Creatinine 1.48 (H) 0.66 - 1.25 mg/dL    GFR Estimate 47 (L) >60 mL/min/1.7m2    GFR Estimate If Black 56 (L) >60 mL/min/1.7m2    Calcium 8.5 8.5 - 10.1 mg/dL   Hemoglobin A1c   Result Value Ref Range    Hemoglobin A1C 7.3 (H) 4.3 - 6.0 %   Lipid Profile   Result Value Ref Range    Cholesterol 153 <200 mg/dL    Triglycerides 118 <150 mg/dL    HDL Cholesterol 40 >39 mg/dL    LDL Cholesterol Calculated 89 <100 mg/dL    Non HDL Cholesterol 113 <130 mg/dL   Estimated Average Glucose   Result Value Ref Range    Estimated Average Glucose 163 mg/dL       ASSESSMENT/PLAN:     Diabetes Type II, A1c Controlled, non-insulin dependent   Associated with the following complications    Renal Complications:  CKD    Ophthalmologic Complications: None    Neurologic Complications: None    Peripheral Vascular Complications:  None    Other: None   Plan:  No changes in the patient's current treatment plan      Hyperlipidemia; does not tolerate statins   Plan:  No changes in the patient's current treatment plan    Hypertension; controlled   Associated with the following complications:    Diabetes   Plan:  No changes in the patient's current treatment plan        ICD-10-CM    1. Type 2 diabetes mellitus with stage 3 chronic kidney disease, with long-term current use of insulin (H) E11.22 C FOOT EXAM  NO CHARGE    N18.3     Z79.4    2. Hyperlipidemia, unspecified hyperlipidemia type E78.5    3. Benign essential hypertension I10    4. Special screening for malignant neoplasms, colon Z12.11 Immunos occult blood       FUTURE APPOINTMENTS:       - Follow-up visit in 6 months, sooner as needed.      Lorelei Felix MD  [facility]

## 2018-03-01 NOTE — PATIENT INSTRUCTIONS
Results for orders placed or performed in visit on 02/21/18   INR point of care (  clinic ONLY)   Result Value Ref Range    INR Point of Care 1.9 (H) 0.86 - 1.14   Basic metabolic panel   Result Value Ref Range    Sodium 144 133 - 144 mmol/L    Potassium 4.2 3.4 - 5.3 mmol/L    Chloride 112 (H) 94 - 109 mmol/L    Carbon Dioxide 25 20 - 32 mmol/L    Anion Gap 7 3 - 14 mmol/L    Glucose 170 (H) 70 - 99 mg/dL    Urea Nitrogen 17 7 - 30 mg/dL    Creatinine 1.48 (H) 0.66 - 1.25 mg/dL    GFR Estimate 47 (L) >60 mL/min/1.7m2    GFR Estimate If Black 56 (L) >60 mL/min/1.7m2    Calcium 8.5 8.5 - 10.1 mg/dL   Hemoglobin A1c   Result Value Ref Range    Hemoglobin A1C 7.3 (H) 4.3 - 6.0 %   Lipid Profile   Result Value Ref Range    Cholesterol 153 <200 mg/dL    Triglycerides 118 <150 mg/dL    HDL Cholesterol 40 >39 mg/dL    LDL Cholesterol Calculated 89 <100 mg/dL    Non HDL Cholesterol 113 <130 mg/dL   Estimated Average Glucose   Result Value Ref Range    Estimated Average Glucose 163 mg/dL

## 2018-03-02 ASSESSMENT — PATIENT HEALTH QUESTIONNAIRE - PHQ9: SUM OF ALL RESPONSES TO PHQ QUESTIONS 1-9: 0

## 2018-03-02 ASSESSMENT — ANXIETY QUESTIONNAIRES: GAD7 TOTAL SCORE: 0

## 2018-03-05 DIAGNOSIS — Z12.11 SPECIAL SCREENING FOR MALIGNANT NEOPLASMS, COLON: ICD-10-CM

## 2018-03-05 LAB — HEMOCCULT SP1 STL QL: NEGATIVE

## 2018-03-05 PROCEDURE — 82274 ASSAY TEST FOR BLOOD FECAL: CPT | Mod: ZL | Performed by: FAMILY MEDICINE

## 2018-03-06 ENCOUNTER — HOSPITAL ENCOUNTER (OUTPATIENT)
Dept: EDUCATION SERVICES | Facility: HOSPITAL | Age: 73
Discharge: HOME | End: 2018-03-06
Attending: FAMILY MEDICINE | Admitting: FAMILY MEDICINE
Payer: MEDICARE

## 2018-03-06 VITALS
BODY MASS INDEX: 35.11 KG/M2 | HEIGHT: 67 IN | SYSTOLIC BLOOD PRESSURE: 138 MMHG | WEIGHT: 223.7 LBS | RESPIRATION RATE: 12 BRPM | OXYGEN SATURATION: 97 % | DIASTOLIC BLOOD PRESSURE: 58 MMHG | HEART RATE: 59 BPM

## 2018-03-06 DIAGNOSIS — E11.65 TYPE 2 DIABETES MELLITUS WITH HYPERGLYCEMIA, WITH LONG-TERM CURRENT USE OF INSULIN (H): Primary | ICD-10-CM

## 2018-03-06 DIAGNOSIS — I10 BENIGN ESSENTIAL HYPERTENSION: ICD-10-CM

## 2018-03-06 DIAGNOSIS — Z79.4 TYPE 2 DIABETES MELLITUS WITH HYPERGLYCEMIA, WITH LONG-TERM CURRENT USE OF INSULIN (H): Primary | ICD-10-CM

## 2018-03-06 PROCEDURE — G0108 DIAB MANAGE TRN  PER INDIV: HCPCS

## 2018-03-06 RX ORDER — LISINOPRIL 40 MG/1
TABLET ORAL
Qty: 90 TABLET | Refills: 0 | Status: SHIPPED | OUTPATIENT
Start: 2018-03-06 | End: 2018-06-05

## 2018-03-06 ASSESSMENT — PAIN SCALES - GENERAL: PAINLEVEL: NO PAIN (0)

## 2018-03-06 NOTE — NURSING NOTE
"Chief Complaint   Patient presents with     Diabetes     follow up       Initial /58 (BP Location: Right arm, Patient Position: Sitting, Cuff Size: Adult Large)  Pulse 59  Resp 12  Ht 1.702 m (5' 7\")  Wt 101.5 kg (223 lb 11.2 oz)  SpO2 97%  BMI 35.04 kg/m2 Estimated body mass index is 35.04 kg/(m^2) as calculated from the following:    Height as of this encounter: 1.702 m (5' 7\").    Weight as of this encounter: 101.5 kg (223 lb 11.2 oz).  Medication Reconciliation: complete   Janett Rogers      "

## 2018-03-07 NOTE — PROGRESS NOTES
"Clement is here today for BG review and diabetes follow-up. /58 (BP Location: Right arm, Patient Position: Sitting, Cuff Size: Adult Large)  Pulse 59  Resp 12  Ht 1.702 m (5' 7\")  Wt 101.5 kg (223 lb 11.2 oz)  SpO2 97%  BMI 35.04 kg/m2 He is accompanied by his wife, Jodie. Clement is taking Lantus 19 units daily and Januvia 100 mg daily. Readings range as follows: fastin-154, pre-supper: 123-171 and post-supper: 206, 281, 304. Denies lows. A1C trending downward.    Lab Results   Component Value Date    A1C 7.3 2018    A1C 7.6 2017    A1C 7.9 2017    A1C 8.0 2017    A1C 8.7 2017         Reviewed usual foods eaten. Clement's food choices and portions remain consistent. Reviewed BG/A1C targets, hypoglycemia symptoms and treatment and insulin injection site rotation.    Clement is having upcoming cataract and glaucoma surgeries. Discussed that he can take half of his Lantus the night before surgery, but that the perioperative nurse may have specific directions from his surgeon.    Will follow in 6 months.    Time spent with patient 30 minutes.       "

## 2018-03-14 ENCOUNTER — TELEPHONE (OUTPATIENT)
Dept: FAMILY MEDICINE | Facility: OTHER | Age: 73
End: 2018-03-14

## 2018-03-14 ENCOUNTER — ANTICOAGULATION THERAPY VISIT (OUTPATIENT)
Dept: ANTICOAGULATION | Facility: OTHER | Age: 73
End: 2018-03-14
Payer: COMMERCIAL

## 2018-03-14 DIAGNOSIS — N18.30 TYPE 2 DIABETES MELLITUS WITH STAGE 3 CHRONIC KIDNEY DISEASE, WITH LONG-TERM CURRENT USE OF INSULIN (H): Primary | ICD-10-CM

## 2018-03-14 DIAGNOSIS — Z79.4 TYPE 2 DIABETES MELLITUS WITH STAGE 3 CHRONIC KIDNEY DISEASE, WITH LONG-TERM CURRENT USE OF INSULIN (H): Primary | ICD-10-CM

## 2018-03-14 DIAGNOSIS — Z79.01 LONG-TERM (CURRENT) USE OF ANTICOAGULANTS: ICD-10-CM

## 2018-03-14 DIAGNOSIS — E11.22 TYPE 2 DIABETES MELLITUS WITH STAGE 3 CHRONIC KIDNEY DISEASE, WITH LONG-TERM CURRENT USE OF INSULIN (H): Primary | ICD-10-CM

## 2018-03-14 DIAGNOSIS — I26.99 PULMONARY EMBOLISM AND INFARCTION (H): ICD-10-CM

## 2018-03-14 DIAGNOSIS — I82.409 ACUTE THROMBOEMBOLISM OF DEEP VEINS OF LOWER EXTREMITY, UNSPECIFIED LATERALITY (H): ICD-10-CM

## 2018-03-14 LAB — INR BLD: 3.2 (ref 0.86–1.14)

## 2018-03-14 PROCEDURE — 85610 PROTHROMBIN TIME: CPT | Mod: QW,ZL | Performed by: FAMILY MEDICINE

## 2018-03-14 PROCEDURE — 36416 COLLJ CAPILLARY BLOOD SPEC: CPT | Mod: ZL | Performed by: FAMILY MEDICINE

## 2018-03-14 NOTE — TELEPHONE ENCOUNTER
The clinic received a fax from insurance company stating it will no longer cover his    LANTUS SOLO STAR INSULIN  DO A PA OR CHANGE TO MORE COVERED BASLAGAR INSULIN?

## 2018-03-14 NOTE — PROGRESS NOTES
ANTICOAGULATION FOLLOW-UP CLINIC VISIT    Patient Name:  Clement Voss  Date:  3/14/2018  Contact Type:  Telephone    SUBJECTIVE:     Patient Findings     Positives Change in diet/appetite    Comments INR done by lab and result noted by warfarin clinic nurse. Call placed to patient and spoke to him re: INR result, warfarin dosing and INR recheck date. He has no new medication changes, no bleeding/bruising. He states he has no intake of vit K lately. We discussed that he should increase his vit K intake so INR will go down a little bit, told him 2-3 times/week eat something with vit K in it. He verbalized understanding and has no questions.           OBJECTIVE    INR Point of Care   Date Value Ref Range Status   03/14/2018 3.2 (H) 0.86 - 1.14 Final     Comment:     This test is intended for monitoring Coumadin therapy.  Results are not   accurate in patients with prolonged INR due to factor deficiency.         ASSESSMENT / PLAN  INR assessment SUPRA    Recheck INR In: 4 WEEKS    INR Location Clinic      Anticoagulation Summary as of 3/14/2018     INR goal 2.0-3.0   Today's INR 3.2!   Maintenance plan 2.5 mg (5 mg x 0.5) on Mon, Fri; 5 mg (5 mg x 1) all other days   Full instructions 2.5 mg on Mon, Fri; 5 mg all other days   Weekly total 30 mg   No change documented Jodie Elena RN   Plan last modified Jodie Elena, RN (2/7/2018)   Next INR check 4/11/2018   Priority INR   Target end date Indefinite    Indications   Pulmonary embolism and infarction (H) [I26.99]  Acute thromboembolism of deep veins of lower extremity (H) [I82.409]  Long-term (current) use of anticoagulants [Z79.01] [Z79.01]         Anticoagulation Episode Summary     INR check location     Preferred lab     Send INR reminders to  ANTICOAG POOL    Comments       Anticoagulation Care Providers     Provider Role Specialty Phone number    Lorelei Felix MD [facility] [phone]            See the Encounter Report to view  Anticoagulation Flowsheet and Dosing Calendar (Go to Encounters tab in chart review, and find the Anticoagulation Therapy Visit)        Jodie Elena RN

## 2018-03-14 NOTE — MR AVS SNAPSHOT
Clement CROWELL Shanika   3/14/2018   Anticoagulation Therapy Visit    Description:  72 year old male   Provider:  Lorelei Felix MD   Department:  Hc Anti Coagulation           INR as of 3/14/2018     Today's INR 3.2!      Anticoagulation Summary as of 3/14/2018     INR goal 2.0-3.0   Today's INR 3.2!   Full instructions 2.5 mg on Mon, Fri; 5 mg all other days   Next INR check 4/11/2018    Indications   Pulmonary embolism and infarction (H) [I26.99]  Acute thromboembolism of deep veins of lower extremity (H) [I82.409]  Long-term (current) use of anticoagulants [Z79.01] [Z79.01]         March 2018 Details    Sun Mon Tue Wed Thu Fri Sat         1               2               3                 4               5               6               7               8               9               10                 11               12               13               14      5 mg   See details      15      5 mg         16      2.5 mg         17      5 mg           18      5 mg         19      2.5 mg         20      5 mg         21      5 mg         22      5 mg         23      2.5 mg         24      5 mg           25      5 mg         26      2.5 mg         27      5 mg         28      5 mg         29      5 mg         30      2.5 mg         31      5 mg          Date Details   03/14 This INR check               How to take your warfarin dose     To take:  2.5 mg Take 0.5 of a 5 mg tablet.    To take:  5 mg Take 1 of the 5 mg tablets.           April 2018 Details    Sun Mon Tue Wed Thu Fri Sat     1      5 mg         2      2.5 mg         3      5 mg         4      5 mg         5      5 mg         6      2.5 mg         7      5 mg           8      5 mg         9      2.5 mg         10      5 mg         11            12               13               14                 15               16               17               18               19               20               21                 22               23               24                25               26               27               28                 29               30                     Date Details   No additional details    Date of next INR:  4/11/2018         How to take your warfarin dose     To take:  2.5 mg Take 0.5 of a 5 mg tablet.    To take:  5 mg Take 1 of the 5 mg tablets.

## 2018-03-15 RX ORDER — INSULIN GLARGINE 100 [IU]/ML
19 INJECTION, SOLUTION SUBCUTANEOUS AT BEDTIME
Qty: 18 ML | Refills: 1 | Status: SHIPPED | OUTPATIENT
Start: 2018-03-15 | End: 2018-09-20

## 2018-04-03 DIAGNOSIS — I10 BENIGN ESSENTIAL HYPERTENSION: ICD-10-CM

## 2018-04-03 RX ORDER — AMLODIPINE BESYLATE 10 MG/1
TABLET ORAL
Qty: 90 TABLET | Refills: 1 | Status: SHIPPED | OUTPATIENT
Start: 2018-04-03 | End: 2018-10-05

## 2018-04-11 ENCOUNTER — ANTICOAGULATION THERAPY VISIT (OUTPATIENT)
Dept: ANTICOAGULATION | Facility: OTHER | Age: 73
End: 2018-04-11
Payer: COMMERCIAL

## 2018-04-11 DIAGNOSIS — Z79.01 LONG-TERM (CURRENT) USE OF ANTICOAGULANTS: ICD-10-CM

## 2018-04-11 DIAGNOSIS — I26.99 PULMONARY EMBOLISM AND INFARCTION (H): ICD-10-CM

## 2018-04-11 DIAGNOSIS — I82.409 ACUTE THROMBOEMBOLISM OF DEEP VEINS OF LOWER EXTREMITY, UNSPECIFIED LATERALITY (H): ICD-10-CM

## 2018-04-11 LAB — INR BLD: 2.2 (ref 0.86–1.14)

## 2018-04-11 PROCEDURE — 36416 COLLJ CAPILLARY BLOOD SPEC: CPT | Mod: ZL | Performed by: FAMILY MEDICINE

## 2018-04-11 PROCEDURE — 85610 PROTHROMBIN TIME: CPT | Mod: QW,ZL | Performed by: FAMILY MEDICINE

## 2018-04-11 NOTE — MR AVS SNAPSHOT
Clement CROWELL Shanika   4/11/2018   Anticoagulation Therapy Visit    Description:  72 year old male   Provider:  Lorelei Felix MD   Department:  Hc Anti Coagulation           INR as of 4/11/2018     Today's INR 2.2      Anticoagulation Summary as of 4/11/2018     INR goal 2.0-3.0   Today's INR 2.2   Full instructions 2.5 mg on Mon, Fri; 5 mg all other days   Next INR check 5/23/2018    Indications   Pulmonary embolism and infarction (H) [I26.99]  Acute thromboembolism of deep veins of lower extremity (H) [I82.409]  Long-term (current) use of anticoagulants [Z79.01] [Z79.01]         Your next Anticoagulation Clinic appointment(s)     May 23, 2018 10:00 AM CDT   Anticoagulation Visit with HC ANTI COAGULATION   [facility] ([facility] )    [address] [place] [address]   [phone]              April 2018 Details    Sun Mon Tue Wed Thu Fri Sat     1               2               3               4               5               6               7                 8               9               10               11      5 mg   See details      12      5 mg         13      2.5 mg         14      5 mg           15      5 mg         16      2.5 mg         17      5 mg         18      5 mg         19      5 mg         20      2.5 mg         21      5 mg           22      5 mg         23      2.5 mg         24      5 mg         25      5 mg         26      5 mg         27      2.5 mg         28      5 mg           29      5 mg         30      2.5 mg               Date Details   04/11 This INR check               How to take your warfarin dose     To take:  2.5 mg Take 0.5 of a 5 mg tablet.    To take:  5 mg Take 1 of the 5 mg tablets.           May 2018 Details    Sun Mon Tue Wed Thu Fri Sat       1      5 mg         2      5 mg         3      5 mg         4      2.5 mg         5      5 mg           6      5 mg         7      2.5 mg         8      5 mg         9      5 mg         10       5 mg         11      2.5 mg         12      5 mg           13      5 mg         14      2.5 mg         15      5 mg         16      5 mg         17      5 mg         18      2.5 mg         19      5 mg           20      5 mg         21      2.5 mg         22      5 mg         23            24               25               26                 27               28               29               30               31                  Date Details   No additional details    Date of next INR:  5/23/2018         How to take your warfarin dose     To take:  2.5 mg Take 0.5 of a 5 mg tablet.    To take:  5 mg Take 1 of the 5 mg tablets.

## 2018-04-11 NOTE — PROGRESS NOTES
ANTICOAGULATION FOLLOW-UP CLINIC VISIT    Patient Name:  Clement Voss  Date:  4/11/2018  Contact Type:  Telephone/ message left on home phone voicemail    SUBJECTIVE:     Patient Findings     Positives No Problem Findings    Comments INR done by lab and result noted by warfarin clinic nurse. Call placed to patient and message left re: INR result, warfarin dosing and INR recheck date. He is to call warfarin clinic if any bleeding/bruising, changes in diet/meds/activity or questions.           OBJECTIVE    INR Point of Care   Date Value Ref Range Status   04/11/2018 2.2 (H) 0.86 - 1.14 Final     Comment:     This test is intended for monitoring Coumadin therapy.  Results are not   accurate in patients with prolonged INR due to factor deficiency.         ASSESSMENT / PLAN  INR assessment THER    Recheck INR In: 6 WEEKS    INR Location Clinic      Anticoagulation Summary as of 4/11/2018     INR goal 2.0-3.0   Today's INR 2.2   Maintenance plan 2.5 mg (5 mg x 0.5) on Mon, Fri; 5 mg (5 mg x 1) all other days   Full instructions 2.5 mg on Mon, Fri; 5 mg all other days   Weekly total 30 mg   No change documented Jodie Elena, RN   Plan last modified Jodie Elena, RN (2/7/2018)   Next INR check 5/23/2018   Priority INR   Target end date Indefinite    Indications   Pulmonary embolism and infarction (H) [I26.99]  Acute thromboembolism of deep veins of lower extremity (H) [I82.409]  Long-term (current) use of anticoagulants [Z79.01] [Z79.01]         Anticoagulation Episode Summary     INR check location     Preferred lab     Send INR reminders to  ANTICOAG POOL    Comments       Anticoagulation Care Providers     Provider Role Specialty Phone number    Lorelei Feilx MD [facility] [phone]            See the Encounter Report to view Anticoagulation Flowsheet and Dosing Calendar (Go to Encounters tab in chart review, and find the Anticoagulation Therapy Visit)        Jodie Elena RN

## 2018-04-12 ENCOUNTER — OFFICE VISIT (OUTPATIENT)
Dept: FAMILY MEDICINE | Facility: OTHER | Age: 73
End: 2018-04-12
Attending: FAMILY MEDICINE
Payer: COMMERCIAL

## 2018-04-12 VITALS
HEART RATE: 64 BPM | BODY MASS INDEX: 34.37 KG/M2 | OXYGEN SATURATION: 97 % | SYSTOLIC BLOOD PRESSURE: 138 MMHG | HEIGHT: 67 IN | RESPIRATION RATE: 18 BRPM | TEMPERATURE: 97 F | WEIGHT: 219 LBS | DIASTOLIC BLOOD PRESSURE: 62 MMHG

## 2018-04-12 DIAGNOSIS — Z79.4 TYPE 2 DIABETES MELLITUS WITH STAGE 3 CHRONIC KIDNEY DISEASE, WITH LONG-TERM CURRENT USE OF INSULIN (H): ICD-10-CM

## 2018-04-12 DIAGNOSIS — Z01.818 PREOP GENERAL PHYSICAL EXAM: Primary | ICD-10-CM

## 2018-04-12 DIAGNOSIS — I10 BENIGN ESSENTIAL HYPERTENSION: ICD-10-CM

## 2018-04-12 DIAGNOSIS — E78.5 HYPERLIPIDEMIA, UNSPECIFIED HYPERLIPIDEMIA TYPE: ICD-10-CM

## 2018-04-12 DIAGNOSIS — E11.22 TYPE 2 DIABETES MELLITUS WITH STAGE 3 CHRONIC KIDNEY DISEASE, WITH LONG-TERM CURRENT USE OF INSULIN (H): ICD-10-CM

## 2018-04-12 DIAGNOSIS — N18.30 TYPE 2 DIABETES MELLITUS WITH STAGE 3 CHRONIC KIDNEY DISEASE, WITH LONG-TERM CURRENT USE OF INSULIN (H): ICD-10-CM

## 2018-04-12 LAB
ERYTHROCYTE [DISTWIDTH] IN BLOOD BY AUTOMATED COUNT: 15.3 % (ref 10–15)
EST. AVERAGE GLUCOSE BLD GHB EST-MCNC: 180 MG/DL
HBA1C MFR BLD: 7.9 % (ref 0–6.4)
HCT VFR BLD AUTO: 35.3 % (ref 40–53)
HGB BLD-MCNC: 12.8 G/DL (ref 13.3–17.7)
MCH RBC QN AUTO: 34 PG (ref 26.5–33)
MCHC RBC AUTO-ENTMCNC: 36.3 G/DL (ref 31.5–36.5)
MCV RBC AUTO: 94 FL (ref 78–100)
PLATELET # BLD AUTO: 114 10E9/L (ref 150–450)
RBC # BLD AUTO: 3.76 10E12/L (ref 4.4–5.9)
WBC # BLD AUTO: 5.9 10E9/L (ref 4–11)

## 2018-04-12 PROCEDURE — 36415 COLL VENOUS BLD VENIPUNCTURE: CPT | Mod: ZL | Performed by: FAMILY MEDICINE

## 2018-04-12 PROCEDURE — 93005 ELECTROCARDIOGRAM TRACING: CPT

## 2018-04-12 PROCEDURE — 40000788 ZZHCL STATISTIC ESTIMATED AVERAGE GLUCOSE: Mod: ZL | Performed by: FAMILY MEDICINE

## 2018-04-12 PROCEDURE — 83036 HEMOGLOBIN GLYCOSYLATED A1C: CPT | Mod: ZL | Performed by: FAMILY MEDICINE

## 2018-04-12 PROCEDURE — 85027 COMPLETE CBC AUTOMATED: CPT | Mod: ZL | Performed by: FAMILY MEDICINE

## 2018-04-12 PROCEDURE — G0463 HOSPITAL OUTPT CLINIC VISIT: HCPCS

## 2018-04-12 PROCEDURE — 99214 OFFICE O/P EST MOD 30 MIN: CPT | Mod: 25 | Performed by: FAMILY MEDICINE

## 2018-04-12 PROCEDURE — 93010 ELECTROCARDIOGRAM REPORT: CPT | Performed by: INTERNAL MEDICINE

## 2018-04-12 ASSESSMENT — PAIN SCALES - GENERAL: PAINLEVEL: NO PAIN (0)

## 2018-04-12 NOTE — NURSING NOTE
"Chief Complaint   Patient presents with     Pre-Op Exam     Dr. Arthur, [facility]. 4/19/18, Left eye       Initial /62 (BP Location: Left arm, Patient Position: Sitting, Cuff Size: Adult Regular)  Pulse 64  Temp 97  F (36.1  C) (Tympanic)  Resp 18  Ht 5' 7\" (1.702 m)  Wt 219 lb (99.3 kg)  SpO2 97%  BMI 34.3 kg/m2 Estimated body mass index is 34.3 kg/(m^2) as calculated from the following:    Height as of this encounter: 5' 7\" (1.702 m).    Weight as of this encounter: 219 lb (99.3 kg).  Medication Reconciliation: complete   Tracey Blanco MA  "

## 2018-04-12 NOTE — MR AVS SNAPSHOT
After Visit Summary   4/12/2018    Clement Voss    MRN: [id number]           Patient Information     Date Of Birth          1945        Visit Information        Provider Department      4/12/2018 10:30 AM Lorelei Felix MD [facility]        Today's Diagnoses     Preop general physical exam    -  1    Type 2 diabetes mellitus with stage 3 chronic kidney disease, with long-term current use of insulin (H)        Hyperlipidemia, unspecified hyperlipidemia type        Benign essential hypertension          Care Instructions      Before Your Surgery      Call your surgeon if there is any change in your health. This includes signs of a cold or flu (such as a sore throat, runny nose, cough, rash or fever).    Do not smoke, drink alcohol or take over the counter medicine (unless your surgeon or primary care doctor tells you to) for the 24 hours before and after surgery.    If you take prescribed drugs: Follow your doctor s orders about which medicines to take and which to stop until after surgery.    Eating and drinking prior to surgery: follow the instructions from your surgeon    Take a shower or bath the night before surgery. Use the soap your surgeon gave you to gently clean your skin. If you do not have soap from your surgeon, use your regular soap. Do not shave or scrub the surgery site.  Wear clean pajamas and have clean sheets on your bed.           Follow-ups after your visit        Follow-up notes from your care team     Return if symptoms worsen or fail to improve.      Your next 10 appointments already scheduled     Apr 19, 2018   Procedure with Kush Almaraz MD   [facility] ([facility] )    [address] [address]   [phone]            May 03, 2018   Procedure with Kush Almaraz MD   [facility] ([facility] )    [address] [address]   [phone]            May 23, 2018  9:45 AM  "CDT   LAB with [facility] LAB   [facility] ([facility] )    [address]  [place] [address]   [phone]            May 23, 2018 10:00 AM CDT   Anticoagulation Visit with HC ANTI COAGULATION   [facility] ([facility] )    [address] [place] [address]   [phone]            Sep 06, 2018 10:15 AM CDT   (Arrive by 10:00 AM)   SHORT with Lorelei Felix MD   [facility] ([facility] )    [address]  [place] [address]   [phone]              Who to contact     If you have questions or need follow up information about today's clinic visit or your schedule please contact [facility] directly at [phone].  Normal or non-critical lab and imaging results will be communicated to you by MyChart, letter or phone within 4 business days after the clinic has received the results. If you do not hear from us within 7 days, please contact the clinic through [employer] or phone. If you have a critical or abnormal lab result, we will notify you by phone as soon as possible.  Submit refill requests through [employer] or call your pharmacy and they will forward the refill request to us. Please allow 3 business days for your refill to be completed.          Additional Information About Your Visit        MyChart Information     [employer] lets you send messages to your doctor, view your test results, renew your prescriptions, schedule appointments and more. To sign up, go to [email or web address] . Click on \"Log in\" on the left side of the screen, which will take you to the Welcome page. Then click on \"Sign up Now\" on the right side of the page.     You will be asked to enter the access code listed below, as well as some personal information. Please follow the directions to create your username and password.     Your access code is: [email or web address]  Expires: 5/30/2018 11:44 AM   " "  Your access code will  in 90 days. If you need help or a new code, please call your [facility] clinic or [phone].        Care EveryWhere ID     This is your Care EveryWhere ID. This could be used by other organizations to access your [facility] medical records  [id number]        Your Vitals Were     Pulse Temperature Respirations Height Pulse Oximetry BMI (Body Mass Index)    64 97  F (36.1  C) (Tympanic) 18 5' 7\" (1.702 m) 97% 34.3 kg/m2       Blood Pressure from Last 3 Encounters:   18 138/62   18 138/58   18 136/60    Weight from Last 3 Encounters:   18 219 lb (99.3 kg)   18 223 lb 11.2 oz (101.5 kg)   18 221 lb (100.2 kg)              We Performed the Following     CBC with platelets     EKG 12-lead complete w/read - (Clinic Performed)     Estimated Average Glucose     Hemoglobin A1c          Today's Medication Changes          These changes are accurate as of 18 11:59 PM.  If you have any questions, ask your nurse or doctor.               These medicines have changed or have updated prescriptions.        Dose/Directions    fluticasone 50 MCG/ACT spray   Commonly known as:  FLONASE   This may have changed:    - when to take this  - reasons to take this   Used for:  Acute maxillary sinusitis        Dose:  2 spray   Spray 2 sprays into both nostrils daily   Quantity:  1 Package   Refills:  0       sitagliptin 100 MG tablet   Commonly known as:  JANUVIA   This may have changed:  See the new instructions.   Used for:  Type 2 diabetes mellitus with stage 3 chronic kidney disease, with long-term current use of insulin (H)   Changed by:  Lorelei Felix MD        Dose:  100 mg   Take 1 tablet (100 mg) by mouth daily   Quantity:  90 tablet   Refills:  3            Where to get your medicines      These medications were sent to [id number] [place]  [address] AT [facility] [address], [place] [address]    "  Phone:  [phone]     sitagliptin 100 MG tablet                Primary Care Provider Office Phone # Fax #    Lorelei GILLESPIE [address] [phone] [address] [facility] [address]        Equal Access to Services     BALJINDERBALDEV SHAYY : Cassi lao mariellengoc Somarisabelali, waaxda luqadaha, qaybta kaalmada adesaminayaemerson, manan alma delia fionaolivia zapata soledadlouise aviles. So [facility] [phone].    ATENCIÓN: Si habla español, tiene a breaux disposición servicios gratuitos de asistencia lingüística. Llame al [phone].    We comply with applicable federal civil rights laws and [place] laws. We do not discriminate on the basis of race, color, national origin, age, disability, sex, sexual orientation, or gender identity.            Thank you!     Thank you for choosing [facility]  for your care. Our goal is always to provide you with excellent care. Hearing back from our patients is one way we can continue to improve our services. Please take a few minutes to complete the written survey that you may receive in the mail after your visit with us. Thank you!             Your Updated Medication List - Protect others around you: Learn how to safely use, store and throw away your medicines at www.disposemymeds.org.          This list is accurate as of 4/12/18 11:59 PM.  Always use your most recent med list.                   Brand Name Dispense Instructions for use Diagnosis    ACCU-CHEK VLAD PLUS test strip   Generic drug:  blood glucose monitoring     100 strip    USE TO TEST BLOOD SUGARS THREE TIMES DAILY    Diabetes mellitus, type 2 (H)       acetaminophen 500 MG tablet    TYLENOL     Take 1-2 tablets (500-1,000 mg) by mouth every 6 hours as needed    Pulmonary embolism (H)       amLODIPine 10 MG tablet    NORVASC    90 tablet    TAKE 1 TABLET(10 MG) BY MOUTH DAILY    Benign essential hypertension       aspirin 81 MG EC tablet     90 tablet    Take 1 tablet (81 mg) by mouth daily    Diabetes  mellitus, type 2 (H)       BASAGLAR 100 UNIT/ML injection     18 mL    Inject 19 Units Subcutaneous At Bedtime    Type 2 diabetes mellitus with stage 3 chronic kidney disease, with long-term current use of insulin (H)       CLARITIN 10 MG tablet   Generic drug:  loratadine      Take 10 mg by mouth daily.        COMBIGAN 0.2-0.5 % ophthalmic solution   Generic drug:  brimonidine-timolol      1 drop 2 times daily Morning and evening, 12 hours apart        fenofibrate 48 MG tablet     90 tablet    TAKE 1 TABLET(48 MG) BY MOUTH DAILY    Hyperlipidemia, unspecified hyperlipidemia type       fish oil-omega-3 fatty acids 1000 MG capsule      Take 1 capsule by mouth 3 times daily.        fluticasone 50 MCG/ACT spray    FLONASE    1 Package    Spray 2 sprays into both nostrils daily    Acute maxillary sinusitis       indomethacin 50 MG capsule    INDOCIN    30 capsule    Take 1 capsule (50 mg) by mouth 3 times daily With food as needed    Gout, unspecified       insulin pen needle 31G X 6 MM     100 each    Use 1 daily or as directed.    Type 2 diabetes mellitus with stage 3 chronic kidney disease, with long-term current use of insulin (H)       lisinopril 40 MG tablet    PRINIVIL/ZESTRIL    90 tablet    TAKE 1 TABLET(40 MG) BY MOUTH DAILY    Benign essential hypertension       pilocarpine 1 % ophthalmic solution    PILOCAR     Place 1 drop into both eyes 4 times daily        sitagliptin 100 MG tablet    JANUVIA    90 tablet    Take 1 tablet (100 mg) by mouth daily    Type 2 diabetes mellitus with stage 3 chronic kidney disease, with long-term current use of insulin (H)       STATIN NOT PRESCRIBED (INTENTIONAL)     0 each    Statin not prescribed intentionally due to Intolerance (with supporting documentation of trying a statin at least once within the last 5 years)    Hyperlipidemia, unspecified hyperlipidemia       terazosin 10 MG capsule    HYTRIN    90 capsule    TAKE 1 CAPSULE(10 MG) BY MOUTH AT BEDTIME    Benign  essential hypertension       TRAVATAN Z 0.004 % ophthalmic solution   Generic drug:  travoprost (DARION Free)      Instill 1 drop by ophthalmic route every day into affected eye(s) in the evening        warfarin 5 MG tablet    COUMADIN    90 tablet    SEE NOTES    Pulmonary embolism and infarction (H)

## 2018-04-13 NOTE — OR NURSING
Huy Chavis had his pre op today with Dr. Wang for cataract surgery with Dr. Almaraz on 4/18/18.  We will draw an INR on the DOS.       Thank you!      Sent to coumadin clinic.

## 2018-04-13 NOTE — H&P (VIEW-ONLY)
[facility]  [address]  [place] [address]  [phone]  Dept: [phone]    PRE-OP EVALUATION:  Today's date: 2018    Clement Voss (: 1945) presents for pre-operative evaluation assessment as requested by Dr. Arthur.  He requires evaluation and anesthesia risk assessment prior to undergoing surgery/procedure for treatment of Left Eye and Right Eye Cataract .    Proposed Surgery/ Procedure: Left Eye Cataract, Right Eye Cataract  Date of Surgery/ Procedure: 18,18  Time of Surgery/ Procedure: [facility]  Hospital/Surgical Facility: [facility]  [facility] to fax  Primary Physician: Lorelei Felix  Type of Anesthesia Anticipated: to be determined    Patient has a Health Care Directive or Living Will:  NO    1. NO - Do you have a history of heart attack, stroke, stent, bypass or surgery on an artery in the head, neck, heart or legs?  2. NO - Do you ever have any pain or discomfort in your chest?  3. NO - Do you have a history of  Heart Failure?  4. NO - Are you troubled by shortness of breath when: walking on the level, up a slight hill or at night?  5. NO - Do you currently have a cold, bronchitis or other respiratory infection?  6. NO - Do you have a cough, shortness of breath or wheezing?  7. NO - Do you sometimes get pains in the calves of your legs when you walk?  8. NO - Do you or anyone in your family have previous history of blood clots?  9. NO - Do you or does anyone in your family have a serious bleeding problem such as prolonged bleeding following surgeries or cuts?  10. YES - HAVE YOU EVER HAD PROBLEMS WITH ANEMIA OR BEEN TOLD TO TAKE IRON PILLS? Iron pills previously  11. NO - Have you had any abnormal blood loss such as black, tarry or bloody stools, or abnormal vaginal bleeding?  12. NO - Have you ever had a blood transfusion?  13. NO - Have you or any of your relatives ever had problems with anesthesia?  14. YES - DO YOU HAVE SLEEP APNEA, EXCESSIVE SNORING  OR DAYTIME DROWSINESS? drowsiness on occasion  15. NO - Do you have any prosthetic heart valves?  16. NO - Do you have prosthetic joints?  17. NO - Is there any chance that you may be pregnant?      HPI:     HPI related to upcoming procedure: Bilateral cataracts      DIABETES - Patient has a longstanding history of DiabetesType Type II . Patient is being treated with diet, oral agents and insulin injections and denies significant side effects. Control has been good. Complicating factors include but are not limited to: hypertension and hyperlipidemia.                                                                                                                            .  HYPERLIPIDEMIA - Patient has a long history of significant Hyperlipidemia requiring medication for treatment with recent good control. Patient reports no problems or side effects with the medication.                                                                                                                                                       .  HYPERTENSION - Patient has longstanding history of HTN , currently denies any symptoms referable to elevated blood pressure. Specifically denies chest pain, palpitations, dyspnea, orthopnea, PND or peripheral edema. Blood pressure readings have been in normal range. Current medication regimen is as listed below. Patient denies any side effects of medication.                                                                                                                                                                                          .    MEDICAL HISTORY:     Patient Active Problem List    Diagnosis Date Noted     Morbid obesity (H) 05/26/2017     Priority: Medium     Long-term (current) use of anticoagulants [Z79.01] 08/04/2016     Priority: Medium     ACP (advance care planning) 03/14/2013     Priority: Medium     Advance Care Planning 8/26/2016: ACP Review of Chart / Resources Provided:   Reviewed chart for advance care plan.  Clement Voss has no plan or code status on file. Discussed available resources and provided with information. Confirmed code status reflects current choices pending further ACP discussions.  Confirmed/documented legally designated decision makers.  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/2004     Priority: Medium      Past Medical History:   Diagnosis Date     DVT (deep venous thrombosis) 9/24/2012     DVT (deep venous thrombosis) (H)      Gout, unspecified 1/10/2011     Other and unspecified hyperlipidemia 10/10/2005     Pulmonary embolism 9/24/2012     Pulmonary embolism (H)      Type II or unspecified type diabetes mellitus without mention of complication, not stated as uncontrolled 8/2/2004     Unspecified essential hypertension 1/3/2005     Past Surgical History:   Procedure Laterality Date     APPENDECTOMY  2008     CHOLECYSTECTOMY  1984     History of PE of right lung 3/2013[       Current Outpatient Prescriptions   Medication Sig Dispense Refill     sitagliptin (JANUVIA) 100 MG tablet Take 1 tablet (100 mg) by mouth daily 90 tablet 3     amLODIPine (NORVASC) 10 MG tablet TAKE 1 TABLET(10 MG) BY MOUTH DAILY 90 tablet 1     BASAGLAR 100 UNIT/ML injection Inject 19 Units Subcutaneous At Bedtime 18 mL 1     lisinopril (PRINIVIL/ZESTRIL) 40 MG tablet TAKE 1 TABLET(40 MG) BY MOUTH DAILY 90 tablet 0     ACCU-CHEK VLAD PLUS test strip USE TO TEST BLOOD SUGARS THREE TIMES DAILY 100 strip 2     terazosin (HYTRIN) 10 MG capsule TAKE 1 CAPSULE(10 MG) BY MOUTH AT  BEDTIME 90 capsule 3     warfarin (COUMADIN) 5 MG tablet SEE NOTES 90 tablet 3     fenofibrate 48 MG tablet TAKE 1 TABLET(48 MG) BY MOUTH DAILY 90 tablet 2     insulin pen needle 31G X 6 MM Use 1 daily or as directed. 100 each prn     pilocarpine (PILOCAR) 1 % ophthalmic solution Place 1 drop into both eyes 4 times daily       STATIN NOT PRESCRIBED, INTENTIONAL, Statin not prescribed intentionally due to Intolerance (with supporting documentation of trying a statin at least once within the last 5 years) 0 each 0     fluticasone (FLONASE) 50 MCG/ACT nasal spray Spray 2 sprays into both nostrils daily (Patient taking differently: Spray 2 sprays into both nostrils daily as needed ) 1 Package 0     indomethacin (INDOCIN) 50 MG capsule Take 1 capsule (50 mg) by mouth 3 times daily With food as needed 30 capsule 1     brimonidine-timolol (COMBIGAN) 0.2-0.5 % ophthalmic solution 1 drop 2 times daily Morning and evening, 12 hours apart       acetaminophen (TYLENOL) 500 MG tablet Take 1-2 tablets (500-1,000 mg) by mouth every 6 hours as needed       aspirin 81 MG EC tablet Take 1 tablet (81 mg) by mouth daily 90 tablet 3     loratadine (CLARITIN) 10 MG tablet Take 10 mg by mouth daily.       fish oil-omega-3 fatty acids (FISH OIL) 1000 MG capsule Take 1 capsule by mouth 3 times daily.       TRAVATAN Z (TRAVATAN Z) 0.004 % ophthalmic solution Instill 1 drop by ophthalmic route every day into affected eye(s) in the evening       OTC products: None, except as noted above    Allergies   Allergen Reactions     Atorvastatin      Other reaction(s): Other  Caused increased creatinine.      Atorvastatin Calcium Other (See Comments)     Lipitor  Increase CR     Iodine      Penicillins      Sulfa Drugs      Sulfa (sulfonamide antibiotics)     Sulfacetamide       Latex Allergy: NO    Social History   Substance Use Topics     Smoking status: Former Smoker     Types: Cigarettes     Quit date: 8/12/1964     Smokeless tobacco: Never Used  "    Alcohol use No     History   Drug Use No       REVIEW OF SYSTEMS:   Constitutional, neuro, ENT, endocrine, pulmonary, cardiac, gastrointestinal, genitourinary, musculoskeletal, integument and psychiatric systems are negative, except as otherwise noted.    EXAM:   /62 (BP Location: Left arm, Patient Position: Sitting, Cuff Size: Adult Regular)  Pulse 64  Temp 97  F (36.1  C) (Tympanic)  Resp 18  Ht 5' 7\" (1.702 m)  Wt 219 lb (99.3 kg)  SpO2 97%  BMI 34.3 kg/m2    GENERAL APPEARANCE: healthy, alert and no distress     EYES: EOMI,  PERRL     HENT: ear canals and TM's normal and nose and mouth without ulcers or lesions     NECK: no adenopathy     RESP: lungs clear to auscultation - no rales, rhonchi or wheezes     CV: regular rates and rhythm, normal S1 S2, no S3 or S4 and no murmur, click or rub     ABDOMEN:  soft, nontender, no HSM or masses and bowel sounds normal     SKIN: no suspicious lesions or rashes     NEURO: Normal strength and tone, sensory exam grossly normal, mentation intact and speech normal     PSYCH: mentation appears normal. and affect normal/bright    DIAGNOSTICS:     EKG: mild sinus bradycardia, normal axis, normal intervals, no acute ST/T changes c/w ischemia, no LVH by voltage criteria, unchanged from previous tracings    Labs Resulted Today:   Results for orders placed or performed in visit on 04/12/18   CBC with platelets   Result Value Ref Range    WBC 5.9 4.0 - 11.0 10e9/L    RBC Count 3.76 (L) 4.4 - 5.9 10e12/L    Hemoglobin 12.8 (L) 13.3 - 17.7 g/dL    Hematocrit 35.3 (L) 40.0 - 53.0 %    MCV 94 78 - 100 fl    MCH 34.0 (H) 26.5 - 33.0 pg    MCHC 36.3 31.5 - 36.5 g/dL    RDW 15.3 (H) 10.0 - 15.0 %    Platelet Count 114 (L) 150 - 450 10e9/L   Hemoglobin A1c   Result Value Ref Range    Hemoglobin A1C 7.9 (H) 0 - 6.4 %   Estimated Average Glucose   Result Value Ref Range    Estimated Average Glucose 180 mg/dL       Recent Labs   Lab Test  04/11/18   1017  03/14/18   1118  " 02/21/18   0740   11/28/17   0824   07/24/17   1710   07/12/16   1502   HGB   --    --    --    --    --    --   12.8*   --   12.5*   PLT   --    --    --    --    --    --   108*   --   118*   INR  2.2*  3.2*  1.9*   < >  2.1*   < >  1.58*   < >   --    NA   --    --   144   --   143   < >  143   < >  145*   POTASSIUM   --    --   4.2   --   4.0   < >  3.9   < >  4.0   CR   --    --   1.48*   --   1.46*   < >  1.40*   < >  1.47*   A1C   --    --   7.3*   --   7.6*   < >   --    < >   --     < > = values in this interval not displayed.        IMPRESSION:   Reason for surgery/procedure: Cataracts  Diagnosis/reason for consult: Cardiopulmonary clearance    The proposed surgical procedure is considered INTERMEDIATE risk.    REVISED CARDIAC RISK INDEX  The patient has the following serious cardiovascular risks for perioperative complications such as (MI, PE, VFib and 3  AV Block):  Diabetes Mellitus (on Insulin)  INTERPRETATION: 1 risks: Class II (low risk - 0.9% complication rate)    The patient has the following additional risks for perioperative complications:  No identified additional risks      ICD-10-CM    1. Preop general physical exam Z01.818 CBC with platelets     Hemoglobin A1c     EKG 12-lead complete w/read - (Clinic Performed)     Estimated Average Glucose   2. Type 2 diabetes mellitus with stage 3 chronic kidney disease, with long-term current use of insulin (H) E11.22 sitagliptin (JANUVIA) 100 MG tablet    N18.3 Hemoglobin A1c    Z79.4    3. Hyperlipidemia, unspecified hyperlipidemia type E78.5    4. Benign essential hypertension I10        RECOMMENDATIONS:       Cardiovascular Risk  Performs 4 METs exercise without symptoms (Light housework (dusting, washing dishes) and Walk on level ground at 15 minutes per mile (4 miles/hour)) .       --Patient is to take all scheduled medications on the day of surgery EXCEPT for modifications listed below.    Diabetes Medication Use  Hold evening insulin if needs to  be NPO the next morning      APPROVAL GIVEN to proceed with proposed procedure, without further diagnostic evaluation       Signed Electronically by: Lorelei Felix MD    Copy of this evaluation report is provided to requesting physician.    [facility] Preop Guidelines    Revised Cardiac Risk Index

## 2018-04-18 NOTE — H&P (VIEW-ONLY)
[facility]  [address]  [place] [address]  [phone]  Dept: [phone]    PRE-OP EVALUATION:  Today's date: 2018    Clement Voss (: 1945) presents for pre-operative evaluation assessment as requested by Dr. Arthur.  He requires evaluation and anesthesia risk assessment prior to undergoing surgery/procedure for treatment of Left Eye and Right Eye Cataract .    Proposed Surgery/ Procedure: Left Eye Cataract, Right Eye Cataract  Date of Surgery/ Procedure: 18,18  Time of Surgery/ Procedure: [facility]  Hospital/Surgical Facility: [facility]  [facility] to fax  Primary Physician: Lorelei Felix  Type of Anesthesia Anticipated: to be determined    Patient has a Health Care Directive or Living Will:  NO    1. NO - Do you have a history of heart attack, stroke, stent, bypass or surgery on an artery in the head, neck, heart or legs?  2. NO - Do you ever have any pain or discomfort in your chest?  3. NO - Do you have a history of  Heart Failure?  4. NO - Are you troubled by shortness of breath when: walking on the level, up a slight hill or at night?  5. NO - Do you currently have a cold, bronchitis or other respiratory infection?  6. NO - Do you have a cough, shortness of breath or wheezing?  7. NO - Do you sometimes get pains in the calves of your legs when you walk?  8. NO - Do you or anyone in your family have previous history of blood clots?  9. NO - Do you or does anyone in your family have a serious bleeding problem such as prolonged bleeding following surgeries or cuts?  10. YES - HAVE YOU EVER HAD PROBLEMS WITH ANEMIA OR BEEN TOLD TO TAKE IRON PILLS? Iron pills previously  11. NO - Have you had any abnormal blood loss such as black, tarry or bloody stools, or abnormal vaginal bleeding?  12. NO - Have you ever had a blood transfusion?  13. NO - Have you or any of your relatives ever had problems with anesthesia?  14. YES - DO YOU HAVE SLEEP APNEA, EXCESSIVE SNORING  OR DAYTIME DROWSINESS? drowsiness on occasion  15. NO - Do you have any prosthetic heart valves?  16. NO - Do you have prosthetic joints?  17. NO - Is there any chance that you may be pregnant?      HPI:     HPI related to upcoming procedure: Bilateral cataracts      DIABETES - Patient has a longstanding history of DiabetesType Type II . Patient is being treated with diet, oral agents and insulin injections and denies significant side effects. Control has been good. Complicating factors include but are not limited to: hypertension and hyperlipidemia.                                                                                                                            .  HYPERLIPIDEMIA - Patient has a long history of significant Hyperlipidemia requiring medication for treatment with recent good control. Patient reports no problems or side effects with the medication.                                                                                                                                                       .  HYPERTENSION - Patient has longstanding history of HTN , currently denies any symptoms referable to elevated blood pressure. Specifically denies chest pain, palpitations, dyspnea, orthopnea, PND or peripheral edema. Blood pressure readings have been in normal range. Current medication regimen is as listed below. Patient denies any side effects of medication.                                                                                                                                                                                          .    MEDICAL HISTORY:     Patient Active Problem List    Diagnosis Date Noted     Morbid obesity (H) 05/26/2017     Priority: Medium     Long-term (current) use of anticoagulants [Z79.01] 08/04/2016     Priority: Medium     ACP (advance care planning) 03/14/2013     Priority: Medium     Advance Care Planning 8/26/2016: ACP Review of Chart / Resources Provided:   Reviewed chart for advance care plan.  Clement Voss has no plan or code status on file. Discussed available resources and provided with information. Confirmed code status reflects current choices pending further ACP discussions.  Confirmed/documented legally designated decision makers.  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/2004     Priority: Medium      Past Medical History:   Diagnosis Date     DVT (deep venous thrombosis) 9/24/2012     DVT (deep venous thrombosis) (H)      Gout, unspecified 1/10/2011     Other and unspecified hyperlipidemia 10/10/2005     Pulmonary embolism 9/24/2012     Pulmonary embolism (H)      Type II or unspecified type diabetes mellitus without mention of complication, not stated as uncontrolled 8/2/2004     Unspecified essential hypertension 1/3/2005     Past Surgical History:   Procedure Laterality Date     APPENDECTOMY  2008     CHOLECYSTECTOMY  1984     History of PE of right lung 3/2013[       Current Outpatient Prescriptions   Medication Sig Dispense Refill     sitagliptin (JANUVIA) 100 MG tablet Take 1 tablet (100 mg) by mouth daily 90 tablet 3     amLODIPine (NORVASC) 10 MG tablet TAKE 1 TABLET(10 MG) BY MOUTH DAILY 90 tablet 1     BASAGLAR 100 UNIT/ML injection Inject 19 Units Subcutaneous At Bedtime 18 mL 1     lisinopril (PRINIVIL/ZESTRIL) 40 MG tablet TAKE 1 TABLET(40 MG) BY MOUTH DAILY 90 tablet 0     ACCU-CHEK VLAD PLUS test strip USE TO TEST BLOOD SUGARS THREE TIMES DAILY 100 strip 2     terazosin (HYTRIN) 10 MG capsule TAKE 1 CAPSULE(10 MG) BY MOUTH AT  BEDTIME 90 capsule 3     warfarin (COUMADIN) 5 MG tablet SEE NOTES 90 tablet 3     fenofibrate 48 MG tablet TAKE 1 TABLET(48 MG) BY MOUTH DAILY 90 tablet 2     insulin pen needle 31G X 6 MM Use 1 daily or as directed. 100 each prn     pilocarpine (PILOCAR) 1 % ophthalmic solution Place 1 drop into both eyes 4 times daily       STATIN NOT PRESCRIBED, INTENTIONAL, Statin not prescribed intentionally due to Intolerance (with supporting documentation of trying a statin at least once within the last 5 years) 0 each 0     fluticasone (FLONASE) 50 MCG/ACT nasal spray Spray 2 sprays into both nostrils daily (Patient taking differently: Spray 2 sprays into both nostrils daily as needed ) 1 Package 0     indomethacin (INDOCIN) 50 MG capsule Take 1 capsule (50 mg) by mouth 3 times daily With food as needed 30 capsule 1     brimonidine-timolol (COMBIGAN) 0.2-0.5 % ophthalmic solution 1 drop 2 times daily Morning and evening, 12 hours apart       acetaminophen (TYLENOL) 500 MG tablet Take 1-2 tablets (500-1,000 mg) by mouth every 6 hours as needed       aspirin 81 MG EC tablet Take 1 tablet (81 mg) by mouth daily 90 tablet 3     loratadine (CLARITIN) 10 MG tablet Take 10 mg by mouth daily.       fish oil-omega-3 fatty acids (FISH OIL) 1000 MG capsule Take 1 capsule by mouth 3 times daily.       TRAVATAN Z (TRAVATAN Z) 0.004 % ophthalmic solution Instill 1 drop by ophthalmic route every day into affected eye(s) in the evening       OTC products: None, except as noted above    Allergies   Allergen Reactions     Atorvastatin      Other reaction(s): Other  Caused increased creatinine.      Atorvastatin Calcium Other (See Comments)     Lipitor  Increase CR     Iodine      Penicillins      Sulfa Drugs      Sulfa (sulfonamide antibiotics)     Sulfacetamide       Latex Allergy: NO    Social History   Substance Use Topics     Smoking status: Former Smoker     Types: Cigarettes     Quit date: 8/12/1964     Smokeless tobacco: Never Used  "    Alcohol use No     History   Drug Use No       REVIEW OF SYSTEMS:   Constitutional, neuro, ENT, endocrine, pulmonary, cardiac, gastrointestinal, genitourinary, musculoskeletal, integument and psychiatric systems are negative, except as otherwise noted.    EXAM:   /62 (BP Location: Left arm, Patient Position: Sitting, Cuff Size: Adult Regular)  Pulse 64  Temp 97  F (36.1  C) (Tympanic)  Resp 18  Ht 5' 7\" (1.702 m)  Wt 219 lb (99.3 kg)  SpO2 97%  BMI 34.3 kg/m2    GENERAL APPEARANCE: healthy, alert and no distress     EYES: EOMI,  PERRL     HENT: ear canals and TM's normal and nose and mouth without ulcers or lesions     NECK: no adenopathy     RESP: lungs clear to auscultation - no rales, rhonchi or wheezes     CV: regular rates and rhythm, normal S1 S2, no S3 or S4 and no murmur, click or rub     ABDOMEN:  soft, nontender, no HSM or masses and bowel sounds normal     SKIN: no suspicious lesions or rashes     NEURO: Normal strength and tone, sensory exam grossly normal, mentation intact and speech normal     PSYCH: mentation appears normal. and affect normal/bright    DIAGNOSTICS:     EKG: mild sinus bradycardia, normal axis, normal intervals, no acute ST/T changes c/w ischemia, no LVH by voltage criteria, unchanged from previous tracings    Labs Resulted Today:   Results for orders placed or performed in visit on 04/12/18   CBC with platelets   Result Value Ref Range    WBC 5.9 4.0 - 11.0 10e9/L    RBC Count 3.76 (L) 4.4 - 5.9 10e12/L    Hemoglobin 12.8 (L) 13.3 - 17.7 g/dL    Hematocrit 35.3 (L) 40.0 - 53.0 %    MCV 94 78 - 100 fl    MCH 34.0 (H) 26.5 - 33.0 pg    MCHC 36.3 31.5 - 36.5 g/dL    RDW 15.3 (H) 10.0 - 15.0 %    Platelet Count 114 (L) 150 - 450 10e9/L   Hemoglobin A1c   Result Value Ref Range    Hemoglobin A1C 7.9 (H) 0 - 6.4 %   Estimated Average Glucose   Result Value Ref Range    Estimated Average Glucose 180 mg/dL       Recent Labs   Lab Test  04/11/18   1017  03/14/18   1118  " 02/21/18   0740   11/28/17   0824   07/24/17   1710   07/12/16   1502   HGB   --    --    --    --    --    --   12.8*   --   12.5*   PLT   --    --    --    --    --    --   108*   --   118*   INR  2.2*  3.2*  1.9*   < >  2.1*   < >  1.58*   < >   --    NA   --    --   144   --   143   < >  143   < >  145*   POTASSIUM   --    --   4.2   --   4.0   < >  3.9   < >  4.0   CR   --    --   1.48*   --   1.46*   < >  1.40*   < >  1.47*   A1C   --    --   7.3*   --   7.6*   < >   --    < >   --     < > = values in this interval not displayed.        IMPRESSION:   Reason for surgery/procedure: Cataracts  Diagnosis/reason for consult: Cardiopulmonary clearance    The proposed surgical procedure is considered INTERMEDIATE risk.    REVISED CARDIAC RISK INDEX  The patient has the following serious cardiovascular risks for perioperative complications such as (MI, PE, VFib and 3  AV Block):  Diabetes Mellitus (on Insulin)  INTERPRETATION: 1 risks: Class II (low risk - 0.9% complication rate)    The patient has the following additional risks for perioperative complications:  No identified additional risks      ICD-10-CM    1. Preop general physical exam Z01.818 CBC with platelets     Hemoglobin A1c     EKG 12-lead complete w/read - (Clinic Performed)     Estimated Average Glucose   2. Type 2 diabetes mellitus with stage 3 chronic kidney disease, with long-term current use of insulin (H) E11.22 sitagliptin (JANUVIA) 100 MG tablet    N18.3 Hemoglobin A1c    Z79.4    3. Hyperlipidemia, unspecified hyperlipidemia type E78.5    4. Benign essential hypertension I10        RECOMMENDATIONS:       Cardiovascular Risk  Performs 4 METs exercise without symptoms (Light housework (dusting, washing dishes) and Walk on level ground at 15 minutes per mile (4 miles/hour)) .       --Patient is to take all scheduled medications on the day of surgery EXCEPT for modifications listed below.    Diabetes Medication Use  Hold evening insulin if needs to  be NPO the next morning      APPROVAL GIVEN to proceed with proposed procedure, without further diagnostic evaluation       Signed Electronically by: Lorelei Felix MD    Copy of this evaluation report is provided to requesting physician.    [facility] Preop Guidelines    Revised Cardiac Risk Index

## 2018-04-19 ENCOUNTER — ANESTHESIA EVENT (OUTPATIENT)
Dept: SURGERY | Facility: HOSPITAL | Age: 73
End: 2018-04-19
Payer: MEDICARE

## 2018-04-19 ENCOUNTER — ANESTHESIA (OUTPATIENT)
Dept: SURGERY | Facility: HOSPITAL | Age: 73
End: 2018-04-19
Payer: MEDICARE

## 2018-04-19 ENCOUNTER — HOSPITAL ENCOUNTER (OUTPATIENT)
Facility: HOSPITAL | Age: 73
Discharge: HOME | End: 2018-04-19
Attending: OPHTHALMOLOGY | Admitting: OPHTHALMOLOGY
Payer: MEDICARE

## 2018-04-19 ENCOUNTER — TRANSFERRED RECORDS (OUTPATIENT)
Dept: HEALTH INFORMATION MANAGEMENT | Facility: HOSPITAL | Age: 73
End: 2018-04-19

## 2018-04-19 VITALS
HEIGHT: 67 IN | SYSTOLIC BLOOD PRESSURE: 142 MMHG | WEIGHT: 222 LBS | HEART RATE: 57 BPM | RESPIRATION RATE: 16 BRPM | BODY MASS INDEX: 34.84 KG/M2 | TEMPERATURE: 97.9 F | DIASTOLIC BLOOD PRESSURE: 61 MMHG | OXYGEN SATURATION: 94 %

## 2018-04-19 DIAGNOSIS — Z01.818 PRE-OP TESTING: Primary | ICD-10-CM

## 2018-04-19 LAB
GLUCOSE BLDC GLUCOMTR-MCNC: 161 MG/DL (ref 70–99)
INR PPP: 1.91 (ref 0.8–1.2)

## 2018-04-19 PROCEDURE — 37000009 ZZH ANESTHESIA TECHNICAL FEE, EACH ADDTL 15 MIN: Performed by: OPHTHALMOLOGY

## 2018-04-19 PROCEDURE — 36415 COLL VENOUS BLD VENIPUNCTURE: CPT | Performed by: NURSE ANESTHETIST, CERTIFIED REGISTERED

## 2018-04-19 PROCEDURE — 40000306 ZZH STATISTIC PRE PROC ASSESS II: Performed by: OPHTHALMOLOGY

## 2018-04-19 PROCEDURE — 36000058 ZZH SURGERY LEVEL 3 EA 15 ADDTL MIN: Performed by: OPHTHALMOLOGY

## 2018-04-19 PROCEDURE — 85610 PROTHROMBIN TIME: CPT | Performed by: NURSE ANESTHETIST, CERTIFIED REGISTERED

## 2018-04-19 PROCEDURE — 99100 ANES PT EXTEME AGE<1 YR&>70: CPT | Performed by: NURSE ANESTHETIST, CERTIFIED REGISTERED

## 2018-04-19 PROCEDURE — 66984 XCAPSL CTRC RMVL W/O ECP: CPT | Performed by: NURSE ANESTHETIST, CERTIFIED REGISTERED

## 2018-04-19 PROCEDURE — 25000128 H RX IP 250 OP 636: Performed by: OPHTHALMOLOGY

## 2018-04-19 PROCEDURE — 36000056 ZZH SURGERY LEVEL 3 1ST 30 MIN: Performed by: OPHTHALMOLOGY

## 2018-04-19 PROCEDURE — 37000008 ZZH ANESTHESIA TECHNICAL FEE, 1ST 30 MIN: Performed by: OPHTHALMOLOGY

## 2018-04-19 PROCEDURE — 71000027 ZZH RECOVERY PHASE 2 EACH 15 MINS: Performed by: OPHTHALMOLOGY

## 2018-04-19 PROCEDURE — 27210794 ZZH OR GENERAL SUPPLY STERILE: Performed by: OPHTHALMOLOGY

## 2018-04-19 PROCEDURE — V2632 POST CHMBR INTRAOCULAR LENS: HCPCS | Performed by: OPHTHALMOLOGY

## 2018-04-19 PROCEDURE — 82962 GLUCOSE BLOOD TEST: CPT

## 2018-04-19 PROCEDURE — C1783 OCULAR IMP, AQUEOUS DRAIN DE: HCPCS | Performed by: OPHTHALMOLOGY

## 2018-04-19 PROCEDURE — 25000125 ZZHC RX 250: Performed by: OPHTHALMOLOGY

## 2018-04-19 DEVICE — ISTENT-INJECT: Type: IMPLANTABLE DEVICE | Site: EYE | Status: FUNCTIONAL

## 2018-04-19 DEVICE — IMPLANTABLE DEVICE: Type: IMPLANTABLE DEVICE | Site: EYE | Status: FUNCTIONAL

## 2018-04-19 RX ORDER — SODIUM CHLORIDE, SODIUM LACTATE, POTASSIUM CHLORIDE, CALCIUM CHLORIDE 600; 310; 30; 20 MG/100ML; MG/100ML; MG/100ML; MG/100ML
INJECTION, SOLUTION INTRAVENOUS CONTINUOUS
Status: DISCONTINUED | OUTPATIENT
Start: 2018-04-19 | End: 2018-04-19 | Stop reason: HOSPADM

## 2018-04-19 RX ORDER — PHENYLEPHRINE HYDROCHLORIDE 100 MG/ML
1 SOLUTION/ DROPS OPHTHALMIC ONCE
Status: COMPLETED | OUTPATIENT
Start: 2018-04-19 | End: 2018-04-19

## 2018-04-19 RX ORDER — PREDNISOLONE ACETATE 10 MG/ML
SUSPENSION/ DROPS OPHTHALMIC PRN
Status: DISCONTINUED | OUTPATIENT
Start: 2018-04-19 | End: 2018-04-19 | Stop reason: HOSPADM

## 2018-04-19 RX ORDER — HYDRALAZINE HYDROCHLORIDE 20 MG/ML
2.5-5 INJECTION INTRAMUSCULAR; INTRAVENOUS EVERY 10 MIN PRN
Status: DISCONTINUED | OUTPATIENT
Start: 2018-04-19 | End: 2018-04-19 | Stop reason: HOSPADM

## 2018-04-19 RX ORDER — FENTANYL CITRATE 50 UG/ML
25-50 INJECTION, SOLUTION INTRAMUSCULAR; INTRAVENOUS
Status: DISCONTINUED | OUTPATIENT
Start: 2018-04-19 | End: 2018-04-19 | Stop reason: HOSPADM

## 2018-04-19 RX ORDER — LABETALOL HYDROCHLORIDE 5 MG/ML
10 INJECTION, SOLUTION INTRAVENOUS
Status: DISCONTINUED | OUTPATIENT
Start: 2018-04-19 | End: 2018-04-19 | Stop reason: HOSPADM

## 2018-04-19 RX ORDER — ALBUTEROL SULFATE 0.83 MG/ML
2.5 SOLUTION RESPIRATORY (INHALATION) EVERY 4 HOURS PRN
Status: DISCONTINUED | OUTPATIENT
Start: 2018-04-19 | End: 2018-04-19 | Stop reason: HOSPADM

## 2018-04-19 RX ORDER — ONDANSETRON 4 MG/1
4 TABLET, ORALLY DISINTEGRATING ORAL EVERY 30 MIN PRN
Status: DISCONTINUED | OUTPATIENT
Start: 2018-04-19 | End: 2018-04-19 | Stop reason: HOSPADM

## 2018-04-19 RX ORDER — CYCLOPENTOLATE HYDROCHLORIDE 10 MG/ML
1 SOLUTION/ DROPS OPHTHALMIC
Status: DISCONTINUED | OUTPATIENT
Start: 2018-04-19 | End: 2018-04-19 | Stop reason: HOSPADM

## 2018-04-19 RX ORDER — PROPARACAINE HYDROCHLORIDE 5 MG/ML
1 SOLUTION/ DROPS OPHTHALMIC ONCE
Status: COMPLETED | OUTPATIENT
Start: 2018-04-19 | End: 2018-04-19

## 2018-04-19 RX ORDER — OFLOXACIN 3 MG/ML
SOLUTION/ DROPS OPHTHALMIC PRN
Status: DISCONTINUED | OUTPATIENT
Start: 2018-04-19 | End: 2018-04-19 | Stop reason: HOSPADM

## 2018-04-19 RX ORDER — ONDANSETRON 2 MG/ML
4 INJECTION INTRAMUSCULAR; INTRAVENOUS EVERY 30 MIN PRN
Status: DISCONTINUED | OUTPATIENT
Start: 2018-04-19 | End: 2018-04-19 | Stop reason: HOSPADM

## 2018-04-19 RX ORDER — OFLOXACIN 3 MG/ML
1 SOLUTION/ DROPS OPHTHALMIC
Status: COMPLETED | OUTPATIENT
Start: 2018-04-19 | End: 2018-04-19

## 2018-04-19 RX ORDER — TETRACAINE HYDROCHLORIDE 5 MG/ML
SOLUTION OPHTHALMIC PRN
Status: DISCONTINUED | OUTPATIENT
Start: 2018-04-19 | End: 2018-04-19 | Stop reason: HOSPADM

## 2018-04-19 RX ORDER — MEPERIDINE HYDROCHLORIDE 25 MG/ML
12.5 INJECTION INTRAMUSCULAR; INTRAVENOUS; SUBCUTANEOUS
Status: DISCONTINUED | OUTPATIENT
Start: 2018-04-19 | End: 2018-04-19 | Stop reason: HOSPADM

## 2018-04-19 RX ORDER — PHENYLEPHRINE HYDROCHLORIDE 25 MG/ML
1 SOLUTION/ DROPS OPHTHALMIC
Status: DISCONTINUED | OUTPATIENT
Start: 2018-04-19 | End: 2018-04-19 | Stop reason: HOSPADM

## 2018-04-19 RX ORDER — LIDOCAINE 40 MG/G
CREAM TOPICAL
Status: DISCONTINUED | OUTPATIENT
Start: 2018-04-19 | End: 2018-04-19 | Stop reason: HOSPADM

## 2018-04-19 RX ORDER — NALOXONE HYDROCHLORIDE 0.4 MG/ML
.1-.4 INJECTION, SOLUTION INTRAMUSCULAR; INTRAVENOUS; SUBCUTANEOUS
Status: DISCONTINUED | OUTPATIENT
Start: 2018-04-19 | End: 2018-04-19 | Stop reason: HOSPADM

## 2018-04-19 RX ADMIN — PHENYLEPHRINE HYDROCHLORIDE 1 DROP: 25 SOLUTION/ DROPS OPHTHALMIC at 10:56

## 2018-04-19 RX ADMIN — OFLOXACIN 1 DROP: 3 SOLUTION/ DROPS OPHTHALMIC at 10:51

## 2018-04-19 RX ADMIN — PHENYLEPHRINE HYDROCHLORIDE 1 DROP: 100 SOLUTION/ DROPS OPHTHALMIC at 12:10

## 2018-04-19 RX ADMIN — FLURBIPROFEN SODIUM 0.5 ML: 0.3 SOLUTION/ DROPS OPHTHALMIC at 10:56

## 2018-04-19 RX ADMIN — CYCLOPENTOLATE HYDROCHLORIDE 1 DROP: 10 SOLUTION/ DROPS OPHTHALMIC at 10:56

## 2018-04-19 RX ADMIN — CYCLOPENTOLATE HYDROCHLORIDE 1 DROP: 10 SOLUTION/ DROPS OPHTHALMIC at 10:51

## 2018-04-19 RX ADMIN — PHENYLEPHRINE HYDROCHLORIDE 1 DROP: 25 SOLUTION/ DROPS OPHTHALMIC at 10:51

## 2018-04-19 RX ADMIN — PROPARACAINE HYDROCHLORIDE 1 DROP: 5 SOLUTION/ DROPS OPHTHALMIC at 10:51

## 2018-04-19 ASSESSMENT — LIFESTYLE VARIABLES: TOBACCO_USE: 1

## 2018-04-19 NOTE — IP AVS SNAPSHOT
[facility] II    [address] [address]    Phone:  [phone]                                       After Visit Summary   4/19/2018    Clement Voss    MRN: [id number]           After Visit Summary Signature Page     I have received my discharge instructions, and my questions have been answered. I have discussed any challenges I see with this plan with the nurse or doctor.    ..........................................................................................................................................  Patient/Patient Representative Signature      ..........................................................................................................................................  Patient Representative Print Name and Relationship to Patient    ..................................................               ................................................  Date                                            Time    ..........................................................................................................................................  Reviewed by Signature/Title    ...................................................              ..............................................  Date                                                            Time

## 2018-04-19 NOTE — ANESTHESIA PREPROCEDURE EVALUATION
Anesthesia Evaluation     . Pt has had prior anesthetic.     No history of anesthetic complications          ROS/MED HX    ENT/Pulmonary:     (+)NATALIYA risk factors hypertension, daytime somnolence, tobacco use, Past use , . .    Neurologic:  - neg neurologic ROS     Cardiovascular:     (+) Dyslipidemia, hypertension----. Taking blood thinners : . . . :. . Previous cardiac testing date:results:date: results:ECG reviewed date:4/12/18 results:SB date: results:          METS/Exercise Tolerance:     Hematologic:     (+) Anemia, Other Hematologic Disorder-hx PE, DVT      Musculoskeletal:   (+) arthritis, , , other musculoskeletal- gout, lower back upper back arthritis pain      GI/Hepatic:  - neg GI/hepatic ROS       Renal/Genitourinary:     (+) chronic renal disease, type: CRI,       Endo:     (+) type II DM Diabetic complications: neuropathy, Obesity, .      Psychiatric:  - neg psychiatric ROS       Infectious Disease:  - neg infectious disease ROS       Malignancy:      - no malignancy   Other:    - neg other ROS                 Physical Exam  Normal systems: cardiovascular and pulmonary    Airway   Mallampati: IV  TM distance: >3 FB  Neck ROM: full    Dental   (+) missing and other  Comment: Brittle teethe    Cardiovascular   Rhythm and rate: regular and normal      Pulmonary    breath sounds clear to auscultation                    Anesthesia Plan      History & Physical Review  History and physical reviewed and following examination; no interval change.    ASA Status:  3 .    NPO Status:  > 8 hours    Plan for Other and MAC with Other induction. Maintenance will be Other.    PONV prophylaxis:  Ondansetron (or other 5HT-3)  Risks and benefits of MAC anesthetic discussed including dental damage, aspiration, loss of airway, conversion to general anesthetic, CV complications, MI, stroke, death. Pt wishes to proceed.       Postoperative Care  Postoperative pain management:  IV analgesics.      Consents  Anesthetic plan,  risks, benefits and alternatives discussed with:  Patient..                          .

## 2018-04-19 NOTE — ANESTHESIA POSTPROCEDURE EVALUATION
Patient: Clement Voss    Procedure(s):  CATARACT EXTRACTION LEFT EYE WITH IMPLANT, ISTENT LEFT EYE - Wound Class: I-Clean   - Wound Class: I-Clean    Diagnosis:CATARACT LEFT EYE  Diagnosis Additional Information: No value filed.    Anesthesia Type:  Other, MAC    Note:  Anesthesia Post Evaluation    Patient location during evaluation: Phase 2  Patient participation: Able to fully participate in evaluation  Level of consciousness: awake  Pain management: adequate  Airway patency: patent  Cardiovascular status: acceptable  Respiratory status: acceptable  Hydration status: acceptable  PONV: none     Anesthetic complications: None          Last vitals:  Vitals:    04/19/18 1047   BP: 164/61   Pulse: 57   Resp: 18   Temp: 98.8  F (37.1  C)   SpO2: 97%         Electronically Signed By: KRIS Luciano CRNA  April 19, 2018  1:09 PM

## 2018-04-19 NOTE — IP AVS SNAPSHOT
MRN:[id number]                      After Visit Summary   4/19/2018    Clement Voss    MRN: [id number]           Thank you!     Thank you for choosing [facility] for your care. Our goal is always to provide you with excellent care. Hearing back from our patients is one way we can continue to improve our services. Please take a few minutes to complete the written survey that you may receive in the mail after you visit with us. Thank you!        Patient Information     Date Of Birth          1945        About your hospital stay     You were admitted on:  April 19, 2018 You last received care in the:  [facility] Preop/Phase II    You were discharged on:  April 19, 2018       Who to Call     For medical emergencies, please call 911.  For non-urgent questions about your medical care, please call your primary care provider or clinic, [phone]  For questions related to your surgery, please call your surgery clinic        Attending Provider     Provider Specialty    Kush Almaraz MD Ophthalmology       Primary Care Provider Office Phone # Fax #    Lorelei Felix [phone]      Your next 10 appointments already scheduled     May 03, 2018   Procedure with Kush Almaraz MD   [facility] ([facility] )    [address] [place] [address] [id number] [phone]            May 23, 2018  9:45 AM CDT   LAB with MT LAB   [facility] ([facility] )    [address]  [place] [address]   [phone]            May 23, 2018 10:00 AM CDT   Anticoagulation Visit with  ANTI COAGULATION   [facility] ([facility] )    [address]  [facility] [address]   [phone]            Sep 06, 2018 10:15 AM CDT   (Arrive by 10:00 AM)   SHORT with Lorelei Felix MD   [facility] ([facility] )    [address]  [place] [address]  "  [phone]              Further instructions from your care team       AFTER CATARACT SURGERY RESTRICTIONS    SHIELD: WEAR OVER SURGICAL EYE AT NIGHT FOR 1 WEEK    ACTIVITY/LIFTING: Avoid activities, which increase abdominal/head pressure such as pulling on a starter cord, heavy lifting (over 15-20 lbs) or bending with the head below the waist, for 1 week. Avoid sudden jerky movements (chopping, shoveling) for 1 week. Waking and lower body exercise such as biking is okay.     WATER: Showering/washing hair is okay the day after surgery, but avoid excess water, soap, or shampoo in your eyes. Clean eyelids gently with a clean, wet washcloth as needed. Avoid pressure on the eye.     SUNLIGHT: If the eye is light sensitive after surgery, dark glasses may be worn.     DIET/MEDICATIONS: Resume you usual diet and medications your family doctor ehas prescribed. Continue to use/follow the instructions for your eye drops.     DRIVING: Avoid driving with a patch. Resume driving when you feel safe, but don't drive on the day of surgery.          PAIN: Minor pain and itching is normal during healing. DO NOT RUB THE EYE! Report any unusual swelling, increased discharge or pain that is not relieved by Tylenol (or equivalent). If sudden drop/change in vision call immediately. [facility] [phone]    CONTINUE TO USE ALL THE EYE DROPS PER THE INSTRUCTIONS ORDERED BY DR. MANLEY'S TECHNICIANS    FOR EMERGENCY DR. ALVAREZ: [phone]  FOLLOW EYE DROP INSTRUCTIONS GIVEN BY 's OFFICE                Pending Results     No orders found from 4/17/2018 to 4/20/2018.            Admission Information     Date & Time Provider Department Dept. Phone    4/19/2018 Kush Alvarez MD [facility] Preop/Phase [phone]      Your Vitals Were     Blood Pressure Pulse Temperature Respirations Height Weight    155/61 57 98.8  F (37.1  C) (Oral) 18 1.702 m (5' 7\") 100.7 kg (222 lb)    Pulse Oximetry BMI (Body Mass Index)             " "   95% 34.77 kg/m2          AllergEase Information     AllergEase lets you send messages to your doctor, view your test results, renew your prescriptions, schedule appointments and more. To sign up, go to [email or web address] . Click on \"Log in\" on the left side of the screen, which will take you to the Welcome page. Then click on \"Sign up Now\" on the right side of the page.     You will be asked to enter the access code listed below, as well as some personal information. Please follow the directions to create your username and password.     Your access code is: [email or web address]  Expires: 2018 11:44 AM     Your access code will  in 90 days. If you need help or a new code, please call your [facility] clinic or [phone].        Care EveryWhere ID     This is your Care EveryWhere ID. This could be used by other organizations to access your [facility] medical records  [id number]        Equal Access to Services     CHRISTAL LUCAS : Hadii robb lao hadasho Somarisabelali, waaxda luqadaha, qaybta kaalmada adeegyada, waxay alma delia campbell . So [facility] [phone].    ATENCIÓN: Si habla español, tiene a breaux disposición servicios gratuitos de asistencia lingüística. Haroon al [phone].    We comply with applicable federal civil rights laws and [place] laws. We do not discriminate on the basis of race, color, national origin, age, disability, sex, sexual orientation, or gender identity.               Review of your medicines      CONTINUE these medicines which may have CHANGED, or have new prescriptions. If we are uncertain of the size of tablets/capsules you have at home, strength may be listed as something that might have changed.        Dose / Directions    fluticasone 50 MCG/ACT spray   Commonly known as:  FLONASE   This may have changed:    - when to take this  - reasons to take this   Used for:  Acute maxillary sinusitis        Dose:  2 spray   Spray 2 sprays into both nostrils daily   Quantity:  1 " Package   Refills:  0         CONTINUE these medicines which have NOT CHANGED        Dose / Directions    ACCU-CHEK VLAD PLUS test strip   Used for:  Diabetes mellitus, type 2 (H)   Generic drug:  blood glucose monitoring        USE TO TEST BLOOD SUGARS THREE TIMES DAILY   Quantity:  100 strip   Refills:  2       acetaminophen 500 MG tablet   Commonly known as:  TYLENOL   Used for:  Pulmonary embolism (H)        Dose:  500-1000 mg   Take 1-2 tablets (500-1,000 mg) by mouth every 6 hours as needed   Refills:  0       amLODIPine 10 MG tablet   Commonly known as:  NORVASC   Used for:  Benign essential hypertension        TAKE 1 TABLET(10 MG) BY MOUTH DAILY   Quantity:  90 tablet   Refills:  1       aspirin 81 MG EC tablet   Used for:  Diabetes mellitus, type 2 (H)        Dose:  81 mg   Take 1 tablet (81 mg) by mouth daily   Quantity:  90 tablet   Refills:  3       BASAGLAR 100 UNIT/ML injection   Used for:  Type 2 diabetes mellitus with stage 3 chronic kidney disease, with long-term current use of insulin (H)        Dose:  19 Units   Inject 19 Units Subcutaneous At Bedtime   Quantity:  18 mL   Refills:  1       CLARITIN 10 MG tablet   Generic drug:  loratadine        Dose:  10 mg   Take 10 mg by mouth daily.   Refills:  0       COMBIGAN 0.2-0.5 % ophthalmic solution   Generic drug:  brimonidine-timolol        Dose:  1 drop   1 drop 2 times daily Morning and evening, 12 hours apart   Refills:  0       fenofibrate 48 MG tablet   Used for:  Hyperlipidemia, unspecified hyperlipidemia type        TAKE 1 TABLET(48 MG) BY MOUTH DAILY   Quantity:  90 tablet   Refills:  2       fish oil-omega-3 fatty acids 1000 MG capsule        Dose:  1 capsule   Take 1 capsule by mouth 3 times daily.   Refills:  0       indomethacin 50 MG capsule   Commonly known as:  INDOCIN   Used for:  Gout, unspecified        Dose:  50 mg   Take 1 capsule (50 mg) by mouth 3 times daily With food as needed   Quantity:  30 capsule   Refills:  1        insulin pen needle 31G X 6 MM   Used for:  Type 2 diabetes mellitus with stage 3 chronic kidney disease, with long-term current use of insulin (H)        Use 1 daily or as directed.   Quantity:  100 each   Refills:  prn       lisinopril 40 MG tablet   Commonly known as:  PRINIVIL/ZESTRIL   Used for:  Benign essential hypertension        TAKE 1 TABLET(40 MG) BY MOUTH DAILY   Quantity:  90 tablet   Refills:  0       pilocarpine 1 % ophthalmic solution   Commonly known as:  PILOCAR        Dose:  1 drop   Place 1 drop into both eyes 4 times daily   Refills:  0       sitagliptin 100 MG tablet   Commonly known as:  JANUVIA   Used for:  Type 2 diabetes mellitus with stage 3 chronic kidney disease, with long-term current use of insulin (H)        Dose:  100 mg   Take 1 tablet (100 mg) by mouth daily   Quantity:  90 tablet   Refills:  3       STATIN NOT PRESCRIBED (INTENTIONAL)   Used for:  Hyperlipidemia, unspecified hyperlipidemia        Statin not prescribed intentionally due to Intolerance (with supporting documentation of trying a statin at least once within the last 5 years)   Quantity:  0 each   Refills:  0       terazosin 10 MG capsule   Commonly known as:  HYTRIN   Used for:  Benign essential hypertension        TAKE 1 CAPSULE(10 MG) BY MOUTH AT BEDTIME   Quantity:  90 capsule   Refills:  3       TRAVATAN Z 0.004 % ophthalmic solution   Generic drug:  travoprost (DARION Free)        Instill 1 drop by ophthalmic route every day into affected eye(s) in the evening   Refills:  0       warfarin 5 MG tablet   Commonly known as:  COUMADIN   Used for:  Pulmonary embolism and infarction (H)        SEE NOTES   Quantity:  90 tablet   Refills:  3                Protect others around you: Learn how to safely use, store and throw away your medicines at www.disposemymeds.org.             Medication List: This is a list of all your medications and when to take them. Check marks below indicate your daily home schedule. Keep this list  as a reference.      Medications           Morning Afternoon Evening Bedtime As Needed    ACCU-CHEK VLAD PLUS test strip   USE TO TEST BLOOD SUGARS THREE TIMES DAILY   Generic drug:  blood glucose monitoring                                acetaminophen 500 MG tablet   Commonly known as:  TYLENOL   Take 1-2 tablets (500-1,000 mg) by mouth every 6 hours as needed                                amLODIPine 10 MG tablet   Commonly known as:  NORVASC   TAKE 1 TABLET(10 MG) BY MOUTH DAILY                                aspirin 81 MG EC tablet   Take 1 tablet (81 mg) by mouth daily                                BASAGLAR 100 UNIT/ML injection   Inject 19 Units Subcutaneous At Bedtime                                CLARITIN 10 MG tablet   Take 10 mg by mouth daily.   Generic drug:  loratadine                                COMBIGAN 0.2-0.5 % ophthalmic solution   1 drop 2 times daily Morning and evening, 12 hours apart   Generic drug:  brimonidine-timolol                                fenofibrate 48 MG tablet   TAKE 1 TABLET(48 MG) BY MOUTH DAILY                                fish oil-omega-3 fatty acids 1000 MG capsule   Take 1 capsule by mouth 3 times daily.                                fluticasone 50 MCG/ACT spray   Commonly known as:  FLONASE   Spray 2 sprays into both nostrils daily                                indomethacin 50 MG capsule   Commonly known as:  INDOCIN   Take 1 capsule (50 mg) by mouth 3 times daily With food as needed                                insulin pen needle 31G X 6 MM   Use 1 daily or as directed.                                lisinopril 40 MG tablet   Commonly known as:  PRINIVIL/ZESTRIL   TAKE 1 TABLET(40 MG) BY MOUTH DAILY                                pilocarpine 1 % ophthalmic solution   Commonly known as:  PILOCAR   Place 1 drop into both eyes 4 times daily                                sitagliptin 100 MG tablet   Commonly known as:  JANUVIA   Take 1 tablet (100 mg) by mouth  daily                                STATIN NOT PRESCRIBED (INTENTIONAL)   Statin not prescribed intentionally due to Intolerance (with supporting documentation of trying a statin at least once within the last 5 years)                                terazosin 10 MG capsule   Commonly known as:  HYTRIN   TAKE 1 CAPSULE(10 MG) BY MOUTH AT BEDTIME                                TRAVATAN Z 0.004 % ophthalmic solution   Instill 1 drop by ophthalmic route every day into affected eye(s) in the evening   Generic drug:  travoprost (DARION Free)                                warfarin 5 MG tablet   Commonly known as:  COUMADIN   SEE NOTES

## 2018-04-19 NOTE — ANESTHESIA CARE TRANSFER NOTE
Patient: Clement Voss    Procedure(s):  CATARACT EXTRACTION LEFT EYE WITH IMPLANT, ISTENT LEFT EYE - Wound Class: I-Clean   - Wound Class: I-Clean    Diagnosis: CATARACT LEFT EYE  Diagnosis Additional Information: No value filed.    Anesthesia Type:   Other, MAC     Note:  Airway :Room Air  Patient transferred to:Phase II  Handoff Report: Identifed the Patient, Identified the Reponsible Provider, Reviewed the pertinent medical history, Discussed the surgical course, Reviewed Intra-OP anesthesia mangement and issues during anesthesia, Set expectations for post-procedure period and Allowed opportunity for questions and acknowledgement of understanding      Vitals: (Last set prior to Anesthesia Care Transfer)    CRNA VITALS  4/19/2018 1230 - 4/19/2018 1303      4/19/2018             Pulse: 61    Ht Rate: 59    SpO2: 96 %    Resp Rate (set): 8                Electronically Signed By: KIRS Beltran CRNA  April 19, 2018  1:03 PM

## 2018-04-19 NOTE — BRIEF OP NOTE
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

## 2018-04-19 NOTE — OR NURSING
Patient and responsible adult given discharge instructions with no questions regarding instructions. Jorge score 19. Pain level 2/10.  Discharged from unit via ambulation. Patient discharged to home with wife.

## 2018-04-19 NOTE — DISCHARGE INSTRUCTIONS
AFTER CATARACT SURGERY RESTRICTIONS    SHIELD: WEAR OVER SURGICAL EYE AT NIGHT FOR 1 WEEK    ACTIVITY/LIFTING: Avoid activities, which increase abdominal/head pressure such as pulling on a starter cord, heavy lifting (over 15-20 lbs) or bending with the head below the waist, for 1 week. Avoid sudden jerky movements (chopping, shoveling) for 1 week. Waking and lower body exercise such as biking is okay.     WATER: Showering/washing hair is okay the day after surgery, but avoid excess water, soap, or shampoo in your eyes. Clean eyelids gently with a clean, wet washcloth as needed. Avoid pressure on the eye.     SUNLIGHT: If the eye is light sensitive after surgery, dark glasses may be worn.     DIET/MEDICATIONS: Resume you usual diet and medications your family doctor ehas prescribed. Continue to use/follow the instructions for your eye drops.     DRIVING: Avoid driving with a patch. Resume driving when you feel safe, but don't drive on the day of surgery.          PAIN: Minor pain and itching is normal during healing. DO NOT RUB THE EYE! Report any unusual swelling, increased discharge or pain that is not relieved by Tylenol (or equivalent). If sudden drop/change in vision call immediately. [facility] [phone]    CONTINUE TO USE ALL THE EYE DROPS PER THE INSTRUCTIONS ORDERED BY DR. MANLEY'S TECHNICIANS    FOR EMERGENCY DR. ALVAREZ: [phone]  FOLLOW EYE DROP INSTRUCTIONS GIVEN BY 's OFFICE

## 2018-04-19 NOTE — OP NOTE
Procedure Date: 04/19/2018      DATE OF SERVICE:  4/19/18.      PREOPERATIVE DIAGNOSIS:  Cataract, left eye.      POSTOPERATIVE DIAGNOSIS:  Nuclear sclerotic mature cataract with miosis as well as primary open angle glaucoma, left eye, moderate stage.        PROCEDURE:  Phacoemulsification of intraocular lens with iStent, PCB00, 10.5 diopter power.      ANESTHESIA:  Topical with IV sedation.         DESCRIPTION OF PROCEDURE:   Operative risks, benefits and alternatives to the procedure were discussed at length with the patient including loss of vision, loss of the eye.  Patient voiced understanding and informed consent was signed.  The patient was taken to the operating suite, where an appropriate level of anesthesia was given.  Attention was placed to the left eye.  The patient was then prepped and draped in the usual sterile ophthalmic manner.  A lid speculum was placed in the eye to provide exposure and MVR blade was used to make a paracentesis.  Once this was performed, Shugarcaine was then placed intracamerally.  This was then followed by intracameral Viscoat.  A 2.75 mm blade was then used to make a biplanar temporal clear corneal incision.  A cystotome and uttrata were used to make a continuous curvilinear capsulorrhexis.  BSS on Haro cannula was then used to hydrodissect the lens.  Phacoemulsification was then performed in a divide-and-conquer technique to remove all pieces of the nucleus.  Irrigation aspiration was then used to remove all remaining cortical material and debris.  Amvisc was then used to fill the capsular bag.  A PCB00 10.5 diopter lens was then injected into the capsular bag using the injector.  Once this was performed, a Goldberg secondary instrument was used to rotate the lens into proper position.  Irrigation aspiration was then used to remove all remaining viscoelastic material and center the lens appropriately.  BSS on 30 gauge cannula was then used to hydrate both wounds.  A [facility]  was used to assess intraocular IOP.  Once this was stable and the lens was found to be in good position, the patient was undraped.  The patient was brought to the recovery area in stable condition.  Family was made aware of the patient's condition and the patient will follow up with us later this afternoon.        ADDENDUM:  Following intracameral Viscoat a Malyugin ring was placed in the anterior chamber using the injector and an Osher manipulator.  The rest of the case went as per usual.  Once the lens was placed, the Malyugin ring was removed using the injector and an Osher manipulator.  Irrigation-aspiration was then performed in the capsular bag.  The patient was then rotated to 30 degrees.  Healon was used to fill the anterior chamber.  Viscoat was placed over the eye.  A gonioprism lens was placed on the eye.  The patient was rotated to 30 degrees as well as rotating the microscope to 30 degrees.  An iStent was then placed in the trabecular mesh work.  All checks were performed and was found to be in good position.  Irrigation-aspiration was then used to remove all the remaining viscoelastic material and blood.  Patient did have a significant amount of reflux of blood because of this, which was expected.  Wounds were hydrated as per usual.         JESSICA ALVAREZ MD             D: 2018   T: 2018   MT: JAIDEN      Name:     MARILYN TOLENTINO   MRN:      [id number]        Account:        [id number]   :      1945           Procedure Date: 2018      Document: [id number]

## 2018-05-02 ENCOUNTER — ANESTHESIA EVENT (OUTPATIENT)
Dept: SURGERY | Facility: HOSPITAL | Age: 73
End: 2018-05-02
Payer: MEDICARE

## 2018-05-02 NOTE — ANESTHESIA PREPROCEDURE EVALUATION
Anesthesia Evaluation     . Pt has had prior anesthetic. Type: General    No history of anesthetic complications          ROS/MED HX    ENT/Pulmonary:     (+)NATALIYA risk factors hypertension, daytime somnolence, , . .    Neurologic:  - neg neurologic ROS     Cardiovascular:     (+) Dyslipidemia, hypertension----. : . . . :. . Previous cardiac testing date:results:date: results:ECG reviewed date:4/12/18 results:SB date: results:          METS/Exercise Tolerance:  >4 METS   Hematologic:     (+) History of blood clots pt is anticoagulated, -      Musculoskeletal:   (+) arthritis, , , -       GI/Hepatic:  - neg GI/hepatic ROS       Renal/Genitourinary:         Endo:     (+) type II DM Normal glucose range: 217 today, little high per pt Obesity, Other Endocrine Disorder gout.      Psychiatric:  - neg psychiatric ROS       Infectious Disease:  - neg infectious disease ROS       Malignancy:      - no malignancy   Other:    - neg other ROS                 Physical Exam  Normal systems: cardiovascular and pulmonary    Airway   Mallampati: III  TM distance: >3 FB  Neck ROM: full    Dental   (+) chipped  Comment: Chipped throughout  Brittle teeth    Cardiovascular   Rhythm and rate: regular and normal      Pulmonary    breath sounds clear to auscultation                    Anesthesia Plan      History & Physical Review  History and physical reviewed and following examination; no interval change.    ASA Status:  3 .    NPO Status:  > 8 hours    Plan for MAC and Other with Other induction. Maintenance will be Other.           Postoperative Care      Consents  Anesthetic plan, risks, benefits and alternatives discussed with:  Patient..                          .

## 2018-05-03 ENCOUNTER — ANTICOAGULATION THERAPY VISIT (OUTPATIENT)
Dept: ANTICOAGULATION | Facility: OTHER | Age: 73
End: 2018-05-03
Payer: COMMERCIAL

## 2018-05-03 ENCOUNTER — HOSPITAL ENCOUNTER (OUTPATIENT)
Facility: HOSPITAL | Age: 73
Discharge: HOME | End: 2018-05-03
Attending: OPHTHALMOLOGY | Admitting: OPHTHALMOLOGY
Payer: MEDICARE

## 2018-05-03 ENCOUNTER — ANESTHESIA (OUTPATIENT)
Dept: SURGERY | Facility: HOSPITAL | Age: 73
End: 2018-05-03
Payer: MEDICARE

## 2018-05-03 ENCOUNTER — APPOINTMENT (OUTPATIENT)
Dept: LAB | Facility: HOSPITAL | Age: 73
End: 2018-05-03
Attending: FAMILY MEDICINE
Payer: MEDICARE

## 2018-05-03 VITALS
OXYGEN SATURATION: 95 % | BODY MASS INDEX: 34.84 KG/M2 | HEART RATE: 66 BPM | WEIGHT: 222 LBS | RESPIRATION RATE: 18 BRPM | TEMPERATURE: 97.5 F | HEIGHT: 67 IN | DIASTOLIC BLOOD PRESSURE: 59 MMHG | SYSTOLIC BLOOD PRESSURE: 139 MMHG

## 2018-05-03 DIAGNOSIS — I26.99 PULMONARY EMBOLISM AND INFARCTION (H): ICD-10-CM

## 2018-05-03 DIAGNOSIS — Z79.01 LONG-TERM (CURRENT) USE OF ANTICOAGULANTS: ICD-10-CM

## 2018-05-03 LAB — INR PPP: 2.34 (ref 0.8–1.2)

## 2018-05-03 PROCEDURE — 25000125 ZZHC RX 250: Performed by: OPHTHALMOLOGY

## 2018-05-03 PROCEDURE — 25000128 H RX IP 250 OP 636: Performed by: NURSE ANESTHETIST, CERTIFIED REGISTERED

## 2018-05-03 PROCEDURE — 25000128 H RX IP 250 OP 636: Performed by: OPHTHALMOLOGY

## 2018-05-03 PROCEDURE — 36000056 ZZH SURGERY LEVEL 3 1ST 30 MIN: Performed by: OPHTHALMOLOGY

## 2018-05-03 PROCEDURE — 36000058 ZZH SURGERY LEVEL 3 EA 15 ADDTL MIN: Performed by: OPHTHALMOLOGY

## 2018-05-03 PROCEDURE — 40000305 ZZH STATISTIC PRE PROC ASSESS I: Performed by: OPHTHALMOLOGY

## 2018-05-03 PROCEDURE — 66984 XCAPSL CTRC RMVL W/O ECP: CPT | Performed by: NURSE ANESTHETIST, CERTIFIED REGISTERED

## 2018-05-03 PROCEDURE — 99100 ANES PT EXTEME AGE<1 YR&>70: CPT | Performed by: NURSE ANESTHETIST, CERTIFIED REGISTERED

## 2018-05-03 PROCEDURE — 71000027 ZZH RECOVERY PHASE 2 EACH 15 MINS: Performed by: OPHTHALMOLOGY

## 2018-05-03 PROCEDURE — V2632 POST CHMBR INTRAOCULAR LENS: HCPCS | Performed by: OPHTHALMOLOGY

## 2018-05-03 PROCEDURE — 37000009 ZZH ANESTHESIA TECHNICAL FEE, EACH ADDTL 15 MIN: Performed by: OPHTHALMOLOGY

## 2018-05-03 PROCEDURE — 37000008 ZZH ANESTHESIA TECHNICAL FEE, 1ST 30 MIN: Performed by: OPHTHALMOLOGY

## 2018-05-03 PROCEDURE — 27210794 ZZH OR GENERAL SUPPLY STERILE: Performed by: OPHTHALMOLOGY

## 2018-05-03 PROCEDURE — 36415 COLL VENOUS BLD VENIPUNCTURE: CPT | Performed by: NURSE ANESTHETIST, CERTIFIED REGISTERED

## 2018-05-03 PROCEDURE — C1783 OCULAR IMP, AQUEOUS DRAIN DE: HCPCS | Performed by: OPHTHALMOLOGY

## 2018-05-03 PROCEDURE — 85610 PROTHROMBIN TIME: CPT | Performed by: NURSE ANESTHETIST, CERTIFIED REGISTERED

## 2018-05-03 DEVICE — ISTENT-INJECT: Type: IMPLANTABLE DEVICE | Site: EYE | Status: FUNCTIONAL

## 2018-05-03 DEVICE — IMPLANTABLE DEVICE: Type: IMPLANTABLE DEVICE | Site: EYE | Status: FUNCTIONAL

## 2018-05-03 RX ORDER — PREDNISOLONE ACETATE 10 MG/ML
SUSPENSION/ DROPS OPHTHALMIC PRN
Status: DISCONTINUED | OUTPATIENT
Start: 2018-05-03 | End: 2018-05-03 | Stop reason: HOSPADM

## 2018-05-03 RX ORDER — TETRACAINE HYDROCHLORIDE 5 MG/ML
SOLUTION OPHTHALMIC PRN
Status: DISCONTINUED | OUTPATIENT
Start: 2018-05-03 | End: 2018-05-03 | Stop reason: HOSPADM

## 2018-05-03 RX ORDER — CYCLOPENTOLATE HYDROCHLORIDE 10 MG/ML
1 SOLUTION/ DROPS OPHTHALMIC SEE ADMIN INSTRUCTIONS
Status: COMPLETED | OUTPATIENT
Start: 2018-05-03 | End: 2018-05-03

## 2018-05-03 RX ORDER — OFLOXACIN 3 MG/ML
1 SOLUTION/ DROPS OPHTHALMIC SEE ADMIN INSTRUCTIONS
Status: DISCONTINUED | OUTPATIENT
Start: 2018-05-03 | End: 2018-05-03 | Stop reason: HOSPADM

## 2018-05-03 RX ORDER — PHENYLEPHRINE HYDROCHLORIDE 25 MG/ML
1 SOLUTION/ DROPS OPHTHALMIC SEE ADMIN INSTRUCTIONS
Status: COMPLETED | OUTPATIENT
Start: 2018-05-03 | End: 2018-05-03

## 2018-05-03 RX ORDER — PHENYLEPHRINE HYDROCHLORIDE 100 MG/ML
1 SOLUTION/ DROPS OPHTHALMIC ONCE
Status: COMPLETED | OUTPATIENT
Start: 2018-05-03 | End: 2018-05-03

## 2018-05-03 RX ORDER — PROPARACAINE HYDROCHLORIDE 5 MG/ML
1 SOLUTION/ DROPS OPHTHALMIC SEE ADMIN INSTRUCTIONS
Status: DISCONTINUED | OUTPATIENT
Start: 2018-05-03 | End: 2018-05-03 | Stop reason: HOSPADM

## 2018-05-03 RX ORDER — OFLOXACIN 3 MG/ML
SOLUTION/ DROPS OPHTHALMIC PRN
Status: DISCONTINUED | OUTPATIENT
Start: 2018-05-03 | End: 2018-05-03 | Stop reason: HOSPADM

## 2018-05-03 RX ADMIN — PHENYLEPHRINE HYDROCHLORIDE 1 DROP: 25 SOLUTION/ DROPS OPHTHALMIC at 09:13

## 2018-05-03 RX ADMIN — CYCLOPENTOLATE HYDROCHLORIDE 1 DROP: 10 SOLUTION/ DROPS OPHTHALMIC at 09:17

## 2018-05-03 RX ADMIN — MIDAZOLAM 0.5 MG: 1 INJECTION INTRAMUSCULAR; INTRAVENOUS at 11:07

## 2018-05-03 RX ADMIN — CYCLOPENTOLATE HYDROCHLORIDE 1 DROP: 10 SOLUTION/ DROPS OPHTHALMIC at 09:13

## 2018-05-03 RX ADMIN — PHENYLEPHRINE HYDROCHLORIDE 1 DROP: 100 SOLUTION/ DROPS OPHTHALMIC at 10:25

## 2018-05-03 RX ADMIN — PHENYLEPHRINE HYDROCHLORIDE 1 DROP: 100 SOLUTION/ DROPS OPHTHALMIC at 09:57

## 2018-05-03 RX ADMIN — PROPARACAINE HYDROCHLORIDE 1 DROP: 5 SOLUTION/ DROPS OPHTHALMIC at 09:12

## 2018-05-03 RX ADMIN — PHENYLEPHRINE HYDROCHLORIDE 1 DROP: 25 SOLUTION/ DROPS OPHTHALMIC at 09:18

## 2018-05-03 RX ADMIN — OFLOXACIN 1 DROP: 3 SOLUTION/ DROPS OPHTHALMIC at 09:13

## 2018-05-03 RX ADMIN — MIDAZOLAM 0.5 MG: 1 INJECTION INTRAMUSCULAR; INTRAVENOUS at 11:01

## 2018-05-03 RX ADMIN — FLURBIPROFEN SODIUM 0.5 ML: 0.3 SOLUTION/ DROPS OPHTHALMIC at 09:18

## 2018-05-03 NOTE — DISCHARGE INSTRUCTIONS
Post-Anesthesia Patient Instructions    IMMEDIATELY FOLLOWING SURGERY:  Do not drive or operate machinery for the first twenty four hours after surgery.  Do not make any important decisions for twenty four hours after surgery or while taking narcotic pain medications or sedatives.  If you develop intractable nausea and vomiting or a severe headache please notify your doctor immediately.    FOLLOW-UP:  Please make an appointment with your surgeon as instructed. You do not need to follow up with anesthesia unless specifically instructed to do so.    WOUND CARE INSTRUCTIONS (if applicable):  Keep a dry clean dressing on the anesthesia/puncture wound site if there is drainage.  Once the wound has quit draining you may leave it open to air.  Generally you should leave the bandage intact for twenty four hours unless there is drainage.  If the epidural site drains for more than 36-48 hours please call the anesthesia department.    QUESTIONS?:  Please feel free to call your physician or the hospital  if you have any questions, and they will be happy to assist you.       AFTER CATARACT SURGERY RESTRICTIONS    SHIELD: WEAR OVER SURGICAL EYE AT NIGHT FOR 1 WEEK    ACTIVITY/LIFTING: Avoid activities, which increase abdominal/head pressure such as pulling on a starter cord, heavy lifting (over 15-20 lbs) or bending with the head below the waist, for 1 week. Avoid sudden jerky movements (chopping, shoveling) for 1 week. Waking and lower body exercise such as biking is okay.     WATER: Showering/washing hair is okay the day after surgery, but avoid excess water, soap, or shampoo in your eyes. Clean eyelids gently with a clean, wet washcloth as needed. Avoid pressure on the eye.     SUNLIGHT: If the eye is light sensitive after surgery, dark glasses may be worn.     DIET/MEDICATIONS: Resume you usual diet and medications your family doctor ehas prescribed. Continue to use/follow the instructions for your eye drops.      DRIVING: Avoid driving with a patch. Resume driving when you feel safe, but don't drive on the day of surgery.    PAIN: Minor pain and itching is normal during healing. DO NOT RUB THE EYE! Report any unusual swelling, increased discharge or pain that is not relieved by Tylenol (or equivalent). If sudden drop/change in vision call immediately. [facility] [phone]    CONTINUE TO USE ALL THE EYE DROPS PER THE INSTRUCTIONS ORDERED BY DR. MANLEY'S TECHNICIANS    FOR EMERGENCY DR. ALVAREZ: [phone]  FOLLOW EYE DROP INSTRUCTIONS GIVEN BY 's OFFICE

## 2018-05-03 NOTE — ANESTHESIA CARE TRANSFER NOTE
Patient: Clement Voss    Procedure(s):  CATARACT EXTRACTION RIGHT EYE WITH IMPLANT,WITH ISTENT ATTEMPTED - Wound Class: I-Clean   - Wound Class: I-Clean    Diagnosis: CATARACT RIGHT EYE  Diagnosis Additional Information: No value filed.    Anesthesia Type:   MAC, Other     Note:  Airway :Room Air  Patient transferred to:Phase II  Comments: VSS. Report to PACU RN.Handoff Report: Identifed the Patient, Identified the Reponsible Provider, Reviewed the pertinent medical history, Discussed the surgical course, Reviewed Intra-OP anesthesia mangement and issues during anesthesia, Set expectations for post-procedure period and Allowed opportunity for questions and acknowledgement of understanding      Vitals: (Last set prior to Anesthesia Care Transfer)    CRNA VITALS  5/3/2018 1112 - 5/3/2018 1142      5/3/2018             Resp Rate (set): 8                Electronically Signed By: KRIS Franco CRNA  May 3, 2018  11:42 AM

## 2018-05-03 NOTE — OP NOTE
DATE OF SERVICE: 5/3/18       PREOPERATIVE DIAGNOSIS:     Cataract, Right eye.       POSTOPERATIVE DIAGNOSIS:  Mature cataract with miosis      PROCEDURE:  Phacoemulsification with intraocular lens implant, Model PCB00, 11.0 diopter power.      ANESTHESIA:  Topical with IV sedation.         DESCRIPTION OF PROCEDURE:   Operative risks, benefits and alternatives to the procedure were discussed at length with the patient including loss of vision, loss of the eye.  Patient voiced understanding and informed consent was signed.  The patient was taken to the operating suite, where an appropriate level of anesthesia was given.  Attention was placed to the right eye.  The patient was then prepped and draped in the usual sterile ophthalmic manner.  A lid speculum was placed in the eye to provide exposure and MVR blade was used to make a paracentesis.  Once this was performed, Shugarcaine was then placed intracamerally.  This was then followed by intracameral Viscoat.  A 2.75 mm blade was then used to make a biplanar temporal clear corneal incision. A malyugin ring was then placed using an osher manipulator.  A cystotome and uttrata were used to make a continuous curvilinear capsulorrhexis.  BSS on Haro cannula was then used to hydrodissect the lens.  Phacoemulsification was then performed in a divide-and-conquer technique to remove all pieces of the nucleus.  Irrigation aspiration was then used to remove all remaining cortical material and debris.  Amvisc was then used to fill the capsular bag.  A PCB00 11.0 diopter lens was then injected into the capsular bag using the injector.  Once this was performed, a Goldberg secondary instrument was used to rotate the lens into proper position.  The malyugin ring was then removed using the osher manipulator.  The eye was then filled with healon.  A gonio lens was placed.  The istent was inserted.  5 attempts were made however the amount of blood in the angle was to great despite  using healon to remove.  We were unable to place the stent, and it was removed.  Irrigation aspiration was then used to remove all remaining viscoelastic material and center the lens appropriately.  BSS on 30 gauge cannula was then used to hydrate both wounds.  A Weck-Shiela was used to assess intraocular IOP.  Once this was stable and the lens was found to be in good position, the patient was undraped.  The patient was brought to the recovery area in stable condition.  Family was made aware of the patient's condition and the patient will follow up with us later this afternoon.  No complications.           Kush Almaraz MD

## 2018-05-03 NOTE — IP AVS SNAPSHOT
[facility] Preop/Phase II    [address] [address]    Phone:  [phone]                                       After Visit Summary   5/3/2018    Clement Voss    MRN: [id number]           After Visit Summary Signature Page     I have received my discharge instructions, and my questions have been answered. I have discussed any challenges I see with this plan with the nurse or doctor.    ..........................................................................................................................................  Patient/Patient Representative Signature      ..........................................................................................................................................  Patient Representative Print Name and Relationship to Patient    ..................................................               ................................................  Date                                            Time    ..........................................................................................................................................  Reviewed by Signature/Title    ...................................................              ..............................................  Date                                                            Time

## 2018-05-03 NOTE — IP AVS SNAPSHOT
MRN:[id number]                      After Visit Summary   5/3/2018    Clement Voss    MRN: [id number]           Thank you!     Thank you for choosing [facility] for your care. Our goal is always to provide you with excellent care. Hearing back from our patients is one way we can continue to improve our services. Please take a few minutes to complete the written survey that you may receive in the mail after you visit with us. Thank you!        Patient Information     Date Of Birth          1945        About your hospital stay     You were admitted on:  May 3, 2018 You last received care in the:  [facility] Preop/Phase II    You were discharged on:  May 3, 2018       Who to Call     For medical emergencies, please call 911.  For non-urgent questions about your medical care, please call your primary care provider or clinic, [phone]  For questions related to your surgery, please call your surgery clinic        Attending Provider     Provider Specialty    Kush Almaraz MD Ophthalmology       Primary Care Provider Office Phone # Fax #    Lorelei Felix [phone]      Your next 10 appointments already scheduled     May 23, 2018  9:45 AM CDT   LAB with MT LAB   [facility] ([facility] )    [address] [place] [address]   [phone]            Jun 14, 2018 11:30 AM CDT   Anticoagulation Visit with  ANTI COAGULATION   [facility] ([facility] )    [address] [place] [address]   [phone]            Sep 06, 2018 10:15 AM CDT   (Arrive by 10:00 AM)   SHORT with Lorelei Felix MD   [facility] ([facility] )    [address]  [place] [address]   [phone]              Further instructions from your care team           Post-Anesthesia Patient Instructions    IMMEDIATELY FOLLOWING SURGERY:  Do not drive or operate machinery  for the first twenty four hours after surgery.  Do not make any important decisions for twenty four hours after surgery or while taking narcotic pain medications or sedatives.  If you develop intractable nausea and vomiting or a severe headache please notify your doctor immediately.    FOLLOW-UP:  Please make an appointment with your surgeon as instructed. You do not need to follow up with anesthesia unless specifically instructed to do so.    WOUND CARE INSTRUCTIONS (if applicable):  Keep a dry clean dressing on the anesthesia/puncture wound site if there is drainage.  Once the wound has quit draining you may leave it open to air.  Generally you should leave the bandage intact for twenty four hours unless there is drainage.  If the epidural site drains for more than 36-48 hours please call the anesthesia department.    QUESTIONS?:  Please feel free to call your physician or the hospital  if you have any questions, and they will be happy to assist you.       AFTER CATARACT SURGERY RESTRICTIONS    SHIELD: WEAR OVER SURGICAL EYE AT NIGHT FOR 1 WEEK    ACTIVITY/LIFTING: Avoid activities, which increase abdominal/head pressure such as pulling on a starter cord, heavy lifting (over 15-20 lbs) or bending with the head below the waist, for 1 week. Avoid sudden jerky movements (chopping, shoveling) for 1 week. Waking and lower body exercise such as biking is okay.     WATER: Showering/washing hair is okay the day after surgery, but avoid excess water, soap, or shampoo in your eyes. Clean eyelids gently with a clean, wet washcloth as needed. Avoid pressure on the eye.     SUNLIGHT: If the eye is light sensitive after surgery, dark glasses may be worn.     DIET/MEDICATIONS: Resume you usual diet and medications your family doctor ehas prescribed. Continue to use/follow the instructions for your eye drops.     DRIVING: Avoid driving with a patch. Resume driving when you feel safe, but don't drive on the day of  "surgery.    PAIN: Minor pain and itching is normal during healing. DO NOT RUB THE EYE! Report any unusual swelling, increased discharge or pain that is not relieved by Tylenol (or equivalent). If sudden drop/change in vision call immediately. [facility] [phone]    CONTINUE TO USE ALL THE EYE DROPS PER THE INSTRUCTIONS ORDERED BY DR. MANLEY'S TECHNICIANS    FOR EMERGENCY DR. ALMARAZ: [phone]  FOLLOW EYE DROP INSTRUCTIONS GIVEN BY DR's OFFICE                Pending Results     No orders found from 2018 to 2018.            Admission Information     Date & Time Provider Department Dept. Phone    5/3/2018 Kush Almaraz MD [facility] Preop/Phase [phone]      Your Vitals Were     Blood Pressure Pulse Temperature Respirations Height Weight    147/59 66 97.8  F (36.6  C) (Oral) 18 1.702 m (5' 7\") 100.7 kg (222 lb 0.1 oz)    Pulse Oximetry BMI (Body Mass Index)                96% 34.77 kg/m2          [employer] Information     [employer] lets you send messages to your doctor, view your test results, renew your prescriptions, schedule appointments and more. To sign up, go to [email or web address] . Click on \"Log in\" on the left side of the screen, which will take you to the Welcome page. Then click on \"Sign up Now\" on the right side of the page.     You will be asked to enter the access code listed below, as well as some personal information. Please follow the directions to create your username and password.     Your access code is: [email or web address]  Expires: 2018 11:44 AM     Your access code will  in 90 days. If you need help or a new code, please call your [facility] clinic or [phone].        Care EveryWhere ID     This is your Care EveryWhere ID. This could be used by other organizations to access your [facility] medical records  [id number]        Equal Access to Services     CHRISTAL LUCAS AH: Cassi Marinelli, luis guzman, manan millard " matt becksamina jeraldkandi laTenaaan ah. [facility] [phone].    ATENCIÓN: Si abhishek banegas, tiene a breaux disposición servicios gratuitos de asistencia lingüística. Haroon al [phone].    We comply with applicable federal civil rights laws and [place] laws. We do not discriminate on the basis of race, color, national origin, age, disability, sex, sexual orientation, or gender identity.               Review of your medicines      CONTINUE these medicines which may have CHANGED, or have new prescriptions. If we are uncertain of the size of tablets/capsules you have at home, strength may be listed as something that might have changed.        Dose / Directions    fluticasone 50 MCG/ACT spray   Commonly known as:  FLONASE   This may have changed:    - when to take this  - reasons to take this   Used for:  Acute maxillary sinusitis        Dose:  2 spray   Spray 2 sprays into both nostrils daily   Quantity:  1 Package   Refills:  0         CONTINUE these medicines which have NOT CHANGED        Dose / Directions    ACCU-CHEK VLAD PLUS test strip   Used for:  Diabetes mellitus, type 2 (H)   Generic drug:  blood glucose monitoring        USE TO TEST BLOOD SUGARS THREE TIMES DAILY   Quantity:  100 strip   Refills:  2       acetaminophen 500 MG tablet   Commonly known as:  TYLENOL   Used for:  Pulmonary embolism (H)        Dose:  500-1000 mg   Take 1-2 tablets (500-1,000 mg) by mouth every 6 hours as needed   Refills:  0       amLODIPine 10 MG tablet   Commonly known as:  NORVASC   Used for:  Benign essential hypertension        TAKE 1 TABLET(10 MG) BY MOUTH DAILY   Quantity:  90 tablet   Refills:  1       aspirin 81 MG EC tablet   Used for:  Diabetes mellitus, type 2 (H)        Dose:  81 mg   Take 1 tablet (81 mg) by mouth daily   Quantity:  90 tablet   Refills:  3       BASAGLAR 100 UNIT/ML injection   Used for:  Type 2 diabetes mellitus with stage 3 chronic kidney disease, with long-term current use of insulin (H)        Dose:   19 Units   Inject 19 Units Subcutaneous At Bedtime   Quantity:  18 mL   Refills:  1       CLARITIN 10 MG tablet   Generic drug:  loratadine        Dose:  10 mg   Take 10 mg by mouth daily.   Refills:  0       COMBIGAN 0.2-0.5 % ophthalmic solution   Generic drug:  brimonidine-timolol        Dose:  1 drop   1 drop 2 times daily Morning and evening, 12 hours apart   Refills:  0       fenofibrate 48 MG tablet   Used for:  Hyperlipidemia, unspecified hyperlipidemia type        TAKE 1 TABLET(48 MG) BY MOUTH DAILY   Quantity:  90 tablet   Refills:  2       fish oil-omega-3 fatty acids 1000 MG capsule        Dose:  1 capsule   Take 1 capsule by mouth 3 times daily.   Refills:  0       indomethacin 50 MG capsule   Commonly known as:  INDOCIN   Used for:  Gout, unspecified        Dose:  50 mg   Take 1 capsule (50 mg) by mouth 3 times daily With food as needed   Quantity:  30 capsule   Refills:  1       insulin pen needle 31G X 6 MM   Used for:  Type 2 diabetes mellitus with stage 3 chronic kidney disease, with long-term current use of insulin (H)        Use 1 daily or as directed.   Quantity:  100 each   Refills:  prn       lisinopril 40 MG tablet   Commonly known as:  PRINIVIL/ZESTRIL   Used for:  Benign essential hypertension        TAKE 1 TABLET(40 MG) BY MOUTH DAILY   Quantity:  90 tablet   Refills:  0       pilocarpine 1 % ophthalmic solution   Commonly known as:  PILOCAR        Dose:  1 drop   Place 1 drop into both eyes 4 times daily   Refills:  0       sitagliptin 100 MG tablet   Commonly known as:  JANUVIA   Used for:  Type 2 diabetes mellitus with stage 3 chronic kidney disease, with long-term current use of insulin (H)        Dose:  100 mg   Take 1 tablet (100 mg) by mouth daily   Quantity:  90 tablet   Refills:  3       STATIN NOT PRESCRIBED (INTENTIONAL)   Used for:  Hyperlipidemia, unspecified hyperlipidemia        Statin not prescribed intentionally due to Intolerance (with supporting documentation of trying a  statin at least once within the last 5 years)   Quantity:  0 each   Refills:  0       terazosin 10 MG capsule   Commonly known as:  HYTRIN   Used for:  Benign essential hypertension        TAKE 1 CAPSULE(10 MG) BY MOUTH AT BEDTIME   Quantity:  90 capsule   Refills:  3       TRAVATAN Z 0.004 % ophthalmic solution   Generic drug:  travoprost (BAK Free)        Instill 1 drop by ophthalmic route every day into affected eye(s) in the evening   Refills:  0       warfarin 5 MG tablet   Commonly known as:  COUMADIN   Used for:  Pulmonary embolism and infarction (H)        SEE NOTES   Quantity:  90 tablet   Refills:  3                Protect others around you: Learn how to safely use, store and throw away your medicines at www.disposemymeds.org.             Medication List: This is a list of all your medications and when to take them. Check marks below indicate your daily home schedule. Keep this list as a reference.      Medications           Morning Afternoon Evening Bedtime As Needed    ACCU-CHEK VLAD PLUS test strip   USE TO TEST BLOOD SUGARS THREE TIMES DAILY   Generic drug:  blood glucose monitoring                                acetaminophen 500 MG tablet   Commonly known as:  TYLENOL   Take 1-2 tablets (500-1,000 mg) by mouth every 6 hours as needed                                amLODIPine 10 MG tablet   Commonly known as:  NORVASC   TAKE 1 TABLET(10 MG) BY MOUTH DAILY                                aspirin 81 MG EC tablet   Take 1 tablet (81 mg) by mouth daily                                BASAGLAR 100 UNIT/ML injection   Inject 19 Units Subcutaneous At Bedtime                                CLARITIN 10 MG tablet   Take 10 mg by mouth daily.   Generic drug:  loratadine                                COMBIGAN 0.2-0.5 % ophthalmic solution   1 drop 2 times daily Morning and evening, 12 hours apart   Generic drug:  brimonidine-timolol                                fenofibrate 48 MG tablet   TAKE 1 TABLET(48 MG) BY  MOUTH DAILY                                fish oil-omega-3 fatty acids 1000 MG capsule   Take 1 capsule by mouth 3 times daily.                                fluticasone 50 MCG/ACT spray   Commonly known as:  FLONASE   Spray 2 sprays into both nostrils daily                                indomethacin 50 MG capsule   Commonly known as:  INDOCIN   Take 1 capsule (50 mg) by mouth 3 times daily With food as needed                                insulin pen needle 31G X 6 MM   Use 1 daily or as directed.                                lisinopril 40 MG tablet   Commonly known as:  PRINIVIL/ZESTRIL   TAKE 1 TABLET(40 MG) BY MOUTH DAILY                                pilocarpine 1 % ophthalmic solution   Commonly known as:  PILOCAR   Place 1 drop into both eyes 4 times daily                                sitagliptin 100 MG tablet   Commonly known as:  JANUVIA   Take 1 tablet (100 mg) by mouth daily                                STATIN NOT PRESCRIBED (INTENTIONAL)   Statin not prescribed intentionally due to Intolerance (with supporting documentation of trying a statin at least once within the last 5 years)                                terazosin 10 MG capsule   Commonly known as:  HYTRIN   TAKE 1 CAPSULE(10 MG) BY MOUTH AT BEDTIME                                TRAVATAN Z 0.004 % ophthalmic solution   Instill 1 drop by ophthalmic route every day into affected eye(s) in the evening   Generic drug:  travoprost (BAK Free)                                warfarin 5 MG tablet   Commonly known as:  COUMADIN   SEE NOTES

## 2018-05-03 NOTE — OP NOTE
DATE OF SERVICE: 5/3/18      PREOPERATIVE DIAGNOSIS:     Cataract, Right eye.       POSTOPERATIVE DIAGNOSIS:  Cataract, Right eye.       PROCEDURE:  Phacoemulsification with intraocular lens implant, Model PCB00, 19.5 diopter power. 11.0      ANESTHESIA:  Topical with IV sedation.         DESCRIPTION OF PROCEDURE:   Operative risks, benefits and alternatives to the procedure were discussed at length with the patient including loss of vision, loss of the eye.  Patient voiced understanding and informed consent was signed.  The patient was taken to the operating suite, where an appropriate level of anesthesia was given.  Attention was placed to the right eye.  The patient was then prepped and draped in the usual sterile ophthalmic manner.  A lid speculum was placed in the eye to provide exposure and MVR blade was used to make a paracentesis.  Once this was performed, Shugarcaine was then placed intracamerally.  This was then followed by intracameral Viscoat.  A 2.75 mm blade was then used to make a biplanar temporal clear corneal incision.  A cystotome and uttrata were used to make a continuous curvilinear capsulorrhexis.  BSS on Haro cannula was then used to hydrodissect the lens.  Phacoemulsification was then performed in a divide-and-conquer technique to remove all pieces of the nucleus.  Irrigation aspiration was then used to remove all remaining cortical material and debris.  Amvisc was then used to fill the capsular bag.  A PCB00 11.0 diopter lens was then injected into the capsular bag using the injector.  Once this was performed, a Goldberg secondary instrument was used to rotate the lens into proper position.  Irrigation aspiration was then used to remove all remaining viscoelastic material and center the lens appropriately.  BSS on 30 gauge cannula was then used to hydrate both wounds.  A Weck-Shiela was used to assess intraocular IOP.  Once this was stable and the lens was found to be in good position, the  patient was undraped.  The patient was brought to the recovery area in stable condition.  Family was made aware of the patient's condition and the patient will follow up with us later this afternoon.  No complications.           Kush Almaraz MD

## 2018-05-03 NOTE — MR AVS SNAPSHOT
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]  Long-term (current) use of anticoagulants [Z79.01] [Z79.01]         Your next Anticoagulation Clinic appointment(s)     Jun 14, 2018 11:30 AM CDT   Anticoagulation Visit with HC ANTI COAGULATION   [facility] [place] ([facility] [place] )    [address]  [place] [address]   [phone]              May 2018 Details    Sun Mon Tue Wed Thu Fri Sat       1               2               3      5 mg   See details      4      2.5 mg         5      5 mg           6      5 mg         7      2.5 mg         8      5 mg         9      5 mg         10      5 mg         11      2.5 mg         12      5 mg           13      5 mg         14      2.5 mg         15      5 mg         16      5 mg         17      5 mg         18      2.5 mg         19      5 mg           20      5 mg         21      2.5 mg         22      5 mg         23      5 mg         24      5 mg         25      2.5 mg         26      5 mg           27      5 mg         28      2.5 mg         29      5 mg         30      5 mg         31      5 mg            Date Details   05/03 This INR check               How to take your warfarin dose     To take:  2.5 mg Take 0.5 of a 5 mg tablet.    To take:  5 mg Take 1 of the 5 mg tablets.           June 2018 Details    Sun Mon Tue Wed Thu Fri Sat          1      2.5 mg         2      5 mg           3      5 mg         4      2.5 mg         5      5 mg         6      5 mg         7       5 mg         8      2.5 mg         9      5 mg           10      5 mg         11      2.5 mg         12      5 mg         13      5 mg         14            15               16                 17               18               19               20               21               22               23                 24               25               26               27               28               29               30                Date Details   No additional details    Date of next INR:  6/14/2018         How to take your warfarin dose     To take:  2.5 mg Take 0.5 of a 5 mg tablet.    To take:  5 mg Take 1 of the 5 mg tablets.

## 2018-05-03 NOTE — PROGRESS NOTES
ANTICOAGULATION FOLLOW-UP CLINIC VISIT    Patient Name:  Clement Voss  Date:  5/3/2018  Contact Type:  Telephone/ message left on home phone voicemail    SUBJECTIVE:     Patient Findings     Positives Other complaints    Comments INR done by hospital lab.  Patient having second cataract surgery today although he did not inform warfarin clinic about this surgery. Call placed to patient home and message left re: INR result, warfarin dosing and INR recheck date. He is to call warfarin clinic if any new changes in diet/meds/activity, upcoming procedures or questions.            OBJECTIVE    INR   Date Value Ref Range Status   05/03/2018 2.34 (H) 0.80 - 1.20 Final       ASSESSMENT / PLAN  INR assessment THER    Recheck INR In: 6 WEEKS    INR Location Clinic      Anticoagulation Summary as of 5/3/2018     INR goal 2.0-3.0   Today's INR 2.34   Maintenance plan 2.5 mg (5 mg x 0.5) on Mon, Fri; 5 mg (5 mg x 1) all other days   Full instructions 2.5 mg on Mon, Fri; 5 mg all other days   Weekly total 30 mg   No change documented Jodie Elena RN   Plan last modified Jodie Elena RN (2/7/2018)   Next INR check 6/14/2018   Priority INR   Target end date Indefinite    Indications   Pulmonary embolism and infarction (H) [I26.99]  Acute thromboembolism of deep veins of lower extremity (H) [I82.409]  Long-term (current) use of anticoagulants [Z79.01] [Z79.01]         Anticoagulation Episode Summary     INR check location     Preferred lab     Send INR reminders to  [facility]    Comments       Anticoagulation Care Providers     Provider Role Specialty Phone number    Lorelei Felix MD [facility] [phone]            See the Encounter Report to view Anticoagulation Flowsheet and Dosing Calendar (Go to Encounters tab in chart review, and find the Anticoagulation Therapy Visit)        Jodie Elena RN

## 2018-05-03 NOTE — ANESTHESIA POSTPROCEDURE EVALUATION
Patient: Clement Voss    Procedure(s):  CATARACT EXTRACTION RIGHT EYE WITH IMPLANT,WITH ISTENT ATTEMPTED - Wound Class: I-Clean   - Wound Class: I-Clean    Diagnosis:CATARACT RIGHT EYE  Diagnosis Additional Information: No value filed.    Anesthesia Type:  MAC, Other    Note:  Anesthesia Post Evaluation    Patient location during evaluation: Phase 2  Patient participation: Able to fully participate in evaluation  Level of consciousness: awake and alert  Pain management: adequate  Airway patency: patent  Cardiovascular status: acceptable  Respiratory status: acceptable  Hydration status: acceptable  PONV: none             Last vitals:  Vitals:    05/03/18 1155 05/03/18 1200 05/03/18 1205   BP: 152/59 137/74 139/59   Pulse:      Resp:   18   Temp:   97.5  F (36.4  C)   SpO2: 96% 94% 95%         Electronically Signed By: KRIS Carbajal CRNA  May 3, 2018  1:03 PM

## 2018-05-23 DIAGNOSIS — E78.5 HYPERLIPIDEMIA, UNSPECIFIED HYPERLIPIDEMIA TYPE: ICD-10-CM

## 2018-05-24 RX ORDER — FENOFIBRATE 48 MG/1
TABLET, COATED ORAL
Qty: 90 TABLET | Refills: 0 | Status: SHIPPED | OUTPATIENT
Start: 2018-05-24 | End: 2018-08-21

## 2018-06-05 DIAGNOSIS — I10 BENIGN ESSENTIAL HYPERTENSION: ICD-10-CM

## 2018-06-05 NOTE — TELEPHONE ENCOUNTER
lisinopril (PRINIVIL/ZESTRIL) 40 MG tablet     Last Written Prescription Date:  03/06/2018  Last Fill Quantity: 90,   # refills: 0  Last Office Visit: 04/12/2018  Future Office visit:       Routing refill request to provider for review/approval because:

## 2018-06-07 RX ORDER — LISINOPRIL 40 MG/1
TABLET ORAL
Qty: 90 TABLET | Refills: 0 | Status: SHIPPED | OUTPATIENT
Start: 2018-06-07 | End: 2018-09-07

## 2018-06-13 ENCOUNTER — ANTICOAGULATION THERAPY VISIT (OUTPATIENT)
Dept: ANTICOAGULATION | Facility: OTHER | Age: 73
End: 2018-06-13
Attending: FAMILY MEDICINE
Payer: MEDICARE

## 2018-06-13 DIAGNOSIS — Z79.01 LONG-TERM (CURRENT) USE OF ANTICOAGULANTS: ICD-10-CM

## 2018-06-13 DIAGNOSIS — I26.99 PULMONARY EMBOLISM AND INFARCTION (H): ICD-10-CM

## 2018-06-13 DIAGNOSIS — I82.409 ACUTE THROMBOEMBOLISM OF DEEP VEINS OF LOWER EXTREMITY, UNSPECIFIED LATERALITY (H): ICD-10-CM

## 2018-06-13 LAB — INR BLD: 2.6 (ref 0.86–1.14)

## 2018-06-13 PROCEDURE — 85610 PROTHROMBIN TIME: CPT | Mod: QW,ZL | Performed by: FAMILY MEDICINE

## 2018-06-13 PROCEDURE — 36416 COLLJ CAPILLARY BLOOD SPEC: CPT | Mod: ZL | Performed by: FAMILY MEDICINE

## 2018-06-13 NOTE — MR AVS SNAPSHOT
Clement CROWELL Shanika   6/13/2018 10:00 AM   Anticoagulation Therapy Visit    Description:  73 year old male   Provider:   ANTI COAGULATION   Department:  Hc Anti Coagulation           INR as of 6/13/2018     Today's INR 2.6      Anticoagulation Summary as of 6/13/2018     INR goal 2.0-3.0   Today's INR 2.6   Full warfarin instructions 2.5 mg on Mon, Fri; 5 mg all other days   Next INR check 7/25/2018    Indications   Pulmonary embolism and infarction (H) [I26.99]  Acute thromboembolism of deep veins of lower extremity (H) [I82.409]  Long-term (current) use of anticoagulants [Z79.01] [Z79.01]         Your next Anticoagulation Clinic appointment(s)     Jul 25, 2018 10:00 AM CDT   Anticoagulation Visit with  ANTI COAGULATION   [facility] [place] ([facility] [place] )    [address] [place] [address]   [phone]              June 2018 Details    Sun Mon Tue Wed Thu Fri Sat          1               2                 3               4               5               6               7               8               9                 10               11               12               13      5 mg   See details      14      5 mg         15      2.5 mg         16      5 mg           17      5 mg         18      2.5 mg         19      5 mg         20      5 mg         21      5 mg         22      2.5 mg         23      5 mg           24      5 mg         25      2.5 mg         26      5 mg         27      5 mg         28      5 mg         29      2.5 mg         30      5 mg          Date Details   06/13 This INR check               How to take your warfarin dose     To take:  2.5 mg Take 0.5 of a 5 mg tablet.    To take:  5 mg Take 1 of the 5 mg tablets.           July 2018 Details    Sun Mon Tue Wed Thu Fri Sat     1      5 mg         2      2.5 mg         3      5 mg         4      5 mg         5      5 mg         6      2.5 mg         7      5 mg           8      5 mg         9      2.5 mg          10      5 mg         11      5 mg         12      5 mg         13      2.5 mg         14      5 mg           15      5 mg         16      2.5 mg         17      5 mg         18      5 mg         19      5 mg         20      2.5 mg         21      5 mg           22      5 mg         23      2.5 mg         24      5 mg         25            26               27               28                 29               30               31                    Date Details   No additional details    Date of next INR:  7/25/2018         How to take your warfarin dose     To take:  2.5 mg Take 0.5 of a 5 mg tablet.    To take:  5 mg Take 1 of the 5 mg tablets.

## 2018-06-13 NOTE — PROGRESS NOTES
ANTICOAGULATION FOLLOW-UP CLINIC VISIT    Patient Name:  Clement Voss  Date:  6/13/2018  Contact Type:  Telephone/ message left on home phone voicemail    SUBJECTIVE:     Patient Findings     Positives No Problem Findings    Comments INR done by lab. Call placed to patient and message left re: INR result, warfarin dosing and INR recheck date. He is to call warfarin clinic if he has any bleeding/bruising, changes in diet/meds/activity or questions.            OBJECTIVE    INR Point of Care   Date Value Ref Range Status   06/13/2018 2.6 (H) 0.86 - 1.14 Final     Comment:     This test is intended for monitoring Coumadin therapy.  Results are not   accurate in patients with prolonged INR due to factor deficiency.         ASSESSMENT / PLAN  INR assessment THER    Recheck INR In: 6 WEEKS    INR Location Clinic      Anticoagulation Summary as of 6/13/2018     INR goal 2.0-3.0   Today's INR 2.6   Warfarin maintenance plan 2.5 mg (5 mg x 0.5) on Mon, Fri; 5 mg (5 mg x 1) all other days   Full warfarin instructions 2.5 mg on Mon, Fri; 5 mg all other days   Weekly warfarin total 30 mg   No change documented Jodie Elena RN   Plan last modified Jodie Elena RN (2/7/2018)   Next INR check 7/25/2018   Priority INR   Target end date Indefinite    Indications   Pulmonary embolism and infarction (H) [I26.99]  Acute thromboembolism of deep veins of lower extremity (H) [I82.409]  Long-term (current) use of anticoagulants [Z79.01] [Z79.01]         Anticoagulation Episode Summary     INR check location     Preferred lab     Send INR reminders to  ANTICOAG POOL    Comments       Anticoagulation Care Providers     Provider Role Specialty Phone number    Lorelei Felix MD [facility] [phone]            See the Encounter Report to view Anticoagulation Flowsheet and Dosing Calendar (Go to Encounters tab in chart review, and find the Anticoagulation Therapy Visit)        Jodie Elena RN

## 2018-07-25 ENCOUNTER — ANTICOAGULATION THERAPY VISIT (OUTPATIENT)
Dept: ANTICOAGULATION | Facility: OTHER | Age: 73
End: 2018-07-25
Attending: FAMILY MEDICINE
Payer: MEDICARE

## 2018-07-25 DIAGNOSIS — Z79.01 LONG-TERM (CURRENT) USE OF ANTICOAGULANTS: ICD-10-CM

## 2018-07-25 DIAGNOSIS — E11.22 TYPE 2 DIABETES MELLITUS WITH STAGE 3 CHRONIC KIDNEY DISEASE, WITH LONG-TERM CURRENT USE OF INSULIN (H): ICD-10-CM

## 2018-07-25 DIAGNOSIS — I26.99 PULMONARY EMBOLISM AND INFARCTION (H): ICD-10-CM

## 2018-07-25 DIAGNOSIS — Z79.4 TYPE 2 DIABETES MELLITUS WITH STAGE 3 CHRONIC KIDNEY DISEASE, WITH LONG-TERM CURRENT USE OF INSULIN (H): ICD-10-CM

## 2018-07-25 DIAGNOSIS — I82.409 ACUTE THROMBOEMBOLISM OF DEEP VEINS OF LOWER EXTREMITY, UNSPECIFIED LATERALITY (H): ICD-10-CM

## 2018-07-25 DIAGNOSIS — N18.30 TYPE 2 DIABETES MELLITUS WITH STAGE 3 CHRONIC KIDNEY DISEASE, WITH LONG-TERM CURRENT USE OF INSULIN (H): ICD-10-CM

## 2018-07-25 LAB — INR BLD: 2.5 (ref 0.86–1.14)

## 2018-07-25 PROCEDURE — 85610 PROTHROMBIN TIME: CPT | Mod: QW,ZL | Performed by: FAMILY MEDICINE

## 2018-07-25 PROCEDURE — 36416 COLLJ CAPILLARY BLOOD SPEC: CPT | Mod: ZL | Performed by: FAMILY MEDICINE

## 2018-07-25 NOTE — MR AVS SNAPSHOT
Clement Voss   7/25/2018 10:00 AM   Anticoagulation Therapy Visit    Description:  73 year old male   Provider:   ANTI COAGULATION   Department:  Hc Anti Coagulation           INR as of 7/25/2018     Today's INR 2.5      Anticoagulation Summary as of 7/25/2018     INR goal 2.0-3.0   Today's INR 2.5   Full warfarin instructions 2.5 mg on Mon, Fri; 5 mg all other days   Next INR check 9/5/2018    Indications   Pulmonary embolism and infarction (H) [I26.99]  Acute thromboembolism of deep veins of lower extremity (H) [I82.409]  Long-term (current) use of anticoagulants [Z79.01] [Z79.01]         Your next Anticoagulation Clinic appointment(s)     Sep 05, 2018 10:00 AM CDT   Anticoagulation Visit with  ANTI COAGULATION   [facility] [place] ([facility] [place] [facility] )    [address]  [place] [address]   [phone]              July 2018 Details    Sun Mon Tue Wed Thu Fri Sat     1               2               3               4               5               6               7                 8               9               10               11               12               13               14                 15               16               17               18               19               20               21                 22               23               24               25      5 mg   See details      26      5 mg         27      2.5 mg         28      5 mg           29      5 mg         30      2.5 mg         31      5 mg              Date Details   07/25 This INR check               How to take your warfarin dose     To take:  2.5 mg Take 0.5 of a 5 mg tablet.    To take:  5 mg Take 1 of the 5 mg tablets.           August 2018 Details    Sun Mon Tue Wed Thu Fri Sat        1      5 mg         2      5 mg         3      2.5 mg         4      5 mg           5      5 mg         6      2.5 mg         7      5 mg         8      5 mg         9      5 mg         10      2.5 mg         11       5 mg           12      5 mg         13      2.5 mg         14      5 mg         15      5 mg         16      5 mg         17      2.5 mg         18      5 mg           19      5 mg         20      2.5 mg         21      5 mg         22      5 mg         23      5 mg         24      2.5 mg         25      5 mg           26      5 mg         27      2.5 mg         28      5 mg         29      5 mg         30      5 mg         31      2.5 mg           Date Details   No additional details            How to take your warfarin dose     To take:  2.5 mg Take 0.5 of a 5 mg tablet.    To take:  5 mg Take 1 of the 5 mg tablets.           September 2018 Details    Sun Mon Tue Wed Thu Fri Sat           1      5 mg           2      5 mg         3      2.5 mg         4      5 mg         5            6               7               8                 9               10               11               12               13               14               15                 16               17               18               19               20               21               22                 23               24               25               26               27               28               29                 30                      Date Details   No additional details    Date of next INR:  9/5/2018         How to take your warfarin dose     To take:  2.5 mg Take 0.5 of a 5 mg tablet.    To take:  5 mg Take 1 of the 5 mg tablets.

## 2018-07-25 NOTE — PROGRESS NOTES
ANTICOAGULATION FOLLOW-UP CLINIC VISIT    Patient Name:  Clement Voss  Date:  7/25/2018  Contact Type:  Telephone/ message left on home phone voicemail    SUBJECTIVE:     Patient Findings     Positives No Problem Findings    Comments INR done by lab. Call placed to patient and message left on home phone voicemail re: INR result, warfarin dosing and INR recheck date. He is to call warfarin clinic if he has any bleeding/bruising, changes in diet/meds/activity or questions.            OBJECTIVE    INR Point of Care   Date Value Ref Range Status   07/25/2018 2.5 (H) 0.86 - 1.14 Final     Comment:     This test is intended for monitoring Coumadin therapy.  Results are not   accurate in patients with prolonged INR due to factor deficiency.         ASSESSMENT / PLAN  INR assessment THER    Recheck INR In: 6 WEEKS    INR Location Clinic      Anticoagulation Summary as of 7/25/2018     INR goal 2.0-3.0   Today's INR 2.5   Warfarin maintenance plan 2.5 mg (5 mg x 0.5) on Mon, Fri; 5 mg (5 mg x 1) all other days   Full warfarin instructions 2.5 mg on Mon, Fri; 5 mg all other days   Weekly warfarin total 30 mg   No change documented Jodie Elena, RN   Plan last modified Jodie Elena, RN (2/7/2018)   Next INR check 9/5/2018   Priority INR   Target end date Indefinite    Indications   Pulmonary embolism and infarction (H) [I26.99]  Acute thromboembolism of deep veins of lower extremity (H) [I82.409]  Long-term (current) use of anticoagulants [Z79.01] [Z79.01]         Anticoagulation Episode Summary     INR check location     Preferred lab     Send INR reminders to  ANTICOAG POOL    Comments       Anticoagulation Care Providers     Provider Role Specialty Phone number    Lorelei Felix MD [facility] [phone]            See the Encounter Report to view Anticoagulation Flowsheet and Dosing Calendar (Go to Encounters tab in chart review, and find the Anticoagulation Therapy Visit)        Jodie LYNNE  Kassy RN

## 2018-07-26 NOTE — TELEPHONE ENCOUNTER
B-D U/F insulin pen needle  USE AS DIRECTED    Not on current med list     Last office visit 4/12/18

## 2018-07-27 RX ORDER — FLURBIPROFEN SODIUM 0.3 MG/ML
SOLUTION/ DROPS OPHTHALMIC
Qty: 100 EACH | Refills: 1 | Status: SHIPPED | OUTPATIENT
Start: 2018-07-27 | End: 2019-02-12

## 2018-08-21 DIAGNOSIS — E78.5 HYPERLIPIDEMIA, UNSPECIFIED HYPERLIPIDEMIA TYPE: ICD-10-CM

## 2018-08-22 RX ORDER — FENOFIBRATE 48 MG/1
TABLET, COATED ORAL
Qty: 90 TABLET | Refills: 1 | Status: SHIPPED | OUTPATIENT
Start: 2018-08-22 | End: 2019-02-15

## 2018-09-07 DIAGNOSIS — I10 BENIGN ESSENTIAL HYPERTENSION: ICD-10-CM

## 2018-09-07 NOTE — TELEPHONE ENCOUNTER
Lisinopril  Last Written Prescription Date: 6/7/18  Last Fill Quantity: 90 # of Refills: 0  Last Office Visit: 4/12/18

## 2018-09-10 RX ORDER — LISINOPRIL 40 MG/1
TABLET ORAL
Qty: 90 TABLET | Refills: 0 | Status: SHIPPED | OUTPATIENT
Start: 2018-09-10 | End: 2018-11-30

## 2018-09-12 ENCOUNTER — ANTICOAGULATION THERAPY VISIT (OUTPATIENT)
Dept: ANTICOAGULATION | Facility: OTHER | Age: 73
End: 2018-09-12
Attending: FAMILY MEDICINE
Payer: MEDICARE

## 2018-09-12 DIAGNOSIS — Z79.01 LONG-TERM (CURRENT) USE OF ANTICOAGULANTS: ICD-10-CM

## 2018-09-12 DIAGNOSIS — I10 BENIGN ESSENTIAL HYPERTENSION: ICD-10-CM

## 2018-09-12 DIAGNOSIS — I26.99 PULMONARY EMBOLISM AND INFARCTION (H): ICD-10-CM

## 2018-09-12 DIAGNOSIS — Z79.4 TYPE 2 DIABETES MELLITUS WITH STAGE 3 CHRONIC KIDNEY DISEASE, WITH LONG-TERM CURRENT USE OF INSULIN (H): ICD-10-CM

## 2018-09-12 DIAGNOSIS — E11.22 TYPE 2 DIABETES MELLITUS WITH STAGE 3 CHRONIC KIDNEY DISEASE, WITH LONG-TERM CURRENT USE OF INSULIN (H): ICD-10-CM

## 2018-09-12 DIAGNOSIS — E78.5 HYPERLIPIDEMIA, UNSPECIFIED HYPERLIPIDEMIA TYPE: ICD-10-CM

## 2018-09-12 DIAGNOSIS — N18.30 TYPE 2 DIABETES MELLITUS WITH STAGE 3 CHRONIC KIDNEY DISEASE, WITH LONG-TERM CURRENT USE OF INSULIN (H): ICD-10-CM

## 2018-09-12 DIAGNOSIS — I82.409 ACUTE THROMBOEMBOLISM OF DEEP VEINS OF LOWER EXTREMITY, UNSPECIFIED LATERALITY (H): ICD-10-CM

## 2018-09-12 LAB
ANION GAP SERPL CALCULATED.3IONS-SCNC: 5 MMOL/L (ref 3–14)
BUN SERPL-MCNC: 17 MG/DL (ref 7–30)
CALCIUM SERPL-MCNC: 8.9 MG/DL (ref 8.5–10.1)
CHLORIDE SERPL-SCNC: 111 MMOL/L (ref 94–109)
CHOLEST SERPL-MCNC: 131 MG/DL
CO2 SERPL-SCNC: 26 MMOL/L (ref 20–32)
CREAT SERPL-MCNC: 1.53 MG/DL (ref 0.66–1.25)
EST. AVERAGE GLUCOSE BLD GHB EST-MCNC: 186 MG/DL
GFR SERPL CREATININE-BSD FRML MDRD: 45 ML/MIN/1.7M2
GLUCOSE SERPL-MCNC: 156 MG/DL (ref 70–99)
HBA1C MFR BLD: 8.1 % (ref 0–5.6)
HDLC SERPL-MCNC: 40 MG/DL
INR BLD: 2.9 (ref 0.86–1.14)
LDLC SERPL CALC-MCNC: 67 MG/DL
NONHDLC SERPL-MCNC: 91 MG/DL
POTASSIUM SERPL-SCNC: 4.2 MMOL/L (ref 3.4–5.3)
SODIUM SERPL-SCNC: 142 MMOL/L (ref 133–144)
TRIGL SERPL-MCNC: 118 MG/DL

## 2018-09-12 PROCEDURE — 83036 HEMOGLOBIN GLYCOSYLATED A1C: CPT | Mod: ZL | Performed by: FAMILY MEDICINE

## 2018-09-12 PROCEDURE — 80061 LIPID PANEL: CPT | Mod: ZL | Performed by: FAMILY MEDICINE

## 2018-09-12 PROCEDURE — 40000788 ZZHCL STATISTIC ESTIMATED AVERAGE GLUCOSE: Mod: ZL | Performed by: FAMILY MEDICINE

## 2018-09-12 PROCEDURE — 36416 COLLJ CAPILLARY BLOOD SPEC: CPT | Mod: ZL | Performed by: FAMILY MEDICINE

## 2018-09-12 PROCEDURE — 85610 PROTHROMBIN TIME: CPT | Mod: QW,ZL | Performed by: FAMILY MEDICINE

## 2018-09-12 PROCEDURE — 80048 BASIC METABOLIC PNL TOTAL CA: CPT | Mod: ZL | Performed by: FAMILY MEDICINE

## 2018-09-12 PROCEDURE — 36415 COLL VENOUS BLD VENIPUNCTURE: CPT | Mod: ZL | Performed by: FAMILY MEDICINE

## 2018-09-12 NOTE — PROGRESS NOTES
ANTICOAGULATION FOLLOW-UP CLINIC VISIT    Patient Name:  Clement Voss  Date:  9/12/2018  Contact Type:  Telephone/ message left on home phone voicemail    SUBJECTIVE:     Patient Findings     Positives No Problem Findings    Comments INR done by lab. Call placed to patient and message left re: INR result, warfarin dosing and INR recheck date. He is to call warfarin clinic if he has any bleeding/bruising, changes in diet/meds/activity or questions.            OBJECTIVE    INR Point of Care   Date Value Ref Range Status   09/12/2018 2.9 (H) 0.86 - 1.14 Final     Comment:     This test is intended for monitoring Coumadin therapy.  Results are not   accurate in patients with prolonged INR due to factor deficiency.         ASSESSMENT / PLAN  INR assessment THER    Recheck INR In: 6 WEEKS    INR Location Clinic      Anticoagulation Summary as of 9/12/2018     INR goal 2.0-3.0   Today's INR 2.9   Warfarin maintenance plan 2.5 mg (5 mg x 0.5) on Mon, Fri; 5 mg (5 mg x 1) all other days   Full warfarin instructions 2.5 mg on Mon, Fri; 5 mg all other days   Weekly warfarin total 30 mg   No change documented Jodie Elena RN   Plan last modified Jodie Elena RN (2/7/2018)   Next INR check 10/24/2018   Priority INR   Target end date Indefinite    Indications   Pulmonary embolism and infarction (H) [I26.99]  Acute thromboembolism of deep veins of lower extremity (H) [I82.409]  Long-term (current) use of anticoagulants [Z79.01] [Z79.01]         Anticoagulation Episode Summary     INR check location     Preferred lab     Send INR reminders to  ANTICOAG POOL    Comments       Anticoagulation Care Providers     Provider Role Specialty Phone number    Lorelei Felix MD [facility] [phone]            See the Encounter Report to view Anticoagulation Flowsheet and Dosing Calendar (Go to Encounters tab in chart review, and find the Anticoagulation Therapy Visit)        Jodie Elena RN

## 2018-09-12 NOTE — MR AVS SNAPSHOT
Clement CROWELL Shanika   9/12/2018 10:00 AM   Anticoagulation Therapy Visit    Description:  73 year old male   Provider:   ANTI COAGULATION   Department:  Hc Anti Coagulation           INR as of 9/12/2018     Today's INR 2.9      Anticoagulation Summary as of 9/12/2018     INR goal 2.0-3.0   Today's INR 2.9   Full warfarin instructions 2.5 mg on Mon, Fri; 5 mg all other days   Next INR check 10/24/2018    Indications   Pulmonary embolism and infarction (H) [I26.99]  Acute thromboembolism of deep veins of lower extremity (H) [I82.409]  Long-term (current) use of anticoagulants [Z79.01] [Z79.01]         Your next Anticoagulation Clinic appointment(s)     Oct 24, 2018 10:00 AM CDT   Anticoagulation Visit with  ANTI COAGULATION   [facility] [place] ([facility] [place] )    [address] [place] [address]   [phone]              September 2018 Details    Sun Mon Tue Wed Thu Fri Sat           1                 2               3               4               5               6               7               8                 9               10               11               12      5 mg   See details      13      5 mg         14      2.5 mg         15      5 mg           16      5 mg         17      2.5 mg         18      5 mg         19      5 mg         20      5 mg         21      2.5 mg         22      5 mg           23      5 mg         24      2.5 mg         25      5 mg         26      5 mg         27      5 mg         28      2.5 mg         29      5 mg           30      5 mg                Date Details   09/12 This INR check               How to take your warfarin dose     To take:  2.5 mg Take 0.5 of a 5 mg tablet.    To take:  5 mg Take 1 of the 5 mg tablets.           October 2018 Details    Sun Mon Tue Wed Thu Fri Sat      1      2.5 mg         2      5 mg         3      5 mg         4      5 mg         5      2.5 mg         6      5 mg           7      5 mg         8      2.5 mg          9      5 mg         10      5 mg         11      5 mg         12      2.5 mg         13      5 mg           14      5 mg         15      2.5 mg         16      5 mg         17      5 mg         18      5 mg         19      2.5 mg         20      5 mg           21      5 mg         22      2.5 mg         23      5 mg         24            25               26               27                 28               29               30               31                   Date Details   No additional details    Date of next INR:  10/24/2018         How to take your warfarin dose     To take:  2.5 mg Take 0.5 of a 5 mg tablet.    To take:  5 mg Take 1 of the 5 mg tablets.

## 2018-09-18 NOTE — PROGRESS NOTES
SUBJECTIVE:                                                    Clement Voss is a 73 year old male who presents to clinic today for the following health issues:      Diabetes Follow-up    Patient is checking blood sugars: twice daily.    Blood sugar testing frequency justification: Uncontrolled diabetes  Results are as follows:         various    Diabetic concerns: blood sugar frequently over 200     Symptoms of hypoglycemia (low blood sugar): no lows     Paresthesias (numbness or burning in feet) or sores: No     Date of last diabetic eye exam: due 10-20-18    Diabetes Management Resources    Hyperlipidemia Follow-Up      Rate your low fat/cholesterol diet?: not monitoring fat    Taking statin?  No    Other lipid medications/supplements?:  Fish oil/Omega 3, dose tid without side effects    Hypertension Follow-up      Outpatient blood pressures are being checked at home.  Results are good.    Low Salt Diet: not monitoring salt    BP Readings from Last 2 Encounters:   05/03/18 139/59   04/19/18 142/61     Hemoglobin A1C (%)   Date Value   09/12/2018 8.1 (H)   04/12/2018 7.9 (H)     LDL Cholesterol Calculated (mg/dL)   Date Value   09/12/2018 67   02/21/2018 89       Amount of exercise or physical activity: 2-3 days/week for an average of 15-30 minutes    Problems taking medications regularly: No    Medication side effects: none    Diet: regular (no restrictions)          Problem list and histories reviewed & adjusted, as indicated.  Additional history: as documented    Patient Active Problem List   Diagnosis     Type 2 diabetes mellitus with chronic kidney disease, with long-term current use of insulin (H)     Hyperlipidemia     Benign essential hypertension     Gout     Pulmonary embolism and infarction (H)     Acute thromboembolism of deep veins of lower extremity (H)     ACP (advance care planning)     Long-term (current) use of anticoagulants [Z79.01]     Morbid obesity (H)     Past Surgical History:    Procedure Laterality Date     APPENDECTOMY  2008     CHOLECYSTECTOMY  1984     History of PE of right lung 3/2013[       PHACOEMULSIFICATION WITH STANDARD INTRAOCULAR LENS IMPLANT Left 4/19/2018    Procedure: PHACOEMULSIFICATION WITH STANDARD INTRAOCULAR LENS IMPLANT;  CATARACT EXTRACTION LEFT EYE WITH IMPLANT, ISTENT LEFT EYE;  Surgeon: Kush Almaraz MD;  Location: [facility]     PHACOEMULSIFICATION WITH STANDARD INTRAOCULAR LENS IMPLANT Right 5/3/2018    Procedure: PHACOEMULSIFICATION WITH STANDARD INTRAOCULAR LENS IMPLANT;  CATARACT EXTRACTION RIGHT EYE WITH IMPLANT,WITH ISTENT ATTEMPTED;  Surgeon: Kush Almaraz MD;  Location: [facility]     TRABECULAR MICRO-BYPASS WITH ISTENT Left 4/19/2018    Procedure: TRABECULAR MICRO-BYPASS WITH ISTENT;;  Surgeon: Kush Almaraz MD;  Location: [facility]     TRABECULAR MICRO-BYPASS WITH ISTENT  5/3/2018    Procedure: TRABECULAR MICRO-BYPASS WITH ISTENT;;  Surgeon: Kush Almaraz MD;  Location: [facility]       Social History   Substance Use Topics     Smoking status: Former Smoker     Types: Cigarettes     Quit date: 8/12/1964     Smokeless tobacco: Never Used     Alcohol use No     Family History   Problem Relation Age of Onset     HEART DISEASE Father 71     heart disease; cause of death     Other - See Comments Mother 79     old age; cause of death         Current Outpatient Prescriptions   Medication Sig Dispense Refill     ACCU-CHEK VLAD PLUS test strip USE TO TEST BLOOD SUGARS THREE TIMES DAILY 100 strip 3     acetaminophen (TYLENOL) 500 MG tablet Take 1-2 tablets (500-1,000 mg) by mouth every 6 hours as needed       amLODIPine (NORVASC) 10 MG tablet TAKE 1 TABLET(10 MG) BY MOUTH DAILY 90 tablet 1     aspirin 81 MG EC tablet Take 1 tablet (81 mg) by mouth daily 90 tablet 3     B-D U/F insulin pen needle USE AS DIRECTED 100 each 1     BASAGLAR 100 UNIT/ML injection Inject 25 Units Subcutaneous At Bedtime 18 mL 1     brimonidine-timolol (COMBIGAN) 0.2-0.5 % ophthalmic  solution 1 drop 2 times daily Morning and evening, 12 hours apart       fenofibrate 48 MG tablet TAKE 1 TABLET(48 MG) BY MOUTH DAILY 90 tablet 1     fish oil-omega-3 fatty acids (FISH OIL) 1000 MG capsule Take 1 capsule by mouth 3 times daily.       fluticasone (FLONASE) 50 MCG/ACT nasal spray Spray 2 sprays into both nostrils daily (Patient taking differently: Spray 2 sprays into both nostrils daily as needed ) 1 Package 0     indomethacin (INDOCIN) 50 MG capsule Take 1 capsule (50 mg) by mouth 3 times daily With food as needed 30 capsule 1     lisinopril (PRINIVIL/ZESTRIL) 40 MG tablet TAKE 1 TABLET(40 MG) BY MOUTH DAILY 90 tablet 0     loratadine (CLARITIN) 10 MG tablet Take 10 mg by mouth daily.       pilocarpine (PILOCAR) 1 % ophthalmic solution Place 1 drop into both eyes 4 times daily       sitagliptin (JANUVIA) 100 MG tablet Take 1 tablet (100 mg) by mouth daily 90 tablet 3     STATIN NOT PRESCRIBED, INTENTIONAL, Statin not prescribed intentionally due to Intolerance (with supporting documentation of trying a statin at least once within the last 5 years) 0 each 0     terazosin (HYTRIN) 10 MG capsule TAKE 1 CAPSULE(10 MG) BY MOUTH AT BEDTIME 90 capsule 3     TRAVATAN Z (TRAVATAN Z) 0.004 % ophthalmic solution Instill 1 drop by ophthalmic route every day into affected eye(s) in the evening       warfarin (COUMADIN) 5 MG tablet SEE NOTES 90 tablet 3     [DISCONTINUED] BASAGLAR 100 UNIT/ML injection Inject 19 Units Subcutaneous At Bedtime 18 mL 1     Allergies   Allergen Reactions     Atorvastatin      Other reaction(s): Other  Caused increased creatinine.      Atorvastatin Calcium Other (See Comments)     Lipitor  Increase CR     Iodine      Penicillins      Sulfa Drugs      Sulfa (sulfonamide antibiotics)     Sulfacetamide        ROS:  Constitutional, HEENT, cardiovascular, pulmonary, gi and gu systems are negative, except as otherwise noted.    OBJECTIVE:     /50  Pulse 85  Temp 98.1  F (36.7  C)  "(Tympanic)  Resp 16  Ht 5' 7\" (1.702 m)  Wt 223 lb (101.2 kg)  SpO2 98%  BMI 34.93 kg/m2  Body mass index is 34.93 kg/(m^2).  GENERAL: healthy, alert and no distress  PSYCH: mentation appears normal, affect normal/bright    Diagnostic Test Results:  Results for orders placed or performed in visit on 09/12/18   INR point of care (  clinic ONLY)   Result Value Ref Range    INR Point of Care 2.9 (H) 0.86 - 1.14   Basic metabolic panel   Result Value Ref Range    Sodium 142 133 - 144 mmol/L    Potassium 4.2 3.4 - 5.3 mmol/L    Chloride 111 (H) 94 - 109 mmol/L    Carbon Dioxide 26 20 - 32 mmol/L    Anion Gap 5 3 - 14 mmol/L    Glucose 156 (H) 70 - 99 mg/dL    Urea Nitrogen 17 7 - 30 mg/dL    Creatinine 1.53 (H) 0.66 - 1.25 mg/dL    GFR Estimate 45 (L) >60 mL/min/1.7m2    GFR Estimate If Black 54 (L) >60 mL/min/1.7m2    Calcium 8.9 8.5 - 10.1 mg/dL   Hemoglobin A1c   Result Value Ref Range    Hemoglobin A1C 8.1 (H) 0 - 5.6 %   Lipid Profile   Result Value Ref Range    Cholesterol 131 <200 mg/dL    Triglycerides 118 <150 mg/dL    HDL Cholesterol 40 >39 mg/dL    LDL Cholesterol Calculated 67 <100 mg/dL    Non HDL Cholesterol 91 <130 mg/dL   Estimated Average Glucose   Result Value Ref Range    Estimated Average Glucose 186 mg/dL       ASSESSMENT/PLAN:     Diabetes Type II, A1c Uncontrolled, insulin dependent   Associated with the following complications    Renal Complications:  CKD    Ophthalmologic Complications: None    Neurologic Complications: None    Peripheral Vascular Complications:  None    Other: None   Plan:  The following changes are made - Increase insulin as directed      Hyperlipidemia; controlled   Plan:  No changes in the patient's current treatment plan    Hypertension; controlled   Associated with the following complications:    Diabetes   Plan:  No changes in the patient's current treatment plan          ICD-10-CM    1. Type 2 diabetes mellitus with stage 3 chronic kidney disease, with long-term " current use of insulin (H) E11.22 BASAGLAR 100 UNIT/ML injection    N18.3     Z79.4    2. Hyperlipidemia, unspecified hyperlipidemia type E78.5    3. Benign essential hypertension I10        FUTURE APPOINTMENTS:       - Follow-up visit in 3 months, sooner as needed.      Lorelei Felix MD  [facility]

## 2018-09-20 ENCOUNTER — OFFICE VISIT (OUTPATIENT)
Dept: FAMILY MEDICINE | Facility: OTHER | Age: 73
End: 2018-09-20
Attending: FAMILY MEDICINE
Payer: COMMERCIAL

## 2018-09-20 VITALS
HEIGHT: 67 IN | DIASTOLIC BLOOD PRESSURE: 50 MMHG | OXYGEN SATURATION: 98 % | HEART RATE: 85 BPM | TEMPERATURE: 98.1 F | WEIGHT: 223 LBS | SYSTOLIC BLOOD PRESSURE: 130 MMHG | RESPIRATION RATE: 16 BRPM | BODY MASS INDEX: 35 KG/M2

## 2018-09-20 DIAGNOSIS — I10 BENIGN ESSENTIAL HYPERTENSION: ICD-10-CM

## 2018-09-20 DIAGNOSIS — E78.5 HYPERLIPIDEMIA, UNSPECIFIED HYPERLIPIDEMIA TYPE: ICD-10-CM

## 2018-09-20 DIAGNOSIS — E11.22 TYPE 2 DIABETES MELLITUS WITH STAGE 3 CHRONIC KIDNEY DISEASE, WITH LONG-TERM CURRENT USE OF INSULIN (H): Primary | ICD-10-CM

## 2018-09-20 DIAGNOSIS — N18.30 TYPE 2 DIABETES MELLITUS WITH STAGE 3 CHRONIC KIDNEY DISEASE, WITH LONG-TERM CURRENT USE OF INSULIN (H): Primary | ICD-10-CM

## 2018-09-20 DIAGNOSIS — Z79.4 TYPE 2 DIABETES MELLITUS WITH STAGE 3 CHRONIC KIDNEY DISEASE, WITH LONG-TERM CURRENT USE OF INSULIN (H): Primary | ICD-10-CM

## 2018-09-20 PROCEDURE — 99214 OFFICE O/P EST MOD 30 MIN: CPT | Performed by: FAMILY MEDICINE

## 2018-09-20 PROCEDURE — G0463 HOSPITAL OUTPT CLINIC VISIT: HCPCS

## 2018-09-20 RX ORDER — INSULIN GLARGINE 100 [IU]/ML
25 INJECTION, SOLUTION SUBCUTANEOUS AT BEDTIME
Qty: 18 ML | Refills: 1 | Status: SHIPPED | OUTPATIENT
Start: 2018-09-20 | End: 2019-03-06

## 2018-09-20 ASSESSMENT — ANXIETY QUESTIONNAIRES
2. NOT BEING ABLE TO STOP OR CONTROL WORRYING: NOT AT ALL
6. BECOMING EASILY ANNOYED OR IRRITABLE: NOT AT ALL
3. WORRYING TOO MUCH ABOUT DIFFERENT THINGS: NOT AT ALL
1. FEELING NERVOUS, ANXIOUS, OR ON EDGE: NOT AT ALL
GAD7 TOTAL SCORE: 0
4. TROUBLE RELAXING: NOT AT ALL
5. BEING SO RESTLESS THAT IT IS HARD TO SIT STILL: NOT AT ALL
7. FEELING AFRAID AS IF SOMETHING AWFUL MIGHT HAPPEN: NOT AT ALL

## 2018-09-20 ASSESSMENT — PAIN SCALES - GENERAL: PAINLEVEL: NO PAIN (0)

## 2018-09-20 NOTE — NURSING NOTE
"Chief Complaint   Patient presents with     Diabetes     Trauma     right leg       Initial /50  Pulse 85  Temp 98.1  F (36.7  C) (Tympanic)  Resp 16  Ht 5' 7\" (1.702 m)  Wt 223 lb (101.2 kg)  SpO2 98%  BMI 34.93 kg/m2 Estimated body mass index is 34.93 kg/(m^2) as calculated from the following:    Height as of this encounter: 5' 7\" (1.702 m).    Weight as of this encounter: 223 lb (101.2 kg).  Medication Reconciliation: complete    Arleen Paul LPN    "

## 2018-09-20 NOTE — PATIENT INSTRUCTIONS
Increase evening insulin to 22 units nightly for one week.  If blood glucose levels are still elevated, increase to 25 units nightly.

## 2018-09-20 NOTE — MR AVS SNAPSHOT
After Visit Summary   9/20/2018    Clement Voss    MRN: [id number]           Patient Information     Date Of Birth          1945        Visit Information        Provider Department      9/20/2018 2:00 PM Lorelei Felix MD [facility]        Today's Diagnoses     Type 2 diabetes mellitus with stage 3 chronic kidney disease, with long-term current use of insulin (H)    -  1    Hyperlipidemia, unspecified hyperlipidemia type        Benign essential hypertension          Care Instructions    Increase evening insulin to 22 units nightly for one week.  If blood glucose levels are still elevated, increase to 25 units nightly.          Follow-ups after your visit        Your next 10 appointments already scheduled     Oct 24, 2018  9:45 AM CDT   LAB with [facility]   [facility] ([facility] )    [address]  [place] [address]   [phone]            Oct 24, 2018 10:00 AM CDT   Anticoagulation Visit with [facility] ANTI COAGULATION   [facility] ([facility] )    [address] [place] [address] [phone]              Who to contact     If you have questions or need follow up information about today's clinic visit or your schedule please contact [facility] directly at [phone].  Normal or non-critical lab and imaging results will be communicated to you by MyChart, letter or phone within 4 business days after the clinic has received the results. If you do not hear from us within 7 days, please contact the clinic through MyChart or phone. If you have a critical or abnormal lab result, we will notify you by phone as soon as possible.  Submit refill requests through [employer] or call your pharmacy and they will forward the refill request to us. Please allow 3 business days for your refill to be completed.          Additional Information About Your Visit    "     [employer] Information     [employer] gives you secure access to your electronic health record. If you see a primary care provider, you can also send messages to your care team and make appointments. If you have questions, please call your primary care clinic.  If you do not have a primary care provider, please call [phone] and they will assist you.        Care EveryWhere ID     This is your Care EveryWhere ID. This could be used by other organizations to access your [facility] medical records  [id number]        Your Vitals Were     Pulse Temperature Respirations Height Pulse Oximetry BMI (Body Mass Index)    85 98.1  F (36.7  C) (Tympanic) 16 5' 7\" (1.702 m) 98% 34.93 kg/m2       Blood Pressure from Last 3 Encounters:   09/20/18 130/50   05/03/18 139/59   04/19/18 142/61    Weight from Last 3 Encounters:   09/20/18 223 lb (101.2 kg)   05/03/18 222 lb 0.1 oz (100.7 kg)   04/19/18 222 lb (100.7 kg)              Today, you had the following     No orders found for display         Today's Medication Changes          These changes are accurate as of 9/20/18  2:08 PM.  If you have any questions, ask your nurse or doctor.               These medicines have changed or have updated prescriptions.        Dose/Directions    BASAGLAR 100 UNIT/ML injection   This may have changed:  how much to take   Used for:  Type 2 diabetes mellitus with stage 3 chronic kidney disease, with long-term current use of insulin (H)   Changed by:  Lorelei Felix MD        Dose:  25 Units   Inject 25 Units Subcutaneous At Bedtime   Quantity:  18 mL   Refills:  1       fluticasone 50 MCG/ACT spray   Commonly known as:  FLONASE   This may have changed:    - when to take this  - reasons to take this   Used for:  Acute maxillary sinusitis        Dose:  2 spray   Spray 2 sprays into both nostrils daily   Quantity:  1 Package   Refills:  0            Where to get your medicines      These medications were sent to [id number] - " [place] - [address]  [address] [facility] [address]  [address] [place] [address]     Phone:  [phone]     BASAGLAR 100 UNIT/ML injection                Primary Care Provider Office Phone # Fax #    Lorelei VERNA Felix [phone] [address] [facility] [address]        Equal Access to Services     CHRISTAL LUCAS : Hadii aad ku hadasho Soomaali, waaxda luqadaha, qaybta kaalmada adeegyada, waxay idiin hayaan adeeg kharash la'chadn ah. So [facility] [phone].    ATENCIÓN: Si habla esptran, tiene a breaux disposición servicios gratuitos de asistencia lingüística. Llame al [phone].    We comply with applicable federal civil rights laws and [place] laws. We do not discriminate on the basis of race, color, national origin, age, disability, sex, sexual orientation, or gender identity.            Thank you!     Thank you for choosing [facility]  for your care. Our goal is always to provide you with excellent care. Hearing back from our patients is one way we can continue to improve our services. Please take a few minutes to complete the written survey that you may receive in the mail after your visit with us. Thank you!             Your Updated Medication List - Protect others around you: Learn how to safely use, store and throw away your medicines at www.disposemymeds.org.          This list is accurate as of 9/20/18  2:08 PM.  Always use your most recent med list.                   Brand Name Dispense Instructions for use Diagnosis    ACCU-CHEK VLAD PLUS test strip   Generic drug:  blood glucose monitoring     100 strip    USE TO TEST BLOOD SUGARS THREE TIMES DAILY    Diabetes mellitus, type 2 (H)       acetaminophen 500 MG tablet    TYLENOL     Take 1-2 tablets (500-1,000 mg) by mouth every 6 hours as needed    Pulmonary embolism (H)       amLODIPine 10 MG tablet    NORVASC    90 tablet    TAKE 1 TABLET(10 MG) BY MOUTH DAILY    Benign  essential hypertension       aspirin 81 MG EC tablet     90 tablet    Take 1 tablet (81 mg) by mouth daily    Diabetes mellitus, type 2 (H)       B-D U/F 31G X 5 MM   Generic drug:  insulin pen needle     100 each    USE AS DIRECTED    Type 2 diabetes mellitus with stage 3 chronic kidney disease, with long-term current use of insulin (H)       BASAGLAR 100 UNIT/ML injection     18 mL    Inject 25 Units Subcutaneous At Bedtime    Type 2 diabetes mellitus with stage 3 chronic kidney disease, with long-term current use of insulin (H)       CLARITIN 10 MG tablet   Generic drug:  loratadine      Take 10 mg by mouth daily.        COMBIGAN 0.2-0.5 % ophthalmic solution   Generic drug:  brimonidine-timolol      1 drop 2 times daily Morning and evening, 12 hours apart        fenofibrate 48 MG tablet     90 tablet    TAKE 1 TABLET(48 MG) BY MOUTH DAILY    Hyperlipidemia, unspecified hyperlipidemia type       fish oil-omega-3 fatty acids 1000 MG capsule      Take 1 capsule by mouth 3 times daily.        fluticasone 50 MCG/ACT spray    FLONASE    1 Package    Spray 2 sprays into both nostrils daily    Acute maxillary sinusitis       indomethacin 50 MG capsule    INDOCIN    30 capsule    Take 1 capsule (50 mg) by mouth 3 times daily With food as needed    Gout, unspecified       lisinopril 40 MG tablet    PRINIVIL/ZESTRIL    90 tablet    TAKE 1 TABLET(40 MG) BY MOUTH DAILY    Benign essential hypertension       pilocarpine 1 % ophthalmic solution    PILOCAR     Place 1 drop into both eyes 4 times daily        sitagliptin 100 MG tablet    JANUVIA    90 tablet    Take 1 tablet (100 mg) by mouth daily    Type 2 diabetes mellitus with stage 3 chronic kidney disease, with long-term current use of insulin (H)       STATIN NOT PRESCRIBED (INTENTIONAL)     0 each    Statin not prescribed intentionally due to Intolerance (with supporting documentation of trying a statin at least once within the last 5 years)    Hyperlipidemia,  unspecified hyperlipidemia       terazosin 10 MG capsule    HYTRIN    90 capsule    TAKE 1 CAPSULE(10 MG) BY MOUTH AT BEDTIME    Benign essential hypertension       TRAVATAN Z 0.004 % ophthalmic solution   Generic drug:  travoprost (DARION Free)      Instill 1 drop by ophthalmic route every day into affected eye(s) in the evening        warfarin 5 MG tablet    COUMADIN    90 tablet    SEE NOTES    Pulmonary embolism and infarction (H)

## 2018-09-21 ASSESSMENT — ANXIETY QUESTIONNAIRES: GAD7 TOTAL SCORE: 0

## 2018-09-21 ASSESSMENT — PATIENT HEALTH QUESTIONNAIRE - PHQ9: SUM OF ALL RESPONSES TO PHQ QUESTIONS 1-9: 0

## 2018-10-05 ENCOUNTER — MYC REFILL (OUTPATIENT)
Dept: FAMILY MEDICINE | Facility: OTHER | Age: 73
End: 2018-10-05

## 2018-10-05 DIAGNOSIS — I10 BENIGN ESSENTIAL HYPERTENSION: ICD-10-CM

## 2018-10-10 RX ORDER — AMLODIPINE BESYLATE 10 MG/1
TABLET ORAL
Qty: 90 TABLET | Refills: 1 | Status: SHIPPED | OUTPATIENT
Start: 2018-10-10 | End: 2019-04-05

## 2018-10-24 ENCOUNTER — ANTICOAGULATION THERAPY VISIT (OUTPATIENT)
Dept: ANTICOAGULATION | Facility: OTHER | Age: 73
End: 2018-10-24
Attending: FAMILY MEDICINE
Payer: MEDICARE

## 2018-10-24 DIAGNOSIS — I26.99 PULMONARY EMBOLISM AND INFARCTION (H): ICD-10-CM

## 2018-10-24 DIAGNOSIS — I82.409 ACUTE THROMBOEMBOLISM OF DEEP VEINS OF LOWER EXTREMITY, UNSPECIFIED LATERALITY (H): ICD-10-CM

## 2018-10-24 DIAGNOSIS — Z79.01 LONG TERM CURRENT USE OF ANTICOAGULANT THERAPY: ICD-10-CM

## 2018-10-24 LAB — INR BLD: 2.8 (ref 0.86–1.14)

## 2018-10-24 PROCEDURE — 36416 COLLJ CAPILLARY BLOOD SPEC: CPT | Mod: ZL | Performed by: FAMILY MEDICINE

## 2018-10-24 PROCEDURE — 85610 PROTHROMBIN TIME: CPT | Mod: QW,ZL | Performed by: FAMILY MEDICINE

## 2018-10-24 NOTE — MR AVS SNAPSHOT
Clement Voss   10/24/2018 10:00 AM   Anticoagulation Therapy Visit    Description:  73 year old male   Provider:   ANTI COAGULATION   Department:  Hc Anti Coagulation           INR as of 10/24/2018     Today's INR 2.8      Anticoagulation Summary as of 10/24/2018     INR goal 2.0-3.0   Today's INR 2.8   Full warfarin instructions 2.5 mg on Mon, Fri; 5 mg all other days   Next INR check 12/5/2018    Indications   Pulmonary embolism and infarction (H) [I26.99]  Acute thromboembolism of deep veins of lower extremity (H) [I82.409]  Long-term (current) use of anticoagulants [Z79.01] [Z79.01]         Your next Anticoagulation Clinic appointment(s)     Dec 05, 2018 10:00 AM CST   Anticoagulation Visit with  ANTI COAGULATION   [facility] ([facility] )    [address] [place] [address]   [phone]              October 2018 Details    Sun Mon Tue Wed Thu Fri Sat      1               2               3               4               5               6                 7               8               9               10               11               12               13                 14               15               16               17               18               19               20                 21               22               23               24      5 mg   See details      25      5 mg         26      2.5 mg         27      5 mg           28      5 mg         29      2.5 mg         30      5 mg         31      5 mg             Date Details   10/24 This INR check               How to take your warfarin dose     To take:  2.5 mg Take 0.5 of a 5 mg tablet.    To take:  5 mg Take 1 of the 5 mg tablets.           November 2018 Details    Sun Mon Tue Wed Thu Fri Sat         1      5 mg         2      2.5 mg         3      5 mg           4      5 mg         5      2.5 mg         6      5 mg         7      5 mg         8      5 mg         9      2.5 mg         10       5 mg           11      5 mg         12      2.5 mg         13      5 mg         14      5 mg         15      5 mg         16      2.5 mg         17      5 mg           18      5 mg         19      2.5 mg         20      5 mg         21      5 mg         22      5 mg         23      2.5 mg         24      5 mg           25      5 mg         26      2.5 mg         27      5 mg         28      5 mg         29      5 mg         30      2.5 mg           Date Details   No additional details            How to take your warfarin dose     To take:  2.5 mg Take 0.5 of a 5 mg tablet.    To take:  5 mg Take 1 of the 5 mg tablets.           December 2018 Details    Sun Mon Tue Wed Thu Fri Sat           1      5 mg           2      5 mg         3      2.5 mg         4      5 mg         5            6               7               8                 9               10               11               12               13               14               15                 16               17               18               19               20               21               22                 23               24               25               26               27               28               29                 30               31                     Date Details   No additional details    Date of next INR:  12/5/2018         How to take your warfarin dose     To take:  2.5 mg Take 0.5 of a 5 mg tablet.    To take:  5 mg Take 1 of the 5 mg tablets.

## 2018-10-24 NOTE — PROGRESS NOTES
ANTICOAGULATION FOLLOW-UP CLINIC VISIT    Patient Name:  Clement Voss  Date:  10/24/2018  Contact Type:  Telephone/ message left on home phone voicemail    SUBJECTIVE:     Patient Findings     Positives No Problem Findings    Comments INR done by lab. Call placed to patient and message left re: INR result, warfarin dosing and INR recheck date. He is to call warfarin clinic if he has any bleeding/bruising, changes in diet/meds/activity or questions.            OBJECTIVE    INR Point of Care   Date Value Ref Range Status   10/24/2018 2.8 (H) 0.86 - 1.14 Final     Comment:     This test is intended for monitoring Coumadin therapy.  Results are not   accurate in patients with prolonged INR due to factor deficiency.         ASSESSMENT / PLAN  INR assessment THER    Recheck INR In: 6 WEEKS    INR Location Clinic      Anticoagulation Summary as of 10/24/2018     INR goal 2.0-3.0   Today's INR 2.8   Warfarin maintenance plan 2.5 mg (5 mg x 0.5) on Mon, Fri; 5 mg (5 mg x 1) all other days   Full warfarin instructions 2.5 mg on Mon, Fri; 5 mg all other days   Weekly warfarin total 30 mg   No change documented Jodie Elena RN   Plan last modified Jodie Elena RN (2/7/2018)   Next INR check 12/5/2018   Priority INR   Target end date Indefinite    Indications   Pulmonary embolism and infarction (H) [I26.99]  Acute thromboembolism of deep veins of lower extremity (H) [I82.409]  Long-term (current) use of anticoagulants [Z79.01] [Z79.01]         Anticoagulation Episode Summary     INR check location     Preferred lab     Send INR reminders to  ANTICOAG POOL    Comments       Anticoagulation Care Providers     Provider Role Specialty Phone number    Lorelei Felix MD [facility] [phone]            See the Encounter Report to view Anticoagulation Flowsheet and Dosing Calendar (Go to Encounters tab in chart review, and find the Anticoagulation Therapy Visit)        Jodie Elena RN

## 2018-11-30 DIAGNOSIS — I10 BENIGN ESSENTIAL HYPERTENSION: ICD-10-CM

## 2018-11-30 RX ORDER — LISINOPRIL 40 MG/1
TABLET ORAL
Qty: 30 TABLET | Refills: 0 | Status: SHIPPED | OUTPATIENT
Start: 2018-11-30 | End: 2019-01-04

## 2018-12-05 ENCOUNTER — ANTICOAGULATION THERAPY VISIT (OUTPATIENT)
Dept: ANTICOAGULATION | Facility: OTHER | Age: 73
End: 2018-12-05
Attending: FAMILY MEDICINE
Payer: MEDICARE

## 2018-12-05 DIAGNOSIS — I82.409 ACUTE THROMBOEMBOLISM OF DEEP VEINS OF LOWER EXTREMITY (H): ICD-10-CM

## 2018-12-05 DIAGNOSIS — I26.99 PULMONARY EMBOLISM AND INFARCTION (H): ICD-10-CM

## 2018-12-05 DIAGNOSIS — Z79.01 LONG TERM CURRENT USE OF ANTICOAGULANT THERAPY: ICD-10-CM

## 2018-12-05 DIAGNOSIS — I82.409 ACUTE THROMBOEMBOLISM OF DEEP VEINS OF LOWER EXTREMITY, UNSPECIFIED LATERALITY (H): ICD-10-CM

## 2018-12-05 LAB — INR BLD: 2.2 (ref 0.86–1.14)

## 2018-12-05 PROCEDURE — 85610 PROTHROMBIN TIME: CPT | Mod: QW,ZL | Performed by: FAMILY MEDICINE

## 2018-12-05 PROCEDURE — 36416 COLLJ CAPILLARY BLOOD SPEC: CPT | Mod: ZL | Performed by: FAMILY MEDICINE

## 2018-12-05 NOTE — MR AVS SNAPSHOT
Clement CROWELL Shanika   12/5/2018 10:00 AM   Anticoagulation Therapy Visit    Description:  73 year old male   Provider:   ANTI COAGULATION   Department:  [facility] Anti Coagulation           INR as of 12/5/2018     Today's INR 2.2      Anticoagulation Summary as of 12/5/2018     INR goal 2.0-3.0   Today's INR 2.2   Full warfarin instructions 2.5 mg on Mon, Fri; 5 mg all other days   Next INR check 1/16/2019    Indications   Pulmonary embolism and infarction (H) [I26.99]  Acute thromboembolism of deep veins of lower extremity (H) [I82.409]  Long-term (current) use of anticoagulants [Z79.01] [Z79.01]         Your next Anticoagulation Clinic appointment(s)     Jan 16, 2019 10:00 AM CST   Anticoagulation Visit with  ANTI COAGULATION   [facility] ([facility] [place] )    [address] [place] [address]   [phone]              December 2018 Details    Sun Mon Tue Wed Thu Fri Sat           1                 2               3               4               5      5 mg   See details      6      5 mg         7      2.5 mg         8      5 mg           9      5 mg         10      2.5 mg         11      5 mg         12      5 mg         13      5 mg         14      2.5 mg         15      5 mg           16      5 mg         17      2.5 mg         18      5 mg         19      5 mg         20      5 mg         21      2.5 mg         22      5 mg           23      5 mg         24      2.5 mg         25      5 mg         26      5 mg         27      5 mg         28      2.5 mg         29      5 mg           30      5 mg         31      2.5 mg               Date Details   12/05 This INR check               How to take your warfarin dose     To take:  2.5 mg Take 0.5 of a 5 mg tablet.    To take:  5 mg Take 1 of the 5 mg tablets.           January 2019 Details    Sun Mon Tue Wed Thu Fri Sat       1      5 mg         2      5 mg         3      5 mg         4      2.5 mg         5      5 mg            6      5 mg         7      2.5 mg         8      5 mg         9      5 mg         10      5 mg         11      2.5 mg         12      5 mg           13      5 mg         14      2.5 mg         15      5 mg         16            17               18               19                 20               21               22               23               24               25               26                 27               28               29               30               31                  Date Details   No additional details    Date of next INR:  1/16/2019         How to take your warfarin dose     To take:  2.5 mg Take 0.5 of a 5 mg tablet.    To take:  5 mg Take 1 of the 5 mg tablets.

## 2018-12-05 NOTE — PROGRESS NOTES
ANTICOAGULATION FOLLOW-UP CLINIC VISIT    Patient Name:  Clement Voss  Date:  12/5/2018  Contact Type:  Telephone/ message left on home phone voicemail    SUBJECTIVE:     Patient Findings     Positives No Problem Findings    Comments INR done by lab. Call placed to patient and message left re: INR result, warfarin dosing and INR recheck date. He is to call warfarin clinic if he has any bleeding/bruising, changes in diet/meds/activity or questions.            OBJECTIVE    INR Point of Care   Date Value Ref Range Status   12/05/2018 2.2 (H) 0.86 - 1.14 Final     Comment:     This test is intended for monitoring Coumadin therapy.  Results are not   accurate in patients with prolonged INR due to factor deficiency.         ASSESSMENT / PLAN  INR assessment THER    Recheck INR In: 6 WEEKS    INR Location Clinic      Anticoagulation Summary as of 12/5/2018     INR goal 2.0-3.0   Today's INR 2.2   Warfarin maintenance plan 2.5 mg (5 mg x 0.5) on Mon, Fri; 5 mg (5 mg x 1) all other days   Full warfarin instructions 2.5 mg on Mon, Fri; 5 mg all other days   Weekly warfarin total 30 mg   No change documented Jodie Elena RN   Plan last modified Jodie Elena RN (2/7/2018)   Next INR check 1/16/2019   Priority INR   Target end date Indefinite    Indications   Pulmonary embolism and infarction (H) [I26.99]  Acute thromboembolism of deep veins of lower extremity (H) [I82.409]  Long-term (current) use of anticoagulants [Z79.01] [Z79.01]         Anticoagulation Episode Summary     INR check location     Preferred lab     Send INR reminders to  ANTICOAG POOL    Comments       Anticoagulation Care Providers     Provider Role Specialty Phone number    Lorelei Felix MD [facility] [phone]            See the Encounter Report to view Anticoagulation Flowsheet and Dosing Calendar (Go to Encounters tab in chart review, and find the Anticoagulation Therapy Visit)        Jodie Elena RN

## 2018-12-18 DIAGNOSIS — N18.30 TYPE 2 DIABETES MELLITUS WITH STAGE 3 CHRONIC KIDNEY DISEASE, WITH LONG-TERM CURRENT USE OF INSULIN (H): ICD-10-CM

## 2018-12-18 DIAGNOSIS — E78.5 HYPERLIPIDEMIA, UNSPECIFIED HYPERLIPIDEMIA TYPE: ICD-10-CM

## 2018-12-18 DIAGNOSIS — Z79.4 TYPE 2 DIABETES MELLITUS WITH STAGE 3 CHRONIC KIDNEY DISEASE, WITH LONG-TERM CURRENT USE OF INSULIN (H): ICD-10-CM

## 2018-12-18 DIAGNOSIS — E11.22 TYPE 2 DIABETES MELLITUS WITH STAGE 3 CHRONIC KIDNEY DISEASE, WITH LONG-TERM CURRENT USE OF INSULIN (H): ICD-10-CM

## 2018-12-18 DIAGNOSIS — I10 BENIGN ESSENTIAL HYPERTENSION: ICD-10-CM

## 2018-12-18 LAB
ANION GAP SERPL CALCULATED.3IONS-SCNC: 7 MMOL/L (ref 3–14)
BUN SERPL-MCNC: 17 MG/DL (ref 7–30)
CALCIUM SERPL-MCNC: 8.6 MG/DL (ref 8.5–10.1)
CHLORIDE SERPL-SCNC: 111 MMOL/L (ref 94–109)
CHOLEST SERPL-MCNC: 145 MG/DL
CO2 SERPL-SCNC: 25 MMOL/L (ref 20–32)
CREAT SERPL-MCNC: 1.63 MG/DL (ref 0.66–1.25)
CREAT UR-MCNC: 112 MG/DL
EST. AVERAGE GLUCOSE BLD GHB EST-MCNC: 174 MG/DL
GFR SERPL CREATININE-BSD FRML MDRD: 42 ML/MIN/1.7M2
GLUCOSE SERPL-MCNC: 125 MG/DL (ref 70–99)
HBA1C MFR BLD: 7.7 % (ref 0–5.6)
HDLC SERPL-MCNC: 40 MG/DL
LDLC SERPL CALC-MCNC: 84 MG/DL
MICROALBUMIN UR-MCNC: 11 MG/L
MICROALBUMIN/CREAT UR: 9.91 MG/G CR (ref 0–17)
NONHDLC SERPL-MCNC: 105 MG/DL
POTASSIUM SERPL-SCNC: 4.4 MMOL/L (ref 3.4–5.3)
SODIUM SERPL-SCNC: 143 MMOL/L (ref 133–144)
TRIGL SERPL-MCNC: 105 MG/DL
TSH SERPL DL<=0.005 MIU/L-ACNC: 2.46 MU/L (ref 0.4–4)

## 2018-12-18 PROCEDURE — 80048 BASIC METABOLIC PNL TOTAL CA: CPT | Mod: ZL | Performed by: FAMILY MEDICINE

## 2018-12-18 PROCEDURE — 80061 LIPID PANEL: CPT | Mod: ZL | Performed by: FAMILY MEDICINE

## 2018-12-18 PROCEDURE — 36415 COLL VENOUS BLD VENIPUNCTURE: CPT | Mod: ZL | Performed by: FAMILY MEDICINE

## 2018-12-18 PROCEDURE — 82043 UR ALBUMIN QUANTITATIVE: CPT | Mod: ZL | Performed by: FAMILY MEDICINE

## 2018-12-18 PROCEDURE — 83036 HEMOGLOBIN GLYCOSYLATED A1C: CPT | Mod: ZL | Performed by: FAMILY MEDICINE

## 2018-12-18 PROCEDURE — 84443 ASSAY THYROID STIM HORMONE: CPT | Mod: ZL | Performed by: FAMILY MEDICINE

## 2018-12-18 PROCEDURE — 40000788 ZZHCL STATISTIC ESTIMATED AVERAGE GLUCOSE: Mod: ZL | Performed by: FAMILY MEDICINE

## 2018-12-18 NOTE — PROGRESS NOTES
SUBJECTIVE:                                                    Clement Voss is a 73 year old male who presents to clinic today for the following health issues:      Diabetes Follow-up    Patient is checking blood sugars: twice daily.    Blood sugar testing frequency justification: Uncontrolled diabetes  Results are as follows:         am - see list         suppertime - see list         bedtime - see list    Diabetic concerns: None     Symptoms of hypoglycemia (low blood sugar): sweating     Paresthesias (numbness or burning in feet) or sores: No     Date of last diabetic eye exam: 04/19/18    Diabetes Management Resources    Hyperlipidemia Follow-Up      Rate your low fat/cholesterol diet?: good    Taking statin?  Yes, no muscle aches from statin    Other lipid medications/supplements?:  Fish oil/Omega 3, dose 1000mg without side effects    Hypertension Follow-up      Outpatient blood pressures are being checked at home.  Results are within normal range pt states.    Low Salt Diet: not monitoring salt    BP Readings from Last 2 Encounters:   12/21/18 128/58   09/20/18 130/50     Hemoglobin A1C (%)   Date Value   12/18/2018 7.7 (H)   09/12/2018 8.1 (H)     LDL Cholesterol Calculated (mg/dL)   Date Value   12/18/2018 84   09/12/2018 67       Amount of exercise or physical activity: 4-5 days/week for an average of 30-45 minutes    Problems taking medications regularly: No    Medication side effects: none    Diet: low fat/cholesterol and diabetic          Problem list and histories reviewed & adjusted, as indicated.  Additional history: as documented    Patient Active Problem List   Diagnosis     Type 2 diabetes mellitus with chronic kidney disease, with long-term current use of insulin (H)     Hyperlipidemia     Benign essential hypertension     Gout     Pulmonary embolism and infarction (H)     Acute thromboembolism of deep veins of lower extremity (H)     ACP (advance care planning)     Long-term (current) use of  anticoagulants [Z79.01]     Morbid obesity (H)     Past Surgical History:   Procedure Laterality Date     APPENDECTOMY       CHOLECYSTECTOMY       History of PE of right lung 3/2013[       PHACOEMULSIFICATION WITH STANDARD INTRAOCULAR LENS IMPLANT Left 2018    Procedure: PHACOEMULSIFICATION WITH STANDARD INTRAOCULAR LENS IMPLANT;  CATARACT EXTRACTION LEFT EYE WITH IMPLANT, ISTENT LEFT EYE;  Surgeon: Kush Almaraz MD;  Location: [facility]     PHACOEMULSIFICATION WITH STANDARD INTRAOCULAR LENS IMPLANT Right 5/3/2018    Procedure: PHACOEMULSIFICATION WITH STANDARD INTRAOCULAR LENS IMPLANT;  CATARACT EXTRACTION RIGHT EYE WITH IMPLANT,WITH ISTENT ATTEMPTED;  Surgeon: Kush Almaraz MD;  Location: [facility] OR     TRABECULAR MICRO-BYPASS WITH ISTENT Left 2018    Procedure: TRABECULAR MICRO-BYPASS WITH ISTENT;;  Surgeon: Kush Almaraz MD;  Location: [facility]     TRABECULAR MICRO-BYPASS WITH ISTENT  5/3/2018    Procedure: TRABECULAR MICRO-BYPASS WITH ISTENT;;  Surgeon: Kush Almaraz MD;  Location: [facility] OR       Social History     Tobacco Use     Smoking status: Former Smoker     Types: Cigarettes     Last attempt to quit: 1964     Years since quittin.3     Smokeless tobacco: Never Used   Substance Use Topics     Alcohol use: No     Family History   Problem Relation Age of Onset     Heart Disease Father 71        heart disease; cause of death     Other - See Comments Mother 79        old age; cause of death         Current Outpatient Medications   Medication Sig Dispense Refill     ACCU-CHEK VLAD PLUS test strip USE TO TEST BLOOD SUGARS THREE TIMES DAILY 100 strip 3     acetaminophen (TYLENOL) 500 MG tablet Take 1-2 tablets (500-1,000 mg) by mouth every 6 hours as needed       amLODIPine (NORVASC) 10 MG tablet TAKE 1 TABLET(10 MG) BY MOUTH DAILY 90 tablet 1     aspirin 81 MG EC tablet Take 1 tablet (81 mg) by mouth daily 90 tablet 3     B-D U/F insulin pen needle USE AS DIRECTED 100 each 1      BASAGLAR 100 UNIT/ML injection Inject 25 Units Subcutaneous At Bedtime 18 mL 1     brimonidine-timolol (COMBIGAN) 0.2-0.5 % ophthalmic solution 1 drop 2 times daily Morning and evening, 12 hours apart       fenofibrate 48 MG tablet TAKE 1 TABLET(48 MG) BY MOUTH DAILY 90 tablet 1     fish oil-omega-3 fatty acids (FISH OIL) 1000 MG capsule Take 1 capsule by mouth 3 times daily.       fluticasone (FLONASE) 50 MCG/ACT nasal spray Spray 2 sprays into both nostrils daily (Patient taking differently: Spray 2 sprays into both nostrils daily as needed ) 1 Package 0     indomethacin (INDOCIN) 50 MG capsule Take 1 capsule (50 mg) by mouth 3 times daily With food as needed 30 capsule 1     lisinopril (PRINIVIL/ZESTRIL) 40 MG tablet TAKE 1 TABLET(40 MG) BY MOUTH DAILY 30 tablet 0     loratadine (CLARITIN) 10 MG tablet Take 10 mg by mouth daily.       sitagliptin (JANUVIA) 100 MG tablet Take 1 tablet (100 mg) by mouth daily 90 tablet 3     STATIN NOT PRESCRIBED, INTENTIONAL, Statin not prescribed intentionally due to Intolerance (with supporting documentation of trying a statin at least once within the last 5 years) 0 each 0     terazosin (HYTRIN) 10 MG capsule Take 1 capsule (10 mg) by mouth At Bedtime 90 capsule 2     TRAVATAN Z (TRAVATAN Z) 0.004 % ophthalmic solution Instill 1 drop by ophthalmic route every day into affected eye(s) in the evening       warfarin (COUMADIN) 5 MG tablet Take 5 mg daily 5 days a week and 2.5 mg daily 2 days a week, adjust per Coumadin Clinic recommendations 90 tablet 3     Allergies   Allergen Reactions     Atorvastatin      Other reaction(s): Other  Caused increased creatinine.      Atorvastatin Calcium Other (See Comments)     Lipitor  Increase CR     Iodine      Penicillins      Sulfa Drugs      Sulfa (sulfonamide antibiotics)     Sulfacetamide        ROS:  Constitutional, HEENT, cardiovascular, pulmonary, gi and gu systems are negative, except as otherwise noted.    OBJECTIVE:     /58  "(BP Location: Left arm, Patient Position: Sitting, Cuff Size: Adult Regular)   Pulse 61   Temp 96.9  F (36.1  C) (Tympanic)   Resp 16   Ht 1.702 m (5' 7\")   Wt 103.1 kg (227 lb 6.4 oz)   SpO2 97%   BMI 35.62 kg/m    Body mass index is 35.62 kg/m .  GENERAL: healthy, alert and no distress  PSYCH: mentation appears normal, affect normal/bright    Diagnostic Test Results:  Results for orders placed or performed in visit on 12/18/18   TSH   Result Value Ref Range    TSH 2.46 0.40 - 4.00 mU/L   Lipid Profile   Result Value Ref Range    Cholesterol 145 <200 mg/dL    Triglycerides 105 <150 mg/dL    HDL Cholesterol 40 >39 mg/dL    LDL Cholesterol Calculated 84 <100 mg/dL    Non HDL Cholesterol 105 <130 mg/dL   Hemoglobin A1c   Result Value Ref Range    Hemoglobin A1C 7.7 (H) 0 - 5.6 %   Basic metabolic panel   Result Value Ref Range    Sodium 143 133 - 144 mmol/L    Potassium 4.4 3.4 - 5.3 mmol/L    Chloride 111 (H) 94 - 109 mmol/L    Carbon Dioxide 25 20 - 32 mmol/L    Anion Gap 7 3 - 14 mmol/L    Glucose 125 (H) 70 - 99 mg/dL    Urea Nitrogen 17 7 - 30 mg/dL    Creatinine 1.63 (H) 0.66 - 1.25 mg/dL    GFR Estimate 42 (L) >60 mL/min/1.7m2    GFR Estimate If Black 50 (L) >60 mL/min/1.7m2    Calcium 8.6 8.5 - 10.1 mg/dL   Albumin Random Urine Quantitative with Creat Ratio   Result Value Ref Range    Creatinine Urine 112 mg/dL    Albumin Urine mg/L 11 mg/L    Albumin Urine mg/g Cr 9.91 0 - 17 mg/g Cr   Estimated Average Glucose   Result Value Ref Range    Estimated Average Glucose 174 mg/dL       ASSESSMENT/PLAN:     Diabetes Type II, A1c Improved, insulin dependent   Associated with the following complications    Renal Complications:  CKD    Ophthalmologic Complications: None    Neurologic Complications: None    Peripheral Vascular Complications:  None    Other: None   Plan:  No changes in the patient's current treatment plan      Hyperlipidemia; controlled   Plan:  No changes in the patient's current treatment " plan    Hypertension; controlled   Associated with the following complications:    Diabetes   Plan:  No changes in the patient's current treatment plan          ICD-10-CM    1. Type 2 diabetes mellitus with stage 3 chronic kidney disease, with long-term current use of insulin (H) E11.22     N18.3     Z79.4    2. Hyperlipidemia, unspecified hyperlipidemia type E78.5    3. Benign essential hypertension I10    4. Pulmonary embolism and infarction (H) I26.99 warfarin (COUMADIN) 5 MG tablet       FUTURE APPOINTMENTS:       - Follow-up visit in 3 months, sooner as needed.      Lorelei Felix MD  [facility]

## 2018-12-21 ENCOUNTER — OFFICE VISIT (OUTPATIENT)
Dept: FAMILY MEDICINE | Facility: OTHER | Age: 73
End: 2018-12-21
Attending: FAMILY MEDICINE
Payer: COMMERCIAL

## 2018-12-21 VITALS
SYSTOLIC BLOOD PRESSURE: 128 MMHG | HEART RATE: 61 BPM | WEIGHT: 227.4 LBS | HEIGHT: 67 IN | OXYGEN SATURATION: 97 % | BODY MASS INDEX: 35.69 KG/M2 | DIASTOLIC BLOOD PRESSURE: 58 MMHG | RESPIRATION RATE: 16 BRPM | TEMPERATURE: 96.9 F

## 2018-12-21 DIAGNOSIS — I10 BENIGN ESSENTIAL HYPERTENSION: ICD-10-CM

## 2018-12-21 DIAGNOSIS — E78.5 HYPERLIPIDEMIA, UNSPECIFIED HYPERLIPIDEMIA TYPE: ICD-10-CM

## 2018-12-21 DIAGNOSIS — I26.99 PULMONARY EMBOLISM AND INFARCTION (H): ICD-10-CM

## 2018-12-21 DIAGNOSIS — Z79.4 TYPE 2 DIABETES MELLITUS WITH STAGE 3 CHRONIC KIDNEY DISEASE, WITH LONG-TERM CURRENT USE OF INSULIN (H): Primary | ICD-10-CM

## 2018-12-21 DIAGNOSIS — N18.30 TYPE 2 DIABETES MELLITUS WITH STAGE 3 CHRONIC KIDNEY DISEASE, WITH LONG-TERM CURRENT USE OF INSULIN (H): Primary | ICD-10-CM

## 2018-12-21 DIAGNOSIS — E11.22 TYPE 2 DIABETES MELLITUS WITH STAGE 3 CHRONIC KIDNEY DISEASE, WITH LONG-TERM CURRENT USE OF INSULIN (H): Primary | ICD-10-CM

## 2018-12-21 PROCEDURE — G0463 HOSPITAL OUTPT CLINIC VISIT: HCPCS

## 2018-12-21 PROCEDURE — 99214 OFFICE O/P EST MOD 30 MIN: CPT | Performed by: FAMILY MEDICINE

## 2018-12-21 RX ORDER — WARFARIN SODIUM 5 MG/1
TABLET ORAL
Qty: 90 TABLET | Refills: 3 | Status: SHIPPED | OUTPATIENT
Start: 2018-12-21 | End: 2019-05-21

## 2018-12-21 ASSESSMENT — ANXIETY QUESTIONNAIRES
2. NOT BEING ABLE TO STOP OR CONTROL WORRYING: NOT AT ALL
5. BEING SO RESTLESS THAT IT IS HARD TO SIT STILL: NOT AT ALL
1. FEELING NERVOUS, ANXIOUS, OR ON EDGE: NOT AT ALL
GAD7 TOTAL SCORE: 0
7. FEELING AFRAID AS IF SOMETHING AWFUL MIGHT HAPPEN: NOT AT ALL
IF YOU CHECKED OFF ANY PROBLEMS ON THIS QUESTIONNAIRE, HOW DIFFICULT HAVE THESE PROBLEMS MADE IT FOR YOU TO DO YOUR WORK, TAKE CARE OF THINGS AT HOME, OR GET ALONG WITH OTHER PEOPLE: NOT DIFFICULT AT ALL
6. BECOMING EASILY ANNOYED OR IRRITABLE: NOT AT ALL
3. WORRYING TOO MUCH ABOUT DIFFERENT THINGS: NOT AT ALL

## 2018-12-21 ASSESSMENT — MIFFLIN-ST. JEOR: SCORE: 1730.11

## 2018-12-21 ASSESSMENT — PATIENT HEALTH QUESTIONNAIRE - PHQ9
5. POOR APPETITE OR OVEREATING: NOT AT ALL
SUM OF ALL RESPONSES TO PHQ QUESTIONS 1-9: 0

## 2018-12-21 ASSESSMENT — PAIN SCALES - GENERAL: PAINLEVEL: NO PAIN (0)

## 2018-12-21 NOTE — NURSING NOTE
"Chief Complaint   Patient presents with     Diabetes       Initial /58 (BP Location: Left arm, Patient Position: Sitting, Cuff Size: Adult Regular)   Pulse 61   Temp 96.9  F (36.1  C) (Tympanic)   Resp 16   Ht 1.702 m (5' 7\")   Wt 103.1 kg (227 lb 6.4 oz)   SpO2 97%   BMI 35.62 kg/m   Estimated body mass index is 35.62 kg/m  as calculated from the following:    Height as of this encounter: 1.702 m (5' 7\").    Weight as of this encounter: 103.1 kg (227 lb 6.4 oz).  Medication Reconciliation: complete    Tracey Blanco MA  "

## 2018-12-22 ASSESSMENT — ANXIETY QUESTIONNAIRES: GAD7 TOTAL SCORE: 0

## 2019-01-04 ENCOUNTER — MYC REFILL (OUTPATIENT)
Dept: FAMILY MEDICINE | Facility: OTHER | Age: 74
End: 2019-01-04

## 2019-01-04 DIAGNOSIS — I10 BENIGN ESSENTIAL HYPERTENSION: ICD-10-CM

## 2019-01-07 RX ORDER — LISINOPRIL 40 MG/1
40 TABLET ORAL DAILY
Qty: 30 TABLET | Refills: 5 | Status: SHIPPED | OUTPATIENT
Start: 2019-01-07 | End: 2019-07-05

## 2019-01-16 ENCOUNTER — ANTICOAGULATION THERAPY VISIT (OUTPATIENT)
Dept: ANTICOAGULATION | Facility: OTHER | Age: 74
End: 2019-01-16
Attending: FAMILY MEDICINE
Payer: MEDICARE

## 2019-01-16 DIAGNOSIS — I82.409 ACUTE THROMBOEMBOLISM OF DEEP VEINS OF LOWER EXTREMITY, UNSPECIFIED LATERALITY (H): ICD-10-CM

## 2019-01-16 DIAGNOSIS — I26.99 PULMONARY EMBOLISM AND INFARCTION (H): ICD-10-CM

## 2019-01-16 DIAGNOSIS — I82.409 ACUTE THROMBOEMBOLISM OF DEEP VEINS OF LOWER EXTREMITY (H): ICD-10-CM

## 2019-01-16 DIAGNOSIS — Z79.01 LONG TERM CURRENT USE OF ANTICOAGULANT THERAPY: ICD-10-CM

## 2019-01-16 LAB — INR BLD: 2.4 (ref 0.86–1.14)

## 2019-01-16 PROCEDURE — 85610 PROTHROMBIN TIME: CPT | Mod: QW,ZL | Performed by: FAMILY MEDICINE

## 2019-01-16 PROCEDURE — 36416 COLLJ CAPILLARY BLOOD SPEC: CPT | Mod: ZL | Performed by: FAMILY MEDICINE

## 2019-01-16 NOTE — PROGRESS NOTES
ANTICOAGULATION FOLLOW-UP CLINIC VISIT    Patient Name:  Clement Voss  Date:  2019  Contact Type:  Telephone/ left message on answering machine for patient    SUBJECTIVE:     Patient Findings     Positives:   No Problem Findings    Comments:   INR done by lab. Call placed to patient and message left re: INR result, warfarin dosing and INR recheck date. He is to call warfarin clinic if he has any bleeding/bruising/upcoming procedures, changes in diet/meds/activity or questions.              OBJECTIVE    INR Point of Care   Date Value Ref Range Status   2019 2.4 (H) 0.86 - 1.14 Final     Comment:     This test is intended for monitoring Coumadin therapy.  Results are not   accurate in patients with prolonged INR due to factor deficiency.         ASSESSMENT / PLAN  INR assessment THER    Recheck INR In: 6 WEEKS    INR Location Clinic      Anticoagulation Summary  As of 2019    INR goal:   2.0-3.0   TTR:   79.1 % (2.4 y)   INR used for dosin.4 (2019)   Warfarin maintenance plan:   2.5 mg (5 mg x 0.5) every Mon, Fri; 5 mg (5 mg x 1) all other days   Full warfarin instructions:   2.5 mg every Mon, Fri; 5 mg all other days   Weekly warfarin total:   30 mg   No change documented:   Lisa Blandon, RN   Plan last modified:   Jodie Elena RN (2018)   Next INR check:   2019   Priority:   INR   Target end date:   Indefinite    Indications    Pulmonary embolism and infarction (H) [I26.99]  Acute thromboembolism of deep veins of lower extremity (H) [I82.409]  Long-term (current) use of anticoagulants [Z79.01] [Z79.01]             Anticoagulation Episode Summary     INR check location:       Preferred lab:       Send INR reminders to:   [facility] ANTICOAG POOL    Comments:         Anticoagulation Care Providers     Provider Role Specialty Phone number    Lorelei Felix MD [facility] [phone]            See the Encounter Report to view Anticoagulation Flowsheet and Dosing  Calendar (Go to Encounters tab in chart review, and find the Anticoagulation Therapy Visit)      Lisa Blandon RN

## 2019-02-12 ENCOUNTER — MYC REFILL (OUTPATIENT)
Dept: FAMILY MEDICINE | Facility: OTHER | Age: 74
End: 2019-02-12

## 2019-02-12 DIAGNOSIS — E11.22 TYPE 2 DIABETES MELLITUS WITH STAGE 3 CHRONIC KIDNEY DISEASE, WITH LONG-TERM CURRENT USE OF INSULIN (H): ICD-10-CM

## 2019-02-12 DIAGNOSIS — N18.30 TYPE 2 DIABETES MELLITUS WITH STAGE 3 CHRONIC KIDNEY DISEASE, WITH LONG-TERM CURRENT USE OF INSULIN (H): ICD-10-CM

## 2019-02-12 DIAGNOSIS — Z79.4 TYPE 2 DIABETES MELLITUS WITH STAGE 3 CHRONIC KIDNEY DISEASE, WITH LONG-TERM CURRENT USE OF INSULIN (H): ICD-10-CM

## 2019-02-27 ENCOUNTER — ANTICOAGULATION THERAPY VISIT (OUTPATIENT)
Dept: ANTICOAGULATION | Facility: OTHER | Age: 74
End: 2019-02-27
Attending: FAMILY MEDICINE
Payer: MEDICARE

## 2019-02-27 DIAGNOSIS — I82.409 ACUTE THROMBOEMBOLISM OF DEEP VEINS OF LOWER EXTREMITY (H): ICD-10-CM

## 2019-02-27 DIAGNOSIS — I26.99 PULMONARY EMBOLISM AND INFARCTION (H): ICD-10-CM

## 2019-02-27 DIAGNOSIS — I82.409 ACUTE THROMBOEMBOLISM OF DEEP VEINS OF LOWER EXTREMITY, UNSPECIFIED LATERALITY (H): ICD-10-CM

## 2019-02-27 DIAGNOSIS — Z79.01 LONG TERM CURRENT USE OF ANTICOAGULANT THERAPY: ICD-10-CM

## 2019-02-27 LAB — INR BLD: 2.5 (ref 0.86–1.14)

## 2019-02-27 PROCEDURE — 85610 PROTHROMBIN TIME: CPT | Mod: QW,ZL | Performed by: FAMILY MEDICINE

## 2019-02-27 PROCEDURE — 36416 COLLJ CAPILLARY BLOOD SPEC: CPT | Mod: ZL | Performed by: FAMILY MEDICINE

## 2019-02-27 NOTE — PROGRESS NOTES
ANTICOAGULATION FOLLOW-UP CLINIC VISIT    Patient Name:  Clement Voss  Date:  2019  Contact Type:  Telephone/ message left on voicemail    SUBJECTIVE:     Patient Findings     Comments:   INR done by lab. Call placed to patient and message left on voicemail re: INR result, warfarin dosing/INR recheck date. He is to call warfarin clinic if he has any bleeding/bruising, changes in diet/meds/activity or questions.            OBJECTIVE    INR Point of Care   Date Value Ref Range Status   2019 2.5 (H) 0.86 - 1.14 Final     Comment:     This test is intended for monitoring Coumadin therapy.  Results are not   accurate in patients with prolonged INR due to factor deficiency.         ASSESSMENT / PLAN  INR assessment THER    Recheck INR In: 6 WEEKS    INR Location Clinic      Anticoagulation Summary  As of 2019    INR goal:   2.0-3.0   TTR:   80.0 % (2.5 y)   INR used for dosin.5 (2019)   Warfarin maintenance plan:   2.5 mg (5 mg x 0.5) every Mon, Fri; 5 mg (5 mg x 1) all other days   Full warfarin instructions:   2.5 mg every Mon, Fri; 5 mg all other days   Weekly warfarin total:   30 mg   No change documented:   Jodie Elena, RN   Plan last modified:   Jodie Elena RN (2018)   Next INR check:   4/10/2019   Priority:   INR   Target end date:   Indefinite    Indications    Pulmonary embolism and infarction (H) [I26.99]  Acute thromboembolism of deep veins of lower extremity (H) [I82.409]  Long-term (current) use of anticoagulants [Z79.01] [Z79.01]             Anticoagulation Episode Summary     INR check location:       Preferred lab:       Send INR reminders to:   [facility] ANTICOAG POOL    Comments:         Anticoagulation Care Providers     Provider Role Specialty Phone number    Lorelei Felix MD [facility] [phone]            See the Encounter Report to view Anticoagulation Flowsheet and Dosing Calendar (Go to Encounters tab in chart review, and find the  Anticoagulation Therapy Visit)        Jodie Elena RN

## 2019-03-06 ENCOUNTER — MYC REFILL (OUTPATIENT)
Dept: FAMILY MEDICINE | Facility: OTHER | Age: 74
End: 2019-03-06

## 2019-03-06 DIAGNOSIS — E11.22 TYPE 2 DIABETES MELLITUS WITH STAGE 3 CHRONIC KIDNEY DISEASE, WITH LONG-TERM CURRENT USE OF INSULIN (H): ICD-10-CM

## 2019-03-06 DIAGNOSIS — N18.30 TYPE 2 DIABETES MELLITUS WITH STAGE 3 CHRONIC KIDNEY DISEASE, WITH LONG-TERM CURRENT USE OF INSULIN (H): ICD-10-CM

## 2019-03-06 DIAGNOSIS — Z79.4 TYPE 2 DIABETES MELLITUS WITH STAGE 3 CHRONIC KIDNEY DISEASE, WITH LONG-TERM CURRENT USE OF INSULIN (H): ICD-10-CM

## 2019-03-06 NOTE — TELEPHONE ENCOUNTER
insulin glargine (BASAGLAR KWIKPEN) 100 UNIT/ML pen      Last Written Prescription Date:  9/20/18  Last Fill Quantity: 18 mL,   # refills: 1  Last Office Visit: 12/21/18  Future Office visit:    Next 5 appointments (look out 90 days)    Mar 22, 2019 10:15 AM CDT  (Arrive by 10:00 AM)  SHORT with Lorelei Felix MD  [facility] ([facility] ) [address]  [facility] [address]  [phone]

## 2019-03-07 RX ORDER — INSULIN GLARGINE 100 [IU]/ML
25 INJECTION, SOLUTION SUBCUTANEOUS AT BEDTIME
Qty: 18 ML | Refills: 1 | Status: SHIPPED | OUTPATIENT
Start: 2019-03-07 | End: 2019-07-31

## 2019-03-20 DIAGNOSIS — I10 BENIGN ESSENTIAL HYPERTENSION: ICD-10-CM

## 2019-03-20 DIAGNOSIS — E78.5 HYPERLIPIDEMIA, UNSPECIFIED HYPERLIPIDEMIA TYPE: ICD-10-CM

## 2019-03-20 DIAGNOSIS — Z79.4 TYPE 2 DIABETES MELLITUS WITH STAGE 3 CHRONIC KIDNEY DISEASE, WITH LONG-TERM CURRENT USE OF INSULIN (H): ICD-10-CM

## 2019-03-20 DIAGNOSIS — N18.30 TYPE 2 DIABETES MELLITUS WITH STAGE 3 CHRONIC KIDNEY DISEASE, WITH LONG-TERM CURRENT USE OF INSULIN (H): ICD-10-CM

## 2019-03-20 DIAGNOSIS — E11.22 TYPE 2 DIABETES MELLITUS WITH STAGE 3 CHRONIC KIDNEY DISEASE, WITH LONG-TERM CURRENT USE OF INSULIN (H): ICD-10-CM

## 2019-03-20 LAB
ANION GAP SERPL CALCULATED.3IONS-SCNC: 2 MMOL/L (ref 3–14)
BUN SERPL-MCNC: 14 MG/DL (ref 7–30)
CALCIUM SERPL-MCNC: 9 MG/DL (ref 8.5–10.1)
CHLORIDE SERPL-SCNC: 112 MMOL/L (ref 94–109)
CHOLEST SERPL-MCNC: 167 MG/DL
CO2 SERPL-SCNC: 28 MMOL/L (ref 20–32)
CREAT SERPL-MCNC: 1.53 MG/DL (ref 0.66–1.25)
GFR SERPL CREATININE-BSD FRML MDRD: 44 ML/MIN/{1.73_M2}
GLUCOSE SERPL-MCNC: 139 MG/DL (ref 70–99)
HBA1C MFR BLD: 7.9 % (ref 0–5.6)
HDLC SERPL-MCNC: 39 MG/DL
LDLC SERPL CALC-MCNC: 98 MG/DL
NONHDLC SERPL-MCNC: 128 MG/DL
POTASSIUM SERPL-SCNC: 4.5 MMOL/L (ref 3.4–5.3)
SODIUM SERPL-SCNC: 142 MMOL/L (ref 133–144)
TRIGL SERPL-MCNC: 152 MG/DL

## 2019-03-20 PROCEDURE — 83036 HEMOGLOBIN GLYCOSYLATED A1C: CPT | Mod: ZL | Performed by: FAMILY MEDICINE

## 2019-03-20 PROCEDURE — 80061 LIPID PANEL: CPT | Mod: ZL | Performed by: FAMILY MEDICINE

## 2019-03-20 PROCEDURE — 36415 COLL VENOUS BLD VENIPUNCTURE: CPT | Mod: ZL | Performed by: FAMILY MEDICINE

## 2019-03-20 PROCEDURE — 80048 BASIC METABOLIC PNL TOTAL CA: CPT | Mod: ZL | Performed by: FAMILY MEDICINE

## 2019-03-22 ENCOUNTER — OFFICE VISIT (OUTPATIENT)
Dept: FAMILY MEDICINE | Facility: OTHER | Age: 74
End: 2019-03-22
Attending: FAMILY MEDICINE
Payer: COMMERCIAL

## 2019-03-22 VITALS
OXYGEN SATURATION: 97 % | DIASTOLIC BLOOD PRESSURE: 62 MMHG | BODY MASS INDEX: 36.1 KG/M2 | TEMPERATURE: 96.8 F | SYSTOLIC BLOOD PRESSURE: 130 MMHG | WEIGHT: 230 LBS | RESPIRATION RATE: 16 BRPM | HEIGHT: 67 IN | HEART RATE: 64 BPM

## 2019-03-22 DIAGNOSIS — E78.5 HYPERLIPIDEMIA, UNSPECIFIED HYPERLIPIDEMIA TYPE: ICD-10-CM

## 2019-03-22 DIAGNOSIS — Z79.4 TYPE 2 DIABETES MELLITUS WITH STAGE 3 CHRONIC KIDNEY DISEASE, WITH LONG-TERM CURRENT USE OF INSULIN (H): Primary | ICD-10-CM

## 2019-03-22 DIAGNOSIS — I10 BENIGN ESSENTIAL HYPERTENSION: ICD-10-CM

## 2019-03-22 DIAGNOSIS — E11.22 TYPE 2 DIABETES MELLITUS WITH STAGE 3 CHRONIC KIDNEY DISEASE, WITH LONG-TERM CURRENT USE OF INSULIN (H): Primary | ICD-10-CM

## 2019-03-22 DIAGNOSIS — N18.30 TYPE 2 DIABETES MELLITUS WITH STAGE 3 CHRONIC KIDNEY DISEASE, WITH LONG-TERM CURRENT USE OF INSULIN (H): Primary | ICD-10-CM

## 2019-03-22 PROCEDURE — 99214 OFFICE O/P EST MOD 30 MIN: CPT | Performed by: FAMILY MEDICINE

## 2019-03-22 PROCEDURE — G0463 HOSPITAL OUTPT CLINIC VISIT: HCPCS

## 2019-03-22 ASSESSMENT — MIFFLIN-ST. JEOR: SCORE: 1741.9

## 2019-03-22 ASSESSMENT — PAIN SCALES - GENERAL: PAINLEVEL: NO PAIN (0)

## 2019-03-22 NOTE — PROGRESS NOTES
SUBJECTIVE:                                                    Clement Voss is a 73 year old male who presents to clinic today for the following health issues:      Diabetes Follow-up    Patient is checking blood sugars: twice daily.    Blood sugar testing frequency justification: Uncontrolled diabetes  Results are as follows:         am -          suppertime - 103-223              postprandial after supper- 113-308    Diabetic concerns: None     Symptoms of hypoglycemia (low blood sugar): none     Paresthesias (numbness or burning in feet) or sores: No     Date of last diabetic eye exam: 10/18    Diabetes Management Resources    Hyperlipidemia Follow-Up      Rate your low fat/cholesterol diet?: fair    Taking statin?  No    Other lipid medications/supplements?:  none    Hypertension Follow-up      Outpatient blood pressures are being checked at home.  Results are 130/60.    Low Salt Diet: not monitoring salt    BP Readings from Last 2 Encounters:   12/21/18 128/58   09/20/18 130/50     Hemoglobin A1C (%)   Date Value   03/20/2019 7.9 (H)   12/18/2018 7.7 (H)     LDL Cholesterol Calculated (mg/dL)   Date Value   03/20/2019 98   12/18/2018 84       Amount of exercise or physical activity: None    Problems taking medications regularly: No    Medication side effects: none    Diet: low fat/cholesterol and diabetic      Patient is willing to complete Cologuard for colon cancer screening.      Problem list and histories reviewed & adjusted, as indicated.  Additional history: as documented    Patient Active Problem List   Diagnosis     Type 2 diabetes mellitus with chronic kidney disease, with long-term current use of insulin (H)     Hyperlipidemia     Benign essential hypertension     Gout     Pulmonary embolism and infarction (H)     Acute thromboembolism of deep veins of lower extremity (H)     ACP (advance care planning)     Long-term (current) use of anticoagulants [Z79.01]     Morbid obesity (H)     Past  Surgical History:   Procedure Laterality Date     APPENDECTOMY       CHOLECYSTECTOMY       History of PE of right lung 3/2013[       PHACOEMULSIFICATION WITH STANDARD INTRAOCULAR LENS IMPLANT Left 2018    Procedure: PHACOEMULSIFICATION WITH STANDARD INTRAOCULAR LENS IMPLANT;  CATARACT EXTRACTION LEFT EYE WITH IMPLANT, ISTENT LEFT EYE;  Surgeon: Kush Almaraz MD;  Location: [facility]     PHACOEMULSIFICATION WITH STANDARD INTRAOCULAR LENS IMPLANT Right 5/3/2018    Procedure: PHACOEMULSIFICATION WITH STANDARD INTRAOCULAR LENS IMPLANT;  CATARACT EXTRACTION RIGHT EYE WITH IMPLANT,WITH ISTENT ATTEMPTED;  Surgeon: Kush Almaraz MD;  Location: [facility]     TRABECULAR MICRO-BYPASS WITH ISTENT Left 2018    Procedure: TRABECULAR MICRO-BYPASS WITH ISTENT;;  Surgeon: Kush Almaraz MD;  Location: [facility]     TRABECULAR MICRO-BYPASS WITH ISTENT  5/3/2018    Procedure: TRABECULAR MICRO-BYPASS WITH ISTENT;;  Surgeon: Kush Almaraz MD;  Location: [facility]       Social History     Tobacco Use     Smoking status: Former Smoker     Types: Cigarettes     Last attempt to quit: 1964     Years since quittin.6     Smokeless tobacco: Never Used   Substance Use Topics     Alcohol use: No     Family History   Problem Relation Age of Onset     Heart Disease Father 71        heart disease; cause of death     Other - See Comments Mother 79        old age; cause of death         Current Outpatient Medications   Medication Sig Dispense Refill     acetaminophen (TYLENOL) 500 MG tablet Take 1-2 tablets (500-1,000 mg) by mouth every 6 hours as needed       amLODIPine (NORVASC) 10 MG tablet TAKE 1 TABLET(10 MG) BY MOUTH DAILY 90 tablet 1     aspirin 81 MG EC tablet Take 1 tablet (81 mg) by mouth daily 90 tablet 3     blood glucose (ACCU-CHEK VLAD PLUS) test strip Use to test blood sugar 3 times daily or as directed. 100 strip 3     brimonidine-timolol (COMBIGAN) 0.2-0.5 % ophthalmic solution 1 drop 2 times daily  Morning and evening, 12 hours apart       fenofibrate (TRICOR) 48 MG tablet Take 1 tablet (48 mg) by mouth daily 90 tablet 1     fish oil-omega-3 fatty acids (FISH OIL) 1000 MG capsule Take 1 capsule by mouth 3 times daily.       fluticasone (FLONASE) 50 MCG/ACT nasal spray Spray 2 sprays into both nostrils daily (Patient taking differently: Spray 2 sprays into both nostrils daily as needed ) 1 Package 0     indomethacin (INDOCIN) 50 MG capsule Take 1 capsule (50 mg) by mouth 3 times daily With food as needed 30 capsule 1     insulin glargine (BASAGLAR KWIKPEN) 100 UNIT/ML pen Inject 25 Units Subcutaneous At Bedtime 18 mL 1     insulin pen needle (B-D U/F) 31G X 5 MM miscellaneous by Device route See Admin Instructions Use pen needles daily or as directed. 100 each 1     lisinopril (PRINIVIL/ZESTRIL) 40 MG tablet Take 1 tablet (40 mg) by mouth daily 30 tablet 5     loratadine (CLARITIN) 10 MG tablet Take 10 mg by mouth daily.       sitagliptin (JANUVIA) 100 MG tablet Take 1 tablet (100 mg) by mouth daily 90 tablet 3     STATIN NOT PRESCRIBED, INTENTIONAL, Statin not prescribed intentionally due to Intolerance (with supporting documentation of trying a statin at least once within the last 5 years) 0 each 0     terazosin (HYTRIN) 10 MG capsule Take 1 capsule (10 mg) by mouth At Bedtime 90 capsule 2     TRAVATAN Z (TRAVATAN Z) 0.004 % ophthalmic solution Instill 1 drop by ophthalmic route every day into affected eye(s) in the evening       warfarin (COUMADIN) 5 MG tablet Take 5 mg daily 5 days a week and 2.5 mg daily 2 days a week, adjust per Coumadin Clinic recommendations 90 tablet 3     Allergies   Allergen Reactions     Atorvastatin      Other reaction(s): Other  Caused increased creatinine.      Atorvastatin Calcium Other (See Comments)     Lipitor  Increase CR     Iodine      Penicillins      Sulfa Drugs      Sulfa (sulfonamide antibiotics)     Sulfacetamide        ROS:  Constitutional, HEENT, cardiovascular,  "pulmonary, gi and gu systems are negative, except as otherwise noted.    OBJECTIVE:     /62 (BP Location: Left arm, Patient Position: Sitting, Cuff Size: Adult Regular)   Pulse 64   Temp 96.8  F (36  C) (Tympanic)   Resp 16   Ht 1.702 m (5' 7\")   Wt 104.3 kg (230 lb)   SpO2 97%   BMI 36.02 kg/m    Body mass index is 36.02 kg/m .  GENERAL: healthy, alert and no distress  PSYCH: mentation appears normal, affect normal/bright    Diagnostic Test Results:  Results for orders placed or performed in visit on 03/20/19   Lipid Profile   Result Value Ref Range    Cholesterol 167 <200 mg/dL    Triglycerides 152 (H) <150 mg/dL    HDL Cholesterol 39 (L) >39 mg/dL    LDL Cholesterol Calculated 98 <100 mg/dL    Non HDL Cholesterol 128 <130 mg/dL   Hemoglobin A1c   Result Value Ref Range    Hemoglobin A1C 7.9 (H) 0 - 5.6 %   Basic metabolic panel   Result Value Ref Range    Sodium 142 133 - 144 mmol/L    Potassium 4.5 3.4 - 5.3 mmol/L    Chloride 112 (H) 94 - 109 mmol/L    Carbon Dioxide 28 20 - 32 mmol/L    Anion Gap 2 (L) 3 - 14 mmol/L    Glucose 139 (H) 70 - 99 mg/dL    Urea Nitrogen 14 7 - 30 mg/dL    Creatinine 1.53 (H) 0.66 - 1.25 mg/dL    GFR Estimate 44 (L) >60 mL/min/[1.73_m2]    GFR Estimate If Black 51 (L) >60 mL/min/[1.73_m2]    Calcium 9.0 8.5 - 10.1 mg/dL       ASSESSMENT/PLAN:     Diabetes Type II, A1c Improved, insulin dependent   Associated with the following complications    Renal Complications:  CKD    Ophthalmologic Complications: None    Neurologic Complications: None    Peripheral Vascular Complications:  None    Other: None   Plan:  No changes in the patient's current treatment plan  Patient is recommended in increase activity now that weather is getting better      Hyperlipidemia; controlled   Plan:  No changes in the patient's current treatment plan    Hypertension; controlled   Associated with the following complications:    Diabetes   Plan:  No changes in the patient's current treatment " plan          ICD-10-CM    1. Type 2 diabetes mellitus with stage 3 chronic kidney disease, with long-term current use of insulin (H) E11.22 Hemoglobin A1c    N18.3     Z79.4    2. Hyperlipidemia, unspecified hyperlipidemia type E78.5 Lipid Profile   3. Benign essential hypertension I10 Basic metabolic panel       FUTURE APPOINTMENTS:       - Follow-up visit in 3 months, sooner as needed.      Lorelei Felix MD  [facility]

## 2019-03-22 NOTE — NURSING NOTE
"Chief Complaint   Patient presents with     Diabetes       Initial /62 (BP Location: Left arm, Patient Position: Sitting, Cuff Size: Adult Regular)   Pulse 64   Temp 96.8  F (36  C) (Tympanic)   Resp 16   Ht 1.702 m (5' 7\")   Wt 104.3 kg (230 lb)   SpO2 97%   BMI 36.02 kg/m   Estimated body mass index is 36.02 kg/m  as calculated from the following:    Height as of this encounter: 1.702 m (5' 7\").    Weight as of this encounter: 104.3 kg (230 lb).  Medication Reconciliation: complete    Tracey Blanco MA  "

## 2019-04-01 ENCOUNTER — TRANSFERRED RECORDS (OUTPATIENT)
Dept: HEALTH INFORMATION MANAGEMENT | Facility: CLINIC | Age: 74
End: 2019-04-01

## 2019-04-01 LAB — COLOGUARD-ABSTRACT: POSITIVE

## 2019-04-05 ENCOUNTER — MYC REFILL (OUTPATIENT)
Dept: FAMILY MEDICINE | Facility: OTHER | Age: 74
End: 2019-04-05

## 2019-04-05 DIAGNOSIS — I10 BENIGN ESSENTIAL HYPERTENSION: ICD-10-CM

## 2019-04-05 RX ORDER — AMLODIPINE BESYLATE 10 MG/1
TABLET ORAL
Qty: 90 TABLET | Refills: 1 | Status: SHIPPED | OUTPATIENT
Start: 2019-04-05 | End: 2019-10-04

## 2019-04-10 ENCOUNTER — ANTICOAGULATION THERAPY VISIT (OUTPATIENT)
Dept: ANTICOAGULATION | Facility: OTHER | Age: 74
End: 2019-04-10
Attending: FAMILY MEDICINE
Payer: MEDICARE

## 2019-04-10 DIAGNOSIS — Z79.01 LONG TERM CURRENT USE OF ANTICOAGULANT THERAPY: ICD-10-CM

## 2019-04-10 DIAGNOSIS — I82.409 ACUTE THROMBOEMBOLISM OF DEEP VEINS OF LOWER EXTREMITY, UNSPECIFIED LATERALITY (H): ICD-10-CM

## 2019-04-10 DIAGNOSIS — I26.99 PULMONARY EMBOLISM AND INFARCTION (H): ICD-10-CM

## 2019-04-10 DIAGNOSIS — I82.409 ACUTE THROMBOEMBOLISM OF DEEP VEINS OF LOWER EXTREMITY (H): ICD-10-CM

## 2019-04-10 LAB — INR BLD: 2.2 (ref 0.86–1.14)

## 2019-04-10 PROCEDURE — 36416 COLLJ CAPILLARY BLOOD SPEC: CPT | Mod: ZL | Performed by: FAMILY MEDICINE

## 2019-04-10 PROCEDURE — 85610 PROTHROMBIN TIME: CPT | Mod: QW,ZL | Performed by: FAMILY MEDICINE

## 2019-04-10 NOTE — PROGRESS NOTES
ANTICOAGULATION FOLLOW-UP CLINIC VISIT    Patient Name:  Clement Voss  Date:  4/10/2019  Contact Type:  Telephone    SUBJECTIVE:     Patient Findings     Comments:   INR done by lab. Call placed to patient and spoke to him re: INR result, warfarin dosing and INR recheck date. He verbalized understanding of instruction and has no questions. He denies bleeding/bruising and has no changes in diet/meds/activity           OBJECTIVE    INR Point of Care   Date Value Ref Range Status   04/10/2019 2.2 (H) 0.86 - 1.14 Final     Comment:     This test is intended for monitoring Coumadin therapy.  Results are not   accurate in patients with prolonged INR due to factor deficiency.         ASSESSMENT / PLAN  INR assessment THER    Recheck INR In: 6 WEEKS    INR Location Clinic      Anticoagulation Summary  As of 4/10/2019    INR goal:   2.0-3.0   TTR:   80.9 % (2.7 y)   INR used for dosin.2 (4/10/2019)   Warfarin maintenance plan:   2.5 mg (5 mg x 0.5) every Mon, Fri; 5 mg (5 mg x 1) all other days   Full warfarin instructions:   2.5 mg every Mon, Fri; 5 mg all other days   Weekly warfarin total:   30 mg   No change documented:   Jodie Elena RN   Plan last modified:   Jodie Elena RN (2018)   Next INR check:   2019   Priority:   INR   Target end date:   Indefinite    Indications    Pulmonary embolism and infarction (H) [I26.99]  Acute thromboembolism of deep veins of lower extremity (H) [I82.409]  Long-term (current) use of anticoagulants [Z79.01] [Z79.01]             Anticoagulation Episode Summary     INR check location:       Preferred lab:       Send INR reminders to:   [facility] ANTICOAG POOL    Comments:         Anticoagulation Care Providers     Provider Role Specialty Phone number    Lorelei Felix MD [facility] [phone]            See the Encounter Report to view Anticoagulation Flowsheet and Dosing Calendar (Go to Encounters tab in chart review, and find the Anticoagulation  Therapy Visit)        Jodie Elena RN

## 2019-04-15 ENCOUNTER — MYC REFILL (OUTPATIENT)
Dept: FAMILY MEDICINE | Facility: OTHER | Age: 74
End: 2019-04-15

## 2019-04-15 DIAGNOSIS — Z79.4 TYPE 2 DIABETES MELLITUS WITH STAGE 3 CHRONIC KIDNEY DISEASE, WITH LONG-TERM CURRENT USE OF INSULIN (H): ICD-10-CM

## 2019-04-15 DIAGNOSIS — E11.22 TYPE 2 DIABETES MELLITUS WITH STAGE 3 CHRONIC KIDNEY DISEASE, WITH LONG-TERM CURRENT USE OF INSULIN (H): ICD-10-CM

## 2019-04-15 DIAGNOSIS — N18.30 TYPE 2 DIABETES MELLITUS WITH STAGE 3 CHRONIC KIDNEY DISEASE, WITH LONG-TERM CURRENT USE OF INSULIN (H): ICD-10-CM

## 2019-04-15 NOTE — TELEPHONE ENCOUNTER
Januvia  Last Written Prescription Date: 4/12/18  Last Fill Quantity: 90 # of Refills: 3  Last Office Visit: 3/22/19

## 2019-04-30 DIAGNOSIS — R19.5 POSITIVE COLORECTAL CANCER SCREENING USING COLOGUARD TEST: Primary | ICD-10-CM

## 2019-04-30 NOTE — PROGRESS NOTES
Patient's cologuard results got scanned in before I received the report - Cologuard was positive and patient needs referral for colonoscopy.  Please contact patient with results, and then route back to me to order referral.

## 2019-05-09 ENCOUNTER — ANTICOAGULATION THERAPY VISIT (OUTPATIENT)
Dept: ANTICOAGULATION | Facility: OTHER | Age: 74
End: 2019-05-09

## 2019-05-09 ENCOUNTER — ANCILLARY PROCEDURE (OUTPATIENT)
Dept: GENERAL RADIOLOGY | Facility: OTHER | Age: 74
End: 2019-05-09
Attending: SURGERY
Payer: MEDICARE

## 2019-05-09 ENCOUNTER — OFFICE VISIT (OUTPATIENT)
Dept: SURGERY | Facility: OTHER | Age: 74
End: 2019-05-09
Attending: FAMILY MEDICINE
Payer: MEDICARE

## 2019-05-09 VITALS
WEIGHT: 232 LBS | DIASTOLIC BLOOD PRESSURE: 62 MMHG | HEIGHT: 67 IN | SYSTOLIC BLOOD PRESSURE: 136 MMHG | HEART RATE: 80 BPM | OXYGEN SATURATION: 95 % | BODY MASS INDEX: 36.41 KG/M2

## 2019-05-09 DIAGNOSIS — R19.5 POSITIVE COLORECTAL CANCER SCREENING USING COLOGUARD TEST: ICD-10-CM

## 2019-05-09 DIAGNOSIS — Z01.818 PRE-OP EXAM: ICD-10-CM

## 2019-05-09 DIAGNOSIS — I26.99 PULMONARY EMBOLISM AND INFARCTION (H): ICD-10-CM

## 2019-05-09 DIAGNOSIS — I82.409 ACUTE THROMBOEMBOLISM OF DEEP VEINS OF LOWER EXTREMITY (H): ICD-10-CM

## 2019-05-09 DIAGNOSIS — Z01.818 PRE-OP EXAM: Primary | ICD-10-CM

## 2019-05-09 DIAGNOSIS — Z79.01 CURRENT USE OF ANTICOAGULANT THERAPY: ICD-10-CM

## 2019-05-09 LAB
ANION GAP SERPL CALCULATED.3IONS-SCNC: 6 MMOL/L (ref 3–14)
APTT PPP: 35 SEC (ref 24–37)
BUN SERPL-MCNC: 17 MG/DL (ref 7–30)
CALCIUM SERPL-MCNC: 8.9 MG/DL (ref 8.5–10.1)
CHLORIDE SERPL-SCNC: 111 MMOL/L (ref 94–109)
CO2 SERPL-SCNC: 27 MMOL/L (ref 20–32)
CREAT SERPL-MCNC: 1.55 MG/DL (ref 0.66–1.25)
ERYTHROCYTE [DISTWIDTH] IN BLOOD BY AUTOMATED COUNT: 14.9 % (ref 10–15)
GFR SERPL CREATININE-BSD FRML MDRD: 43 ML/MIN/{1.73_M2}
GLUCOSE SERPL-MCNC: 175 MG/DL (ref 70–99)
HCT VFR BLD AUTO: 35.8 % (ref 40–53)
HGB BLD-MCNC: 12.7 G/DL (ref 13.3–17.7)
MCH RBC QN AUTO: 32.9 PG (ref 26.5–33)
MCHC RBC AUTO-ENTMCNC: 35.5 G/DL (ref 31.5–36.5)
MCV RBC AUTO: 93 FL (ref 78–100)
PLATELET # BLD AUTO: 114 10E9/L (ref 150–450)
POTASSIUM SERPL-SCNC: 4.5 MMOL/L (ref 3.4–5.3)
RBC # BLD AUTO: 3.86 10E12/L (ref 4.4–5.9)
SODIUM SERPL-SCNC: 144 MMOL/L (ref 133–144)
WBC # BLD AUTO: 6 10E9/L (ref 4–11)

## 2019-05-09 PROCEDURE — 85027 COMPLETE CBC AUTOMATED: CPT | Mod: ZL | Performed by: SURGERY

## 2019-05-09 PROCEDURE — G0463 HOSPITAL OUTPT CLINIC VISIT: HCPCS | Mod: 25

## 2019-05-09 PROCEDURE — 99202 OFFICE O/P NEW SF 15 MIN: CPT | Performed by: SURGERY

## 2019-05-09 PROCEDURE — 36415 COLL VENOUS BLD VENIPUNCTURE: CPT | Mod: ZL | Performed by: SURGERY

## 2019-05-09 PROCEDURE — 93010 ELECTROCARDIOGRAM REPORT: CPT | Performed by: INTERNAL MEDICINE

## 2019-05-09 PROCEDURE — 93005 ELECTROCARDIOGRAM TRACING: CPT

## 2019-05-09 PROCEDURE — 85730 THROMBOPLASTIN TIME PARTIAL: CPT | Mod: ZL | Performed by: SURGERY

## 2019-05-09 PROCEDURE — 80048 BASIC METABOLIC PNL TOTAL CA: CPT | Mod: ZL | Performed by: SURGERY

## 2019-05-09 PROCEDURE — 71046 X-RAY EXAM CHEST 2 VIEWS: CPT | Mod: TC,FY

## 2019-05-09 PROCEDURE — G0463 HOSPITAL OUTPT CLINIC VISIT: HCPCS

## 2019-05-09 ASSESSMENT — MIFFLIN-ST. JEOR: SCORE: 1750.98

## 2019-05-09 ASSESSMENT — PAIN SCALES - GENERAL: PAINLEVEL: NO PAIN (0)

## 2019-05-09 NOTE — PROGRESS NOTES
CLINIC NOTE - CONSULT  5/9/2019    Patient:Clement Voss  Referring Physician: Lorelei Felix    Reason for Referral: Positive ColoGuard    This is a 73 year old male with a need of a colonoscopy for Positive ColoGuard.     Personal history of colon cancer : NO   Family history of colon cancer : NO   Personal history of polyps : NO   Family history of polyps : NO   History of Inflammatory Bowel Disease : NO   History of rectal bleeding : NO   Changes in bowel habits : NO   Last colonoscopy : Never    Past Medical History:  Past Medical History:   Diagnosis Date     DVT (deep venous thrombosis) 9/24/2012     DVT (deep venous thrombosis) (H)      Gout, unspecified 1/10/2011     Other and unspecified hyperlipidemia 10/10/2005     Pulmonary embolism 9/24/2012     Pulmonary embolism (H)      Type II or unspecified type diabetes mellitus without mention of complication, not stated as uncontrolled 8/2/2004     Unspecified essential hypertension 1/3/2005       Past Surgical History:  Past Surgical History:   Procedure Laterality Date     APPENDECTOMY  2008     CHOLECYSTECTOMY  1984     History of PE of right lung 3/2013[       PHACOEMULSIFICATION WITH STANDARD INTRAOCULAR LENS IMPLANT Left 4/19/2018    Procedure: PHACOEMULSIFICATION WITH STANDARD INTRAOCULAR LENS IMPLANT;  CATARACT EXTRACTION LEFT EYE WITH IMPLANT, ISTENT LEFT EYE;  Surgeon: Kush Almaraz MD;  Location: [facility]     PHACOEMULSIFICATION WITH STANDARD INTRAOCULAR LENS IMPLANT Right 5/3/2018    Procedure: PHACOEMULSIFICATION WITH STANDARD INTRAOCULAR LENS IMPLANT;  CATARACT EXTRACTION RIGHT EYE WITH IMPLANT,WITH ISTENT ATTEMPTED;  Surgeon: Kush Almaraz MD;  Location: [facility]     TRABECULAR MICRO-BYPASS WITH ISTENT Left 4/19/2018    Procedure: TRABECULAR MICRO-BYPASS WITH ISTENT;;  Surgeon: Kush Almaraz MD;  Location: [facility]     TRABECULAR MICRO-BYPASS WITH ISTENT  5/3/2018    Procedure: TRABECULAR MICRO-BYPASS WITH ISTENT;;  Surgeon: Sung  Kush PLASCENCIA MD;  Location: [facility]       Family History History:  Family History   Problem Relation Age of Onset     Heart Disease Father 71        heart disease; cause of death     Other - See Comments Mother 79        old age; cause of death       History of Tobacco Use:  History   Smoking Status     Former Smoker     Types: Cigarettes     Quit date: 8/12/1964   Smokeless Tobacco     Never Used       Current Medications:  Current Outpatient Medications   Medication Sig Dispense Refill     acetaminophen (TYLENOL) 500 MG tablet Take 1-2 tablets (500-1,000 mg) by mouth every 6 hours as needed       amLODIPine (NORVASC) 10 MG tablet TAKE 1 TABLET(10 MG) BY MOUTH DAILY 90 tablet 1     aspirin 81 MG EC tablet Take 1 tablet (81 mg) by mouth daily 90 tablet 3     blood glucose (ACCU-CHEK VLAD PLUS) test strip Use to test blood sugar 3 times daily or as directed. 100 strip 3     brimonidine-timolol (COMBIGAN) 0.2-0.5 % ophthalmic solution 1 drop 2 times daily Morning and evening, 12 hours apart       fenofibrate (TRICOR) 48 MG tablet Take 1 tablet (48 mg) by mouth daily 90 tablet 1     fish oil-omega-3 fatty acids (FISH OIL) 1000 MG capsule Take 1 capsule by mouth 3 times daily.       fluticasone (FLONASE) 50 MCG/ACT nasal spray Spray 2 sprays into both nostrils daily (Patient taking differently: Spray 2 sprays into both nostrils daily as needed ) 1 Package 0     indomethacin (INDOCIN) 50 MG capsule Take 1 capsule (50 mg) by mouth 3 times daily With food as needed 30 capsule 1     insulin glargine (BASAGLAR KWIKPEN) 100 UNIT/ML pen Inject 25 Units Subcutaneous At Bedtime 18 mL 1     insulin pen needle (B-D U/F) 31G X 5 MM miscellaneous by Device route See Admin Instructions Use pen needles daily or as directed. 100 each 1     lisinopril (PRINIVIL/ZESTRIL) 40 MG tablet Take 1 tablet (40 mg) by mouth daily 30 tablet 5     loratadine (CLARITIN) 10 MG tablet Take 10 mg by mouth daily.       sitagliptin (JANUVIA) 100 MG tablet  "Take 1 tablet (100 mg) by mouth daily 90 tablet 1     STATIN NOT PRESCRIBED, INTENTIONAL, Statin not prescribed intentionally due to Intolerance (with supporting documentation of trying a statin at least once within the last 5 years) 0 each 0     terazosin (HYTRIN) 10 MG capsule Take 1 capsule (10 mg) by mouth At Bedtime 90 capsule 2     TRAVATAN Z (TRAVATAN Z) 0.004 % ophthalmic solution Instill 1 drop by ophthalmic route every day into affected eye(s) in the evening       warfarin (COUMADIN) 5 MG tablet Take 5 mg daily 5 days a week and 2.5 mg daily 2 days a week, adjust per Coumadin Clinic recommendations 90 tablet 3       Allergies:  Allergies   Allergen Reactions     Atorvastatin      Other reaction(s): Other  Caused increased creatinine.      Atorvastatin Calcium Other (See Comments)     Lipitor  Increase CR     Iodine      Penicillins      Sulfa Drugs      Sulfa (sulfonamide antibiotics)     Sulfacetamide        ROS:  Pertinent items are noted in HPI.  All other systems are negative.  A comprehensive review of systems was negative.    PHYSICAL EXAM:     Vital signs: /62   Pulse 80   Ht 1.702 m (5' 7\")   Wt 105.2 kg (232 lb)   SpO2 95%   BMI 36.34 kg/m     Weight: [unfilled]   BMI: Body mass index is 36.34 kg/m .   General: Normal, healthy, cooperative, in no acute distress, alert   Skin: no jaundice   HEENT: PERRLA, EOMI and Sclera clear, anicteric   Neck: supple, without adenopathy, full range of motion   Lungs: clear to auscultation   CV: Regular rate and rhythm without murmer   Abdominal: abdomen is soft without significant tenderness, masses, organomegaly or guarding   Extremities: No cyanosis, clubbing or edema noted bilaterally in Upper and Lower Extremities   Neurological: without deficit    ASSESSMENT:      73 year old male with a need of a colonoscopy for Positive ColoGuard.    PLAN:   A colonoscopy will be scheduled.  The procedure with their risks, benefits and alternatives were explained.  " Risks include but are not limited to bleeding, perforation, missing lesions, need for additional procedures, reaction to anesthesia.  All the patients questions were answered.  The patient consents to proceed as planned.    Will hold coumadin for 5 days prior to the procedure

## 2019-05-09 NOTE — NURSING NOTE
"Chief Complaint   Patient presents with     Consult     colon consult-Dr. daugherty referring        Initial /62   Pulse 80   Ht 1.702 m (5' 7\")   Wt 105.2 kg (232 lb)   SpO2 95%   BMI 36.34 kg/m   Estimated body mass index is 36.34 kg/m  as calculated from the following:    Height as of this encounter: 1.702 m (5' 7\").    Weight as of this encounter: 105.2 kg (232 lb).  Medication Reconciliation: complete    Ruth Up LPN  "

## 2019-05-09 NOTE — PROGRESS NOTES
ANTICOAGULATION FOLLOW-UP CLINIC VISIT    Patient Name:  Clement Voss  Date:  5/9/2019  Contact Type:  Telephone    SUBJECTIVE:  Patient Findings     Positives:   Upcoming invasive procedure    Comments:   Patient having colonoscopy on 5/16/19. Call placed to patient and we discussed warfarin hold prior to procedure and warfarin dosing post procedure. He verbalized understanding and repeated back instructions accurately. He will call warfarin clinic if questions.         Clinical Outcomes     Comments:   Patient having colonoscopy on 5/16/19. Call placed to patient and we discussed warfarin hold prior to procedure and warfarin dosing post procedure. He verbalized understanding and repeated back instructions accurately. He will call warfarin clinic if questions.            OBJECTIVE    INR Point of Care   Date Value Ref Range Status   04/10/2019 2.2 (H) 0.86 - 1.14 Final     Comment:     This test is intended for monitoring Coumadin therapy.  Results are not   accurate in patients with prolonged INR due to factor deficiency.         ASSESSMENT / PLAN  No question data found.  Anticoagulation Summary  As of 5/9/2019    INR goal:   2.0-3.0   TTR:   80.9 % (2.7 y)   INR used for dosing:   No new INR was available at the time of this encounter.   Warfarin maintenance plan:   2.5 mg (5 mg x 0.5) every Mon, Fri; 5 mg (5 mg x 1) all other days   Full warfarin instructions:   5/11: Hold; 5/12: Hold; 5/13: Hold; 5/14: Hold; 5/15: Hold; 5/17: 5 mg; 5/18: 7.5 mg; 5/19: 7.5 mg; 5/20: 5 mg; Otherwise 2.5 mg every Mon, Fri; 5 mg all other days   Weekly warfarin total:   30 mg   Plan last modified:   Jodie Elena RN (2/7/2018)   Next INR check:   5/22/2019   Priority:   INR   Target end date:   Indefinite    Indications    Pulmonary embolism and infarction (H) [I26.99]  Acute thromboembolism of deep veins of lower extremity (H) [I82.409]  Long-term (current) use of anticoagulants [Z79.01] [Z79.01]             Anticoagulation  Episode Summary     INR check location:       Preferred lab:       Send INR reminders to:   [facility] ANTICOAG POOL    Comments:         Anticoagulation Care Providers     Provider Role Specialty Phone number    Lorelei Felix MD [facility] [phone]            See the Encounter Report to view Anticoagulation Flowsheet and Dosing Calendar (Go to Encounters tab in chart review, and find the Anticoagulation Therapy Visit)        Jodie Elena RN

## 2019-05-09 NOTE — PATIENT INSTRUCTIONS
Thank you for allowing Dr. Saldana and our surgical team to participate in your care.  If you have a scheduling or an appointment question please contact Jane our Health Unit Coordinator at her direct line [phone].   ALL nursing questions or concerns can be directed to your surgical nurse at: [phone]      We want to your procedure to be as pleasant as possible. Please review the instructions below. If you have questions, you may contact us at the any of the following numbers:     Clinic Health Unit Coordinator: [phone]  Clinic Nurse: [phone]  Surgery Education Nurse: [phone]    Date of Procedure: 5/16/19 with Dr. Saldana  Admit Time: Hospital Surgery will call you the day before your procedure by 5pm with your arrival time. If your surgery is on Monday, expect a call on Friday. If you are not contacted before 5PM, please call admitting at [phone]. After hours or on weekends, please call [phone] to postpone.     Call the clinic nurse if you become ill within 1 week of your procedure to reschedule.    tests needed EKG, CBC, BMP, PTT CHEST XRAY.    7 DAYS BEFORE THE EXAM:  Call the Surgery Education Nurse at [phone] and have a medication list ready.   Do not take Aspirin or NSAIDS (Ibuprofen, Celebrex, Naproxen, etc) 7 days before surgery.  Stop taking fiber supplements, herbals, vitamins, and iron. Stop eating corn, nuts and seeds. If you are prescribed a daily 81mg Aspirin, you may continue this.If you are prescribed blood thinners or insulin, talk to your primary care provider for instructions.   Arrange transportation with a responsible adult or you will be be cancelled.    You have been ordered Gatorade Prep Please purchase the following over the counter items:    Two 5 mg Dulcolax (bisacodyl) tablets   One 8.3 ounce (238 g) bottle of miralax   One 10 ounce bottle of magnesium citrate   One 64 ounce bottle of gatorade-Not red or purple or powdered.    2 DAYS BEFORE THE EXAM:  "  Low fiber diet. See the list of low fiber foods on page 3 of the \"Miralax, Dulcolax and Magnesium Citrate\" packet. Drink at least 4-6 large glasses of sports drink today and tomorrow. Nothing red or purple.    1 DAY BEFORE THE EXAM:  No solid food or milk products after 12:01 AM. Drink only clear liquids all day, at least 8-10 glasses. Please see the list of clear liquids on page 2 of the \"Miralax, Dulcolax and Magnesium Citrate\" packet.  No red, purple or alcohol.             AT 12:00 PM NOON THE DAY BEFORE EXAM:  Take 2 Dulcolax tablets by mouth with clear liquids.            AT 6:00 PM THE DAY BEFORE EXAM:  Mix the bottle of Miralax and 64 oz. of Gatorade in a pitcher. Drink one 8 oz. glass every 10-15 minutes until gone. Stay near a toilet.     DAY OF COLONOSCOPY EXAM:             6 HOURS PRIOR TO EXAM DAY OF PROCEDURE:  Drink the bottle of magnesium citrate followed by a full glass of water. You may have clear liquids up until 3 hours before arrival.  If you need to take any medications after this, take them with a tiny sip of water. If you have asthma, bring your inhaler with you.  Shower. Wear comfortable clothes. No jewelry, make-up, nail polish, hair spray, lotions, or perfumes. [place] in Admitting through the [place] [facility]. You must have a responsible adult to drive and to stay with you for 4 hours at home.     TIPS FOR COLON CLEANSING BEFORE YOUR COLONOSCOPY  To get accurate results from your exam, your colon must be completely empty or you may need to repeat the colon prep and exam. If you followed instructions and your stool is clear or yellow liquid, you are ready. If you are not sure if your colon is clean, please call the clinic nurse.    You may use Tucks wipes, hemorrhoid treatments, hydrocortisone cream or alcohol-free baby wipes to ease anal irritation. You may also use Vaseline to help protect the skin.     Quickly drink each glass. Even when you are sitting on the toilet, keep drinking " every 15 minutes. If you have nausea or vomiting, take a break for 30 minutes and then resume drinking.    You will have loose watery stools and may also have chills. Dress for comfort. Expect to feel bloating, nausea and other discomfort until the stool clears from your colon.

## 2019-05-13 ENCOUNTER — ANESTHESIA EVENT (OUTPATIENT)
Dept: SURGERY | Facility: HOSPITAL | Age: 74
End: 2019-05-13
Payer: MEDICARE

## 2019-05-13 ASSESSMENT — LIFESTYLE VARIABLES: TOBACCO_USE: 1

## 2019-05-13 NOTE — ANESTHESIA PREPROCEDURE EVALUATION
Anesthesia Pre-Procedure Evaluation    Patient: Clement Voss   MRN: [id number] : 1945          Preoperative Diagnosis: POSITIVE COLORECTAL CANCER SCREENING, USING COLOGUARD TEST    Procedure(s):  COLONOSCOPY    Past Medical History:   Diagnosis Date     DVT (deep venous thrombosis) 2012     DVT (deep venous thrombosis) (H)      Gout, unspecified 1/10/2011     Other and unspecified hyperlipidemia 10/10/2005     Pulmonary embolism 2012     Pulmonary embolism (H)      Type II or unspecified type diabetes mellitus without mention of complication, not stated as uncontrolled 2004     Unspecified essential hypertension 1/3/2005     Past Surgical History:   Procedure Laterality Date     APPENDECTOMY       CHOLECYSTECTOMY  1984     History of PE of right lung 3/2013[       PHACOEMULSIFICATION WITH STANDARD INTRAOCULAR LENS IMPLANT Left 2018    Procedure: PHACOEMULSIFICATION WITH STANDARD INTRAOCULAR LENS IMPLANT;  CATARACT EXTRACTION LEFT EYE WITH IMPLANT, ISTENT LEFT EYE;  Surgeon: Kush Almaraz MD;  Location: [facility]     PHACOEMULSIFICATION WITH STANDARD INTRAOCULAR LENS IMPLANT Right 5/3/2018    Procedure: PHACOEMULSIFICATION WITH STANDARD INTRAOCULAR LENS IMPLANT;  CATARACT EXTRACTION RIGHT EYE WITH IMPLANT,WITH ISTENT ATTEMPTED;  Surgeon: Kush Almaraz MD;  Location: [facility] OR     TRABECULAR MICRO-BYPASS WITH ISTENT Left 2018    Procedure: TRABECULAR MICRO-BYPASS WITH ISTENT;;  Surgeon: Kush Almaraz MD;  Location: [facility]     TRABECULAR MICRO-BYPASS WITH ISTENT  5/3/2018    Procedure: TRABECULAR MICRO-BYPASS WITH ISTENT;;  Surgeon: Kush Almaraz MD;  Location: [facility]       Anesthesia Evaluation     .             ROS/MED HX    ENT/Pulmonary:     (+)NATALIYA risk factors hypertension, obese, tobacco use, Past use , . .    Neurologic:       Cardiovascular: Comment: History of PE and infarct    (+) Dyslipidemia, hypertension----. : . . . :. . Previous cardiac testing  date:results:date: results:ECG reviewed date:5-9-19 results:Unable to locate results date: results:          METS/Exercise Tolerance:     Hematologic:     (+) History of blood clots pt is anticoagulated, -      Musculoskeletal:         GI/Hepatic: Comment: Positive cologuard test       (-) other GI/Hepatic   Renal/Genitourinary:         Endo:     (+) type II DM Last HgA1c: 7.9 date: 3-20-19 Using insulin Obesity, .      Psychiatric:         Infectious Disease:         Malignancy:         Other:                          Physical Exam  Normal systems: cardiovascular    Airway   Mallampati: I    Dental   (+) chipped    Cardiovascular       Pulmonary   PE comment: Diminished in bases             Lab Results   Component Value Date    WBC 6.0 05/09/2019    HGB 12.7 (L) 05/09/2019    HCT 35.8 (L) 05/09/2019     (L) 05/09/2019     05/09/2019    POTASSIUM 4.5 05/09/2019    CHLORIDE 111 (H) 05/09/2019    CO2 27 05/09/2019    BUN 17 05/09/2019    CR 1.55 (H) 05/09/2019     (H) 05/09/2019    PATRICIA 8.9 05/09/2019    MAG 2.0 01/01/2014    ALBUMIN 3.8 07/12/2016    PROTTOTAL 6.7 (L) 07/12/2016    ALT 32 11/28/2016    AST 24 07/12/2016    ALKPHOS 57 07/12/2016    BILITOTAL 0.5 07/12/2016    PTT 35 05/09/2019    INR 2.2 (H) 04/10/2019    TSH 2.46 12/18/2018    TROPN  12/29/2013     <0.04  **Risk Stratification**   0.10 - 0.39   Elevated-may indicate increased                 risk of short term adverse                 cardiac events.      >=0.4      Possible cardiac injury.       Preop Vitals  BP Readings from Last 3 Encounters:   05/09/19 136/62   03/22/19 130/62   12/21/18 128/58    Pulse Readings from Last 3 Encounters:   05/09/19 80   03/22/19 64   12/21/18 61      Resp Readings from Last 3 Encounters:   03/22/19 16   12/21/18 16   09/20/18 16    SpO2 Readings from Last 3 Encounters:   05/09/19 95%   03/22/19 97%   12/21/18 97%      Temp Readings from Last 1 Encounters:   03/22/19 96.8  F (36  C) (Tympanic)     "Ht Readings from Last 1 Encounters:   05/09/19 1.702 m (5' 7\")      Wt Readings from Last 1 Encounters:   05/09/19 105.2 kg (232 lb)    Estimated body mass index is 36.34 kg/m  as calculated from the following:    Height as of 5/9/19: 1.702 m (5' 7\").    Weight as of 5/9/19: 105.2 kg (232 lb).       Anesthesia Plan      History & Physical Review  History and physical reviewed and following examination; no interval change.    ASA Status:  3 .    NPO Status:  > 8 hours    Plan for MAC with Propofol and Intravenous induction. Maintenance will be TIVA.  Reason for MAC:  Chronic cardiopulmonary disease (G9) and Deep or markedly invasive procedure (G8)         Postoperative Care      Consents  Anesthetic plan, risks, benefits and alternatives discussed with:  Patient..                 KRIS Fortune CRNA  "

## 2019-05-16 ENCOUNTER — APPOINTMENT (OUTPATIENT)
Dept: LAB | Facility: HOSPITAL | Age: 74
End: 2019-05-16
Attending: SURGERY
Payer: MEDICARE

## 2019-05-16 ENCOUNTER — ANESTHESIA (OUTPATIENT)
Dept: SURGERY | Facility: HOSPITAL | Age: 74
End: 2019-05-16
Payer: MEDICARE

## 2019-05-16 ENCOUNTER — HOSPITAL ENCOUNTER (OUTPATIENT)
Facility: HOSPITAL | Age: 74
Discharge: HOME | End: 2019-05-16
Attending: SURGERY | Admitting: SURGERY
Payer: MEDICARE

## 2019-05-16 VITALS
DIASTOLIC BLOOD PRESSURE: 67 MMHG | OXYGEN SATURATION: 94 % | WEIGHT: 219 LBS | TEMPERATURE: 98.1 F | BODY MASS INDEX: 34.37 KG/M2 | RESPIRATION RATE: 18 BRPM | SYSTOLIC BLOOD PRESSURE: 149 MMHG | HEIGHT: 67 IN | HEART RATE: 90 BPM

## 2019-05-16 LAB — INR PPP: 1.12 (ref 0.8–1.2)

## 2019-05-16 PROCEDURE — 36000050 ZZH SURGERY LEVEL 2 1ST 30 MIN: Performed by: SURGERY

## 2019-05-16 PROCEDURE — 45378 DIAGNOSTIC COLONOSCOPY: CPT | Performed by: NURSE ANESTHETIST, CERTIFIED REGISTERED

## 2019-05-16 PROCEDURE — 99100 ANES PT EXTEME AGE<1 YR&>70: CPT | Performed by: NURSE ANESTHETIST, CERTIFIED REGISTERED

## 2019-05-16 PROCEDURE — 25800030 ZZH RX IP 258 OP 636: Performed by: NURSE ANESTHETIST, CERTIFIED REGISTERED

## 2019-05-16 PROCEDURE — 85610 PROTHROMBIN TIME: CPT | Performed by: NURSE ANESTHETIST, CERTIFIED REGISTERED

## 2019-05-16 PROCEDURE — 25000125 ZZHC RX 250: Performed by: NURSE ANESTHETIST, CERTIFIED REGISTERED

## 2019-05-16 PROCEDURE — 71000027 ZZH RECOVERY PHASE 2 EACH 15 MINS: Performed by: SURGERY

## 2019-05-16 PROCEDURE — 45378 DIAGNOSTIC COLONOSCOPY: CPT | Performed by: SURGERY

## 2019-05-16 PROCEDURE — 40000305 ZZH STATISTIC PRE PROC ASSESS I: Performed by: SURGERY

## 2019-05-16 PROCEDURE — 37000008 ZZH ANESTHESIA TECHNICAL FEE, 1ST 30 MIN: Performed by: SURGERY

## 2019-05-16 PROCEDURE — 25000128 H RX IP 250 OP 636: Performed by: NURSE ANESTHETIST, CERTIFIED REGISTERED

## 2019-05-16 PROCEDURE — 36415 COLL VENOUS BLD VENIPUNCTURE: CPT | Performed by: NURSE ANESTHETIST, CERTIFIED REGISTERED

## 2019-05-16 RX ORDER — PROPOFOL 10 MG/ML
INJECTION, EMULSION INTRAVENOUS PRN
Status: DISCONTINUED | OUTPATIENT
Start: 2019-05-16 | End: 2019-05-16

## 2019-05-16 RX ORDER — SODIUM CHLORIDE, SODIUM LACTATE, POTASSIUM CHLORIDE, CALCIUM CHLORIDE 600; 310; 30; 20 MG/100ML; MG/100ML; MG/100ML; MG/100ML
INJECTION, SOLUTION INTRAVENOUS CONTINUOUS
Status: DISCONTINUED | OUTPATIENT
Start: 2019-05-16 | End: 2019-05-16 | Stop reason: HOSPADM

## 2019-05-16 RX ORDER — NALOXONE HYDROCHLORIDE 0.4 MG/ML
.1-.4 INJECTION, SOLUTION INTRAMUSCULAR; INTRAVENOUS; SUBCUTANEOUS
Status: DISCONTINUED | OUTPATIENT
Start: 2019-05-16 | End: 2019-05-16 | Stop reason: HOSPADM

## 2019-05-16 RX ORDER — ONDANSETRON 2 MG/ML
4 INJECTION INTRAMUSCULAR; INTRAVENOUS EVERY 30 MIN PRN
Status: DISCONTINUED | OUTPATIENT
Start: 2019-05-16 | End: 2019-05-16 | Stop reason: HOSPADM

## 2019-05-16 RX ORDER — ONDANSETRON 4 MG/1
4 TABLET, ORALLY DISINTEGRATING ORAL EVERY 30 MIN PRN
Status: DISCONTINUED | OUTPATIENT
Start: 2019-05-16 | End: 2019-05-16 | Stop reason: HOSPADM

## 2019-05-16 RX ORDER — LIDOCAINE HYDROCHLORIDE 20 MG/ML
INJECTION, SOLUTION INFILTRATION; PERINEURAL PRN
Status: DISCONTINUED | OUTPATIENT
Start: 2019-05-16 | End: 2019-05-16

## 2019-05-16 RX ORDER — LIDOCAINE 40 MG/G
CREAM TOPICAL
Status: DISCONTINUED | OUTPATIENT
Start: 2019-05-16 | End: 2019-05-16 | Stop reason: HOSPADM

## 2019-05-16 RX ADMIN — PROPOFOL 20 MG: 10 INJECTION, EMULSION INTRAVENOUS at 10:52

## 2019-05-16 RX ADMIN — SODIUM CHLORIDE, POTASSIUM CHLORIDE, SODIUM LACTATE AND CALCIUM CHLORIDE: 600; 310; 30; 20 INJECTION, SOLUTION INTRAVENOUS at 10:44

## 2019-05-16 RX ADMIN — PROPOFOL 20 MG: 10 INJECTION, EMULSION INTRAVENOUS at 10:58

## 2019-05-16 RX ADMIN — LIDOCAINE HYDROCHLORIDE 40 MG: 20 INJECTION, SOLUTION INFILTRATION; PERINEURAL at 10:47

## 2019-05-16 RX ADMIN — PROPOFOL 20 MG: 10 INJECTION, EMULSION INTRAVENOUS at 10:56

## 2019-05-16 RX ADMIN — PROPOFOL 40 MG: 10 INJECTION, EMULSION INTRAVENOUS at 10:47

## 2019-05-16 RX ADMIN — PROPOFOL 20 MG: 10 INJECTION, EMULSION INTRAVENOUS at 10:48

## 2019-05-16 RX ADMIN — PROPOFOL 20 MG: 10 INJECTION, EMULSION INTRAVENOUS at 10:50

## 2019-05-16 RX ADMIN — PROPOFOL 20 MG: 10 INJECTION, EMULSION INTRAVENOUS at 10:54

## 2019-05-16 ASSESSMENT — MIFFLIN-ST. JEOR: SCORE: 1692.13

## 2019-05-16 NOTE — ANESTHESIA CARE TRANSFER NOTE
Patient: Clement Voss    Procedure(s):  COLONOSCOPY    Diagnosis: POSITIVE COLORECTAL CANCER SCREENING, USING COLOGUARD TEST  Diagnosis Additional Information: No value filed.    Anesthesia Type:   MAC     Note:  Airway :Room Air  Patient transferred to:Phase II  Handoff Report: Identifed the Patient, Identified the Reponsible Provider, Reviewed the pertinent medical history, Discussed the surgical course, Reviewed Intra-OP anesthesia mangement and issues during anesthesia, Set expectations for post-procedure period and Allowed opportunity for questions and acknowledgement of understanding      Vitals: (Last set prior to Anesthesia Care Transfer)    CRNA VITALS  5/16/2019 1030 - 5/16/2019 1105      5/16/2019             Pulse:  77    Ht Rate:  76    SpO2:  92 %                Electronically Signed By: KRIS Hanley CRNA  May 16, 2019  11:05 AM

## 2019-05-16 NOTE — DISCHARGE INSTRUCTIONS

## 2019-05-16 NOTE — OR NURSING
Pateint discharged to home.  Jorge score 20/20. Pain level 0/10.  Discharged from unit via ambulatory.  Discharge instructions given to pt and wife with understanding of plan of care.

## 2019-05-16 NOTE — ANESTHESIA POSTPROCEDURE EVALUATION
Patient: Clement Voss    Procedure(s):  COLONOSCOPY    Diagnosis:POSITIVE COLORECTAL CANCER SCREENING, USING COLOGUARD TEST  Diagnosis Additional Information: No value filed.    Anesthesia Type:  MAC    Note:  Anesthesia Post Evaluation    Patient location during evaluation: Bedside  Patient participation: Able to fully participate in evaluation  Level of consciousness: awake  Pain management: adequate  Airway patency: patent  Cardiovascular status: acceptable  Respiratory status: acceptable  Hydration status: acceptable  PONV: none     Anesthetic complications: None          Last vitals:  Vitals:    05/16/19 1130 05/16/19 1135 05/16/19 1145   BP: 138/63 149/67    Pulse:      Resp: 18  18   Temp:      SpO2: 94% 94%          Electronically Signed By: KRIS Mcelroy CRNA  May 16, 2019  12:03 PM

## 2019-05-16 NOTE — OP NOTE
REPORT OF OPERATION  DATE OF PROCEDURE: 5/16/2019    PATIENT: Clement Voss    SURGERY PREFORMED: Colonoscopy    PREOPERATIVE DIAGNOSIS: Positive ColoGuard    POSTOPERATIVE DIAGNOSIS:    Same   Normal colonoscopy   Diverticulosis was identified.   Hemorrhoids  were  identified.    SURGEON: Benito Saldana MD    ASSISTANTS: None    ANESTHESIA: Monitored Anesthesia Care    COMPLICATIONS: None apparent    TRANSFUSIONS: None    TISSUE TO PATHOLOGY: None    FINDINGS: Normal colonoscopy.  Diverticulosis was identified.  Hemorrhoids  were  identified.    INDICATIONS: This is a 73 year old male in need of a colonoscopy for Positive ColoGuard.  The patient will be taken to the endoscopy suite for that procedure.    DESCRIPTIONS OF PROCEDURE IN DETAIL: After consent was obtained the patient was taken to the endoscopy suite and placed in the left lateral decubitus position.  The patient was identified and the correct patient was confirmed.  Monitored Anesthesia Care was given.  A time out was preformed verifying the correct patient and the correct procedure.  The entire operative team was in agreement.  All necessary equipment and supplies were in the room.    Rectal exam was preformed and no lesions of the anal canal were noted.  The colonoscope was inserted into the anus and passed without difficulty to the cecum.  The cecum was identified by the ileocecal valve, the coalescence of the tinea and the appendiceal orifice.  Upon withdrawal all walls of the colon were visualized.  There were no polyps, masses or evidence of colitis seen.  Diverticulosis was seen.  Upon reaching the rectum the scope was retroflexed and internal hemorrhoids  were  seen.  The scope was straightened back out and removed from the patient.  The patient was then taken to the recovery room in stable condition tolerating the procedure well.      Prep: poor    Withdrawal time was 6 minutes.    It is recommended that the patient have another colonoscopy  in [place].

## 2019-05-21 DIAGNOSIS — I26.99 PULMONARY EMBOLISM AND INFARCTION (H): ICD-10-CM

## 2019-05-21 RX ORDER — WARFARIN SODIUM 5 MG/1
TABLET ORAL
Qty: 100 TABLET | Refills: 3 | Status: SHIPPED | OUTPATIENT
Start: 2019-05-21 | End: 2019-06-19

## 2019-05-22 ENCOUNTER — ANTICOAGULATION THERAPY VISIT (OUTPATIENT)
Dept: ANTICOAGULATION | Facility: OTHER | Age: 74
End: 2019-05-22
Attending: FAMILY MEDICINE
Payer: MEDICARE

## 2019-05-22 DIAGNOSIS — I26.99 PULMONARY EMBOLISM AND INFARCTION (H): ICD-10-CM

## 2019-05-22 DIAGNOSIS — I82.409 ACUTE THROMBOEMBOLISM OF DEEP VEINS OF LOWER EXTREMITY (H): ICD-10-CM

## 2019-05-22 DIAGNOSIS — E11.9 DIABETES MELLITUS, TYPE 2 (H): ICD-10-CM

## 2019-05-22 DIAGNOSIS — Z79.01 LONG TERM CURRENT USE OF ANTICOAGULANT THERAPY: ICD-10-CM

## 2019-05-22 DIAGNOSIS — I82.409 ACUTE THROMBOEMBOLISM OF DEEP VEINS OF LOWER EXTREMITY, UNSPECIFIED LATERALITY (H): ICD-10-CM

## 2019-05-22 LAB — INR BLD: 2.1 (ref 0.86–1.14)

## 2019-05-22 PROCEDURE — 36416 COLLJ CAPILLARY BLOOD SPEC: CPT | Mod: ZL | Performed by: FAMILY MEDICINE

## 2019-05-22 PROCEDURE — 85610 PROTHROMBIN TIME: CPT | Mod: QW,ZL | Performed by: FAMILY MEDICINE

## 2019-05-22 NOTE — PROGRESS NOTES
ANTICOAGULATION FOLLOW-UP CLINIC VISIT    Patient Name:  Clement Voss  Date:  2019  Contact Type:  Telephone/ message left on voicemail    SUBJECTIVE:  Patient Findings     Comments:   INR done by lab. Call placed to patient and message left on home phone voicemail re: INR result, warfarin dosing/INR recheck date. He is to call warfarin clinic if he has any bleeding/bruising, changes in diet/meds/activity or questions.         Clinical Outcomes     Negatives:   Major bleeding event, Thromboembolic event, Anticoagulation-related hospital admission, Anticoagulation-related ED visit, Anticoagulation-related fatality    Comments:   INR done by lab. Call placed to patient and message left on home phone voicemail re: INR result, warfarin dosing/INR recheck date. He is to call warfarin clinic if he has any bleeding/bruising, changes in diet/meds/activity or questions.            OBJECTIVE    INR Point of Care   Date Value Ref Range Status   2019 2.1 (H) 0.86 - 1.14 Final     Comment:     This test is intended for monitoring Coumadin therapy.  Results are not   accurate in patients with prolonged INR due to factor deficiency.         ASSESSMENT / PLAN  INR assessment THER    Recheck INR In: 4 WEEKS    INR Location Clinic      Anticoagulation Summary  As of 2019    INR goal:   2.0-3.0   TTR:   78.3 % (2.8 y)   INR used for dosin.1 (2019)   Warfarin maintenance plan:   2.5 mg (5 mg x 0.5) every Mon, Fri; 5 mg (5 mg x 1) all other days   Full warfarin instructions:   2.5 mg every Mon, Fri; 5 mg all other days   Weekly warfarin total:   30 mg   No change documented:   Jodie Elena RN   Plan last modified:   Jodie Elena RN (2018)   Next INR check:   2019   Priority:   INR   Target end date:   Indefinite    Indications    Pulmonary embolism and infarction (H) [I26.99]  Acute thromboembolism of deep veins of lower extremity (H) [I82.409]  Long-term (current) use of anticoagulants  [Z79.01] [Z79.01]             Anticoagulation Episode Summary     INR check location:       Preferred lab:       Send INR reminders to:   [facility] ANTICOAG POOL    Comments:         Anticoagulation Care Providers     Provider Role Specialty Phone number    Lorelei Felix MD [facility] [phone]            See the Encounter Report to view Anticoagulation Flowsheet and Dosing Calendar (Go to Encounters tab in chart review, and find the Anticoagulation Therapy Visit)        Jodie Elena RN

## 2019-05-22 NOTE — TELEPHONE ENCOUNTER
blood glucose (ACCU-CHEK VLAD PLUS) test strip  Last Written Prescription Date:  1/3/19  Last Fill Quantity: 100,   # refills: 3  Last Office Visit: 3/22/19  Future Office visit:    Next 5 appointments (look out 90 days)    Jun 24, 2019 10:15 AM CDT  (Arrive by 10:00 AM)  SHORT with Lorelei Felix MD  [facility] ([facility] ) [address]  [facility] [address]  [phone]

## 2019-05-23 DIAGNOSIS — E11.9 DIABETES MELLITUS, TYPE 2 (H): ICD-10-CM

## 2019-05-23 NOTE — TELEPHONE ENCOUNTER
ACCU-CHEK VLAD PLUS test strip  Last Written Prescription Date:  5/23/19  Last Fill Quantity: 100,   # refills: 3  Last Office Visit: 3/22/19  Future Office visit:    Next 5 appointments (look out 90 days)    Jun 24, 2019 10:15 AM CDT  (Arrive by 10:00 AM)  SHORT with Lorelei Felix MD  [facility] ([facility] ) [address]  [place] [address]  [phone]

## 2019-06-18 NOTE — PROGRESS NOTES
Subjective     Clement Voss is a 74 year old male who presents to clinic today for the following health issues:    HPI   Diabetes Follow-up      How often are you checking your blood sugar? Two times daily    What time of day are you checking your blood sugars (select all that apply)?  Before meals    Have you had any blood sugars above 200?  Yes once    Have you had any blood sugars below 70?  No    What symptoms do you notice when your blood sugar is low?  Other: GI upset    What concerns do you have today about your diabetes? None     Do you have any of these symptoms? (Select all that apply)  No numbness or tingling in feet.  No redness, sores or blisters on feet.  No complaints of excessive thirst.  No reports of blurry vision.  No significant changes to weight.     Have you had a diabetic eye exam in the last 12 months? Yes- Date of last eye exam: 04/19/18    Diabetes Management Resources    Hyperlipidemia Follow-Up      Are you having any of the following symptoms? (Select all that apply)  No complaints of shortness of breath, chest pain or pressure.  No increased sweating or nausea with activity.  No left-sided neck or arm pain.  No complaints of pain in calves when walking 1-2 blocks.    Are you regularly taking any medication or supplement to lower your cholesterol?   Yes- fish oil    Are you having muscle aches or other side effects that you think could be caused by your cholesterol lowering medication?  No    Hypertension Follow-up      Do you check your blood pressure regularly outside of the clinic? No     Are you following a low salt diet? Yes    Are your blood pressures ever more than 140 on the top number (systolic) OR more   than 90 on the bottom number (diastolic), for example 140/90? No    BP Readings from Last 2 Encounters:   06/24/19 104/60   05/16/19 149/67     Hemoglobin A1C (%)   Date Value   06/19/2019 7.4 (H)   03/20/2019 7.9 (H)     LDL Cholesterol Calculated (mg/dL)   Date Value    2019 82   2019 98       Amount of exercise or physical activity: 2-3 days/week for an average of 30-45 minutes    Problems taking medications regularly: No    Medication side effects: none    Diet: low salt and diabetic        Patient Active Problem List   Diagnosis     Type 2 diabetes mellitus with chronic kidney disease, with long-term current use of insulin (H)     Hyperlipidemia     Benign essential hypertension     Gout     Pulmonary embolism and infarction (H)     Acute thromboembolism of deep veins of lower extremity (H)     ACP (advance care planning)     Long-term (current) use of anticoagulants [Z79.01]     Morbid obesity (H)     Past Surgical History:   Procedure Laterality Date     APPENDECTOMY       CHOLECYSTECTOMY       COLONOSCOPY N/A 2019    Procedure: COLONOSCOPY;  Surgeon: Benito Sadlana MD;  Location: [facility]     History of PE of right lung 3/2013[       PHACOEMULSIFICATION WITH STANDARD INTRAOCULAR LENS IMPLANT Left 2018    Procedure: PHACOEMULSIFICATION WITH STANDARD INTRAOCULAR LENS IMPLANT;  CATARACT EXTRACTION LEFT EYE WITH IMPLANT, ISTENT LEFT EYE;  Surgeon: Kush Almaraz MD;  Location: [facility]     PHACOEMULSIFICATION WITH STANDARD INTRAOCULAR LENS IMPLANT Right 5/3/2018    Procedure: PHACOEMULSIFICATION WITH STANDARD INTRAOCULAR LENS IMPLANT;  CATARACT EXTRACTION RIGHT EYE WITH IMPLANT,WITH ISTENT ATTEMPTED;  Surgeon: Kush Almaraz MD;  Location: [facility]     TRABECULAR MICRO-BYPASS WITH ISTENT Left 2018    Procedure: TRABECULAR MICRO-BYPASS WITH ISTENT;;  Surgeon: Kush Almaraz MD;  Location: [facility]     TRABECULAR MICRO-BYPASS WITH ISTENT  5/3/2018    Procedure: TRABECULAR MICRO-BYPASS WITH ISTENT;;  Surgeon: Kush Almaraz MD;  Location: [facility]       Social History     Tobacco Use     Smoking status: Former Smoker     Types: Cigarettes     Last attempt to quit: 1964     Years since quittin.9     Smokeless tobacco: Never Used    Substance Use Topics     Alcohol use: No     Family History   Problem Relation Age of Onset     Heart Disease Father 71        heart disease; cause of death     Other - See Comments Mother 79        old age; cause of death         Current Outpatient Medications   Medication Sig Dispense Refill     acetaminophen (TYLENOL) 500 MG tablet Take 1-2 tablets (500-1,000 mg) by mouth every 6 hours as needed       amLODIPine (NORVASC) 10 MG tablet TAKE 1 TABLET(10 MG) BY MOUTH DAILY 90 tablet 1     aspirin 81 MG EC tablet Take 1 tablet (81 mg) by mouth daily 90 tablet 3     blood glucose (ACCU-CHEK VLAD PLUS) test strip USE TO TEST BLOOD SUGARS THREE TIMES DAILY OR AS DIRECTED 200 strip 3     brimonidine-timolol (COMBIGAN) 0.2-0.5 % ophthalmic solution 1 drop 2 times daily Morning and evening, 12 hours apart       fenofibrate (TRICOR) 48 MG tablet Take 1 tablet (48 mg) by mouth daily 90 tablet 1     fish oil-omega-3 fatty acids (FISH OIL) 1000 MG capsule Take 1 capsule by mouth 3 times daily.       fluticasone (FLONASE) 50 MCG/ACT nasal spray Spray 2 sprays into both nostrils daily (Patient taking differently: Spray 2 sprays into both nostrils daily as needed ) 1 Package 0     indomethacin (INDOCIN) 50 MG capsule Take 1 capsule (50 mg) by mouth 3 times daily With food as needed 30 capsule 1     insulin glargine (BASAGLAR KWIKPEN) 100 UNIT/ML pen Inject 25 Units Subcutaneous At Bedtime 18 mL 1     insulin pen needle (B-D U/F) 31G X 5 MM miscellaneous by Device route See Admin Instructions Use pen needles daily or as directed. 100 each 1     lisinopril (PRINIVIL/ZESTRIL) 40 MG tablet Take 1 tablet (40 mg) by mouth daily 30 tablet 5     loratadine (CLARITIN) 10 MG tablet Take 10 mg by mouth daily.       sitagliptin (JANUVIA) 100 MG tablet Take 1 tablet (100 mg) by mouth daily 90 tablet 1     STATIN NOT PRESCRIBED, INTENTIONAL, Statin not prescribed intentionally due to Intolerance (with supporting documentation of trying a  "statin at least once within the last 5 years) 0 each 0     terazosin (HYTRIN) 10 MG capsule Take 1 capsule (10 mg) by mouth At Bedtime 90 capsule 2     TRAVATAN Z (TRAVATAN Z) 0.004 % ophthalmic solution Instill 1 drop by ophthalmic route every day into affected eye(s) in the evening       warfarin (COUMADIN) 5 MG tablet 2.5mg (1/2 pill) Mon, Fri and 5mg (1 pill) all other days or as directed by warfarin clinic 90 tablet 3     Allergies   Allergen Reactions     Atorvastatin      Other reaction(s): Other  Caused increased creatinine.      Atorvastatin Calcium Other (See Comments)     Lipitor  Increase CR     Iodine      Penicillins      Sulfa Drugs      Sulfa (sulfonamide antibiotics)     Sulfacetamide          Reviewed and updated as needed this visit by Provider         Review of Systems   ROS COMP: Constitutional, HEENT, cardiovascular, pulmonary, gi and gu systems are negative, except as otherwise noted.      Objective    /60 (BP Location: Left arm, Patient Position: Sitting, Cuff Size: Adult Regular)   Pulse 67   Temp 96.7  F (35.9  C) (Tympanic)   Resp 18   Ht 1.702 m (5' 7\")   Wt 104.6 kg (230 lb 9.6 oz)   SpO2 95%   BMI 36.12 kg/m    Body mass index is 36.12 kg/m .  Physical Exam   GENERAL: healthy, alert and no distress  PSYCH: mentation appears normal, affect normal/bright  Diabetic foot exam: normal DP and PT pulses, no trophic changes or ulcerative lesions and normal monofilament exam    Diagnostic Test Results:  Results for orders placed or performed in visit on 06/19/19   Lipid Profile   Result Value Ref Range    Cholesterol 150 <200 mg/dL    Triglycerides 140 <150 mg/dL    HDL Cholesterol 40 >39 mg/dL    LDL Cholesterol Calculated 82 <100 mg/dL    Non HDL Cholesterol 110 <130 mg/dL   Hemoglobin A1c   Result Value Ref Range    Hemoglobin A1C 7.4 (H) 0 - 5.6 %   Basic metabolic panel   Result Value Ref Range    Sodium 141 133 - 144 mmol/L    Potassium 4.1 3.4 - 5.3 mmol/L    Chloride 111 (H) " "94 - 109 mmol/L    Carbon Dioxide 25 20 - 32 mmol/L    Anion Gap 5 3 - 14 mmol/L    Glucose 162 (H) 70 - 99 mg/dL    Urea Nitrogen 18 7 - 30 mg/dL    Creatinine 1.50 (H) 0.66 - 1.25 mg/dL    GFR Estimate 45 (L) >60 mL/min/[1.73_m2]    GFR Estimate If Black 52 (L) >60 mL/min/[1.73_m2]    Calcium 8.8 8.5 - 10.1 mg/dL   INR point of care (  clinic ONLY)   Result Value Ref Range    INR Point of Care 2.8 (H) 0.86 - 1.14   Estimated Average Glucose   Result Value Ref Range    Estimated Average Glucose 166 mg/dL           Assessment & Plan     1. Type 2 diabetes mellitus with stage 3 chronic kidney disease, with long-term current use of insulin (H)  No changes to current medications.  Follow-up in 3 months, future lab orders will be placed.  - C FOOT EXAM  NO CHARGE    2. Hyperlipidemia, unspecified hyperlipidemia type  As above.    3. Benign essential hypertension  As above.       BMI:   Estimated body mass index is 36.12 kg/m  as calculated from the following:    Height as of this encounter: 1.702 m (5' 7\").    Weight as of this encounter: 104.6 kg (230 lb 9.6 oz).           FUTURE APPOINTMENTS:       - Follow-up visit in 3 months, sooner as needed.    Return in about 3 months (around 9/24/2019) for Diabetic Follow-up.    Lorelei Felix MD  [facility]      "

## 2019-06-19 ENCOUNTER — ANTICOAGULATION THERAPY VISIT (OUTPATIENT)
Dept: ANTICOAGULATION | Facility: OTHER | Age: 74
End: 2019-06-19
Attending: FAMILY MEDICINE
Payer: MEDICARE

## 2019-06-19 DIAGNOSIS — E11.22 TYPE 2 DIABETES MELLITUS WITH STAGE 3 CHRONIC KIDNEY DISEASE, WITH LONG-TERM CURRENT USE OF INSULIN (H): ICD-10-CM

## 2019-06-19 DIAGNOSIS — I82.409 ACUTE THROMBOEMBOLISM OF DEEP VEINS OF LOWER EXTREMITY (H): ICD-10-CM

## 2019-06-19 DIAGNOSIS — Z79.01 LONG TERM CURRENT USE OF ANTICOAGULANT THERAPY: ICD-10-CM

## 2019-06-19 DIAGNOSIS — I26.99 PULMONARY EMBOLISM AND INFARCTION (H): ICD-10-CM

## 2019-06-19 DIAGNOSIS — N18.30 TYPE 2 DIABETES MELLITUS WITH STAGE 3 CHRONIC KIDNEY DISEASE, WITH LONG-TERM CURRENT USE OF INSULIN (H): ICD-10-CM

## 2019-06-19 DIAGNOSIS — I82.409 ACUTE THROMBOEMBOLISM OF DEEP VEINS OF LOWER EXTREMITY, UNSPECIFIED LATERALITY (H): ICD-10-CM

## 2019-06-19 DIAGNOSIS — Z79.4 TYPE 2 DIABETES MELLITUS WITH STAGE 3 CHRONIC KIDNEY DISEASE, WITH LONG-TERM CURRENT USE OF INSULIN (H): ICD-10-CM

## 2019-06-19 DIAGNOSIS — I10 BENIGN ESSENTIAL HYPERTENSION: ICD-10-CM

## 2019-06-19 DIAGNOSIS — E78.5 HYPERLIPIDEMIA, UNSPECIFIED HYPERLIPIDEMIA TYPE: ICD-10-CM

## 2019-06-19 LAB
ANION GAP SERPL CALCULATED.3IONS-SCNC: 5 MMOL/L (ref 3–14)
BUN SERPL-MCNC: 18 MG/DL (ref 7–30)
CALCIUM SERPL-MCNC: 8.8 MG/DL (ref 8.5–10.1)
CHLORIDE SERPL-SCNC: 111 MMOL/L (ref 94–109)
CHOLEST SERPL-MCNC: 150 MG/DL
CO2 SERPL-SCNC: 25 MMOL/L (ref 20–32)
CREAT SERPL-MCNC: 1.5 MG/DL (ref 0.66–1.25)
EST. AVERAGE GLUCOSE BLD GHB EST-MCNC: 166 MG/DL
GFR SERPL CREATININE-BSD FRML MDRD: 45 ML/MIN/{1.73_M2}
GLUCOSE SERPL-MCNC: 162 MG/DL (ref 70–99)
HBA1C MFR BLD: 7.4 % (ref 0–5.6)
HDLC SERPL-MCNC: 40 MG/DL
INR BLD: 2.8 (ref 0.86–1.14)
LDLC SERPL CALC-MCNC: 82 MG/DL
NONHDLC SERPL-MCNC: 110 MG/DL
POTASSIUM SERPL-SCNC: 4.1 MMOL/L (ref 3.4–5.3)
SODIUM SERPL-SCNC: 141 MMOL/L (ref 133–144)
TRIGL SERPL-MCNC: 140 MG/DL

## 2019-06-19 PROCEDURE — 80061 LIPID PANEL: CPT | Mod: ZL | Performed by: FAMILY MEDICINE

## 2019-06-19 PROCEDURE — 80048 BASIC METABOLIC PNL TOTAL CA: CPT | Mod: ZL | Performed by: FAMILY MEDICINE

## 2019-06-19 PROCEDURE — 36415 COLL VENOUS BLD VENIPUNCTURE: CPT | Mod: ZL | Performed by: FAMILY MEDICINE

## 2019-06-19 PROCEDURE — 36416 COLLJ CAPILLARY BLOOD SPEC: CPT | Mod: ZL | Performed by: FAMILY MEDICINE

## 2019-06-19 PROCEDURE — 40000788 ZZHCL STATISTIC ESTIMATED AVERAGE GLUCOSE: Mod: ZL | Performed by: FAMILY MEDICINE

## 2019-06-19 PROCEDURE — 83036 HEMOGLOBIN GLYCOSYLATED A1C: CPT | Mod: ZL | Performed by: FAMILY MEDICINE

## 2019-06-19 PROCEDURE — 85610 PROTHROMBIN TIME: CPT | Mod: QW,ZL | Performed by: FAMILY MEDICINE

## 2019-06-19 NOTE — PROGRESS NOTES
ANTICOAGULATION FOLLOW-UP CLINIC VISIT    Patient Name:  Clement Voss  Date:  2019  Contact Type:  Telephone    SUBJECTIVE:  Patient Findings     Comments:   INR done by lab. Call placed to patient and spoke to him re: INR result, warfarin dosing/INR recheck date. He verbalized understanding of instruction and has no questions. He denies any bleeding/bruising and has no new changes in diet/meds/activity.         Clinical Outcomes     Negatives:   Major bleeding event, Thromboembolic event, Anticoagulation-related hospital admission, Anticoagulation-related ED visit, Anticoagulation-related fatality    Comments:   INR done by lab. Call placed to patient and spoke to him re: INR result, warfarin dosing/INR recheck date. He verbalized understanding of instruction and has no questions. He denies any bleeding/bruising and has no new changes in diet/meds/activity.            OBJECTIVE    INR Point of Care   Date Value Ref Range Status   2019 2.8 (H) 0.86 - 1.14 Final     Comment:     This test is intended for monitoring Coumadin therapy.  Results are not   accurate in patients with prolonged INR due to factor deficiency.         ASSESSMENT / PLAN  INR assessment THER    Recheck INR In: 6 WEEKS    INR Location Clinic      Anticoagulation Summary  As of 2019    INR goal:   2.0-3.0   TTR:   78.8 % (2.8 y)   INR used for dosin.8 (2019)   Warfarin maintenance plan:   2.5 mg (5 mg x 0.5) every Mon, Fri; 5 mg (5 mg x 1) all other days   Full warfarin instructions:   2.5 mg every Mon, Fri; 5 mg all other days   Weekly warfarin total:   30 mg   No change documented:   Jodie Elena RN   Plan last modified:   Jodie Elena RN (2018)   Next INR check:   2019   Priority:   INR   Target end date:   Indefinite    Indications    Pulmonary embolism and infarction (H) [I26.99]  Acute thromboembolism of deep veins of lower extremity (H) [I82.409]  Long-term (current) use of anticoagulants [Z79.01]  [Z79.01]             Anticoagulation Episode Summary     INR check location:       Preferred lab:       Send INR reminders to:   [facility] ANTICOAG POOL    Comments:         Anticoagulation Care Providers     Provider Role Specialty Phone number    Lorelei Felix MD [facility] [phone]            See the Encounter Report to view Anticoagulation Flowsheet and Dosing Calendar (Go to Encounters tab in chart review, and find the Anticoagulation Therapy Visit)        Jodie Elena RN

## 2019-06-24 ENCOUNTER — OFFICE VISIT (OUTPATIENT)
Dept: FAMILY MEDICINE | Facility: OTHER | Age: 74
End: 2019-06-24
Attending: FAMILY MEDICINE
Payer: COMMERCIAL

## 2019-06-24 VITALS
TEMPERATURE: 96.7 F | HEIGHT: 67 IN | SYSTOLIC BLOOD PRESSURE: 104 MMHG | BODY MASS INDEX: 36.19 KG/M2 | RESPIRATION RATE: 18 BRPM | DIASTOLIC BLOOD PRESSURE: 60 MMHG | HEART RATE: 67 BPM | WEIGHT: 230.6 LBS | OXYGEN SATURATION: 95 %

## 2019-06-24 DIAGNOSIS — E78.5 HYPERLIPIDEMIA, UNSPECIFIED HYPERLIPIDEMIA TYPE: ICD-10-CM

## 2019-06-24 DIAGNOSIS — N18.30 TYPE 2 DIABETES MELLITUS WITH STAGE 3 CHRONIC KIDNEY DISEASE, WITH LONG-TERM CURRENT USE OF INSULIN (H): Primary | ICD-10-CM

## 2019-06-24 DIAGNOSIS — Z79.4 TYPE 2 DIABETES MELLITUS WITH STAGE 3 CHRONIC KIDNEY DISEASE, WITH LONG-TERM CURRENT USE OF INSULIN (H): Primary | ICD-10-CM

## 2019-06-24 DIAGNOSIS — I10 BENIGN ESSENTIAL HYPERTENSION: ICD-10-CM

## 2019-06-24 DIAGNOSIS — E11.22 TYPE 2 DIABETES MELLITUS WITH STAGE 3 CHRONIC KIDNEY DISEASE, WITH LONG-TERM CURRENT USE OF INSULIN (H): Primary | ICD-10-CM

## 2019-06-24 PROCEDURE — 99214 OFFICE O/P EST MOD 30 MIN: CPT | Performed by: FAMILY MEDICINE

## 2019-06-24 PROCEDURE — G0463 HOSPITAL OUTPT CLINIC VISIT: HCPCS

## 2019-06-24 ASSESSMENT — PATIENT HEALTH QUESTIONNAIRE - PHQ9
5. POOR APPETITE OR OVEREATING: NOT AT ALL
SUM OF ALL RESPONSES TO PHQ QUESTIONS 1-9: 0

## 2019-06-24 ASSESSMENT — MIFFLIN-ST. JEOR: SCORE: 1739.62

## 2019-06-24 ASSESSMENT — ANXIETY QUESTIONNAIRES
GAD7 TOTAL SCORE: 0
3. WORRYING TOO MUCH ABOUT DIFFERENT THINGS: NOT AT ALL
6. BECOMING EASILY ANNOYED OR IRRITABLE: NOT AT ALL
1. FEELING NERVOUS, ANXIOUS, OR ON EDGE: NOT AT ALL
7. FEELING AFRAID AS IF SOMETHING AWFUL MIGHT HAPPEN: NOT AT ALL
5. BEING SO RESTLESS THAT IT IS HARD TO SIT STILL: NOT AT ALL
2. NOT BEING ABLE TO STOP OR CONTROL WORRYING: NOT AT ALL
IF YOU CHECKED OFF ANY PROBLEMS ON THIS QUESTIONNAIRE, HOW DIFFICULT HAVE THESE PROBLEMS MADE IT FOR YOU TO DO YOUR WORK, TAKE CARE OF THINGS AT HOME, OR GET ALONG WITH OTHER PEOPLE: NOT DIFFICULT AT ALL

## 2019-06-24 ASSESSMENT — PAIN SCALES - GENERAL: PAINLEVEL: MILD PAIN (2)

## 2019-06-24 NOTE — NURSING NOTE
"Chief Complaint   Patient presents with     Diabetes     Lipids     Hypertension       Initial /60 (BP Location: Left arm, Patient Position: Sitting, Cuff Size: Adult Regular)   Pulse 67   Temp 96.7  F (35.9  C) (Tympanic)   Resp 18   Ht 1.702 m (5' 7\")   Wt 104.6 kg (230 lb 9.6 oz)   SpO2 95%   BMI 36.12 kg/m   Estimated body mass index is 36.12 kg/m  as calculated from the following:    Height as of this encounter: 1.702 m (5' 7\").    Weight as of this encounter: 104.6 kg (230 lb 9.6 oz).  Medication Reconciliation: complete      "

## 2019-06-25 ASSESSMENT — ANXIETY QUESTIONNAIRES: GAD7 TOTAL SCORE: 0

## 2019-07-02 ENCOUNTER — TRANSFERRED RECORDS (OUTPATIENT)
Dept: HEALTH INFORMATION MANAGEMENT | Facility: CLINIC | Age: 74
End: 2019-07-02

## 2019-07-03 ENCOUNTER — ANTICOAGULATION THERAPY VISIT (OUTPATIENT)
Dept: ANTICOAGULATION | Facility: OTHER | Age: 74
End: 2019-07-03

## 2019-07-03 DIAGNOSIS — I26.99 PULMONARY EMBOLISM AND INFARCTION (H): ICD-10-CM

## 2019-07-03 DIAGNOSIS — I82.409 ACUTE THROMBOEMBOLISM OF DEEP VEINS OF LOWER EXTREMITY (H): ICD-10-CM

## 2019-07-03 DIAGNOSIS — Z79.01 LONG TERM CURRENT USE OF ANTICOAGULANT THERAPY: ICD-10-CM

## 2019-07-03 NOTE — PROGRESS NOTES
ANTICOAGULATION FOLLOW-UP CLINIC VISIT    Patient Name:  Clement Voss  Date:  7/3/2019  Contact Type:  Telephone/ received message from patient and called back and left message on both patient and wife cellphone to call back    SUBJECTIVE:  Patient Findings     Comments:   Received a call from patient on 7/2 stating to call him about a surgery.  7/3 Nurse called and left messages on both his phone and wife's phone and waiting for a return call.  Patient returned my call and stated he had eye surger on his left eye 7/2 and 7/1 had right eye surgery.  He stated these were urgent surgeries and they just instructed him not to take coumadin on 7/2 and resume normal coumadin tonight 7/3.  Patient states no bleeding/bruising or changes to diet/meds/activity.  Educated patient on going in if any bleeding or pressure in the eyes and patient verbalized understanding.        Clinical Outcomes     Comments:   Received a call from patient on 7/2 stating to call him about a surgery.  7/3 Nurse called and left messages on both his phone and wife's phone and waiting for a return call.  Patient returned my call and stated he had eye surger on his left eye 7/2 and 7/1 had right eye surgery.  He stated these were urgent surgeries and they just instructed him not to take coumadin on 7/2 and resume normal coumadin tonight 7/3.  Patient states no bleeding/bruising or changes to diet/meds/activity.  Educated patient on going in if any bleeding or pressure in the eyes and patient verbalized understanding.           OBJECTIVE    INR Point of Care   Date Value Ref Range Status   06/19/2019 2.8 (H) 0.86 - 1.14 Final     Comment:     This test is intended for monitoring Coumadin therapy.  Results are not   accurate in patients with prolonged INR due to factor deficiency.         ASSESSMENT / PLAN  No question data found.  Anticoagulation Summary  As of 7/3/2019    INR goal:   2.0-3.0   TTR:   78.8 % (2.8 y)   INR used for dosing:   No new INR  was available at the time of this encounter.   Warfarin maintenance plan:   2.5 mg (5 mg x 0.5) every Mon, Fri; 5 mg (5 mg x 1) all other days   Full warfarin instructions:   2.5 mg every Mon, Fri; 5 mg all other days   Weekly warfarin total:   30 mg   No change documented:   Lisa Blandon RN   Plan last modified:   Jodie Elena RN (2/7/2018)   Next INR check:   7/17/2019   Priority:   INR   Target end date:   Indefinite    Indications    Pulmonary embolism and infarction (H) [I26.99]  Acute thromboembolism of deep veins of lower extremity (H) [I82.409]  Long-term (current) use of anticoagulants [Z79.01] [Z79.01]             Anticoagulation Episode Summary     INR check location:       Preferred lab:       Send INR reminders to:   [facility] ANTICOAG POOL    Comments:         Anticoagulation Care Providers     Provider Role Specialty Phone number    Lorelei Felix MD [facility] [phone]            See the Encounter Report to view Anticoagulation Flowsheet and Dosing Calendar (Go to Encounters tab in chart review, and find the Anticoagulation Therapy Visit)      Lisa Blandon RN

## 2019-07-23 ENCOUNTER — MYC REFILL (OUTPATIENT)
Dept: FAMILY MEDICINE | Facility: OTHER | Age: 74
End: 2019-07-23

## 2019-07-23 DIAGNOSIS — E11.9 DIABETES MELLITUS, TYPE 2 (H): ICD-10-CM

## 2019-07-25 ENCOUNTER — ANTICOAGULATION THERAPY VISIT (OUTPATIENT)
Dept: ANTICOAGULATION | Facility: OTHER | Age: 74
End: 2019-07-25

## 2019-07-25 DIAGNOSIS — Z79.01 LONG TERM CURRENT USE OF ANTICOAGULANT THERAPY: ICD-10-CM

## 2019-07-25 DIAGNOSIS — I26.99 PULMONARY EMBOLISM AND INFARCTION (H): ICD-10-CM

## 2019-07-25 DIAGNOSIS — I82.409 ACUTE THROMBOEMBOLISM OF DEEP VEINS OF LOWER EXTREMITY (H): ICD-10-CM

## 2019-07-25 NOTE — PROGRESS NOTES
ANTICOAGULATION FOLLOW-UP CLINIC VISIT    Patient Name:  Clement Voss  Date:  7/25/2019  Contact Type:  Telephone    SUBJECTIVE:  Patient Findings     Comments:   Call placed to patient as he missed last week INR appt.   I spoke to him re: getting to clinic next week for INR check. He states he will go to clinic for INR check. He was a little confused with appts. As his INR appt was rescheduled to 7/17/19 after he had eye surgery.          Clinical Outcomes     Negatives:   Major bleeding event, Thromboembolic event, Anticoagulation-related hospital admission, Anticoagulation-related ED visit, Anticoagulation-related fatality    Comments:   Call placed to patient as he missed last week INR appt.   I spoke to him re: getting to clinic next week for INR check. He states he will go to clinic for INR check. He was a little confused with appts. As his INR appt was rescheduled to 7/17/19 after he had eye surgery.             OBJECTIVE    INR Point of Care   Date Value Ref Range Status   06/19/2019 2.8 (H) 0.86 - 1.14 Final     Comment:     This test is intended for monitoring Coumadin therapy.  Results are not   accurate in patients with prolonged INR due to factor deficiency.         ASSESSMENT / PLAN  No question data found.  Anticoagulation Summary  As of 7/25/2019    INR goal:   2.0-3.0   TTR:   78.8 % (2.8 y)   INR used for dosing:   No new INR was available at the time of this encounter.   Warfarin maintenance plan:   2.5 mg (5 mg x 0.5) every Mon, Fri; 5 mg (5 mg x 1) all other days   Full warfarin instructions:   2.5 mg every Mon, Fri; 5 mg all other days   Weekly warfarin total:   30 mg   No change documented:   Jodie Elena RN   Plan last modified:   Jodie Elena RN (2/7/2018)   Next INR check:   8/2/2019   Priority:   INR   Target end date:   Indefinite    Indications    Pulmonary embolism and infarction (H) [I26.99]  Acute thromboembolism of deep veins of lower extremity (H) [I82.409]  Long-term  (current) use of anticoagulants [Z79.01] [Z79.01]             Anticoagulation Episode Summary     INR check location:       Preferred lab:       Send INR reminders to:   [facility] ANTICOAG POOL    Comments:         Anticoagulation Care Providers     Provider Role Specialty Phone number    Lorelei Felix MD [facility] [phone]            See the Encounter Report to view Anticoagulation Flowsheet and Dosing Calendar (Go to Encounters tab in chart review, and find the Anticoagulation Therapy Visit)        Jodie Elena RN

## 2019-07-31 ENCOUNTER — ANTICOAGULATION THERAPY VISIT (OUTPATIENT)
Dept: ANTICOAGULATION | Facility: OTHER | Age: 74
End: 2019-07-31
Attending: FAMILY MEDICINE
Payer: MEDICARE

## 2019-07-31 DIAGNOSIS — Z79.01 LONG TERM CURRENT USE OF ANTICOAGULANT THERAPY: ICD-10-CM

## 2019-07-31 DIAGNOSIS — I82.409 ACUTE THROMBOEMBOLISM OF DEEP VEINS OF LOWER EXTREMITY, UNSPECIFIED LATERALITY (H): ICD-10-CM

## 2019-07-31 DIAGNOSIS — I82.409 ACUTE THROMBOEMBOLISM OF DEEP VEINS OF LOWER EXTREMITY (H): ICD-10-CM

## 2019-07-31 DIAGNOSIS — I26.99 PULMONARY EMBOLISM AND INFARCTION (H): ICD-10-CM

## 2019-07-31 LAB — INR BLD: 2.8 (ref 0.86–1.14)

## 2019-07-31 PROCEDURE — 85610 PROTHROMBIN TIME: CPT | Mod: QW,ZL | Performed by: FAMILY MEDICINE

## 2019-07-31 PROCEDURE — 36416 COLLJ CAPILLARY BLOOD SPEC: CPT | Mod: ZL | Performed by: FAMILY MEDICINE

## 2019-07-31 NOTE — PROGRESS NOTES
ANTICOAGULATION FOLLOW-UP CLINIC VISIT    Patient Name:  Clement Voss  Date:  2019  Contact Type:  Telephone/ message left on voicemail    SUBJECTIVE:  Patient Findings     Comments:   INR done by lab. Call placed to patient and message left on voicemail re: INR result, warfarin dosing/INR recheck date. He is to call warfarin clinic if he has any bleeding/bruising, new changes in diet/meds/activity or questions.         Clinical Outcomes     Comments:   INR done by lab. Call placed to patient and message left on voicemail re: INR result, warfarin dosing/INR recheck date. He is to call warfarin clinic if he has any bleeding/bruising, new changes in diet/meds/activity or questions.            OBJECTIVE    INR Point of Care   Date Value Ref Range Status   2019 2.8 (H) 0.86 - 1.14 Final     Comment:     This test is intended for monitoring Coumadin therapy.  Results are not   accurate in patients with prolonged INR due to factor deficiency.         ASSESSMENT / PLAN  INR assessment THER    Recheck INR In: 6 WEEKS    INR Location Clinic      Anticoagulation Summary  As of 2019    INR goal:   2.0-3.0   TTR:   79.7 % (3 y)   INR used for dosin.8 (2019)   Warfarin maintenance plan:   2.5 mg (5 mg x 0.5) every Mon, Fri; 5 mg (5 mg x 1) all other days   Full warfarin instructions:   2.5 mg every Mon, Fri; 5 mg all other days   Weekly warfarin total:   30 mg   No change documented:   Jodie Elena RN   Plan last modified:   Jodie Elena RN (2018)   Next INR check:   2019   Priority:   INR   Target end date:   Indefinite    Indications    Pulmonary embolism and infarction (H) [I26.99]  Acute thromboembolism of deep veins of lower extremity (H) [I82.409]  Long-term (current) use of anticoagulants [Z79.01] [Z79.01]             Anticoagulation Episode Summary     INR check location:       Preferred lab:       Send INR reminders to:   [facility] ANTICOAG POOL    Comments:         Anticoagulation  Care Providers     Provider Role Specialty Phone number    Lorelei Felix MD [facility] [phone]            See the Encounter Report to view Anticoagulation Flowsheet and Dosing Calendar (Go to Encounters tab in chart review, and find the Anticoagulation Therapy Visit)        Jodie Elena RN

## 2019-08-16 ENCOUNTER — TRANSFERRED RECORDS (OUTPATIENT)
Dept: HEALTH INFORMATION MANAGEMENT | Facility: CLINIC | Age: 74
End: 2019-08-16

## 2019-08-16 ENCOUNTER — MYC REFILL (OUTPATIENT)
Dept: FAMILY MEDICINE | Facility: OTHER | Age: 74
End: 2019-08-16

## 2019-08-16 DIAGNOSIS — E78.5 HYPERLIPIDEMIA, UNSPECIFIED HYPERLIPIDEMIA TYPE: ICD-10-CM

## 2019-08-17 DIAGNOSIS — E78.5 HYPERLIPIDEMIA, UNSPECIFIED HYPERLIPIDEMIA TYPE: ICD-10-CM

## 2019-08-19 RX ORDER — FENOFIBRATE 48 MG/1
48 TABLET, COATED ORAL DAILY
Qty: 90 TABLET | Refills: 1 | Status: SHIPPED | OUTPATIENT
Start: 2019-08-19 | End: 2020-03-21

## 2019-08-20 RX ORDER — FENOFIBRATE 48 MG/1
TABLET, COATED ORAL
Qty: 90 TABLET | Refills: 0 | OUTPATIENT
Start: 2019-08-20

## 2019-09-06 ENCOUNTER — MYC REFILL (OUTPATIENT)
Dept: FAMILY MEDICINE | Facility: OTHER | Age: 74
End: 2019-09-06

## 2019-09-06 DIAGNOSIS — I10 BENIGN ESSENTIAL HYPERTENSION: ICD-10-CM

## 2019-09-06 RX ORDER — TERAZOSIN 10 MG/1
10 CAPSULE ORAL AT BEDTIME
Qty: 90 CAPSULE | Refills: 2 | Status: SHIPPED | OUTPATIENT
Start: 2019-09-06 | End: 2020-03-09

## 2019-09-11 ENCOUNTER — ANTICOAGULATION THERAPY VISIT (OUTPATIENT)
Dept: ANTICOAGULATION | Facility: OTHER | Age: 74
End: 2019-09-11
Attending: FAMILY MEDICINE
Payer: MEDICARE

## 2019-09-11 DIAGNOSIS — I82.409 ACUTE THROMBOEMBOLISM OF DEEP VEINS OF LOWER EXTREMITY (H): ICD-10-CM

## 2019-09-11 DIAGNOSIS — I26.99 PULMONARY EMBOLISM AND INFARCTION (H): ICD-10-CM

## 2019-09-11 DIAGNOSIS — I82.409 ACUTE THROMBOEMBOLISM OF DEEP VEINS OF LOWER EXTREMITY, UNSPECIFIED LATERALITY (H): ICD-10-CM

## 2019-09-11 DIAGNOSIS — Z79.01 LONG TERM CURRENT USE OF ANTICOAGULANT THERAPY: ICD-10-CM

## 2019-09-11 LAB — INR BLD: 2.8 (ref 0.86–1.14)

## 2019-09-11 PROCEDURE — 36416 COLLJ CAPILLARY BLOOD SPEC: CPT | Mod: ZL | Performed by: FAMILY MEDICINE

## 2019-09-11 PROCEDURE — 85610 PROTHROMBIN TIME: CPT | Mod: QW,ZL | Performed by: FAMILY MEDICINE

## 2019-09-20 ENCOUNTER — APPOINTMENT (OUTPATIENT)
Dept: LAB | Facility: OTHER | Age: 74
End: 2019-09-20
Attending: FAMILY MEDICINE
Payer: MEDICARE

## 2019-09-23 NOTE — PROGRESS NOTES
"Subjective     Clement Voss is a 74 year old male who presents to clinic today for the following health issues:    HPI   Diabetes Follow-up      How often are you checking your blood sugar? Two times daily    What time of day are you checking your blood sugars (select all that apply)?  Before and after meals    Have you had any blood sugars above 200?  Yes 448    Have you had any blood sugars below 70?  Yes 53    What symptoms do you notice when your blood sugar is low?  Shaky and Other: sweating and slurring words    What concerns do you have today about your diabetes? None and Other: \"hope it's okay\"     Do you have any of these symptoms? (Select all that apply)  Burning in feet     Have you had a diabetic eye exam in the last 12 months? Yes- Date of last eye exam: unknown    Diabetes Management Resources    Hyperlipidemia Follow-Up      Are you having any of the following symptoms? (Select all that apply)  Chest pain or pressure    Are you regularly taking any medication or supplement to lower your cholesterol?   Yes- fish oil    Are you having muscle aches or other side effects that you think could be caused by your cholesterol lowering medication?  No    Hypertension Follow-up      Do you check your blood pressure regularly outside of the clinic? No     Are you following a low salt diet? Yes    Are your blood pressures ever more than 140 on the top number (systolic) OR more   than 90 on the bottom number (diastolic), for example 140/90? No    BP Readings from Last 2 Encounters:   09/24/19 132/74   06/24/19 104/60     Hemoglobin A1C (%)   Date Value   09/24/2019 7.3 (H)   06/19/2019 7.4 (H)     LDL Cholesterol Calculated (mg/dL)   Date Value   06/19/2019 82   03/20/2019 98         How many servings of fruits and vegetables do you eat daily?  0-1    On average, how many sweetened beverages do you drink each day (soda, juice, sweet tea, etc)?   0    How many days per week do you miss taking your medication? " 0          Patient Active Problem List   Diagnosis     Type 2 diabetes mellitus with chronic kidney disease, with long-term current use of insulin (H)     Hyperlipidemia     Benign essential hypertension     Gout     Pulmonary embolism and infarction (H)     Acute thromboembolism of deep veins of lower extremity (H)     ACP (advance care planning)     Long-term (current) use of anticoagulants [Z79.01]     Morbid obesity (H)     CKD (chronic kidney disease) stage 3, GFR 30-59 ml/min (H)     Past Surgical History:   Procedure Laterality Date     APPENDECTOMY       CHOLECYSTECTOMY       COLONOSCOPY N/A 2019    Procedure: COLONOSCOPY;  Surgeon: Benito Saldana MD;  Location: [facility]     History of PE of right lung 3/2013[       PHACOEMULSIFICATION WITH STANDARD INTRAOCULAR LENS IMPLANT Left 2018    Procedure: PHACOEMULSIFICATION WITH STANDARD INTRAOCULAR LENS IMPLANT;  CATARACT EXTRACTION LEFT EYE WITH IMPLANT, ISTENT LEFT EYE;  Surgeon: Kush Almaraz MD;  Location: [facility]     PHACOEMULSIFICATION WITH STANDARD INTRAOCULAR LENS IMPLANT Right 5/3/2018    Procedure: PHACOEMULSIFICATION WITH STANDARD INTRAOCULAR LENS IMPLANT;  CATARACT EXTRACTION RIGHT EYE WITH IMPLANT,WITH ISTENT ATTEMPTED;  Surgeon: Kush Almaraz MD;  Location: [facility]     TRABECULAR MICRO-BYPASS WITH ISTENT Left 2018    Procedure: TRABECULAR MICRO-BYPASS WITH ISTENT;;  Surgeon: Kush Almaraz MD;  Location: [facility]     TRABECULAR MICRO-BYPASS WITH ISTENT  5/3/2018    Procedure: TRABECULAR MICRO-BYPASS WITH ISTENT;;  Surgeon: Kush Almaraz MD;  Location: [facility]       Social History     Tobacco Use     Smoking status: Former Smoker     Types: Cigarettes     Last attempt to quit: 1964     Years since quittin.1     Smokeless tobacco: Never Used   Substance Use Topics     Alcohol use: No     Family History   Problem Relation Age of Onset     Heart Disease Father 71        heart disease; cause of death      Other - See Comments Mother 79        old age; cause of death         Current Outpatient Medications   Medication Sig Dispense Refill     acetaminophen (TYLENOL) 500 MG tablet Take 1-2 tablets (500-1,000 mg) by mouth every 6 hours as needed       amLODIPine (NORVASC) 10 MG tablet TAKE 1 TABLET(10 MG) BY MOUTH DAILY 90 tablet 1     aspirin 81 MG EC tablet Take 1 tablet (81 mg) by mouth daily 90 tablet 3     blood glucose (ACCU-CHEK VLAD PLUS) test strip USE TO TEST BLOOD SUGARS THREE TIMES DAILY OR AS DIRECTED 200 strip 3     brimonidine-timolol (COMBIGAN) 0.2-0.5 % ophthalmic solution 1 drop 2 times daily Morning and evening, 12 hours apart       fenofibrate (TRICOR) 48 MG tablet Take 1 tablet (48 mg) by mouth daily 90 tablet 1     fish oil-omega-3 fatty acids (FISH OIL) 1000 MG capsule Take 1 capsule by mouth 3 times daily.       fluticasone (FLONASE) 50 MCG/ACT nasal spray Spray 2 sprays into both nostrils daily (Patient taking differently: Spray 2 sprays into both nostrils daily as needed ) 1 Package 0     indomethacin (INDOCIN) 50 MG capsule Take 1 capsule (50 mg) by mouth 3 times daily With food as needed 30 capsule 1     insulin glargine (BASAGLAR KWIKPEN) 100 UNIT/ML pen Inject 25 Units Subcutaneous At Bedtime 18 mL 1     insulin pen needle (B-D U/F) 31G X 5 MM miscellaneous by Device route See Admin Instructions Use pen needles daily or as directed. 100 each 1     lisinopril (PRINIVIL/ZESTRIL) 40 MG tablet TAKE 1 TABLET(40 MG) BY MOUTH DAILY 90 tablet 1     loratadine (CLARITIN) 10 MG tablet Take 10 mg by mouth daily.       sitagliptin (JANUVIA) 100 MG tablet Take 1 tablet (100 mg) by mouth daily 90 tablet 1     STATIN NOT PRESCRIBED, INTENTIONAL, Statin not prescribed intentionally due to Intolerance (with supporting documentation of trying a statin at least once within the last 5 years) 0 each 0     terazosin (HYTRIN) 10 MG capsule Take 1 capsule (10 mg) by mouth At Bedtime 90 capsule 2     TRAVATAN Z  "(TRAVATAN Z) 0.004 % ophthalmic solution Instill 1 drop by ophthalmic route every day into affected eye(s) in the evening       warfarin (COUMADIN) 5 MG tablet 2.5mg (1/2 pill) Mon, Fri and 5mg (1 pill) all other days or as directed by warfarin clinic 90 tablet 3     Allergies   Allergen Reactions     Atorvastatin      Other reaction(s): Other  Caused increased creatinine.      Atorvastatin Calcium Other (See Comments)     Lipitor  Increase CR     Barley Grass Unknown     Iodine      Penicillins      Procaine Unknown     Shellfish-Derived Products Unknown     Sulfa Drugs      Sulfa (sulfonamide antibiotics)     Sulfacetamide        Reviewed and updated as needed this visit by Provider         Review of Systems   ROS COMP: Constitutional, HEENT, cardiovascular, pulmonary, gi and gu systems are negative, except as otherwise noted.      Objective    /74 (BP Location: Right arm, Patient Position: Sitting, Cuff Size: Adult Regular)   Pulse 60   Temp 96.9  F (36.1  C) (Tympanic)   Resp 18   Wt 103.2 kg (227 lb 8 oz)   SpO2 96%   BMI 35.63 kg/m    Body mass index is 35.63 kg/m .  Physical Exam   GENERAL: healthy, alert and no distress  RESP: lungs clear to auscultation - no rales, rhonchi or wheezes  CV: regular rates and rhythm, normal S1 S2, no S3 or S4 and no murmur, click or rub  PSYCH: mentation appears normal, affect normal/bright    Diagnostic Test Results:  Drawn this morning        Assessment & Plan       ICD-10-CM    1. Type 2 diabetes mellitus with stage 3 chronic kidney disease, with long-term current use of insulin (H) E11.22     N18.3     Z79.4    2. Hyperlipidemia, unspecified hyperlipidemia type E78.5    3. Benign essential hypertension I10         BMI:   Estimated body mass index is 35.63 kg/m  as calculated from the following:    Height as of 6/24/19: 1.702 m (5' 7\").    Weight as of this encounter: 103.2 kg (227 lb 8 oz).           Return in about 3 months (around 12/24/2019) for Diabetic " Follow-up, Chronic Disease Management.    Lorelei Felix MD  [facility] [place] [facility]

## 2019-09-24 ENCOUNTER — OFFICE VISIT (OUTPATIENT)
Dept: FAMILY MEDICINE | Facility: OTHER | Age: 74
End: 2019-09-24
Attending: FAMILY MEDICINE
Payer: COMMERCIAL

## 2019-09-24 VITALS
RESPIRATION RATE: 18 BRPM | DIASTOLIC BLOOD PRESSURE: 74 MMHG | OXYGEN SATURATION: 96 % | BODY MASS INDEX: 35.63 KG/M2 | SYSTOLIC BLOOD PRESSURE: 132 MMHG | WEIGHT: 227.5 LBS | HEART RATE: 60 BPM | TEMPERATURE: 96.9 F

## 2019-09-24 DIAGNOSIS — E11.22 TYPE 2 DIABETES MELLITUS WITH STAGE 3 CHRONIC KIDNEY DISEASE, WITH LONG-TERM CURRENT USE OF INSULIN (H): Primary | ICD-10-CM

## 2019-09-24 DIAGNOSIS — Z79.4 TYPE 2 DIABETES MELLITUS WITH STAGE 3 CHRONIC KIDNEY DISEASE, WITH LONG-TERM CURRENT USE OF INSULIN (H): ICD-10-CM

## 2019-09-24 DIAGNOSIS — E78.5 HYPERLIPIDEMIA, UNSPECIFIED HYPERLIPIDEMIA TYPE: ICD-10-CM

## 2019-09-24 DIAGNOSIS — I10 BENIGN ESSENTIAL HYPERTENSION: ICD-10-CM

## 2019-09-24 DIAGNOSIS — N18.30 TYPE 2 DIABETES MELLITUS WITH STAGE 3 CHRONIC KIDNEY DISEASE, WITH LONG-TERM CURRENT USE OF INSULIN (H): ICD-10-CM

## 2019-09-24 DIAGNOSIS — Z79.4 TYPE 2 DIABETES MELLITUS WITH STAGE 3 CHRONIC KIDNEY DISEASE, WITH LONG-TERM CURRENT USE OF INSULIN (H): Primary | ICD-10-CM

## 2019-09-24 DIAGNOSIS — N18.30 TYPE 2 DIABETES MELLITUS WITH STAGE 3 CHRONIC KIDNEY DISEASE, WITH LONG-TERM CURRENT USE OF INSULIN (H): Primary | ICD-10-CM

## 2019-09-24 DIAGNOSIS — E11.22 TYPE 2 DIABETES MELLITUS WITH STAGE 3 CHRONIC KIDNEY DISEASE, WITH LONG-TERM CURRENT USE OF INSULIN (H): ICD-10-CM

## 2019-09-24 LAB
ALT SERPL W P-5'-P-CCNC: 28 U/L (ref 0–70)
ANION GAP SERPL CALCULATED.3IONS-SCNC: 6 MMOL/L (ref 3–14)
BUN SERPL-MCNC: 16 MG/DL (ref 7–30)
CALCIUM SERPL-MCNC: 9.2 MG/DL (ref 8.5–10.1)
CHLORIDE SERPL-SCNC: 114 MMOL/L (ref 94–109)
CHOLEST SERPL-MCNC: 150 MG/DL
CO2 SERPL-SCNC: 24 MMOL/L (ref 20–32)
CREAT SERPL-MCNC: 1.44 MG/DL (ref 0.66–1.25)
EST. AVERAGE GLUCOSE BLD GHB EST-MCNC: 163 MG/DL
GFR SERPL CREATININE-BSD FRML MDRD: 47 ML/MIN/{1.73_M2}
GLUCOSE SERPL-MCNC: 137 MG/DL (ref 70–99)
HBA1C MFR BLD: 7.3 % (ref 0–5.6)
HDLC SERPL-MCNC: 38 MG/DL
LDLC SERPL CALC-MCNC: 85 MG/DL
NONHDLC SERPL-MCNC: 112 MG/DL
POTASSIUM SERPL-SCNC: 4.2 MMOL/L (ref 3.4–5.3)
SODIUM SERPL-SCNC: 144 MMOL/L (ref 133–144)
TRIGL SERPL-MCNC: 134 MG/DL

## 2019-09-24 PROCEDURE — 80061 LIPID PANEL: CPT | Mod: ZL | Performed by: FAMILY MEDICINE

## 2019-09-24 PROCEDURE — 83036 HEMOGLOBIN GLYCOSYLATED A1C: CPT | Mod: ZL | Performed by: FAMILY MEDICINE

## 2019-09-24 PROCEDURE — 36415 COLL VENOUS BLD VENIPUNCTURE: CPT | Mod: ZL | Performed by: FAMILY MEDICINE

## 2019-09-24 PROCEDURE — 80048 BASIC METABOLIC PNL TOTAL CA: CPT | Mod: ZL | Performed by: FAMILY MEDICINE

## 2019-09-24 PROCEDURE — 84460 ALANINE AMINO (ALT) (SGPT): CPT | Mod: ZL | Performed by: FAMILY MEDICINE

## 2019-09-24 PROCEDURE — G0463 HOSPITAL OUTPT CLINIC VISIT: HCPCS

## 2019-09-24 PROCEDURE — 99214 OFFICE O/P EST MOD 30 MIN: CPT | Performed by: FAMILY MEDICINE

## 2019-09-24 PROCEDURE — 40000788 ZZHCL STATISTIC ESTIMATED AVERAGE GLUCOSE: Mod: ZL | Performed by: FAMILY MEDICINE

## 2019-09-24 ASSESSMENT — PAIN SCALES - GENERAL: PAINLEVEL: NO PAIN (0)

## 2019-10-04 ENCOUNTER — MYC REFILL (OUTPATIENT)
Dept: FAMILY MEDICINE | Facility: OTHER | Age: 74
End: 2019-10-04

## 2019-10-04 DIAGNOSIS — I10 BENIGN ESSENTIAL HYPERTENSION: ICD-10-CM

## 2019-10-07 RX ORDER — AMLODIPINE BESYLATE 10 MG/1
TABLET ORAL
Qty: 90 TABLET | Refills: 1 | Status: SHIPPED | OUTPATIENT
Start: 2019-10-07 | End: 2020-03-09

## 2019-10-11 ENCOUNTER — MYC REFILL (OUTPATIENT)
Dept: FAMILY MEDICINE | Facility: OTHER | Age: 74
End: 2019-10-11

## 2019-10-11 DIAGNOSIS — N18.30 TYPE 2 DIABETES MELLITUS WITH STAGE 3 CHRONIC KIDNEY DISEASE, WITH LONG-TERM CURRENT USE OF INSULIN (H): ICD-10-CM

## 2019-10-11 DIAGNOSIS — Z79.4 TYPE 2 DIABETES MELLITUS WITH STAGE 3 CHRONIC KIDNEY DISEASE, WITH LONG-TERM CURRENT USE OF INSULIN (H): ICD-10-CM

## 2019-10-11 DIAGNOSIS — E11.22 TYPE 2 DIABETES MELLITUS WITH STAGE 3 CHRONIC KIDNEY DISEASE, WITH LONG-TERM CURRENT USE OF INSULIN (H): ICD-10-CM

## 2019-10-11 NOTE — TELEPHONE ENCOUNTER
Januvia  Last Written Prescription Date: 4/15/19  Last Fill Quantity: 90 # of Refills: 1  Last Office Visit: 9/24/19

## 2019-10-23 ENCOUNTER — ANTICOAGULATION THERAPY VISIT (OUTPATIENT)
Dept: ANTICOAGULATION | Facility: OTHER | Age: 74
End: 2019-10-23
Attending: FAMILY MEDICINE
Payer: MEDICARE

## 2019-10-23 DIAGNOSIS — I26.99 PULMONARY EMBOLISM AND INFARCTION (H): ICD-10-CM

## 2019-10-23 DIAGNOSIS — Z79.01 LONG TERM CURRENT USE OF ANTICOAGULANT THERAPY: ICD-10-CM

## 2019-10-23 DIAGNOSIS — I82.409 ACUTE THROMBOEMBOLISM OF DEEP VEINS OF LOWER EXTREMITY (H): ICD-10-CM

## 2019-10-23 DIAGNOSIS — I82.409 ACUTE THROMBOEMBOLISM OF DEEP VEINS OF LOWER EXTREMITY, UNSPECIFIED LATERALITY (H): ICD-10-CM

## 2019-10-23 LAB — INR BLD: 2.6 (ref 0.86–1.14)

## 2019-10-23 PROCEDURE — 85610 PROTHROMBIN TIME: CPT | Mod: QW,ZL | Performed by: FAMILY MEDICINE

## 2019-10-23 PROCEDURE — 36416 COLLJ CAPILLARY BLOOD SPEC: CPT | Mod: ZL | Performed by: FAMILY MEDICINE

## 2019-10-23 NOTE — PROGRESS NOTES
ANTICOAGULATION FOLLOW-UP CLINIC VISIT    Patient Name:  Clement Voss  Date:  10/23/2019  Contact Type:  Telephone/ message left on voicemail    SUBJECTIVE:  Patient Findings     Comments:   INR done by lab. Call placed to patient and message left re: INR result, warfarin dosing/INR recheck date. He is to call warfarin clinic if he has any bleeding/bruising, new changes in diet/meds/activity or questions.         Clinical Outcomes     Negatives:   Major bleeding event, Thromboembolic event, Anticoagulation-related hospital admission, Anticoagulation-related ED visit, Anticoagulation-related fatality    Comments:   INR done by lab. Call placed to patient and message left re: INR result, warfarin dosing/INR recheck date. He is to call warfarin clinic if he has any bleeding/bruising, new changes in diet/meds/activity or questions.            OBJECTIVE    INR Point of Care   Date Value Ref Range Status   10/23/2019 2.6 (H) 0.86 - 1.14 Final     Comment:     This test is intended for monitoring Coumadin therapy.  Results are not   accurate in patients with prolonged INR due to factor deficiency.         ASSESSMENT / PLAN  INR assessment THER    Recheck INR In: 6 WEEKS    INR Location Clinic      Anticoagulation Summary  As of 10/23/2019    INR goal:   2.0-3.0   TTR:   81.1 % (3.2 y)   INR used for dosin.6 (10/23/2019)   Warfarin maintenance plan:   2.5 mg (5 mg x 0.5) every Mon, Fri; 5 mg (5 mg x 1) all other days   Full warfarin instructions:   2.5 mg every Mon, Fri; 5 mg all other days   Weekly warfarin total:   30 mg   No change documented:   Jodie Elena RN   Plan last modified:   Jodie Elena RN (2018)   Next INR check:   2019   Priority:   INR   Target end date:   Indefinite    Indications    Pulmonary embolism and infarction (H) [I26.99]  Acute thromboembolism of deep veins of lower extremity (H) [I82.409]  Long-term (current) use of anticoagulants [Z79.01] [Z79.01]              Anticoagulation Episode Summary     INR check location:       Preferred lab:       Send INR reminders to:   [facility] ANTICOAG POOL    Comments:         Anticoagulation Care Providers     Provider Role Specialty Phone number    Lorelei Felix MD [facility] [phone]            See the Encounter Report to view Anticoagulation Flowsheet and Dosing Calendar (Go to Encounters tab in chart review, and find the Anticoagulation Therapy Visit)        Jodie Elena RN

## 2019-11-19 ENCOUNTER — MYC MEDICAL ADVICE (OUTPATIENT)
Dept: FAMILY MEDICINE | Facility: OTHER | Age: 74
End: 2019-11-19

## 2019-12-04 ENCOUNTER — ANTICOAGULATION THERAPY VISIT (OUTPATIENT)
Dept: ANTICOAGULATION | Facility: OTHER | Age: 74
End: 2019-12-04
Attending: FAMILY MEDICINE
Payer: MEDICARE

## 2019-12-04 DIAGNOSIS — Z79.01 LONG TERM CURRENT USE OF ANTICOAGULANT THERAPY: ICD-10-CM

## 2019-12-04 DIAGNOSIS — I26.99 PULMONARY EMBOLISM AND INFARCTION (H): ICD-10-CM

## 2019-12-04 DIAGNOSIS — I82.409 ACUTE THROMBOEMBOLISM OF DEEP VEINS OF LOWER EXTREMITY, UNSPECIFIED LATERALITY (H): ICD-10-CM

## 2019-12-04 DIAGNOSIS — I82.409 ACUTE THROMBOEMBOLISM OF DEEP VEINS OF LOWER EXTREMITY (H): ICD-10-CM

## 2019-12-04 LAB
CAPILLARY BLOOD COLLECTION: NORMAL
INR BLD: 2.3 (ref 0.86–1.14)

## 2019-12-04 PROCEDURE — 36416 COLLJ CAPILLARY BLOOD SPEC: CPT | Mod: ZL | Performed by: FAMILY MEDICINE

## 2019-12-04 PROCEDURE — 85610 PROTHROMBIN TIME: CPT | Mod: QW,ZL | Performed by: FAMILY MEDICINE

## 2019-12-04 NOTE — PROGRESS NOTES
ANTICOAGULATION FOLLOW-UP CLINIC VISIT    Patient Name:  Clement Voss  Date:  12/4/2019  Contact Type:  Telephone/ message left on home voicemail re: warfarin dosing/INR recheck date    SUBJECTIVE:  Patient Findings     Comments:   Received INR results from lab. Call placed to patient and message left on home voicemail re: INR results, warfarin dosing/INR recheck date. Patient to return call to warfarin clinic with changes in bruising/bleeding, new changes in diet/meds/activity or questions.          Clinical Outcomes     Negatives:   Major bleeding event, Thromboembolic event, Anticoagulation-related hospital admission, Anticoagulation-related ED visit, Anticoagulation-related fatality    Comments:   Received INR results from lab. Call placed to patient and message left on home voicemail re: INR results, warfarin dosing/INR recheck date. Patient to return call to warfarin clinic with changes in bruising/bleeding, new changes in diet/meds/activity or questions.             OBJECTIVE    INR Point of Care   Date Value Ref Range Status   12/04/2019 2.3 (H) 0.86 - 1.14 Final     Comment:     This test is intended for monitoring Coumadin therapy.  Results are not   accurate in patients with prolonged INR due to factor deficiency.         ASSESSMENT / PLAN  INR assessment THER    Recheck INR In: 6 WEEKS    INR Location Clinic      Anticoagulation Summary  As of 12/4/2019    INR goal:   2.0-3.0   TTR:   90.5 % (1 y)   INR used for dosing:      Warfarin maintenance plan:   2.5 mg (5 mg x 0.5) every Mon, Fri; 5 mg (5 mg x 1) all other days   Full warfarin instructions:   2.5 mg every Mon, Fri; 5 mg all other days   Weekly warfarin total:   30 mg   No change documented:   Guille, Brenda, RN   Plan last modified:   Jodie Elena RN (2/7/2018)   Next INR check:   1/15/2020   Priority:   Maintenance   Target end date:   Indefinite    Indications    Pulmonary embolism and infarction (H) [I26.99]  Acute thromboembolism of deep  veins of lower extremity (H) [I82.409]  Long-term (current) use of anticoagulants [Z79.01] [Z79.01]             Anticoagulation Episode Summary     INR check location:       Preferred lab:       Send INR reminders to:   [facility] ANTICOAG POOL    Comments:         Anticoagulation Care Providers     Provider Role Specialty Phone number    Lorelei Felix MD [facility] [phone]            See the Encounter Report to view Anticoagulation Flowsheet and Dosing Calendar (Go to Encounters tab in chart review, and find the Anticoagulation Therapy Visit)        Brenda Han RN

## 2019-12-30 ENCOUNTER — TRANSFERRED RECORDS (OUTPATIENT)
Dept: HEALTH INFORMATION MANAGEMENT | Facility: CLINIC | Age: 74
End: 2019-12-30

## 2019-12-31 NOTE — PROGRESS NOTES
Subjective     Clement Voss is a 74 year old male who presents to clinic today for the following health issues:    HPI   Diabetes Follow-up    How often are you checking your blood sugar? Two times daily  Blood sugar testing frequency justification:  Patient modifying lifestyle changes (diet, exercise) with blood sugars  What time of day are you checking your blood sugars (select all that apply)?  Before and after meals  Have you had any blood sugars above 200?  No  Have you had any blood sugars below 70?  No    What symptoms do you notice when your blood sugar is low?  Shaky and Other: sweaty    What concerns do you have today about your diabetes? None     Do you have any of these symptoms? (Select all that apply)  No numbness or tingling in feet.  No redness, sores or blisters on feet.  No complaints of excessive thirst.  No reports of blurry vision.  No significant changes to weight.     Have you had a diabetic eye exam in the last 12 months? Yes- Date of last eye exam: July    Diabetes Management Resources    Hyperlipidemia Follow-Up      Are you regularly taking any medication or supplement to lower your cholesterol?   No    Are you having muscle aches or other side effects that you think could be caused by your cholesterol lowering medication?  No    Hypertension Follow-up      Do you check your blood pressure regularly outside of the clinic? No     Are you following a low salt diet? No    Are your blood pressures ever more than 140 on the top number (systolic) OR more   than 90 on the bottom number (diastolic), for example 140/90? No    BP Readings from Last 2 Encounters:   01/06/20 147/64   01/06/20 118/62     Hemoglobin A1C (%)   Date Value   01/02/2020 7.5 (H)   09/24/2019 7.3 (H)     LDL Cholesterol Calculated (mg/dL)   Date Value   01/02/2020 95   09/24/2019 85         How many servings of fruits and vegetables do you eat daily?  2-3    On average, how many sweetened beverages do you drink each day  (Examples: soda, juice, sweet tea, etc.  Do NOT count diet or artificially sweetened beverages)?   0    How many days per week do you miss taking your medication? 0      Chest Pain      Onset: weeks ago, intermittently present    Description (location/character/radiation/duration): pressure in chest mainly with activity (shoveling snow off roof, etc)    Intensity:  mild    Accompanying signs and symptoms:        Shortness of breath: YES       Sweating: no        Nausea/vomitting: maybe       Palpitations: no        Other (fevers/chills/cough/heartburn/lightheadedness): no     History (similar episodes/previous evaluation): Previous cardiac work-up, last time was a few years ago, all negative    Precipitating or alleviating factors:       Worse with exertion: YES       Worse with breathing: no        Related to eating: no        Better with burping: no     Therapies tried and outcome: None         Patient Active Problem List   Diagnosis     Type 2 diabetes mellitus with chronic kidney disease, with long-term current use of insulin (H)     Hyperlipidemia     Benign essential hypertension     Gout     Pulmonary embolism and infarction (H)     Acute thromboembolism of deep veins of lower extremity (H)     ACP (advance care planning)     Long-term (current) use of anticoagulants [Z79.01]     Morbid obesity (H)     CKD (chronic kidney disease) stage 3, GFR 30-59 ml/min (H)     Past Surgical History:   Procedure Laterality Date     APPENDECTOMY  2008     CHOLECYSTECTOMY  1984     COLONOSCOPY N/A 5/16/2019    Procedure: COLONOSCOPY;  Surgeon: Benito Saldana MD;  Location: [facility]     History of PE of right lung 3/2013[       PHACOEMULSIFICATION WITH STANDARD INTRAOCULAR LENS IMPLANT Left 4/19/2018    Procedure: PHACOEMULSIFICATION WITH STANDARD INTRAOCULAR LENS IMPLANT;  CATARACT EXTRACTION LEFT EYE WITH IMPLANT, ISTENT LEFT EYE;  Surgeon: Kush Almaraz MD;  Location: [facility]     PHACOEMULSIFICATION WITH STANDARD  INTRAOCULAR LENS IMPLANT Right 5/3/2018    Procedure: PHACOEMULSIFICATION WITH STANDARD INTRAOCULAR LENS IMPLANT;  CATARACT EXTRACTION RIGHT EYE WITH IMPLANT,WITH ISTENT ATTEMPTED;  Surgeon: Kush Almaraz MD;  Location: [facility]     TRABECULAR MICRO-BYPASS WITH ISTENT Left 2018    Procedure: TRABECULAR MICRO-BYPASS WITH ISTENT;;  Surgeon: Kush Almaraz MD;  Location: [facility]     TRABECULAR MICRO-BYPASS WITH ISTENT  5/3/2018    Procedure: TRABECULAR MICRO-BYPASS WITH ISTENT;;  Surgeon: Kush Almaraz MD;  Location: [facility]       Social History     Tobacco Use     Smoking status: Former Smoker     Types: Cigarettes     Last attempt to quit: 1964     Years since quittin.4     Smokeless tobacco: Never Used   Substance Use Topics     Alcohol use: No     Family History   Problem Relation Age of Onset     Heart Disease Father 71        heart disease; cause of death     Other - See Comments Mother 79        old age; cause of death         Current Outpatient Medications   Medication Sig Dispense Refill     acetaminophen (TYLENOL) 500 MG tablet Take 1-2 tablets (500-1,000 mg) by mouth every 6 hours as needed       amLODIPine (NORVASC) 10 MG tablet TAKE 1 TABLET(10 MG) BY MOUTH DAILY 90 tablet 1     aspirin 81 MG EC tablet Take 1 tablet (81 mg) by mouth daily 90 tablet 3     blood glucose (ACCU-CHEK VLAD PLUS) test strip USE TO TEST BLOOD SUGARS THREE TIMES DAILY OR AS DIRECTED 200 strip 3     brimonidine-timolol (COMBIGAN) 0.2-0.5 % ophthalmic solution 1 drop 2 times daily Morning and evening, 12 hours apart       fenofibrate (TRICOR) 48 MG tablet Take 1 tablet (48 mg) by mouth daily 90 tablet 1     fish oil-omega-3 fatty acids (FISH OIL) 1000 MG capsule Take 1 capsule by mouth 3 times daily.       fluticasone (FLONASE) 50 MCG/ACT nasal spray Spray 2 sprays into both nostrils daily (Patient taking differently: Spray 2 sprays into both nostrils daily as needed ) 1 Package 0     indomethacin (INDOCIN) 50  MG capsule Take 1 capsule (50 mg) by mouth 3 times daily With food as needed 30 capsule 1     insulin glargine (BASAGLAR KWIKPEN) 100 UNIT/ML pen Inject 25 Units Subcutaneous At Bedtime 24 mL 3     insulin pen needle (B-D U/F) 31G X 5 MM miscellaneous by Device route See Admin Instructions Use pen needles daily or as directed. 100 each 1     lisinopril (PRINIVIL/ZESTRIL) 40 MG tablet Take 1 tablet (40 mg) by mouth daily 90 tablet 3     loratadine (CLARITIN) 10 MG tablet Take 10 mg by mouth daily.       sitagliptin (JANUVIA) 100 MG tablet Take 1 tablet (100 mg) by mouth daily 90 tablet 1     STATIN NOT PRESCRIBED, INTENTIONAL, Statin not prescribed intentionally due to Intolerance (with supporting documentation of trying a statin at least once within the last 5 years) 0 each 0     terazosin (HYTRIN) 10 MG capsule Take 1 capsule (10 mg) by mouth At Bedtime 90 capsule 2     TRAVATAN Z (TRAVATAN Z) 0.004 % ophthalmic solution Instill 1 drop by ophthalmic route every day into affected eye(s) in the evening       warfarin (COUMADIN) 5 MG tablet 2.5mg (1/2 pill) Mon, Fri and 5mg (1 pill) all other days or as directed by warfarin clinic 90 tablet 3     furosemide (LASIX) 20 MG tablet Take 1 tablet (20 mg) by mouth daily for 10 doses 10 tablet 0     insulin glargine (BASAGLAR KWIKPEN) 100 UNIT/ML pen INJECT 25 UNITS SUBCUTANEOUSLY EVERY NIGHT AT BEDTIME 18 mL 1     Allergies   Allergen Reactions     Atorvastatin      Other reaction(s): Other  Caused increased creatinine.      Atorvastatin Calcium Other (See Comments)     Lipitor  Increase CR     Barley Grass Unknown     Iodine      Penicillins      Procaine Unknown     Shellfish-Derived Products Unknown     Sulfa Drugs      Sulfa (sulfonamide antibiotics)     Sulfacetamide        Reviewed and updated as needed this visit by Provider         Review of Systems   ROS COMP: Constitutional, HEENT, cardiovascular, pulmonary, gi and gu systems are negative, except as otherwise  "noted.      Objective    /62   Pulse 71   Temp 97.4  F (36.3  C) (Tympanic)   Ht 1.702 m (5' 7\")   Wt 107.9 kg (237 lb 12.8 oz)   SpO2 97%   BMI 37.24 kg/m    Body mass index is 37.24 kg/m .  Physical Exam   GENERAL: alert and no distress  PSYCH: mentation appears normal, affect normal/bright    Diagnostic Test Results:  Results for orders placed or performed during the hospital encounter of 01/06/20   CBC with platelets differential     Status: Abnormal   Result Value Ref Range    WBC 5.7 4.0 - 11.0 10e9/L    RBC Count 3.45 (L) 4.4 - 5.9 10e12/L    Hemoglobin 11.5 (L) 13.3 - 17.7 g/dL    Hematocrit 31.8 (L) 40.0 - 53.0 %    MCV 92 78 - 100 fl    MCH 33.3 (H) 26.5 - 33.0 pg    MCHC 36.2 31.5 - 36.5 g/dL    RDW 15.6 (H) 10.0 - 15.0 %    Platelet Count 106 (L) 150 - 450 10e9/L    Diff Method Automated Method     % Neutrophils 72.1 %    % Lymphocytes 11.4 %    % Monocytes 9.6 %    % Eosinophils 5.8 %    % Basophils 0.7 %    % Immature Granulocytes 0.4 %    Nucleated RBCs 0 0 /100    Absolute Neutrophil 4.1 1.6 - 8.3 10e9/L    Absolute Lymphocytes 0.7 (L) 0.8 - 5.3 10e9/L    Absolute Monocytes 0.6 0.0 - 1.3 10e9/L    Absolute Eosinophils 0.3 0.0 - 0.7 10e9/L    Absolute Basophils 0.0 0.0 - 0.2 10e9/L    Abs Immature Granulocytes 0.0 0 - 0.4 10e9/L    Absolute Nucleated RBC 0.0    Basic metabolic panel     Status: Abnormal   Result Value Ref Range    Sodium 142 133 - 144 mmol/L    Potassium 4.2 3.4 - 5.3 mmol/L    Chloride 115 (H) 94 - 109 mmol/L    Carbon Dioxide 23 20 - 32 mmol/L    Anion Gap 4 3 - 14 mmol/L    Glucose 124 (H) 70 - 99 mg/dL    Urea Nitrogen 20 7 - 30 mg/dL    Creatinine 1.45 (H) 0.66 - 1.25 mg/dL    GFR Estimate 47 (L) >60 mL/min/[1.73_m2]    GFR Estimate If Black 54 (L) >60 mL/min/[1.73_m2]    Calcium 8.9 8.5 - 10.1 mg/dL   INR     Status: Abnormal   Result Value Ref Range    INR 2.36 (H) 0.86 - 1.14   Troponin I     Status: None   Result Value Ref Range    Troponin I ES <0.015 0.000 - " 0.045 ug/L   NT pro BNP     Status: None   Result Value Ref Range    N-Terminal Pro BNP Inpatient 768 0 - 1,800 pg/mL   Troponin I (second draw)     Status: None   Result Value Ref Range    Troponin I ES <0.015 0.000 - 0.045 ug/L           Assessment & Plan     1. Type 2 diabetes mellitus with stage 3 chronic kidney disease, with long-term current use of insulin (H)  HgbA1c stable, patient will continue to work with diet.  - insulin glargine (BASAGLAR KWIKPEN) 100 UNIT/ML pen; Inject 25 Units Subcutaneous At Bedtime  Dispense: 24 mL; Refill: 3    2. Hyperlipidemia, unspecified hyperlipidemia type  No changes, patient does not tolerate statins.    3. Benign essential hypertension  No changes  - lisinopril (PRINIVIL/ZESTRIL) 40 MG tablet; Take 1 tablet (40 mg) by mouth daily  Dispense: 90 tablet; Refill: 3    4. Exertional chest pain  Stress test ordered.  - NM Lexiscan stress test; Future    5. Atypical chest pain  As above.  - NM Lexiscan stress test; Future    6. Shortness of breath  As above.  - NM Lexiscan stress test; Future       As patient was about to leave appointment, he admits he is having chest pressure right now.  He does have risk factors for heart disease.  He refuses ambulance transfer to [place] (closest facility) and his wife insists she will take him directly to [place].  Call is made to ER physician for doctor to doctor hand off.  Follow-up here as directed.      Over 45 minutes are spent with the patient and his wife, over 30 minutes of which was in education and counseling regarding current conditions and treatment/therapy options/risks/benefits/etc, including review of diabetic chronic condition labs, review of current symptoms, discussion of cardiac work-up, and then coordination of care with transfer to ER.      Return in about 3 months (around 4/6/2020) for Diabetic Follow-up.    Lorelei Felix MD  [facility]

## 2020-01-01 ENCOUNTER — MYC REFILL (OUTPATIENT)
Dept: FAMILY MEDICINE | Facility: OTHER | Age: 75
End: 2020-01-01

## 2020-01-01 DIAGNOSIS — Z79.4 TYPE 2 DIABETES MELLITUS WITH STAGE 3 CHRONIC KIDNEY DISEASE, WITH LONG-TERM CURRENT USE OF INSULIN (H): ICD-10-CM

## 2020-01-01 DIAGNOSIS — N18.30 TYPE 2 DIABETES MELLITUS WITH STAGE 3 CHRONIC KIDNEY DISEASE, WITH LONG-TERM CURRENT USE OF INSULIN (H): ICD-10-CM

## 2020-01-01 DIAGNOSIS — E11.22 TYPE 2 DIABETES MELLITUS WITH STAGE 3 CHRONIC KIDNEY DISEASE, WITH LONG-TERM CURRENT USE OF INSULIN (H): ICD-10-CM

## 2020-01-02 DIAGNOSIS — N18.30 TYPE 2 DIABETES MELLITUS WITH STAGE 3 CHRONIC KIDNEY DISEASE, WITH LONG-TERM CURRENT USE OF INSULIN (H): ICD-10-CM

## 2020-01-02 DIAGNOSIS — I26.99 PULMONARY EMBOLISM AND INFARCTION (H): ICD-10-CM

## 2020-01-02 DIAGNOSIS — I82.409 ACUTE THROMBOEMBOLISM OF DEEP VEINS OF LOWER EXTREMITY, UNSPECIFIED LATERALITY (H): ICD-10-CM

## 2020-01-02 DIAGNOSIS — Z79.01 LONG TERM CURRENT USE OF ANTICOAGULANT THERAPY: ICD-10-CM

## 2020-01-02 DIAGNOSIS — Z79.4 TYPE 2 DIABETES MELLITUS WITH STAGE 3 CHRONIC KIDNEY DISEASE, WITH LONG-TERM CURRENT USE OF INSULIN (H): ICD-10-CM

## 2020-01-02 DIAGNOSIS — E11.22 TYPE 2 DIABETES MELLITUS WITH STAGE 3 CHRONIC KIDNEY DISEASE, WITH LONG-TERM CURRENT USE OF INSULIN (H): ICD-10-CM

## 2020-01-02 DIAGNOSIS — E11.9 TYPE 2 DIABETES MELLITUS (H): Primary | ICD-10-CM

## 2020-01-02 PROCEDURE — 84443 ASSAY THYROID STIM HORMONE: CPT | Mod: ZL | Performed by: FAMILY MEDICINE

## 2020-01-02 PROCEDURE — 83036 HEMOGLOBIN GLYCOSYLATED A1C: CPT | Mod: ZL | Performed by: FAMILY MEDICINE

## 2020-01-02 PROCEDURE — 80048 BASIC METABOLIC PNL TOTAL CA: CPT | Mod: ZL | Performed by: FAMILY MEDICINE

## 2020-01-02 PROCEDURE — 36415 COLL VENOUS BLD VENIPUNCTURE: CPT | Mod: ZL | Performed by: FAMILY MEDICINE

## 2020-01-02 PROCEDURE — 80061 LIPID PANEL: CPT | Mod: ZL | Performed by: FAMILY MEDICINE

## 2020-01-02 PROCEDURE — 40000788 ZZHCL STATISTIC ESTIMATED AVERAGE GLUCOSE: Mod: ZL | Performed by: FAMILY MEDICINE

## 2020-01-03 DIAGNOSIS — E11.22 TYPE 2 DIABETES MELLITUS WITH STAGE 3 CHRONIC KIDNEY DISEASE, WITH LONG-TERM CURRENT USE OF INSULIN (H): Primary | ICD-10-CM

## 2020-01-03 DIAGNOSIS — Z79.4 TYPE 2 DIABETES MELLITUS WITH STAGE 3 CHRONIC KIDNEY DISEASE, WITH LONG-TERM CURRENT USE OF INSULIN (H): Primary | ICD-10-CM

## 2020-01-03 DIAGNOSIS — I10 BENIGN ESSENTIAL HYPERTENSION: ICD-10-CM

## 2020-01-03 DIAGNOSIS — E78.5 HYPERLIPIDEMIA, UNSPECIFIED HYPERLIPIDEMIA TYPE: ICD-10-CM

## 2020-01-03 DIAGNOSIS — N18.30 TYPE 2 DIABETES MELLITUS WITH STAGE 3 CHRONIC KIDNEY DISEASE, WITH LONG-TERM CURRENT USE OF INSULIN (H): Primary | ICD-10-CM

## 2020-01-03 LAB
ANION GAP SERPL CALCULATED.3IONS-SCNC: 4 MMOL/L (ref 3–14)
BUN SERPL-MCNC: 17 MG/DL (ref 7–30)
CALCIUM SERPL-MCNC: 8.8 MG/DL (ref 8.5–10.1)
CHLORIDE SERPL-SCNC: 114 MMOL/L (ref 94–109)
CHOLEST SERPL-MCNC: 154 MG/DL
CO2 SERPL-SCNC: 24 MMOL/L (ref 20–32)
CREAT SERPL-MCNC: 1.57 MG/DL (ref 0.66–1.25)
EST. AVERAGE GLUCOSE BLD GHB EST-MCNC: 169 MG/DL
GFR SERPL CREATININE-BSD FRML MDRD: 42 ML/MIN/{1.73_M2}
GLUCOSE SERPL-MCNC: 141 MG/DL (ref 70–99)
HBA1C MFR BLD: 7.5 % (ref 0–5.6)
HDLC SERPL-MCNC: 41 MG/DL
LDLC SERPL CALC-MCNC: 95 MG/DL
NONHDLC SERPL-MCNC: 113 MG/DL
POTASSIUM SERPL-SCNC: 4.2 MMOL/L (ref 3.4–5.3)
SODIUM SERPL-SCNC: 142 MMOL/L (ref 133–144)
TRIGL SERPL-MCNC: 88 MG/DL
TSH SERPL DL<=0.005 MIU/L-ACNC: 3.09 MU/L (ref 0.4–4)

## 2020-01-03 NOTE — TELEPHONE ENCOUNTER
insulin glargine (BASAGLAR KWIKPEN) 100 UNIT/ML pen  Last visit date with prescribing provider: 9-  Last refill date: 7-  Quantity: 18 mls , Refills: 1    Pt is out of insulin, needs now,    Thanks    Maryjane Salinas LPN      Next 5 appointments (look out 90 days)    Jan 06, 2020 10:15 AM CST  (Arrive by 10:00 AM)  SHORT with Lorelei Felix MD  [facility] ([facility] ) [address]  [facility] [address]  [phone]

## 2020-01-06 ENCOUNTER — ANTICOAGULATION THERAPY VISIT (OUTPATIENT)
Dept: ANTICOAGULATION | Facility: OTHER | Age: 75
End: 2020-01-06

## 2020-01-06 ENCOUNTER — OFFICE VISIT (OUTPATIENT)
Dept: FAMILY MEDICINE | Facility: OTHER | Age: 75
End: 2020-01-06
Attending: FAMILY MEDICINE
Payer: MEDICARE

## 2020-01-06 ENCOUNTER — APPOINTMENT (OUTPATIENT)
Dept: GENERAL RADIOLOGY | Facility: HOSPITAL | Age: 75
End: 2020-01-06
Attending: EMERGENCY MEDICINE
Payer: MEDICARE

## 2020-01-06 ENCOUNTER — HOSPITAL ENCOUNTER (EMERGENCY)
Facility: HOSPITAL | Age: 75
Discharge: HOME | End: 2020-01-06
Attending: EMERGENCY MEDICINE | Admitting: EMERGENCY MEDICINE
Payer: MEDICARE

## 2020-01-06 VITALS
OXYGEN SATURATION: 97 % | DIASTOLIC BLOOD PRESSURE: 62 MMHG | TEMPERATURE: 97.4 F | WEIGHT: 237.8 LBS | BODY MASS INDEX: 37.32 KG/M2 | HEART RATE: 71 BPM | SYSTOLIC BLOOD PRESSURE: 118 MMHG | HEIGHT: 67 IN

## 2020-01-06 VITALS
HEART RATE: 70 BPM | DIASTOLIC BLOOD PRESSURE: 64 MMHG | SYSTOLIC BLOOD PRESSURE: 147 MMHG | RESPIRATION RATE: 20 BRPM | TEMPERATURE: 98.1 F | OXYGEN SATURATION: 94 %

## 2020-01-06 DIAGNOSIS — I10 BENIGN ESSENTIAL HYPERTENSION: ICD-10-CM

## 2020-01-06 DIAGNOSIS — R07.9 CHEST PAIN, UNSPECIFIED TYPE: ICD-10-CM

## 2020-01-06 DIAGNOSIS — R06.02 SHORTNESS OF BREATH: ICD-10-CM

## 2020-01-06 DIAGNOSIS — N18.30 CKD (CHRONIC KIDNEY DISEASE) STAGE 3, GFR 30-59 ML/MIN (H): ICD-10-CM

## 2020-01-06 DIAGNOSIS — E78.5 HYPERLIPIDEMIA, UNSPECIFIED HYPERLIPIDEMIA TYPE: ICD-10-CM

## 2020-01-06 DIAGNOSIS — I26.99 PULMONARY EMBOLISM AND INFARCTION (H): ICD-10-CM

## 2020-01-06 DIAGNOSIS — R07.89 ATYPICAL CHEST PAIN: ICD-10-CM

## 2020-01-06 DIAGNOSIS — N18.30 TYPE 2 DIABETES MELLITUS WITH STAGE 3 CHRONIC KIDNEY DISEASE, WITH LONG-TERM CURRENT USE OF INSULIN (H): Primary | ICD-10-CM

## 2020-01-06 DIAGNOSIS — Z79.01 LONG TERM CURRENT USE OF ANTICOAGULANT THERAPY: ICD-10-CM

## 2020-01-06 DIAGNOSIS — I50.9 CONGESTIVE HEART FAILURE, UNSPECIFIED HF CHRONICITY, UNSPECIFIED HEART FAILURE TYPE (H): ICD-10-CM

## 2020-01-06 DIAGNOSIS — R07.9 EXERTIONAL CHEST PAIN: ICD-10-CM

## 2020-01-06 DIAGNOSIS — E11.22 TYPE 2 DIABETES MELLITUS WITH STAGE 3 CHRONIC KIDNEY DISEASE, WITH LONG-TERM CURRENT USE OF INSULIN (H): Primary | ICD-10-CM

## 2020-01-06 DIAGNOSIS — I82.409 ACUTE THROMBOEMBOLISM OF DEEP VEINS OF LOWER EXTREMITY (H): ICD-10-CM

## 2020-01-06 DIAGNOSIS — Z79.4 TYPE 2 DIABETES MELLITUS WITH STAGE 3 CHRONIC KIDNEY DISEASE, WITH LONG-TERM CURRENT USE OF INSULIN (H): Primary | ICD-10-CM

## 2020-01-06 LAB
ANION GAP SERPL CALCULATED.3IONS-SCNC: 4 MMOL/L (ref 3–14)
BASOPHILS # BLD AUTO: 0 10E9/L (ref 0–0.2)
BASOPHILS NFR BLD AUTO: 0.7 %
BUN SERPL-MCNC: 20 MG/DL (ref 7–30)
CALCIUM SERPL-MCNC: 8.9 MG/DL (ref 8.5–10.1)
CHLORIDE SERPL-SCNC: 115 MMOL/L (ref 94–109)
CO2 SERPL-SCNC: 23 MMOL/L (ref 20–32)
CREAT SERPL-MCNC: 1.45 MG/DL (ref 0.66–1.25)
DIFFERENTIAL METHOD BLD: ABNORMAL
EOSINOPHIL # BLD AUTO: 0.3 10E9/L (ref 0–0.7)
EOSINOPHIL NFR BLD AUTO: 5.8 %
ERYTHROCYTE [DISTWIDTH] IN BLOOD BY AUTOMATED COUNT: 15.6 % (ref 10–15)
GFR SERPL CREATININE-BSD FRML MDRD: 47 ML/MIN/{1.73_M2}
GLUCOSE SERPL-MCNC: 124 MG/DL (ref 70–99)
HCT VFR BLD AUTO: 31.8 % (ref 40–53)
HGB BLD-MCNC: 11.5 G/DL (ref 13.3–17.7)
IMM GRANULOCYTES # BLD: 0 10E9/L (ref 0–0.4)
IMM GRANULOCYTES NFR BLD: 0.4 %
INR PPP: 2.36 (ref 0.86–1.14)
LYMPHOCYTES # BLD AUTO: 0.7 10E9/L (ref 0.8–5.3)
LYMPHOCYTES NFR BLD AUTO: 11.4 %
MCH RBC QN AUTO: 33.3 PG (ref 26.5–33)
MCHC RBC AUTO-ENTMCNC: 36.2 G/DL (ref 31.5–36.5)
MCV RBC AUTO: 92 FL (ref 78–100)
MONOCYTES # BLD AUTO: 0.6 10E9/L (ref 0–1.3)
MONOCYTES NFR BLD AUTO: 9.6 %
NEUTROPHILS # BLD AUTO: 4.1 10E9/L (ref 1.6–8.3)
NEUTROPHILS NFR BLD AUTO: 72.1 %
NRBC # BLD AUTO: 0 10*3/UL
NRBC BLD AUTO-RTO: 0 /100
NT-PROBNP SERPL-MCNC: 768 PG/ML (ref 0–1800)
PLATELET # BLD AUTO: 106 10E9/L (ref 150–450)
POTASSIUM SERPL-SCNC: 4.2 MMOL/L (ref 3.4–5.3)
RBC # BLD AUTO: 3.45 10E12/L (ref 4.4–5.9)
SODIUM SERPL-SCNC: 142 MMOL/L (ref 133–144)
TROPONIN I SERPL-MCNC: <0.015 UG/L (ref 0–0.04)
TROPONIN I SERPL-MCNC: <0.015 UG/L (ref 0–0.04)
WBC # BLD AUTO: 5.7 10E9/L (ref 4–11)

## 2020-01-06 PROCEDURE — 85610 PROTHROMBIN TIME: CPT | Performed by: EMERGENCY MEDICINE

## 2020-01-06 PROCEDURE — 99215 OFFICE O/P EST HI 40 MIN: CPT | Performed by: FAMILY MEDICINE

## 2020-01-06 PROCEDURE — 83880 ASSAY OF NATRIURETIC PEPTIDE: CPT | Performed by: EMERGENCY MEDICINE

## 2020-01-06 PROCEDURE — 99285 EMERGENCY DEPT VISIT HI MDM: CPT | Mod: Z6 | Performed by: EMERGENCY MEDICINE

## 2020-01-06 PROCEDURE — 25000128 H RX IP 250 OP 636: Performed by: EMERGENCY MEDICINE

## 2020-01-06 PROCEDURE — G0463 HOSPITAL OUTPT CLINIC VISIT: HCPCS | Mod: 25

## 2020-01-06 PROCEDURE — 99285 EMERGENCY DEPT VISIT HI MDM: CPT | Mod: 25,27

## 2020-01-06 PROCEDURE — 96374 THER/PROPH/DIAG INJ IV PUSH: CPT

## 2020-01-06 PROCEDURE — 25000132 ZZH RX MED GY IP 250 OP 250 PS 637: Performed by: EMERGENCY MEDICINE

## 2020-01-06 PROCEDURE — 96375 TX/PRO/DX INJ NEW DRUG ADDON: CPT

## 2020-01-06 PROCEDURE — 36415 COLL VENOUS BLD VENIPUNCTURE: CPT | Performed by: EMERGENCY MEDICINE

## 2020-01-06 PROCEDURE — 71045 X-RAY EXAM CHEST 1 VIEW: CPT | Mod: TC

## 2020-01-06 PROCEDURE — 84484 ASSAY OF TROPONIN QUANT: CPT | Mod: 91 | Performed by: EMERGENCY MEDICINE

## 2020-01-06 PROCEDURE — 93010 ELECTROCARDIOGRAM REPORT: CPT | Performed by: INTERNAL MEDICINE

## 2020-01-06 PROCEDURE — 80048 BASIC METABOLIC PNL TOTAL CA: CPT | Performed by: EMERGENCY MEDICINE

## 2020-01-06 PROCEDURE — G0463 HOSPITAL OUTPT CLINIC VISIT: HCPCS

## 2020-01-06 PROCEDURE — 93005 ELECTROCARDIOGRAM TRACING: CPT

## 2020-01-06 PROCEDURE — 85025 COMPLETE CBC W/AUTO DIFF WBC: CPT | Performed by: EMERGENCY MEDICINE

## 2020-01-06 RX ORDER — INSULIN GLARGINE 100 [IU]/ML
25 INJECTION, SOLUTION SUBCUTANEOUS AT BEDTIME
Qty: 24 ML | Refills: 3 | Status: SHIPPED | OUTPATIENT
Start: 2020-01-06 | End: 2020-03-09

## 2020-01-06 RX ORDER — NITROGLYCERIN 0.4 MG/1
0.4 TABLET SUBLINGUAL EVERY 5 MIN PRN
Status: DISCONTINUED | OUTPATIENT
Start: 2020-01-06 | End: 2020-01-06 | Stop reason: HOSPADM

## 2020-01-06 RX ORDER — INSULIN GLARGINE 100 [IU]/ML
INJECTION, SOLUTION SUBCUTANEOUS
Qty: 18 ML | Refills: 1 | Status: SHIPPED | OUTPATIENT
Start: 2020-01-06 | End: 2020-01-21

## 2020-01-06 RX ORDER — LISINOPRIL 40 MG/1
40 TABLET ORAL DAILY
Qty: 90 TABLET | Refills: 3 | Status: SHIPPED | OUTPATIENT
Start: 2020-01-06 | End: 2020-03-09

## 2020-01-06 RX ORDER — MORPHINE SULFATE 2 MG/ML
2 INJECTION, SOLUTION INTRAMUSCULAR; INTRAVENOUS
Status: DISCONTINUED | OUTPATIENT
Start: 2020-01-06 | End: 2020-01-06 | Stop reason: HOSPADM

## 2020-01-06 RX ORDER — ASPIRIN 81 MG/1
243 TABLET, CHEWABLE ORAL ONCE
Status: COMPLETED | OUTPATIENT
Start: 2020-01-06 | End: 2020-01-06

## 2020-01-06 RX ORDER — FUROSEMIDE 10 MG/ML
20 INJECTION INTRAMUSCULAR; INTRAVENOUS ONCE
Status: COMPLETED | OUTPATIENT
Start: 2020-01-06 | End: 2020-01-06

## 2020-01-06 RX ORDER — FUROSEMIDE 20 MG
20 TABLET ORAL DAILY
Qty: 10 TABLET | Refills: 0 | Status: SHIPPED | OUTPATIENT
Start: 2020-01-06 | End: 2020-01-21

## 2020-01-06 RX ORDER — INSULIN GLARGINE 100 [IU]/ML
25 INJECTION, SOLUTION SUBCUTANEOUS AT BEDTIME
Qty: 18 ML | Refills: 1 | OUTPATIENT
Start: 2020-01-06

## 2020-01-06 RX ADMIN — FUROSEMIDE 20 MG: 10 INJECTION, SOLUTION INTRAMUSCULAR; INTRAVENOUS at 13:21

## 2020-01-06 RX ADMIN — ASPIRIN 81 MG 243 MG: 81 TABLET ORAL at 12:12

## 2020-01-06 RX ADMIN — NITROGLYCERIN 0.4 MG: 0.4 TABLET SUBLINGUAL at 12:15

## 2020-01-06 RX ADMIN — MORPHINE SULFATE 2 MG: 2 INJECTION, SOLUTION INTRAMUSCULAR; INTRAVENOUS at 12:32

## 2020-01-06 RX ADMIN — NITROGLYCERIN 0.4 MG: 0.4 TABLET SUBLINGUAL at 12:22

## 2020-01-06 ASSESSMENT — ENCOUNTER SYMPTOMS
PSYCHIATRIC NEGATIVE: 1
GASTROINTESTINAL NEGATIVE: 1
MUSCULOSKELETAL NEGATIVE: 1
EYES NEGATIVE: 1
NEUROLOGICAL NEGATIVE: 1
RESPIRATORY NEGATIVE: 1

## 2020-01-06 ASSESSMENT — ANXIETY QUESTIONNAIRES
2. NOT BEING ABLE TO STOP OR CONTROL WORRYING: NOT AT ALL
1. FEELING NERVOUS, ANXIOUS, OR ON EDGE: NOT AT ALL
7. FEELING AFRAID AS IF SOMETHING AWFUL MIGHT HAPPEN: NOT AT ALL
6. BECOMING EASILY ANNOYED OR IRRITABLE: NOT AT ALL
5. BEING SO RESTLESS THAT IT IS HARD TO SIT STILL: NOT AT ALL
4. TROUBLE RELAXING: NOT AT ALL
GAD7 TOTAL SCORE: 0
3. WORRYING TOO MUCH ABOUT DIFFERENT THINGS: NOT AT ALL

## 2020-01-06 ASSESSMENT — PAIN SCALES - GENERAL: PAINLEVEL: NO PAIN (0)

## 2020-01-06 ASSESSMENT — MIFFLIN-ST. JEOR: SCORE: 1772.28

## 2020-01-06 NOTE — TELEPHONE ENCOUNTER
insulin glargine (BASAGLAR KWIKPEN) 100 UNIT/ML pen  Last Written Prescription Date:  7/31/2019  Last Fill Quantity: 18mL,   # refills: 1  Last Office Visit: 6/24/2019  Future Office visit:    Next 5 appointments (look out 90 days)    Jan 06, 2020 10:15 AM CST  (Arrive by 10:00 AM)  SHORT with Lorelei Felix MD  [facility] ([facility] ) [address]  [place] [address]  [phone]

## 2020-01-06 NOTE — ED AVS SNAPSHOT
[facility]  [address]  [place] [address]  Phone:  [phone]                                    Clement Voss   MRN: [id number]    Department:  [facility]   Date of Visit:  1/6/2020           After Visit Summary Signature Page    I have received my discharge instructions, and my questions have been answered. I have discussed any challenges I see with this plan with the nurse or doctor.    ..........................................................................................................................................  Patient/Patient Representative Signature      ..........................................................................................................................................  Patient Representative Print Name and Relationship to Patient    ..................................................               ................................................  Date                                   Time    ..........................................................................................................................................  Reviewed by Signature/Title    ...................................................              ..............................................  Date                                               Time          22EPIC Rev 08/18

## 2020-01-06 NOTE — ED PROVIDER NOTES
"  History     Chief Complaint   Patient presents with     Chest Pain     c/o chest pain off and on for 6 months to a year. Gives vague responses when asked questions. Will not give timeline for symptoms. States, \"it's been for awhile.\" When asked for specific time states \"since the last time.\"      HPI  Clement Voss is a 74 year old male who presents with minor chest tightness this morning when awakening at 6 AM.  Patient has been complain about some intermittent chest pressure when doing physical activities such as snowblowing or pulling snow off his roof this winter.  Patient has had 2 previous cardiovascular work-ups including when age 29 with a negative angiography done then.  Patient also 3 years ago had a negative cardiac work-up.  Patient been a diabetic since  2004 and currently is on insulin.  Patient discontinued smoking in 1964.  Patient is currently on Coumadin because of a history of recurrent DVTs and PEs.  Patient has no nausea vomiting diarrhea weakness numbness or coordination problems.  Patient has no complaints about shortness of breath noted.    Allergies:  Allergies   Allergen Reactions     Atorvastatin      Other reaction(s): Other  Caused increased creatinine.      Atorvastatin Calcium Other (See Comments)     Lipitor  Increase CR     Barley Grass Unknown     Iodine      Penicillins      Procaine Unknown     Shellfish-Derived Products Unknown     Sulfa Drugs      Sulfa (sulfonamide antibiotics)     Sulfacetamide        Problem List: Reviewed  Patient Active Problem List    Diagnosis Date Noted     CKD (chronic kidney disease) stage 3, GFR 30-59 ml/min (H) 09/24/2019     Priority: Medium     Morbid obesity (H) 05/26/2017     Priority: Medium     Long-term (current) use of anticoagulants [Z79.01] 08/04/2016     Priority: Medium     ACP (advance care planning) 03/14/2013     Priority: Medium     Advance Care Planning 8/26/2016: ACP Review of Chart / Resources Provided:  Reviewed chart for " advance care plan.  Clement Voss has no plan or code status on file. Discussed available resources and provided with information. Confirmed code status reflects current choices pending further ACP discussions.  Confirmed/documented legally designated decision makers.  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/2004     Priority: Medium        Past Medical History: Reviewed  Past Medical History:   Diagnosis Date     DVT (deep venous thrombosis) 9/24/2012     DVT (deep venous thrombosis) (H)      Gout, unspecified 1/10/2011     Other and unspecified hyperlipidemia 10/10/2005     Pulmonary embolism 9/24/2012     Pulmonary embolism (H)      Type II or unspecified type diabetes mellitus without mention of complication, not stated as uncontrolled 8/2/2004     Unspecified essential hypertension 1/3/2005       Past Surgical History: Reviewed distant regular chest is just the regular ones is my familiarity with Babar put in 1 week only  Past Surgical History:   Procedure Laterality Date     APPENDECTOMY  2008     CHOLECYSTECTOMY  1984     COLONOSCOPY N/A 5/16/2019    Procedure: COLONOSCOPY;  Surgeon: Benito Saldana MD;  Location: [facility]     History of PE of right lung 3/2013[       PHACOEMULSIFICATION WITH STANDARD INTRAOCULAR LENS IMPLANT Left 4/19/2018    Procedure: PHACOEMULSIFICATION WITH STANDARD INTRAOCULAR LENS IMPLANT;  CATARACT EXTRACTION LEFT EYE WITH IMPLANT, ISTENT LEFT EYE;  Surgeon: Kush Almaraz MD;  Location: [facility]     PHACOEMULSIFICATION WITH STANDARD INTRAOCULAR LENS IMPLANT  Right 5/3/2018    Procedure: PHACOEMULSIFICATION WITH STANDARD INTRAOCULAR LENS IMPLANT;  CATARACT EXTRACTION RIGHT EYE WITH IMPLANT,WITH ISTENT ATTEMPTED;  Surgeon: Kush Almaraz MD;  Location: [facility]     TRABECULAR MICRO-BYPASS WITH ISTENT Left 2018    Procedure: TRABECULAR MICRO-BYPASS WITH ISTENT;;  Surgeon: Kush Almaraz MD;  Location: [facility]     TRABECULAR MICRO-BYPASS WITH ISTENT  5/3/2018    Procedure: TRABECULAR MICRO-BYPASS WITH ISTENT;;  Surgeon: Kush Almaraz MD;  Location: [facility]       Family History:    Family History   Problem Relation Age of Onset     Heart Disease Father 71        heart disease; cause of death     Other - See Comments Mother 79        old age; cause of death       Social History:  Marital Status:   [2]  Social History     Tobacco Use     Smoking status: Former Smoker     Types: Cigarettes     Last attempt to quit: 1964     Years since quittin.4     Smokeless tobacco: Never Used   Substance Use Topics     Alcohol use: No     Drug use: No        Medications:    furosemide (LASIX) 20 MG tablet  acetaminophen (TYLENOL) 500 MG tablet  amLODIPine (NORVASC) 10 MG tablet  aspirin 81 MG EC tablet  blood glucose (ACCU-CHEK VLAD PLUS) test strip  brimonidine-timolol (COMBIGAN) 0.2-0.5 % ophthalmic solution  fenofibrate (TRICOR) 48 MG tablet  fish oil-omega-3 fatty acids (FISH OIL) 1000 MG capsule  fluticasone (FLONASE) 50 MCG/ACT nasal spray  indomethacin (INDOCIN) 50 MG capsule  insulin glargine (BASAGLAR KWIKPEN) 100 UNIT/ML pen  insulin pen needle (B-D U/F) 31G X 5 MM miscellaneous  lisinopril (PRINIVIL/ZESTRIL) 40 MG tablet  loratadine (CLARITIN) 10 MG tablet  sitagliptin (JANUVIA) 100 MG tablet  STATIN NOT PRESCRIBED, INTENTIONAL,  terazosin (HYTRIN) 10 MG capsule  TRAVATAN Z (TRAVATAN Z) 0.004 % ophthalmic solution  warfarin (COUMADIN) 5 MG tablet          Review of Systems   Constitutional:        See HPI.   HENT: Negative.    Eyes: Negative.     Respiratory: Negative.    Cardiovascular:        See HPI.   Gastrointestinal: Negative.    Genitourinary: Negative.    Musculoskeletal: Negative.    Neurological: Negative.    Psychiatric/Behavioral: Negative.        Physical Exam   BP: 159/69  Pulse: 73  Heart Rate: 74  Temp: 97.2  F (36.2  C)  Resp: 18  SpO2: 94 %      Physical Exam  Constitutional:       Appearance: He is well-developed.   HENT:      Head: Normocephalic and atraumatic.   Eyes:      Pupils: Pupils are equal, round, and reactive to light.   Cardiovascular:      Rate and Rhythm: Normal rate and regular rhythm.      Heart sounds: Normal heart sounds.   Pulmonary:      Effort: Pulmonary effort is normal.   Abdominal:      Palpations: Abdomen is soft.   Musculoskeletal: Normal range of motion.   Skin:     Capillary Refill: Capillary refill takes less than 2 seconds.   Neurological:      Mental Status: He is alert.         ED Course        Procedures               EKG shows normal sinus rhythm with normal QRS complex.    Results for orders placed or performed during the hospital encounter of 01/06/20 (from the past 24 hour(s))   CBC with platelets differential   Result Value Ref Range    WBC 5.7 4.0 - 11.0 10e9/L    RBC Count 3.45 (L) 4.4 - 5.9 10e12/L    Hemoglobin 11.5 (L) 13.3 - 17.7 g/dL    Hematocrit 31.8 (L) 40.0 - 53.0 %    MCV 92 78 - 100 fl    MCH 33.3 (H) 26.5 - 33.0 pg    MCHC 36.2 31.5 - 36.5 g/dL    RDW 15.6 (H) 10.0 - 15.0 %    Platelet Count 106 (L) 150 - 450 10e9/L    Diff Method Automated Method     % Neutrophils 72.1 %    % Lymphocytes 11.4 %    % Monocytes 9.6 %    % Eosinophils 5.8 %    % Basophils 0.7 %    % Immature Granulocytes 0.4 %    Nucleated RBCs 0 0 /100    Absolute Neutrophil 4.1 1.6 - 8.3 10e9/L    Absolute Lymphocytes 0.7 (L) 0.8 - 5.3 10e9/L    Absolute Monocytes 0.6 0.0 - 1.3 10e9/L    Absolute Eosinophils 0.3 0.0 - 0.7 10e9/L    Absolute Basophils 0.0 0.0 - 0.2 10e9/L    Abs Immature Granulocytes 0.0 0 - 0.4 10e9/L     Absolute Nucleated RBC 0.0    Basic metabolic panel   Result Value Ref Range    Sodium 142 133 - 144 mmol/L    Potassium 4.2 3.4 - 5.3 mmol/L    Chloride 115 (H) 94 - 109 mmol/L    Carbon Dioxide 23 20 - 32 mmol/L    Anion Gap 4 3 - 14 mmol/L    Glucose 124 (H) 70 - 99 mg/dL    Urea Nitrogen 20 7 - 30 mg/dL    Creatinine 1.45 (H) 0.66 - 1.25 mg/dL    GFR Estimate 47 (L) >60 mL/min/[1.73_m2]    GFR Estimate If Black 54 (L) >60 mL/min/[1.73_m2]    Calcium 8.9 8.5 - 10.1 mg/dL   INR   Result Value Ref Range    INR 2.36 (H) 0.86 - 1.14   Troponin I   Result Value Ref Range    Troponin I ES <0.015 0.000 - 0.045 ug/L   NT pro BNP   Result Value Ref Range    N-Terminal Pro BNP Inpatient 768 0 - 1,800 pg/mL   XR Chest Port 1 View    Narrative    PROCEDURE:  XR CHEST PORT 1 VW    HISTORY: chest pain. .    COMPARISON:  5/9/2019    FINDINGS:    The cardiomediastinal contours are unchanged. There is calcific aortic  atherosclerosis.   Multiple Kerley B-lines are present. There are increased interstitial  markings at the bases. Small amount of ill-defined basilar opacity is  also present.      Impression    IMPRESSION:  Findings most suggestive of interstitial pulmonary edema.  Recommend short interval follow-up.      JOSÉ RYAN MD   Troponin I (second draw)   Result Value Ref Range    Troponin I ES <0.015 0.000 - 0.045 ug/L       Medications   nitroGLYcerin (NITROSTAT) sublingual tablet 0.4 mg (0.4 mg Sublingual Given 1/6/20 1222)   morphine (PF) injection 2 mg (2 mg Intravenous Given 1/6/20 1232)   aspirin (ASA) chewable tablet 243 mg (243 mg Oral Given 1/6/20 1212)   furosemide (LASIX) injection 20 mg (20 mg Intravenous Given 1/6/20 1321)       Assessments & Plan (with Medical Decision Making)     I have reviewed the nursing notes.    I have reviewed the findings, diagnosis, plan and need for follow up with the patient.  After reviewing patient's chest x-ray is felt the patient did have mild pulmonary venous  congestion.  Patient was given Lasix 20 mg IV.  Patient be discharged on Lasix 20 mg p.o. every day.  With the second repeat troponin being negative patient be discharged with stress test ordered.  Actually within 1/2-hour after Lasix was given patient's chest slight discomfort that was still remaining was totally gone.  Patient had been given nitroglycerin x2 without relief of his initial chest pressure of a 1 or 2 on the pain scale.  Patient still had minimal pain left even after morphine.  Pain is totally gone after Lasix.    New Prescriptions    FUROSEMIDE (LASIX) 20 MG TABLET    Take 1 tablet (20 mg) by mouth daily for 10 doses       Final diagnoses:   Congestive heart failure, unspecified HF chronicity, unspecified heart failure type (H)   Chest pain, unspecified type       1/6/2020   [facility]     Tuan Guzman MD  01/06/20 2236

## 2020-01-06 NOTE — PROGRESS NOTES
Accessed chart to facilitate discharge needs as appropriate. HENNY Davison      (3) no apparent problem

## 2020-01-06 NOTE — NURSING NOTE
"Chief Complaint   Patient presents with     Diabetes     Lipids     Hypertension       Initial /62   Pulse 71   Temp 97.4  F (36.3  C) (Tympanic)   Ht 1.702 m (5' 7\")   Wt 107.9 kg (237 lb 12.8 oz)   SpO2 97%   BMI 37.24 kg/m   Estimated body mass index is 37.24 kg/m  as calculated from the following:    Height as of this encounter: 1.702 m (5' 7\").    Weight as of this encounter: 107.9 kg (237 lb 12.8 oz).  Medication Reconciliation: complete  Ruth Up LPN  "

## 2020-01-06 NOTE — PROGRESS NOTES
ANTICOAGULATION FOLLOW-UP CLINIC VISIT    Patient Name:  Clement Voss  Date:  2020  Contact Type:  Telephone/ message left on home voicemail re: warfarin dosing/INR recheck date    SUBJECTIVE:         OBJECTIVE    INR   Date Value Ref Range Status   2020 2.36 (H) 0.86 - 1.14 Final       ASSESSMENT / PLAN  INR assessment THER    Recheck INR In: 6 WEEKS    INR Location Clinic      Anticoagulation Summary  As of 2020    INR goal:   2.0-3.0   TTR:   90.5 % (1 y)   INR used for dosin.36 (2020)   Warfarin maintenance plan:   2.5 mg (5 mg x 0.5) every Mon, Fri; 5 mg (5 mg x 1) all other days   Full warfarin instructions:   2.5 mg every Mon, Fri; 5 mg all other days   Weekly warfarin total:   30 mg   No change documented:   Guille, Brenda, RN   Plan last modified:   Jodie Elena RN (2018)   Next INR check:   2020   Priority:   Maintenance   Target end date:   Indefinite    Indications    Pulmonary embolism and infarction (H) [I26.99]  Acute thromboembolism of deep veins of lower extremity (H) [I82.409]  Long-term (current) use of anticoagulants [Z79.01] [Z79.01]             Anticoagulation Episode Summary     INR check location:       Preferred lab:       Send INR reminders to:   [facility] ANTICOAG POOL    Comments:         Anticoagulation Care Providers     Provider Role Specialty Phone number    Lorelei Felix MD [facility] [phone]            See the Encounter Report to view Anticoagulation Flowsheet and Dosing Calendar (Go to Encounters tab in chart review, and find the Anticoagulation Therapy Visit)        Brenda Han, ROBI

## 2020-01-07 ASSESSMENT — ANXIETY QUESTIONNAIRES: GAD7 TOTAL SCORE: 0

## 2020-01-10 ENCOUNTER — HOSPITAL ENCOUNTER (OUTPATIENT)
Dept: CARDIOLOGY | Facility: HOSPITAL | Age: 75
Setting detail: NUCLEAR MEDICINE
End: 2020-01-10
Attending: FAMILY MEDICINE
Payer: MEDICARE

## 2020-01-10 ENCOUNTER — HOSPITAL ENCOUNTER (OUTPATIENT)
Dept: NUCLEAR MEDICINE | Facility: HOSPITAL | Age: 75
Setting detail: NUCLEAR MEDICINE
Discharge: HOME | End: 2020-01-10
Attending: FAMILY MEDICINE | Admitting: FAMILY MEDICINE
Payer: MEDICARE

## 2020-01-10 ENCOUNTER — HOSPITAL ENCOUNTER (OUTPATIENT)
Dept: NUCLEAR MEDICINE | Facility: HOSPITAL | Age: 75
Setting detail: NUCLEAR MEDICINE
End: 2020-01-10
Attending: FAMILY MEDICINE
Payer: MEDICARE

## 2020-01-10 DIAGNOSIS — R06.02 SHORTNESS OF BREATH: ICD-10-CM

## 2020-01-10 DIAGNOSIS — R07.9 EXERTIONAL CHEST PAIN: ICD-10-CM

## 2020-01-10 DIAGNOSIS — R07.89 ATYPICAL CHEST PAIN: ICD-10-CM

## 2020-01-10 DIAGNOSIS — R94.39 ABNORMAL CARDIOVASCULAR STRESS TEST: Primary | ICD-10-CM

## 2020-01-10 LAB
CV BLOOD PRESSURE: 51 %
CV STRESS MAX HR HE: 77
NUC STRESS EJECTION FRACTION: 51 %
RATE PRESSURE PRODUCT: NORMAL
STRESS ECHO BASELINE DIASTOLIC HE: 55
STRESS ECHO BASELINE HR: 65
STRESS ECHO BASELINE SYSTOLIC BP: 140
STRESS ECHO CALCULATED PERCENT HR: 53 %
STRESS ECHO LAST STRESS DIASTOLIC BP: 50
STRESS ECHO LAST STRESS SYSTOLIC BP: 140
STRESS ECHO TARGET HR: 145

## 2020-01-10 PROCEDURE — A9500 TC99M SESTAMIBI: HCPCS | Performed by: RADIOLOGY

## 2020-01-10 PROCEDURE — 34300033 ZZH RX 343: Performed by: RADIOLOGY

## 2020-01-10 PROCEDURE — 25000128 H RX IP 250 OP 636: Performed by: INTERNAL MEDICINE

## 2020-01-10 PROCEDURE — 93016 CV STRESS TEST SUPVJ ONLY: CPT | Performed by: INTERNAL MEDICINE

## 2020-01-10 PROCEDURE — 78452 HT MUSCLE IMAGE SPECT MULT: CPT | Mod: TC

## 2020-01-10 PROCEDURE — 93018 CV STRESS TEST I&R ONLY: CPT | Performed by: INTERNAL MEDICINE

## 2020-01-10 PROCEDURE — 93017 CV STRESS TEST TRACING ONLY: CPT | Performed by: INTERNAL MEDICINE

## 2020-01-10 RX ORDER — AMINOPHYLLINE 25 MG/ML
INJECTION, SOLUTION INTRAVENOUS
Status: DISCONTINUED
Start: 2020-01-10 | End: 2020-01-10 | Stop reason: WASHOUT

## 2020-01-10 RX ORDER — REGADENOSON 0.08 MG/ML
0.4 INJECTION, SOLUTION INTRAVENOUS ONCE
Status: COMPLETED | OUTPATIENT
Start: 2020-01-10 | End: 2020-01-10

## 2020-01-10 RX ADMIN — REGADENOSON 0.4 MG: 0.08 INJECTION, SOLUTION INTRAVENOUS at 09:52

## 2020-01-10 RX ADMIN — Medication 10 MILLICURIE: at 07:24

## 2020-01-10 RX ADMIN — Medication 30 MILLICURIE: at 09:52

## 2020-01-10 ASSESSMENT — ANXIETY QUESTIONNAIRES
2. NOT BEING ABLE TO STOP OR CONTROL WORRYING: NOT AT ALL
1. FEELING NERVOUS, ANXIOUS, OR ON EDGE: NOT AT ALL
IF YOU CHECKED OFF ANY PROBLEMS ON THIS QUESTIONNAIRE, HOW DIFFICULT HAVE THESE PROBLEMS MADE IT FOR YOU TO DO YOUR WORK, TAKE CARE OF THINGS AT HOME, OR GET ALONG WITH OTHER PEOPLE: NOT DIFFICULT AT ALL
7. FEELING AFRAID AS IF SOMETHING AWFUL MIGHT HAPPEN: NOT AT ALL
6. BECOMING EASILY ANNOYED OR IRRITABLE: NOT AT ALL
5. BEING SO RESTLESS THAT IT IS HARD TO SIT STILL: NOT AT ALL
GAD7 TOTAL SCORE: 0
3. WORRYING TOO MUCH ABOUT DIFFERENT THINGS: NOT AT ALL

## 2020-01-10 ASSESSMENT — PATIENT HEALTH QUESTIONNAIRE - PHQ9
5. POOR APPETITE OR OVEREATING: NOT AT ALL
SUM OF ALL RESPONSES TO PHQ QUESTIONS 1-9: 2

## 2020-01-16 ENCOUNTER — ALLIED HEALTH/NURSE VISIT (OUTPATIENT)
Dept: FAMILY MEDICINE | Facility: OTHER | Age: 75
End: 2020-01-16
Attending: FAMILY MEDICINE
Payer: MEDICARE

## 2020-01-16 DIAGNOSIS — Z79.4 TYPE 2 DIABETES MELLITUS WITH STAGE 3 CHRONIC KIDNEY DISEASE, WITH LONG-TERM CURRENT USE OF INSULIN (H): Primary | ICD-10-CM

## 2020-01-16 DIAGNOSIS — E11.22 TYPE 2 DIABETES MELLITUS WITH STAGE 3 CHRONIC KIDNEY DISEASE, WITH LONG-TERM CURRENT USE OF INSULIN (H): Primary | ICD-10-CM

## 2020-01-16 DIAGNOSIS — N18.30 TYPE 2 DIABETES MELLITUS WITH STAGE 3 CHRONIC KIDNEY DISEASE, WITH LONG-TERM CURRENT USE OF INSULIN (H): Primary | ICD-10-CM

## 2020-01-16 PROCEDURE — G0463 HOSPITAL OUTPT CLINIC VISIT: HCPCS

## 2020-01-16 NOTE — PROGRESS NOTES
Spoke with pt and he will my chart us when he needs a refill of insulin.  Basaglar is not longer covered.  Pt was also scheduled for a hospital follow up and CDM follow up in April.

## 2020-01-21 ENCOUNTER — OFFICE VISIT (OUTPATIENT)
Dept: FAMILY MEDICINE | Facility: OTHER | Age: 75
End: 2020-01-21
Attending: FAMILY MEDICINE
Payer: COMMERCIAL

## 2020-01-21 VITALS
BODY MASS INDEX: 37.32 KG/M2 | HEART RATE: 72 BPM | SYSTOLIC BLOOD PRESSURE: 148 MMHG | DIASTOLIC BLOOD PRESSURE: 60 MMHG | HEIGHT: 67 IN | WEIGHT: 237.8 LBS | RESPIRATION RATE: 18 BRPM | OXYGEN SATURATION: 97 % | TEMPERATURE: 97.5 F

## 2020-01-21 DIAGNOSIS — R07.89 ATYPICAL CHEST PAIN: ICD-10-CM

## 2020-01-21 DIAGNOSIS — R94.39 ABNORMAL CARDIOVASCULAR STRESS TEST: Primary | ICD-10-CM

## 2020-01-21 DIAGNOSIS — R06.02 SHORTNESS OF BREATH: ICD-10-CM

## 2020-01-21 DIAGNOSIS — R07.9 EXERTIONAL CHEST PAIN: ICD-10-CM

## 2020-01-21 PROCEDURE — 99213 OFFICE O/P EST LOW 20 MIN: CPT | Performed by: FAMILY MEDICINE

## 2020-01-21 PROCEDURE — G0463 HOSPITAL OUTPT CLINIC VISIT: HCPCS

## 2020-01-21 ASSESSMENT — MIFFLIN-ST. JEOR: SCORE: 1772.28

## 2020-01-21 ASSESSMENT — PAIN SCALES - GENERAL: PAINLEVEL: NO PAIN (0)

## 2020-01-21 NOTE — NURSING NOTE
"Chief Complaint   Patient presents with     ER F/U       Initial BP (!) 148/60 (BP Location: Right arm, Patient Position: Sitting, Cuff Size: Adult Regular)   Pulse 72   Temp 97.5  F (36.4  C) (Tympanic)   Resp 18   Ht 1.702 m (5' 7\")   Wt 107.9 kg (237 lb 12.8 oz)   SpO2 97%   BMI 37.24 kg/m   Estimated body mass index is 37.24 kg/m  as calculated from the following:    Height as of this encounter: 1.702 m (5' 7\").    Weight as of this encounter: 107.9 kg (237 lb 12.8 oz).  Medication Reconciliation: complete  Tracey Blanco MA  "

## 2020-01-21 NOTE — PROGRESS NOTES
Subjective     Clement Voss is a 74 year old male who presents to clinic today for the following health issues:    HPI   ED/UC Followup:    Facility:  [facility]  Date of visit: 01/06/2020  Reason for visit: Chest pain  Current Status: Fatigued most of the time, chest pain when he went in the cold air, not problem blowing snow this weekend with the snowblower.    Please see last clinic note for details, patient had been experiencing intermittent exertional chest pressure.  He does have risk factors (diabetes, hypertension, hyperlipidemia, remote tobacco use, male, overweight).  He was having chest pressure in the clinic, and was sent to the ER.  Work-up was negative for acute coronary syndrome.  He did have a stress test, results are below.  Cardiology referral was ordered, patient is scheduled in [place] for mid February.    Conclusion         The nuclear stress test is abnormal.     There is a small area of infarction in the anteroseptal segment(s) of the left ventricle.     There is a large area of infarction in the apical, inferior and inferolateral segment(s) of the left ventricle associated with a small area of niko-infarct ischemia.     Left ventricular function is low normal.     The left ventricular ejection fraction at rest is 51%.  The left ventricular ejection fraction at stress is 51%.     Left ventricular enlargement is noted.     A prior study was conducted on 6/29/2007.  Prior images were unavailable for comparison review.              Patient Active Problem List   Diagnosis     Type 2 diabetes mellitus with chronic kidney disease, with long-term current use of insulin (H)     Hyperlipidemia     Benign essential hypertension     Gout     Pulmonary embolism and infarction (H)     Acute thromboembolism of deep veins of lower extremity (H)     ACP (advance care planning)     Long-term (current) use of anticoagulants [Z79.01]     Morbid obesity (H)     CKD (chronic kidney disease) stage 3, GFR 30-59  ml/min (H)     Past Surgical History:   Procedure Laterality Date     APPENDECTOMY       CHOLECYSTECTOMY       COLONOSCOPY N/A 2019    Procedure: COLONOSCOPY;  Surgeon: Benito Saldana MD;  Location: [facility]     History of PE of right lung 3/2013[       PHACOEMULSIFICATION WITH STANDARD INTRAOCULAR LENS IMPLANT Left 2018    Procedure: PHACOEMULSIFICATION WITH STANDARD INTRAOCULAR LENS IMPLANT;  CATARACT EXTRACTION LEFT EYE WITH IMPLANT, ISTENT LEFT EYE;  Surgeon: Kush Almaraz MD;  Location: [facility]     PHACOEMULSIFICATION WITH STANDARD INTRAOCULAR LENS IMPLANT Right 5/3/2018    Procedure: PHACOEMULSIFICATION WITH STANDARD INTRAOCULAR LENS IMPLANT;  CATARACT EXTRACTION RIGHT EYE WITH IMPLANT,WITH ISTENT ATTEMPTED;  Surgeon: Kush Almaraz MD;  Location[facility]     TRABECULAR MICRO-BYPASS WITH ISTENT Left 2018    Procedure: TRABECULAR MICRO-BYPASS WITH ISTENT;;  Surgeon: Kush Almaraz MD;  [facility]     TRABECULAR MICRO-BYPASS WITH ISTENT  5/3/2018    Procedure: TRABECULAR MICRO-BYPASS WITH ISTENT;;  Surgeon: Kush Almaraz MD;  Location: [facility]       Social History     Tobacco Use     Smoking status: Former Smoker     Types: Cigarettes     Last attempt to quit: 1964     Years since quittin.4     Smokeless tobacco: Never Used   Substance Use Topics     Alcohol use: No     Family History   Problem Relation Age of Onset     Heart Disease Father 71        heart disease; cause of death     Other - See Comments Mother 79        old age; cause of death         Current Outpatient Medications   Medication Sig Dispense Refill     acetaminophen (TYLENOL) 500 MG tablet Take 1-2 tablets (500-1,000 mg) by mouth every 6 hours as needed       amLODIPine (NORVASC) 10 MG tablet TAKE 1 TABLET(10 MG) BY MOUTH DAILY 90 tablet 1     aspirin 81 MG EC tablet Take 1 tablet (81 mg) by mouth daily 90 tablet 3     blood glucose (ACCU-CHEK VLAD PLUS) test strip USE TO TEST BLOOD SUGARS  THREE TIMES DAILY OR AS DIRECTED 200 strip 3     brimonidine-timolol (COMBIGAN) 0.2-0.5 % ophthalmic solution 1 drop 2 times daily Morning and evening, 12 hours apart       fenofibrate (TRICOR) 48 MG tablet Take 1 tablet (48 mg) by mouth daily 90 tablet 1     fish oil-omega-3 fatty acids (FISH OIL) 1000 MG capsule Take 1 capsule by mouth 3 times daily.       fluticasone (FLONASE) 50 MCG/ACT nasal spray Spray 2 sprays into both nostrils daily (Patient taking differently: Spray 2 sprays into both nostrils daily as needed ) 1 Package 0     indomethacin (INDOCIN) 50 MG capsule Take 1 capsule (50 mg) by mouth 3 times daily With food as needed 30 capsule 1     insulin glargine (BASAGLAR KWIKPEN) 100 UNIT/ML pen Inject 25 Units Subcutaneous At Bedtime 24 mL 3     insulin pen needle (B-D U/F) 31G X 5 MM miscellaneous by Device route See Admin Instructions Use pen needles daily or as directed. 100 each 1     lisinopril (PRINIVIL/ZESTRIL) 40 MG tablet Take 1 tablet (40 mg) by mouth daily 90 tablet 3     loratadine (CLARITIN) 10 MG tablet Take 10 mg by mouth daily.       sitagliptin (JANUVIA) 100 MG tablet Take 1 tablet (100 mg) by mouth daily 90 tablet 1     STATIN NOT PRESCRIBED, INTENTIONAL, Statin not prescribed intentionally due to Intolerance (with supporting documentation of trying a statin at least once within the last 5 years) 0 each 0     terazosin (HYTRIN) 10 MG capsule Take 1 capsule (10 mg) by mouth At Bedtime 90 capsule 2     TRAVATAN Z (TRAVATAN Z) 0.004 % ophthalmic solution Instill 1 drop by ophthalmic route every day into affected eye(s) in the evening       warfarin (COUMADIN) 5 MG tablet 2.5mg (1/2 pill) Mon, Fri and 5mg (1 pill) all other days or as directed by warfarin clinic 90 tablet 3     Allergies   Allergen Reactions     Atorvastatin      Other reaction(s): Other  Caused increased creatinine.      Atorvastatin Calcium Other (See Comments)     Lipitor  Increase CR     Barley Grass Unknown     Iodine   "    Penicillins      Procaine Unknown     Shellfish-Derived Products Unknown     Sulfa Drugs      Sulfa (sulfonamide antibiotics)     Sulfacetamide          Reviewed and updated as needed this visit by Provider         Review of Systems   ROS COMP: Constitutional, HEENT, cardiovascular, pulmonary, gi and gu systems are negative, except as otherwise noted.      Objective    BP (!) 148/60 (BP Location: Right arm, Patient Position: Sitting, Cuff Size: Adult Regular)   Pulse 72   Temp 97.5  F (36.4  C) (Tympanic)   Resp 18   Ht 1.702 m (5' 7\")   Wt 107.9 kg (237 lb 12.8 oz)   SpO2 97%   BMI 37.24 kg/m    Body mass index is 37.24 kg/m .  Physical Exam   GENERAL: healthy, alert and no distress  PSYCH: mentation appears normal, affect normal/bright    Diagnostic Test Results:  Labs reviewed in Epic        Assessment & Plan     1. Abnormal cardiovascular stress test  Will send referral to [facility] to see if he can be seen sooner.  He will likely be having an angiogram, and would prefer to go to [place] as opposed to the [facility] area.  Follow-up here as directed.  - CARDIOLOGY EVAL ADULT REFERRAL    2. Exertional chest pain  As above.  - CARDIOLOGY EVAL ADULT REFERRAL    3. Atypical chest pain  As above.  - CARDIOLOGY EVAL ADULT REFERRAL    4. Shortness of breath  As above.  - CARDIOLOGY EVAL ADULT REFERRAL         Return if symptoms worsen or fail to improve.    Lorelei Felix MD  [facility]      "

## 2020-01-22 ENCOUNTER — TELEPHONE (OUTPATIENT)
Dept: FAMILY MEDICINE | Facility: OTHER | Age: 75
End: 2020-01-22

## 2020-01-22 NOTE — TELEPHONE ENCOUNTER
Pt called today and would like to follow up with [facility] Cardiology and for the follow up angiogram.

## 2020-01-22 NOTE — PROGRESS NOTES
The referral with supporting documentation was faxed to  Referral Dept and to [facility] Cardiology on 1/22/2020. Facilty to contact patient to schedule consult. Kaden

## 2020-01-22 NOTE — TELEPHONE ENCOUNTER
Patient is scheduled with  Cardiology on Tuesday 1/28/2020 with Anupama Jean NP at 9am. Noted in referral 1/22/2020 sonia

## 2020-01-28 ENCOUNTER — TRANSFERRED RECORDS (OUTPATIENT)
Dept: HEALTH INFORMATION MANAGEMENT | Facility: CLINIC | Age: 75
End: 2020-01-28

## 2020-01-28 LAB — EJECTION FRACTION: NORMAL %

## 2020-01-29 ENCOUNTER — ANTICOAGULATION THERAPY VISIT (OUTPATIENT)
Dept: ANTICOAGULATION | Facility: OTHER | Age: 75
End: 2020-01-29

## 2020-01-29 DIAGNOSIS — Z79.01 LONG TERM CURRENT USE OF ANTICOAGULANT THERAPY: ICD-10-CM

## 2020-01-29 DIAGNOSIS — I26.99 PULMONARY EMBOLISM AND INFARCTION (H): ICD-10-CM

## 2020-01-29 DIAGNOSIS — I82.409 ACUTE THROMBOEMBOLISM OF DEEP VEINS OF LOWER EXTREMITY (H): ICD-10-CM

## 2020-01-29 NOTE — PROGRESS NOTES
ANTICOAGULATION FOLLOW-UP CLINIC VISIT    Patient Name:  Clement Voss  Date:  1/29/2020  Contact Type:  Telephone    SUBJECTIVE:  Patient Findings     Positives:   Upcoming invasive procedure (aortic valve repair 2/3/2020)    Comments:   Call from patient stating he is having his Aortic Valve repaired on 2/4/20. He states that he was told, by cardiology, to hold his warfarin for 3 days prior to the procedure. I told him to do what they have told him to do and to take no warfarin on 1/31, 2/1, 2/2.  He will be admitted to hospital after the procedure so I requested that he give us a call when he gets home so INR can be rescheduled. He verbalized understanding and states he will do that.         Clinical Outcomes     Negatives:   Major bleeding event, Thromboembolic event, Anticoagulation-related hospital admission, Anticoagulation-related ED visit, Anticoagulation-related fatality    Comments:   Call from patient stating he is having his Aortic Valve repaired on 2/4/20. He states that he was told, by cardiology, to hold his warfarin for 3 days prior to the procedure. I told him to do what they have told him to do and to take no warfarin on 1/31, 2/1, 2/2.  He will be admitted to hospital after the procedure so I requested that he give us a call when he gets home so INR can be rescheduled. He verbalized understanding and states he will do that.            OBJECTIVE    INR   Date Value Ref Range Status   01/06/2020 2.36 (H) 0.86 - 1.14 Final       ASSESSMENT / PLAN  No question data found.  Anticoagulation Summary  As of 1/29/2020    INR goal:   2.0-3.0   TTR:   89.9 % (11.4 mo)   INR used for dosing:   No new INR was available at the time of this encounter.   Warfarin maintenance plan:   2.5 mg (5 mg x 0.5) every Mon, Fri; 5 mg (5 mg x 1) all other days   Full warfarin instructions:   1/31: Hold; 2/1: Hold; 2/2: Hold; Otherwise 2.5 mg every Mon, Fri; 5 mg all other days   Weekly warfarin total:   30 mg   Plan last  modified:   Jodie Elena RN (2/7/2018)   Next INR check:   2/17/2020   Priority:   Maintenance   Target end date:   Indefinite    Indications    Pulmonary embolism and infarction (H) [I26.99]  Acute thromboembolism of deep veins of lower extremity (H) [I82.409]  Long-term (current) use of anticoagulants [Z79.01] [Z79.01]             Anticoagulation Episode Summary     INR check location:       Preferred lab:       Send INR reminders to:   HC ANTICOAG POOL    Comments:         Anticoagulation Care Providers     Provider Role Specialty Phone number    Lorelei Felix MD [facility] [phone]            See the Encounter Report to view Anticoagulation Flowsheet and Dosing Calendar (Go to Encounters tab in chart review, and find the Anticoagulation Therapy Visit)        Jodie Elena, RN

## 2020-02-03 ENCOUNTER — TRANSFERRED RECORDS (OUTPATIENT)
Dept: HEALTH INFORMATION MANAGEMENT | Facility: CLINIC | Age: 75
End: 2020-02-03

## 2020-02-04 ENCOUNTER — TRANSFERRED RECORDS (OUTPATIENT)
Dept: HEALTH INFORMATION MANAGEMENT | Facility: CLINIC | Age: 75
End: 2020-02-04

## 2020-02-10 ENCOUNTER — TRANSFERRED RECORDS (OUTPATIENT)
Dept: HEALTH INFORMATION MANAGEMENT | Facility: CLINIC | Age: 75
End: 2020-02-10

## 2020-02-13 ENCOUNTER — TRANSFERRED RECORDS (OUTPATIENT)
Dept: HEALTH INFORMATION MANAGEMENT | Facility: CLINIC | Age: 75
End: 2020-02-13

## 2020-02-13 LAB — EJECTION FRACTION: NORMAL %

## 2020-02-18 PROBLEM — R93.1 REGIONAL WALL MOTION ABNORMALITY OF HEART: Status: ACTIVE | Noted: 2020-02-18

## 2020-02-18 PROBLEM — R94.39 ABNORMAL STRESS TEST: Status: ACTIVE | Noted: 2020-02-18

## 2020-02-18 PROBLEM — Z79.01 ANTICOAGULATED ON COUMADIN: Status: ACTIVE | Noted: 2020-02-18

## 2020-02-18 PROBLEM — I34.0 MODERATE MITRAL REGURGITATION: Status: ACTIVE | Noted: 2020-02-18

## 2020-02-18 PROBLEM — I51.89 DIASTOLIC DYSFUNCTION: Status: ACTIVE | Noted: 2020-02-18

## 2020-02-18 PROBLEM — I71.21 ASCENDING AORTIC ANEURYSM (H): Status: ACTIVE | Noted: 2020-02-18

## 2020-02-18 PROBLEM — I35.1 SEVERE AORTIC VALVE REGURGITATION: Status: ACTIVE | Noted: 2020-02-18

## 2020-02-18 PROBLEM — Z86.711 HISTORY OF PULMONARY EMBOLISM: Status: ACTIVE | Noted: 2020-02-18

## 2020-02-18 PROBLEM — Z87.891 HISTORY OF TOBACCO ABUSE: Status: ACTIVE | Noted: 2020-02-18

## 2020-02-18 PROBLEM — Z86.718 HISTORY OF DVT (DEEP VEIN THROMBOSIS): Status: ACTIVE | Noted: 2020-02-18

## 2020-02-19 ENCOUNTER — TELEPHONE (OUTPATIENT)
Dept: CARDIOLOGY | Facility: OTHER | Age: 75
End: 2020-02-19

## 2020-02-19 DIAGNOSIS — R07.89 ATYPICAL CHEST PAIN: ICD-10-CM

## 2020-02-19 DIAGNOSIS — R94.39 ABNORMAL STRESS TEST: Primary | ICD-10-CM

## 2020-02-19 NOTE — LETTER
February 19, 2020      Clement Voss  [address]   [place] [address]        Dear Jodie    You requested a letter reminding you to call to reschedule and appointment for Clement with the Cardiology Department in [place] with Dr. Hobson. Please call to schedule at your convenience at [phone]. Thank you for your time.       Sincerely,        The office of Rob Hobson, DO

## 2020-02-19 NOTE — TELEPHONE ENCOUNTER
Reached out to patient's wife Jodie regarding missed appointment. Reports patient has been hospitalized in [facility] since February 4th and has undergone open heart surgery. Patient still currently hospitalized. Jodie would like a letter sent with cardiology department information so she can reschedule with Dr. Hobson when patient returns home and is stable.     Letter sent per her request.

## 2020-02-20 ENCOUNTER — TRANSFERRED RECORDS (OUTPATIENT)
Dept: HEALTH INFORMATION MANAGEMENT | Facility: CLINIC | Age: 75
End: 2020-02-20

## 2020-02-20 LAB
CREAT SERPL-MCNC: 1.74 MG/DL (ref 0.7–1.2)
GLUCOSE SERPL-MCNC: 160 MG/DL (ref 70–99)
INR PPP: 2.5 (ref 0.9–1.1)
POTASSIUM SERPL-SCNC: 3.6 MEQ/L (ref 3.4–5.1)

## 2020-02-21 ENCOUNTER — TRANSFERRED RECORDS (OUTPATIENT)
Dept: HEALTH INFORMATION MANAGEMENT | Facility: CLINIC | Age: 75
End: 2020-02-21

## 2020-02-25 ENCOUNTER — ANTICOAGULATION THERAPY VISIT (OUTPATIENT)
Dept: ANTICOAGULATION | Facility: OTHER | Age: 75
End: 2020-02-25

## 2020-02-25 DIAGNOSIS — I26.99 PULMONARY EMBOLISM AND INFARCTION (H): ICD-10-CM

## 2020-02-25 DIAGNOSIS — Z79.01 LONG TERM CURRENT USE OF ANTICOAGULANT THERAPY: ICD-10-CM

## 2020-02-25 NOTE — PROGRESS NOTES
ANTICOAGULATION FOLLOW-UP CLINIC VISIT    Patient Name:  Clement Voss  Date:  2/25/2020  Contact Type:  patient in hospital will follow up with patient.    SUBJECTIVE:  Patient Findings     Comments:   Patient back in hospital. Will follow up with patient once discharged.         Clinical Outcomes     Comments:   Patient back in hospital. Will follow up with patient once discharged.            OBJECTIVE    INR   Date Value Ref Range Status   01/06/2020 2.36 (H) 0.86 - 1.14 Final       ASSESSMENT / PLAN  No question data found.  Anticoagulation Summary  As of 2/25/2020    INR goal:   2.0-3.0   TTR:   89.0 % (10.5 mo)   INR used for dosing:   No new INR was available at the time of this encounter.   Warfarin maintenance plan:   2.5 mg (5 mg x 0.5) every Mon, Fri; 5 mg (5 mg x 1) all other days   Full warfarin instructions:   2.5 mg every Mon, Fri; 5 mg all other days   Weekly warfarin total:   30 mg   No change documented:   Guille, Brenda, RN   Plan last modified:   Jodie Elena RN (2/7/2018)   Next INR check:   3/3/2020   Priority:   Maintenance   Target end date:   Indefinite    Indications    Pulmonary embolism and infarction (H) [I26.99]  Long-term (current) use of anticoagulants [Z79.01] [Z79.01]             Anticoagulation Episode Summary     INR check location:       Preferred lab:       Send INR reminders to:   HC ANTICOAG POOL    Comments:         Anticoagulation Care Providers     Provider Role Specialty Phone number    Lorelei Felix MD [facility] [phone]            See the Encounter Report to view Anticoagulation Flowsheet and Dosing Calendar (Go to Encounters tab in chart review, and find the Anticoagulation Therapy Visit)        Brenda Han RN

## 2020-03-02 ENCOUNTER — HEALTH MAINTENANCE LETTER (OUTPATIENT)
Age: 75
End: 2020-03-02

## 2020-03-06 NOTE — PROGRESS NOTES
Subjective     Clement Voss is a 74 year old male who presents to clinic today for the following health issues:    HPI       Hospital Follow-up Visit:    Hospital/Nursing Home/IP Rehab Facility: [facility]  Date of Admission: 02/20/20  Date of Discharge: 03/06/20  Reason(s) for Admission: CAD, aortic root aneurysm, s/p CABG x 2 and aortic valve replacement            Problems taking medications regularly:  None       Medication changes since discharge: noted in medication list       Problems adhering to non-medication therapy:  None    Summary of hospitalization:  CareEverywhere information obtained and reviewed  Diagnostic Tests/Treatments reviewed.  Follow up needed: lab recheck  Other Healthcare Providers Involved in Patient s Care:         Specialist appointment - Cardiology and CV Surgery  Update since discharge: improved.     Post Discharge Medication Reconciliation: discharge medications reconciled and changed, per note/orders (see AVS).  Plan of care communicated with patient and family     Coding guidelines for this visit:  Type of Medical   Decision Making Face-to-Face Visit       within 7 Days of discharge Face-to-Face Visit        within 14 days of discharge   Moderate Complexity 59150 [id number]   High Complexity 60111 02051                Patient Active Problem List   Diagnosis     Type 2 diabetes mellitus with chronic kidney disease, with long-term current use of insulin (H)     Mixed hyperlipidemia     Benign essential hypertension     Gout     Pulmonary embolism and infarction (H)     ACP (advance care planning)     Long-term (current) use of anticoagulants [Z79.01]     Morbid obesity (H)     CKD (chronic kidney disease) stage 3, GFR 30-59 ml/min (H)     Abnormal stress test on 1/10/2020     Regional wall motion abnormality of heart     Diastolic dysfunction grade 2 on 1/20/2020     Severe aortic valve regurgitation     Moderate mitral regurgitation     Ascending aortic aneurysm-severe      Anticoagulated on Coumadin     History of DVT on 2013     History of pulmonary embolism     History of tobacco abuse quitting 1964     Aortic root aneurysm (H)     ASCVD (arteriosclerotic cardiovascular disease)     CALDERON (dyspnea on exertion)     Murmur     Other dysphagia     Postoperative anemia due to acute blood loss     S/P aortic valve replacement     S/P CABG x 2     Past Surgical History:   Procedure Laterality Date     APPENDECTOMY       CHOLECYSTECTOMY       COLONOSCOPY N/A 2019    Procedure: COLONOSCOPY;  Surgeon: Benito Saldana MD;  Location: [facility]     History of PE of right lung 3/2013[       PHACOEMULSIFICATION WITH STANDARD INTRAOCULAR LENS IMPLANT Left 2018    Procedure: PHACOEMULSIFICATION WITH STANDARD INTRAOCULAR LENS IMPLANT;  CATARACT EXTRACTION LEFT EYE WITH IMPLANT, ISTENT LEFT EYE;  Surgeon: Kush Almaraz MD;  Location: [facility] OR     PHACOEMULSIFICATION WITH STANDARD INTRAOCULAR LENS IMPLANT Right 5/3/2018    Procedure: PHACOEMULSIFICATION WITH STANDARD INTRAOCULAR LENS IMPLANT;  CATARACT EXTRACTION RIGHT EYE WITH IMPLANT,WITH ISTENT ATTEMPTED;  Surgeon: Kush Almaraz MD;  Location: [facility] OR     TRABECULAR MICRO-BYPASS WITH ISTENT Left 2018    Procedure: TRABECULAR MICRO-BYPASS WITH ISTENT;;  Surgeon: Kush Almaraz MD;  Location: [facility]     TRABECULAR MICRO-BYPASS WITH ISTENT  5/3/2018    Procedure: TRABECULAR MICRO-BYPASS WITH ISTENT;;  Surgeon: Kush Almaraz MD;  Location: [facility]       Social History     Tobacco Use     Smoking status: Former Smoker     Types: Cigarettes     Last attempt to quit: 1964     Years since quittin.6     Smokeless tobacco: Never Used   Substance Use Topics     Alcohol use: No     Family History   Problem Relation Age of Onset     Heart Disease Father 71        heart disease; cause of death     Other - See Comments Mother 79        old age; cause of death         Current Outpatient Medications    Medication Sig Dispense Refill     acetaminophen (TYLENOL) 500 MG tablet Take 1-2 tablets (500-1,000 mg) by mouth every 6 hours as needed       amiodarone (PACERONE) 200 MG tablet Take 200 mg by mouth       aspirin 81 MG EC tablet Take 1 tablet (81 mg) by mouth daily 90 tablet 3     blood glucose (ACCU-CHEK VLAD PLUS) test strip USE TO TEST BLOOD SUGARS THREE TIMES DAILY OR AS DIRECTED 200 strip 3     clindamycin (CLEOCIN) 300 MG capsule Take 300 mg by mouth 4 times daily       dorzolamide-timolol (COSOPT) 2-0.5 % ophthalmic solution Inject 1 drop into the eye       famotidine (PEPCID) 20 MG tablet Take 20 mg by mouth       fenofibrate (TRICOR) 48 MG tablet Take 1 tablet (48 mg) by mouth daily 90 tablet 1     ferrous sulfate (FEROSUL) 325 (65 Fe) MG tablet Take 325 mg by mouth       fluticasone (FLONASE) 50 MCG/ACT nasal spray Spray 2 sprays into both nostrils daily (Patient taking differently: Spray 2 sprays into both nostrils daily as needed ) 1 Package 0     furosemide (LASIX) 20 MG tablet Take 20 mg by mouth       indomethacin (INDOCIN) 50 MG capsule Take 1 capsule (50 mg) by mouth 3 times daily With food as needed 30 capsule 1     insulin glargine (LANTUS PEN) 100 UNIT/ML pen Inject 33 Units Subcutaneous At Bedtime 15 mL 1     insulin pen needle (B-D U/F) 31G X 5 MM miscellaneous by Device route See Admin Instructions Use pen needles daily or as directed. 100 each 1     loratadine (CLARITIN) 10 MG tablet Take 10 mg by mouth daily.       losartan (COZAAR) 25 MG tablet Take 25 mg by mouth       metoprolol tartrate (LOPRESSOR) 25 MG tablet Take 25 mg by mouth       sitagliptin (JANUVIA) 100 MG tablet Take 1 tablet (100 mg) by mouth daily 90 tablet 1     STATIN NOT PRESCRIBED, INTENTIONAL, Statin not prescribed intentionally due to Intolerance (with supporting documentation of trying a statin at least once within the last 5 years) 0 each 0     TRAVATAN Z (TRAVATAN Z) 0.004 % ophthalmic solution Instill 1 drop by  "ophthalmic route every day into affected eye(s) in the evening       warfarin (COUMADIN) 5 MG tablet 2.5mg (1/2 pill) Mon, Fri and 5mg (1 pill) all other days or as directed by warfarin clinic 90 tablet 3     Allergies   Allergen Reactions     Atorvastatin      Other reaction(s): Other  Caused increased creatinine.      Atorvastatin Calcium Other (See Comments)     Lipitor  Increase CR     Barley Grass Unknown     Iodine      Penicillins      Procaine Unknown     Shellfish-Derived Products Unknown     Sulfa Drugs      Sulfa (sulfonamide antibiotics)     Sulfacetamide          Reviewed and updated as needed this visit by Provider  Tobacco  Allergies  Meds  Problems  Med Hx  Surg Hx  Fam Hx         Review of Systems   ROS COMP: Constitutional, HEENT, cardiovascular, pulmonary, gi and gu systems are negative, except as otherwise noted.      Objective    /62 (BP Location: Left arm, Patient Position: Sitting, Cuff Size: Adult Regular)   Pulse 55   Temp 97.6  F (36.4  C) (Tympanic)   Resp 16   Ht 1.702 m (5' 7\")   Wt 93.9 kg (207 lb 1.6 oz)   SpO2 99%   BMI 32.44 kg/m    Body mass index is 32.44 kg/m .  Physical Exam   GENERAL: alert and no distress  RESP: lungs clear to auscultation - no rales, rhonchi or wheezes  CV: irregularly irregular rhythm and grade 2/6 systolic murmur heard best over the RUSB  PSYCH: mentation appears normal, affect normal/bright    Diagnostic Test Results:  Labs reviewed in Epic        Assessment & Plan     1. S/P CABG x 2  Will check labs later this week (patient already has appointment for INR, so we will complete labs then).  Follow-up with CV Surgery as scheduled.  - CBC with platelets; Future  - Basic metabolic panel; Future    2. S/P aortic valve replacement  As above.    3. ASCVD (arteriosclerotic cardiovascular disease)  As above.  - CBC with platelets; Future  - Basic metabolic panel; Future    4. Ascending aortic aneurysm-severe  As above.    5. Type 2 diabetes " mellitus with stage 3 chronic kidney disease, with long-term current use of insulin (H)  Patient is having borderline low readings in the am.  Decrease Lantus from 35 to 33 units at bedtime.  Follow-up for regularly scheduled diabetic check in April  - insulin glargine (LANTUS PEN) 100 UNIT/ML pen; Inject 33 Units Subcutaneous At Bedtime  Dispense: 15 mL; Refill: 1       Over 30 minutes are spent with the patient and his wife, over 20 minutes of which was in education and counseling regarding current conditions and treatment/therapy options/risks/benefits/etc, including chart review of recent hospitalization and rehab stay, medication discussion, and follow-up planning.      Return in about 4 weeks (around 4/6/2020) for Diabetic Follow-up.    Lorelei Felix MD  [facility]

## 2020-03-09 ENCOUNTER — OFFICE VISIT (OUTPATIENT)
Dept: FAMILY MEDICINE | Facility: OTHER | Age: 75
End: 2020-03-09
Attending: FAMILY MEDICINE
Payer: COMMERCIAL

## 2020-03-09 VITALS
HEART RATE: 55 BPM | WEIGHT: 207.1 LBS | SYSTOLIC BLOOD PRESSURE: 118 MMHG | TEMPERATURE: 97.6 F | DIASTOLIC BLOOD PRESSURE: 62 MMHG | BODY MASS INDEX: 32.51 KG/M2 | HEIGHT: 67 IN | OXYGEN SATURATION: 99 % | RESPIRATION RATE: 16 BRPM

## 2020-03-09 DIAGNOSIS — I71.21 ASCENDING AORTIC ANEURYSM (H): ICD-10-CM

## 2020-03-09 DIAGNOSIS — I25.10 ASCVD (ARTERIOSCLEROTIC CARDIOVASCULAR DISEASE): ICD-10-CM

## 2020-03-09 DIAGNOSIS — N18.30 TYPE 2 DIABETES MELLITUS WITH STAGE 3 CHRONIC KIDNEY DISEASE, WITH LONG-TERM CURRENT USE OF INSULIN (H): ICD-10-CM

## 2020-03-09 DIAGNOSIS — Z95.1 S/P CABG X 2: Primary | ICD-10-CM

## 2020-03-09 DIAGNOSIS — Z79.4 TYPE 2 DIABETES MELLITUS WITH STAGE 3 CHRONIC KIDNEY DISEASE, WITH LONG-TERM CURRENT USE OF INSULIN (H): ICD-10-CM

## 2020-03-09 DIAGNOSIS — E11.22 TYPE 2 DIABETES MELLITUS WITH STAGE 3 CHRONIC KIDNEY DISEASE, WITH LONG-TERM CURRENT USE OF INSULIN (H): ICD-10-CM

## 2020-03-09 DIAGNOSIS — Z95.2 S/P AORTIC VALVE REPLACEMENT: ICD-10-CM

## 2020-03-09 PROBLEM — R06.09 DOE (DYSPNEA ON EXERTION): Status: ACTIVE | Noted: 2020-01-28

## 2020-03-09 PROBLEM — Q25.43 AORTIC ROOT ANEURYSM: Status: ACTIVE | Noted: 2020-01-28

## 2020-03-09 PROBLEM — R13.19 OTHER DYSPHAGIA: Status: ACTIVE | Noted: 2020-02-21

## 2020-03-09 PROBLEM — R01.1 MURMUR: Status: ACTIVE | Noted: 2020-01-28

## 2020-03-09 PROBLEM — D62 POSTOPERATIVE ANEMIA DUE TO ACUTE BLOOD LOSS: Status: ACTIVE | Noted: 2020-02-10

## 2020-03-09 PROCEDURE — 99214 OFFICE O/P EST MOD 30 MIN: CPT | Performed by: FAMILY MEDICINE

## 2020-03-09 PROCEDURE — G0463 HOSPITAL OUTPT CLINIC VISIT: HCPCS

## 2020-03-09 RX ORDER — AMIODARONE HYDROCHLORIDE 200 MG/1
200 TABLET ORAL
COMMUNITY
Start: 2020-03-06 | End: 2020-10-27

## 2020-03-09 RX ORDER — FERROUS SULFATE 325(65) MG
325 TABLET ORAL
COMMUNITY
Start: 2020-03-07 | End: 2020-03-26

## 2020-03-09 RX ORDER — FUROSEMIDE 20 MG
20 TABLET ORAL
COMMUNITY
Start: 2020-03-06

## 2020-03-09 RX ORDER — ACETAMINOPHEN 500 MG
1000 TABLET ORAL
COMMUNITY
Start: 2020-02-20 | End: 2020-03-09

## 2020-03-09 RX ORDER — FAMOTIDINE 20 MG/1
20 TABLET, FILM COATED ORAL
COMMUNITY
Start: 2020-03-06 | End: 2020-07-16

## 2020-03-09 RX ORDER — FLUTICASONE PROPIONATE 50 MCG
1 SPRAY, SUSPENSION (ML) NASAL
COMMUNITY
Start: 2020-02-21 | End: 2020-03-09

## 2020-03-09 RX ORDER — WARFARIN SODIUM 5 MG/1
TABLET ORAL
COMMUNITY
Start: 2020-03-06 | End: 2020-03-09

## 2020-03-09 RX ORDER — CLINDAMYCIN HCL 300 MG
300 CAPSULE ORAL 4 TIMES DAILY
COMMUNITY
End: 2020-07-16

## 2020-03-09 RX ORDER — DORZOLAMIDE HYDROCHLORIDE AND TIMOLOL MALEATE 20; 5 MG/ML; MG/ML
1 SOLUTION/ DROPS OPHTHALMIC
COMMUNITY
End: 2020-11-24

## 2020-03-09 RX ORDER — METOPROLOL TARTRATE 25 MG/1
25 TABLET, FILM COATED ORAL
COMMUNITY
Start: 2020-03-06 | End: 2020-11-19

## 2020-03-09 RX ORDER — LOSARTAN POTASSIUM 25 MG/1
25 TABLET ORAL
COMMUNITY
Start: 2020-03-06 | End: 2020-11-06

## 2020-03-09 RX ORDER — FENOFIBRATE 48 MG/1
48 TABLET, COATED ORAL
COMMUNITY
End: 2020-03-09

## 2020-03-09 ASSESSMENT — PAIN SCALES - GENERAL: PAINLEVEL: NO PAIN (0)

## 2020-03-09 ASSESSMENT — MIFFLIN-ST. JEOR: SCORE: 1633.03

## 2020-03-09 NOTE — PROGRESS NOTES
Spoke with [facility]; patient is set to be scheduled the following week due to main cardiac nurse is out of clinic this week and no new admits during her absence. Patient was informed by facility that it would be a week or so before scheduling according to Alvaro. Writer called patient/wife to update on the status and when to expect a call. 3/9/2020 sonia

## 2020-03-09 NOTE — Clinical Note
Patient was supposed to have Cardiac Rehab ordered in [place] after his discharge from inpatient rehab, and he has not heard anything yet.  Can you look into this?

## 2020-03-09 NOTE — NURSING NOTE
"Chief Complaint   Patient presents with     Hospital F/U       Initial /62 (BP Location: Left arm, Patient Position: Sitting, Cuff Size: Adult Regular)   Pulse 55   Temp 97.6  F (36.4  C) (Tympanic)   Resp 16   Ht 1.702 m (5' 7\")   Wt 93.9 kg (207 lb 1.6 oz)   SpO2 99%   BMI 32.44 kg/m   Estimated body mass index is 32.44 kg/m  as calculated from the following:    Height as of this encounter: 1.702 m (5' 7\").    Weight as of this encounter: 93.9 kg (207 lb 1.6 oz).  Medication Reconciliation: complete  Tracey Blanco MA  "

## 2020-03-13 ENCOUNTER — ANTICOAGULATION THERAPY VISIT (OUTPATIENT)
Dept: ANTICOAGULATION | Facility: OTHER | Age: 75
End: 2020-03-13
Attending: FAMILY MEDICINE
Payer: MEDICARE

## 2020-03-13 DIAGNOSIS — I82.409 ACUTE THROMBOEMBOLISM OF DEEP VEINS OF LOWER EXTREMITY, UNSPECIFIED LATERALITY (H): ICD-10-CM

## 2020-03-13 DIAGNOSIS — Z79.01 LONG TERM CURRENT USE OF ANTICOAGULANT THERAPY: ICD-10-CM

## 2020-03-13 DIAGNOSIS — I26.99 PULMONARY EMBOLISM AND INFARCTION (H): ICD-10-CM

## 2020-03-13 DIAGNOSIS — E11.9 TYPE 2 DIABETES MELLITUS (H): ICD-10-CM

## 2020-03-13 DIAGNOSIS — I25.10 ASCVD (ARTERIOSCLEROTIC CARDIOVASCULAR DISEASE): ICD-10-CM

## 2020-03-13 DIAGNOSIS — Z95.1 S/P CABG X 2: ICD-10-CM

## 2020-03-13 LAB
ANION GAP SERPL CALCULATED.3IONS-SCNC: 6 MMOL/L (ref 3–14)
BUN SERPL-MCNC: 22 MG/DL (ref 7–30)
CALCIUM SERPL-MCNC: 9.1 MG/DL (ref 8.5–10.1)
CHLORIDE SERPL-SCNC: 106 MMOL/L (ref 94–109)
CO2 SERPL-SCNC: 27 MMOL/L (ref 20–32)
CREAT SERPL-MCNC: 1.67 MG/DL (ref 0.66–1.25)
CREAT UR-MCNC: 32 MG/DL
ERYTHROCYTE [DISTWIDTH] IN BLOOD BY AUTOMATED COUNT: 16.5 % (ref 10–15)
GFR SERPL CREATININE-BSD FRML MDRD: 39 ML/MIN/{1.73_M2}
GLUCOSE SERPL-MCNC: 149 MG/DL (ref 70–99)
HCT VFR BLD AUTO: 30.4 % (ref 40–53)
HGB BLD-MCNC: 9.9 G/DL (ref 13.3–17.7)
INR BLD: 3.7 (ref 0.86–1.14)
MCH RBC QN AUTO: 30.5 PG (ref 26.5–33)
MCHC RBC AUTO-ENTMCNC: 32.6 G/DL (ref 31.5–36.5)
MCV RBC AUTO: 94 FL (ref 78–100)
MICROALBUMIN UR-MCNC: <5 MG/L
MICROALBUMIN/CREAT UR: NORMAL MG/G CR (ref 0–17)
PLATELET # BLD AUTO: 207 10E9/L (ref 150–450)
POTASSIUM SERPL-SCNC: 4.3 MMOL/L (ref 3.4–5.3)
RBC # BLD AUTO: 3.25 10E12/L (ref 4.4–5.9)
SODIUM SERPL-SCNC: 139 MMOL/L (ref 133–144)
WBC # BLD AUTO: 7.2 10E9/L (ref 4–11)

## 2020-03-13 PROCEDURE — 80048 BASIC METABOLIC PNL TOTAL CA: CPT | Mod: ZL | Performed by: FAMILY MEDICINE

## 2020-03-13 PROCEDURE — 36415 COLL VENOUS BLD VENIPUNCTURE: CPT | Mod: ZL | Performed by: FAMILY MEDICINE

## 2020-03-13 PROCEDURE — 85027 COMPLETE CBC AUTOMATED: CPT | Mod: ZL | Performed by: FAMILY MEDICINE

## 2020-03-13 PROCEDURE — 36416 COLLJ CAPILLARY BLOOD SPEC: CPT | Mod: ZL | Performed by: FAMILY MEDICINE

## 2020-03-13 PROCEDURE — 85610 PROTHROMBIN TIME: CPT | Mod: QW,ZL | Performed by: FAMILY MEDICINE

## 2020-03-13 PROCEDURE — 82043 UR ALBUMIN QUANTITATIVE: CPT | Mod: ZL | Performed by: FAMILY MEDICINE

## 2020-03-13 NOTE — PROGRESS NOTES
"ANTICOAGULATION FOLLOW-UP CLINIC VISIT    Patient Name:  Clement Voss  Date:  3/13/2020  Contact Type:  Telephone    SUBJECTIVE:  Patient Findings     Positives:   Change in medications (amiodarone started sometime mid to late february)    Comments:   INR done by lab. Noted in patient record is that he is on amiodarone and it looks like it was started sometime mid to end of February. He has been hospitalized twice since 2/1/20 at [facility]. He was also in [facility] for a very short stay. Call placed to patient cell phone and spoke to him re: INR result, warfarin dosing/INR recheck date. He states he takes 1/2 amiodarone pill daily. He has no new bleeding/bruising and no other changes in diet/meds/activity. States he is feeling \"pretty good\". He verbalized understanding of warfarin dosing/INR recheck date and has no questions.         Clinical Outcomes     Negatives:   Major bleeding event, Thromboembolic event, Anticoagulation-related hospital admission, Anticoagulation-related ED visit, Anticoagulation-related fatality    Comments:   INR done by lab. Noted in patient record is that he is on amiodarone and it looks like it was started sometime mid to end of February. He has been hospitalized twice since 2/1/20 at [facility]. He was also in [facility] for a very short stay. Call placed to patient cell phone and spoke to him re: INR result, warfarin dosing/INR recheck date. He states he takes 1/2 amiodarone pill daily. He has no new bleeding/bruising and no other changes in diet/meds/activity. States he is feeling \"pretty good\". He verbalized understanding of warfarin dosing/INR recheck date and has no questions.            OBJECTIVE    INR Point of Care   Date Value Ref Range Status   03/13/2020 3.7 (H) 0.86 - 1.14 Final     Comment:     This test is intended for monitoring Coumadin therapy.  Results are not   accurate in patients with prolonged INR due to factor " deficiency.         ASSESSMENT / PLAN  INR assessment SUPRA started on amiodarone, we were  not notified   Recheck INR In: 1 WEEK    INR Location Clinic      Anticoagulation Summary  As of 3/13/2020    INR goal:   2.0-3.0   TTR:   80.9 % (1 y)   INR used for dosing:   3.7! (3/13/2020)   Warfarin maintenance plan:   5 mg (5 mg x 1) every Mon, Fri; 2.5 mg (5 mg x 0.5) all other days   Full warfarin instructions:   3/13: Hold; Otherwise 5 mg every Mon, Fri; 2.5 mg all other days   Weekly warfarin total:   22.5 mg   Plan last modified:   Jodie Elena, RN (3/13/2020)   Next INR check:   3/20/2020   Priority:   Maintenance   Target end date:   Indefinite    Indications    Pulmonary embolism and infarction (H) [I26.99]  Long-term (current) use of anticoagulants [Z79.01] [Z79.01]             Anticoagulation Episode Summary     INR check location:       Preferred lab:       Send INR reminders to:   [facility] ANTICOAG POOL    Comments:         Anticoagulation Care Providers     Provider Role Specialty Phone number    Lorelei Felix MD [facility] [phone]            See the Encounter Report to view Anticoagulation Flowsheet and Dosing Calendar (Go to Encounters tab in chart review, and find the Anticoagulation Therapy Visit)        Jodie Elena, RN

## 2020-03-20 ENCOUNTER — ANTICOAGULATION THERAPY VISIT (OUTPATIENT)
Dept: ANTICOAGULATION | Facility: OTHER | Age: 75
End: 2020-03-20
Attending: FAMILY MEDICINE
Payer: MEDICARE

## 2020-03-20 DIAGNOSIS — Z79.01 LONG TERM CURRENT USE OF ANTICOAGULANT THERAPY: ICD-10-CM

## 2020-03-20 DIAGNOSIS — I26.99 PULMONARY EMBOLISM AND INFARCTION (H): ICD-10-CM

## 2020-03-20 DIAGNOSIS — I82.409 ACUTE THROMBOEMBOLISM OF DEEP VEINS OF LOWER EXTREMITY, UNSPECIFIED LATERALITY (H): ICD-10-CM

## 2020-03-20 LAB
CAPILLARY BLOOD COLLECTION: NORMAL
INR BLD: 3.8 (ref 0.86–1.14)

## 2020-03-20 PROCEDURE — 36416 COLLJ CAPILLARY BLOOD SPEC: CPT | Mod: ZL | Performed by: FAMILY MEDICINE

## 2020-03-20 PROCEDURE — 85610 PROTHROMBIN TIME: CPT | Mod: QW,ZL | Performed by: FAMILY MEDICINE

## 2020-03-20 NOTE — PROGRESS NOTES
ANTICOAGULATION FOLLOW-UP CLINIC VISIT    Patient Name:  Clement Voss  Date:  3/20/2020  Contact Type:  Telephone/ message left on home phone voicemail    SUBJECTIVE:  Patient Findings     Comments:   INR still elevated, possibly r/t amiodarone. Call placed to patient and message left re: INR result, warfarin dosing/INR recheck date. He is to call warfarin clinic if he has any unusual bleeding/bruising, new changes in diet/meds/activity or questions.         Clinical Outcomes     Negatives:   Major bleeding event, Thromboembolic event, Anticoagulation-related hospital admission, Anticoagulation-related ED visit, Anticoagulation-related fatality    Comments:   INR still elevated, possibly r/t amiodarone. Call placed to patient and message left re: INR result, warfarin dosing/INR recheck date. He is to call warfarin clinic if he has any unusual bleeding/bruising, new changes in diet/meds/activity or questions.            OBJECTIVE    INR Point of Care   Date Value Ref Range Status   03/20/2020 3.8 (H) 0.86 - 1.14 Final     Comment:     This test is intended for monitoring Coumadin therapy.  Results are not   accurate in patients with prolonged INR due to factor deficiency.         ASSESSMENT / PLAN  INR assessment SUPRA ? amiodarone effect still   Recheck INR In: 2 WEEKS    INR Location Clinic      Anticoagulation Summary  As of 3/20/2020    INR goal:   2.0-3.0   TTR:   85.0 % (1 y)   INR used for dosing:   3.8! (3/20/2020)   Warfarin maintenance plan:   2.5 mg (5 mg x 0.5) every day   Full warfarin instructions:   3/20: Hold; 3/21: Hold; Otherwise 2.5 mg every day   Weekly warfarin total:   17.5 mg   Plan last modified:   Jodie Elena RN (3/20/2020)   Next INR check:   4/7/2020   Priority:   Maintenance   Target end date:   Indefinite    Indications    Pulmonary embolism and infarction (H) [I26.99]  Long-term (current) use of anticoagulants [Z79.01] [Z79.01]             Anticoagulation Episode Summary     INR  check location:       Preferred lab:       Send INR reminders to:   [facility] ANTICOAG POOL    Comments:         Anticoagulation Care Providers     Provider Role Specialty Phone number    Lorelei Felix MD [facility] [phone]            See the Encounter Report to view Anticoagulation Flowsheet and Dosing Calendar (Go to Encounters tab in chart review, and find the Anticoagulation Therapy Visit)        Jodie Elena RN

## 2020-03-21 ENCOUNTER — MYC REFILL (OUTPATIENT)
Dept: FAMILY MEDICINE | Facility: OTHER | Age: 75
End: 2020-03-21

## 2020-03-21 DIAGNOSIS — E78.5 HYPERLIPIDEMIA, UNSPECIFIED HYPERLIPIDEMIA TYPE: ICD-10-CM

## 2020-03-23 RX ORDER — FENOFIBRATE 48 MG/1
48 TABLET, COATED ORAL DAILY
Qty: 90 TABLET | Refills: 2 | Status: SHIPPED | OUTPATIENT
Start: 2020-03-23 | End: 2020-10-27

## 2020-03-26 ENCOUNTER — VIRTUAL VISIT (OUTPATIENT)
Dept: FAMILY MEDICINE | Facility: OTHER | Age: 75
End: 2020-03-26
Attending: FAMILY MEDICINE
Payer: COMMERCIAL

## 2020-03-26 ENCOUNTER — MYC MEDICAL ADVICE (OUTPATIENT)
Dept: FAMILY MEDICINE | Facility: OTHER | Age: 75
End: 2020-03-26

## 2020-03-26 DIAGNOSIS — N18.30 TYPE 2 DIABETES MELLITUS WITH STAGE 3 CHRONIC KIDNEY DISEASE, WITH LONG-TERM CURRENT USE OF INSULIN (H): ICD-10-CM

## 2020-03-26 DIAGNOSIS — E11.22 TYPE 2 DIABETES MELLITUS WITH STAGE 3 CHRONIC KIDNEY DISEASE, WITH LONG-TERM CURRENT USE OF INSULIN (H): ICD-10-CM

## 2020-03-26 DIAGNOSIS — E78.2 MIXED HYPERLIPIDEMIA: ICD-10-CM

## 2020-03-26 DIAGNOSIS — D64.9 ANEMIA, UNSPECIFIED TYPE: Primary | ICD-10-CM

## 2020-03-26 DIAGNOSIS — Z79.4 TYPE 2 DIABETES MELLITUS WITH STAGE 3 CHRONIC KIDNEY DISEASE, WITH LONG-TERM CURRENT USE OF INSULIN (H): ICD-10-CM

## 2020-03-26 DIAGNOSIS — I10 BENIGN ESSENTIAL HYPERTENSION: ICD-10-CM

## 2020-03-26 DIAGNOSIS — G25.81 RESTLESS LEGS SYNDROME: ICD-10-CM

## 2020-03-26 PROCEDURE — 99213 OFFICE O/P EST LOW 20 MIN: CPT | Mod: 95 | Performed by: FAMILY MEDICINE

## 2020-03-26 RX ORDER — FERROUS SULFATE 325(65) MG
325 TABLET ORAL 2 TIMES DAILY
Qty: 60 TABLET | Refills: 2 | Status: SHIPPED | OUTPATIENT
Start: 2020-03-26 | End: 2020-07-23

## 2020-03-26 RX ORDER — DOCUSATE SODIUM 100 MG/1
100 CAPSULE, LIQUID FILLED ORAL 2 TIMES DAILY
Qty: 60 CAPSULE | Refills: 2 | Status: SHIPPED | OUTPATIENT
Start: 2020-03-26 | End: 2020-07-07

## 2020-03-26 NOTE — PROGRESS NOTES
"Clement Voss is a 74 year old male who is being evaluated via a billable telephone visit.      The patient has been notified of following:     \"This telephone visit will be conducted via a call between you and your physician/provider. We have found that certain health care needs can be provided without the need for a physical exam.  This service lets us provide the care you need with a short phone conversation.  If a prescription is necessary we can send it directly to your pharmacy.  If lab work is needed we can place an order for that and you can then stop by our lab to have the test done at a later time.    If during the course of the call the physician/provider feels a telephone visit is not appropriate, you will not be charged for this service.\"     Clement Voss complains of   Chief Complaint   Patient presents with     Musculoskeletal Problem       I have reviewed and updated the patient's Past Medical History, Social History, Family History and Medication List.    ALLERGIES  Atorvastatin; Atorvastatin calcium; Barley grass; Iodine; Penicillins; Procaine; Shellfish-derived products; Sulfa drugs; and Sulfacetamide    Possible restless legs      Duration: 2 weeks    Description (location/character/radiation): legs feel like they need to move when he gets in bed, middle of the night, and even in the morning    Intensity:  mild    Accompanying signs and symptoms: Did not have these symptoms when he slept in the recliner and has cut back on caffeine    History (similar episodes/previous evaluation): None    Precipitating or alleviating factors: None    Therapies tried and outcome: None    Patient recently had heart surgery and had a low hemoglobin in the hospital.  Hemoglobin has been slowly improving (around 8 while at [facility], last check here on 3/13 was 9.9)          Assessment/Plan:  1. Anemia, unspecified type  Anemia and low iron levels can contribute to periodic leg movements/restless legs.  Before " Pt appeared to be sleeping comfortably in bed. VSS. No complaints of pain nor n/v. Voiding via chen catheter. CIV. Hgb stable at 9.3.   starting other medications, we will try increasing iron supplement to boost iron and hemoglobin levels.  Stool softener added as well.  Will have patient recheck hemoglobin and iron levels in April, when INR is due  - ferrous sulfate (FEROSUL) 325 (65 Fe) MG tablet; Take 1 tablet (325 mg) by mouth 2 times daily  Dispense: 60 tablet; Refill: 2  - docusate sodium (COLACE) 100 MG capsule; Take 1 capsule (100 mg) by mouth 2 times daily  Dispense: 60 capsule; Refill: 2    2. Restless legs syndrome  As above  - ferrous sulfate (FEROSUL) 325 (65 Fe) MG tablet; Take 1 tablet (325 mg) by mouth 2 times daily  Dispense: 60 tablet; Refill: 2      Will also add in diabetic labs with next lab draw, will discuss with patient once results return.    Phone call duration:  16 minutes    Lorelei Felix MD

## 2020-04-06 ENCOUNTER — MYC REFILL (OUTPATIENT)
Dept: FAMILY MEDICINE | Facility: OTHER | Age: 75
End: 2020-04-06

## 2020-04-06 DIAGNOSIS — M10.9 ACUTE GOUT, UNSPECIFIED CAUSE, UNSPECIFIED SITE: ICD-10-CM

## 2020-04-06 RX ORDER — INDOMETHACIN 50 MG/1
50 CAPSULE ORAL 3 TIMES DAILY
Qty: 60 CAPSULE | Refills: 1 | Status: CANCELLED | OUTPATIENT
Start: 2020-04-06

## 2020-04-07 ENCOUNTER — VIRTUAL VISIT (OUTPATIENT)
Dept: FAMILY MEDICINE | Facility: OTHER | Age: 75
End: 2020-04-07
Attending: FAMILY MEDICINE
Payer: COMMERCIAL

## 2020-04-07 ENCOUNTER — TRANSFERRED RECORDS (OUTPATIENT)
Dept: HEALTH INFORMATION MANAGEMENT | Facility: CLINIC | Age: 75
End: 2020-04-07

## 2020-04-07 ENCOUNTER — ANTICOAGULATION THERAPY VISIT (OUTPATIENT)
Dept: ANTICOAGULATION | Facility: OTHER | Age: 75
End: 2020-04-07
Attending: FAMILY MEDICINE
Payer: MEDICARE

## 2020-04-07 ENCOUNTER — TELEPHONE (OUTPATIENT)
Dept: FAMILY MEDICINE | Facility: OTHER | Age: 75
End: 2020-04-07

## 2020-04-07 DIAGNOSIS — Z79.4 TYPE 2 DIABETES MELLITUS WITH STAGE 3 CHRONIC KIDNEY DISEASE, WITH LONG-TERM CURRENT USE OF INSULIN (H): ICD-10-CM

## 2020-04-07 DIAGNOSIS — E11.22 TYPE 2 DIABETES MELLITUS WITH STAGE 3 CHRONIC KIDNEY DISEASE, WITH LONG-TERM CURRENT USE OF INSULIN (H): ICD-10-CM

## 2020-04-07 DIAGNOSIS — N18.30 TYPE 2 DIABETES MELLITUS WITH STAGE 3 CHRONIC KIDNEY DISEASE, WITH LONG-TERM CURRENT USE OF INSULIN (H): ICD-10-CM

## 2020-04-07 DIAGNOSIS — I10 BENIGN ESSENTIAL HYPERTENSION: ICD-10-CM

## 2020-04-07 DIAGNOSIS — Z79.01 LONG TERM CURRENT USE OF ANTICOAGULANT THERAPY: ICD-10-CM

## 2020-04-07 DIAGNOSIS — G25.81 RESTLESS LEGS SYNDROME: Primary | ICD-10-CM

## 2020-04-07 DIAGNOSIS — E78.2 MIXED HYPERLIPIDEMIA: ICD-10-CM

## 2020-04-07 DIAGNOSIS — I26.99 PULMONARY EMBOLISM AND INFARCTION (H): ICD-10-CM

## 2020-04-07 DIAGNOSIS — D64.9 ANEMIA, UNSPECIFIED TYPE: ICD-10-CM

## 2020-04-07 DIAGNOSIS — Z95.1 S/P CABG X 2: ICD-10-CM

## 2020-04-07 LAB — INR PPP: 2.5 (ref 0.9–1.1)

## 2020-04-07 PROCEDURE — 99213 OFFICE O/P EST LOW 20 MIN: CPT | Mod: TEL | Performed by: FAMILY MEDICINE

## 2020-04-07 NOTE — PROGRESS NOTES
ANTICOAGULATION FOLLOW-UP CLINIC VISIT    Patient Name:  Clement Voss  Date:  2020  Contact Type:  Telephone/ message left on home phone voicemail    SUBJECTIVE:  Patient Findings     Comments:   INR done by outside lab and result faxed to warfarin clinic. Call placed to patient and message left on voicemail re: INR result, warfarin dosing/INR recheck date. He is to call warfarin clinic if he has any unusual bleeding/bruising, changes in diet/meds/activity, questions or illness        Clinical Outcomes     Negatives:   Major bleeding event, Thromboembolic event, Anticoagulation-related hospital admission, Anticoagulation-related ED visit, Anticoagulation-related fatality    Comments:   INR done by outside lab and result faxed to warfarin clinic. Call placed to patient and message left on voicemail re: INR result, warfarin dosing/INR recheck date. He is to call warfarin clinic if he has any unusual bleeding/bruising, changes in diet/meds/activity, questions or illness           OBJECTIVE    INR   Date Value Ref Range Status   2020 2.5 (A) 0.90 - 1.10 Final       ASSESSMENT / PLAN  INR assessment THER    Recheck INR In: 7 WEEKS    INR Location Outside lab      Anticoagulation Summary  As of 2020    INR goal:   2.0-3.0   TTR:   82.0 % (1 y)   INR used for dosin.5 (2020)   Warfarin maintenance plan:   2.5 mg (5 mg x 0.5) every day   Full warfarin instructions:   2.5 mg every day   Weekly warfarin total:   17.5 mg   No change documented:   Jodie Elena RN   Plan last modified:   Jodie Elena RN (3/20/2020)   Next INR check:   2020   Priority:   High   Target end date:   Indefinite    Indications    Pulmonary embolism and infarction (H) [I26.99]  Long-term (current) use of anticoagulants [Z79.01] [Z79.01]             Anticoagulation Episode Summary     INR check location:       Preferred lab:       Send INR reminders to:   [facility] ANTICOAG POOL    Comments:         Anticoagulation Care  Providers     Provider Role Specialty Phone number    Lorelei Felix MD [facility] [phone]            See the Encounter Report to view Anticoagulation Flowsheet and Dosing Calendar (Go to Encounters tab in chart review, and find the Anticoagulation Therapy Visit)        Jodie Elena RN

## 2020-04-07 NOTE — PROGRESS NOTES
Spoke to [facility] and faxed order's to [phone] per facility. Facility did state d/t cut backs R/T COVID-19 a confirmation call may not come until next week. Confirmation of fax received and patient notified.

## 2020-04-07 NOTE — TELEPHONE ENCOUNTER
Patient having restless legs and getting no sleep, would like an appointment call at [phone] to let know if can be seen

## 2020-04-07 NOTE — PROGRESS NOTES
"Subjective     Clement Voss is a 74 year old male who is being evaluated via a billable telephone visit.      The patient has been notified of following:     \"This telephone visit will be conducted via a call between you and your physician/provider. We have found that certain health care needs can be provided without the need for a physical exam.  This service lets us provide the care you need with a short phone conversation.  If a prescription is necessary we can send it directly to your pharmacy.  If lab work is needed we can place an order for that and you can then stop by our lab to have the test done at a later time.    Telephone visits are billed at different rates depending on your insurance coverage. During this emergency period, for some insurers they may be billed the same as an in-person visit.  Please reach out to your insurance provider with any questions.    If during the course of the call the physician/provider feels a telephone visit is not appropriate, you will not be charged for this service.\"    Patient has given verbal consent for Telephone visit?  Yes    Clement Voss complains of   Chief Complaint   Patient presents with     Sleep Problem     restless legs, lack of sleep        ALLERGIES  Atorvastatin; Atorvastatin calcium; Barley grass; Iodine; Penicillins; Procaine; Shellfish-derived products; Sulfa drugs; and Sulfacetamide    Insomnia/restless legs  Onset: 2-3 weeks    Description:   Time to fall asleep (sleep latency): sometimes all night  Middle of night awakening:  YES  Early morning awakening:  YES    Progression of Symptoms:  worsening    Accompanying Signs & Symptoms:  Daytime sleepiness/napping: YES  Excessive snoring/apnea: no   Restless legs: YES  Frequent urination: YES- every couple hours  Chronic pain:  no     History:  Prior Insomnia: no    Precipitating factors:   New stressful situation: YES  Caffeine intake: no   OTC decongestants: no   Any new medications: " no    Alleviating factors:  Self medicating (alcohol, etc.):  no    Therapies Tried and outcome: walking      Patient's wife wonders if compression stockings are too tight.  He feels like he is scraping skin off trying to get them on.    Patient is also due for labs for chronic conditions.  He is willing to have labs completed at [facility].         Patient Active Problem List   Diagnosis     Type 2 diabetes mellitus with chronic kidney disease, with long-term current use of insulin (H)     Mixed hyperlipidemia     Benign essential hypertension     Gout     Pulmonary embolism and infarction (H)     ACP (advance care planning)     Long-term (current) use of anticoagulants [Z79.01]     Morbid obesity (H)     CKD (chronic kidney disease) stage 3, GFR 30-59 ml/min (H)     Abnormal stress test on 1/10/2020     Regional wall motion abnormality of heart     Diastolic dysfunction grade 2 on 1/20/2020     Severe aortic valve regurgitation     Moderate mitral regurgitation     Ascending aortic aneurysm-severe     Anticoagulated on Coumadin     History of DVT on 12/30/2013     History of pulmonary embolism     History of tobacco abuse quitting 8/12/1964     Aortic root aneurysm (H)     ASCVD (arteriosclerotic cardiovascular disease)     CALDERON (dyspnea on exertion)     Murmur     Other dysphagia     Postoperative anemia due to acute blood loss     S/P aortic valve replacement     S/P CABG x 2     Past Surgical History:   Procedure Laterality Date     APPENDECTOMY  2008     CHOLECYSTECTOMY  1984     COLONOSCOPY N/A 5/16/2019    Procedure: COLONOSCOPY;  Surgeon: Benito Saldana MD;  Location: [facility]     History of PE of right lung 3/2013[       PHACOEMULSIFICATION WITH STANDARD INTRAOCULAR LENS IMPLANT Left 4/19/2018    Procedure: PHACOEMULSIFICATION WITH STANDARD INTRAOCULAR LENS IMPLANT;  CATARACT EXTRACTION LEFT EYE WITH IMPLANT, ISTENT LEFT EYE;  Surgeon: Kush Almaraz MD;  Location: [facility] OR      PHACOEMULSIFICATION WITH STANDARD INTRAOCULAR LENS IMPLANT Right 5/3/2018    Procedure: PHACOEMULSIFICATION WITH STANDARD INTRAOCULAR LENS IMPLANT;  CATARACT EXTRACTION RIGHT EYE WITH IMPLANT,WITH ISTENT ATTEMPTED;  Surgeon: Kush Almaraz MD;  Location: [facility] OR     TRABECULAR MICRO-BYPASS WITH ISTENT Left 2018    Procedure: TRABECULAR MICRO-BYPASS WITH ISTENT;;  Surgeon: Kush Almaraz MD;  Location: [facility]     TRABECULAR MICRO-BYPASS WITH ISTENT  5/3/2018    Procedure: TRABECULAR MICRO-BYPASS WITH ISTENT;;  Surgeon: Kush Almaraz MD;  Location: [facility] OR       Social History     Tobacco Use     Smoking status: Former Smoker     Types: Cigarettes     Last attempt to quit: 1964     Years since quittin.6     Smokeless tobacco: Never Used   Substance Use Topics     Alcohol use: No     Family History   Problem Relation Age of Onset     Heart Disease Father 71        heart disease; cause of death     Other - See Comments Mother 79        old age; cause of death         Current Outpatient Medications   Medication Sig Dispense Refill     acetaminophen (TYLENOL) 500 MG tablet Take 1-2 tablets (500-1,000 mg) by mouth every 6 hours as needed       amiodarone (PACERONE) 200 MG tablet Take 200 mg by mouth       aspirin 81 MG EC tablet Take 1 tablet (81 mg) by mouth daily 90 tablet 3     blood glucose (ACCU-CHEK VLAD PLUS) test strip USE TO TEST BLOOD SUGARS THREE TIMES DAILY OR AS DIRECTED 200 strip 3     docusate sodium (COLACE) 100 MG capsule Take 1 capsule (100 mg) by mouth 2 times daily 60 capsule 2     dorzolamide-timolol (COSOPT) 2-0.5 % ophthalmic solution Inject 1 drop into the eye       fenofibrate (TRICOR) 48 MG tablet Take 1 tablet (48 mg) by mouth daily 90 tablet 2     ferrous sulfate (FEROSUL) 325 (65 Fe) MG tablet Take 1 tablet (325 mg) by mouth 2 times daily 60 tablet 2     fluticasone (FLONASE) 50 MCG/ACT nasal spray Spray 2 sprays into both nostrils daily (Patient taking differently:  Spray 2 sprays into both nostrils daily as needed ) 1 Package 0     indomethacin (INDOCIN) 50 MG capsule Take 1 capsule (50 mg) by mouth 3 times daily With food as needed 60 capsule 1     insulin glargine (LANTUS PEN) 100 UNIT/ML pen Inject 33 Units Subcutaneous At Bedtime 15 mL 1     loratadine (CLARITIN) 10 MG tablet Take 10 mg by mouth daily.       losartan (COZAAR) 25 MG tablet Take 25 mg by mouth       order for DME Equipment being ordered: Knee high compression stockings - 20-30 mmHg 2 Device 1     sitagliptin (JANUVIA) 100 MG tablet Take 1 tablet (100 mg) by mouth daily 90 tablet 1     TRAVATAN Z (TRAVATAN Z) 0.004 % ophthalmic solution Instill 1 drop by ophthalmic route every day into affected eye(s) in the evening       warfarin (COUMADIN) 5 MG tablet 2.5mg (1/2 pill) Mon, Fri and 5mg (1 pill) all other days or as directed by warfarin clinic 90 tablet 3     clindamycin (CLEOCIN) 300 MG capsule Take 300 mg by mouth 4 times daily       famotidine (PEPCID) 20 MG tablet Take 20 mg by mouth       furosemide (LASIX) 20 MG tablet Take 20 mg by mouth       insulin pen needle (B-D U/F) 31G X 5 MM miscellaneous by Device route See Admin Instructions Use pen needles daily or as directed. 100 each 1     metoprolol tartrate (LOPRESSOR) 25 MG tablet Take 25 mg by mouth       STATIN NOT PRESCRIBED, INTENTIONAL, Statin not prescribed intentionally due to Intolerance (with supporting documentation of trying a statin at least once within the last 5 years) 0 each 0     Allergies   Allergen Reactions     Atorvastatin      Other reaction(s): Other  Caused increased creatinine.      Atorvastatin Calcium Other (See Comments)     Lipitor  Increase CR     Barley Grass Unknown     Iodine      Penicillins      Procaine Unknown     Shellfish-Derived Products Unknown     Sulfa Drugs      Sulfa (sulfonamide antibiotics)     Sulfacetamide        Reviewed and updated as needed this visit by Provider  Tobacco  Allergies  Meds  Problems   Med Hx  Surg Hx  Fam Hx         Review of Systems   ROS COMP: Constitutional, HEENT, cardiovascular, pulmonary, gi and gu systems are negative, except as otherwise noted.       Objective   Reported vitals:  There were no vitals taken for this visit.   Psych: Alert and oriented times 3; coherent speech, normal   rate and volume, able to articulate logical thoughts, able   to abstract reason, no tangential thoughts, no hallucinations   or delusions  His affect is normal     Diagnostic Test Results:  none         Assessment/Plan:  1. Restless legs syndrome  Will check labs as listed.  I did offer medications, but wife and patient decline, as they would like to rule out other causes first.  Follow-up with lab results when available.  - Magnesium; Future    2. Anemia, unspecified type  As above.  - CBC with platelets; Future  - Ferritin; Future    3. Type 2 diabetes mellitus with stage 3 chronic kidney disease, with long-term current use of insulin (H)  Labs ordered.  Patient will send in BG readings.  - Hemoglobin A1c; Future    4. Mixed hyperlipidemia  Labs ordered.  - Lipid Profile; Future    5. Benign essential hypertension  Labs ordered.  - Basic metabolic panel; Future    6. S/P CABG x 2  New compression stockings sent with lighter weight compression.  - order for DME; Equipment being ordered: Knee high compression stockings - 20-30 mmHg  Dispense: 2 Device; Refill: 1    Return if symptoms worsen or fail to improve.      Phone call duration:  22 minutes    Lorelei Felix MD

## 2020-04-07 NOTE — TELEPHONE ENCOUNTER
APPROVAL - PA request received from [employer] for Indomethacin 50MG Capsules. Submitted request through [facility] and received immediate approval from [employer]. Approval dates 3/8/20 through 4/7/21. Pharmacy advised. Documentation scanned to Epic.   No

## 2020-04-07 NOTE — Clinical Note
Unable to change order class to external (not an option) - please print and send orders for CBC, ferritin, HgbA1c, BMP, lipid, and magnesium to [facility].  After receipt is confirmed at [facility], please let patient know (phone call or MyChart message).  Thank you.

## 2020-04-09 ENCOUNTER — ANTICOAGULATION THERAPY VISIT (OUTPATIENT)
Dept: ANTICOAGULATION | Facility: OTHER | Age: 75
End: 2020-04-09

## 2020-04-09 DIAGNOSIS — Z79.01 LONG TERM CURRENT USE OF ANTICOAGULANT THERAPY: ICD-10-CM

## 2020-04-09 DIAGNOSIS — I26.99 PULMONARY EMBOLISM AND INFARCTION (H): ICD-10-CM

## 2020-04-09 RX ORDER — WARFARIN SODIUM 2.5 MG/1
TABLET ORAL
Qty: 100 TABLET | Refills: 1 | Status: SHIPPED | OUTPATIENT
Start: 2020-04-09 | End: 2021-07-13

## 2020-04-09 NOTE — PROGRESS NOTES
ANTICOAGULATION FOLLOW-UP CLINIC VISIT    Patient Name:  Clement Voss  Date:  4/9/2020  Contact Type:  Telephone    SUBJECTIVE:  Patient Findings     Positives:   Change in medications (indocin for gout)    Comments:   Message from Dr. Good that patient has flare up of his gout and will be taking some Indocin. Call placed to patient to speak to him re: Indocin use. He states he usually just takes 1 tablet daily which helps greatly. He has never taken one pill three times a day. Based on his reported use of indocin, warfarin dosing changed slightly and 2.5mg size pills ordered for him through pharmacy of choice. He verbalized understanding of warfarin dosing changes and INR recheck date change and has no questions. He will let me know on 4/20/20 if he is continuing to take the indocin.         Clinical Outcomes     Negatives:   Major bleeding event, Thromboembolic event, Anticoagulation-related hospital admission, Anticoagulation-related ED visit, Anticoagulation-related fatality    Comments:   Message from Dr. Good that patient has flare up of his gout and will be taking some Indocin. Call placed to patient to speak to him re: Indocin use. He states he usually just takes 1 tablet daily which helps greatly. He has never taken one pill three times a day. Based on his reported use of indocin, warfarin dosing changed slightly and 2.5mg size pills ordered for him through pharmacy of choice. He verbalized understanding of warfarin dosing changes and INR recheck date change and has no questions. He will let me know on 4/20/20 if he is continuing to take the indocin.            OBJECTIVE    INR   Date Value Ref Range Status   04/07/2020 2.5 (A) 0.90 - 1.10 Final       ASSESSMENT / PLAN  No question data found.  Anticoagulation Summary  As of 4/9/2020    INR goal:   2.0-3.0   TTR:   81.8 % (1 y)   INR used for dosing:   No new INR was available at the time of this encounter.   Warfarin maintenance plan:   2.5 mg  (5 mg x 0.5) every day   Full warfarin instructions:   4/12: 1.25 mg; 4/17: 1.25 mg; Otherwise 2.5 mg every day   Weekly warfarin total:   17.5 mg   Plan last modified:   Jodie Elena RN (4/9/2020)   Next INR check:   4/30/2020   Priority:   High   Target end date:   Indefinite    Indications    Pulmonary embolism and infarction (H) [I26.99]  Long-term (current) use of anticoagulants [Z79.01] [Z79.01]             Anticoagulation Episode Summary     INR check location:       Preferred lab:       Send INR reminders to:   [facility] ANTICOAG POOL    Comments:         Anticoagulation Care Providers     Provider Role Specialty Phone number    Lorelei Felix MD [facility] [phone]            See the Encounter Report to view Anticoagulation Flowsheet and Dosing Calendar (Go to Encounters tab in chart review, and find the Anticoagulation Therapy Visit)        Jodie Elena RN

## 2020-04-13 ENCOUNTER — TRANSFERRED RECORDS (OUTPATIENT)
Dept: HEALTH INFORMATION MANAGEMENT | Facility: CLINIC | Age: 75
End: 2020-04-13

## 2020-04-16 ENCOUNTER — MYC REFILL (OUTPATIENT)
Dept: FAMILY MEDICINE | Facility: OTHER | Age: 75
End: 2020-04-16

## 2020-04-16 DIAGNOSIS — E11.22 TYPE 2 DIABETES MELLITUS WITH STAGE 3 CHRONIC KIDNEY DISEASE, WITH LONG-TERM CURRENT USE OF INSULIN (H): ICD-10-CM

## 2020-04-16 DIAGNOSIS — Z79.4 TYPE 2 DIABETES MELLITUS WITH STAGE 3 CHRONIC KIDNEY DISEASE, WITH LONG-TERM CURRENT USE OF INSULIN (H): ICD-10-CM

## 2020-04-16 DIAGNOSIS — N18.30 TYPE 2 DIABETES MELLITUS WITH STAGE 3 CHRONIC KIDNEY DISEASE, WITH LONG-TERM CURRENT USE OF INSULIN (H): ICD-10-CM

## 2020-04-16 RX ORDER — FLURBIPROFEN SODIUM 0.3 MG/ML
SOLUTION/ DROPS OPHTHALMIC SEE ADMIN INSTRUCTIONS
Qty: 100 EACH | Refills: 1 | Status: SHIPPED | OUTPATIENT
Start: 2020-04-16 | End: 2020-11-23

## 2020-04-30 ENCOUNTER — ANTICOAGULATION THERAPY VISIT (OUTPATIENT)
Dept: ANTICOAGULATION | Facility: OTHER | Age: 75
End: 2020-04-30
Attending: FAMILY MEDICINE
Payer: MEDICARE

## 2020-04-30 DIAGNOSIS — Z79.01 LONG TERM CURRENT USE OF ANTICOAGULANT THERAPY: ICD-10-CM

## 2020-04-30 DIAGNOSIS — I26.99 PULMONARY EMBOLISM AND INFARCTION (H): ICD-10-CM

## 2020-04-30 LAB — INR PPP: 1.9 (ref 0.9–1.1)

## 2020-04-30 NOTE — PROGRESS NOTES
"ANTICOAGULATION FOLLOW-UP CLINIC VISIT    Patient Name:  Clement oVss  Date:  2020  Contact Type:  Telephone    SUBJECTIVE:  Patient Findings     Comments:   INR done by [facility] lab and result faxed to warfarin clinic. Call placed to patient and spoke to him re: INR result, warfarin dosing/INR recheck date. He states he is all done with the \"gout medicine\" he was taking. He has no new changes in diet/meds/activity, no bleeding/bruising. He verbalized understanding of warfarin dosing and INR recheck instructions and has no questions. He will notify warfarin clinic if he has any new changes/issues/illness        Clinical Outcomes     Negatives:   Major bleeding event, Thromboembolic event, Anticoagulation-related hospital admission, Anticoagulation-related ED visit, Anticoagulation-related fatality    Comments:   INR done by [facility] lab and result faxed to warfarin clinic. Call placed to patient and spoke to him re: INR result, warfarin dosing/INR recheck date. He states he is all done with the \"gout medicine\" he was taking. He has no new changes in diet/meds/activity, no bleeding/bruising. He verbalized understanding of warfarin dosing and INR recheck instructions and has no questions. He will notify warfarin clinic if he has any new changes/issues/illness           OBJECTIVE    INR   Date Value Ref Range Status   2020 1.9 (A) 0.90 - 1.10 Final       ASSESSMENT / PLAN  INR assessment SUB dose cut previously for Indocin use for gout   Recheck INR In: 7 WEEKS    INR Location Outside lab      Anticoagulation Summary  As of 2020    INR goal:   2.0-3.0   TTR:   84.8 % (1 y)   INR used for dosin.9! (2020)   Warfarin maintenance plan:   2.5 mg (5 mg x 0.5) every day   Full warfarin instructions:   : 3.75 mg; Otherwise 2.5 mg every day   Weekly warfarin total:   17.5 mg   Plan last modified:   Jodie Elena RN (2020)   Next INR check:   2020   Priority:   High "   Target end date:   Indefinite    Indications    Pulmonary embolism and infarction (H) [I26.99]  Long-term (current) use of anticoagulants [Z79.01] [Z79.01]             Anticoagulation Episode Summary     INR check location:       Preferred lab:       Send INR reminders to:   [facility] ANTICOAG POOL    Comments:         Anticoagulation Care Providers     Provider Role Specialty Phone number    Lorelei Felix MD Referring [facility] [phone]            See the Encounter Report to view Anticoagulation Flowsheet and Dosing Calendar (Go to Encounters tab in chart review, and find the Anticoagulation Therapy Visit)        Jodie Elena RN

## 2020-05-01 DIAGNOSIS — Z79.4 TYPE 2 DIABETES MELLITUS WITH STAGE 3 CHRONIC KIDNEY DISEASE, WITH LONG-TERM CURRENT USE OF INSULIN (H): ICD-10-CM

## 2020-05-01 DIAGNOSIS — E11.22 TYPE 2 DIABETES MELLITUS WITH STAGE 3 CHRONIC KIDNEY DISEASE, WITH LONG-TERM CURRENT USE OF INSULIN (H): ICD-10-CM

## 2020-05-01 DIAGNOSIS — N18.30 TYPE 2 DIABETES MELLITUS WITH STAGE 3 CHRONIC KIDNEY DISEASE, WITH LONG-TERM CURRENT USE OF INSULIN (H): ICD-10-CM

## 2020-05-01 RX ORDER — SITAGLIPTIN 100 MG/1
TABLET, FILM COATED ORAL
Qty: 90 TABLET | Refills: 1 | Status: SHIPPED | OUTPATIENT
Start: 2020-05-01 | End: 2020-11-02

## 2020-05-01 NOTE — TELEPHONE ENCOUNTER
sitagliptin (JANUVIA) 100 MG tablet 100 mg, DAILY     Last Written Prescription Date:  10/11/19  Last Fill Quantity: 90,   # refills: 1  Last Office Visit: 4/7/20 VV  Future Office visit:       Routing refill request to provider for review/approval because:  Medication is reported/historical

## 2020-05-01 NOTE — TELEPHONE ENCOUNTER
JANUVIA 100 MG tablet     Routing refill request to provider for review/approval because:  DPP4 Inhibitors Protocol Failed    Normal serum creatinine in past 12 months    Creatinine   Date Value Ref Range Status   03/13/2020 1.67 (H) 0.66 - 1.25 mg/dL Final

## 2020-05-05 ENCOUNTER — TELEPHONE (OUTPATIENT)
Dept: FAMILY MEDICINE | Facility: OTHER | Age: 75
End: 2020-05-05

## 2020-05-05 NOTE — TELEPHONE ENCOUNTER
His hemoglobin is still just a tiny bit low, I would continue current iron at twice daily for about one more month, then he can decrease to once daily with one stool softener daily.

## 2020-05-05 NOTE — TELEPHONE ENCOUNTER
Called and left message to return call to let pt know all his labs that were drawn at  were stable. Per Dr Felix a copy was also mailed to him.

## 2020-05-05 NOTE — TELEPHONE ENCOUNTER
Patient calling back and notified of note below that his labs that were drawn at [facility] were stable. Patient verbalizes understanding. Patient is wanting to know if there is a change in his iron dose or if he is to continue taking this along with the stool softener?  Please advise, thank you.

## 2020-06-17 ENCOUNTER — TRANSFERRED RECORDS (OUTPATIENT)
Dept: HEALTH INFORMATION MANAGEMENT | Facility: CLINIC | Age: 75
End: 2020-06-17

## 2020-06-18 ENCOUNTER — TRANSFERRED RECORDS (OUTPATIENT)
Dept: HEALTH INFORMATION MANAGEMENT | Facility: CLINIC | Age: 75
End: 2020-06-18

## 2020-06-18 ENCOUNTER — ANTICOAGULATION THERAPY VISIT (OUTPATIENT)
Dept: FAMILY MEDICINE | Facility: OTHER | Age: 75
End: 2020-06-18

## 2020-06-18 DIAGNOSIS — I26.99 PULMONARY EMBOLISM AND INFARCTION (H): ICD-10-CM

## 2020-06-18 DIAGNOSIS — Z79.01 LONG TERM CURRENT USE OF ANTICOAGULANT THERAPY: ICD-10-CM

## 2020-06-18 LAB — INR PPP: 1.5 (ref 0.9–1.1)

## 2020-06-18 NOTE — PROGRESS NOTES
ANTICOAGULATION FOLLOW-UP CLINIC VISIT    Patient Name:  Clement Voss  Date:  2020  Contact Type:  Telephone/ message left on home phoe voicemail    SUBJECTIVE:  Patient Findings     Comments:   INR done by [facility] lab. Call placed to patient and message left re: INR result, warfarin dosing/INR recheck date. He is to call warfarin clinic if he has any unusual bleeding/bruising, new changes in diet/meds/activity, questions or illness        Clinical Outcomes     Negatives:   Major bleeding event, Thromboembolic event, Anticoagulation-related hospital admission, Anticoagulation-related ED visit, Anticoagulation-related fatality    Comments:   INR done by [facility] lab. Call placed to patient and message left re: INR result, warfarin dosing/INR recheck date. He is to call warfarin clinic if he has any unusual bleeding/bruising, new changes in diet/meds/activity, questions or illness           OBJECTIVE    Recent labs: (last 7 days)     20   INR 1.5*       ASSESSMENT / PLAN  INR assessment SUB    Recheck INR In: 2 WEEKS    INR Location Outside lab      Anticoagulation Summary  As of 2020    INR goal:   2.0-3.0   TTR:   77.1 % (1 y)   INR used for dosin.5! (2020)   Warfarin maintenance plan:   2.5 mg (5 mg x 0.5) every day   Full warfarin instructions:   : 6.25 mg; Otherwise 2.5 mg every day   Weekly warfarin total:   17.5 mg   Plan last modified:   Jodie Elena RN (2020)   Next INR check:   2020   Priority:   High   Target end date:   Indefinite    Indications    Pulmonary embolism and infarction (H) [I26.99]  Long-term (current) use of anticoagulants [Z79.01] [Z79.01]             Anticoagulation Episode Summary     INR check location:       Preferred lab:       Send INR reminders to:   THOMAS RUELAS    Comments:         Anticoagulation Care Providers     Provider Role Specialty Phone number    Lorelei Felix MD Referring [facility] [phone]             See the Encounter Report to view Anticoagulation Flowsheet and Dosing Calendar (Go to Encounters tab in chart review, and find the Anticoagulation Therapy Visit)        Jodie Elena RN

## 2020-06-29 ENCOUNTER — TRANSFERRED RECORDS (OUTPATIENT)
Dept: HEALTH INFORMATION MANAGEMENT | Facility: CLINIC | Age: 75
End: 2020-06-29

## 2020-07-02 ENCOUNTER — TRANSFERRED RECORDS (OUTPATIENT)
Dept: HEALTH INFORMATION MANAGEMENT | Facility: CLINIC | Age: 75
End: 2020-07-02

## 2020-07-02 ENCOUNTER — ANTICOAGULATION THERAPY VISIT (OUTPATIENT)
Dept: ANTICOAGULATION | Facility: OTHER | Age: 75
End: 2020-07-02

## 2020-07-02 DIAGNOSIS — Z79.01 LONG TERM CURRENT USE OF ANTICOAGULANT THERAPY: ICD-10-CM

## 2020-07-02 DIAGNOSIS — I26.99 PULMONARY EMBOLISM AND INFARCTION (H): ICD-10-CM

## 2020-07-02 LAB — INR PPP: 1.5 (ref 0.9–1.1)

## 2020-07-02 NOTE — PROGRESS NOTES
ANTICOAGULATION FOLLOW-UP CLINIC VISIT    Patient Name:  Clement Voss  Date:  2020  Contact Type:  Telephone/ message left on home voicemail RE: Warfarin dosing/INR recheck date     SUBJECTIVE:  Patient Findings     Comments:   Received INR results from lab. Call placed to patient and message left on home voicemail re: INR results, warfarin dosing/INR recheck date. Patient to return call to warfarin clinic with changes in bruising/bleeding, new changes in diet/meds/activity or questions.           Clinical Outcomes     Negatives:   Major bleeding event, Thromboembolic event, Anticoagulation-related hospital admission, Anticoagulation-related ED visit, Anticoagulation-related fatality    Comments:   Received INR results from lab. Call placed to patient and message left on home voicemail re: INR results, warfarin dosing/INR recheck date. Patient to return call to warfarin clinic with changes in bruising/bleeding, new changes in diet/meds/activity or questions.              OBJECTIVE    Recent labs: (last 7 days)     20   INR 1.5*       ASSESSMENT / PLAN  INR assessment SUB    Recheck INR In: 2 WEEKS    INR Location Clinic      Anticoagulation Summary  As of 2020    INR goal:   2.0-3.0   TTR:   73.2 % (1 y)   INR used for dosin.5! (2020)   Warfarin maintenance plan:   5 mg (5 mg x 1) every Fri; 2.5 mg (5 mg x 0.5) all other days   Full warfarin instructions:   : 7.5 mg; 7/3: 5 mg; Otherwise 5 mg every Fri; 2.5 mg all other days   Weekly warfarin total:   20 mg   Plan last modified:   Brenda Han RN (2020)   Next INR check:   2020   Priority:   High   Target end date:   Indefinite    Indications    Pulmonary embolism and infarction (H) [I26.99]  Long-term (current) use of anticoagulants [Z79.01] [Z79.01]             Anticoagulation Episode Summary     INR check location:       Preferred lab:       Send INR reminders to:   THOMAS RUELAS    Comments:         Anticoagulation Care  Providers     Provider Role Specialty Phone number    Lorelei Felix MD Referring [facility] [phone]            See the Encounter Report to view Anticoagulation Flowsheet and Dosing Calendar (Go to Encounters tab in chart review, and find the Anticoagulation Therapy Visit)        Brenda Han RN

## 2020-07-03 DIAGNOSIS — D64.9 ANEMIA, UNSPECIFIED TYPE: ICD-10-CM

## 2020-07-06 NOTE — TELEPHONE ENCOUNTER
Stool softener 100 MG      Last Written Prescription Date:  3-  Last Fill Quantity: 60,   # refills: 2  Last Office Visit: 4-7-2020  Future Office visit:

## 2020-07-07 RX ORDER — DOCUSATE SODIUM 100 MG
CAPSULE ORAL
Qty: 60 CAPSULE | Refills: 2 | Status: SHIPPED | OUTPATIENT
Start: 2020-07-07 | End: 2020-12-18

## 2020-07-16 ENCOUNTER — ANTICOAGULATION THERAPY VISIT (OUTPATIENT)
Dept: ANTICOAGULATION | Facility: OTHER | Age: 75
End: 2020-07-16
Attending: FAMILY MEDICINE
Payer: MEDICARE

## 2020-07-16 ENCOUNTER — ALLIED HEALTH/NURSE VISIT (OUTPATIENT)
Dept: FAMILY MEDICINE | Facility: OTHER | Age: 75
End: 2020-07-16
Attending: FAMILY MEDICINE
Payer: MEDICARE

## 2020-07-16 ENCOUNTER — HOSPITAL ENCOUNTER (EMERGENCY)
Facility: HOSPITAL | Age: 75
Discharge: HOME | End: 2020-07-16
Attending: NURSE PRACTITIONER | Admitting: NURSE PRACTITIONER
Payer: MEDICARE

## 2020-07-16 ENCOUNTER — TELEPHONE (OUTPATIENT)
Dept: FAMILY MEDICINE | Facility: OTHER | Age: 75
End: 2020-07-16

## 2020-07-16 VITALS
OXYGEN SATURATION: 97 % | RESPIRATION RATE: 16 BRPM | TEMPERATURE: 97.7 F | SYSTOLIC BLOOD PRESSURE: 162 MMHG | DIASTOLIC BLOOD PRESSURE: 91 MMHG

## 2020-07-16 DIAGNOSIS — S81.809A OPEN WOUND OF LOWER LIMB: Primary | ICD-10-CM

## 2020-07-16 DIAGNOSIS — I26.99 PULMONARY EMBOLISM AND INFARCTION (H): ICD-10-CM

## 2020-07-16 DIAGNOSIS — G25.81 RESTLESS LEGS SYNDROME: ICD-10-CM

## 2020-07-16 DIAGNOSIS — Z79.01 LONG TERM CURRENT USE OF ANTICOAGULANT THERAPY: ICD-10-CM

## 2020-07-16 DIAGNOSIS — I10 BENIGN ESSENTIAL HYPERTENSION: ICD-10-CM

## 2020-07-16 DIAGNOSIS — E78.2 MIXED HYPERLIPIDEMIA: ICD-10-CM

## 2020-07-16 DIAGNOSIS — D64.9 ANEMIA, UNSPECIFIED TYPE: ICD-10-CM

## 2020-07-16 DIAGNOSIS — N18.30 TYPE 2 DIABETES MELLITUS WITH STAGE 3 CHRONIC KIDNEY DISEASE, WITH LONG-TERM CURRENT USE OF INSULIN (H): ICD-10-CM

## 2020-07-16 DIAGNOSIS — S81.801A OPEN WOUND OF RIGHT LOWER EXTREMITY, INITIAL ENCOUNTER: ICD-10-CM

## 2020-07-16 DIAGNOSIS — I82.409 ACUTE THROMBOEMBOLISM OF DEEP VEINS OF LOWER EXTREMITY, UNSPECIFIED LATERALITY (H): ICD-10-CM

## 2020-07-16 DIAGNOSIS — E11.22 TYPE 2 DIABETES MELLITUS WITH STAGE 3 CHRONIC KIDNEY DISEASE, WITH LONG-TERM CURRENT USE OF INSULIN (H): ICD-10-CM

## 2020-07-16 DIAGNOSIS — Z79.4 TYPE 2 DIABETES MELLITUS WITH STAGE 3 CHRONIC KIDNEY DISEASE, WITH LONG-TERM CURRENT USE OF INSULIN (H): ICD-10-CM

## 2020-07-16 LAB
ANION GAP SERPL CALCULATED.3IONS-SCNC: 5 MMOL/L (ref 3–14)
BUN SERPL-MCNC: 20 MG/DL (ref 7–30)
CALCIUM SERPL-MCNC: 9.1 MG/DL (ref 8.5–10.1)
CHLORIDE SERPL-SCNC: 109 MMOL/L (ref 94–109)
CHOLEST SERPL-MCNC: 154 MG/DL
CO2 SERPL-SCNC: 27 MMOL/L (ref 20–32)
CREAT SERPL-MCNC: 1.55 MG/DL (ref 0.66–1.25)
ERYTHROCYTE [DISTWIDTH] IN BLOOD BY AUTOMATED COUNT: 16.9 % (ref 10–15)
EST. AVERAGE GLUCOSE BLD GHB EST-MCNC: 163 MG/DL
FERRITIN SERPL-MCNC: 210 NG/ML (ref 26–388)
GFR SERPL CREATININE-BSD FRML MDRD: 43 ML/MIN/{1.73_M2}
GLUCOSE SERPL-MCNC: 186 MG/DL (ref 70–99)
HBA1C MFR BLD: 7.3 % (ref 0–5.6)
HCT VFR BLD AUTO: 42.1 % (ref 40–53)
HDLC SERPL-MCNC: 44 MG/DL
HGB BLD-MCNC: 14.2 G/DL (ref 13.3–17.7)
INR BLD: 2.1 (ref 0.86–1.14)
LDLC SERPL CALC-MCNC: 78 MG/DL
MAGNESIUM SERPL-MCNC: 2.2 MG/DL (ref 1.6–2.3)
MCH RBC QN AUTO: 29.5 PG (ref 26.5–33)
MCHC RBC AUTO-ENTMCNC: 33.7 G/DL (ref 31.5–36.5)
MCV RBC AUTO: 87 FL (ref 78–100)
NONHDLC SERPL-MCNC: 110 MG/DL
PLATELET # BLD AUTO: 125 10E9/L (ref 150–450)
POTASSIUM SERPL-SCNC: 4.1 MMOL/L (ref 3.4–5.3)
RBC # BLD AUTO: 4.82 10E12/L (ref 4.4–5.9)
SODIUM SERPL-SCNC: 141 MMOL/L (ref 133–144)
TRIGL SERPL-MCNC: 161 MG/DL
WBC # BLD AUTO: 6.6 10E9/L (ref 4–11)

## 2020-07-16 PROCEDURE — 40000788 ZZHCL STATISTIC ESTIMATED AVERAGE GLUCOSE: Mod: ZL | Performed by: FAMILY MEDICINE

## 2020-07-16 PROCEDURE — G0463 HOSPITAL OUTPT CLINIC VISIT: HCPCS

## 2020-07-16 PROCEDURE — 99212 OFFICE O/P EST SF 10 MIN: CPT | Mod: Z6 | Performed by: NURSE PRACTITIONER

## 2020-07-16 PROCEDURE — 85027 COMPLETE CBC AUTOMATED: CPT | Mod: ZL | Performed by: FAMILY MEDICINE

## 2020-07-16 PROCEDURE — 36416 COLLJ CAPILLARY BLOOD SPEC: CPT | Mod: QW,ZL | Performed by: FAMILY MEDICINE

## 2020-07-16 PROCEDURE — 83036 HEMOGLOBIN GLYCOSYLATED A1C: CPT | Mod: ZL | Performed by: FAMILY MEDICINE

## 2020-07-16 PROCEDURE — 36415 COLL VENOUS BLD VENIPUNCTURE: CPT | Mod: ZL | Performed by: FAMILY MEDICINE

## 2020-07-16 PROCEDURE — 82728 ASSAY OF FERRITIN: CPT | Mod: ZL | Performed by: FAMILY MEDICINE

## 2020-07-16 PROCEDURE — 85610 PROTHROMBIN TIME: CPT | Mod: QW,ZL | Performed by: FAMILY MEDICINE

## 2020-07-16 PROCEDURE — 83735 ASSAY OF MAGNESIUM: CPT | Mod: ZL | Performed by: FAMILY MEDICINE

## 2020-07-16 PROCEDURE — 80048 BASIC METABOLIC PNL TOTAL CA: CPT | Mod: ZL | Performed by: FAMILY MEDICINE

## 2020-07-16 PROCEDURE — 80061 LIPID PANEL: CPT | Mod: ZL | Performed by: FAMILY MEDICINE

## 2020-07-16 ASSESSMENT — ENCOUNTER SYMPTOMS
MUSCULOSKELETAL NEGATIVE: 1
RESPIRATORY NEGATIVE: 1
PSYCHIATRIC NEGATIVE: 1
GASTROINTESTINAL NEGATIVE: 1
ENDOCRINE NEGATIVE: 1
HEMATOLOGIC/LYMPHATIC NEGATIVE: 1
FEVER: 0
EYES NEGATIVE: 1
CARDIOVASCULAR NEGATIVE: 1
NEUROLOGICAL NEGATIVE: 1
CHILLS: 0
ALLERGIC/IMMUNOLOGIC NEGATIVE: 1

## 2020-07-16 NOTE — DISCHARGE INSTRUCTIONS
Use Silver foam dressing as discussed in UC   Change dressing every 3-4 days   Follow up with Dr Felix in 1-2 weeks     Follow up right away if develop sign or symptoms of infection, such as increased pain, pus drainage, red streaking from wound or any concerns

## 2020-07-16 NOTE — ED PROVIDER NOTES
History     Chief Complaint   Patient presents with     Leg Pain     states he dropped a plank on leg Sunday. skin tear noted. takes coumadin but no uncontrolled bleeding. sent from [facility] to be seen      The history is provided by the patient.     Clement Voss is a 75 year old male who presents to the  for right lower leg wound.  He states a large plank fell against is lower leg 4 days ago.  He does have a bandage over. He did initially wash the wound, is using triple antibiotic ointment and coving.  Denies any uncontrolled bleeding. Now it is weeping. Wife is changing dressing 2 times a day     Huy did have heart surgery in February. He does take coumadin.     Allergies:  Allergies   Allergen Reactions     Atorvastatin      Other reaction(s): Other  Caused increased creatinine.      Atorvastatin Calcium Other (See Comments)     Lipitor  Increase CR     Barley Grass Unknown     Iodine      Penicillins      Procaine Unknown     Shellfish-Derived Products Unknown     Sulfa Drugs      Sulfa (sulfonamide antibiotics)     Sulfacetamide        Problem List:    Patient Active Problem List    Diagnosis Date Noted     Other dysphagia 02/21/2020     Priority: Medium     Abnormal stress test on 1/10/2020 02/18/2020     Priority: Medium     Regional wall motion abnormality of heart 02/18/2020     Priority: Medium     Diastolic dysfunction grade 2 on 1/20/2020 02/18/2020     Priority: Medium     Severe aortic valve regurgitation 02/18/2020     Priority: Medium     Moderate mitral regurgitation 02/18/2020     Priority: Medium     Ascending aortic aneurysm-severe 02/18/2020     Priority: Medium     Anticoagulated on Coumadin 02/18/2020     Priority: Medium     History of DVT on 12/30/2013 02/18/2020     Priority: Medium     History of pulmonary embolism 02/18/2020     Priority: Medium     History of tobacco abuse quitting 8/12/1964 02/18/2020     Priority: Medium     S/P aortic valve replacement 02/17/2020      Priority: Medium     Postoperative anemia due to acute blood loss 02/10/2020     Priority: Medium     S/P CABG x 2 02/10/2020     Priority: Medium     ASCVD (arteriosclerotic cardiovascular disease) 02/03/2020     Priority: Medium     Aortic root aneurysm (H) 01/28/2020     Priority: Medium     Added automatically from request for surgery [id number]       CALDERON (dyspnea on exertion) 01/28/2020     Priority: Medium     Added automatically from request for surgery [id number]       Murmur 01/28/2020     Priority: Medium     Added automatically from request for surgery [id number]       CKD (chronic kidney disease) stage 3, GFR 30-59 ml/min (H) 09/24/2019     Priority: Medium     Morbid obesity (H) 05/26/2017     Priority: Medium     Long-term (current) use of anticoagulants [Z79.01] 08/04/2016     Priority: Medium     ACP (advance care planning) 03/14/2013     Priority: Medium     Advance Care Planning 8/26/2016: ACP Review of Chart / Resources Provided:  Reviewed chart for advance care plan.  Clement Voss has no plan or code status on file. Discussed available resources and provided with information. Confirmed code status reflects current choices pending further ACP discussions.  Confirmed/documented legally designated decision makers.  Added by Jenifer Villa             Pulmonary embolism and infarction (H) 09/24/2012     Priority: Medium     Gout 01/10/2011     Priority: Medium     Mixed hyperlipidemia 10/10/2005     Priority: Medium     Benign essential hypertension 01/03/2005     Priority: Medium     Type 2 diabetes mellitus with chronic kidney disease, with long-term current use of insulin (H) 08/02/2004     Priority: Medium        Past Medical History:    Past Medical History:   Diagnosis Date     DVT (deep venous thrombosis) 9/24/2012     DVT (deep venous thrombosis) (H)      Gout, unspecified 1/10/2011     Other and unspecified hyperlipidemia 10/10/2005     Pulmonary embolism 9/24/2012     Pulmonary embolism (H)      Type  II or unspecified type diabetes mellitus without mention of complication, not stated as uncontrolled 2004     Unspecified essential hypertension 1/3/2005       Past Surgical History:    Past Surgical History:   Procedure Laterality Date     APPENDECTOMY       CHOLECYSTECTOMY       COLONOSCOPY N/A 2019    Procedure: COLONOSCOPY;  Surgeon: Benito Saldana MD;  Location: [facility]     History of PE of right lung 3/2013[       PHACOEMULSIFICATION WITH STANDARD INTRAOCULAR LENS IMPLANT Left 2018    Procedure: PHACOEMULSIFICATION WITH STANDARD INTRAOCULAR LENS IMPLANT;  CATARACT EXTRACTION LEFT EYE WITH IMPLANT, ISTENT LEFT EYE;  Surgeon: Kush Almaraz MD;  Location: [facility]     PHACOEMULSIFICATION WITH STANDARD INTRAOCULAR LENS IMPLANT Right 5/3/2018    Procedure: PHACOEMULSIFICATION WITH STANDARD INTRAOCULAR LENS IMPLANT;  CATARACT EXTRACTION RIGHT EYE WITH IMPLANT,WITH ISTENT ATTEMPTED;  Surgeon: Kush Almaraz MD;  Location: [facility]     TRABECULAR MICRO-BYPASS WITH ISTENT Left 2018    Procedure: TRABECULAR MICRO-BYPASS WITH ISTENT;;  Surgeon: Kush Almaraz MD;  Location: [facility]     TRABECULAR MICRO-BYPASS WITH ISTENT  5/3/2018    Procedure: TRABECULAR MICRO-BYPASS WITH ISTENT;;  Surgeon: Kush Almaraz MD;  Location: [facility] OR       Family History:    Family History   Problem Relation Age of Onset     Heart Disease Father 71        heart disease; cause of death     Other - See Comments Mother 79        old age; cause of death       Social History:  Marital Status:   [2]  Social History     Tobacco Use     Smoking status: Former Smoker     Types: Cigarettes     Last attempt to quit: 1964     Years since quittin.9     Smokeless tobacco: Never Used   Substance Use Topics     Alcohol use: No     Drug use: No        Medications:    acetaminophen (TYLENOL) 500 MG tablet  amiodarone (PACERONE) 200 MG tablet  aspirin 81 MG EC tablet  blood glucose (ACCU-CHEK VLAD PLUS)  test strip  fenofibrate (TRICOR) 48 MG tablet  ferrous sulfate (FEROSUL) 325 (65 Fe) MG tablet  fluticasone (FLONASE) 50 MCG/ACT nasal spray  furosemide (LASIX) 20 MG tablet  insulin glargine (LANTUS PEN) 100 UNIT/ML pen  insulin pen needle (B-D U/F) 31G X 5 MM miscellaneous  JANUVIA 100 MG tablet  loratadine (CLARITIN) 10 MG tablet  losartan (COZAAR) 25 MG tablet  order for DME  STOOL SOFTENER 100 MG capsule  warfarin (COUMADIN) 5 MG tablet  warfarin ANTICOAGULANT (COUMADIN) 2.5 MG tablet  dorzolamide-timolol (COSOPT) 2-0.5 % ophthalmic solution  indomethacin (INDOCIN) 50 MG capsule  metoprolol tartrate (LOPRESSOR) 25 MG tablet  STATIN NOT PRESCRIBED, INTENTIONAL,          Review of Systems   Constitutional: Negative for chills and fever.   HENT: Negative.    Eyes: Negative.    Respiratory: Negative.    Cardiovascular: Negative.    Gastrointestinal: Negative.    Endocrine: Negative.    Genitourinary: Negative.    Musculoskeletal: Negative.    Skin:        Wound right lower leg   Allergic/Immunologic: Negative.    Neurological: Negative.    Hematological: Negative.    Psychiatric/Behavioral: Negative.        Physical Exam   BP: 176/84  Heart Rate: 60  Temp: 97.7  F (36.5  C)  Resp: 16  SpO2: 97 %      Physical Exam  Vitals signs and nursing note reviewed.   Constitutional:       Appearance: He is not ill-appearing.   Cardiovascular:      Rate and Rhythm: Normal rate.   Pulmonary:      Effort: Pulmonary effort is normal. No respiratory distress.   Skin:     General: Skin is warm.      Capillary Refill: Capillary refill takes less than 2 seconds.      Findings: Wound present.             Comments: See picture below   Neurological:      Mental Status: He is alert and oriented to person, place, and time.   Psychiatric:         Mood and Affect: Mood normal.         Behavior: Behavior normal.             ED Course        Procedures               Critical Care time:  none               Results for orders placed or  performed in visit on 07/16/20 (from the past 24 hour(s))   CBC with platelets   Result Value Ref Range    WBC 6.6 4.0 - 11.0 10e9/L    RBC Count 4.82 4.4 - 5.9 10e12/L    Hemoglobin 14.2 13.3 - 17.7 g/dL    Hematocrit 42.1 40.0 - 53.0 %    MCV 87 78 - 100 fl    MCH 29.5 26.5 - 33.0 pg    MCHC 33.7 31.5 - 36.5 g/dL    RDW 16.9 (H) 10.0 - 15.0 %    Platelet Count 125 (L) 150 - 450 10e9/L   Hemoglobin A1c   Result Value Ref Range    Hemoglobin A1C 7.3 (H) 0 - 5.6 %   INR point of care ( [facility] clinic ONLY)   Result Value Ref Range    INR Point of Care 2.1 (H) 0.86 - 1.14       Medications - No data to display    Assessments & Plan (with Medical Decision Making)     I have reviewed the nursing notes.    I have reviewed the findings, diagnosis, plan and need for follow up with the patient.  Spoke with Jory Colby NP wound care.  Discussed wound and treatment plan. Try using Silverfoam leave in place for 3-4 days and change       Patient verbally educated and given appropriate education sheets for the diagnoses and has no questions.  Take medications as directed.   Follow up with your Primary Care provider if symptoms increase or if further concerns develop, return to the ER    Use Silver foam dressing as discussed in UC   Change dressing every 3-4 days   Follow up with Dr Felix in 1-2 weeks     Follow up right away if develop sign or symptoms of infection, such as increased pain, pus drainage, red streaking from wound or any concerns  Discharge Medication List as of 7/16/2020 11:47 AM          Final diagnoses:   Open wound of right lower extremity, initial encounter       7/16/2020   [facility]     TrevonRenetta nelson APRN CNP  07/16/20 [id number]

## 2020-07-16 NOTE — PROGRESS NOTES
Pt here for lab draw. Has right leg wound.Last week pt was hit by a wood board. Patient is diabetic and is on Coumadin. Lower leg near shin. Two open areas with fragile skin that is light blue in color and dry. Has ABD pad with tape on it.Light red drainage on outside of bandage. Opened bandage. Moderate amount of light yellow drainage with areas of red blood on bandage.Per pt spouse has been placing antibiotic cream to wound. Top wound bright red and open is 3.5cm wide by 1.8 in length. No noted drainage.No odor. Wound below 1cm x1cm open red.No drainage.Skin is dry.Skin on foot is intact.Dry, flaky and brown skin to feet.He denies pain but states it feels tingly.Updated MD and advised ED. Updated pt to go to ED for eval.He verbalized understanding.      Pleae note.      Sharmila Gao RN

## 2020-07-16 NOTE — TELEPHONE ENCOUNTER
Spouse called approx 9am and was wondering if MD could look at pt open wound on leg to check for infection as he will be here for lab draw. Updated no consent to discuss.Will update MD. Spoke with MD and Ok for nurse to check. During nurse visit this writer did note plus 3 pitting edema above his sock line bilateral legs. Patient also asked for a regular check up with MD for his diabetes and general.    Please note and ok for appt?    Sharmila Gao RN

## 2020-07-16 NOTE — ED AVS SNAPSHOT
[facility]  [address]  [place] [address]  Phone:  [phone]                                    Clement Voss   MRN: [id number]    Department:  [facility]   Date of Visit:  7/16/2020           After Visit Summary Signature Page    I have received my discharge instructions, and my questions have been answered. I have discussed any challenges I see with this plan with the nurse or doctor.    ..........................................................................................................................................  Patient/Patient Representative Signature      ..........................................................................................................................................  Patient Representative Print Name and Relationship to Patient    ..................................................               ................................................  Date                                   Time    ..........................................................................................................................................  Reviewed by Signature/Title    ...................................................              ..............................................  Date                                               Time          22EPIC Rev 08/18

## 2020-07-16 NOTE — ED TRIAGE NOTES
Pt presents with a wound to his right lower leg since Sunday when he was tearing apart an old porch and a piece of wood hit his leg.

## 2020-07-16 NOTE — PROGRESS NOTES
ANTICOAGULATION FOLLOW-UP CLINIC VISIT    Patient Name:  Clement Voss  Date:  2020  Contact Type:  Telephone/ message left on home phone voicemail    SUBJECTIVE:  Patient Findings     Comments:   INR done by [facility]. IRon lab and result noted by warfarin clinic. Call placed to patient home phone and message left re: INR result, warfarin dosing/INR recheck date. He is to call warfarin clinic if he has any unusual bleeding/bruising, new changes in diet/meds/activity, questions or illness        Clinical Outcomes     Negatives:   Major bleeding event, Thromboembolic event, Anticoagulation-related hospital admission, Anticoagulation-related ED visit, Anticoagulation-related fatality    Comments:   INR done by [facility] IRon lab and result noted by warfarin clinic. Call placed to patient home phone and message left re: INR result, warfarin dosing/INR recheck date. He is to call warfarin clinic if he has any unusual bleeding/bruising, new changes in diet/meds/activity, questions or illness           OBJECTIVE    Recent labs: (last 7 days)     20  0936   INR 2.1*       ASSESSMENT / PLAN  INR assessment THER    Recheck INR In: 4 WEEKS    INR Location Clinic      Anticoagulation Summary  As of 2020    INR goal:   2.0-3.0   TTR:   70.0 % (1 y)   INR used for dosin.1 (2020)   Warfarin maintenance plan:   5 mg (5 mg x 1) every Fri; 2.5 mg (5 mg x 0.5) all other days   Full warfarin instructions:   5 mg every Fri; 2.5 mg all other days   Weekly warfarin total:   20 mg   No change documented:   Jodie Elena RN   Plan last modified:   Brenda Han RN (2020)   Next INR check:   2020   Priority:   High   Target end date:   Indefinite    Indications    Pulmonary embolism and infarction (H) [I26.99]  Long-term (current) use of anticoagulants [Z79.01] [Z79.01]             Anticoagulation Episode Summary     INR check location:       Preferred lab:       Send INR reminders to:   THOMAS RUELAS    Comments:          Anticoagulation Care Providers     Provider Role Specialty Phone number    Lorelei Fleix MD Referring [facility] [phone]            See the Encounter Report to view Anticoagulation Flowsheet and Dosing Calendar (Go to Encounters tab in chart review, and find the Anticoagulation Therapy Visit)        Jodie Elena RN

## 2020-07-21 NOTE — PROGRESS NOTES
Subjective     Clement Voss is a 75 year old male who presents to clinic today for the following health issues:    HPI       Diabetes Follow-up    How often are you checking your blood sugar? Three times daily  Blood sugar testing frequency justification:  Uncontrolled diabetes  What time of day are you checking your blood sugars (select all that apply)?  Before and after meals  Have you had any blood sugars above 200?  Yes sometimes after meals  Have you had any blood sugars below 70?  No    What symptoms do you notice when your blood sugar is low?  None and sweating and chills sometimes    What concerns do you have today about your diabetes? None     Do you have any of these symptoms? (Select all that apply)  No numbness or tingling in feet.  No redness, sores or blisters on feet.  No complaints of excessive thirst.  No reports of blurry vision.  No significant changes to weight.    Have you had a diabetic eye exam in the last 12 months? No        BP Readings from Last 2 Encounters:   07/23/20 (!) 162/80   07/16/20 162/91     Hemoglobin A1C (%)   Date Value   07/16/2020 7.3 (H)   01/02/2020 7.5 (H)     LDL Cholesterol Calculated (mg/dL)   Date Value   07/16/2020 78   01/02/2020 95           How many servings of fruits and vegetables do you eat daily?  4 or more    On average, how many sweetened beverages do you drink each day (Examples: soda, juice, sweet tea, etc.  Do NOT count diet or artificially sweetened beverages)?   1    How many days per week do you exercise enough to make your heart beat faster? 7    How many minutes a day do you exercise enough to make your heart beat faster? 30 - 60    How many days per week do you miss taking your medication? 0    Hyperlipidemia Follow-Up      Are you regularly taking any medication or supplement to lower your cholesterol?   Yes- tricor    Are you having muscle aches or other side effects that you think could be caused by your cholesterol lowering medication?   No    Hypertension Follow-up      Do you check your blood pressure regularly outside of the clinic? Yes     Are you following a low salt diet? Yes    Are your blood pressures ever more than 140 on the top number (systolic) OR more   than 90 on the bottom number (diastolic), for example 140/90? Has had mildly elevated readings, but nothing too high, today is higher than usual, but he was upset about construction traffic and feels that is why his reading is elevated today.      Leg wound      Duration: 07/16/20    Description (location/character/radiation): rt lower leg    Intensity:  mild    Accompanying signs and symptoms: was red    History (similar episodes/previous evaluation): er visit on 07/16/20    Precipitating or alleviating factors: None    Therapies tried and outcome: dressing from Urgent Care (consulted Wound Care) - patient states it is healing nicely       Patient Active Problem List   Diagnosis     Type 2 diabetes mellitus with chronic kidney disease, with long-term current use of insulin (H)     Mixed hyperlipidemia     Benign essential hypertension     Gout     Pulmonary embolism and infarction (H)     ACP (advance care planning)     Long-term (current) use of anticoagulants [Z79.01]     Morbid obesity (H)     CKD (chronic kidney disease) stage 3, GFR 30-59 ml/min (H)     Abnormal stress test on 1/10/2020     Regional wall motion abnormality of heart     Diastolic dysfunction grade 2 on 1/20/2020     Severe aortic valve regurgitation     Moderate mitral regurgitation     Ascending aortic aneurysm-severe     Anticoagulated on Coumadin     History of DVT on 12/30/2013     History of pulmonary embolism     History of tobacco abuse quitting 8/12/1964     Aortic root aneurysm (H)     ASCVD (arteriosclerotic cardiovascular disease)     CALDERON (dyspnea on exertion)     Murmur     Other dysphagia     Postoperative anemia due to acute blood loss     S/P aortic valve replacement     S/P CABG x 2     Past Surgical  History:   Procedure Laterality Date     APPENDECTOMY       CHOLECYSTECTOMY       COLONOSCOPY N/A 2019    Procedure: COLONOSCOPY;  Surgeon: Benito Saldana MD;  Location: [facility]     History of PE of right lung 3/2013[       PHACOEMULSIFICATION WITH STANDARD INTRAOCULAR LENS IMPLANT Left 2018    Procedure: PHACOEMULSIFICATION WITH STANDARD INTRAOCULAR LENS IMPLANT;  CATARACT EXTRACTION LEFT EYE WITH IMPLANT, ISTENT LEFT EYE;  Surgeon: Kush Almaraz MD;  Location: [facility]     PHACOEMULSIFICATION WITH STANDARD INTRAOCULAR LENS IMPLANT Right 5/3/2018    Procedure: PHACOEMULSIFICATION WITH STANDARD INTRAOCULAR LENS IMPLANT;  CATARACT EXTRACTION RIGHT EYE WITH IMPLANT,WITH ISTENT ATTEMPTED;  Surgeon: Kush Almaraz MD;  Location: [facility] OR     TRABECULAR MICRO-BYPASS WITH ISTENT Left 2018    Procedure: TRABECULAR MICRO-BYPASS WITH ISTENT;;  Surgeon: Kush Almaraz MD;  Location: [facility]     TRABECULAR MICRO-BYPASS WITH ISTENT  5/3/2018    Procedure: TRABECULAR MICRO-BYPASS WITH ISTENT;;  Surgeon: Kush Almaraz MD;  Location: [facility]       Social History     Tobacco Use     Smoking status: Former Smoker     Types: Cigarettes     Last attempt to quit: 1964     Years since quittin.9     Smokeless tobacco: Never Used   Substance Use Topics     Alcohol use: No     Family History   Problem Relation Age of Onset     Heart Disease Father 71        heart disease; cause of death     Other - See Comments Mother 79        old age; cause of death         Current Outpatient Medications   Medication Sig Dispense Refill     acetaminophen (TYLENOL) 500 MG tablet Take 1-2 tablets (500-1,000 mg) by mouth every 6 hours as needed       amiodarone (PACERONE) 200 MG tablet Take 200 mg by mouth       aspirin 81 MG EC tablet Take 1 tablet (81 mg) by mouth daily 90 tablet 3     blood glucose (ACCU-CHEK VLAD PLUS) test strip USE TO TEST BLOOD SUGARS THREE TIMES DAILY OR AS DIRECTED 200 strip 3      dorzolamide-timolol (COSOPT) 2-0.5 % ophthalmic solution Inject 1 drop into the eye       fenofibrate (TRICOR) 48 MG tablet Take 1 tablet (48 mg) by mouth daily 90 tablet 2     ferrous sulfate (FEROSUL) 325 (65 Fe) MG tablet Take 1 tablet (325 mg) by mouth daily (with breakfast) 30 tablet 2     fluticasone (FLONASE) 50 MCG/ACT nasal spray Spray 2 sprays into both nostrils daily (Patient taking differently: Spray 2 sprays into both nostrils daily as needed ) 1 Package 0     furosemide (LASIX) 20 MG tablet Take 20 mg by mouth       indomethacin (INDOCIN) 50 MG capsule Take 1 capsule (50 mg) by mouth 3 times daily With food as needed 60 capsule 1     insulin glargine (LANTUS PEN) 100 UNIT/ML pen Inject 33 Units Subcutaneous At Bedtime 15 mL 1     insulin pen needle (B-D U/F) 31G X 5 MM miscellaneous by Device route See Admin Instructions Use pen needles daily or as directed. 100 each 1     JANUVIA 100 MG tablet TAKE 1 TABLET(100 MG) BY MOUTH DAILY 90 tablet 1     loratadine (CLARITIN) 10 MG tablet Take 10 mg by mouth daily.       losartan (COZAAR) 25 MG tablet Take 25 mg by mouth       metoprolol tartrate (LOPRESSOR) 25 MG tablet Take 25 mg by mouth       order for DME Equipment being ordered: Knee high compression stockings - 20-30 mmHg 2 Device 1     STATIN NOT PRESCRIBED, INTENTIONAL, Statin not prescribed intentionally due to Intolerance (with supporting documentation of trying a statin at least once within the last 5 years) 0 each 0     STOOL SOFTENER 100 MG capsule TAKE 1 CAPSULE(100 MG) BY MOUTH TWICE DAILY 60 capsule 2     warfarin (COUMADIN) 5 MG tablet 2.5mg (1/2 pill) Mon, Fri and 5mg (1 pill) all other days or as directed by warfarin clinic 90 tablet 3     warfarin ANTICOAGULANT (COUMADIN) 2.5 MG tablet Take 1 tab (2.5mg ) daily or as directed by warfarin clinic. 100 tablet 1     Allergies   Allergen Reactions     Atorvastatin      Other reaction(s): Other  Caused increased creatinine.      Atorvastatin  "Calcium Other (See Comments)     Lipitor  Increase CR     Barley Grass Unknown     Iodine      Penicillins      Procaine Unknown     Shellfish-Derived Products Unknown     Sulfa Drugs      Sulfa (sulfonamide antibiotics)     Sulfacetamide        Reviewed and updated as needed this visit by Provider         Review of Systems   Constitutional, HEENT, cardiovascular, pulmonary, gi and gu systems are negative, except as otherwise noted.      Objective    BP (!) 162/80 (BP Location: Right arm, Patient Position: Sitting, Cuff Size: Adult Regular)   Pulse 60   Temp 97.7  F (36.5  C) (Tympanic)   Resp 18   Wt 96.2 kg (212 lb)   SpO2 98%   BMI 33.20 kg/m    Body mass index is 33.2 kg/m .  Physical Exam   GENERAL: healthy, alert and no distress  SKIN: dressing moved, from pictures wound is healing beautifully, dressing is replaced  PSYCH: mentation appears normal, affect normal/bright    Diagnostic Test Results:  Labs reviewed in Epic        Assessment & Plan     1. Type 2 diabetes mellitus with stage 3 chronic kidney disease, with long-term current use of insulin (H)  No changes to current plan, HgbA1c has improved.  Follow-up in three months, sooner as needed.    2. Mixed hyperlipidemia  As above.    3. Benign essential hypertension  As above.    4. Open wound of right lower leg, subsequent encounter  Continue wound dressings until healed    5. Anemia, unspecified type  Will decrease dose to once daily.  - ferrous sulfate (FEROSUL) 325 (65 Fe) MG tablet; Take 1 tablet (325 mg) by mouth daily (with breakfast)  Dispense: 30 tablet; Refill: 2    6. Restless legs syndrome  - ferrous sulfate (FEROSUL) 325 (65 Fe) MG tablet; Take 1 tablet (325 mg) by mouth daily (with breakfast)  Dispense: 30 tablet; Refill: 2     BMI:   Estimated body mass index is 33.2 kg/m  as calculated from the following:    Height as of 3/9/20: 1.702 m (5' 7\").    Weight as of this encounter: 96.2 kg (212 lb).        No follow-ups on file.    Lorelei" St Steve MD  [facility]

## 2020-07-23 ENCOUNTER — OFFICE VISIT (OUTPATIENT)
Dept: FAMILY MEDICINE | Facility: OTHER | Age: 75
End: 2020-07-23
Attending: FAMILY MEDICINE
Payer: COMMERCIAL

## 2020-07-23 VITALS
SYSTOLIC BLOOD PRESSURE: 162 MMHG | HEART RATE: 60 BPM | WEIGHT: 212 LBS | OXYGEN SATURATION: 98 % | TEMPERATURE: 97.7 F | RESPIRATION RATE: 18 BRPM | BODY MASS INDEX: 33.2 KG/M2 | DIASTOLIC BLOOD PRESSURE: 80 MMHG

## 2020-07-23 DIAGNOSIS — S81.801D OPEN WOUND OF RIGHT LOWER LEG, SUBSEQUENT ENCOUNTER: ICD-10-CM

## 2020-07-23 DIAGNOSIS — Z79.4 TYPE 2 DIABETES MELLITUS WITH STAGE 3 CHRONIC KIDNEY DISEASE, WITH LONG-TERM CURRENT USE OF INSULIN (H): Primary | ICD-10-CM

## 2020-07-23 DIAGNOSIS — E11.22 TYPE 2 DIABETES MELLITUS WITH STAGE 3 CHRONIC KIDNEY DISEASE, WITH LONG-TERM CURRENT USE OF INSULIN (H): Primary | ICD-10-CM

## 2020-07-23 DIAGNOSIS — D64.9 ANEMIA, UNSPECIFIED TYPE: ICD-10-CM

## 2020-07-23 DIAGNOSIS — G25.81 RESTLESS LEGS SYNDROME: ICD-10-CM

## 2020-07-23 DIAGNOSIS — I10 BENIGN ESSENTIAL HYPERTENSION: ICD-10-CM

## 2020-07-23 DIAGNOSIS — E78.2 MIXED HYPERLIPIDEMIA: ICD-10-CM

## 2020-07-23 DIAGNOSIS — N18.30 TYPE 2 DIABETES MELLITUS WITH STAGE 3 CHRONIC KIDNEY DISEASE, WITH LONG-TERM CURRENT USE OF INSULIN (H): Primary | ICD-10-CM

## 2020-07-23 PROCEDURE — G0463 HOSPITAL OUTPT CLINIC VISIT: HCPCS

## 2020-07-23 PROCEDURE — 99214 OFFICE O/P EST MOD 30 MIN: CPT | Performed by: FAMILY MEDICINE

## 2020-07-23 RX ORDER — FERROUS SULFATE 325(65) MG
325 TABLET ORAL
Qty: 30 TABLET | Refills: 2 | Status: SHIPPED | OUTPATIENT
Start: 2020-07-23 | End: 2020-11-17

## 2020-07-23 ASSESSMENT — PAIN SCALES - GENERAL: PAINLEVEL: NO PAIN (0)

## 2020-08-13 ENCOUNTER — ANTICOAGULATION THERAPY VISIT (OUTPATIENT)
Dept: ANTICOAGULATION | Facility: OTHER | Age: 75
End: 2020-08-13
Attending: FAMILY MEDICINE
Payer: MEDICARE

## 2020-08-13 DIAGNOSIS — Z79.01 LONG TERM CURRENT USE OF ANTICOAGULANT THERAPY: ICD-10-CM

## 2020-08-13 DIAGNOSIS — I82.409 ACUTE THROMBOEMBOLISM OF DEEP VEINS OF LOWER EXTREMITY, UNSPECIFIED LATERALITY (H): ICD-10-CM

## 2020-08-13 DIAGNOSIS — I26.99 PULMONARY EMBOLISM AND INFARCTION (H): ICD-10-CM

## 2020-08-13 LAB
CAPILLARY BLOOD COLLECTION: NORMAL
INR BLD: 1.7 (ref 0.86–1.14)

## 2020-08-13 PROCEDURE — 85610 PROTHROMBIN TIME: CPT | Mod: QW,ZL | Performed by: FAMILY MEDICINE

## 2020-08-13 PROCEDURE — 36416 COLLJ CAPILLARY BLOOD SPEC: CPT | Mod: ZL | Performed by: FAMILY MEDICINE

## 2020-08-13 NOTE — PROGRESS NOTES
ANTICOAGULATION FOLLOW-UP CLINIC VISIT    Patient Name:  Clement Voss  Date:  2020  Contact Type:  Telephone/ message left on voicemail    SUBJECTIVE:  Patient Findings     Comments:   INR done by lab. Call placed to patient and message left on voicemail re: INR result, warfarin dosing and INR recheck date. He is to call warfarin clinic if he has any bleeding/bruisng, new change in diet/meds/activity, questions or illness        Clinical Outcomes     Negatives:   Major bleeding event, Thromboembolic event, Anticoagulation-related hospital admission, Anticoagulation-related ED visit, Anticoagulation-related fatality    Comments:   INR done by lab. Call placed to patient and message left on voicemail re: INR result, warfarin dosing and INR recheck date. He is to call warfarin clinic if he has any bleeding/bruisng, new change in diet/meds/activity, questions or illness           OBJECTIVE    Recent labs: (last 7 days)     20  0959   INR 1.7*       ASSESSMENT / PLAN  INR assessment SUB    Recheck INR In: 2 WEEKS    INR Location Clinic      Anticoagulation Summary  As of 2020    INR goal:   2.0-3.0   TTR:   64.2 % (1 y)   INR used for dosin.7! (2020)   Warfarin maintenance plan:   5 mg (5 mg x 1) every Fri; 2.5 mg (5 mg x 0.5) all other days   Full warfarin instructions:   : 7.5 mg; Otherwise 5 mg every Fri; 2.5 mg all other days   Weekly warfarin total:   20 mg   Plan last modified:   Brenda Han RN (2020)   Next INR check:   2020   Priority:   High   Target end date:   Indefinite    Indications    Pulmonary embolism and infarction (H) [I26.99]  Long-term (current) use of anticoagulants [Z79.01] [Z79.01]             Anticoagulation Episode Summary     INR check location:       Preferred lab:       Send INR reminders to:   THOMAS RUELAS    Comments:         Anticoagulation Care Providers     Provider Role Specialty Phone number    Lorelei Felix MD Referring [facility] [phone]            See the Encounter Report to view Anticoagulation Flowsheet and Dosing Calendar (Go to Encounters tab in chart review, and find the Anticoagulation Therapy Visit)        Jodie Elena RN

## 2020-08-27 ENCOUNTER — ANTICOAGULATION THERAPY VISIT (OUTPATIENT)
Dept: ANTICOAGULATION | Facility: OTHER | Age: 75
End: 2020-08-27
Attending: FAMILY MEDICINE
Payer: MEDICARE

## 2020-08-27 DIAGNOSIS — I82.409 ACUTE THROMBOEMBOLISM OF DEEP VEINS OF LOWER EXTREMITY, UNSPECIFIED LATERALITY (H): ICD-10-CM

## 2020-08-27 DIAGNOSIS — Z79.01 LONG TERM CURRENT USE OF ANTICOAGULANT THERAPY: ICD-10-CM

## 2020-08-27 DIAGNOSIS — I26.99 PULMONARY EMBOLISM AND INFARCTION (H): ICD-10-CM

## 2020-08-27 LAB
CAPILLARY BLOOD COLLECTION: NORMAL
INR BLD: 2.1 (ref 0.86–1.14)

## 2020-08-27 PROCEDURE — 85610 PROTHROMBIN TIME: CPT | Mod: QW,ZL | Performed by: FAMILY MEDICINE

## 2020-08-27 PROCEDURE — 36416 COLLJ CAPILLARY BLOOD SPEC: CPT | Mod: ZL | Performed by: FAMILY MEDICINE

## 2020-08-27 NOTE — PROGRESS NOTES
ANTICOAGULATION FOLLOW-UP CLINIC VISIT    Patient Name:  Clement Voss  Date:  2020  Contact Type:  Telephone/ message left on home phone voicemail    SUBJECTIVE:  Patient Findings     Comments:   INR done by lab. Call placed to patient home and message left on voicemail re: INR result,warfarin dosing and INR recheck date. He is to call warfarin clinic if he has any bleeding/bruising, new changes in diet/meds/activity, questions or illness        Clinical Outcomes     Negatives:   Major bleeding event, Thromboembolic event, Anticoagulation-related hospital admission, Anticoagulation-related ED visit, Anticoagulation-related fatality    Comments:   INR done by lab. Call placed to patient home and message left on voicemail re: INR result,warfarin dosing and INR recheck date. He is to call warfarin clinic if he has any bleeding/bruising, new changes in diet/meds/activity, questions or illness           OBJECTIVE    Recent labs: (last 7 days)     20  1029   INR 2.1*       ASSESSMENT / PLAN  INR assessment THER    Recheck INR In: 4 WEEKS    INR Location Clinic      Anticoagulation Summary  As of 2020    INR goal:   2.0-3.0   TTR:   61.3 % (1 y)   INR used for dosin.1 (2020)   Warfarin maintenance plan:   5 mg (5 mg x 1) every Fri; 2.5 mg (5 mg x 0.5) all other days   Full warfarin instructions:   5 mg every Fri; 2.5 mg all other days   Weekly warfarin total:   20 mg   No change documented:   Jodie Elena RN   Plan last modified:   Brenda Han RN (2020)   Next INR check:   2020   Priority:   High   Target end date:   Indefinite    Indications    Pulmonary embolism and infarction (H) [I26.99]  Long-term (current) use of anticoagulants [Z79.01] [Z79.01]             Anticoagulation Episode Summary     INR check location:       Preferred lab:       Send INR reminders to:   THOMAS RUELAS    Comments:         Anticoagulation Care Providers     Provider Role Specialty Phone number    St  Lorelei Wilson MD [facility] [phone]            See the Encounter Report to view Anticoagulation Flowsheet and Dosing Calendar (Go to Encounters tab in chart review, and find the Anticoagulation Therapy Visit)        Jodie Elena RN

## 2020-09-09 DIAGNOSIS — E11.22 TYPE 2 DIABETES MELLITUS WITH STAGE 3 CHRONIC KIDNEY DISEASE, WITH LONG-TERM CURRENT USE OF INSULIN (H): Primary | ICD-10-CM

## 2020-09-09 DIAGNOSIS — E11.9 DIABETES MELLITUS, TYPE 2 (H): ICD-10-CM

## 2020-09-09 DIAGNOSIS — N18.30 TYPE 2 DIABETES MELLITUS WITH STAGE 3 CHRONIC KIDNEY DISEASE, WITH LONG-TERM CURRENT USE OF INSULIN (H): Primary | ICD-10-CM

## 2020-09-09 DIAGNOSIS — Z79.4 TYPE 2 DIABETES MELLITUS WITH STAGE 3 CHRONIC KIDNEY DISEASE, WITH LONG-TERM CURRENT USE OF INSULIN (H): Primary | ICD-10-CM

## 2020-09-10 RX ORDER — BLOOD SUGAR DIAGNOSTIC
STRIP MISCELLANEOUS
Qty: 200 STRIP | Refills: 3 | Status: SHIPPED | OUTPATIENT
Start: 2020-09-10 | End: 2021-08-30

## 2020-09-10 NOTE — TELEPHONE ENCOUNTER
Test Strips      Last Written Prescription Date:  7/23/2019  Last Fill Quantity: 200,   # refills: 3  Last Office Visit: 7/23/2020  Future Office visit:    Next 5 appointments (look out 90 days)    Oct 27, 2020 10:00 AM CDT  (Arrive by 9:45 AM)  SHORT with Lorelei Felix MD  [facility] ([facility] ) [address]  [facility] [address]  [phone]

## 2020-09-11 ENCOUNTER — TRANSFERRED RECORDS (OUTPATIENT)
Dept: HEALTH INFORMATION MANAGEMENT | Facility: CLINIC | Age: 75
End: 2020-09-11

## 2020-09-24 ENCOUNTER — ANTICOAGULATION THERAPY VISIT (OUTPATIENT)
Dept: ANTICOAGULATION | Facility: OTHER | Age: 75
End: 2020-09-24
Attending: FAMILY MEDICINE
Payer: MEDICARE

## 2020-09-24 DIAGNOSIS — I26.99 PULMONARY EMBOLISM AND INFARCTION (H): ICD-10-CM

## 2020-09-24 DIAGNOSIS — I82.409 ACUTE THROMBOEMBOLISM OF DEEP VEINS OF LOWER EXTREMITY, UNSPECIFIED LATERALITY (H): ICD-10-CM

## 2020-09-24 DIAGNOSIS — Z79.01 LONG TERM CURRENT USE OF ANTICOAGULANT THERAPY: ICD-10-CM

## 2020-09-24 LAB
CAPILLARY BLOOD COLLECTION: NORMAL
INR BLD: 1.8 (ref 0.86–1.14)

## 2020-09-24 PROCEDURE — 36416 COLLJ CAPILLARY BLOOD SPEC: CPT | Mod: ZL | Performed by: FAMILY MEDICINE

## 2020-09-24 PROCEDURE — 85610 PROTHROMBIN TIME: CPT | Mod: QW,ZL | Performed by: FAMILY MEDICINE

## 2020-09-24 NOTE — PROGRESS NOTES
ANTICOAGULATION FOLLOW-UP CLINIC VISIT    Patient Name:  Clement Voss  Date:  2020  Contact Type:  Telephone    SUBJECTIVE:  Patient Findings     Positives:   Change in activity (increased)    Comments:   INR done by lab. Call placed to patient and spoke to him re: INR result, warfarin dosing/INR recheck date. He verbalized understanding and has no questions. He states he has no new changes in diet/meds but activity has increased. He has no bleeding/bruising. He will call warfarin clinic if any changes/issues/illness        Clinical Outcomes     Negatives:   Major bleeding event, Thromboembolic event, Anticoagulation-related hospital admission, Anticoagulation-related ED visit, Anticoagulation-related fatality    Comments:   INR done by lab. Call placed to patient and spoke to him re: INR result, warfarin dosing/INR recheck date. He verbalized understanding and has no questions. He states he has no new changes in diet/meds but activity has increased. He has no bleeding/bruising. He will call warfarin clinic if any changes/issues/illness           OBJECTIVE    Recent labs: (last 7 days)     20  0845   INR 1.8*       ASSESSMENT / PLAN  INR assessment SUB increased activity   Recheck INR In: 4 WEEKS    INR Location Clinic      Anticoagulation Summary  As of 2020    INR goal:   2.0-3.0   TTR:   56.2 % (1 y)   INR used for dosin.8! (2020)   Warfarin maintenance plan:   5 mg (5 mg x 1) every Fri; 2.5 mg (5 mg x 0.5) all other days   Full warfarin instructions:   : 5 mg; Otherwise 5 mg every Fri; 2.5 mg all other days   Weekly warfarin total:   20 mg   Plan last modified:   Brenda Han RN (2020)   Next INR check:   10/22/2020   Priority:   High   Target end date:   Indefinite    Indications    Pulmonary embolism and infarction (H) [I26.99]  Long-term (current) use of anticoagulants [Z79.01] [Z79.01]             Anticoagulation Episode Summary     INR check location:       Preferred lab:        Send INR reminders to:   THOMAS RUELAS    Comments:         Anticoagulation Care Providers     Provider Role Specialty Phone number    Lorelei Felix MD Referring [facility] [phone]            See the Encounter Report to view Anticoagulation Flowsheet and Dosing Calendar (Go to Encounters tab in chart review, and find the Anticoagulation Therapy Visit)        Jodie Elena RN

## 2020-10-12 NOTE — PROGRESS NOTES
Subjective     Clement Voss is a 75 year old male who presents to clinic today for the following health issues:    HPI         Diabetes Follow-up    How often are you checking your blood sugar? Two times daily  Blood sugar testing frequency justification:  Uncontrolled diabetes  What time of day are you checking your blood sugars (select all that apply)?  Before and after meals  Have you had any blood sugars above 200?  Yes a few   Have you had any blood sugars below 70?  No    What symptoms do you notice when your blood sugar is low?  Other: cold sweats anxious     What concerns do you have today about your diabetes? None     Do you have any of these symptoms? (Select all that apply)  No numbness or tingling in feet.  No redness, sores or blisters on feet.  No complaints of excessive thirst.  No reports of blurry vision.  No significant changes to weight.    Have you had a diabetic eye exam in the last 12 months? No     Patient states blood glucose readings have been quite variable.  He did send a [employer] message with his BG averages, he has had a few readings above 200 but most look pretty good.        BP Readings from Last 2 Encounters:   10/27/20 (!) 158/84   07/23/20 (!) 162/80     Hemoglobin A1C (%)   Date Value   07/16/2020 7.3 (H)   01/02/2020 7.5 (H)     LDL Cholesterol Calculated (mg/dL)   Date Value   07/16/2020 78   01/02/2020 95       Hyperlipidemia Follow-Up      Are you regularly taking any medication or supplement to lower your cholesterol?   Yes- Tricor    Are you having muscle aches or other side effects that you think could be caused by your cholesterol lowering medication?  No     Patient's chart notes in the past, he did have a creatinine elevation due to Lipitor.  He has been fairly resistant to starting a statin since then, concern about side effects.  After discussion today, he is willing to retry medication.    Hypertension Follow-up      Do you check your blood pressure regularly outside  of the clinic? Yes     Are you following a low salt diet? Yes    Are your blood pressures ever more than 140 on the top number (systolic) OR more   than 90 on the bottom number (diastolic), for example 140/90? No      How many servings of fruits and vegetables do you eat daily?  2-3    On average, how many sweetened beverages do you drink each day (Examples: soda, juice, sweet tea, etc.  Do NOT count diet or artificially sweetened beverages)?   0    How many days per week do you exercise enough to make your heart beat faster? 7    How many minutes a day do you exercise enough to make your heart beat faster? 20 - 29    How many days per week do you miss taking your medication? 0      Patient Active Problem List   Diagnosis     Type 2 diabetes mellitus with chronic kidney disease, with long-term current use of insulin (H)     Mixed hyperlipidemia     Benign essential hypertension     Gout     Pulmonary embolism and infarction (H)     ACP (advance care planning)     Long-term (current) use of anticoagulants [Z79.01]     Morbid obesity (H)     CKD (chronic kidney disease) stage 3, GFR 30-59 ml/min     Abnormal stress test on 1/10/2020     Regional wall motion abnormality of heart     Diastolic dysfunction grade 2 on 1/20/2020     Severe aortic valve regurgitation     Moderate mitral regurgitation     Ascending aortic aneurysm-severe     Anticoagulated on Coumadin     History of DVT on 12/30/2013     History of pulmonary embolism     History of tobacco abuse quitting 8/12/1964     Aortic root aneurysm (H)     ASCVD (arteriosclerotic cardiovascular disease)     CALDERON (dyspnea on exertion)     Murmur     Other dysphagia     Postoperative anemia due to acute blood loss     S/P aortic valve replacement     S/P CABG x 2     Aortic valve insufficiency, etiology of cardiac valve disease unspecified     Chronic left ventricular systolic dysfunction     Past Surgical History:   Procedure Laterality Date     APPENDECTOMY  2008      CHOLECYSTECTOMY  1984     COLONOSCOPY N/A 2019    Procedure: COLONOSCOPY;  Surgeon: Benito Saldana MD;  Location: [facility]     History of PE of right lung 3/2013[       PHACOEMULSIFICATION WITH STANDARD INTRAOCULAR LENS IMPLANT Left 2018    Procedure: PHACOEMULSIFICATION WITH STANDARD INTRAOCULAR LENS IMPLANT;  CATARACT EXTRACTION LEFT EYE WITH IMPLANT, ISTENT LEFT EYE;  Surgeon: Kush Almaraz MD;  Location: [facility]     PHACOEMULSIFICATION WITH STANDARD INTRAOCULAR LENS IMPLANT Right 5/3/2018    Procedure: PHACOEMULSIFICATION WITH STANDARD INTRAOCULAR LENS IMPLANT;  CATARACT EXTRACTION RIGHT EYE WITH IMPLANT,WITH ISTENT ATTEMPTED;  Surgeon: Kush Almaraz MD;  Location: [facility]     TRABECULAR MICRO-BYPASS WITH ISTENT Left 2018    Procedure: TRABECULAR MICRO-BYPASS WITH ISTENT;;  Surgeon: Kush Almaraz MD;  Location: [facility]     TRABECULAR MICRO-BYPASS WITH ISTENT  5/3/2018    Procedure: TRABECULAR MICRO-BYPASS WITH ISTENT;;  Surgeon: Kush Almaraz MD;  Location: [facility]       Social History     Tobacco Use     Smoking status: Former Smoker     Types: Cigarettes     Quit date: 1964     Years since quittin.2     Smokeless tobacco: Never Used   Substance Use Topics     Alcohol use: No     Family History   Problem Relation Age of Onset     Heart Disease Father 71        heart disease; cause of death     Other - See Comments Mother 79        old age; cause of death           Current Outpatient Medications   Medication Sig Dispense Refill     acetaminophen (TYLENOL) 500 MG tablet Take 1-2 tablets (500-1,000 mg) by mouth every 6 hours as needed       amLODIPine (NORVASC) 10 MG tablet        aspirin 81 MG EC tablet Take 1 tablet (81 mg) by mouth daily 90 tablet 3     blood glucose (ACCU-CHEK VLAD PLUS) test strip USE TO TEST BLOOD SUGARS THREE TIMES DAILY OR AS DIRECTED 200 strip 3     dorzolamide-timolol (COSOPT) 2-0.5 % ophthalmic solution Inject 1 drop into the eye       ferrous  sulfate (FEROSUL) 325 (65 Fe) MG tablet Take 1 tablet (325 mg) by mouth daily (with breakfast) 30 tablet 2     fluticasone (FLONASE) 50 MCG/ACT nasal spray Spray 2 sprays into both nostrils daily (Patient taking differently: Spray 2 sprays into both nostrils daily as needed ) 1 Package 0     furosemide (LASIX) 20 MG tablet Take 20 mg by mouth       indomethacin (INDOCIN) 50 MG capsule Take 1 capsule (50 mg) by mouth 3 times daily With food as needed 60 capsule 1     insulin glargine (LANTUS PEN) 100 UNIT/ML pen Inject 33 Units Subcutaneous At Bedtime 15 mL 1     insulin pen needle (B-D U/F) 31G X 5 MM miscellaneous by Device route See Admin Instructions Use pen needles daily or as directed. 100 each 1     JANUVIA 100 MG tablet TAKE 1 TABLET(100 MG) BY MOUTH DAILY 90 tablet 1     loratadine (CLARITIN) 10 MG tablet Take 10 mg by mouth daily.       losartan (COZAAR) 25 MG tablet Take 25 mg by mouth       metoprolol tartrate (LOPRESSOR) 25 MG tablet Take 25 mg by mouth       order for DME Equipment being ordered: Knee high compression stockings - 20-30 mmHg 2 Device 1     rosuvastatin (CRESTOR) 5 MG tablet Take 1 tablet (5 mg) by mouth daily 30 tablet 2     STATIN NOT PRESCRIBED, INTENTIONAL, Statin not prescribed intentionally due to Intolerance (with supporting documentation of trying a statin at least once within the last 5 years) 0 each 0     STOOL SOFTENER 100 MG capsule TAKE 1 CAPSULE(100 MG) BY MOUTH TWICE DAILY 60 capsule 2     warfarin ANTICOAGULANT (COUMADIN) 2.5 MG tablet Take 1 tab (2.5mg ) daily or as directed by warfarin clinic. 100 tablet 1     warfarin ANTICOAGULANT (COUMADIN) 5 MG tablet 2.5mg (1/2 pill) Mon, Fri and 5mg (1 pill) all other days or as directed by warfarin clinic 90 tablet 3     Allergies   Allergen Reactions     Atorvastatin      Other reaction(s): Other  Caused increased creatinine.      Atorvastatin Calcium Other (See Comments)     Lipitor  Increase CR     Barley Grass Unknown      "Iodine      Penicillins      Procaine Unknown     Shellfish-Derived Products Unknown     Sulfa Drugs      Sulfa (sulfonamide antibiotics)     Sulfacetamide        Family History, Social History, Tobacco Use are all reviewed and updated      Review of Systems   Constitutional, HEENT, cardiovascular, pulmonary, gi and gu systems are negative, except as otherwise noted.      Objective    BP (!) 158/84 (BP Location: Right arm, Patient Position: Chair, Cuff Size: Adult Large)   Pulse 64   Temp 97.8  F (36.6  C) (Tympanic)   Resp 16   Ht 1.702 m (5' 7\")   Wt 98.2 kg (216 lb 9.6 oz)   SpO2 98%   BMI 33.92 kg/m    Body mass index is 33.92 kg/m .   BP rechecked manually with arm cuff, and patient checked with both of his wrist cuffs.  Readings are as follows:  Newer wrist cuff - 167/75  Older wrist cuff - 92/60  Manual large arm cuff - 152/78  Physical Exam   GENERAL: healthy, alert and no distress  PSYCH: mentation appears normal, affect normal/bright    Labs drawn this morning, results pending.        Assessment & Plan     1. Type 2 diabetes mellitus with stage 3b chronic kidney disease, with long-term current use of insulin (H)  Will watch for lab results, will adjust medications if needed.    2. Mixed hyperlipidemia  After discussion of rationale for statin use, patient does agree to retry medication.  We will start at a lower dose than recommended to make sure medication is tolerated.  We will increase to recommended dose if able.  Patient is asked to contact clinic if any side effects noted.  - rosuvastatin (CRESTOR) 5 MG tablet; Take 1 tablet (5 mg) by mouth daily  Dispense: 30 tablet; Refill: 2    3. Benign essential hypertension  Patient is asked to use the newer wrist cuff and keep track of BP at home.  He is asked to send a message to the clinic in about a week with home BP readings.    4. Pulmonary embolism and infarction (H)  Refilled.  - warfarin ANTICOAGULANT (COUMADIN) 5 MG tablet; 2.5mg (1/2 pill) " "Mon, Fri and 5mg (1 pill) all other days or as directed by warfarin clinic  Dispense: 90 tablet; Refill: 3      BMI:   Estimated body mass index is 33.92 kg/m  as calculated from the following:    Height as of this encounter: 1.702 m (5' 7\").    Weight as of this encounter: 98.2 kg (216 lb 9.6 oz).          FUTURE APPOINTMENTS:       - Follow-up visit in 3 months, sooner as needed    No follow-ups on file.    Lorelei Felix MD  [facility]    "

## 2020-10-13 ENCOUNTER — TRANSFERRED RECORDS (OUTPATIENT)
Dept: HEALTH INFORMATION MANAGEMENT | Facility: CLINIC | Age: 75
End: 2020-10-13

## 2020-10-13 ENCOUNTER — MEDICAL CORRESPONDENCE (OUTPATIENT)
Dept: HEALTH INFORMATION MANAGEMENT | Facility: CLINIC | Age: 75
End: 2020-10-13

## 2020-10-22 ENCOUNTER — ANTICOAGULATION THERAPY VISIT (OUTPATIENT)
Dept: ANTICOAGULATION | Facility: OTHER | Age: 75
End: 2020-10-22
Attending: FAMILY MEDICINE
Payer: MEDICARE

## 2020-10-22 DIAGNOSIS — Z79.01 LONG TERM CURRENT USE OF ANTICOAGULANT THERAPY: ICD-10-CM

## 2020-10-22 DIAGNOSIS — I26.99 PULMONARY EMBOLISM AND INFARCTION (H): ICD-10-CM

## 2020-10-22 DIAGNOSIS — I82.409 ACUTE THROMBOEMBOLISM OF DEEP VEINS OF LOWER EXTREMITY, UNSPECIFIED LATERALITY (H): ICD-10-CM

## 2020-10-22 LAB
CAPILLARY BLOOD COLLECTION: NORMAL
INR BLD: 1.4 (ref 0.86–1.14)

## 2020-10-22 PROCEDURE — 85610 PROTHROMBIN TIME: CPT | Mod: ZL | Performed by: FAMILY MEDICINE

## 2020-10-22 PROCEDURE — 36416 COLLJ CAPILLARY BLOOD SPEC: CPT | Mod: ZL | Performed by: FAMILY MEDICINE

## 2020-10-22 NOTE — PROGRESS NOTES
ANTICOAGULATION FOLLOW-UP CLINIC VISIT    Patient Name:  Clement Voss  Date:  10/22/2020  Contact Type:  Telephone/ message left on home phone voicemail    SUBJECTIVE:  Patient Findings     Comments:  INR done by lab. Call placed to patient home phone and message left on voicemail re: INR result, warfarin dosing/INR recheck date. He is to call warfarin clinic if he has any bleeding/bruising, new changes in diet/meds/activity, questions or illness        Clinical Outcomes     Negatives:  Major bleeding event, Thromboembolic event, Anticoagulation-related hospital admission, Anticoagulation-related ED visit, Anticoagulation-related fatality    Comments:  INR done by lab. Call placed to patient home phone and message left on voicemail re: INR result, warfarin dosing/INR recheck date. He is to call warfarin clinic if he has any bleeding/bruising, new changes in diet/meds/activity, questions or illness           OBJECTIVE    Recent labs: (last 7 days)     10/22/20  0958   INR 1.4*       ASSESSMENT / PLAN  INR assessment SUB    Recheck INR In: 2 WEEKS    INR Location Clinic      Anticoagulation Summary  As of 10/22/2020    INR goal:  2.0-3.0   TTR:  48.6 % (1 y)   INR used for dosin.4 (10/22/2020)   Warfarin maintenance plan:  5 mg (5 mg x 1) every Mon, Fri; 2.5 mg (5 mg x 0.5) all other days   Full warfarin instructions:  10/22: 7.5 mg; Otherwise 5 mg every Mon, Fri; 2.5 mg all other days   Weekly warfarin total:  22.5 mg   Plan last modified:  Jodie Elena RN (10/22/2020)   Next INR check:  2020   Priority:  High   Target end date:  Indefinite    Indications    Pulmonary embolism and infarction (H) [I26.99]  Long-term (current) use of anticoagulants [Z79.01] [Z79.01]             Anticoagulation Episode Summary     INR check location:      Preferred lab:      Send INR reminders to:  THOMAS RUELAS    Comments:        Anticoagulation Care Providers     Provider Role Specialty Phone number     Steve  Lorelei GILLESPIE MD [facility] [phone]            See the Encounter Report to view Anticoagulation Flowsheet and Dosing Calendar (Go to Encounters tab in chart review, and find the Anticoagulation Therapy Visit)        Jodie Elena RN

## 2020-10-27 ENCOUNTER — OFFICE VISIT (OUTPATIENT)
Dept: FAMILY MEDICINE | Facility: OTHER | Age: 75
End: 2020-10-27
Attending: FAMILY MEDICINE
Payer: COMMERCIAL

## 2020-10-27 VITALS
OXYGEN SATURATION: 98 % | RESPIRATION RATE: 16 BRPM | SYSTOLIC BLOOD PRESSURE: 158 MMHG | DIASTOLIC BLOOD PRESSURE: 84 MMHG | HEIGHT: 67 IN | TEMPERATURE: 97.8 F | WEIGHT: 216.6 LBS | HEART RATE: 64 BPM | BODY MASS INDEX: 34 KG/M2

## 2020-10-27 DIAGNOSIS — Z79.4 TYPE 2 DIABETES MELLITUS WITH STAGE 3 CHRONIC KIDNEY DISEASE, WITH LONG-TERM CURRENT USE OF INSULIN (H): ICD-10-CM

## 2020-10-27 DIAGNOSIS — E11.22 TYPE 2 DIABETES MELLITUS WITH STAGE 3B CHRONIC KIDNEY DISEASE, WITH LONG-TERM CURRENT USE OF INSULIN (H): Primary | ICD-10-CM

## 2020-10-27 DIAGNOSIS — E78.2 MIXED HYPERLIPIDEMIA: ICD-10-CM

## 2020-10-27 DIAGNOSIS — I10 BENIGN ESSENTIAL HYPERTENSION: ICD-10-CM

## 2020-10-27 DIAGNOSIS — I26.99 PULMONARY EMBOLISM AND INFARCTION (H): ICD-10-CM

## 2020-10-27 DIAGNOSIS — N18.32 TYPE 2 DIABETES MELLITUS WITH STAGE 3B CHRONIC KIDNEY DISEASE, WITH LONG-TERM CURRENT USE OF INSULIN (H): Primary | ICD-10-CM

## 2020-10-27 DIAGNOSIS — N18.30 TYPE 2 DIABETES MELLITUS WITH STAGE 3 CHRONIC KIDNEY DISEASE, WITH LONG-TERM CURRENT USE OF INSULIN (H): ICD-10-CM

## 2020-10-27 DIAGNOSIS — Z79.4 TYPE 2 DIABETES MELLITUS WITH STAGE 3B CHRONIC KIDNEY DISEASE, WITH LONG-TERM CURRENT USE OF INSULIN (H): Primary | ICD-10-CM

## 2020-10-27 DIAGNOSIS — E11.22 TYPE 2 DIABETES MELLITUS WITH STAGE 3 CHRONIC KIDNEY DISEASE, WITH LONG-TERM CURRENT USE OF INSULIN (H): ICD-10-CM

## 2020-10-27 PROBLEM — I51.9 CHRONIC LEFT VENTRICULAR SYSTOLIC DYSFUNCTION: Status: ACTIVE | Noted: 2020-10-27

## 2020-10-27 LAB
ANION GAP SERPL CALCULATED.3IONS-SCNC: 4 MMOL/L (ref 3–14)
BUN SERPL-MCNC: 19 MG/DL (ref 7–30)
CALCIUM SERPL-MCNC: 9 MG/DL (ref 8.5–10.1)
CHLORIDE SERPL-SCNC: 109 MMOL/L (ref 94–109)
CHOLEST SERPL-MCNC: 142 MG/DL
CO2 SERPL-SCNC: 29 MMOL/L (ref 20–32)
CREAT SERPL-MCNC: 1.69 MG/DL (ref 0.66–1.25)
EST. AVERAGE GLUCOSE BLD GHB EST-MCNC: 163 MG/DL
GFR SERPL CREATININE-BSD FRML MDRD: 38 ML/MIN/{1.73_M2}
GLUCOSE SERPL-MCNC: 127 MG/DL (ref 70–99)
HBA1C MFR BLD: 7.3 % (ref 0–5.6)
HDLC SERPL-MCNC: 45 MG/DL
LDLC SERPL CALC-MCNC: 67 MG/DL
NONHDLC SERPL-MCNC: 97 MG/DL
POTASSIUM SERPL-SCNC: 4.2 MMOL/L (ref 3.4–5.3)
SODIUM SERPL-SCNC: 142 MMOL/L (ref 133–144)
TRIGL SERPL-MCNC: 150 MG/DL

## 2020-10-27 PROCEDURE — 36415 COLL VENOUS BLD VENIPUNCTURE: CPT | Mod: ZL | Performed by: FAMILY MEDICINE

## 2020-10-27 PROCEDURE — 80048 BASIC METABOLIC PNL TOTAL CA: CPT | Mod: ZL | Performed by: FAMILY MEDICINE

## 2020-10-27 PROCEDURE — 83036 HEMOGLOBIN GLYCOSYLATED A1C: CPT | Mod: ZL | Performed by: FAMILY MEDICINE

## 2020-10-27 PROCEDURE — G0463 HOSPITAL OUTPT CLINIC VISIT: HCPCS | Mod: 25

## 2020-10-27 PROCEDURE — G0463 HOSPITAL OUTPT CLINIC VISIT: HCPCS

## 2020-10-27 PROCEDURE — 80061 LIPID PANEL: CPT | Mod: ZL | Performed by: FAMILY MEDICINE

## 2020-10-27 PROCEDURE — 99214 OFFICE O/P EST MOD 30 MIN: CPT | Performed by: FAMILY MEDICINE

## 2020-10-27 PROCEDURE — 999N001182 HC STATISTIC ESTIMATED AVERAGE GLUCOSE: Mod: ZL | Performed by: FAMILY MEDICINE

## 2020-10-27 RX ORDER — ROSUVASTATIN CALCIUM 5 MG/1
5 TABLET, COATED ORAL DAILY
Qty: 30 TABLET | Refills: 2 | Status: SHIPPED | OUTPATIENT
Start: 2020-10-27 | End: 2021-01-14

## 2020-10-27 RX ORDER — AMLODIPINE BESYLATE 10 MG/1
TABLET ORAL
COMMUNITY
Start: 2020-01-03 | End: 2021-04-29

## 2020-10-27 RX ORDER — WARFARIN SODIUM 5 MG/1
TABLET ORAL
Qty: 90 TABLET | Refills: 3 | Status: SHIPPED | OUTPATIENT
Start: 2020-10-27 | End: 2024-03-18

## 2020-10-27 ASSESSMENT — ANXIETY QUESTIONNAIRES
7. FEELING AFRAID AS IF SOMETHING AWFUL MIGHT HAPPEN: NOT AT ALL
4. TROUBLE RELAXING: NOT AT ALL
1. FEELING NERVOUS, ANXIOUS, OR ON EDGE: NOT AT ALL
2. NOT BEING ABLE TO STOP OR CONTROL WORRYING: NOT AT ALL
5. BEING SO RESTLESS THAT IT IS HARD TO SIT STILL: NOT AT ALL
IF YOU CHECKED OFF ANY PROBLEMS ON THIS QUESTIONNAIRE, HOW DIFFICULT HAVE THESE PROBLEMS MADE IT FOR YOU TO DO YOUR WORK, TAKE CARE OF THINGS AT HOME, OR GET ALONG WITH OTHER PEOPLE: NOT DIFFICULT AT ALL
GAD7 TOTAL SCORE: 0
3. WORRYING TOO MUCH ABOUT DIFFERENT THINGS: NOT AT ALL
6. BECOMING EASILY ANNOYED OR IRRITABLE: NOT AT ALL

## 2020-10-27 ASSESSMENT — MIFFLIN-ST. JEOR: SCORE: 1671.12

## 2020-10-27 ASSESSMENT — PATIENT HEALTH QUESTIONNAIRE - PHQ9: SUM OF ALL RESPONSES TO PHQ QUESTIONS 1-9: 0

## 2020-10-27 ASSESSMENT — PAIN SCALES - GENERAL: PAINLEVEL: NO PAIN (0)

## 2020-10-27 NOTE — PATIENT INSTRUCTIONS
Dr. Gurrola recommend trying a statin again, I agree.  The statin helps to lower cholesterol, but it also helps to stabilize any cholesterol plaque present, which lowers the risk of recurrent heart disease.  Dr. Gurrola recommends simvastatin (Zocor) at 40 mg or rosuvastatin (Crestor) at 10 mg.  We will start with Crestor at 5 mg to make sure this is tolerated.  We will recheck kidney function at next visit, we can increase the dose if the medication is tolerated.  Because we are starting a different medication for cholesterol, you can stop the Tricor/fenofibrate.

## 2020-10-27 NOTE — NURSING NOTE
"Chief Complaint   Patient presents with     Imm/Inj     Flu Shot     Hypertension     Diabetes     Lipids       Initial BP (!) 158/84 (BP Location: Right arm, Patient Position: Chair, Cuff Size: Adult Large)   Pulse 64   Temp 97.8  F (36.6  C) (Tympanic)   Resp 16   Ht 1.702 m (5' 7\")   Wt 98.2 kg (216 lb 9.6 oz)   SpO2 98%   BMI 33.92 kg/m   Estimated body mass index is 33.92 kg/m  as calculated from the following:    Height as of this encounter: 1.702 m (5' 7\").    Weight as of this encounter: 98.2 kg (216 lb 9.6 oz).  Medication Reconciliation: complete  Pamela M. Lechevalier, LPN    "

## 2020-10-28 ASSESSMENT — ANXIETY QUESTIONNAIRES: GAD7 TOTAL SCORE: 0

## 2020-10-30 DIAGNOSIS — E11.22 TYPE 2 DIABETES MELLITUS WITH STAGE 3B CHRONIC KIDNEY DISEASE, WITH LONG-TERM CURRENT USE OF INSULIN (H): Primary | ICD-10-CM

## 2020-10-30 DIAGNOSIS — Z79.4 TYPE 2 DIABETES MELLITUS WITH STAGE 3 CHRONIC KIDNEY DISEASE, WITH LONG-TERM CURRENT USE OF INSULIN (H): ICD-10-CM

## 2020-10-30 DIAGNOSIS — N18.30 TYPE 2 DIABETES MELLITUS WITH STAGE 3 CHRONIC KIDNEY DISEASE, WITH LONG-TERM CURRENT USE OF INSULIN (H): ICD-10-CM

## 2020-10-30 DIAGNOSIS — N18.32 TYPE 2 DIABETES MELLITUS WITH STAGE 3B CHRONIC KIDNEY DISEASE, WITH LONG-TERM CURRENT USE OF INSULIN (H): Primary | ICD-10-CM

## 2020-10-30 DIAGNOSIS — E11.22 TYPE 2 DIABETES MELLITUS WITH STAGE 3 CHRONIC KIDNEY DISEASE, WITH LONG-TERM CURRENT USE OF INSULIN (H): ICD-10-CM

## 2020-10-30 DIAGNOSIS — Z79.4 TYPE 2 DIABETES MELLITUS WITH STAGE 3B CHRONIC KIDNEY DISEASE, WITH LONG-TERM CURRENT USE OF INSULIN (H): Primary | ICD-10-CM

## 2020-11-02 RX ORDER — SITAGLIPTIN 100 MG/1
TABLET, FILM COATED ORAL
Qty: 90 TABLET | Refills: 3 | Status: SHIPPED | OUTPATIENT
Start: 2020-11-02 | End: 2021-07-14 | Stop reason: ALTCHOICE

## 2020-11-02 NOTE — TELEPHONE ENCOUNTER
Januvia      Last Written Prescription Date:  05/01/20  Last Fill Quantity: 90,   # refills: 1  Last Office Visit: 10/27/20  Future Office visit:    Next 5 appointments (look out 90 days)    Jan 28, 2021 10:00 AM  (Arrive by 9:45 AM)  SHORT with Lorelei Felix MD  [facility] ([facility] ) [address]  [facility] [address]  [phone]           Last A1C 10/27/20

## 2020-11-05 ENCOUNTER — ANTICOAGULATION THERAPY VISIT (OUTPATIENT)
Dept: ANTICOAGULATION | Facility: OTHER | Age: 75
End: 2020-11-05
Attending: FAMILY MEDICINE
Payer: MEDICARE

## 2020-11-05 DIAGNOSIS — I26.99 PULMONARY EMBOLISM AND INFARCTION (H): ICD-10-CM

## 2020-11-05 DIAGNOSIS — Z79.01 LONG TERM CURRENT USE OF ANTICOAGULANT THERAPY: ICD-10-CM

## 2020-11-05 DIAGNOSIS — I82.409 ACUTE THROMBOEMBOLISM OF DEEP VEINS OF LOWER EXTREMITY, UNSPECIFIED LATERALITY (H): ICD-10-CM

## 2020-11-05 LAB
CAPILLARY BLOOD COLLECTION: NORMAL
INR BLD: 2.2 (ref 0.86–1.14)

## 2020-11-05 PROCEDURE — 85610 PROTHROMBIN TIME: CPT | Mod: ZL | Performed by: FAMILY MEDICINE

## 2020-11-05 PROCEDURE — 36416 COLLJ CAPILLARY BLOOD SPEC: CPT | Mod: ZL | Performed by: FAMILY MEDICINE

## 2020-11-05 NOTE — PROGRESS NOTES
ANTICOAGULATION FOLLOW-UP CLINIC VISIT    Patient Name:  Clement Voss  Date:  2020  Contact Type:  Telephone- spoke to pt wife, Jodie    SUBJECTIVE:  Patient Findings     Comments:  INR done by lab. Call placed to pt wife and spoke to her re: INR results/Warfarin dosing/INR recheck date. She verbalized upstanding of instructions. She denies any bleeding/bruising or any new changes in meds/diet/activity. Pt to call Warfarin clinic if any change/questons/illness.         Clinical Outcomes     Negatives:  Major bleeding event, Thromboembolic event, Anticoagulation-related hospital admission, Anticoagulation-related ED visit, Anticoagulation-related fatality    Comments:  INR done by lab. Call placed to pt wife and spoke to her re: INR results/Warfarin dosing/INR recheck date. She verbalized upstanding of instructions. She denies any bleeding/bruising or any new changes in meds/diet/activity. Pt to call Warfarin clinic if any change/questons/illness.            OBJECTIVE    Recent labs: (last 7 days)     20  1146   INR 2.2*       ASSESSMENT / PLAN  INR assessment THER    Recheck INR In: 4 WEEKS    INR Location Clinic      Anticoagulation Summary  As of 2020    INR goal:  2.0-3.0   TTR:  45.7 % (1 y)   INR used for dosin.2 (2020)   Warfarin maintenance plan:  5 mg (2.5 mg x 2) every Mon, Fri; 2.5 mg (2.5 mg x 1) all other days   Full warfarin instructions:  5 mg every Mon, Fri; 2.5 mg all other days   Weekly warfarin total:  22.5 mg   No change documented:  Saba Delgadillo RN   Plan last modified:  Jodie Elena RN (10/22/2020)   Next INR check:  12/3/2020   Priority:  High   Target end date:  Indefinite    Indications    Pulmonary embolism and infarction (H) [I26.99]  Long-term (current) use of anticoagulants [Z79.01] [Z79.01]             Anticoagulation Episode Summary     INR check location:      Preferred lab:      Send INR reminders to:  THOMAS RUELAS    Comments:         Anticoagulation Care Providers     Provider Role Specialty Phone number    Lorelei Felix MD Referring Family Medicine [phone]            See the Encounter Report to view Anticoagulation Flowsheet and Dosing Calendar (Go to Encounters tab in chart review, and find the Anticoagulation Therapy Visit)        Saba Delgadillo RN

## 2020-11-06 DIAGNOSIS — I10 BENIGN ESSENTIAL HYPERTENSION: ICD-10-CM

## 2020-11-06 RX ORDER — LOSARTAN POTASSIUM 50 MG/1
TABLET ORAL
Qty: 90 TABLET | Refills: 1 | Status: SHIPPED | OUTPATIENT
Start: 2020-11-06 | End: 2020-11-18

## 2020-11-13 DIAGNOSIS — E11.22 TYPE 2 DIABETES MELLITUS WITH STAGE 3B CHRONIC KIDNEY DISEASE, WITH LONG-TERM CURRENT USE OF INSULIN (H): Primary | ICD-10-CM

## 2020-11-13 DIAGNOSIS — G25.81 RESTLESS LEGS SYNDROME: ICD-10-CM

## 2020-11-13 DIAGNOSIS — N18.32 TYPE 2 DIABETES MELLITUS WITH STAGE 3B CHRONIC KIDNEY DISEASE, WITH LONG-TERM CURRENT USE OF INSULIN (H): Primary | ICD-10-CM

## 2020-11-13 DIAGNOSIS — D64.9 ANEMIA, UNSPECIFIED TYPE: ICD-10-CM

## 2020-11-13 DIAGNOSIS — Z79.4 TYPE 2 DIABETES MELLITUS WITH STAGE 3 CHRONIC KIDNEY DISEASE, WITH LONG-TERM CURRENT USE OF INSULIN (H): ICD-10-CM

## 2020-11-13 DIAGNOSIS — N18.30 TYPE 2 DIABETES MELLITUS WITH STAGE 3 CHRONIC KIDNEY DISEASE, WITH LONG-TERM CURRENT USE OF INSULIN (H): ICD-10-CM

## 2020-11-13 DIAGNOSIS — Z79.4 TYPE 2 DIABETES MELLITUS WITH STAGE 3B CHRONIC KIDNEY DISEASE, WITH LONG-TERM CURRENT USE OF INSULIN (H): Primary | ICD-10-CM

## 2020-11-13 DIAGNOSIS — E11.22 TYPE 2 DIABETES MELLITUS WITH STAGE 3 CHRONIC KIDNEY DISEASE, WITH LONG-TERM CURRENT USE OF INSULIN (H): ICD-10-CM

## 2020-11-17 RX ORDER — INSULIN GLARGINE 100 [IU]/ML
INJECTION, SOLUTION SUBCUTANEOUS
Qty: 15 ML | Refills: 1 | Status: SHIPPED | OUTPATIENT
Start: 2020-11-17 | End: 2021-01-28

## 2020-11-17 RX ORDER — PNV NO.95/FERROUS FUM/FOLIC AC 28MG-0.8MG
TABLET ORAL
Qty: 30 TABLET | Refills: 2 | Status: SHIPPED | OUTPATIENT
Start: 2020-11-17 | End: 2021-02-19

## 2020-11-17 NOTE — TELEPHONE ENCOUNTER
iron      Last Written Prescription Date:  7-  Last Fill Quantity: 30,   # refills: 2  Last Office Visit: 10-  Future Office visit:    Next 5 appointments (look out 90 days)    Jan 28, 2021 10:00 AM  (Arrive by 9:45 AM)  SHORT with Lorelei Felix MD  [facility] ([facility] ) [address]  [facility] [address]  [phone]             lantus pen      Last Written Prescription Date:  4-  Last Fill Quantity: 15ml,   # refills: 1  Last Office Visit: 10-  Future Office visit:

## 2020-11-22 DIAGNOSIS — Z79.4 TYPE 2 DIABETES MELLITUS WITH STAGE 3 CHRONIC KIDNEY DISEASE, WITH LONG-TERM CURRENT USE OF INSULIN (H): ICD-10-CM

## 2020-11-22 DIAGNOSIS — E11.22 TYPE 2 DIABETES MELLITUS WITH STAGE 3 CHRONIC KIDNEY DISEASE, WITH LONG-TERM CURRENT USE OF INSULIN (H): ICD-10-CM

## 2020-11-22 DIAGNOSIS — N18.30 TYPE 2 DIABETES MELLITUS WITH STAGE 3 CHRONIC KIDNEY DISEASE, WITH LONG-TERM CURRENT USE OF INSULIN (H): ICD-10-CM

## 2020-11-23 RX ORDER — FLURBIPROFEN SODIUM 0.3 MG/ML
SOLUTION/ DROPS OPHTHALMIC
Qty: 100 EACH | Refills: 2 | Status: SHIPPED | OUTPATIENT
Start: 2020-11-23 | End: 2021-09-21

## 2020-11-24 ENCOUNTER — ANTICOAGULATION THERAPY VISIT (OUTPATIENT)
Dept: ANTICOAGULATION | Facility: OTHER | Age: 75
End: 2020-11-24

## 2020-11-24 ENCOUNTER — OFFICE VISIT (OUTPATIENT)
Dept: FAMILY MEDICINE | Facility: OTHER | Age: 75
End: 2020-11-24
Attending: FAMILY MEDICINE
Payer: COMMERCIAL

## 2020-11-24 ENCOUNTER — TRANSFERRED RECORDS (OUTPATIENT)
Dept: HEALTH INFORMATION MANAGEMENT | Facility: CLINIC | Age: 75
End: 2020-11-24

## 2020-11-24 VITALS
HEART RATE: 62 BPM | OXYGEN SATURATION: 98 % | RESPIRATION RATE: 16 BRPM | SYSTOLIC BLOOD PRESSURE: 158 MMHG | WEIGHT: 220.3 LBS | TEMPERATURE: 97.5 F | HEIGHT: 67 IN | BODY MASS INDEX: 34.58 KG/M2 | DIASTOLIC BLOOD PRESSURE: 72 MMHG

## 2020-11-24 DIAGNOSIS — Z01.818 PRE-OP TESTING: ICD-10-CM

## 2020-11-24 DIAGNOSIS — Z79.01 LONG TERM CURRENT USE OF ANTICOAGULANT THERAPY: ICD-10-CM

## 2020-11-24 DIAGNOSIS — H40.9 GLAUCOMA OF LEFT EYE, UNSPECIFIED GLAUCOMA TYPE: ICD-10-CM

## 2020-11-24 DIAGNOSIS — Z01.818 PREOP GENERAL PHYSICAL EXAM: Primary | ICD-10-CM

## 2020-11-24 DIAGNOSIS — Z79.4 TYPE 2 DIABETES MELLITUS WITH STAGE 3B CHRONIC KIDNEY DISEASE, WITH LONG-TERM CURRENT USE OF INSULIN (H): ICD-10-CM

## 2020-11-24 DIAGNOSIS — N18.32 TYPE 2 DIABETES MELLITUS WITH STAGE 3B CHRONIC KIDNEY DISEASE, WITH LONG-TERM CURRENT USE OF INSULIN (H): ICD-10-CM

## 2020-11-24 DIAGNOSIS — Z86.711 HISTORY OF PULMONARY EMBOLISM: ICD-10-CM

## 2020-11-24 DIAGNOSIS — E78.2 MIXED HYPERLIPIDEMIA: ICD-10-CM

## 2020-11-24 DIAGNOSIS — E11.22 TYPE 2 DIABETES MELLITUS WITH STAGE 3B CHRONIC KIDNEY DISEASE, WITH LONG-TERM CURRENT USE OF INSULIN (H): ICD-10-CM

## 2020-11-24 DIAGNOSIS — I26.99 PULMONARY EMBOLISM AND INFARCTION (H): ICD-10-CM

## 2020-11-24 DIAGNOSIS — I10 BENIGN ESSENTIAL HYPERTENSION: ICD-10-CM

## 2020-11-24 DIAGNOSIS — I25.10 ASCVD (ARTERIOSCLEROTIC CARDIOVASCULAR DISEASE): ICD-10-CM

## 2020-11-24 DIAGNOSIS — N18.32 STAGE 3B CHRONIC KIDNEY DISEASE (H): ICD-10-CM

## 2020-11-24 LAB
ERYTHROCYTE [DISTWIDTH] IN BLOOD BY AUTOMATED COUNT: 13.9 % (ref 10–15)
HCT VFR BLD AUTO: 41.4 % (ref 40–53)
HGB BLD-MCNC: 14.2 G/DL (ref 13.3–17.7)
MCH RBC QN AUTO: 30.5 PG (ref 26.5–33)
MCHC RBC AUTO-ENTMCNC: 34.3 G/DL (ref 31.5–36.5)
MCV RBC AUTO: 89 FL (ref 78–100)
PLATELET # BLD AUTO: 104 10E9/L (ref 150–450)
RBC # BLD AUTO: 4.66 10E12/L (ref 4.4–5.9)
WBC # BLD AUTO: 7 10E9/L (ref 4–11)

## 2020-11-24 PROCEDURE — 36415 COLL VENOUS BLD VENIPUNCTURE: CPT | Mod: ZL | Performed by: FAMILY MEDICINE

## 2020-11-24 PROCEDURE — 99214 OFFICE O/P EST MOD 30 MIN: CPT | Performed by: FAMILY MEDICINE

## 2020-11-24 PROCEDURE — U0003 INFECTIOUS AGENT DETECTION BY NUCLEIC ACID (DNA OR RNA); SEVERE ACUTE RESPIRATORY SYNDROME CORONAVIRUS 2 (SARS-COV-2) (CORONAVIRUS DISEASE [COVID-19]), AMPLIFIED PROBE TECHNIQUE, MAKING USE OF HIGH THROUGHPUT TECHNOLOGIES AS DESCRIBED BY CMS-2020-01-R: HCPCS | Mod: ZL | Performed by: FAMILY MEDICINE

## 2020-11-24 PROCEDURE — 85027 COMPLETE CBC AUTOMATED: CPT | Mod: ZL | Performed by: FAMILY MEDICINE

## 2020-11-24 PROCEDURE — G0463 HOSPITAL OUTPT CLINIC VISIT: HCPCS

## 2020-11-24 RX ORDER — BRIMONIDINE TARTRATE, TIMOLOL MALEATE 2; 5 MG/ML; MG/ML
SOLUTION/ DROPS OPHTHALMIC
COMMUNITY
Start: 2020-11-22 | End: 2024-03-18

## 2020-11-24 RX ORDER — TRAVOPROST OPHTHALMIC SOLUTION 0.04 MG/ML
SOLUTION OPHTHALMIC
COMMUNITY
Start: 2020-09-25 | End: 2022-08-18

## 2020-11-24 ASSESSMENT — PAIN SCALES - GENERAL: PAINLEVEL: NO PAIN (0)

## 2020-11-24 ASSESSMENT — MIFFLIN-ST. JEOR: SCORE: 1687.9

## 2020-11-24 NOTE — PROGRESS NOTES
Evaluation dictation, lab results (COVID Pending 11/24) and EKG graphing faxed to [facility] at [phone] & [phone].

## 2020-11-24 NOTE — PROGRESS NOTES
[facility]  [address]  [facility] [address]  Phone: [phone]  Primary Provider: Saumya Felix  Pre-op Performing Provider: SAUMYA FELIX    PREOPERATIVE EVALUATION:  Today's date: 11/24/2020    Clement Voss is a 75 year old male who presents for a preoperative evaluation.    Surgical Information:  Surgery/Procedure: Left Eye Trabeculectomy with Mitomycin C  Surgery Location: [place], [place]  Surgeon: Dr. Zeynep Oden  Surgery Date: 11/30/20  Time of Surgery: TBD  Where patient plans to recover: At home with family  Fax number for surgical facility: [phone] and [facility]    Type of Anesthesia Anticipated: to be determined    Subjective     HPI related to upcoming procedure: Elevated eye pressure      Preop Questions 11/24/2020   1. Have you ever had a heart attack or stroke? No   2. Have you ever had surgery on your heart or blood vessels, such as a stent placement, a coronary artery bypass, or surgery on an artery in your head, neck, heart, or legs? YES - 02/2020, 2 vessel CABG and aortic valve replacement   3. Do you have chest pain with activity? No   4. Do you have a history of  heart failure? No   5. Do you currently have a cold, bronchitis or symptoms of other infection? No   6. Do you have a cough, shortness of breath, or wheezing? No   7. Do you or anyone in your family have previous history of blood clots? YES - personal history   8. Do you or does anyone in your family have a serious bleeding problem such as prolonged bleeding following surgeries or cuts? No   9. Have you ever had problems with anemia or been told to take iron pills? YES - history of anemia   10. Have you had any abnormal blood loss such as black, tarry or bloody stools? No   11. Have you ever had a blood transfusion? UNKNOWN -    12. Are you willing to have a blood transfusion if it is medically needed before, during, or after your surgery? Yes   13.  Have you or any of your relatives ever had problems with anesthesia? No   14. Do you have sleep apnea, excessive snoring or daytime drowsiness? No   15. Do you have any artifical heart valves or other implanted medical devices like a pacemaker, defibrillator, or continuous glucose monitor? No   16. Do you have artificial joints? No   17. Are you allergic to latex? No     Health Care Directive:  Patient does not have a Health Care Directive or Living Will: Patient states has Advance Directive and will bring in a copy to clinic.    Preoperative Review of :   reviewed - no record of controlled substances prescribed.      Status of Chronic Conditions:  CAD - Patient has a longstanding history of moderate-severe CAD. Patient denies recent chest pain or NTG use, denies exercise induced dyspnea or PND.     DIABETES - Patient has a longstanding history of DiabetesType Type II . Patient is being treated with diet, oral agents and insulin injections and denies significant side effects. Control has been fair. Complicating factors include but are not limited to: hypertension and hyperlipidemia.     HYPERLIPIDEMIA - Patient has a long history of significant Hyperlipidemia requiring medication for treatment with recent fair control. Patient reports no problems or side effects with the medication.     HYPERTENSION - Patient has longstanding history of HTN , currently denies any symptoms referable to elevated blood pressure. Specifically denies chest pain, palpitations, dyspnea, orthopnea, PND or peripheral edema. Blood pressure readings have been in normal range. Current medication regimen is as listed below. Patient denies any side effects of medication.     RENAL INSUFFICIENCY - Patient has a longstanding history of moderate-severe chronic renal insufficiency. Last Cr 1.69.       Review of Systems  Constitutional, neuro, ENT, endocrine, pulmonary, cardiac, gastrointestinal, genitourinary, musculoskeletal, integument and  psychiatric systems are negative, except as otherwise noted.    Patient Active Problem List    Diagnosis Date Noted     Chronic left ventricular systolic dysfunction 10/27/2020     Priority: Medium     Other dysphagia 02/21/2020     Priority: Medium     Abnormal stress test on 1/10/2020 02/18/2020     Priority: Medium     Regional wall motion abnormality of heart 02/18/2020     Priority: Medium     Diastolic dysfunction grade 2 on 1/20/2020 02/18/2020     Priority: Medium     Severe aortic valve regurgitation 02/18/2020     Priority: Medium     Moderate mitral regurgitation 02/18/2020     Priority: Medium     Ascending aortic aneurysm-severe 02/18/2020     Priority: Medium     Anticoagulated on Coumadin 02/18/2020     Priority: Medium     History of DVT on 12/30/2013 02/18/2020     Priority: Medium     History of pulmonary embolism 02/18/2020     Priority: Medium     History of tobacco abuse quitting 8/12/1964 02/18/2020     Priority: Medium     S/P aortic valve replacement 02/17/2020     Priority: Medium     Postoperative anemia due to acute blood loss 02/10/2020     Priority: Medium     S/P CABG x 2 02/10/2020     Priority: Medium     ASCVD (arteriosclerotic cardiovascular disease) 02/03/2020     Priority: Medium     Aortic root aneurysm (H) 01/28/2020     Priority: Medium     Added automatically from request for surgery [id number]       CALDERON (dyspnea on exertion) 01/28/2020     Priority: Medium     Added automatically from request for surgery [id number]       Murmur 01/28/2020     Priority: Medium     Added automatically from request for surgery [id number]       Aortic valve insufficiency, etiology of cardiac valve disease unspecified 01/28/2020     Priority: Medium     Added automatically from request for surgery [id number]       CKD (chronic kidney disease) stage 3, GFR 30-59 ml/min 09/24/2019     Priority: Medium     Morbid obesity (H) 05/26/2017     Priority: Medium     Long-term (current) use of anticoagulants [Z79.01]  08/04/2016     Priority: Medium     ACP (advance care planning) 03/14/2013     Priority: Medium     Advance Care Planning 8/26/2016: ACP Review of Chart / Resources Provided:  Reviewed chart for advance care plan.  Clement Voss has no plan or code status on file. Discussed available resources and provided with information. Confirmed code status reflects current choices pending further ACP discussions.  Confirmed/documented legally designated decision makers.  Added by Jenifer Villa             Pulmonary embolism and infarction (H) 09/24/2012     Priority: Medium     Gout 01/10/2011     Priority: Medium     Mixed hyperlipidemia 10/10/2005     Priority: Medium     Benign essential hypertension 01/03/2005     Priority: Medium     Type 2 diabetes mellitus with chronic kidney disease, with long-term current use of insulin (H) 08/02/2004     Priority: Medium      Past Medical History:   Diagnosis Date     DVT (deep venous thrombosis) 9/24/2012     DVT (deep venous thrombosis) (H)      Gout, unspecified 1/10/2011     Other and unspecified hyperlipidemia 10/10/2005     Pulmonary embolism 9/24/2012     Pulmonary embolism (H)      Type II or unspecified type diabetes mellitus without mention of complication, not stated as uncontrolled 8/2/2004     Unspecified essential hypertension 1/3/2005     Past Surgical History:   Procedure Laterality Date     APPENDECTOMY  2008     CHOLECYSTECTOMY  1984     COLONOSCOPY N/A 5/16/2019    Procedure: COLONOSCOPY;  Surgeon: Benito Saldana MD;  Location: [facility]     History of PE of right lung 3/2013[       PHACOEMULSIFICATION WITH STANDARD INTRAOCULAR LENS IMPLANT Left 4/19/2018    Procedure: PHACOEMULSIFICATION WITH STANDARD INTRAOCULAR LENS IMPLANT;  CATARACT EXTRACTION LEFT EYE WITH IMPLANT, ISTENT LEFT EYE;  Surgeon: Kush Almaraz MD;  Location: [facility] OR     PHACOEMULSIFICATION WITH STANDARD INTRAOCULAR LENS IMPLANT Right 5/3/2018    Procedure: PHACOEMULSIFICATION WITH STANDARD  INTRAOCULAR LENS IMPLANT;  CATARACT EXTRACTION RIGHT EYE WITH IMPLANT,WITH ISTENT ATTEMPTED;  Surgeon: Kush Almaraz MD;  Location: [facility]     TRABECULAR MICRO-BYPASS WITH ISTENT Left 4/19/2018    Procedure: TRABECULAR MICRO-BYPASS WITH ISTENT;;  Surgeon: Kush Almaraz MD;  Location: [facility]     TRABECULAR MICRO-BYPASS WITH ISTENT  5/3/2018    Procedure: TRABECULAR MICRO-BYPASS WITH ISTENT;;  Surgeon: Kush Almaraz MD;  Location: [facility]     Current Outpatient Medications   Medication Sig Dispense Refill     acetaminophen (TYLENOL) 500 MG tablet Take 1-2 tablets (500-1,000 mg) by mouth every 6 hours as needed       amLODIPine (NORVASC) 10 MG tablet        aspirin 81 MG EC tablet Take 1 tablet (81 mg) by mouth daily 90 tablet 3     B-D U/F insulin pen needle USE AS DIRECTED 100 each 2     blood glucose (ACCU-CHEK VLAD PLUS) test strip USE TO TEST BLOOD SUGARS THREE TIMES DAILY OR AS DIRECTED 200 strip 3     COMBIGAN 0.2-0.5 % ophthalmic solution        Ferrous Sulfate (IRON) 325 (65 Fe) MG tablet TAKE 1 TABLET(325 MG) BY MOUTH DAILY WITH BREAKFAST 30 tablet 2     fluticasone (FLONASE) 50 MCG/ACT nasal spray Spray 2 sprays into both nostrils daily (Patient taking differently: Spray 2 sprays into both nostrils daily as needed ) 1 Package 0     furosemide (LASIX) 20 MG tablet Take 20 mg by mouth       indomethacin (INDOCIN) 50 MG capsule Take 1 capsule (50 mg) by mouth 3 times daily With food as needed 60 capsule 1     JANUVIA 100 MG tablet TAKE 1 TABLET(100 MG) BY MOUTH DAILY 90 tablet 3     LANTUS SOLOSTAR 100 UNIT/ML soln ADMINISTER 33 UNITS UNDER THE SKIN AT BEDTIME 15 mL 1     loratadine (CLARITIN) 10 MG tablet Take 10 mg by mouth daily.       losartan (COZAAR) 100 MG tablet Take 1 tablet (100 mg) by mouth daily 90 tablet 1     metoprolol tartrate (LOPRESSOR) 25 MG tablet Take 1.5 tablets (37.5 mg) by mouth 2 times daily 270 tablet 1     order for DME Equipment being ordered: Knee high compression  "stockings - 20-30 mmHg 2 Device 1     rosuvastatin (CRESTOR) 5 MG tablet Take 1 tablet (5 mg) by mouth daily 30 tablet 2     STATIN NOT PRESCRIBED, INTENTIONAL, Statin not prescribed intentionally due to Intolerance (with supporting documentation of trying a statin at least once within the last 5 years) 0 each 0     STOOL SOFTENER 100 MG capsule TAKE 1 CAPSULE(100 MG) BY MOUTH TWICE DAILY 60 capsule 2     travoprost DARION FREE (TRAVATAN Z) 0.004 % ophthalmic solution INT 1 GTT IN OU QHS       warfarin ANTICOAGULANT (COUMADIN) 2.5 MG tablet Take 1 tab (2.5mg ) daily or as directed by warfarin clinic. 100 tablet 1     warfarin ANTICOAGULANT (COUMADIN) 5 MG tablet 2.5mg (1/2 pill) Mon, Fri and 5mg (1 pill) all other days or as directed by warfarin clinic 90 tablet 3       Allergies   Allergen Reactions     Atorvastatin      Other reaction(s): Other  Caused increased creatinine.      Atorvastatin Calcium Other (See Comments)     Lipitor  Increase CR     Barley Grass Unknown     Iodine      Penicillins      Procaine Unknown     Shellfish-Derived Products Unknown     Sulfa Drugs      Sulfa (sulfonamide antibiotics)     Sulfacetamide         Social History     Tobacco Use     Smoking status: Former Smoker     Types: Cigarettes     Quit date: 1964     Years since quittin.3     Smokeless tobacco: Never Used   Substance Use Topics     Alcohol use: No     Family History   Problem Relation Age of Onset     Heart Disease Father 71        heart disease; cause of death     Other - See Comments Mother 79        old age; cause of death     History   Drug Use No         Objective     BP (!) 158/72 (BP Location: Right arm, Patient Position: Sitting, Cuff Size: Adult Regular)   Pulse 62   Temp 97.5  F (36.4  C) (Tympanic)   Resp 16   Ht 1.702 m (5' 7\")   Wt 99.9 kg (220 lb 4.8 oz)   SpO2 98%   BMI 34.50 kg/m      Physical Exam    GENERAL APPEARANCE: healthy, alert and no distress     EYES: EOMI,  PERRL     HENT: ear " canals and TM's normal and nose and mouth without ulcers or lesions     NECK: no adenopathy     RESP: lungs clear to auscultation - no rales, rhonchi or wheezes     CV: regular rates and rhythm and grade 2/6 systolic murmur heard best over the RUSB     NEURO: Normal strength and tone, sensory exam grossly normal, mentation intact and speech normal     PSYCH: mentation appears normal. and affect normal/bright     LYMPHATICS: No cervical adenopathy    Recent Labs   Lab Test 11/05/20  1146 10/27/20  0918 10/22/20  0958 07/16/20  0936 07/16/20  0936 03/13/20  0908 03/13/20  0908   HGB  --   --   --   --  14.2  --  9.9*   PLT  --   --   --   --  125*  --  207   INR 2.2*  --  1.4*   < > 2.1*   < > 3.7*   NA  --  142  --   --  141  --  139   POTASSIUM  --  4.2  --   --  4.1  --  4.3   CR  --  1.69*  --   --  1.55*  --  1.67*   A1C  --  7.3*  --   --  7.3*  --   --     < > = values in this interval not displayed.        Diagnostics:  Recent Results (from the past 24 hour(s))   CBC with platelets    Collection Time: 11/24/20  9:45 AM   Result Value Ref Range    WBC 7.0 4.0 - 11.0 10e9/L    RBC Count 4.66 4.4 - 5.9 10e12/L    Hemoglobin 14.2 13.3 - 17.7 g/dL    Hematocrit 41.4 40.0 - 53.0 %    MCV 89 78 - 100 fl    MCH 30.5 26.5 - 33.0 pg    MCHC 34.3 31.5 - 36.5 g/dL    RDW 13.9 10.0 - 15.0 %    Platelet Count 104 (L) 150 - 450 10e9/L      EKG: atrial fibrillation, rate 57, Right Bundle Branch Block, unchanged from previous tracings, completed at [facility] in May 2020    Revised Cardiac Risk Index (RCRI):  The patient has the following serious cardiovascular risks for perioperative complications:   - Coronary Artery Disease (MI, positive stress test, angina, Qs on EKG) = 1 point   - Diabetes Mellitus (on Insulin) = 1 point     RCRI Interpretation: 2 points: Class III (moderate risk - 6.6% complication rate)     Estimated Functional Capacity: Performs 4 METS exercise without symptoms (e.g., light housework, stairs, 4 mph walk,  7 mph bike, slow step dance)         Assessment & Plan   The proposed surgical procedure is considered INTERMEDIATE risk.      ICD-10-CM    1. Preop general physical exam  Z01.818 CBC with platelets   2. Glaucoma of left eye, unspecified glaucoma type  H40.9    3. Type 2 diabetes mellitus with stage 3b chronic kidney disease, with long-term current use of insulin (H)  E11.21     N18.32     Z79.4    4. Mixed hyperlipidemia  E78.2    5. Benign essential hypertension  I10    6. Stage 3b chronic kidney disease  N18.32    7. ASCVD (arteriosclerotic cardiovascular disease)  I25.10    8. History of pulmonary embolism  Z86.711    9. Pulmonary embolism and infarction (H)  I26.99    10. Pre-op testing  Z01.818 Asymptomatic COVID-19 Virus (Coronavirus) by PCR            Risks and Recommendations:  The patient has the following additional risks and recommendations for perioperative complications:  Cardiovascular:  No further risk assessment needed  Anemia/Bleeding/Clotting:    - History of DVT or PE, consider DVT prevention postoperatively    Medication Instructions:   - warfarin: Bridging therapy will be coordinated by Coumadin Clinic    RECOMMENDATION:  APPROVAL GIVEN to proceed with proposed procedure, without further diagnostic evaluation.  Preoperative COVID testing collected today      Signed Electronically by: Lorelei Felix MD    Copy of this evaluation report is provided to requesting physician.    [facility] Pre[facility] Guidelines    Revised Cardiac Risk Index

## 2020-11-24 NOTE — PATIENT INSTRUCTIONS

## 2020-11-24 NOTE — NURSING NOTE
"Chief Complaint   Patient presents with     Pre-Op Exam       Initial BP (!) 158/72 (BP Location: Right arm, Patient Position: Sitting, Cuff Size: Adult Regular)   Pulse 62   Temp 97.5  F (36.4  C) (Tympanic)   Resp 16   Ht 1.702 m (5' 7\")   Wt 99.9 kg (220 lb 4.8 oz)   SpO2 98%   BMI 34.50 kg/m   Estimated body mass index is 34.5 kg/m  as calculated from the following:    Height as of this encounter: 1.702 m (5' 7\").    Weight as of this encounter: 99.9 kg (220 lb 4.8 oz).  Medication Reconciliation: complete  Tracey Blanco MA  "

## 2020-11-24 NOTE — PROGRESS NOTES
ANTICOAGULATION FOLLOW-UP CLINIC VISIT    Patient Name:  Clement Voss  Date:  11/24/2020  Contact Type:  Telephone    SUBJECTIVE:  Patient Findings     Positives:  Upcoming invasive procedure (Left Eye Trabeculectomy )    Comments:  Chart cc'd to Warfarin clinic re: pt having upcoming invasive procedure. Per Dr. Wang she would like him bridged with Lovenox 40 mg daily DVT prophylaxis dose. Call placed to [facility] to verify pt needing to be bridged with Lovenox and Warfarin held. Call placed to pt and spoke to him regarding pre procedure Warfarin hold and post procedure Warfarin dosing/Lovenox instructions. He verbalized understanding of instructions. He will  Lovenox at desired pharmacy. He is to call Warfarin clinic if any questions/changes/illness.         Clinical Outcomes     Negatives:  Major bleeding event, Thromboembolic event, Anticoagulation-related hospital admission, Anticoagulation-related ED visit, Anticoagulation-related fatality    Comments:  Chart cc'd to Warfarin clinic re: pt having upcoming invasive procedure. Per Dr. Wang she would like him bridged with Lovenox 40 mg daily DVT prophylaxis dose. Call placed to [facility] to verify pt needing to be bridged with Lovenox and Warfarin held. Call placed to pt and spoke to him regarding pre procedure Warfarin hold and post procedure Warfarin dosing/Lovenox instructions. He verbalized understanding of instructions. He will  Lovenox at desired pharmacy. He is to call Warfarin clinic if any questions/changes/illness.            OBJECTIVE    No results for input(s): INR, [id number], [id number], 2AGN, F2 in the last 168 hours.    ASSESSMENT / PLAN  No question data found.  Anticoagulation Summary  As of 11/24/2020    INR goal:  2.0-3.0   TTR:  42.7 % (11.5 mo)   INR used for dosing:  No new INR was available at the time of this encounter.   Warfarin maintenance plan:  5 mg (2.5 mg x 2) every Mon, Fri; 2.5 mg (2.5 mg  x 1) all other days   Full warfarin instructions:  11/25: Hold; 11/26: Hold; 11/27: Hold; 11/28: Hold; 11/29: Hold; 12/1: 7.5 mg; 12/2: 7.5 mg; 12/3: 3.75 mg; Otherwise 5 mg every Mon, Fri; 2.5 mg all other days   Weekly warfarin total:  22.5 mg   Plan last modified:  Jodie Elena RN (10/22/2020)   Next INR check:  12/7/2020   Priority:  High   Target end date:  Indefinite    Indications    Pulmonary embolism and infarction (H) [I26.99]  Long-term (current) use of anticoagulants [Z79.01] [Z79.01]             Anticoagulation Episode Summary     INR check location:      Preferred lab:      Send INR reminders to:  THOMAS RUELAS    Comments:        Anticoagulation Care Providers     Provider Role Specialty Phone number    Lorelei Felix MD Referring Family Medicine [phone]            See the Encounter Report to view Anticoagulation Flowsheet and Dosing Calendar (Go to Encounters tab in chart review, and find the Anticoagulation Therapy Visit)        Saba Delgadillo RN

## 2020-11-25 LAB
LABORATORY COMMENT REPORT: NORMAL
SARS-COV-2 RNA SPEC QL NAA+PROBE: NEGATIVE
SARS-COV-2 RNA SPEC QL NAA+PROBE: NORMAL
SPECIMEN SOURCE: NORMAL
SPECIMEN SOURCE: NORMAL

## 2020-12-07 ENCOUNTER — ANTICOAGULATION THERAPY VISIT (OUTPATIENT)
Dept: ANTICOAGULATION | Facility: OTHER | Age: 75
End: 2020-12-07
Attending: FAMILY MEDICINE
Payer: MEDICARE

## 2020-12-07 DIAGNOSIS — I82.409 ACUTE THROMBOEMBOLISM OF DEEP VEINS OF LOWER EXTREMITY, UNSPECIFIED LATERALITY (H): ICD-10-CM

## 2020-12-07 DIAGNOSIS — I26.99 PULMONARY EMBOLISM AND INFARCTION (H): ICD-10-CM

## 2020-12-07 DIAGNOSIS — Z79.01 LONG TERM CURRENT USE OF ANTICOAGULANT THERAPY: ICD-10-CM

## 2020-12-07 DIAGNOSIS — E78.5 HYPERLIPIDEMIA, UNSPECIFIED HYPERLIPIDEMIA TYPE: ICD-10-CM

## 2020-12-07 LAB — INR BLD: 1.6 (ref 0.86–1.14)

## 2020-12-07 PROCEDURE — 36416 COLLJ CAPILLARY BLOOD SPEC: CPT | Mod: ZL | Performed by: FAMILY MEDICINE

## 2020-12-07 PROCEDURE — 85610 PROTHROMBIN TIME: CPT | Mod: ZL | Performed by: FAMILY MEDICINE

## 2020-12-07 NOTE — PROGRESS NOTES
ANTICOAGULATION FOLLOW-UP CLINIC VISIT    Patient Name:  Clement Voss  Date:  2020  Contact Type:  Telephone/ Left voicemail on pt home phone     SUBJECTIVE:  Patient Findings     Comments:  INR done by lab. Call placed to pt and left voicemail on home phone re: INR results/Warfarin dosing/INR recheck date. Discussed post procedure Lovenox and pt will need to continue until INR is above 2.0. Pt is to call Warfarin clinic if any bleeding/bruising or any other new changes in his diet/meds/activity or any questions/issues/illness.         Clinical Outcomes     Negatives:  Major bleeding event, Thromboembolic event, Anticoagulation-related hospital admission, Anticoagulation-related ED visit, Anticoagulation-related fatality    Comments:  INR done by lab. Call placed to pt and left voicemail on home phone re: INR results/Warfarin dosing/INR recheck date. Discussed post procedure Lovenox and pt will need to continue until INR is above 2.0. Pt is to call Warfarin clinic if any bleeding/bruising or any other new changes in his diet/meds/activity or any questions/issues/illness.            OBJECTIVE    Recent labs: (last 7 days)     20  1000   INR 1.6*       ASSESSMENT / PLAN  INR assessment SUB post procedure- continue lovenox   Recheck INR In: 4 DAYS    INR Location Clinic      Anticoagulation Summary  As of 2020    INR goal:  2.0-3.0   TTR:  39.9 % (1 y)   INR used for dosin.6 (2020)   Warfarin maintenance plan:  5 mg (2.5 mg x 2) every Mon, Fri; 2.5 mg (2.5 mg x 1) all other days   Full warfarin instructions:  : 10 mg; Otherwise 5 mg every Mon, Fri; 2.5 mg all other days   Weekly warfarin total:  22.5 mg   Plan last modified:  Jodie Elena RN (10/22/2020)   Next INR check:  12/10/2020   Priority:  High   Target end date:  Indefinite    Indications    Pulmonary embolism and infarction (H) [I26.99]  Long-term (current) use of anticoagulants [Z79.01] [Z79.01]             Anticoagulation  Episode Summary     INR check location:      Preferred lab:      Send INR reminders to:  THOMAS RUELAS    Comments:        Anticoagulation Care Providers     Provider Role Specialty Phone number    Lorelei Felix MD Referring Family Medicine [phone]            See the Encounter Report to view Anticoagulation Flowsheet and Dosing Calendar (Go to Encounters tab in chart review, and find the Anticoagulation Therapy Visit)        Saba Delgadillo RN

## 2020-12-08 RX ORDER — FENOFIBRATE 48 MG/1
TABLET, COATED ORAL
Qty: 90 TABLET | Refills: 2 | OUTPATIENT
Start: 2020-12-08

## 2020-12-08 NOTE — TELEPHONE ENCOUNTER
Yandy  Last Written Prescription Date: 3/23/20  Last Fill Quantity: 90 # of Refills: 2  Last Office Visit: 11/24/20

## 2020-12-10 ENCOUNTER — ANTICOAGULATION THERAPY VISIT (OUTPATIENT)
Dept: ANTICOAGULATION | Facility: OTHER | Age: 75
End: 2020-12-10
Attending: FAMILY MEDICINE
Payer: MEDICARE

## 2020-12-10 DIAGNOSIS — I82.409 ACUTE THROMBOEMBOLISM OF DEEP VEINS OF LOWER EXTREMITY, UNSPECIFIED LATERALITY (H): ICD-10-CM

## 2020-12-10 DIAGNOSIS — Z79.01 LONG TERM CURRENT USE OF ANTICOAGULANT THERAPY: ICD-10-CM

## 2020-12-10 DIAGNOSIS — I26.99 PULMONARY EMBOLISM AND INFARCTION (H): ICD-10-CM

## 2020-12-10 LAB
CAPILLARY BLOOD COLLECTION: NORMAL
INR BLD: 2 (ref 0.86–1.14)

## 2020-12-10 PROCEDURE — 85610 PROTHROMBIN TIME: CPT | Mod: ZL | Performed by: FAMILY MEDICINE

## 2020-12-10 PROCEDURE — 36416 COLLJ CAPILLARY BLOOD SPEC: CPT | Mod: ZL | Performed by: FAMILY MEDICINE

## 2020-12-10 NOTE — PROGRESS NOTES
ANTICOAGULATION FOLLOW-UP CLINIC VISIT    Patient Name:  Clement Voss  Date:  12/10/2020  Contact Type:  Telephone    SUBJECTIVE:  Patient Findings     Comments:  INR done by lab. Call placed to pt and spoke to pt re: INR results/Warfarin dosing/INR recheck date. Pt denied any bleeding/bruising or any other new changes in his diet/meds/activity. He verbalized understanding of instructions. Call placed to home phone to leave instructions on voicemail per pt request as well. He is to call Warfarin clinic if any questions/issues/illness.         Clinical Outcomes     Negatives:  Major bleeding event, Thromboembolic event, Anticoagulation-related hospital admission, Anticoagulation-related ED visit, Anticoagulation-related fatality    Comments:  INR done by lab. Call placed to pt and spoke to pt re: INR results/Warfarin dosing/INR recheck date. Pt denied any bleeding/bruising or any other new changes in his diet/meds/activity. He verbalized understanding of instructions. Call placed to home phone to leave instructions on voicemail per pt request as well. He is to call Warfarin clinic if any questions/issues/illness.            OBJECTIVE    Recent labs: (last 7 days)     12/10/20  0910   INR 2.0*       ASSESSMENT / PLAN  INR assessment THER    Recheck INR In: 7 WEEKS    INR Location Clinic      Anticoagulation Summary  As of 12/10/2020    INR goal:  2.0-3.0   TTR:  39.0 % (1 y)   INR used for dosin.0 (12/10/2020)   Warfarin maintenance plan:  5 mg (2.5 mg x 2) every Mon, Fri; 2.5 mg (2.5 mg x 1) all other days   Full warfarin instructions:  12/10: 3.75 mg; Otherwise 5 mg every Mon, Fri; 2.5 mg all other days   Weekly warfarin total:  22.5 mg   Plan last modified:  Jodie Elena RN (10/22/2020)   Next INR check:  2021   Priority:  High   Target end date:  Indefinite    Indications    Pulmonary embolism and infarction (H) [I26.99]  Long-term (current) use of anticoagulants [Z79.01] [Z79.01]              Anticoagulation Episode Summary     INR check location:      Preferred lab:      Send INR reminders to:  THOMAS RUELAS    Comments:        Anticoagulation Care Providers     Provider Role Specialty Phone number    Lorelei Felix MD Referring Family Medicine [phone]            See the Encounter Report to view Anticoagulation Flowsheet and Dosing Calendar (Go to Encounters tab in chart review, and find the Anticoagulation Therapy Visit)        Saba Delgadillo RN

## 2020-12-18 DIAGNOSIS — D64.9 ANEMIA, UNSPECIFIED TYPE: ICD-10-CM

## 2020-12-18 RX ORDER — DOCUSATE SODIUM 100 MG
CAPSULE ORAL
Qty: 60 CAPSULE | Refills: 11 | Status: SHIPPED | OUTPATIENT
Start: 2020-12-18 | End: 2022-01-03

## 2020-12-28 ENCOUNTER — ANTICOAGULATION THERAPY VISIT (OUTPATIENT)
Dept: ANTICOAGULATION | Facility: OTHER | Age: 75
End: 2020-12-28
Attending: FAMILY MEDICINE
Payer: MEDICARE

## 2020-12-28 DIAGNOSIS — I26.99 PULMONARY EMBOLISM AND INFARCTION (H): ICD-10-CM

## 2020-12-28 DIAGNOSIS — Z79.01 LONG TERM CURRENT USE OF ANTICOAGULANT THERAPY: ICD-10-CM

## 2020-12-28 DIAGNOSIS — I10 BENIGN ESSENTIAL HYPERTENSION: ICD-10-CM

## 2020-12-28 DIAGNOSIS — I82.409 ACUTE THROMBOEMBOLISM OF DEEP VEINS OF LOWER EXTREMITY, UNSPECIFIED LATERALITY (H): ICD-10-CM

## 2020-12-28 LAB
ANION GAP SERPL CALCULATED.3IONS-SCNC: 6 MMOL/L (ref 3–14)
BUN SERPL-MCNC: 28 MG/DL (ref 7–30)
CALCIUM SERPL-MCNC: 9.2 MG/DL (ref 8.5–10.1)
CHLORIDE SERPL-SCNC: 101 MMOL/L (ref 94–109)
CO2 SERPL-SCNC: 31 MMOL/L (ref 20–32)
CREAT SERPL-MCNC: 1.62 MG/DL (ref 0.66–1.25)
GFR SERPL CREATININE-BSD FRML MDRD: 40 ML/MIN/{1.73_M2}
GLUCOSE SERPL-MCNC: 212 MG/DL (ref 70–99)
INR BLD: 1.5 (ref 0.86–1.14)
POTASSIUM SERPL-SCNC: 3.9 MMOL/L (ref 3.4–5.3)
SODIUM SERPL-SCNC: 138 MMOL/L (ref 133–144)

## 2020-12-28 PROCEDURE — 85610 PROTHROMBIN TIME: CPT | Mod: ZL | Performed by: FAMILY MEDICINE

## 2020-12-28 PROCEDURE — 80048 BASIC METABOLIC PNL TOTAL CA: CPT | Mod: ZL | Performed by: FAMILY MEDICINE

## 2020-12-28 PROCEDURE — 36415 COLL VENOUS BLD VENIPUNCTURE: CPT | Mod: ZL | Performed by: FAMILY MEDICINE

## 2020-12-28 NOTE — PROGRESS NOTES
ANTICOAGULATION FOLLOW-UP CLINIC VISIT    Patient Name:  Clement Voss  Date:  2020  Contact Type:  Telephone    SUBJECTIVE:  Patient Findings     Comments:  INR done by lab. Call placed to pt and spoke to him re: INR results/Warfarin dosing/INR recheck date. Pt states that he has not had any changes in his diet/meds/activity or any bleeding/bruising. He does report that he has been eating a lot of lettuce, but it         Clinical Outcomes     Negatives:  Major bleeding event, Thromboembolic event, Anticoagulation-related hospital admission, Anticoagulation-related ED visit, Anticoagulation-related fatality    Comments:  INR done by lab. Call placed to pt and spoke to him re: INR results/Warfarin dosing/INR recheck date. Pt states that he has not had any changes in his diet/meds/activity or any bleeding/bruising. He does report that he has been eating a lot of lettuce, but it            OBJECTIVE    Recent labs: (last 7 days)     20  1021   INR 1.5*       ASSESSMENT / PLAN  INR assessment SUB    Recheck INR In: 4 WEEKS    INR Location Clinic      Anticoagulation Summary  As of 2020    INR goal:  2.0-3.0   TTR:  34.1 % (1 y)   INR used for dosin.5 (2020)   Warfarin maintenance plan:  5 mg (2.5 mg x 2) every Mon, Wed, Fri; 2.5 mg (2.5 mg x 1) all other days   Full warfarin instructions:  : 7.5 mg; Otherwise 5 mg every Mon, Wed, Fri; 2.5 mg all other days   Weekly warfarin total:  25 mg   Plan last modified:  Saba Delgadillo RN (2020)   Next INR check:  2021   Priority:  High   Target end date:  Indefinite    Indications    Pulmonary embolism and infarction (H) [I26.99]  Long-term (current) use of anticoagulants [Z79.01] [Z79.01]             Anticoagulation Episode Summary     INR check location:      Preferred lab:      Send INR reminders to:  THOMAS RUELAS    Comments:        Anticoagulation Care Providers     Provider Role Specialty Phone number    [facility]  Lorelei GILLESPIE MD [facility] Family Medicine [phone]            See the Encounter Report to view Anticoagulation Flowsheet and Dosing Calendar (Go to Encounters tab in chart review, and find the Anticoagulation Therapy Visit)        Saba Delgadillo RN

## 2020-12-29 ENCOUNTER — TRANSFERRED RECORDS (OUTPATIENT)
Dept: HEALTH INFORMATION MANAGEMENT | Facility: CLINIC | Age: 75
End: 2020-12-29

## 2020-12-29 LAB — RETINOPATHY: NORMAL

## 2021-01-14 DIAGNOSIS — E78.2 MIXED HYPERLIPIDEMIA: ICD-10-CM

## 2021-01-14 RX ORDER — ROSUVASTATIN CALCIUM 5 MG/1
TABLET, COATED ORAL
Qty: 30 TABLET | Refills: 5 | Status: SHIPPED | OUTPATIENT
Start: 2021-01-14 | End: 2021-07-13

## 2021-01-14 NOTE — TELEPHONE ENCOUNTER
Crestor  Last Written Prescription Date: 10/27/20  Last Fill Quantity: 30 # of Refills: 2  Last Office Visit: 11/24/20

## 2021-01-21 NOTE — PROGRESS NOTES
"  Assessment & Plan     1. Type 2 diabetes mellitus with stage 3b chronic kidney disease, with long-term current use of insulin (H)  Recent lab results reviewed in detail.  Will adjust insulin dose slightly.  Follow-up in three months, labs prior to visit.  - insulin glargine (LANTUS SOLOSTAR) 100 UNIT/ML pen; Inject 35 Units Subcutaneous At Bedtime  Dispense: 15 mL; Refill: 3    2. Mixed hyperlipidemia  No changes to current medications.    3. Benign essential hypertension  As above.    4. Stage 3a chronic kidney disease  No changes.    5. Congestive heart failure, unspecified HF chronicity, unspecified heart failure type (H)    6. Pulmonary embolism and infarction (H)    7. Morbid obesity (H)         BMI:   Estimated body mass index is 33.36 kg/m  as calculated from the following:    Height as of this encounter: 1.702 m (5' 7\").    Weight as of this encounter: 96.6 kg (213 lb).       Return in about 3 months (around 4/28/2021) for Diabetic Follow-up, Chronic Disease Management, Medication review.    Lorelei Felix MD  [facility] - [place] Son is a 75 year old who presents to clinic today for the following health issues     HPI       Diabetes Follow-up    How often are you checking your blood sugar? Two times daily  Blood sugar testing frequency justification:  Uncontrolled diabetes  What time of day are you checking your blood sugars (select all that apply)?  Before and after meals  Have you had any blood sugars above 200?  Yes several  Have you had any blood sugars below 70?  No    What symptoms do you notice when your blood sugar is low?  None    What concerns do you have today about your diabetes? None and Blood sugar is often over 200     Do you have any of these symptoms? (Select all that apply)  Burning in feet    Have you had a diabetic eye exam in the last 12 months? Yes- Date of last eye exam: 12/29/21,  Location: [facility]        Hyperlipidemia Follow-Up      Are you " "regularly taking any medication or supplement to lower your cholesterol?   Yes- Losartan    Are you having muscle aches or other side effects that you think could be caused by your cholesterol lowering medication?  Yes- sometimes    Hypertension Follow-up      Do you check your blood pressure regularly outside of the clinic? Yes     Are you following a low salt diet? Yes    Are your blood pressures ever more than 140 on the top number (systolic) OR more   than 90 on the bottom number (diastolic), for example 140/90? Yes    BP Readings from Last 2 Encounters:   01/28/21 132/68   11/24/20 (!) 158/72     Hemoglobin A1C (%)   Date Value   01/26/2021 9.4 (H)   10/27/2020 7.3 (H)     LDL Cholesterol Calculated (mg/dL)   Date Value   01/26/2021 26   10/27/2020 67       Chronic Kidney Disease Follow-up      Do you take any over the counter pain medicine?: No      How many servings of fruits and vegetables do you eat daily?  2-3    On average, how many sweetened beverages do you drink each day (Examples: soda, juice, sweet tea, etc.  Do NOT count diet or artificially sweetened beverages)?   1    How many days per week do you exercise enough to make your heart beat faster? 3 or less    How many minutes a day do you exercise enough to make your heart beat faster? 10 - 19    How many days per week do you miss taking your medication? 0      Review of Systems   Constitutional, HEENT, cardiovascular, pulmonary, gi and gu systems are negative, except as otherwise noted.      Objective    /68 (BP Location: Right arm, Patient Position: Sitting, Cuff Size: Adult Regular)   Pulse 67   Temp 97.7  F (36.5  C) (Tympanic)   Resp 16   Ht 1.702 m (5' 7\")   Wt 96.6 kg (213 lb)   SpO2 96%   BMI 33.36 kg/m    Body mass index is 33.36 kg/m .  Physical Exam   GENERAL: healthy, alert and no distress  PSYCH: mentation appears normal, affect normal/bright    Orders Only on 01/26/2021   Component Date Value Ref Range Status     " Cholesterol 01/26/2021 106  <200 mg/dL Final     Triglycerides 01/26/2021 168* <150 mg/dL Final    Comment: Borderline high:  150-199 mg/dl  High:             200-499 mg/dl  Very high:       >499 mg/dl       HDL Cholesterol 01/26/2021 46  >39 mg/dL Final     LDL Cholesterol Calculated 01/26/2021 26  <100 mg/dL Final    Desirable:       <100 mg/dl     Non HDL Cholesterol 01/26/2021 60  <130 mg/dL Final     Hemoglobin A1C 01/26/2021 9.4* 0 - 5.6 % Final    Comment: Normal <5.7% Prediabetes 5.7-6.4%  Diabetes 6.5% or higher - adopted from ADA   consensus guidelines.       Sodium 01/26/2021 139  133 - 144 mmol/L Final     Potassium 01/26/2021 3.7  3.4 - 5.3 mmol/L Final     Chloride 01/26/2021 101  94 - 109 mmol/L Final     Carbon Dioxide 01/26/2021 31  20 - 32 mmol/L Final     Anion Gap 01/26/2021 7  3 - 14 mmol/L Final     Glucose 01/26/2021 221* 70 - 99 mg/dL Final     Urea Nitrogen 01/26/2021 22  7 - 30 mg/dL Final     Creatinine 01/26/2021 1.48* 0.66 - 1.25 mg/dL Final     GFR Estimate 01/26/2021 45* >60 mL/min/[1.73_m2] Final    Comment: Non  GFR Calc  Starting 12/18/2018, serum creatinine based estimated GFR (eGFR) will be   calculated using the Chronic Kidney Disease Epidemiology Collaboration   (CKD-EPI) equation.       GFR Estimate If Black 01/26/2021 52* >60 mL/min/[1.73_m2] Final    Comment:  GFR Calc  Starting 12/18/2018, serum creatinine based estimated GFR (eGFR) will be   calculated using the Chronic Kidney Disease Epidemiology Collaboration   (CKD-EPI) equation.       Calcium 01/26/2021 9.5  8.5 - 10.1 mg/dL Final     ALT 01/26/2021 59  0 - 70 U/L Final     Estimated Average Glucose 01/26/2021 223  mg/dL Final

## 2021-01-26 DIAGNOSIS — N18.32 TYPE 2 DIABETES MELLITUS WITH STAGE 3B CHRONIC KIDNEY DISEASE, WITH LONG-TERM CURRENT USE OF INSULIN (H): ICD-10-CM

## 2021-01-26 DIAGNOSIS — Z79.4 TYPE 2 DIABETES MELLITUS WITH STAGE 3B CHRONIC KIDNEY DISEASE, WITH LONG-TERM CURRENT USE OF INSULIN (H): ICD-10-CM

## 2021-01-26 DIAGNOSIS — E11.22 TYPE 2 DIABETES MELLITUS WITH STAGE 3B CHRONIC KIDNEY DISEASE, WITH LONG-TERM CURRENT USE OF INSULIN (H): ICD-10-CM

## 2021-01-26 DIAGNOSIS — E78.2 MIXED HYPERLIPIDEMIA: ICD-10-CM

## 2021-01-26 DIAGNOSIS — I10 BENIGN ESSENTIAL HYPERTENSION: ICD-10-CM

## 2021-01-26 LAB
ALT SERPL W P-5'-P-CCNC: 59 U/L (ref 0–70)
ANION GAP SERPL CALCULATED.3IONS-SCNC: 7 MMOL/L (ref 3–14)
BUN SERPL-MCNC: 22 MG/DL (ref 7–30)
CALCIUM SERPL-MCNC: 9.5 MG/DL (ref 8.5–10.1)
CHLORIDE SERPL-SCNC: 101 MMOL/L (ref 94–109)
CHOLEST SERPL-MCNC: 106 MG/DL
CO2 SERPL-SCNC: 31 MMOL/L (ref 20–32)
CREAT SERPL-MCNC: 1.48 MG/DL (ref 0.66–1.25)
EST. AVERAGE GLUCOSE BLD GHB EST-MCNC: 223 MG/DL
GFR SERPL CREATININE-BSD FRML MDRD: 45 ML/MIN/{1.73_M2}
GLUCOSE SERPL-MCNC: 221 MG/DL (ref 70–99)
HBA1C MFR BLD: 9.4 % (ref 0–5.6)
HDLC SERPL-MCNC: 46 MG/DL
LDLC SERPL CALC-MCNC: 26 MG/DL
NONHDLC SERPL-MCNC: 60 MG/DL
POTASSIUM SERPL-SCNC: 3.7 MMOL/L (ref 3.4–5.3)
SODIUM SERPL-SCNC: 139 MMOL/L (ref 133–144)
TRIGL SERPL-MCNC: 168 MG/DL

## 2021-01-26 PROCEDURE — 80061 LIPID PANEL: CPT | Mod: ZL | Performed by: FAMILY MEDICINE

## 2021-01-26 PROCEDURE — 83036 HEMOGLOBIN GLYCOSYLATED A1C: CPT | Mod: ZL | Performed by: FAMILY MEDICINE

## 2021-01-26 PROCEDURE — 80048 BASIC METABOLIC PNL TOTAL CA: CPT | Mod: ZL | Performed by: FAMILY MEDICINE

## 2021-01-26 PROCEDURE — 36415 COLL VENOUS BLD VENIPUNCTURE: CPT | Mod: ZL | Performed by: FAMILY MEDICINE

## 2021-01-26 PROCEDURE — 999N001182 HC STATISTIC ESTIMATED AVERAGE GLUCOSE: Mod: ZL | Performed by: FAMILY MEDICINE

## 2021-01-26 PROCEDURE — 84460 ALANINE AMINO (ALT) (SGPT): CPT | Mod: ZL | Performed by: FAMILY MEDICINE

## 2021-01-28 ENCOUNTER — ANTICOAGULATION THERAPY VISIT (OUTPATIENT)
Dept: ANTICOAGULATION | Facility: OTHER | Age: 76
End: 2021-01-28
Attending: FAMILY MEDICINE
Payer: MEDICARE

## 2021-01-28 ENCOUNTER — OFFICE VISIT (OUTPATIENT)
Dept: FAMILY MEDICINE | Facility: OTHER | Age: 76
End: 2021-01-28
Attending: FAMILY MEDICINE
Payer: COMMERCIAL

## 2021-01-28 VITALS
HEART RATE: 67 BPM | BODY MASS INDEX: 33.43 KG/M2 | RESPIRATION RATE: 16 BRPM | HEIGHT: 67 IN | TEMPERATURE: 97.7 F | SYSTOLIC BLOOD PRESSURE: 132 MMHG | WEIGHT: 213 LBS | DIASTOLIC BLOOD PRESSURE: 68 MMHG | OXYGEN SATURATION: 96 %

## 2021-01-28 DIAGNOSIS — I50.9 CONGESTIVE HEART FAILURE, UNSPECIFIED HF CHRONICITY, UNSPECIFIED HEART FAILURE TYPE (H): ICD-10-CM

## 2021-01-28 DIAGNOSIS — I26.99 PULMONARY EMBOLISM AND INFARCTION (H): ICD-10-CM

## 2021-01-28 DIAGNOSIS — Z79.4 TYPE 2 DIABETES MELLITUS WITH STAGE 3B CHRONIC KIDNEY DISEASE, WITH LONG-TERM CURRENT USE OF INSULIN (H): Primary | ICD-10-CM

## 2021-01-28 DIAGNOSIS — E11.22 TYPE 2 DIABETES MELLITUS WITH STAGE 3B CHRONIC KIDNEY DISEASE, WITH LONG-TERM CURRENT USE OF INSULIN (H): Primary | ICD-10-CM

## 2021-01-28 DIAGNOSIS — E78.2 MIXED HYPERLIPIDEMIA: ICD-10-CM

## 2021-01-28 DIAGNOSIS — E66.01 MORBID OBESITY (H): ICD-10-CM

## 2021-01-28 DIAGNOSIS — Z79.01 LONG TERM CURRENT USE OF ANTICOAGULANT THERAPY: ICD-10-CM

## 2021-01-28 DIAGNOSIS — N18.32 TYPE 2 DIABETES MELLITUS WITH STAGE 3B CHRONIC KIDNEY DISEASE, WITH LONG-TERM CURRENT USE OF INSULIN (H): Primary | ICD-10-CM

## 2021-01-28 DIAGNOSIS — N18.31 STAGE 3A CHRONIC KIDNEY DISEASE (H): ICD-10-CM

## 2021-01-28 DIAGNOSIS — I82.409 ACUTE THROMBOEMBOLISM OF DEEP VEINS OF LOWER EXTREMITY, UNSPECIFIED LATERALITY (H): ICD-10-CM

## 2021-01-28 DIAGNOSIS — I10 BENIGN ESSENTIAL HYPERTENSION: ICD-10-CM

## 2021-01-28 LAB
CAPILLARY BLOOD COLLECTION: NORMAL
INR BLD: 1.5 (ref 0.86–1.14)

## 2021-01-28 PROCEDURE — G0463 HOSPITAL OUTPT CLINIC VISIT: HCPCS

## 2021-01-28 PROCEDURE — 85610 PROTHROMBIN TIME: CPT | Mod: ZL | Performed by: FAMILY MEDICINE

## 2021-01-28 PROCEDURE — 36416 COLLJ CAPILLARY BLOOD SPEC: CPT | Mod: ZL | Performed by: FAMILY MEDICINE

## 2021-01-28 PROCEDURE — 99214 OFFICE O/P EST MOD 30 MIN: CPT | Performed by: FAMILY MEDICINE

## 2021-01-28 RX ORDER — DUREZOL 0.5 MG/ML
EMULSION OPHTHALMIC
COMMUNITY
Start: 2021-01-21 | End: 2021-08-19

## 2021-01-28 ASSESSMENT — PAIN SCALES - GENERAL: PAINLEVEL: NO PAIN (0)

## 2021-01-28 ASSESSMENT — MIFFLIN-ST. JEOR: SCORE: 1654.79

## 2021-01-28 NOTE — NURSING NOTE
"Chief Complaint   Patient presents with     Diabetes     Lipids     Hypertension       Initial /68 (BP Location: Right arm, Patient Position: Sitting, Cuff Size: Adult Regular)   Pulse 67   Temp 97.7  F (36.5  C) (Tympanic)   Resp 16   Ht 1.702 m (5' 7\")   Wt 96.6 kg (213 lb)   SpO2 96%   BMI 33.36 kg/m   Estimated body mass index is 33.36 kg/m  as calculated from the following:    Height as of this encounter: 1.702 m (5' 7\").    Weight as of this encounter: 96.6 kg (213 lb).  Medication Reconciliation: complete  Tracey Blanco MA  "

## 2021-01-28 NOTE — PROGRESS NOTES
ANTICOAGULATION FOLLOW-UP CLINIC VISIT    Patient Name:  Clement Voss  Date:  2021  Contact Type:  Telephone/ Left voicemail on pt home phone    SUBJECTIVE:  Patient Findings     Comments:  INR done by lab. Call placed to pt and left voicemail on home phone re: INR results/Warfarin dosing/INR recheck date. Pt is to call Warfarin clinic with any bleeding/bruising or any new changes to their diet/meds/activity or any questions/issues/illness.           Clinical Outcomes     Negatives:  Major bleeding event, Thromboembolic event, Anticoagulation-related hospital admission, Anticoagulation-related ED visit, Anticoagulation-related fatality    Comments:  INR done by lab. Call placed to pt and left voicemail on home phone re: INR results/Warfarin dosing/INR recheck date. Pt is to call Warfarin clinic with any bleeding/bruising or any new changes to their diet/meds/activity or any questions/issues/illness.              OBJECTIVE    Recent labs: (last 7 days)     21  0945   INR 1.5*       ASSESSMENT / PLAN  INR assessment SUB    Recheck INR In: 2 WEEKS    INR Location Clinic      Anticoagulation Summary  As of 2021    INR goal:  2.0-3.0   TTR:  25.6 % (1 y)   INR used for dosin.5 (2021)   Warfarin maintenance plan:  5 mg (2.5 mg x 2) every Mon, Wed, Fri; 2.5 mg (2.5 mg x 1) all other days   Full warfarin instructions:  : 7.5 mg; : 7.5 mg; Otherwise 5 mg every Mon, Wed, Fri; 2.5 mg all other days   Weekly warfarin total:  25 mg   Plan last modified:  Saba Delgadillo RN (2020)   Next INR check:  2021   Priority:  High   Target end date:  Indefinite    Indications    Pulmonary embolism and infarction (H) [I26.99]  Long-term (current) use of anticoagulants [Z79.01] [Z79.01]             Anticoagulation Episode Summary     INR check location:      Preferred lab:      Send INR reminders to:  THOMAS RUELAS    Comments:        Anticoagulation Care Providers     Provider Role  Specialty Phone number    Lorelei Felix MD Referring Family Medicine [phone]            See the Encounter Report to view Anticoagulation Flowsheet and Dosing Calendar (Go to Encounters tab in chart review, and find the Anticoagulation Therapy Visit)        Saba Delgadillo RN

## 2021-02-11 ENCOUNTER — ANTICOAGULATION THERAPY VISIT (OUTPATIENT)
Dept: ANTICOAGULATION | Facility: OTHER | Age: 76
End: 2021-02-11
Attending: FAMILY MEDICINE
Payer: MEDICARE

## 2021-02-11 DIAGNOSIS — Z79.01 LONG TERM CURRENT USE OF ANTICOAGULANT THERAPY: ICD-10-CM

## 2021-02-11 DIAGNOSIS — I26.99 PULMONARY EMBOLISM AND INFARCTION (H): ICD-10-CM

## 2021-02-11 DIAGNOSIS — I82.409 ACUTE THROMBOEMBOLISM OF DEEP VEINS OF LOWER EXTREMITY, UNSPECIFIED LATERALITY (H): ICD-10-CM

## 2021-02-11 LAB
CAPILLARY BLOOD COLLECTION: NORMAL
INR BLD: 1.8 (ref 0.86–1.14)

## 2021-02-11 PROCEDURE — 85610 PROTHROMBIN TIME: CPT | Mod: ZL | Performed by: FAMILY MEDICINE

## 2021-02-11 PROCEDURE — 36416 COLLJ CAPILLARY BLOOD SPEC: CPT | Mod: ZL | Performed by: FAMILY MEDICINE

## 2021-02-11 NOTE — PROGRESS NOTES
ANTICOAGULATION FOLLOW-UP CLINIC VISIT    Patient Name:  Clement Voss  Date:  2021  Contact Type:  Telephone/ Left voicemail on patient's home phone    SUBJECTIVE:  Patient Findings     Comments:  INR done by lab. Call placed to pt and left voicemail on home phone re: INR results/Warfarin dosing/INR recheck date. Pt is to call Warfarin clinic with any bleeding/bruising or any new changes to their diet/meds/activity or any questions/issues/illness.           Clinical Outcomes     Negatives:  Major bleeding event, Thromboembolic event, Anticoagulation-related hospital admission, Anticoagulation-related ED visit, Anticoagulation-related fatality    Comments:  INR done by lab. Call placed to pt and left voicemail on home phone re: INR results/Warfarin dosing/INR recheck date. Pt is to call Warfarin clinic with any bleeding/bruising or any new changes to their diet/meds/activity or any questions/issues/illness.              OBJECTIVE    Recent labs: (last 7 days)     21  1101   INR 1.8*       ASSESSMENT / PLAN  INR assessment SUB    Recheck INR In: 3 WEEKS    INR Location Clinic      Anticoagulation Summary  As of 2021    INR goal:  2.0-3.0   TTR:  21.8 % (1 y)   INR used for dosin.8 (2021)   Warfarin maintenance plan:  5 mg (2.5 mg x 2) every Mon, Wed, Fri; 2.5 mg (2.5 mg x 1) all other days   Full warfarin instructions:  : 10 mg; Otherwise 5 mg every Mon, Wed, Fri; 2.5 mg all other days   Weekly warfarin total:  25 mg   Plan last modified:  Saba Delgadillo RN (2020)   Next INR check:  3/4/2021   Priority:  High   Target end date:  Indefinite    Indications    Pulmonary embolism and infarction (H) [I26.99]  Long-term (current) use of anticoagulants [Z79.01] [Z79.01]             Anticoagulation Episode Summary     INR check location:      Preferred lab:      Send INR reminders to:  THOMAS RUELAS    Comments:        Anticoagulation Care Providers     Provider Role Specialty  Phone number    Lorelei Felix MD Referring Family Medicine [phone]            See the Encounter Report to view Anticoagulation Flowsheet and Dosing Calendar (Go to Encounters tab in chart review, and find the Anticoagulation Therapy Visit)        Saba Delgadillo RN

## 2021-02-19 DIAGNOSIS — D64.9 ANEMIA, UNSPECIFIED TYPE: ICD-10-CM

## 2021-02-19 DIAGNOSIS — G25.81 RESTLESS LEGS SYNDROME: ICD-10-CM

## 2021-02-19 RX ORDER — FERROUS SULFATE 325(65) MG
TABLET ORAL
Qty: 30 TABLET | Refills: 2 | Status: SHIPPED | OUTPATIENT
Start: 2021-02-19 | End: 2021-05-21

## 2021-03-04 ENCOUNTER — ANTICOAGULATION THERAPY VISIT (OUTPATIENT)
Dept: ANTICOAGULATION | Facility: OTHER | Age: 76
End: 2021-03-04
Attending: FAMILY MEDICINE
Payer: MEDICARE

## 2021-03-04 DIAGNOSIS — Z79.01 LONG TERM CURRENT USE OF ANTICOAGULANT THERAPY: ICD-10-CM

## 2021-03-04 DIAGNOSIS — I82.409 ACUTE THROMBOEMBOLISM OF DEEP VEINS OF LOWER EXTREMITY, UNSPECIFIED LATERALITY (H): ICD-10-CM

## 2021-03-04 DIAGNOSIS — I26.99 PULMONARY EMBOLISM AND INFARCTION (H): ICD-10-CM

## 2021-03-04 LAB
CAPILLARY BLOOD COLLECTION: NORMAL
INR BLD: 1.7 (ref 0.86–1.14)

## 2021-03-04 PROCEDURE — 85610 PROTHROMBIN TIME: CPT | Mod: ZL | Performed by: FAMILY MEDICINE

## 2021-03-04 PROCEDURE — 36416 COLLJ CAPILLARY BLOOD SPEC: CPT | Mod: ZL | Performed by: FAMILY MEDICINE

## 2021-03-04 NOTE — PROGRESS NOTES
ANTICOAGULATION FOLLOW-UP CLINIC VISIT    Patient Name:  Clement Voss  Date:  3/4/2021  Contact Type:  Telephone/ Left voicemail on patient's home phone    SUBJECTIVE:  Patient Findings     Comments:  INR done by lab. Call placed to pt and left voicemail on home phone re: INR results/Warfarin dosing/INR recheck date. Sub therapeutic INR today-1.7. An overall dose increase of 10 % was given to the pt due to multiple previous sub therapeutic INRs. Pt is to call Warfarin clinic with any bleeding/bruising or any new changes to their diet/meds/activity or any questions/issues/illness.           Clinical Outcomes     Negatives:  Major bleeding event, Thromboembolic event, Anticoagulation-related hospital admission, Anticoagulation-related ED visit, Anticoagulation-related fatality    Comments:  INR done by lab. Call placed to pt and left voicemail on home phone re: INR results/Warfarin dosing/INR recheck date. Sub therapeutic INR today-1.7. An overall dose increase of 10 % was given to the pt due to multiple previous sub therapeutic INRs. Pt is to call Warfarin clinic with any bleeding/bruising or any new changes to their diet/meds/activity or any questions/issues/illness.              OBJECTIVE    Recent labs: (last 7 days)     21  1117   INR 1.7*       ASSESSMENT / PLAN  INR assessment SUB 10 % dose increase   Recheck INR In: 2 WEEKS    INR Location Clinic      Anticoagulation Summary  As of 3/4/2021    INR goal:  2.0-3.0   TTR:  17.0 % (1 y)   INR used for dosin.7 (3/4/2021)   Warfarin maintenance plan:  2.5 mg (2.5 mg x 1) every Mon, Wed, Fri; 5 mg (2.5 mg x 2) all other days   Full warfarin instructions:  2.5 mg every Mon, Wed, Fri; 5 mg all other days   Weekly warfarin total:  27.5 mg   Plan last modified:  Saba Delgadillo RN (3/4/2021)   Next INR check:  3/18/2021   Priority:  High   Target end date:  Indefinite    Indications    Pulmonary embolism and infarction (H) [I26.99]  Long-term (current)  use of anticoagulants [Z79.01] [Z79.01]             Anticoagulation Episode Summary     INR check location:      Preferred lab:      Send INR reminders to:  THOMAS RUELAS    Comments:        Anticoagulation Care Providers     Provider Role Specialty Phone number    Lorelei Felix MD Referring Family Medicine [phone]            See the Encounter Report to view Anticoagulation Flowsheet and Dosing Calendar (Go to Encounters tab in chart review, and find the Anticoagulation Therapy Visit)        Saba Delgadillo RN

## 2021-03-09 DIAGNOSIS — I10 BENIGN ESSENTIAL HYPERTENSION: ICD-10-CM

## 2021-03-09 RX ORDER — CHLORTHALIDONE 25 MG/1
25 TABLET ORAL DAILY
Qty: 30 TABLET | Refills: 5 | Status: SHIPPED | OUTPATIENT
Start: 2021-03-09 | End: 2021-08-30

## 2021-03-09 NOTE — TELEPHONE ENCOUNTER
chlorthalidone (HYGROTON) 25 MG tablet     Last Written Prescription Date:  12/14/2020  Last Fill Quantity: 30,   # refills: 2  Last Office Visit: 01/28/2021  Future Office visit:    Next 5 appointments (look out 90 days)    Apr 29, 2021 10:15 AM  (Arrive by 10:00 AM)  SHORT with Lorelei Felix MD  [facility] ([facility] ) [id number]  [facility] [address]  [phone]           Routing refill request to provider for review/approval because:

## 2021-03-18 ENCOUNTER — ANTICOAGULATION THERAPY VISIT (OUTPATIENT)
Dept: ANTICOAGULATION | Facility: OTHER | Age: 76
End: 2021-03-18
Attending: FAMILY MEDICINE
Payer: MEDICARE

## 2021-03-18 DIAGNOSIS — Z79.01 LONG TERM CURRENT USE OF ANTICOAGULANT THERAPY: ICD-10-CM

## 2021-03-18 DIAGNOSIS — I26.99 PULMONARY EMBOLISM AND INFARCTION (H): ICD-10-CM

## 2021-03-18 DIAGNOSIS — I82.409 ACUTE THROMBOEMBOLISM OF DEEP VEINS OF LOWER EXTREMITY, UNSPECIFIED LATERALITY (H): ICD-10-CM

## 2021-03-18 LAB
CAPILLARY BLOOD COLLECTION: NORMAL
INR BLD: 1.9 (ref 0.86–1.14)

## 2021-03-18 PROCEDURE — 36416 COLLJ CAPILLARY BLOOD SPEC: CPT | Mod: ZL | Performed by: FAMILY MEDICINE

## 2021-03-18 PROCEDURE — 85610 PROTHROMBIN TIME: CPT | Mod: ZL | Performed by: FAMILY MEDICINE

## 2021-03-18 NOTE — PROGRESS NOTES
ANTICOAGULATION FOLLOW-UP CLINIC VISIT    Patient Name:  Clement Voss  Date:  3/18/2021  Contact Type:  Telephone/ Left voicemail on home phone    SUBJECTIVE:  Patient Findings     Comments:  INR done by lab. Call placed to pt and left voicemail on home phone re: INR results/Warfarin dosing/INR recheck date. INR sub-therapeutic-1.9. A 1x dose increase was given for today and after today he is to continue with his maintenance dose. Pt is to call Warfarin clinic with any bleeding/bruising or any new changes to their diet/meds/activity or any questions/issues/illness.           Clinical Outcomes     Negatives:  Major bleeding event, Thromboembolic event, Anticoagulation-related hospital admission, Anticoagulation-related ED visit, Anticoagulation-related fatality    Comments:  INR done by lab. Call placed to pt and left voicemail on home phone re: INR results/Warfarin dosing/INR recheck date. INR sub-therapeutic-1.9. A 1x dose increase was given for today and after today he is to continue with his maintenance dose. Pt is to call Warfarin clinic with any bleeding/bruising or any new changes to their diet/meds/activity or any questions/issues/illness.              OBJECTIVE    Recent labs: (last 7 days)     21  1052   INR 1.9*       ASSESSMENT / PLAN  INR assessment SUB    Recheck INR In: 6 WEEKS    INR Location Clinic      Anticoagulation Summary  As of 3/18/2021    INR goal:  2.0-3.0   TTR:  17.1 % (1 y)   INR used for dosin.9 (3/18/2021)   Warfarin maintenance plan:  2.5 mg (2.5 mg x 1) every Mon, Wed, Fri; 5 mg (2.5 mg x 2) all other days   Full warfarin instructions:  3/18: 8.75 mg; Otherwise 2.5 mg every Mon, Wed, Fri; 5 mg all other days   Weekly warfarin total:  27.5 mg   Plan last modified:  Saba Delgadillo RN (3/4/2021)   Next INR check:  4/15/2021   Priority:  High   Target end date:  Indefinite    Indications    Pulmonary embolism and infarction (H) [I26.99]  Long-term (current) use of  anticoagulants [Z79.01] [Z79.01]             Anticoagulation Episode Summary     INR check location:      Preferred lab:      Send INR reminders to:  THOMAS RUELAS    Comments:        Anticoagulation Care Providers     Provider Role Specialty Phone number    Lorelei Felix MD Referring [facility] Medicine [phone]            See the Encounter Report to view Anticoagulation Flowsheet and Dosing Calendar (Go to Encounters tab in chart review, and find the Anticoagulation Therapy Visit)        Saba Delgadillo RN

## 2021-04-01 NOTE — PROGRESS NOTES
"    Assessment & Plan     1. Type 2 diabetes mellitus with stage 3b chronic kidney disease, with long-term current use of insulin (H)  With worsening HgbA1c, referral is made to Diabetic Education to possibly make medication changes.  Otherwise, follow-up in three months for diabetic follow-up  - [facility] FOOT EXAM  NO CHARGE  - DIABETES EDUCATION REFERRAL ([place])  - Hemoglobin A1c; Future  - TSH with free T4 reflex; Future    2. Mixed hyperlipidemia  No changes to current medications, future lab orders entered.  - ALT; Future  - Lipid Profile; Future    3. Benign essential hypertension  Will try increasing metoprolol slightly to help with elevated BP readings, but will monitor pulse closely.  Patient is asked to send in BP readings with pulse readings in about one week, sooner as needed.  - metoprolol tartrate (LOPRESSOR) 25 MG tablet; Take 2 tablets (50 mg) by mouth 2 times daily  Dispense: 270 tablet; Refill: 1  - Basic metabolic panel; Future    4. Congestive heart failure, unspecified HF chronicity, unspecified heart failure type (H)  Continue to follow with Cardiology at [facility]    5. Stage 3a chronic kidney disease  No changes         BMI:   Estimated body mass index is 32.86 kg/m  as calculated from the following:    Height as of this encounter: 1.702 m (5' 7\").    Weight as of this encounter: 95.2 kg (209 lb 12.8 oz).     Return in about 3 months (around 7/29/2021) for Diabetic Follow-up, Chronic Disease Management, Medication review.    Lorelei Felix MD  [facility] - RUTHANN Son is a 75 year old who presents for the following health issues     HPI     Diabetes Follow-up  How often are you checking your blood sugar? Two times daily  Blood sugar testing frequency justification:  Uncontrolled diabetes  What time of day are you checking your blood sugars (select all that apply)?  Before and after meals  Have you had any blood sugars above 200?  Yes several  Have you had any " blood sugars below 70?  No    What symptoms do you notice when your blood sugar is low?  None    What concerns do you have today about your diabetes? None and Blood sugar is often over 200     Do you have any of these symptoms? (Select all that apply)  No numbness or tingling in feet.  No redness, sores or blisters on feet.  No complaints of excessive thirst.  No reports of blurry vision.  No significant changes to weight.     Patient did send a Kor[employer] message with readings      Hyperlipidemia Follow-Up    Are you regularly taking any medication or supplement to lower your cholesterol?   Yes- Crestor    Are you having muscle aches or other side effects that you think could be caused by your cholesterol lowering medication?  No      Hypertension Follow-up    Do you check your blood pressure regularly outside of the clinic? Yes     Are you following a low salt diet? Yes    Are your blood pressures ever more than 140 on the top number (systolic) OR more   than 90 on the bottom number (diastolic), for example 140/90? Yes   Patient did send Korrio message with readings, he feels BP readings are a little too high.    BP Readings from Last 2 Encounters:   04/29/21 132/74   01/28/21 132/68     Hemoglobin A1C (%)   Date Value   04/27/2021 9.7 (H)   01/26/2021 9.4 (H)     LDL Cholesterol Calculated (mg/dL)   Date Value   04/27/2021 12   01/26/2021 26       Heart Failure Follow-up    Are you experiencing any shortness of breath? No    Are you experiencing any swelling in your legs or feet?  Stable    Are you using more pillows than usual? No    Do you cough at night?  No    Do you check your weight daily?  No    Have you had a weight change recently?  No    Are you having any of the following side effects from your medications? (Select all that apply)  Dizziness and Fatigue    Since your last visit, how many times have you gone to the cardiologist, urgent care, emergency room, or hospital because of your heart failure?    "None      Chronic Kidney Disease Follow-up  Do you take any over the counter pain medicine?: Yes  What over the counter medicine are you taking for your pain?:  Aspirin low dose      How often do you take this medicine?:  Daily      How many servings of fruits and vegetables do you eat daily?  2-3    On average, how many sweetened beverages do you drink each day (Examples: soda, juice, sweet tea, etc.  Do NOT count diet or artificially sweetened beverages)?   0    How many days per week do you exercise enough to make your heart beat faster? 7    How many minutes a day do you exercise enough to make your heart beat faster? 10 - 19    How many days per week do you miss taking your medication? 0      Review of Systems   Constitutional, HEENT, cardiovascular, pulmonary, gi and gu systems are negative, except as otherwise noted.      Objective    /74 (BP Location: Left arm, Patient Position: Sitting, Cuff Size: Adult Regular)   Pulse 65   Temp 96.6  F (35.9  C) (Tympanic)   Resp 16   Ht 1.702 m (5' 7\")   Wt 95.2 kg (209 lb 12.8 oz)   SpO2 99%   BMI 32.86 kg/m    Body mass index is 32.86 kg/m .  Physical Exam   GENERAL: healthy, alert and no distress  PSYCH: mentation appears normal, affect normal/bright  Diabetic foot exam: slightly diminished DP and PT pulses, no trophic changes or ulcerative lesions, normal monofilament exam and mild dependent rubor present, capillary refill normal    Orders Only on 04/27/2021   Component Date Value Ref Range Status     Cholesterol 04/27/2021 104  <200 mg/dL Final     Triglycerides 04/27/2021 277* <150 mg/dL Final    Comment: Borderline high:  150-199 mg/dl  High:             200-499 mg/dl  Very high:       >499 mg/dl  Fasting specimen       HDL Cholesterol 04/27/2021 37* >39 mg/dL Final     LDL Cholesterol Calculated 04/27/2021 12  <100 mg/dL Final    Desirable:       <100 mg/dl     Non HDL Cholesterol 04/27/2021 67  <130 mg/dL Final     Hemoglobin A1C 04/27/2021 9.7* 0 - " 5.6 % Final    Comment: Normal <5.7% Prediabetes 5.7-6.4%  Diabetes 6.5% or higher - adopted from ADA   consensus guidelines.       Sodium 04/27/2021 135  133 - 144 mmol/L Final     Potassium 04/27/2021 3.7  3.4 - 5.3 mmol/L Final     Chloride 04/27/2021 102  94 - 109 mmol/L Final     Carbon Dioxide 04/27/2021 31  20 - 32 mmol/L Final     Anion Gap 04/27/2021 2* 3 - 14 mmol/L Final     Glucose 04/27/2021 244* 70 - 99 mg/dL Final    Fasting specimen     Urea Nitrogen 04/27/2021 19  7 - 30 mg/dL Final     Creatinine 04/27/2021 1.34* 0.66 - 1.25 mg/dL Final     GFR Estimate 04/27/2021 51* >60 mL/min/[1.73_m2] Final    Comment: Non  GFR Calc  Starting 12/18/2018, serum creatinine based estimated GFR (eGFR) will be   calculated using the Chronic Kidney Disease Epidemiology Collaboration   (CKD-EPI) equation.       GFR Estimate If Black 04/27/2021 59* >60 mL/min/[1.73_m2] Final    Comment:  GFR Calc  Starting 12/18/2018, serum creatinine based estimated GFR (eGFR) will be   calculated using the Chronic Kidney Disease Epidemiology Collaboration   (CKD-EPI) equation.       Calcium 04/27/2021 9.3  8.5 - 10.1 mg/dL Final     ALT 04/27/2021 39  0 - 70 U/L Final     Creatinine Urine 04/27/2021 45  mg/dL Final     Albumin Urine mg/L 04/27/2021 <5  mg/L Final     Albumin Urine mg/g Cr 04/27/2021 Unable to calculate due to low value  0 - 17 mg/g Cr Final     INR Point of Care 04/27/2021 2.2* 0.86 - 1.14 Final    Comment: This test is intended for monitoring Coumadin therapy.  Results are not   accurate in patients with prolonged INR due to factor deficiency.       Estimated Average Glucose 04/27/2021 232  mg/dL Final

## 2021-04-08 ENCOUNTER — TRANSFERRED RECORDS (OUTPATIENT)
Dept: HEALTH INFORMATION MANAGEMENT | Facility: CLINIC | Age: 76
End: 2021-04-08

## 2021-04-08 LAB — EJECTION FRACTION: NORMAL %

## 2021-04-18 ENCOUNTER — HEALTH MAINTENANCE LETTER (OUTPATIENT)
Age: 76
End: 2021-04-18

## 2021-04-23 ENCOUNTER — TELEPHONE (OUTPATIENT)
Dept: FAMILY MEDICINE | Facility: OTHER | Age: 76
End: 2021-04-23

## 2021-04-23 NOTE — TELEPHONE ENCOUNTER
[place] Department of Safety form received and completed by physician. Copy of document sent to be scanned into patients EMR and original placed at [facility]  for patient to  per request.

## 2021-04-27 ENCOUNTER — ANTICOAGULATION THERAPY VISIT (OUTPATIENT)
Dept: ANTICOAGULATION | Facility: OTHER | Age: 76
End: 2021-04-27
Attending: FAMILY MEDICINE
Payer: MEDICARE

## 2021-04-27 DIAGNOSIS — Z79.4 TYPE 2 DIABETES MELLITUS WITH STAGE 3B CHRONIC KIDNEY DISEASE, WITH LONG-TERM CURRENT USE OF INSULIN (H): ICD-10-CM

## 2021-04-27 DIAGNOSIS — N18.32 TYPE 2 DIABETES MELLITUS WITH STAGE 3B CHRONIC KIDNEY DISEASE, WITH LONG-TERM CURRENT USE OF INSULIN (H): ICD-10-CM

## 2021-04-27 DIAGNOSIS — I10 BENIGN ESSENTIAL HYPERTENSION: ICD-10-CM

## 2021-04-27 DIAGNOSIS — I26.99 PULMONARY EMBOLISM AND INFARCTION (H): ICD-10-CM

## 2021-04-27 DIAGNOSIS — I82.409 ACUTE THROMBOEMBOLISM OF DEEP VEINS OF LOWER EXTREMITY, UNSPECIFIED LATERALITY (H): ICD-10-CM

## 2021-04-27 DIAGNOSIS — E11.22 TYPE 2 DIABETES MELLITUS WITH STAGE 3B CHRONIC KIDNEY DISEASE, WITH LONG-TERM CURRENT USE OF INSULIN (H): ICD-10-CM

## 2021-04-27 DIAGNOSIS — Z79.01 LONG TERM CURRENT USE OF ANTICOAGULANT THERAPY: ICD-10-CM

## 2021-04-27 DIAGNOSIS — E78.2 MIXED HYPERLIPIDEMIA: ICD-10-CM

## 2021-04-27 LAB
ALT SERPL W P-5'-P-CCNC: 39 U/L (ref 0–70)
ANION GAP SERPL CALCULATED.3IONS-SCNC: 2 MMOL/L (ref 3–14)
BUN SERPL-MCNC: 19 MG/DL (ref 7–30)
CALCIUM SERPL-MCNC: 9.3 MG/DL (ref 8.5–10.1)
CHLORIDE SERPL-SCNC: 102 MMOL/L (ref 94–109)
CHOLEST SERPL-MCNC: 104 MG/DL
CO2 SERPL-SCNC: 31 MMOL/L (ref 20–32)
CREAT SERPL-MCNC: 1.34 MG/DL (ref 0.66–1.25)
CREAT UR-MCNC: 45 MG/DL
EST. AVERAGE GLUCOSE BLD GHB EST-MCNC: 232 MG/DL
GFR SERPL CREATININE-BSD FRML MDRD: 51 ML/MIN/{1.73_M2}
GLUCOSE SERPL-MCNC: 244 MG/DL (ref 70–99)
HBA1C MFR BLD: 9.7 % (ref 0–5.6)
HDLC SERPL-MCNC: 37 MG/DL
INR BLD: 2.2 (ref 0.86–1.14)
LDLC SERPL CALC-MCNC: 12 MG/DL
MICROALBUMIN UR-MCNC: <5 MG/L
MICROALBUMIN/CREAT UR: NORMAL MG/G CR (ref 0–17)
NONHDLC SERPL-MCNC: 67 MG/DL
POTASSIUM SERPL-SCNC: 3.7 MMOL/L (ref 3.4–5.3)
SODIUM SERPL-SCNC: 135 MMOL/L (ref 133–144)
TRIGL SERPL-MCNC: 277 MG/DL

## 2021-04-27 PROCEDURE — 80061 LIPID PANEL: CPT | Mod: ZL | Performed by: FAMILY MEDICINE

## 2021-04-27 PROCEDURE — 80048 BASIC METABOLIC PNL TOTAL CA: CPT | Mod: ZL | Performed by: FAMILY MEDICINE

## 2021-04-27 PROCEDURE — 82043 UR ALBUMIN QUANTITATIVE: CPT | Mod: ZL | Performed by: FAMILY MEDICINE

## 2021-04-27 PROCEDURE — 85610 PROTHROMBIN TIME: CPT | Mod: ZL | Performed by: FAMILY MEDICINE

## 2021-04-27 PROCEDURE — 83036 HEMOGLOBIN GLYCOSYLATED A1C: CPT | Mod: ZL | Performed by: FAMILY MEDICINE

## 2021-04-27 PROCEDURE — 36415 COLL VENOUS BLD VENIPUNCTURE: CPT | Mod: ZL | Performed by: FAMILY MEDICINE

## 2021-04-27 PROCEDURE — 84460 ALANINE AMINO (ALT) (SGPT): CPT | Mod: ZL | Performed by: FAMILY MEDICINE

## 2021-04-27 PROCEDURE — 999N001182 HC STATISTIC ESTIMATED AVERAGE GLUCOSE: Mod: ZL | Performed by: FAMILY MEDICINE

## 2021-04-27 NOTE — PROGRESS NOTES
ANTICOAGULATION FOLLOW-UP CLINIC VISIT    Patient Name:  Clement Voss  Date:  2021  Contact Type:  Telephone/ Left voicemail on patient's home phone    SUBJECTIVE:  Patient Findings     Comments:  INR done by lab. Call placed to pt and left voicemail on home phone re: INR results/Warfarin dosing/INR recheck date. INR therapeutic today- 2.2. He is to continue his maintenance dose. Pt is to call Warfarin clinic with any bleeding/bruising or any new changes to their diet/meds/activity or any questions/issues/illness.           Clinical Outcomes     Negatives:  Major bleeding event, Thromboembolic event, Anticoagulation-related hospital admission, Anticoagulation-related ED visit, Anticoagulation-related fatality    Comments:  INR done by lab. Call placed to pt and left voicemail on home phone re: INR results/Warfarin dosing/INR recheck date. INR therapeutic today- 2.2. He is to continue his maintenance dose. Pt is to call Warfarin clinic with any bleeding/bruising or any new changes to their diet/meds/activity or any questions/issues/illness.              OBJECTIVE    Recent labs: (last 7 days)     21  0848   INR 2.2*       ASSESSMENT / PLAN  INR assessment THER    Recheck INR In: 6 WEEKS    INR Location Clinic      Anticoagulation Summary  As of 2021    INR goal:  2.0-3.0   TTR:  17.2 % (1 y)   INR used for dosin.2 (2021)   Warfarin maintenance plan:  2.5 mg (2.5 mg x 1) every Mon, Wed, Fri; 5 mg (2.5 mg x 2) all other days   Full warfarin instructions:  2.5 mg every Mon, Wed, Fri; 5 mg all other days   Weekly warfarin total:  27.5 mg   No change documented:  Saba Delgadillo RN   Plan last modified:  Saba Delgadillo RN (3/4/2021)   Next INR check:  2021   Priority:  High   Target end date:  Indefinite    Indications    Pulmonary embolism and infarction (H) [I26.99]  Long-term (current) use of anticoagulants [Z79.01] [Z79.01]             Anticoagulation Episode Summary     INR  check location:      Preferred lab:      Send INR reminders to:  THOMAS RUELAS    Comments:        Anticoagulation Care Providers     Provider Role Specialty Phone number    Lorelei Felix MD Referring Family Medicine [phone]            See the Encounter Report to view Anticoagulation Flowsheet and Dosing Calendar (Go to Encounters tab in chart review, and find the Anticoagulation Therapy Visit)        Saba Delgadillo RN

## 2021-04-29 ENCOUNTER — TELEPHONE (OUTPATIENT)
Dept: FAMILY MEDICINE | Facility: OTHER | Age: 76
End: 2021-04-29

## 2021-04-29 ENCOUNTER — OFFICE VISIT (OUTPATIENT)
Dept: FAMILY MEDICINE | Facility: OTHER | Age: 76
End: 2021-04-29
Attending: FAMILY MEDICINE
Payer: COMMERCIAL

## 2021-04-29 VITALS
WEIGHT: 209.8 LBS | DIASTOLIC BLOOD PRESSURE: 74 MMHG | HEIGHT: 67 IN | RESPIRATION RATE: 16 BRPM | HEART RATE: 65 BPM | TEMPERATURE: 96.6 F | OXYGEN SATURATION: 99 % | SYSTOLIC BLOOD PRESSURE: 132 MMHG | BODY MASS INDEX: 32.93 KG/M2

## 2021-04-29 DIAGNOSIS — N18.31 STAGE 3A CHRONIC KIDNEY DISEASE (H): ICD-10-CM

## 2021-04-29 DIAGNOSIS — E78.2 MIXED HYPERLIPIDEMIA: ICD-10-CM

## 2021-04-29 DIAGNOSIS — I50.9 CONGESTIVE HEART FAILURE, UNSPECIFIED HF CHRONICITY, UNSPECIFIED HEART FAILURE TYPE (H): ICD-10-CM

## 2021-04-29 DIAGNOSIS — Z79.4 TYPE 2 DIABETES MELLITUS WITH STAGE 3B CHRONIC KIDNEY DISEASE, WITH LONG-TERM CURRENT USE OF INSULIN (H): Primary | ICD-10-CM

## 2021-04-29 DIAGNOSIS — N18.32 TYPE 2 DIABETES MELLITUS WITH STAGE 3B CHRONIC KIDNEY DISEASE, WITH LONG-TERM CURRENT USE OF INSULIN (H): Primary | ICD-10-CM

## 2021-04-29 DIAGNOSIS — I10 BENIGN ESSENTIAL HYPERTENSION: ICD-10-CM

## 2021-04-29 DIAGNOSIS — E11.22 TYPE 2 DIABETES MELLITUS WITH STAGE 3B CHRONIC KIDNEY DISEASE, WITH LONG-TERM CURRENT USE OF INSULIN (H): Primary | ICD-10-CM

## 2021-04-29 PROCEDURE — 99214 OFFICE O/P EST MOD 30 MIN: CPT | Performed by: FAMILY MEDICINE

## 2021-04-29 PROCEDURE — G0463 HOSPITAL OUTPT CLINIC VISIT: HCPCS

## 2021-04-29 RX ORDER — METOPROLOL TARTRATE 25 MG/1
50 TABLET, FILM COATED ORAL 2 TIMES DAILY
Qty: 270 TABLET | Refills: 1 | COMMUNITY
Start: 2021-04-29 | End: 2021-07-02

## 2021-04-29 ASSESSMENT — PAIN SCALES - GENERAL: PAINLEVEL: NO PAIN (0)

## 2021-04-29 ASSESSMENT — ANXIETY QUESTIONNAIRES
4. TROUBLE RELAXING: NOT AT ALL
IF YOU CHECKED OFF ANY PROBLEMS ON THIS QUESTIONNAIRE, HOW DIFFICULT HAVE THESE PROBLEMS MADE IT FOR YOU TO DO YOUR WORK, TAKE CARE OF THINGS AT HOME, OR GET ALONG WITH OTHER PEOPLE: NOT DIFFICULT AT ALL
1. FEELING NERVOUS, ANXIOUS, OR ON EDGE: NOT AT ALL
2. NOT BEING ABLE TO STOP OR CONTROL WORRYING: NOT AT ALL
6. BECOMING EASILY ANNOYED OR IRRITABLE: NOT AT ALL
GAD7 TOTAL SCORE: 0
3. WORRYING TOO MUCH ABOUT DIFFERENT THINGS: NOT AT ALL
7. FEELING AFRAID AS IF SOMETHING AWFUL MIGHT HAPPEN: NOT AT ALL
5. BEING SO RESTLESS THAT IT IS HARD TO SIT STILL: NOT AT ALL

## 2021-04-29 ASSESSMENT — MIFFLIN-ST. JEOR: SCORE: 1640.28

## 2021-04-29 ASSESSMENT — PATIENT HEALTH QUESTIONNAIRE - PHQ9: SUM OF ALL RESPONSES TO PHQ QUESTIONS 1-9: 1

## 2021-04-29 NOTE — PATIENT INSTRUCTIONS
1.  I think the blood glucose readings/HgbA1c are too high, and we are at higher risk of complications.  At this point, I would recommend an appointment with Diabetic Education.  There are many new medications, and I think we might need to make some overall changes to get your blood glucose readings back to normal range.  They can help us with that.    2.  You are already on 4 blood pressure medications - chlorthalidone, losartan, amlodipine, and metoprolol.  Your blood pressure readings are not quite where we want them, so let's try increasing your metoprolol slightly to 2 full tablets (50 mg total) twice daily.  Please send me a note with your blood pressure and pulse readings in a week.  If the dose of metoprolol is too high, your pulse will be too low, so we need to keep a close eye on your readings.

## 2021-04-29 NOTE — NURSING NOTE
"Chief Complaint   Patient presents with     Diabetes     Lipids     Hypertension       Initial /74 (BP Location: Left arm, Patient Position: Sitting, Cuff Size: Adult Regular)   Pulse 65   Temp 96.6  F (35.9  C) (Tympanic)   Resp 16   Ht 1.702 m (5' 7\")   Wt 95.2 kg (209 lb 12.8 oz)   SpO2 99%   BMI 32.86 kg/m   Estimated body mass index is 32.86 kg/m  as calculated from the following:    Height as of this encounter: 1.702 m (5' 7\").    Weight as of this encounter: 95.2 kg (209 lb 12.8 oz).  Medication Reconciliation: complete  Tracey Blanco MA  "

## 2021-04-29 NOTE — TELEPHONE ENCOUNTER
"Pt reports he is currently not taking amlodipine. Pt stated \"The doctor from [facility] told me to stop talking it after my heart surgery\" \"Saurabh said I stopped it in January 2020\". Writer removed amlodipine from med list.   "

## 2021-04-30 ASSESSMENT — ANXIETY QUESTIONNAIRES: GAD7 TOTAL SCORE: 0

## 2021-05-12 ENCOUNTER — HOSPITAL ENCOUNTER (OUTPATIENT)
Dept: EDUCATION SERVICES | Facility: HOSPITAL | Age: 76
Discharge: HOME | End: 2021-05-12
Attending: FAMILY MEDICINE | Admitting: FAMILY MEDICINE
Payer: MEDICARE

## 2021-05-12 VITALS
BODY MASS INDEX: 32.8 KG/M2 | SYSTOLIC BLOOD PRESSURE: 128 MMHG | DIASTOLIC BLOOD PRESSURE: 74 MMHG | OXYGEN SATURATION: 94 % | WEIGHT: 209 LBS | HEIGHT: 67 IN | HEART RATE: 65 BPM | RESPIRATION RATE: 12 BRPM

## 2021-05-12 DIAGNOSIS — Z79.4 TYPE 2 DIABETES MELLITUS WITH STAGE 3B CHRONIC KIDNEY DISEASE, WITH LONG-TERM CURRENT USE OF INSULIN (H): ICD-10-CM

## 2021-05-12 DIAGNOSIS — N18.32 TYPE 2 DIABETES MELLITUS WITH STAGE 3B CHRONIC KIDNEY DISEASE, WITH LONG-TERM CURRENT USE OF INSULIN (H): ICD-10-CM

## 2021-05-12 DIAGNOSIS — E11.22 TYPE 2 DIABETES MELLITUS WITH STAGE 3B CHRONIC KIDNEY DISEASE, WITH LONG-TERM CURRENT USE OF INSULIN (H): ICD-10-CM

## 2021-05-12 PROCEDURE — G0108 DIAB MANAGE TRN  PER INDIV: HCPCS

## 2021-05-12 ASSESSMENT — PAIN SCALES - GENERAL: PAINLEVEL: NO PAIN (0)

## 2021-05-12 ASSESSMENT — MIFFLIN-ST. JEOR: SCORE: 1636.65

## 2021-05-20 DIAGNOSIS — I10 BENIGN ESSENTIAL HYPERTENSION: ICD-10-CM

## 2021-05-20 RX ORDER — LOSARTAN POTASSIUM 100 MG/1
TABLET ORAL
Qty: 90 TABLET | Refills: 3 | Status: SHIPPED | OUTPATIENT
Start: 2021-05-20 | End: 2022-05-10

## 2021-05-20 NOTE — TELEPHONE ENCOUNTER
Losartan 25 mg      Last Written Prescription Date:  11/06/20  Last Fill Quantity: 90,   # refills: 1  Last Office Visit: 4/29/21  Future Office visit:    Next 5 appointments (look out 90 days)    Aug 05, 2021  9:45 AM  (Arrive by 9:30 AM)  SHORT with Lorelei Felix MD  [facility] ([facility] ) [address]  [facility] [address]  [phone]           Routing refill request to provider for review/approval because:  Drug not on the [facility], [facility] or [facility] refill protocol or controlled substance

## 2021-05-21 ENCOUNTER — TELEPHONE (OUTPATIENT)
Dept: EDUCATION SERVICES | Facility: HOSPITAL | Age: 76
End: 2021-05-21

## 2021-05-21 DIAGNOSIS — G25.81 RESTLESS LEGS SYNDROME: ICD-10-CM

## 2021-05-21 DIAGNOSIS — D64.9 ANEMIA, UNSPECIFIED TYPE: ICD-10-CM

## 2021-05-21 RX ORDER — FERROUS SULFATE 325(65) MG
TABLET ORAL
Qty: 30 TABLET | Refills: 2 | Status: SHIPPED | OUTPATIENT
Start: 2021-05-21 | End: 2021-08-13

## 2021-05-21 NOTE — TELEPHONE ENCOUNTER
I met with Huy last week. His Januvia is too expensive. I did talk with him about Trulicity. It most likely will be expensive for him too as it is a Tier 3/5. But looks like he will qualify for the Patient Assistance Program and is willing to do that.    Are you open to us starting Trulicity? We can ask them to dispense 1 or 2 month supplies until the end of the year.    Please let me know what you think. Thanks!

## 2021-05-21 NOTE — TELEPHONE ENCOUNTER
Iron 325mg      Last Written Prescription Date:  02/19/21  Last Fill Quantity: 30,   # refills: 2  Last Office Visit: 04/29/21  Future Office visit:    Next 5 appointments (look out 90 days)    Aug 05, 2021  9:45 AM  (Arrive by 9:30 AM)  SHORT with Lorelei Felix MD  [facility] ([facility] ) [address]  [place] [address]  [phone]

## 2021-05-21 NOTE — PROGRESS NOTES
"Diabetes Self-Management Education & Support    Presents for: Individual review    SUBJECTIVE/OBJECTIVE:  Presents for: Individual review  Accompanied by: Self  Diabetes education in the past 24mo: No  Focus of Visit: Taking Medication  Diabetes type: Type 2  Disease course: Getting harder to manage  Diabetes management related comments/concerns: Elevated BG readings and elevated A1C  Transportation concerns: No  Difficulty affording diabetes medication?: Yes  Difficulty affording diabetes testing supplies?: No  Other concerns:: None  Cultural Influences/Ethnic Background:  American      Diabetes Symptoms & Complications:  Fatigue: No  Neuropathy: No  Polydipsia: No  Polyphagia: No  Polyuria: No  Visual change: No  Slow healing wounds: No  Symptom course: Stable  Weight trend: Stable  Complications assessed today?: Yes  Autonomic neuropathy: No  CVA: No  Heart disease: Yes  Nephropathy: Yes  Peripheral neuropathy: No  Peripheral Vascular Disease: No  Retinopathy: No    Patient Problem List and Family Medical History reviewed for relevant medical history, current medical status, and diabetes risk factors.    Vitals:  /74   Pulse 65   Resp 12   Ht 1.702 m (5' 7\")   Wt 94.8 kg (209 lb)   SpO2 94%   BMI 32.73 kg/m    Estimated body mass index is 32.73 kg/m  as calculated from the following:    Height as of this encounter: 1.702 m (5' 7\").    Weight as of this encounter: 94.8 kg (209 lb).   Last 3 BP:   BP Readings from Last 3 Encounters:   05/12/21 128/74   04/29/21 132/74   01/28/21 132/68       History   Smoking Status     Former Smoker     Types: Cigarettes     Quit date: 8/12/1964   Smokeless Tobacco     Never Used       Labs:  Lab Results   Component Value Date    A1C 9.7 04/27/2021     Lab Results   Component Value Date     04/27/2021     Lab Results   Component Value Date    LDL 12 04/27/2021     HDL Cholesterol   Date Value Ref Range Status   04/27/2021 37 (L) >39 mg/dL Final   ]  GFR Estimate "   Date Value Ref Range Status   04/27/2021 51 (L) >60 mL/min/[1.73_m2] Final     Comment:     Non  GFR Calc  Starting 12/18/2018, serum creatinine based estimated GFR (eGFR) will be   calculated using the Chronic Kidney Disease Epidemiology Collaboration   (CKD-EPI) equation.       GFR Estimate If Black   Date Value Ref Range Status   04/27/2021 59 (L) >60 mL/min/[1.73_m2] Final     Comment:      GFR Calc  Starting 12/18/2018, serum creatinine based estimated GFR (eGFR) will be   calculated using the Chronic Kidney Disease Epidemiology Collaboration   (CKD-EPI) equation.       Lab Results   Component Value Date    CR 1.34 04/27/2021     No results found for: MICROALBUMIN    Healthy Eating:  Healthy Eating Assessed Today: Yes  Cultural/Evangelical diet restrictions?: No  Breakfast: Oatmeal or Honey Nut Cheerios with milk, coffee, juice  Lunch: Leftovers or bagel with butter  Dinner: Chili with slice of bread, meat with potatoes or rice  Snacks: Fuits/vegetables  Beverages: Water, Juice, Milk, Other, Coffee(propel, chocolate milk)    Being Active:  Being Active Assessed Today: Yes  Exercise:: Yes  Days per week of moderate to strenuous exercise (like a brisk walk): 3  On average, minutes per day of exercise at this level: 20(10 to 30 minutes every other day)  How intense was your typical exercise? : Light (like stretching or slow walking)  Exercise Minutes per Week: 60  Barrier to exercise: None    Monitoring:  Monitoring Assessed Today: Yes  Did patient bring glucose meter to appointment? : No  Times checking blood sugar at home (number): 3  Times checking blood sugar at home (per): Day  Blood glucose trend: Increasing        Taking Medications:  Diabetes Medication(s)     Dipeptidyl Peptidase-4 (DPP-4) Inhibitors       JANUVIA 100 MG tablet    TAKE 1 TABLET(100 MG) BY MOUTH DAILY    Insulin       insulin glargine (LANTUS SOLOSTAR) 100 UNIT/ML pen    Inject 35 Units Subcutaneous At  Bedtime          Taking Medication Assessed Today: Yes  Current Treatments: Insulin Injections, Oral Medication (taken by mouth)  Dose schedule: At bedtime  Given by: Patient  Injection/Infusion sites: Abdomen  Problems taking diabetes medications regularly?: No  Diabetes medication side effects?: No    Problem Solving:  Problem Solving Assessed Today: Yes  Is the patient at risk for hypoglycemia?: Yes  Hypoglycemia Frequency: Never              Reducing Risks:  Has dilated eye exam at least once a year?: Yes  Feet checked by healthcare provider in the last year?: Yes    Healthy Coping:  Healthy Coping Assessed Today: Yes  Emotional response to diabetes: Ready to learn  Informal Support system:: Spouse  Stage of change: MAINTENANCE (Working to maintain change, with risk of relapse)  Support resources: None  Patient Activation Measure Survey Score:  ALLEN Score (Last Two) 1/6/2020 1/10/2020   ALLEN Raw Score 30 30   Activation Score 56 56   ALLEN Level 3 3       Diabetes knowledge and skills assessment:   Patient is knowledgeable in diabetes management concepts related to: Healthy Eating, Being Active, Monitoring and Taking Medication    Patient needs further education on the following diabetes management concepts: Monitoring and Taking Medication    Based on learning assessment above, most appropriate setting for further diabetes education would be: Individual setting.      INTERVENTIONS:    Education provided today on:  AADE Self-Care Behaviors:  Diabetes Pathophysiology  Monitoring: log and interpret results, individual blood glucose targets and frequency of monitoring  Taking Medication: action of prescribed medication, proper site selection and rotation for injections, side effects of prescribed medications and when to take medications  GLP-1 administration technique taught today. Patient verbalized understanding and was able to perform an accurate return demonstration of administration technique. Side effects were  discussed, if patient has any abdominal pain, with or without nausea and/or vomiting, stop medication, call provider, clinic or go to the emergency room.    Opportunities for ongoing education and support in diabetes-self management were discussed.    Pt verbalized understanding of concepts discussed and recommendations provided today.       Education Materials Provided:  No new materials provided today      ASSESSMENT:  Huy did not bring his meter today. He reports BG readings as follows: mornings: 180s (has been 101), before supper: 200s and after supper 300s    His Januvia is very expensive for him. He is open to trying Trulicity and trying the Patient assistance program        Patient's most recent   Lab Results   Component Value Date    A1C 9.7 04/27/2021    is not meeting goal of <8.0    PLAN  See Patient Instructions for co-developed, patient-stated behavior change goals.  Sent PAP application home with Huy  Will check with Dr. Good about Trulicity  AVS printed and provided to patient today. See Follow-Up section for recommended follow-up.      Time Spent: 45 minutes  Encounter Type: Individual    Any diabetes medication dose changes were made via the CDE Protocol and Collaborative Practice Agreement with the patient's primary care provider. A copy of this encounter was shared with the provider.

## 2021-05-25 ENCOUNTER — ALLIED HEALTH/NURSE VISIT (OUTPATIENT)
Dept: EDUCATION SERVICES | Facility: OTHER | Age: 76
End: 2021-05-25
Attending: FAMILY MEDICINE
Payer: MEDICARE

## 2021-05-25 DIAGNOSIS — Z79.4 TYPE 2 DIABETES MELLITUS WITH STAGE 3B CHRONIC KIDNEY DISEASE, WITH LONG-TERM CURRENT USE OF INSULIN (H): Primary | ICD-10-CM

## 2021-05-25 DIAGNOSIS — E11.22 TYPE 2 DIABETES MELLITUS WITH STAGE 3B CHRONIC KIDNEY DISEASE, WITH LONG-TERM CURRENT USE OF INSULIN (H): Primary | ICD-10-CM

## 2021-05-25 DIAGNOSIS — N18.32 TYPE 2 DIABETES MELLITUS WITH STAGE 3B CHRONIC KIDNEY DISEASE, WITH LONG-TERM CURRENT USE OF INSULIN (H): Primary | ICD-10-CM

## 2021-06-04 ENCOUNTER — HOSPITAL ENCOUNTER (OUTPATIENT)
Dept: EDUCATION SERVICES | Facility: HOSPITAL | Age: 76
End: 2021-06-04
Attending: NURSE PRACTITIONER
Payer: COMMERCIAL

## 2021-06-04 VITALS
BODY MASS INDEX: 32.49 KG/M2 | RESPIRATION RATE: 16 BRPM | OXYGEN SATURATION: 99 % | HEIGHT: 67 IN | HEART RATE: 60 BPM | SYSTOLIC BLOOD PRESSURE: 142 MMHG | WEIGHT: 207 LBS | DIASTOLIC BLOOD PRESSURE: 68 MMHG

## 2021-06-04 DIAGNOSIS — E11.65 TYPE 2 DIABETES MELLITUS WITH HYPERGLYCEMIA, WITH LONG-TERM CURRENT USE OF INSULIN (H): Primary | ICD-10-CM

## 2021-06-04 DIAGNOSIS — Z79.4 TYPE 2 DIABETES MELLITUS WITH HYPERGLYCEMIA, WITH LONG-TERM CURRENT USE OF INSULIN (H): Primary | ICD-10-CM

## 2021-06-04 PROCEDURE — 999N000218 HC NO CHARGE DRC TIME CRITERIA NOT MET

## 2021-06-04 ASSESSMENT — PAIN SCALES - GENERAL: PAINLEVEL: NO PAIN (0)

## 2021-06-04 ASSESSMENT — MIFFLIN-ST. JEOR: SCORE: 1622.58

## 2021-06-04 NOTE — PATIENT INSTRUCTIONS
Stop Januvia next Tuesday    On Wednesday, 6/9, start Trulicity 0.75 mg weekly on Wednesdays    Questions/concerns: [phone]    Send BG readings on 6/14 on [employer]

## 2021-06-04 NOTE — PROGRESS NOTES
Huy is here to  his PAP shipment of Trulicity 0.75 mg weekly. He would like to start it Wednesday.    Reviewed storage, actions, benefits, how to use the pen and injection site selection.    Instructions:Stop Januvia next Tuesday    On Wednesday, 6/9, start Trulicity 0.75 mg weekly on Wednesdays    Questions/concerns: [phone]    Send BG readings on 6/14 on [employer]    Time spent with patient 15 minutes.

## 2021-06-08 ENCOUNTER — ANTICOAGULATION THERAPY VISIT (OUTPATIENT)
Dept: ANTICOAGULATION | Facility: OTHER | Age: 76
End: 2021-06-08
Attending: FAMILY MEDICINE
Payer: MEDICARE

## 2021-06-08 DIAGNOSIS — Z79.01 LONG TERM CURRENT USE OF ANTICOAGULANT THERAPY: ICD-10-CM

## 2021-06-08 DIAGNOSIS — I82.409 ACUTE THROMBOEMBOLISM OF DEEP VEINS OF LOWER EXTREMITY, UNSPECIFIED LATERALITY (H): ICD-10-CM

## 2021-06-08 DIAGNOSIS — I26.99 PULMONARY EMBOLISM AND INFARCTION (H): ICD-10-CM

## 2021-06-08 LAB
CAPILLARY BLOOD COLLECTION: NORMAL
INR BLD: 2.3 (ref 0.86–1.14)

## 2021-06-08 PROCEDURE — 36416 COLLJ CAPILLARY BLOOD SPEC: CPT | Mod: ZL | Performed by: FAMILY MEDICINE

## 2021-06-08 PROCEDURE — 85610 PROTHROMBIN TIME: CPT | Mod: ZL | Performed by: FAMILY MEDICINE

## 2021-06-08 NOTE — PROGRESS NOTES
ANTICOAGULATION FOLLOW-UP CLINIC VISIT    Patient Name:  Clement Voss  Date:  2021  Contact Type:  Telephone    SUBJECTIVE:  Patient Findings     Comments:  INR done by lab. Call placed to pt and left voicemail on home phone re: INR results/Warfarin dosing/INR recheck date. INR therapeutic today-2.3. He is to continue on his maintenance dose. Pt is to call Warfarin clinic with any bleeding/bruising or any new changes to their diet/meds/activity or any questions/issues/illness.           Clinical Outcomes     Negatives:  Major bleeding event, Thromboembolic event, Anticoagulation-related hospital admission, Anticoagulation-related ED visit, Anticoagulation-related fatality    Comments:  INR done by lab. Call placed to pt and left voicemail on home phone re: INR results/Warfarin dosing/INR recheck date. INR therapeutic today-2.3. He is to continue on his maintenance dose. Pt is to call Warfarin clinic with any bleeding/bruising or any new changes to their diet/meds/activity or any questions/issues/illness.              OBJECTIVE    Recent labs: (last 7 days)     21  0850   INR 2.3*       ASSESSMENT / PLAN  No question data found.  Anticoagulation Summary  As of 2021    INR goal:  2.0-3.0   TTR:  28.7 % (1 y)   INR used for dosin.3 (2021)   Warfarin maintenance plan:  2.5 mg (2.5 mg x 1) every Mon, Wed, Fri; 5 mg (2.5 mg x 2) all other days   Full warfarin instructions:  2.5 mg every Mon, Wed, Fri; 5 mg all other days   Weekly warfarin total:  27.5 mg   No change documented:  Saba Delgadillo RN   Plan last modified:  Saba Delgadillo RN (3/4/2021)   Next INR check:  2021   Priority:  High   Target end date:  Indefinite    Indications    Pulmonary embolism and infarction (H) [I26.99]  Long-term (current) use of anticoagulants [Z79.01] [Z79.01]             Anticoagulation Episode Summary     INR check location:      Preferred lab:      Send INR reminders to:  THOMAS RUELAS     Comments:        Anticoagulation Care Providers     Provider Role Specialty Phone number    Lorelei Felix MD Referring Family Medicine [phone]            See the Encounter Report to view Anticoagulation Flowsheet and Dosing Calendar (Go to Encounters tab in chart review, and find the Anticoagulation Therapy Visit)        Saba Delgadillo, RN

## 2021-07-02 DIAGNOSIS — I10 BENIGN ESSENTIAL HYPERTENSION: ICD-10-CM

## 2021-07-02 RX ORDER — METOPROLOL TARTRATE 25 MG/1
TABLET, FILM COATED ORAL
Qty: 270 TABLET | Refills: 1 | Status: SHIPPED | OUTPATIENT
Start: 2021-07-02 | End: 2021-08-05

## 2021-07-02 NOTE — TELEPHONE ENCOUNTER
metoprolol      Last Written Prescription Date:  4/29/21  Last Fill Quantity: 270,   # refills: 1  Last Office Visit: 4/29/21  Future Office visit:    Next 5 appointments (look out 90 days)    Aug 05, 2021  9:45 AM  (Arrive by 9:30 AM)  SHORT with Lorelei Felix MD  [facility] ([facility] ) [address]  [facility] [address]  [phone]

## 2021-07-19 NOTE — PROGRESS NOTES
"    Assessment & Plan     1. Type 2 diabetes mellitus with stage 3b chronic kidney disease, with long-term current use of insulin (H)  Lab results reviewed.  Patient is currently working with Diabetic Education, insulin dose was adjusted and Anamaria will be contacting [facility] about adjusting Trulicity dosing as well.  Follow-up in three months, will order labs to be completed prior to visit.    2. Mixed hyperlipidemia  Labs reviewed, no medication changes needed.    3. Benign essential hypertension  BP looks good, dose updated in chart and new script sent.  - metoprolol tartrate (LOPRESSOR) 25 MG tablet; Take 2 tablets (50 mg) by mouth 2 times daily  Dispense: 360 tablet; Refill: 1    4. Stage 3a chronic kidney disease       BMI:   Estimated body mass index is 32.42 kg/m  as calculated from the following:    Height as of this encounter: 1.702 m (5' 7\").    Weight as of this encounter: 93.9 kg (207 lb).     Return in about 3 months (around 11/5/2021) for Diabetic Follow-up, Chronic Disease Management, Medication review.    Lorelei Felix MD  [facility] Son is a 76 year old who presents for the following health issues     HPI     Diabetes Follow-up    How often are you checking your blood sugar? Three times daily  Blood sugar testing frequency justification:  Uncontrolled diabetes  What time of day are you checking your blood sugars (select all that apply)?  Before and after meals  Have you had any blood sugars above 200?  Yes   Have you had any blood sugars below 70?  No    What symptoms do you notice when your blood sugar is low?  None    What concerns do you have today about your diabetes? Blood sugar is often over 200     Do you have any of these symptoms? (Select all that apply)  No numbness or tingling in feet.  No redness, sores or blisters on feet.  No complaints of excessive thirst.  No reports of blurry vision.  No significant changes to weight.  April To August  " 2021  AM  Before Meal    PM Before Meal               PM  After  Meal                      High   Low    Ave    High   Low    Ave              High   Low    Ave  April 26      226    127    174    269    158    194              -----    -----    -----  May     3       236    137    190    280    185    227              377    329    353            10      225    181    199    287    196    244              365    314    335            17      202    155    183    301    189    253              -----    -----    327            24      232    156    191    300    198    236              335    299    327            31      266    153    215    265    225    245              396    295    353  June 07      182      98    153    279    156    209              353    244    314            14      146    103    129    231    160    195              381    300    332             21      193    128    161    296    131    185              284    219    243            28      180      94    139    221    132    175              295    245    278  July 05      154    125    141    211    153    179              363    205    286             12      183    102    127    190    136    161              274    236    262            19      173    114    141    188    143    164              288    167    237            26      169      99    142    191    164    173              313    236    267      Hyperlipidemia Follow-Up      Are you regularly taking any medication or supplement to lower your cholesterol?   Yes- rosuvastatin    Are you having muscle aches or other side effects that you think could be caused by your cholesterol lowering medication?  No         Hypertension Follow-up      Do you check your blood pressure regularly outside of the clinic? Yes     Are you following a low salt diet? No    Are your blood pressures ever more than 140 on the top number (systolic) OR more   than 90 on the bottom number  "(diastolic), for example 140/90? No    BP Readings from Last 2 Encounters:   08/05/21 116/64   06/04/21 142/68     Hemoglobin A1C (%)   Date Value   08/02/2021 8.8 (H)   04/27/2021 9.7 (H)   01/26/2021 9.4 (H)     LDL Cholesterol Calculated (mg/dL)   Date Value   08/02/2021 11   04/27/2021 12   01/26/2021 26         How many servings of fruits and vegetables do you eat daily?  2-3    On average, how many sweetened beverages do you drink each day (Examples: soda, juice, sweet tea, etc.  Do NOT count diet or artificially sweetened beverages)?   0    How many days per week do you exercise enough to make your heart beat faster? 7    How many minutes a day do you exercise enough to make your heart beat faster? 30 - 60    How many days per week do you miss taking your medication? 0      Review of Systems   Constitutional, HEENT, cardiovascular, pulmonary, gi and gu systems are negative, except as otherwise noted.      Objective    /64 (BP Location: Right arm, Patient Position: Chair, Cuff Size: Adult Large)   Pulse 65   Temp 98  F (36.7  C) (Tympanic)   Ht 1.702 m (5' 7\")   Wt 93.9 kg (207 lb)   SpO2 98%   BMI 32.42 kg/m    Body mass index is 32.42 kg/m .  Physical Exam   GENERAL: healthy, alert and no distress  PSYCH: mentation appears normal, affect normal/bright    Lab on 08/02/2021   Component Date Value Ref Range Status     TSH 08/02/2021 2.60  0.40 - 4.00 mU/L Final     Cholesterol 08/02/2021 112  <200 mg/dL Final    Age 0-19 years  Desirable: <170 mg/dL  Borderline high:  170-199 mg/dl  High:            >199 mg/dl    Age 20 years and older  Desirable: <200 mg/dL     Triglycerides 08/02/2021 330* <150 mg/dL Final    0-9 years:  Normal:    Less than 75 mg/dL  Borderline high:  75-99 mg/dL  High:             Greater than or equal to 100 mg/dL    0-19 years:  Normal:    Less than 90 mg/dL  Borderline high:   mg/dL  High:             Greater than or equal to 130 mg/dL    20 years and older:  Normal:    " Less than 150 mg/dL  Borderline high:  150-199 mg/dL  High:             200-499 mg/dL  Very high:   Greater than or equal to 500 mg/dL     Direct Measure HDL 08/02/2021 35* >=40 mg/dL Final    0-19 years:       Greater than or equal to 45 mg/dL   Low: Less than 40 mg/dL   Borderline low: 40-44 mg/dL     20 years and older:   Female: Greater than or equal to 50 mg/dL   Male:   Greater than or equal to 40 mg/dL          LDL Cholesterol Calculated 08/02/2021 11  <=100 mg/dL Final    Age 0-19 years:  Desirable: 0-110 mg/dL   Borderline high: 110-129 mg/dL   High: >= 130 mg/dL    Age 20 years and older:  Desirable: <100mg/dL  Above desirable: 100-129 mg/dL   Borderline high: 130-159 mg/dL   High: 160-189 mg/dL   Very high: >= 190 mg/dL     Non HDL Cholesterol 08/02/2021 77  <130 mg/dL Final    0-19 years:  Desirable:          Less than 120 mg/dL  Borderline high:   120-144 mg/dL  High:                   Greater than or equal to 145 mg/dL    20 years and older:  Desirable:          130 mg/dL  Above Desirable: 130-159 mg/dL  Borderline high:   160-189 mg/dL  High:               190-219 mg/dL  Very high:     Greater than or equal to 220 mg/dL     Patient Fasting > 8hrs? 08/02/2021 Yes   Final     Estimated Average Glucose 08/02/2021 206  mg/dL Final     Hemoglobin A1C 08/02/2021 8.8* 0.0 - 5.6 % Final    Normal <5.7%   Prediabetes 5.7-6.4%    Diabetes 6.5% or higher     Note: Adopted from ADA consensus guidelines.     Sodium 08/02/2021 138  133 - 144 mmol/L Final     Potassium 08/02/2021 3.5  3.4 - 5.3 mmol/L Final     Chloride 08/02/2021 105  94 - 109 mmol/L Final     Carbon Dioxide (CO2) 08/02/2021 27  20 - 32 mmol/L Final     Anion Gap 08/02/2021 6  3 - 14 mmol/L Final     Urea Nitrogen 08/02/2021 19  7 - 30 mg/dL Final     Creatinine 08/02/2021 1.47* 0.66 - 1.25 mg/dL Final     Calcium 08/02/2021 9.6  8.5 - 10.1 mg/dL Final     Glucose 08/02/2021 183* 70 - 99 mg/dL Final     GFR Estimate 08/02/2021 45* >60 mL/min/1.73m2  Final    As of July 11, 2021, eGFR is calculated by the CKD-EPI creatinine equation, without race adjustment. eGFR can be influenced by muscle mass, exercise, and diet. The reported eGFR is an estimation only and is only applicable if the renal function is stable.

## 2021-07-20 ENCOUNTER — ANTICOAGULATION THERAPY VISIT (OUTPATIENT)
Dept: ANTICOAGULATION | Facility: OTHER | Age: 76
End: 2021-07-20
Attending: FAMILY MEDICINE
Payer: MEDICARE

## 2021-07-20 ENCOUNTER — LAB (OUTPATIENT)
Dept: LAB | Facility: OTHER | Age: 76
End: 2021-07-20
Attending: FAMILY MEDICINE
Payer: MEDICARE

## 2021-07-20 DIAGNOSIS — Z79.01 LONG TERM CURRENT USE OF ANTICOAGULANT THERAPY: ICD-10-CM

## 2021-07-20 DIAGNOSIS — N18.32 TYPE 2 DIABETES MELLITUS WITH STAGE 3B CHRONIC KIDNEY DISEASE, WITH LONG-TERM CURRENT USE OF INSULIN (H): ICD-10-CM

## 2021-07-20 DIAGNOSIS — I26.99 PULMONARY EMBOLISM AND INFARCTION (H): Primary | ICD-10-CM

## 2021-07-20 DIAGNOSIS — Z79.4 TYPE 2 DIABETES MELLITUS WITH STAGE 3B CHRONIC KIDNEY DISEASE, WITH LONG-TERM CURRENT USE OF INSULIN (H): ICD-10-CM

## 2021-07-20 DIAGNOSIS — E11.22 TYPE 2 DIABETES MELLITUS WITH STAGE 3B CHRONIC KIDNEY DISEASE, WITH LONG-TERM CURRENT USE OF INSULIN (H): ICD-10-CM

## 2021-07-20 DIAGNOSIS — I82.409 ACUTE THROMBOEMBOLISM OF DEEP VEINS OF LOWER EXTREMITY, UNSPECIFIED LATERALITY (H): ICD-10-CM

## 2021-07-20 DIAGNOSIS — I26.99 PULMONARY EMBOLISM AND INFARCTION (H): ICD-10-CM

## 2021-07-20 LAB — INR BLD: 2.2 (ref 0.9–1.1)

## 2021-07-20 PROCEDURE — 85610 PROTHROMBIN TIME: CPT | Mod: ZL

## 2021-07-20 PROCEDURE — 36416 COLLJ CAPILLARY BLOOD SPEC: CPT | Mod: ZL

## 2021-07-20 NOTE — PROGRESS NOTES
ANTICOAGULATION MANAGEMENT     Clement Voss 76 year old male is on warfarin with therapeutic INR result. (Goal INR 2.0-3.0)    Recent labs: (last 7 days)     07/20/21  0912   INR 2.2*       ASSESSMENT     Source(s): Patient/Caregiver Call       Warfarin doses taken: Warfarin taken as instructed    Diet: No new diet changes identified    New illness, injury, or hospitalization: No    Medication/supplement changes: None noted    Signs or symptoms of bleeding or clotting: No    Previous INR: Therapeutic last 2(+) visits    Additional findings: None     PLAN     Recommended plan for no diet, medication or health factor changes affecting INR     Dosing Instructions: Continue your current warfarin dose with next INR in 6 weeks       Summary  As of 7/20/2021    Full warfarin instructions:  2.5 mg every Mon, Wed, Fri; 5 mg all other days   Next INR check:  8/31/2021             Telephone call with Clement who verbalizes understanding and agrees to plan    Lab visit scheduled    Education provided: Please call back if any changes to your diet, medications or how you've been taking warfarin, Monitoring for bleeding signs and symptoms and Monitoring for clotting signs and symptoms    Plan made per [facility] anticoagulation protocol    Saba Delgadillo RN  Anticoagulation Clinic  7/20/2021    _______________________________________________________________________     Anticoagulation Episode Summary     Current INR goal:  2.0-3.0   TTR:  38.6 % (1 y)   Target end date:  Indefinite   Send INR reminders to:  [facility] [place]    Indications    Pulmonary embolism and infarction (H) [I26.99]  Long-term (current) use of anticoagulants [Z79.01] [Z79.01]           Comments:           Anticoagulation Care Providers     Provider Role Specialty Phone number    Lorelei Felix MD Referring Family Medicine [phone]

## 2021-07-21 RX ORDER — INSULIN GLARGINE 100 [IU]/ML
INJECTION, SOLUTION SUBCUTANEOUS
Qty: 15 ML | Refills: 3 | Status: SHIPPED | OUTPATIENT
Start: 2021-07-21 | End: 2021-08-04

## 2021-08-02 ENCOUNTER — LAB (OUTPATIENT)
Dept: LAB | Facility: OTHER | Age: 76
End: 2021-08-02
Payer: MEDICARE

## 2021-08-02 DIAGNOSIS — Z79.4 TYPE 2 DIABETES MELLITUS WITH STAGE 3B CHRONIC KIDNEY DISEASE, WITH LONG-TERM CURRENT USE OF INSULIN (H): ICD-10-CM

## 2021-08-02 DIAGNOSIS — E78.2 MIXED HYPERLIPIDEMIA: ICD-10-CM

## 2021-08-02 DIAGNOSIS — I10 BENIGN ESSENTIAL HYPERTENSION: ICD-10-CM

## 2021-08-02 DIAGNOSIS — E11.22 TYPE 2 DIABETES MELLITUS WITH STAGE 3B CHRONIC KIDNEY DISEASE, WITH LONG-TERM CURRENT USE OF INSULIN (H): ICD-10-CM

## 2021-08-02 DIAGNOSIS — N18.32 TYPE 2 DIABETES MELLITUS WITH STAGE 3B CHRONIC KIDNEY DISEASE, WITH LONG-TERM CURRENT USE OF INSULIN (H): ICD-10-CM

## 2021-08-02 LAB
ANION GAP SERPL CALCULATED.3IONS-SCNC: 6 MMOL/L (ref 3–14)
BUN SERPL-MCNC: 19 MG/DL (ref 7–30)
CALCIUM SERPL-MCNC: 9.6 MG/DL (ref 8.5–10.1)
CHLORIDE BLD-SCNC: 105 MMOL/L (ref 94–109)
CHOLEST SERPL-MCNC: 112 MG/DL
CO2 SERPL-SCNC: 27 MMOL/L (ref 20–32)
CREAT SERPL-MCNC: 1.47 MG/DL (ref 0.66–1.25)
EST. AVERAGE GLUCOSE BLD GHB EST-MCNC: 206 MG/DL
FASTING STATUS PATIENT QL REPORTED: YES
GFR SERPL CREATININE-BSD FRML MDRD: 45 ML/MIN/1.73M2
GLUCOSE BLD-MCNC: 183 MG/DL (ref 70–99)
HBA1C MFR BLD: 8.8 % (ref 0–5.6)
HDLC SERPL-MCNC: 35 MG/DL
LDLC SERPL CALC-MCNC: 11 MG/DL
NONHDLC SERPL-MCNC: 77 MG/DL
POTASSIUM BLD-SCNC: 3.5 MMOL/L (ref 3.4–5.3)
SODIUM SERPL-SCNC: 138 MMOL/L (ref 133–144)
TRIGL SERPL-MCNC: 330 MG/DL
TSH SERPL DL<=0.005 MIU/L-ACNC: 2.6 MU/L (ref 0.4–4)

## 2021-08-02 PROCEDURE — 80061 LIPID PANEL: CPT | Mod: ZL

## 2021-08-02 PROCEDURE — 80048 BASIC METABOLIC PNL TOTAL CA: CPT | Mod: ZL

## 2021-08-02 PROCEDURE — 83036 HEMOGLOBIN GLYCOSYLATED A1C: CPT | Mod: ZL

## 2021-08-02 PROCEDURE — 84443 ASSAY THYROID STIM HORMONE: CPT | Mod: ZL

## 2021-08-02 PROCEDURE — 36415 COLL VENOUS BLD VENIPUNCTURE: CPT | Mod: ZL

## 2021-08-03 ENCOUNTER — TELEPHONE (OUTPATIENT)
Dept: EDUCATION SERVICES | Facility: OTHER | Age: 76
End: 2021-08-03

## 2021-08-03 ENCOUNTER — MYC MEDICAL ADVICE (OUTPATIENT)
Dept: EDUCATION SERVICES | Facility: HOSPITAL | Age: 76
End: 2021-08-03

## 2021-08-03 DIAGNOSIS — Z79.4 TYPE 2 DIABETES MELLITUS WITH STAGE 3B CHRONIC KIDNEY DISEASE, WITH LONG-TERM CURRENT USE OF INSULIN (H): ICD-10-CM

## 2021-08-03 DIAGNOSIS — N18.32 TYPE 2 DIABETES MELLITUS WITH STAGE 3B CHRONIC KIDNEY DISEASE, WITH LONG-TERM CURRENT USE OF INSULIN (H): ICD-10-CM

## 2021-08-03 DIAGNOSIS — E11.22 TYPE 2 DIABETES MELLITUS WITH STAGE 3B CHRONIC KIDNEY DISEASE, WITH LONG-TERM CURRENT USE OF INSULIN (H): ICD-10-CM

## 2021-08-03 NOTE — TELEPHONE ENCOUNTER
I called the pt and let him know his medication has come in from [facility]. He will come to the [facility]  to pick this up and was advised to bring a cooler.

## 2021-08-04 RX ORDER — DULAGLUTIDE 0.75 MG/.5ML
0.75 INJECTION, SOLUTION SUBCUTANEOUS
Start: 2021-08-04 | End: 2021-09-21 | Stop reason: DRUGHIGH

## 2021-08-05 ENCOUNTER — OFFICE VISIT (OUTPATIENT)
Dept: FAMILY MEDICINE | Facility: OTHER | Age: 76
End: 2021-08-05
Attending: FAMILY MEDICINE
Payer: COMMERCIAL

## 2021-08-05 VITALS
HEIGHT: 67 IN | DIASTOLIC BLOOD PRESSURE: 64 MMHG | SYSTOLIC BLOOD PRESSURE: 116 MMHG | HEART RATE: 65 BPM | WEIGHT: 207 LBS | OXYGEN SATURATION: 98 % | TEMPERATURE: 98 F | BODY MASS INDEX: 32.49 KG/M2

## 2021-08-05 DIAGNOSIS — N18.31 STAGE 3A CHRONIC KIDNEY DISEASE (H): ICD-10-CM

## 2021-08-05 DIAGNOSIS — Z79.4 TYPE 2 DIABETES MELLITUS WITH STAGE 3B CHRONIC KIDNEY DISEASE, WITH LONG-TERM CURRENT USE OF INSULIN (H): Primary | ICD-10-CM

## 2021-08-05 DIAGNOSIS — N18.32 TYPE 2 DIABETES MELLITUS WITH STAGE 3B CHRONIC KIDNEY DISEASE, WITH LONG-TERM CURRENT USE OF INSULIN (H): Primary | ICD-10-CM

## 2021-08-05 DIAGNOSIS — E11.22 TYPE 2 DIABETES MELLITUS WITH STAGE 3B CHRONIC KIDNEY DISEASE, WITH LONG-TERM CURRENT USE OF INSULIN (H): Primary | ICD-10-CM

## 2021-08-05 DIAGNOSIS — E78.2 MIXED HYPERLIPIDEMIA: ICD-10-CM

## 2021-08-05 DIAGNOSIS — I10 BENIGN ESSENTIAL HYPERTENSION: ICD-10-CM

## 2021-08-05 PROCEDURE — 99214 OFFICE O/P EST MOD 30 MIN: CPT | Performed by: FAMILY MEDICINE

## 2021-08-05 PROCEDURE — G0463 HOSPITAL OUTPT CLINIC VISIT: HCPCS

## 2021-08-05 RX ORDER — METOPROLOL TARTRATE 25 MG/1
50 TABLET, FILM COATED ORAL 2 TIMES DAILY
Qty: 360 TABLET | Refills: 1 | Status: SHIPPED | OUTPATIENT
Start: 2021-08-05 | End: 2022-02-28

## 2021-08-05 ASSESSMENT — PAIN SCALES - GENERAL: PAINLEVEL: NO PAIN (0)

## 2021-08-05 ASSESSMENT — MIFFLIN-ST. JEOR: SCORE: 1622.58

## 2021-08-05 NOTE — NURSING NOTE
"Chief Complaint   Patient presents with     Diabetes     Lipids     Hypertension       Initial /64 (BP Location: Right arm, Patient Position: Chair, Cuff Size: Adult Large)   Pulse 65   Temp 98  F (36.7  C) (Tympanic)   Ht 1.702 m (5' 7\")   Wt 93.9 kg (207 lb)   SpO2 98%   BMI 32.42 kg/m   Estimated body mass index is 32.42 kg/m  as calculated from the following:    Height as of this encounter: 1.702 m (5' 7\").    Weight as of this encounter: 93.9 kg (207 lb).  Medication Reconciliation: complete  Ingrid Blair LPN    "

## 2021-08-17 ENCOUNTER — MYC MEDICAL ADVICE (OUTPATIENT)
Dept: EDUCATION SERVICES | Facility: HOSPITAL | Age: 76
End: 2021-08-17

## 2021-08-17 DIAGNOSIS — N18.32 TYPE 2 DIABETES MELLITUS WITH STAGE 3B CHRONIC KIDNEY DISEASE, WITH LONG-TERM CURRENT USE OF INSULIN (H): ICD-10-CM

## 2021-08-17 DIAGNOSIS — E11.22 TYPE 2 DIABETES MELLITUS WITH STAGE 3B CHRONIC KIDNEY DISEASE, WITH LONG-TERM CURRENT USE OF INSULIN (H): ICD-10-CM

## 2021-08-17 DIAGNOSIS — Z79.4 TYPE 2 DIABETES MELLITUS WITH STAGE 3B CHRONIC KIDNEY DISEASE, WITH LONG-TERM CURRENT USE OF INSULIN (H): ICD-10-CM

## 2021-08-19 ENCOUNTER — HOSPITAL ENCOUNTER (EMERGENCY)
Facility: HOSPITAL | Age: 76
Discharge: HOME | End: 2021-08-19
Attending: PHYSICIAN ASSISTANT | Admitting: PHYSICIAN ASSISTANT
Payer: MEDICARE

## 2021-08-19 ENCOUNTER — APPOINTMENT (OUTPATIENT)
Dept: CT IMAGING | Facility: HOSPITAL | Age: 76
End: 2021-08-19
Attending: PHYSICIAN ASSISTANT
Payer: MEDICARE

## 2021-08-19 VITALS
TEMPERATURE: 99.2 F | RESPIRATION RATE: 16 BRPM | DIASTOLIC BLOOD PRESSURE: 78 MMHG | OXYGEN SATURATION: 95 % | SYSTOLIC BLOOD PRESSURE: 127 MMHG | HEART RATE: 75 BPM

## 2021-08-19 DIAGNOSIS — S76.219A GROIN STRAIN: ICD-10-CM

## 2021-08-19 DIAGNOSIS — E87.6 HYPOKALEMIA: ICD-10-CM

## 2021-08-19 LAB
ANION GAP SERPL CALCULATED.3IONS-SCNC: 5 MMOL/L (ref 3–14)
BASOPHILS # BLD AUTO: 0.1 10E3/UL (ref 0–0.2)
BASOPHILS NFR BLD AUTO: 1 %
BUN SERPL-MCNC: 28 MG/DL (ref 7–30)
CALCIUM SERPL-MCNC: 9.1 MG/DL (ref 8.5–10.1)
CHLORIDE BLD-SCNC: 105 MMOL/L (ref 94–109)
CO2 SERPL-SCNC: 28 MMOL/L (ref 20–32)
CREAT SERPL-MCNC: 1.53 MG/DL (ref 0.66–1.25)
EOSINOPHIL # BLD AUTO: 0.3 10E3/UL (ref 0–0.7)
EOSINOPHIL NFR BLD AUTO: 4 %
ERYTHROCYTE [DISTWIDTH] IN BLOOD BY AUTOMATED COUNT: 15 % (ref 10–15)
GFR SERPL CREATININE-BSD FRML MDRD: 43 ML/MIN/1.73M2
GLUCOSE BLD-MCNC: 256 MG/DL (ref 70–99)
HCT VFR BLD AUTO: 34.9 % (ref 40–53)
HGB BLD-MCNC: 13.1 G/DL (ref 13.3–17.7)
IMM GRANULOCYTES # BLD: 0 10E3/UL
IMM GRANULOCYTES NFR BLD: 0 %
INR PPP: 2.2 (ref 0.85–1.15)
LYMPHOCYTES # BLD AUTO: 0.8 10E3/UL (ref 0.8–5.3)
LYMPHOCYTES NFR BLD AUTO: 10 %
MCH RBC QN AUTO: 34.8 PG (ref 26.5–33)
MCHC RBC AUTO-ENTMCNC: 37.5 G/DL (ref 31.5–36.5)
MCV RBC AUTO: 93 FL (ref 78–100)
MONOCYTES # BLD AUTO: 0.6 10E3/UL (ref 0–1.3)
MONOCYTES NFR BLD AUTO: 8 %
NEUTROPHILS # BLD AUTO: 6.3 10E3/UL (ref 1.6–8.3)
NEUTROPHILS NFR BLD AUTO: 77 %
NRBC # BLD AUTO: 0 10E3/UL
NRBC BLD AUTO-RTO: 0 /100
PLATELET # BLD AUTO: 124 10E3/UL (ref 150–450)
POTASSIUM BLD-SCNC: 3.3 MMOL/L (ref 3.4–5.3)
RBC # BLD AUTO: 3.76 10E6/UL (ref 4.4–5.9)
SODIUM SERPL-SCNC: 138 MMOL/L (ref 133–144)
WBC # BLD AUTO: 8.2 10E3/UL (ref 4–11)

## 2021-08-19 PROCEDURE — 99284 EMERGENCY DEPT VISIT MOD MDM: CPT | Mod: 25

## 2021-08-19 PROCEDURE — 99284 EMERGENCY DEPT VISIT MOD MDM: CPT | Performed by: PHYSICIAN ASSISTANT

## 2021-08-19 PROCEDURE — 72192 CT PELVIS W/O DYE: CPT

## 2021-08-19 PROCEDURE — 36415 COLL VENOUS BLD VENIPUNCTURE: CPT | Performed by: PHYSICIAN ASSISTANT

## 2021-08-19 PROCEDURE — 85025 COMPLETE CBC W/AUTO DIFF WBC: CPT | Performed by: PHYSICIAN ASSISTANT

## 2021-08-19 PROCEDURE — 80048 BASIC METABOLIC PNL TOTAL CA: CPT | Performed by: PHYSICIAN ASSISTANT

## 2021-08-19 PROCEDURE — 85610 PROTHROMBIN TIME: CPT | Performed by: PHYSICIAN ASSISTANT

## 2021-08-19 ASSESSMENT — ENCOUNTER SYMPTOMS
ABDOMINAL PAIN: 0
BRUISES/BLEEDS EASILY: 1
FEVER: 0
VOMITING: 0
NAUSEA: 0

## 2021-08-19 NOTE — ED PROVIDER NOTES
History   No chief complaint on file.    The history is provided by the patient.     Clement Voss is a 76 year old male who presented to the emergency department via wheelchair along with wife for evaluation of right groin and pelvic pain.  Patient reports he was attempting to get into an SUV when he was raising his right leg and felt an abrupt pain in his right groin described as tearing.  Focal in nature.  Radiates down his right leg.  Denies any abdominal pain.  Denies scrotal pain.  Denies testicular pain.  Denies lower urinary tract symptoms.  Past medical history is most significant for Coumadin therapy.    Allergies:  Allergies   Allergen Reactions     Atorvastatin      Other reaction(s): Other  Caused increased creatinine.      Atorvastatin Calcium Other (See Comments)     Lipitor  Increase CR     Barley Grass Unknown     Iodine      Penicillins      Procaine Unknown     Shellfish-Derived Products Unknown     Sulfa Drugs      Sulfa (sulfonamide antibiotics)     Sulfacetamide        Problem List:    Patient Active Problem List    Diagnosis Date Noted     Congestive heart failure, unspecified HF chronicity, unspecified heart failure type (H) 01/28/2021     Priority: Medium     Chronic left ventricular systolic dysfunction 10/27/2020     Priority: Medium     Other dysphagia 02/21/2020     Priority: Medium     Abnormal stress test on 1/10/2020 02/18/2020     Priority: Medium     Regional wall motion abnormality of heart 02/18/2020     Priority: Medium     Diastolic dysfunction grade 2 on 1/20/2020 02/18/2020     Priority: Medium     Severe aortic valve regurgitation 02/18/2020     Priority: Medium     Moderate mitral regurgitation 02/18/2020     Priority: Medium     Ascending aortic aneurysm-severe 02/18/2020     Priority: Medium     Anticoagulated on Coumadin 02/18/2020     Priority: Medium     History of DVT on 12/30/2013 02/18/2020     Priority: Medium     History of pulmonary embolism 02/18/2020      Priority: Medium     History of tobacco abuse quitting 8/12/1964 02/18/2020     Priority: Medium     S/P aortic valve replacement 02/17/2020     Priority: Medium     Postoperative anemia due to acute blood loss 02/10/2020     Priority: Medium     S/P CABG x 2 02/10/2020     Priority: Medium     ASCVD (arteriosclerotic cardiovascular disease) 02/03/2020     Priority: Medium     Aortic root aneurysm (H) 01/28/2020     Priority: Medium     Added automatically from request for surgery [id number]       CALDERON (dyspnea on exertion) 01/28/2020     Priority: Medium     Added automatically from request for surgery [id number]       Murmur 01/28/2020     Priority: Medium     Added automatically from request for surgery [id number]       Aortic valve insufficiency, etiology of cardiac valve disease unspecified 01/28/2020     Priority: Medium     Added automatically from request for surgery [id number]       CKD (chronic kidney disease) stage 3, GFR 30-59 ml/min 09/24/2019     Priority: Medium     Morbid obesity (H) 05/26/2017     Priority: Medium     Long-term (current) use of anticoagulants [Z79.01] 08/04/2016     Priority: Medium     ACP (advance care planning) 03/14/2013     Priority: Medium     Advance Care Planning 8/26/2016: ACP Review of Chart / Resources Provided:  Reviewed chart for advance care plan.  Clement Voss has no plan or code status on file. Discussed available resources and provided with information. Confirmed code status reflects current choices pending further ACP discussions.  Confirmed/documented legally designated decision makers.  Added by Jenifer Villa             Pulmonary embolism and infarction (H) 09/24/2012     Priority: Medium     Gout 01/10/2011     Priority: Medium     Mixed hyperlipidemia 10/10/2005     Priority: Medium     Benign essential hypertension 01/03/2005     Priority: Medium     Type 2 diabetes mellitus with chronic kidney disease, with long-term current use of insulin (H) 08/02/2004     Priority:  Medium        Past Medical History:    Past Medical History:   Diagnosis Date     DVT (deep venous thrombosis) 9/24/2012     DVT (deep venous thrombosis) (H)      Gout, unspecified 1/10/2011     Other and unspecified hyperlipidemia 10/10/2005     Pulmonary embolism 9/24/2012     Pulmonary embolism (H)      Type II or unspecified type diabetes mellitus without mention of complication, not stated as uncontrolled 8/2/2004     Unspecified essential hypertension 1/3/2005       Past Surgical History:    Past Surgical History:   Procedure Laterality Date     APPENDECTOMY  2008     CHOLECYSTECTOMY  1984     COLONOSCOPY N/A 5/16/2019    Procedure: COLONOSCOPY;  Surgeon: Benito Saldana MD;  Location: [facility] OR     History of PE of right lung 3/2013[       left eye glacoma  11/30/21     PHACOEMULSIFICATION WITH STANDARD INTRAOCULAR LENS IMPLANT Left 4/19/2018    Procedure: PHACOEMULSIFICATION WITH STANDARD INTRAOCULAR LENS IMPLANT;  CATARACT EXTRACTION LEFT EYE WITH IMPLANT, ISTENT LEFT EYE;  Surgeon: Kush Almaraz MD;  Location: [facility] OR     PHACOEMULSIFICATION WITH STANDARD INTRAOCULAR LENS IMPLANT Right 5/3/2018    Procedure: PHACOEMULSIFICATION WITH STANDARD INTRAOCULAR LENS IMPLANT;  CATARACT EXTRACTION RIGHT EYE WITH IMPLANT,WITH ISTENT ATTEMPTED;  Surgeon: Kush Almaraz MD;  Location: [facility] OR     TRABECULAR MICRO-BYPASS WITH ISTENT Left 4/19/2018    Procedure: TRABECULAR MICRO-BYPASS WITH ISTENT;;  Surgeon: Kush Almaraz MD;  Location: [facility] OR     TRABECULAR MICRO-BYPASS WITH ISTENT  5/3/2018    Procedure: TRABECULAR MICRO-BYPASS WITH ISTENT;;  Surgeon: Kush Almaraz MD;  Location: [facility] OR       Family History:    Family History   Problem Relation Age of Onset     Heart Disease Father 71        heart disease; cause of death     Other - See Comments Mother 79        old age; cause of death       Social History:  Marital Status:   [2]  Social History     Tobacco Use     Smoking status: Former Smoker      Types: Cigarettes     Quit date: 1964     Years since quittin.0     Smokeless tobacco: Never Used   Substance Use Topics     Alcohol use: No     Drug use: No        Medications:    acetaminophen (TYLENOL) 500 MG tablet  aspirin 81 MG EC tablet  chlorthalidone (HYGROTON) 25 MG tablet  COMBIGAN 0.2-0.5 % ophthalmic solution  dulaglutide (TRULICITY) 0.75 MG/0.5ML pen  FEROSUL 325 (65 Fe) MG tablet  fluticasone (FLONASE) 50 MCG/ACT nasal spray  furosemide (LASIX) 20 MG tablet  insulin glargine (LANTUS SOLOSTAR) 100 UNIT/ML pen  loratadine (CLARITIN) 10 MG tablet  losartan (COZAAR) 100 MG tablet  metoprolol tartrate (LOPRESSOR) 25 MG tablet  rosuvastatin (CRESTOR) 5 MG tablet  travoprost DARION FREE (TRAVATAN Z) 0.004 % ophthalmic solution  warfarin ANTICOAGULANT (COUMADIN) 2.5 MG tablet  warfarin ANTICOAGULANT (COUMADIN) 5 MG tablet  B-D U/F insulin pen needle  blood glucose (ACCU-CHEK VLAD PLUS) test strip  indomethacin (INDOCIN) 50 MG capsule  order for DME  STOOL SOFTENER 100 MG capsule          Review of Systems   Constitutional: Negative for fever.   Gastrointestinal: Negative for abdominal pain, nausea and vomiting.   Genitourinary: Negative for scrotal swelling and testicular pain.   Musculoskeletal:        See HPI   Hematological: Bruises/bleeds easily.       Physical Exam   BP: 122/87  Pulse: 77  Temp: 99.2  F (37.3  C)  Resp: 16  SpO2: 95 %      Physical Exam  Vitals and nursing note reviewed.   Constitutional:       General: He is not in acute distress.     Appearance: Normal appearance. He is normal weight. He is not ill-appearing, toxic-appearing or diaphoretic.   Cardiovascular:      Rate and Rhythm: Normal rate and regular rhythm.   Pulmonary:      Effort: Pulmonary effort is normal.   Musculoskeletal:      Comments: Focal pain upon palpation of the right suprapubic area.  There is no swelling noted.  Penis, testicles, and scrotum are unremarkable.  No bruising noted.   Skin:     General: Skin  is warm and dry.      Capillary Refill: Capillary refill takes less than 2 seconds.   Neurological:      General: No focal deficit present.      Mental Status: He is alert and oriented to person, place, and time.   Psychiatric:         Mood and Affect: Mood normal.         ED Course        Procedures              Critical Care time:  none               Results for orders placed or performed during the hospital encounter of 08/19/21 (from the past 24 hour(s))   CBC with platelets differential    Narrative    The following orders were created for panel order CBC with platelets differential.  Procedure                               Abnormality         Status                     ---------                               -----------         ------                     CBC with platelets and d...[[id number]]  Abnormal            Final result                 Please view results for these tests on the individual orders.   Basic metabolic panel   Result Value Ref Range    Sodium 138 133 - 144 mmol/L    Potassium 3.3 (L) 3.4 - 5.3 mmol/L    Chloride 105 94 - 109 mmol/L    Carbon Dioxide (CO2) 28 20 - 32 mmol/L    Anion Gap 5 3 - 14 mmol/L    Urea Nitrogen 28 7 - 30 mg/dL    Creatinine 1.53 (H) 0.66 - 1.25 mg/dL    Calcium 9.1 8.5 - 10.1 mg/dL    Glucose 256 (H) 70 - 99 mg/dL    GFR Estimate 43 (L) >60 mL/min/1.73m2   INR   Result Value Ref Range    INR 2.20 (H) 0.85 - 1.15   CBC with platelets and differential   Result Value Ref Range    WBC Count 8.2 4.0 - 11.0 10e3/uL    RBC Count 3.76 (L) 4.40 - 5.90 10e6/uL    Hemoglobin 13.1 (L) 13.3 - 17.7 g/dL    Hematocrit 34.9 (L) 40.0 - 53.0 %    MCV 93 78 - 100 fL    MCH 34.8 (H) 26.5 - 33.0 pg    MCHC 37.5 (H) 31.5 - 36.5 g/dL    RDW 15.0 10.0 - 15.0 %    Platelet Count 124 (L) 150 - 450 10e3/uL    % Neutrophils 77 %    % Lymphocytes 10 %    % Monocytes 8 %    % Eosinophils 4 %    % Basophils 1 %    % Immature Granulocytes 0 %    NRBCs per 100 WBC 0 <1 /100    Absolute Neutrophils 6.3  "1.6 - 8.3 10e3/uL    Absolute Lymphocytes 0.8 0.8 - 5.3 10e3/uL    Absolute Monocytes 0.6 0.0 - 1.3 10e3/uL    Absolute Eosinophils 0.3 0.0 - 0.7 10e3/uL    Absolute Basophils 0.1 0.0 - 0.2 10e3/uL    Absolute Immature Granulocytes 0.0 <=0.0 10e3/uL    Absolute NRBCs 0.0 10e3/uL   CT Pelvis Bone wo Contrast    Narrative    PROCEDURE: CT PELVIS BONE WO CONTRAST 8/19/2021 5:07 PM    HISTORY: abrupt and severe right pelvic pain after raising leg    COMPARISONS: None.    Meds/Dose Given:    TECHNIQUE: CT scan of the pelvis with sagittal and coronal  reconstructions    FINDINGS: The bony pelvis is intact. Both proximal femurs are normal.  Arthritic changes are present in both hips. The sacrum and sacroiliac  joints appear normal.    There is spinal stenosis at L3-L4. There is an asymmetric disc  protrusion at L4-L5 on the right impinging on the right L5 nerve  root.. There is decrease in height in the L5-S1 disc. There are  posterolateral osteophytes impinging on the L5 nerve roots  bilaterally. Facet joint degenerative changes are noted at L4-L5 and  L5-S1.    The muscles of the pelvic girdle appear normal. The bladder and rectum  are normal         Impression    IMPRESSION: Advanced degenerative changes in lower lumbar spine.  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 No acute pelvic or hip  fracture is seen    MELY KIRK MD         SYSTEM ID:  [id number]       Medications - No data to display    Assessments & Plan (with Medical Decision Making)   This is a pleasant and talkative 76-year-old male who presented to the emergency department via wheelchair along with wife for evaluation of an abrupt onset of right sided pelvic pain after lifting his leg to get into an SUV.  No falls.  Patient reports he heard a \"snap.\"  Examination shows focal tenderness upon palpation of the right pubic bone.  Injury is most consistent with a tear or injury of the hip abductor on that side.  Testicular and scrotal examination is " unremarkable.  Patient has no lower urinary tract symptoms.  CT scan shows no evidence of hematoma or profound injury.  Coumadin is therapeutic.  Mild hypokalemia can be treated with diet alone.  He looks well and does not appear to be in any significant distress at rest.  He does have appreciable pain upon active range of motion of the right thigh.  Rest and stay hydrated.  Please see discharge instructions.  Clinic follow-up next week for recheck.  Return here for any new symptoms, worsening symptoms, or other concerns.  Patient and his wife voiced complete understanding and were happy and agreeable.    This document was prepared using a combination of typing and voice generated software.  While every attempt was made for accuracy, spelling and grammatical errors may exist.    I have reviewed the nursing notes.    I have reviewed the findings, diagnosis, plan and need for follow up with the patient.       New Prescriptions    No medications on file       Final diagnoses:   Groin strain   Hypokalemia       8/19/2021   [facility]     Justino Kunz PA-C  08/19/21 [id number]

## 2021-08-19 NOTE — ED NOTES
Patient discharge home with spouse.  Patient follow up instructions reviewed.  Patient and spouse verbalized understanding.

## 2021-08-19 NOTE — ED TRIAGE NOTES
"Pt was getting into a SUV and was lifting right leg up and swinging it into the vehicle when he \"heard a snap\" and \"instant pain 10/10\". Pt states he has been able to walk on right leg but area in right groin hurts. States he hasn't felt any groin swelling. States he fell 1 week ago and landed on his left side. Takes coumadin.  "

## 2021-08-19 NOTE — ED NOTES
"Patient presents to the ED with his wife with c/o right groin pain that is dull and constant.  At 1230 this afternoon patient was lifting his leg to get into the car when he felt a \"sharp tearing\" feeling in the right groin.  Pain has not gone away since and increases with movement of the right leg.  Patient denies having any difficulty urinating.  "

## 2021-08-19 NOTE — DISCHARGE INSTRUCTIONS
Your symptoms are most consistent with a muscle or tendon tear of your groin.  The imaging showed no evidence of swelling, hematoma, or fracture.  Ibuprofen and Tylenol as needed for pain.  Rest and stay hydrated.  Hypokalemia treatment as noted.  Follow-up in the clinic early next week for recheck.  Please return here for any new symptoms, worsening symptoms, or other questions or concerns.

## 2021-08-20 ENCOUNTER — DOCUMENTATION ONLY (OUTPATIENT)
Dept: ANTICOAGULATION | Facility: OTHER | Age: 76
End: 2021-08-20

## 2021-08-20 NOTE — PROGRESS NOTES
ANTICOAGULATION  MANAGEMENT: Discharge Review    Clement Voss chart reviewed for anticoagulation continuity of care    Emergency room visit on 8/19 for groin pain.    Discharge disposition: Home    Results:    Recent labs: (last 7 days)     08/19/21  1640   INR 2.20*     Anticoagulation inpatient management:     not applicable     Anticoagulation discharge instructions:     Warfarin dosing: home regimen continued   Bridging: No   INR goal change: No      Medication changes affecting anticoagulation: No    Additional factors affecting anticoagulation: No    Plan     No adjustment to anticoagulation plan needed    Patient not contacted    No adjustment to Anticoagulation Calendar was required    Saba Delgadillo RN

## 2021-08-25 ENCOUNTER — OFFICE VISIT (OUTPATIENT)
Dept: FAMILY MEDICINE | Facility: OTHER | Age: 76
End: 2021-08-25
Attending: FAMILY MEDICINE
Payer: COMMERCIAL

## 2021-08-25 VITALS
HEIGHT: 67 IN | BODY MASS INDEX: 32.43 KG/M2 | DIASTOLIC BLOOD PRESSURE: 74 MMHG | SYSTOLIC BLOOD PRESSURE: 146 MMHG | OXYGEN SATURATION: 97 % | RESPIRATION RATE: 16 BRPM | WEIGHT: 206.6 LBS | TEMPERATURE: 97.7 F | HEART RATE: 72 BPM

## 2021-08-25 DIAGNOSIS — Z79.4 TYPE 2 DIABETES MELLITUS WITH STAGE 3B CHRONIC KIDNEY DISEASE, WITH LONG-TERM CURRENT USE OF INSULIN (H): Primary | ICD-10-CM

## 2021-08-25 DIAGNOSIS — S76.211D INGUINAL STRAIN, RIGHT, SUBSEQUENT ENCOUNTER: Primary | ICD-10-CM

## 2021-08-25 DIAGNOSIS — E11.22 TYPE 2 DIABETES MELLITUS WITH STAGE 3 CHRONIC KIDNEY DISEASE, WITH LONG-TERM CURRENT USE OF INSULIN (H): ICD-10-CM

## 2021-08-25 DIAGNOSIS — E11.22 TYPE 2 DIABETES MELLITUS WITH STAGE 3B CHRONIC KIDNEY DISEASE, WITH LONG-TERM CURRENT USE OF INSULIN (H): Primary | ICD-10-CM

## 2021-08-25 DIAGNOSIS — Z79.4 TYPE 2 DIABETES MELLITUS WITH STAGE 3 CHRONIC KIDNEY DISEASE, WITH LONG-TERM CURRENT USE OF INSULIN (H): ICD-10-CM

## 2021-08-25 DIAGNOSIS — N18.30 TYPE 2 DIABETES MELLITUS WITH STAGE 3 CHRONIC KIDNEY DISEASE, WITH LONG-TERM CURRENT USE OF INSULIN (H): ICD-10-CM

## 2021-08-25 DIAGNOSIS — E87.6 HYPOKALEMIA: ICD-10-CM

## 2021-08-25 DIAGNOSIS — N18.32 TYPE 2 DIABETES MELLITUS WITH STAGE 3B CHRONIC KIDNEY DISEASE, WITH LONG-TERM CURRENT USE OF INSULIN (H): Primary | ICD-10-CM

## 2021-08-25 LAB — POTASSIUM BLD-SCNC: 4.1 MMOL/L (ref 3.4–5.3)

## 2021-08-25 PROCEDURE — G0463 HOSPITAL OUTPT CLINIC VISIT: HCPCS | Mod: 25

## 2021-08-25 PROCEDURE — 36415 COLL VENOUS BLD VENIPUNCTURE: CPT | Mod: ZL | Performed by: FAMILY MEDICINE

## 2021-08-25 PROCEDURE — G0463 HOSPITAL OUTPT CLINIC VISIT: HCPCS

## 2021-08-25 PROCEDURE — 99213 OFFICE O/P EST LOW 20 MIN: CPT | Performed by: FAMILY MEDICINE

## 2021-08-25 PROCEDURE — 84132 ASSAY OF SERUM POTASSIUM: CPT | Mod: ZL | Performed by: FAMILY MEDICINE

## 2021-08-25 ASSESSMENT — PAIN SCALES - GENERAL: PAINLEVEL: MILD PAIN (2)

## 2021-08-25 ASSESSMENT — MIFFLIN-ST. JEOR: SCORE: 1620.76

## 2021-08-25 NOTE — NURSING NOTE
"Chief Complaint   Patient presents with     ER F/U       Initial BP (!) 146/74 (BP Location: Right arm, Patient Position: Sitting, Cuff Size: Adult Regular)   Pulse 72   Temp 97.7  F (36.5  C) (Tympanic)   Resp 16   Ht 1.702 m (5' 7\")   Wt 93.7 kg (206 lb 9.6 oz)   SpO2 97%   BMI 32.36 kg/m   Estimated body mass index is 32.36 kg/m  as calculated from the following:    Height as of this encounter: 1.702 m (5' 7\").    Weight as of this encounter: 93.7 kg (206 lb 9.6 oz).  Medication Reconciliation: complete  Tracey Blanco MA  "

## 2021-08-25 NOTE — PROGRESS NOTES
"    Assessment & Plan     1. Inguinal strain, right, subsequent encounter  Patient states symptoms have improved significantly, continue to monitor.  Symptomatic cares reviewed.    2. Hypokalemia  Will recheck levels, patient states he did start an OTC supplement.  - Potassium; Future  - Potassium       BMI:   Estimated body mass index is 32.36 kg/m  as calculated from the following:    Height as of this encounter: 1.702 m (5' 7\").    Weight as of this encounter: 93.7 kg (206 lb 9.6 oz).     Return in about 6 weeks (around 10/6/2021) for Diabetic Follow-up, Chronic Disease Management, Medication review.    Lorelei Felix MD  [facility] Son is a 76 year old who presents for the following health issues     HPI     ED/UC Followup:    Facility:  [facility]  Date of visit: 08/19/21  Reason for visit: Groin strain, hypokalemia  Current Status: still a little painful, but is getting better    Patient notes he had fallen at home about a week prior to his ER visit.       Review of Systems   Constitutional, HEENT, cardiovascular, pulmonary, gi and gu systems are negative, except as otherwise noted.      Objective    BP (!) 146/74 (BP Location: Right arm, Patient Position: Sitting, Cuff Size: Adult Regular)   Pulse 72   Temp 97.7  F (36.5  C) (Tympanic)   Resp 16   Ht 1.702 m (5' 7\")   Wt 93.7 kg (206 lb 9.6 oz)   SpO2 97%   BMI 32.36 kg/m    Body mass index is 32.36 kg/m .  Physical Exam   GENERAL: healthy, alert and no distress  PSYCH: mentation appears normal, affect normal/bright            "

## 2021-08-26 NOTE — TELEPHONE ENCOUNTER
Test strips      Last Written Prescription Date:  9/10/2020  Last Fill Quantity: 200,   # refills: 3  Last Office Visit: 8/25/21  Future Office visit:    Next 5 appointments (look out 90 days)    Nov 09, 2021  9:45 AM  (Arrive by 9:30 AM)  SHORT with Lorelei Felix MD  [facility] ([facility] ) [address]  [facility] [address]  [phone]

## 2021-08-27 DIAGNOSIS — I10 BENIGN ESSENTIAL HYPERTENSION: ICD-10-CM

## 2021-08-30 RX ORDER — CHLORTHALIDONE 25 MG/1
TABLET ORAL
Qty: 30 TABLET | Refills: 5 | Status: SHIPPED | OUTPATIENT
Start: 2021-08-30 | End: 2022-05-04

## 2021-08-30 RX ORDER — BLOOD SUGAR DIAGNOSTIC
STRIP MISCELLANEOUS
Qty: 200 STRIP | Refills: 3 | Status: SHIPPED | OUTPATIENT
Start: 2021-08-30 | End: 2022-06-09

## 2021-08-30 NOTE — TELEPHONE ENCOUNTER
donnie      Last Written Prescription Date:  3/9/21  Last Fill Quantity: 30,   # refills: 5  Last Office Visit: 8/25/21  Future Office visit:    Next 5 appointments (look out 90 days)    Nov 09, 2021  9:45 AM  (Arrive by 9:30 AM)  SHORT with Lorelei Felix MD  [facility] ([facility] ) [address]  [facility] [address]  [phone]

## 2021-08-31 ENCOUNTER — ANTICOAGULATION THERAPY VISIT (OUTPATIENT)
Dept: ANTICOAGULATION | Facility: OTHER | Age: 76
End: 2021-08-31
Attending: FAMILY MEDICINE
Payer: MEDICARE

## 2021-08-31 ENCOUNTER — LAB (OUTPATIENT)
Dept: LAB | Facility: OTHER | Age: 76
End: 2021-08-31
Attending: FAMILY MEDICINE
Payer: MEDICARE

## 2021-08-31 DIAGNOSIS — I26.99 PULMONARY EMBOLISM AND INFARCTION (H): ICD-10-CM

## 2021-08-31 DIAGNOSIS — Z79.01 LONG TERM CURRENT USE OF ANTICOAGULANT THERAPY: ICD-10-CM

## 2021-08-31 DIAGNOSIS — I26.99 PULMONARY EMBOLISM AND INFARCTION (H): Primary | ICD-10-CM

## 2021-08-31 LAB — INR BLD: 3 (ref 0.9–1.1)

## 2021-08-31 PROCEDURE — 36416 COLLJ CAPILLARY BLOOD SPEC: CPT | Mod: ZL

## 2021-08-31 PROCEDURE — 85610 PROTHROMBIN TIME: CPT | Mod: ZL

## 2021-08-31 NOTE — PROGRESS NOTES
ANTICOAGULATION MANAGEMENT     Clement Voss 76 year old male is on warfarin with therapeutic INR result. (Goal INR 2.0-3.0)    Recent labs: (last 7 days)     08/31/21  0922   INR 3.0*       ASSESSMENT     Source(s): Chart Review and Patient/Caregiver Call       Warfarin doses taken: Warfarin taken as instructed    Diet: No new diet changes identified    New illness, injury, or hospitalization: Yes: Pulled muscle in groin    Medication/supplement changes: None noted    Signs or symptoms of bleeding or clotting: No    Previous INR: Therapeutic last 2(+) visits    Additional findings: None     PLAN     Recommended plan for no diet, medication or health factor changes affecting INR     Dosing Instructions: Continue your current warfarin dose with next INR in 4 weeks       Summary  As of 8/31/2021    Full warfarin instructions:  2.5 mg every Mon, Wed, Fri; 5 mg all other days   Next INR check:  9/28/2021             Telephone call with Clement who verbalizes understanding and agrees to plan    Lab visit scheduled    Education provided: Please call back if any changes to your diet, medications or how you've been taking warfarin, Monitoring for bleeding signs and symptoms and Monitoring for clotting signs and symptoms    Plan made per [facility] anticoagulation protocol    Saba Delgadillo RN  Anticoagulation Clinic  8/31/2021    _______________________________________________________________________     Anticoagulation Episode Summary     Current INR goal:  2.0-3.0   TTR:  47.2 % (1 y)   Target end date:  Indefinite   Send INR reminders to:  [facility]    Indications    Pulmonary embolism and infarction (H) [I26.99]  Long-term (current) use of anticoagulants [Z79.01] [Z79.01]           Comments:           Anticoagulation Care Providers     Provider Role Specialty Phone number    Lorelei Felix MD Referring Family Medicine [phone]

## 2021-09-10 ENCOUNTER — MYC MEDICAL ADVICE (OUTPATIENT)
Dept: EDUCATION SERVICES | Facility: HOSPITAL | Age: 76
End: 2021-09-10

## 2021-09-13 ENCOUNTER — MEDICAL CORRESPONDENCE (OUTPATIENT)
Dept: HEALTH INFORMATION MANAGEMENT | Facility: CLINIC | Age: 76
End: 2021-09-13

## 2021-09-20 DIAGNOSIS — N18.30 TYPE 2 DIABETES MELLITUS WITH STAGE 3 CHRONIC KIDNEY DISEASE, WITH LONG-TERM CURRENT USE OF INSULIN (H): ICD-10-CM

## 2021-09-20 DIAGNOSIS — Z79.4 TYPE 2 DIABETES MELLITUS WITH STAGE 3 CHRONIC KIDNEY DISEASE, WITH LONG-TERM CURRENT USE OF INSULIN (H): ICD-10-CM

## 2021-09-20 DIAGNOSIS — N18.32 TYPE 2 DIABETES MELLITUS WITH STAGE 3B CHRONIC KIDNEY DISEASE, WITH LONG-TERM CURRENT USE OF INSULIN (H): ICD-10-CM

## 2021-09-20 DIAGNOSIS — E11.22 TYPE 2 DIABETES MELLITUS WITH STAGE 3 CHRONIC KIDNEY DISEASE, WITH LONG-TERM CURRENT USE OF INSULIN (H): ICD-10-CM

## 2021-09-20 DIAGNOSIS — E78.2 MIXED HYPERLIPIDEMIA: Primary | ICD-10-CM

## 2021-09-20 DIAGNOSIS — Z79.4 TYPE 2 DIABETES MELLITUS WITH STAGE 3B CHRONIC KIDNEY DISEASE, WITH LONG-TERM CURRENT USE OF INSULIN (H): ICD-10-CM

## 2021-09-20 DIAGNOSIS — E11.22 TYPE 2 DIABETES MELLITUS WITH STAGE 3B CHRONIC KIDNEY DISEASE, WITH LONG-TERM CURRENT USE OF INSULIN (H): ICD-10-CM

## 2021-09-21 RX ORDER — FLURBIPROFEN SODIUM 0.3 MG/ML
SOLUTION/ DROPS OPHTHALMIC
Qty: 100 EACH | Refills: 3 | Status: SHIPPED | OUTPATIENT
Start: 2021-09-21 | End: 2022-10-20

## 2021-09-21 NOTE — TELEPHONE ENCOUNTER
INSULIN PEN NEEDLES      Last Written Prescription Date:  11-  Last Fill Quantity: 100,   # refills: 2  Last Office Visit: 8-  Future Office visit:    Next 5 appointments (look out 90 days)    Nov 09, 2021  9:45 AM  (Arrive by 9:30 AM)  SHORT with Lorelei Felix MD  [facility] ([facility] ) [address]  [facility] [address]  [phone]           Routing refill request to provider for review/approval because:

## 2021-09-23 ENCOUNTER — TELEPHONE (OUTPATIENT)
Dept: EDUCATION SERVICES | Facility: HOSPITAL | Age: 76
End: 2021-09-23

## 2021-09-23 NOTE — TELEPHONE ENCOUNTER
I called the pt and notified Trulicity has come from the [facility]. Pt is to  at the  of the [facility].

## 2021-09-28 ENCOUNTER — LAB (OUTPATIENT)
Dept: LAB | Facility: OTHER | Age: 76
End: 2021-09-28
Attending: FAMILY MEDICINE
Payer: MEDICARE

## 2021-09-28 ENCOUNTER — ANTICOAGULATION THERAPY VISIT (OUTPATIENT)
Dept: ANTICOAGULATION | Facility: OTHER | Age: 76
End: 2021-09-28
Attending: FAMILY MEDICINE
Payer: MEDICARE

## 2021-09-28 DIAGNOSIS — I26.99 PULMONARY EMBOLISM AND INFARCTION (H): ICD-10-CM

## 2021-09-28 DIAGNOSIS — Z79.01 LONG TERM CURRENT USE OF ANTICOAGULANT THERAPY: ICD-10-CM

## 2021-09-28 DIAGNOSIS — I26.99 PULMONARY EMBOLISM AND INFARCTION (H): Primary | ICD-10-CM

## 2021-09-28 LAB — INR BLD: 2.6 (ref 0.9–1.1)

## 2021-09-28 PROCEDURE — 36415 COLL VENOUS BLD VENIPUNCTURE: CPT | Mod: ZL

## 2021-09-28 PROCEDURE — 85610 PROTHROMBIN TIME: CPT | Mod: ZL

## 2021-09-28 NOTE — PROGRESS NOTES
ANTICOAGULATION MANAGEMENT     Clement Voss 76 year old male is on warfarin with therapeutic INR result. (Goal INR 2.0-3.0)    Recent labs: (last 7 days)     09/28/21  0857   INR 2.6*       ASSESSMENT     Source(s): Chart Review and Patient/Caregiver Call       Warfarin doses taken: Warfarin taken as instructed    Diet: No new diet changes identified    New illness, injury, or hospitalization: No    Medication/supplement changes: None noted    Signs or symptoms of bleeding or clotting: No    Previous INR: Therapeutic last 2(+) visits    Additional findings: None     PLAN     Recommended plan for no diet, medication or health factor changes affecting INR     Dosing Instructions: Continue your current warfarin dose with next INR in 6 weeks       Summary  As of 9/28/2021    Full warfarin instructions:  2.5 mg every Mon, Wed, Fri; 5 mg all other days   Next INR check:  11/5/2021             Detailed voice message left for Clement with dosing instructions and follow up date.     Lab visit scheduled    Education provided: Please call back if any changes to your diet, medications or how you've been taking warfarin, Monitoring for bleeding signs and symptoms and Monitoring for clotting signs and symptoms    Plan made per [facility] anticoagulation protocol    Saba Delgadillo RN  Anticoagulation Clinic  9/28/2021    _______________________________________________________________________     Anticoagulation Episode Summary     Current INR goal:  2.0-3.0   TTR:  53.3 % (1 y)   Target end date:  Indefinite   Send INR reminders to:  [facility]    Indications    Pulmonary embolism and infarction (H) [I26.99]  Long-term (current) use of anticoagulants [Z79.01] [Z79.01]           Comments:           Anticoagulation Care Providers     Provider Role Specialty Phone number    Lorelei Felix MD Referring Family Medicine [phone]

## 2021-10-03 ENCOUNTER — HEALTH MAINTENANCE LETTER (OUTPATIENT)
Age: 76
End: 2021-10-03

## 2021-10-14 ENCOUNTER — HOSPITAL ENCOUNTER (EMERGENCY)
Facility: HOSPITAL | Age: 76
Discharge: HOME | End: 2021-10-14
Attending: STUDENT IN AN ORGANIZED HEALTH CARE EDUCATION/TRAINING PROGRAM | Admitting: STUDENT IN AN ORGANIZED HEALTH CARE EDUCATION/TRAINING PROGRAM
Payer: MEDICARE

## 2021-10-14 VITALS
TEMPERATURE: 100.3 F | SYSTOLIC BLOOD PRESSURE: 139 MMHG | RESPIRATION RATE: 16 BRPM | OXYGEN SATURATION: 96 % | DIASTOLIC BLOOD PRESSURE: 80 MMHG | HEART RATE: 87 BPM

## 2021-10-14 DIAGNOSIS — Z20.822 SUSPECTED COVID-19 VIRUS INFECTION: ICD-10-CM

## 2021-10-14 PROCEDURE — 99282 EMERGENCY DEPT VISIT SF MDM: CPT | Performed by: STUDENT IN AN ORGANIZED HEALTH CARE EDUCATION/TRAINING PROGRAM

## 2021-10-14 PROCEDURE — C9803 HOPD COVID-19 SPEC COLLECT: HCPCS

## 2021-10-14 PROCEDURE — 99283 EMERGENCY DEPT VISIT LOW MDM: CPT

## 2021-10-14 PROCEDURE — U0005 INFEC AGEN DETEC AMPLI PROBE: HCPCS | Performed by: STUDENT IN AN ORGANIZED HEALTH CARE EDUCATION/TRAINING PROGRAM

## 2021-10-14 NOTE — ED NOTES
Plan of care discussed with patient. Patient to follow up with primary care, quarantine, and return to ED with any new or worsening symptoms or low oxygenation saturations. Patient denies any questions/concerns. Patient ambulated out of the ED with a steady and balanced gait.

## 2021-10-14 NOTE — ED PROVIDER NOTES
History     Chief Complaint   Patient presents with     Cough     HPI  Clement Voss is a 76 year old male with history of DVT, PE, CABG on blood thinners, diabetes, hypertension, aortic aneurysm, CHF who presents to the emergency department today with concern for cough and not feeling well, generalized body aches.  He also has headache and congestion, had a runny nose when it first started.  Denies loss of taste or smell, shortness of breath, chest pain, abdominal pain, nausea, vomiting, diarrhea.  No sore throat.  No urinary symptoms  Not Covid vaccinated, not flu vaccinated    Allergies:  Allergies   Allergen Reactions     Atorvastatin      Other reaction(s): Other  Caused increased creatinine.      Atorvastatin Calcium Other (See Comments)     Lipitor  Increase CR     Barley Grass Unknown     Iodine      Penicillins      Procaine Unknown     Shellfish-Derived Products Unknown     Sulfa Drugs      Sulfa (sulfonamide antibiotics)     Sulfacetamide        Problem List:    Patient Active Problem List    Diagnosis Date Noted     Congestive heart failure, unspecified HF chronicity, unspecified heart failure type (H) 01/28/2021     Priority: Medium     Chronic left ventricular systolic dysfunction 10/27/2020     Priority: Medium     Other dysphagia 02/21/2020     Priority: Medium     Abnormal stress test on 1/10/2020 02/18/2020     Priority: Medium     Regional wall motion abnormality of heart 02/18/2020     Priority: Medium     Diastolic dysfunction grade 2 on 1/20/2020 02/18/2020     Priority: Medium     Severe aortic valve regurgitation 02/18/2020     Priority: Medium     Moderate mitral regurgitation 02/18/2020     Priority: Medium     Ascending aortic aneurysm-severe 02/18/2020     Priority: Medium     Anticoagulated on Coumadin 02/18/2020     Priority: Medium     History of DVT on 12/30/2013 02/18/2020     Priority: Medium     History of pulmonary embolism 02/18/2020     Priority: Medium     History of tobacco  abuse quitting 8/12/1964 02/18/2020     Priority: Medium     S/P aortic valve replacement 02/17/2020     Priority: Medium     Postoperative anemia due to acute blood loss 02/10/2020     Priority: Medium     S/P CABG x 2 02/10/2020     Priority: Medium     ASCVD (arteriosclerotic cardiovascular disease) 02/03/2020     Priority: Medium     Aortic root aneurysm (H) 01/28/2020     Priority: Medium     Added automatically from request for surgery [id number]       CALDERON (dyspnea on exertion) 01/28/2020     Priority: Medium     Added automatically from request for surgery [id number]       Murmur 01/28/2020     Priority: Medium     Added automatically from request for surgery [id number]       Aortic valve insufficiency, etiology of cardiac valve disease unspecified 01/28/2020     Priority: Medium     Added automatically from request for surgery [id number]       CKD (chronic kidney disease) stage 3, GFR 30-59 ml/min (H) 09/24/2019     Priority: Medium     Morbid obesity (H) 05/26/2017     Priority: Medium     Long-term (current) use of anticoagulants [Z79.01] 08/04/2016     Priority: Medium     ACP (advance care planning) 03/14/2013     Priority: Medium     Advance Care Planning 8/26/2016: ACP Review of Chart / Resources Provided:  Reviewed chart for advance care plan.  Clement Voss has no plan or code status on file. Discussed available resources and provided with information. Confirmed code status reflects current choices pending further ACP discussions.  Confirmed/documented legally designated decision makers.  Added by Jenifer Villa             Pulmonary embolism and infarction (H) 09/24/2012     Priority: Medium     Gout 01/10/2011     Priority: Medium     Mixed hyperlipidemia 10/10/2005     Priority: Medium     Benign essential hypertension 01/03/2005     Priority: Medium     Type 2 diabetes mellitus with chronic kidney disease, with long-term current use of insulin (H) 08/02/2004     Priority: Medium        Past Medical History:     Past Medical History:   Diagnosis Date     DVT (deep venous thrombosis) 9/24/2012     DVT (deep venous thrombosis) (H)      Gout, unspecified 1/10/2011     Other and unspecified hyperlipidemia 10/10/2005     Pulmonary embolism 9/24/2012     Pulmonary embolism (H)      Type II or unspecified type diabetes mellitus without mention of complication, not stated as uncontrolled 8/2/2004     Unspecified essential hypertension 1/3/2005       Past Surgical History:    Past Surgical History:   Procedure Laterality Date     APPENDECTOMY  2008     CHOLECYSTECTOMY  1984     COLONOSCOPY N/A 5/16/2019    Procedure: COLONOSCOPY;  Surgeon: Benito Saldana MD;  Location: [facility] OR     History of PE of right lung 3/2013[       left eye glacoma  11/30/21     PHACOEMULSIFICATION WITH STANDARD INTRAOCULAR LENS IMPLANT Left 4/19/2018    Procedure: PHACOEMULSIFICATION WITH STANDARD INTRAOCULAR LENS IMPLANT;  CATARACT EXTRACTION LEFT EYE WITH IMPLANT, ISTENT LEFT EYE;  Surgeon: Kush Almaraz MD;  Location: [facility] OR     PHACOEMULSIFICATION WITH STANDARD INTRAOCULAR LENS IMPLANT Right 5/3/2018    Procedure: PHACOEMULSIFICATION WITH STANDARD INTRAOCULAR LENS IMPLANT;  CATARACT EXTRACTION RIGHT EYE WITH IMPLANT,WITH ISTENT ATTEMPTED;  Surgeon: Kush Almaraz MD;  Location: [facility] OR     TRABECULAR MICRO-BYPASS WITH ISTENT Left 4/19/2018    Procedure: TRABECULAR MICRO-BYPASS WITH ISTENT;;  Surgeon: Kush Almaraz MD;  Location: [facility]     TRABECULAR MICRO-BYPASS WITH ISTENT  5/3/2018    Procedure: TRABECULAR MICRO-BYPASS WITH ISTENT;;  Surgeon: Kush Almaraz MD;  Location: [facility] OR       Family History:    Family History   Problem Relation Age of Onset     Heart Disease Father 71        heart disease; cause of death     Other - See Comments Mother 79        old age; cause of death       Social History:  Marital Status:   [2]  Social History     Tobacco Use     Smoking status: Former Smoker     Types: Cigarettes     Quit date:  1964     Years since quittin.2     Smokeless tobacco: Never Used   Substance Use Topics     Alcohol use: No     Drug use: No        Medications:    ACCU-CHEK VLAD PLUS test strip  acetaminophen (TYLENOL) 500 MG tablet  aspirin 81 MG EC tablet  B-D U/F insulin pen needle  chlorthalidone (HYGROTON) 25 MG tablet  COMBIGAN 0.2-0.5 % ophthalmic solution  dulaglutide (TRULICITY) 1.5 MG/0.5ML pen  FEROSUL 325 (65 Fe) MG tablet  fluticasone (FLONASE) 50 MCG/ACT nasal spray  furosemide (LASIX) 20 MG tablet  indomethacin (INDOCIN) 50 MG capsule  insulin glargine (LANTUS SOLOSTAR) 100 UNIT/ML pen  loratadine (CLARITIN) 10 MG tablet  losartan (COZAAR) 100 MG tablet  metoprolol tartrate (LOPRESSOR) 25 MG tablet  order for DME  rosuvastatin (CRESTOR) 5 MG tablet  STOOL SOFTENER 100 MG capsule  travoprost DARION FREE (TRAVATAN Z) 0.004 % ophthalmic solution  warfarin ANTICOAGULANT (COUMADIN) 2.5 MG tablet  warfarin ANTICOAGULANT (COUMADIN) 5 MG tablet          Review of Systems  A complete review of systems was performed and is otherwise negative.     Physical Exam   BP: 139/80  Pulse: 87  Temp: 100.3  F (37.9  C)  Resp: 16  SpO2: 96 %      Physical Exam  Constitutional: Alert and conversant. NAD   HENT: NCAT   Eyes: Normal pupils   Neck: supple   CV: Normal rate  Pulmonary/Chest: Non-labored respirations, clear to auscultation bilaterally, cough correct clear  Abdominal: Soft, non-tender, non-distended   MSK: PETERS.   Neuro: Alert and appropriate   Skin: Warm and dry. No diaphoresis. No rashes on exposed skin    Psych: Appropriate mood and affect     ED Course     ED Course as of Oct 14 08   Thu Oct 14, 2021   0754 76-year-old male here with URI symptoms.  Clear lungs.  Borderline temperature close to a fever.  Presenting with his wife who has the same set of symptoms with similar onset of 3 days.  Convincingly URI, doubt pneumonia in this case.  No other findings in history or exam to suggest that this is from another  source such as urinary or abdominal   Or skin.      0755 Covid testing ordered.  Recommended ongoing symptomatic care.  Emphasized the importance of returning to the emergency department for symptom worsening.  Recommending primary care follow-up within 1 week.  He and his wife were discharged home with a pulse oximeter.  Without any respiratory distress or shortness of breath, no need for chest x-ray or other advanced imaging at this time.  No chest pain to suggest aortic involvement or ACS.      0757 Patient is appropriate for further outpatient management, discharged in stable condition with all questions answered return precautions given primary care follow-up in 1 week.        Procedures            No results found for this or any previous visit (from the past 24 hour(s)).    Medications - No data to display    Assessments & Plan (with Medical Decision Making)     I have reviewed the nursing notes.    I have reviewed the findings, diagnosis, plan and need for follow up with the patient.      Discharge Medication List as of 10/14/2021  7:50 AM          Final diagnoses:   Suspected COVID-19 virus infection       10/14/2021   [facility] EMERGENCY DEPARTMENT     Sinan Mello MD  10/14/21 0802

## 2021-10-15 LAB — SARS-COV-2 RNA RESP QL NAA+PROBE: POSITIVE

## 2021-10-18 ENCOUNTER — TELEPHONE (OUTPATIENT)
Dept: FAMILY MEDICINE | Facility: OTHER | Age: 76
End: 2021-10-18

## 2021-10-18 NOTE — TELEPHONE ENCOUNTER
Emergency Department and Urgent Care Follow-up      Reason for ER/UC visit: covid  o Date seen: 10/14      New or Worsening symptoms:  No change, O2 sats 98%, temp 97      Prescription Received/Picked up from Pharmacy?: none   o Medications started? na  o Any questions or issues regarding your prescription?: na      Follow-up Results or Labs that are pending: none      Questions or concerns?: slight SOB but monitoring O2 sats at home      ER Recommends Follow-up by: 1 week virtual with PCP      RN Recommendations: per ER  o Appointment scheduled: yes,    Next 5 appointments (look out 90 days)    Nov 09, 2021  9:45 AM  (Arrive by 9:30 AM)  SHORT with Lorelei Felix MD  [facility] ([facility] ) [address]  [facility] [id number] [phone]            If you start feeling worse, or have any further questions, please feel free to contact Nurse Triage at [phone].  If needing immediate medical attention at any time please call 911/Go to the ER.

## 2021-10-19 NOTE — PROGRESS NOTES
"Huy is a 76 year old who is being evaluated via a billable telephone visit.      What phone number would you like to be contacted at? [phone]  How would you like to obtain your AVS? [facility]    Assessment & Plan     1. COVID-19 virus infection  With some significant symptoms, I did elect to cover for possible pneumonia with antibiotics and steroids.  I did tell the patient if he has any worsening of symptoms, he should go to the nearest ER for evaluation.  He will make sure his son is checking on him, and I did ask him to contact the clinic if his son was unable to  his antibiotics today.  I will also reach out to Care Coordination to make sure someone is checking on him.  - azithromycin (ZITHROMAX) 250 MG tablet; Take 2 tablets (500 mg) by mouth daily for 1 day, THEN 1 tablet (250 mg) daily for 4 days.  Dispense: 6 tablet; Refill: 0  - dexamethasone (DECADRON) 4 MG tablet; Take 1 tablet (4 mg) by mouth daily (with breakfast) for 7 days  Dispense: 7 tablet; Refill: 0       BMI:   Estimated body mass index is 32.36 kg/m  as calculated from the following:    Height as of 8/25/21: 1.702 m (5' 7\").    Weight as of 8/25/21: 93.7 kg (206 lb 9.6 oz).     Return if symptoms worsen or fail to improve.    Lorelei Felix MD  [facility]      Fernanda Son is a 76 year old who presents for the following health issues     HPI     ED/UC Followup:    Facility:  [facility]  Date of visit: 10/14/21  Reason for visit: Suspected Covid-19 virus infection  Current Status: Throat feels like it has something in it, coughing, and having some shortness of breath.  States he feels weak this morning.    Patient states symptoms have been \"yo-yo'ing\".  He notes some days he is managing quite well, and other days, like today, he feels rather poorly.  He has been monitoring oxygen saturation levels, and states this morning O2 sat was 88%, which quickly improved to 93%.  He states he was feeling shaky this " morning during that time.  His wife is currently hospitalized with COVID, so he is home alone.  He notes his son checks on him pretty much daily.  Later in the conversation, he does express some concerns about obtaining prescriptions sent.         Review of Systems   Constitutional, HEENT, cardiovascular, pulmonary, gi and gu systems are negative, except as otherwise noted.      Objective    Vitals - Patient Reported  Systolic (Patient Reported): 114  Diastolic (Patient Reported): 71  SpO2 (Patient Reported): 96  Temperature (Patient Reported): 98  F (36.7  C)  Pulse (Patient Reported): 91  Pain Score: No Pain (0)      Vitals:  No vitals were obtained today due to virtual visit.    Physical Exam   PSYCH: Alert and oriented times 3; coherent speech, normal   rate and volume, able to articulate logical thoughts, able   to abstract reason, no tangential thoughts, no hallucinations   or delusions  His affect is normal  RESP: No audible wheezing, able to talk in longer phrases, coughing frequently  Remainder of exam unable to be completed due to telephone visits            Phone call duration: 11 minutes

## 2021-10-21 ENCOUNTER — VIRTUAL VISIT (OUTPATIENT)
Dept: FAMILY MEDICINE | Facility: OTHER | Age: 76
End: 2021-10-21
Attending: FAMILY MEDICINE
Payer: COMMERCIAL

## 2021-10-21 ENCOUNTER — TELEPHONE (OUTPATIENT)
Dept: ANTICOAGULATION | Facility: OTHER | Age: 76
End: 2021-10-21

## 2021-10-21 DIAGNOSIS — Z79.01 LONG TERM CURRENT USE OF ANTICOAGULANT THERAPY: ICD-10-CM

## 2021-10-21 DIAGNOSIS — U07.1 COVID-19 VIRUS INFECTION: Primary | ICD-10-CM

## 2021-10-21 DIAGNOSIS — I26.99 PULMONARY EMBOLISM AND INFARCTION (H): Primary | ICD-10-CM

## 2021-10-21 PROCEDURE — 99214 OFFICE O/P EST MOD 30 MIN: CPT | Mod: TEL | Performed by: FAMILY MEDICINE

## 2021-10-21 RX ORDER — DEXAMETHASONE 4 MG/1
4 TABLET ORAL
Qty: 7 TABLET | Refills: 0 | Status: SHIPPED | OUTPATIENT
Start: 2021-10-21 | End: 2021-11-09

## 2021-10-21 RX ORDER — AZITHROMYCIN 250 MG/1
TABLET, FILM COATED ORAL
Qty: 6 TABLET | Refills: 0 | Status: SHIPPED | OUTPATIENT
Start: 2021-10-21 | End: 2021-10-26

## 2021-10-21 NOTE — TELEPHONE ENCOUNTER
ANTICOAGULATION  MANAGEMENT     Interacting Medication Review    Interacting medication(s): Azithromycin (Zithromax, Z-dede) and Dexamethasone with warfarin.    Duration: Zpak until 10/25/2021, Dexamethasone until 10/27/2021    Indication: Pneumonia/COVID    New medication?: Yes, interaction may increase INR and risk of bleeding       PLAN     2.5 mg 10/21, 2.5 mg 10/22, 2.5 mg 10/23, 2.5 mg 10/24, 2.5 mg 10/25, 2.5 mg 10/26, 2.5 mg 10/27    Spoke with Clement regarding dosing instructions. He stated that his son is going to be picking up his prescriptions for him. Clement stated that he doesn't feel good at all and that he is questioning if he should go in to the ED at all. He stated that he does feel better than he had this morning, but remains short of breathe. Encouraged him that if he feels that he needs to be seen to not hesitate getting someone to bring him in or call the ambulance. He stated he had the chills while we were speaking- took temperature while on phone- 96.5. Again encouraged if symptoms worsen at all to be seen at the nearest ED.     Anticoagulation Calendar updated    Saba Delgadillo RN

## 2021-10-21 NOTE — Clinical Note
I can enter a formal referral if you need, but can you check on him sometimes?  He was diagnosed with COVID, and his wife was hospitalized at the end of last week with COVID.  He is home alone and on the cusp of needing to go to the hospital due to symptoms.  Son is apparently checking on him maybe once daily

## 2021-10-23 ENCOUNTER — NURSE TRIAGE (OUTPATIENT)
Dept: CARE COORDINATION | Facility: CLINIC | Age: 76
End: 2021-10-23

## 2021-10-23 NOTE — TELEPHONE ENCOUNTER
"Pt on COVID GetWell Loop with red flag alert for increase shortness of breath. He tested positive for COVID on 10/14. He is taking Z-dede and Decadron as prescribed, he isn't sure if he feel better or not. Pt's answers are vague. His wife was recently discharged from hospital with O2, treated for COVID.    Answer Assessment - Initial Assessment Questions  1. COVID-19 ONSET: \"When did the symptoms of COVID-19 first start?\"      ~10/11  2. DIAGNOSIS CONFIRMATION: \"How were you diagnosed?\" (e.g., COVID-19 oral or nasal viral test; COVID-19 antibody test; doctor visit)      10/14 PCR  3. MAIN SYMPTOM:  \"What is your main concern or symptom right now?\" (e.g., breathing difficulty, cough, fatigue. loss of smell)      Increased CALDERON, fatigue  4. SYMPTOM ONSET: \"When did the  CALDERON  start?\"      10/11  5. BETTER-SAME-WORSE: \"Are you getting better, staying the same, or getting worse over the last 1 to 2 weeks?\"      incraesed CALDERON walking room to room and when he first wakes in the AM  6. RECENT MEDICAL VISIT: \"Have you been seen by a healthcare provider (doctor, NP, PA) for these persisting COVID-19 symptoms?\" If Yes, ask: \"When were you seen?\" (e.g., date)      10/21  7. COUGH: \"Do you have a cough?\" If Yes, ask: \"How bad is the cough?\"        \"a little\" cough.   8. FEVER: \"Do you have a fever?\" If Yes, ask: \"What is your temperature, how was it measured, and when did it start?\"     95.6   9. BREATHING DIFFICULTY: \"Are you having any trouble breathing?\" If Yes, ask: \"How bad is your breathing?\" (e.g., mild, moderate, severe)     - MILD: No SOB at rest, mild SOB with walking, speaks normally in sentences, can lay down, no retractions, pulse < 100.     - MODERATE: SOB at rest, SOB with minimal exertion and prefers to sit, cannot lie down flat, speaks in phrases, mild retractions, audible wheezing, pulse 100-120.     - SEVERE: Very SOB at rest, speaks in single words, struggling to breathe, sitting hunched forward, retractions, " "pulse > 120        Pt had a breathy quality to voice when he answered the phone which improved during the conversation. He is speaking in full sentences. O2 sats 92-93% first thing after waking. 96% during call. HR 80  10. HIGH RISK DISEASE: \"Do you have any chronic medical problems?\" (e.g., asthma, heart or lung disease, weak immune system, obesity, etc.)        Diabetes CHF, CAD DVT/PE hx  11. PREGNANCY: \"Is there any chance you are pregnant?\" \"When was your last menstrual period?\"        NA  12. OTHER SYMPTOMS: \"Do you have any other symptoms?\"  (e.g., fatigue, headache, muscle pain, weakness)        Weakness. Pt expectored reddish thick sputum. He recently had a bloody nose. He has an occasional \"twinge\" in his middle chest. Pt wasn't able to elaborate. Pt denies missing any of his cardiac meds. Denies palpitations.  BG was in low 400s x2 in the evening. This .    Protocols used: CORONAVIRUS (COVID-19) PERSISTING SYMPTOMS FOLLOW-UP CALL-A- 8.25.2021  Pt states he is drinking enough fluids. Urine is pale yellow.  Instructed pt to do deep breathing exercises hourly w/a.   Pt will continue to use the humidifier.   He will continued to be tele-monitored through [employer].   Reviewed when to return to ED (O2sats<90%, increased SOA, chest pain, worsening cough).       "

## 2021-10-25 ENCOUNTER — PATIENT OUTREACH (OUTPATIENT)
Dept: CARE COORDINATION | Facility: OTHER | Age: 76
End: 2021-10-25

## 2021-10-25 ENCOUNTER — TRANSFERRED RECORDS (OUTPATIENT)
Dept: HEALTH INFORMATION MANAGEMENT | Facility: CLINIC | Age: 76
End: 2021-10-25

## 2021-10-25 DIAGNOSIS — U07.1 INFECTION DUE TO 2019 NOVEL CORONAVIRUS: Primary | ICD-10-CM

## 2021-10-25 LAB
ALT SERPL-CCNC: 47 IU/L (ref 6–40)
AST SERPL-CCNC: 55 IU/L (ref 10–40)
CREATININE (EXTERNAL): 1.48 MG/DL (ref 0.7–1.2)
GFR ESTIMATED (EXTERNAL): 46 ML/MIN/1.73M2
GLUCOSE (EXTERNAL): 334 MG/DL (ref 70–99)
POTASSIUM (EXTERNAL): 3.2 MEQ/L (ref 3.4–5.1)

## 2021-10-25 NOTE — PROGRESS NOTES
Clinic Care Coordination Contact  Care Team Conversations    CC received message from PCP on 10/21 regarding patient and looking to see if CC could call to check in on patient sometimes.  CC out of office on 10/22. Wife was in hospital for Covid so patient at home alone and son checking on him.  CC went to reach out to patient.  CC reviewed chart and patient went to ER today.  CC will continue to monitor.  Lisa Blandon, RN  Care Coordination

## 2021-10-26 ENCOUNTER — MYC MEDICAL ADVICE (OUTPATIENT)
Dept: FAMILY MEDICINE | Facility: OTHER | Age: 76
End: 2021-10-26

## 2021-10-26 NOTE — TELEPHONE ENCOUNTER
Please call him, I'm not sure what he means.  He is scheduled to see me on 11/9, I think this will be fine to recheck electrolytes then.

## 2021-10-27 ENCOUNTER — PATIENT OUTREACH (OUTPATIENT)
Dept: CARE COORDINATION | Facility: OTHER | Age: 76
End: 2021-10-27

## 2021-10-27 DIAGNOSIS — U07.1 COVID-19 VIRUS INFECTION: Primary | ICD-10-CM

## 2021-10-27 NOTE — PROGRESS NOTES
"Clinic Care Coordination Contact  Care Team Conversations    CC called patient and talked with him.  Patient states slowly feeling better.  States his breathing is getting better and has been checking his oxygen saturations and they have been 97%.  He states oxygen saturations have remained same when up and moving.  Talking in complete sentences while we talked.  After about 20 minutes and discussing possible inhaler patient states that might be helpful as shortness of breath at times.  Patient continues to talk in full sentences but CC can tell patient getting tired after 20 minute conversation.  Still wanting to talk without issue.  CC had patient check while we were on the phone and oxygen saturation 97%  CC let patient know I would send off to PCP.    Patient states that he has finished up his antibiotic and last pill of Dexamethasone today.  He states these have been helpful.    Patient states eating and drinking propel. We discussed importance of this and he agrees and continues to \"plug away.\"     We discussed importance of deep breathing and coughing with covered mouth.  He states he is using incentive spirometer and this is encouraged.  He verbalized understanding.      Patient states he is using humidifier and vicks stick and that is helpful as well.  States son is checking on he and Wife.  Wife is now home from hospital.    We discussed recent ER visit and upcoming PCP visit and recheck of electrolytes and patient is agreeable with this plan.  Patient is working with [facility]abetic [facility] and looks like Anamaria Benítez RN is looking to reach out to patient today according to notes.  Patient states blood sugars better this morning at 130.  We discussed importance of extra monitoring while sick and patient verbalized understanding.    Patient is educated on importance of being seen in ER if any change in condition or concern and he verbalized understanding and states he will be sure to do this.      Patient " continues to be followed by coumadin clinic and they were aware of current medications.  Patient has follow up scheduled.    CC will look to reach out at times to continue to check in on patient.    Lisa Blandon RN  Care Coordination

## 2021-10-28 RX ORDER — ALBUTEROL SULFATE 90 UG/1
2 AEROSOL, METERED RESPIRATORY (INHALATION) EVERY 4 HOURS PRN
Qty: 18 G | Refills: 1 | Status: SHIPPED | OUTPATIENT
Start: 2021-10-28 | End: 2023-02-24

## 2021-11-02 ENCOUNTER — PATIENT OUTREACH (OUTPATIENT)
Dept: CARE COORDINATION | Facility: OTHER | Age: 76
End: 2021-11-02

## 2021-11-02 NOTE — PROGRESS NOTES
Clinic Care Coordination Contact  Care Team Conversations    CC called patient and spoke with him.  Patient states doing well.  States that his oxygen saturations have been 94-98% and getting around a bit better.  States working on winterizing his camper and still has a bit left.  States eating a bit better.  Feels that the inhaler has been helpful for him.  States not hearing any wheezing when breathing.  Educated on big breaths and keeping lungs big and open.  He verbalized understanding. He is aware of upcoming appointments.    CC will call once more to check in.  Lisa Blandon RN  Care Coordination

## 2021-11-05 ENCOUNTER — ANTICOAGULATION THERAPY VISIT (OUTPATIENT)
Dept: ANTICOAGULATION | Facility: OTHER | Age: 76
End: 2021-11-05
Attending: FAMILY MEDICINE
Payer: MEDICARE

## 2021-11-05 ENCOUNTER — LAB (OUTPATIENT)
Dept: LAB | Facility: OTHER | Age: 76
End: 2021-11-05
Attending: FAMILY MEDICINE
Payer: MEDICARE

## 2021-11-05 DIAGNOSIS — Z79.01 LONG TERM CURRENT USE OF ANTICOAGULANT THERAPY: ICD-10-CM

## 2021-11-05 DIAGNOSIS — I26.99 PULMONARY EMBOLISM AND INFARCTION (H): Primary | ICD-10-CM

## 2021-11-05 DIAGNOSIS — E11.22 TYPE 2 DIABETES MELLITUS WITH STAGE 3B CHRONIC KIDNEY DISEASE, WITH LONG-TERM CURRENT USE OF INSULIN (H): ICD-10-CM

## 2021-11-05 DIAGNOSIS — E78.2 MIXED HYPERLIPIDEMIA: ICD-10-CM

## 2021-11-05 DIAGNOSIS — N18.32 TYPE 2 DIABETES MELLITUS WITH STAGE 3B CHRONIC KIDNEY DISEASE, WITH LONG-TERM CURRENT USE OF INSULIN (H): ICD-10-CM

## 2021-11-05 DIAGNOSIS — I26.99 PULMONARY EMBOLISM AND INFARCTION (H): ICD-10-CM

## 2021-11-05 DIAGNOSIS — I10 BENIGN ESSENTIAL HYPERTENSION: ICD-10-CM

## 2021-11-05 DIAGNOSIS — Z79.4 TYPE 2 DIABETES MELLITUS WITH STAGE 3B CHRONIC KIDNEY DISEASE, WITH LONG-TERM CURRENT USE OF INSULIN (H): ICD-10-CM

## 2021-11-05 LAB
ALT SERPL W P-5'-P-CCNC: 69 U/L (ref 0–70)
ANION GAP SERPL CALCULATED.3IONS-SCNC: 7 MMOL/L (ref 3–14)
BUN SERPL-MCNC: 28 MG/DL (ref 7–30)
CALCIUM SERPL-MCNC: 8.8 MG/DL (ref 8.5–10.1)
CHLORIDE BLD-SCNC: 102 MMOL/L (ref 94–109)
CHOLEST SERPL-MCNC: 77 MG/DL
CO2 SERPL-SCNC: 28 MMOL/L (ref 20–32)
CREAT SERPL-MCNC: 1.48 MG/DL (ref 0.66–1.25)
EST. AVERAGE GLUCOSE BLD GHB EST-MCNC: 206 MG/DL
FASTING STATUS PATIENT QL REPORTED: YES
GFR SERPL CREATININE-BSD FRML MDRD: 45 ML/MIN/1.73M2
GLUCOSE BLD-MCNC: 135 MG/DL (ref 70–99)
HBA1C MFR BLD: 8.8 % (ref 0–5.6)
HDLC SERPL-MCNC: 36 MG/DL
INR BLD: 4 (ref 0.9–1.1)
LDLC SERPL CALC-MCNC: 12 MG/DL
NONHDLC SERPL-MCNC: 41 MG/DL
POTASSIUM BLD-SCNC: 3.6 MMOL/L (ref 3.4–5.3)
SODIUM SERPL-SCNC: 137 MMOL/L (ref 133–144)
TRIGL SERPL-MCNC: 143 MG/DL

## 2021-11-05 PROCEDURE — 36415 COLL VENOUS BLD VENIPUNCTURE: CPT | Mod: ZL

## 2021-11-05 PROCEDURE — 84460 ALANINE AMINO (ALT) (SGPT): CPT | Mod: ZL

## 2021-11-05 PROCEDURE — 80048 BASIC METABOLIC PNL TOTAL CA: CPT | Mod: ZL

## 2021-11-05 PROCEDURE — 83036 HEMOGLOBIN GLYCOSYLATED A1C: CPT | Mod: ZL

## 2021-11-05 PROCEDURE — 85610 PROTHROMBIN TIME: CPT | Mod: ZL

## 2021-11-05 PROCEDURE — 82465 ASSAY BLD/SERUM CHOLESTEROL: CPT | Mod: ZL

## 2021-11-05 NOTE — PROGRESS NOTES
ANTICOAGULATION MANAGEMENT     Clement Voss 76 year old male is on warfarin with supratherapeutic INR result. (Goal INR 2.0-3.0)    Recent labs: (last 7 days)     11/05/21  0930   INR 4.0*       ASSESSMENT     Source(s): Chart Review and Patient/Caregiver Call       Warfarin doses taken: Warfarin taken as instructed    Diet: No new diet changes identified    New illness, injury, or hospitalization: No    Medication/supplement changes: None noted    Signs or symptoms of bleeding or clotting: No    Previous INR: Therapeutic last 2(+) visits    Additional findings: None     PLAN     Recommended plan for no diet, medication or health factor changes affecting INR     Dosing Instructions: 11/5: Hold; 11/7: 2.5 mg; Otherwise 2.5 mg every Mon, Wed, Fri; 5 mg all other days with next INR in 5 days       Summary  As of 11/5/2021    Full warfarin instructions:  11/5: Hold; 11/7: 2.5 mg; Otherwise 2.5 mg every Mon, Wed, Fri; 5 mg all other days   Next INR check:  11/9/2021             Detailed voice message left for Clement with dosing instructions and follow up date.     Lab visit scheduled    Education provided: Please call back if any changes to your diet, medications or how you've been taking warfarin, Monitoring for bleeding signs and symptoms and Monitoring for clotting signs and symptoms    Plan made per [facility] anticoagulation protocol    Saba Delgadillo RN  Anticoagulation Clinic  11/5/2021    _______________________________________________________________________     Anticoagulation Episode Summary     Current INR goal:  2.0-3.0   TTR:  55.1 % (1 y)   Target end date:  Indefinite   Send INR reminders to:  [facility]    Indications    Pulmonary embolism and infarction (H) [I26.99]  Long-term (current) use of anticoagulants [Z79.01] [Z79.01]           Comments:           Anticoagulation Care Providers     Provider Role Specialty Phone number    Lorelei Felix MD Referring Family Medicine [phone]

## 2021-11-08 NOTE — PROGRESS NOTES
"  Assessment & Plan     1. Type 2 diabetes mellitus with stage 3b chronic kidney disease, with long-term current use of insulin (H)  Labs reviewed, patient will start adjusting his insulin up slightly (had been lowered due to low readings).  He will increase to 37 units, and if still needed, will then increase to 40 units.  Follow-up in three months, future labs will be ordered.    2. Mixed hyperlipidemia  No changes, levels look fantastic!    3. Benign essential hypertension  As above    4. Congestive heart failure, unspecified HF chronicity, unspecified heart failure type (H)  No changes    5. Stage 3b chronic kidney disease (H)  No changes    6. Post-COVID-19 condition  Improving, continue current cares    7. Need for prophylactic vaccination and inoculation against influenza  Updated  - INFLUENZA, QUAD, HIGH DOSE, PF, 65YR + (FLUZONE HD)    8. Pulmonary embolism and infarction (H)  - INR point of care ( [facility] clinic ONLY)    9. Long term current use of anticoagulant therapy  - INR point of care ( [facility] clinic ONLY)      Review of prior external note(s) from - CareEverywhere information from [facility] reviewed       BMI:   Estimated body mass index is 30.45 kg/m  as calculated from the following:    Height as of this encounter: 1.702 m (5' 7\").    Weight as of this encounter: 88.2 kg (194 lb 6.4 oz).       Return in about 3 months (around 2/9/2022) for Diabetic Follow-up, Chronic Disease Management, Medication review.    Lorelei Felix MD  [facility] [place] Son is a 76 year old who presents for the following health issues     HPI     Diabetes Follow-up    How often are you checking your blood sugar? Three times daily  Blood sugar testing frequency justification:  Uncontrolled diabetes  What time of day are you checking your blood sugars (select all that apply)?  Before and after meals  Have you had any blood sugars above 200?  Yes a few  Have you had any blood sugars below 70?  " No    What symptoms do you notice when your blood sugar is low?  Shaky and Weak    What concerns do you have today about your diabetes? None     Do you have any of these symptoms? (Select all that apply)  No numbness or tingling in feet.  No redness, sores or blisters on feet.  No complaints of excessive thirst.  No reports of blurry vision.  No significant changes to weight.      BP Readings from Last 2 Encounters:   11/09/21 130/66   10/14/21 139/80     Hemoglobin A1C (%)   Date Value   11/05/2021 8.8 (H)   08/02/2021 8.8 (H)   04/27/2021 9.7 (H)   01/26/2021 9.4 (H)     LDL Cholesterol Calculated (mg/dL)   Date Value   11/05/2021 12   08/02/2021 11   04/27/2021 12   01/26/2021 26       Hyperlipidemia Follow-Up      Are you regularly taking any medication or supplement to lower your cholesterol?   Yes- Crestor    Are you having muscle aches or other side effects that you think could be caused by your cholesterol lowering medication?  No    Hypertension Follow-up      Do you check your blood pressure regularly outside of the clinic? Yes     Are you following a low salt diet? Yes    Are your blood pressures ever more than 140 on the top number (systolic) OR more   than 90 on the bottom number (diastolic), for example 140/90? No    Heart Failure Follow-up  Are you experiencing any shortness of breath? Yes, with activity only, mainly since COVID    Are you experiencing any swelling in your legs or feet?  Stable    Are you using more pillows than usual? No    Do you cough at night?  No    Do you check your weight daily?  frequently    Have you had a weight change recently?  No    Are you having any of the following side effects from your medications? (Select all that apply)  The patient does not report symptoms of dizziness, fatigue, cough, swelling, or slow heart beat.    Since your last visit, how many times have you gone to the cardiologist, urgent care, emergency room, or hospital because of your heart failure?    "None    Chronic Kidney Disease Follow-up  Do you take any over the counter pain medicine?: Yes  What over the counter medicine are you taking for your pain?:  Tylenol      How often do you take this medicine?:  As needed        How many servings of fruits and vegetables do you eat daily?  2-3    On average, how many sweetened beverages do you drink each day (Examples: soda, juice, sweet tea, etc.  Do NOT count diet or artificially sweetened beverages)?   1    How many days per week do you exercise enough to make your heart beat faster? 3 or less    How many minutes a day do you exercise enough to make your heart beat faster? 30 - 60    How many days per week do you miss taking your medication? 0    ED/UC Followup:    Facility:  [facility]  Date of visit: 10/25/21  Reason for visit: Fatigue  Current Status: Still getting a little winded, otherwise feeling better       Review of Systems   Constitutional, HEENT, cardiovascular, pulmonary, gi and gu systems are negative, except as otherwise noted.      Objective    /66 (BP Location: Right arm, Patient Position: Sitting, Cuff Size: Adult Regular)   Pulse 73   Temp 97.3  F (36.3  C) (Tympanic)   Resp 16   Ht 1.702 m (5' 7\")   Wt 88.2 kg (194 lb 6.4 oz)   SpO2 97%   BMI 30.45 kg/m    Body mass index is 30.45 kg/m .  Physical Exam   GENERAL: healthy, alert and no distress  PSYCH: mentation appears normal, affect normal/bright    Lab on 11/05/2021   Component Date Value Ref Range Status     INR 11/05/2021 4.0* 0.9 - 1.1 Final     Cholesterol 11/05/2021 77  <200 mg/dL Final     Triglycerides 11/05/2021 143  <150 mg/dL Final     Direct Measure HDL 11/05/2021 36* >=40 mg/dL Final     LDL Cholesterol Calculated 11/05/2021 12  <=100 mg/dL Final     Non HDL Cholesterol 11/05/2021 41  <130 mg/dL Final     Patient Fasting > 8hrs? 11/05/2021 Yes   Final     Estimated Average Glucose 11/05/2021 206  mg/dL Final     Hemoglobin A1C 11/05/2021 8.8* 0.0 - 5.6 % Final    " Normal <5.7%   Prediabetes 5.7-6.4%    Diabetes 6.5% or higher     Note: Adopted from ADA consensus guidelines.     Sodium 11/05/2021 137  133 - 144 mmol/L Final     Potassium 11/05/2021 3.6  3.4 - 5.3 mmol/L Final     Chloride 11/05/2021 102  94 - 109 mmol/L Final     Carbon Dioxide (CO2) 11/05/2021 28  20 - 32 mmol/L Final     Anion Gap 11/05/2021 7  3 - 14 mmol/L Final     Urea Nitrogen 11/05/2021 28  7 - 30 mg/dL Final     Creatinine 11/05/2021 1.48* 0.66 - 1.25 mg/dL Final     Calcium 11/05/2021 8.8  8.5 - 10.1 mg/dL Final     Glucose 11/05/2021 135* 70 - 99 mg/dL Final     GFR Estimate 11/05/2021 45* >60 mL/min/1.73m2 Final    As of July 11, 2021, eGFR is calculated by the CKD-EPI creatinine equation, without race adjustment. eGFR can be influenced by muscle mass, exercise, and diet. The reported eGFR is an estimation only and is only applicable if the renal function is stable.     ALT 11/05/2021 69  0 - 70 U/L Final

## 2021-11-09 ENCOUNTER — OFFICE VISIT (OUTPATIENT)
Dept: FAMILY MEDICINE | Facility: OTHER | Age: 76
End: 2021-11-09
Attending: FAMILY MEDICINE
Payer: COMMERCIAL

## 2021-11-09 ENCOUNTER — APPOINTMENT (OUTPATIENT)
Dept: LAB | Facility: OTHER | Age: 76
End: 2021-11-09
Attending: FAMILY MEDICINE
Payer: MEDICARE

## 2021-11-09 ENCOUNTER — MYC MEDICAL ADVICE (OUTPATIENT)
Dept: EDUCATION SERVICES | Facility: HOSPITAL | Age: 76
End: 2021-11-09

## 2021-11-09 ENCOUNTER — ANTICOAGULATION THERAPY VISIT (OUTPATIENT)
Dept: ANTICOAGULATION | Facility: OTHER | Age: 76
End: 2021-11-09
Attending: FAMILY MEDICINE
Payer: MEDICARE

## 2021-11-09 VITALS
RESPIRATION RATE: 16 BRPM | TEMPERATURE: 97.3 F | WEIGHT: 194.4 LBS | BODY MASS INDEX: 30.51 KG/M2 | DIASTOLIC BLOOD PRESSURE: 66 MMHG | SYSTOLIC BLOOD PRESSURE: 130 MMHG | HEIGHT: 67 IN | HEART RATE: 73 BPM | OXYGEN SATURATION: 97 %

## 2021-11-09 DIAGNOSIS — U09.9 POST-COVID-19 CONDITION: ICD-10-CM

## 2021-11-09 DIAGNOSIS — I26.99 PULMONARY EMBOLISM AND INFARCTION (H): ICD-10-CM

## 2021-11-09 DIAGNOSIS — E78.2 MIXED HYPERLIPIDEMIA: ICD-10-CM

## 2021-11-09 DIAGNOSIS — I10 BENIGN ESSENTIAL HYPERTENSION: ICD-10-CM

## 2021-11-09 DIAGNOSIS — E11.22 TYPE 2 DIABETES MELLITUS WITH STAGE 3B CHRONIC KIDNEY DISEASE, WITH LONG-TERM CURRENT USE OF INSULIN (H): Primary | ICD-10-CM

## 2021-11-09 DIAGNOSIS — Z79.01 LONG TERM CURRENT USE OF ANTICOAGULANT THERAPY: ICD-10-CM

## 2021-11-09 DIAGNOSIS — Z23 NEED FOR PROPHYLACTIC VACCINATION AND INOCULATION AGAINST INFLUENZA: ICD-10-CM

## 2021-11-09 DIAGNOSIS — Z79.4 TYPE 2 DIABETES MELLITUS WITH STAGE 3B CHRONIC KIDNEY DISEASE, WITH LONG-TERM CURRENT USE OF INSULIN (H): Primary | ICD-10-CM

## 2021-11-09 DIAGNOSIS — N18.32 STAGE 3B CHRONIC KIDNEY DISEASE (H): ICD-10-CM

## 2021-11-09 DIAGNOSIS — N18.32 TYPE 2 DIABETES MELLITUS WITH STAGE 3B CHRONIC KIDNEY DISEASE, WITH LONG-TERM CURRENT USE OF INSULIN (H): Primary | ICD-10-CM

## 2021-11-09 DIAGNOSIS — I26.99 PULMONARY EMBOLISM AND INFARCTION (H): Primary | ICD-10-CM

## 2021-11-09 DIAGNOSIS — I50.9 CONGESTIVE HEART FAILURE, UNSPECIFIED HF CHRONICITY, UNSPECIFIED HEART FAILURE TYPE (H): ICD-10-CM

## 2021-11-09 LAB — INR BLD: 2.8 (ref 0.9–1.1)

## 2021-11-09 PROCEDURE — 85610 PROTHROMBIN TIME: CPT | Mod: ZL | Performed by: FAMILY MEDICINE

## 2021-11-09 PROCEDURE — 90662 IIV NO PRSV INCREASED AG IM: CPT

## 2021-11-09 PROCEDURE — G0008 ADMIN INFLUENZA VIRUS VAC: HCPCS

## 2021-11-09 PROCEDURE — 36415 COLL VENOUS BLD VENIPUNCTURE: CPT | Mod: ZL | Performed by: FAMILY MEDICINE

## 2021-11-09 PROCEDURE — 99214 OFFICE O/P EST MOD 30 MIN: CPT | Performed by: FAMILY MEDICINE

## 2021-11-09 PROCEDURE — G0463 HOSPITAL OUTPT CLINIC VISIT: HCPCS | Mod: 25

## 2021-11-09 ASSESSMENT — ANXIETY QUESTIONNAIRES
3. WORRYING TOO MUCH ABOUT DIFFERENT THINGS: NOT AT ALL
GAD7 TOTAL SCORE: 0
IF YOU CHECKED OFF ANY PROBLEMS ON THIS QUESTIONNAIRE, HOW DIFFICULT HAVE THESE PROBLEMS MADE IT FOR YOU TO DO YOUR WORK, TAKE CARE OF THINGS AT HOME, OR GET ALONG WITH OTHER PEOPLE: NOT DIFFICULT AT ALL
2. NOT BEING ABLE TO STOP OR CONTROL WORRYING: NOT AT ALL
7. FEELING AFRAID AS IF SOMETHING AWFUL MIGHT HAPPEN: NOT AT ALL
5. BEING SO RESTLESS THAT IT IS HARD TO SIT STILL: NOT AT ALL
4. TROUBLE RELAXING: NOT AT ALL
6. BECOMING EASILY ANNOYED OR IRRITABLE: NOT AT ALL
1. FEELING NERVOUS, ANXIOUS, OR ON EDGE: NOT AT ALL

## 2021-11-09 ASSESSMENT — MIFFLIN-ST. JEOR: SCORE: 1565.42

## 2021-11-09 ASSESSMENT — PAIN SCALES - GENERAL: PAINLEVEL: NO PAIN (0)

## 2021-11-09 NOTE — NURSING NOTE
"Chief Complaint   Patient presents with     ER F/U     Diabetes     Lipids     Hypertension     Imm/Inj     Flu Shot       Initial /66 (BP Location: Right arm, Patient Position: Sitting, Cuff Size: Adult Regular)   Pulse 73   Temp 97.3  F (36.3  C) (Tympanic)   Resp 16   Ht 1.702 m (5' 7\")   Wt 88.2 kg (194 lb 6.4 oz)   SpO2 97%   BMI 30.45 kg/m   Estimated body mass index is 30.45 kg/m  as calculated from the following:    Height as of this encounter: 1.702 m (5' 7\").    Weight as of this encounter: 88.2 kg (194 lb 6.4 oz).  Medication Reconciliation: complete  Tracey Blanco MA  "

## 2021-11-09 NOTE — PROGRESS NOTES
ANTICOAGULATION MANAGEMENT     Clement Voss 76 year old male is on warfarin with therapeutic INR result. (Goal INR 2.0-3.0)    Recent labs: (last 7 days)     11/09/21  1018   INR 2.8*       ASSESSMENT     Source(s): Chart Review and Patient/Caregiver Call       Warfarin doses taken: Warfarin taken as instructed    Diet: No new diet changes identified    New illness, injury, or hospitalization: No    Medication/supplement changes: None noted    Signs or symptoms of bleeding or clotting: No    Previous INR: Supratherapeutic    Additional findings: None     PLAN     Recommended plan for no diet, medication or health factor changes affecting INR     Dosing Instructions: Continue your current warfarin dose with next INR in 3 weeks       Summary  As of 11/9/2021    Full warfarin instructions:  2.5 mg every Mon, Wed, Fri; 5 mg all other days   Next INR check:  11/30/2021             Detailed voice message left for Clement with dosing instructions and follow up date.     Lab visit scheduled    Education provided: Please call back if any changes to your diet, medications or how you've been taking warfarin, Monitoring for bleeding signs and symptoms and Monitoring for clotting signs and symptoms    Plan made per [facility] anticoagulation protocol    Saba Delgadillo RN  Anticoagulation Clinic  11/9/2021    _______________________________________________________________________     Anticoagulation Episode Summary     Current INR goal:  2.0-3.0   TTR:  54.5 % (1 y)   Target end date:  Indefinite   Send INR reminders to:  [facility]    Indications    Pulmonary embolism and infarction (H) [I26.99]  Long-term (current) use of anticoagulants [Z79.01] [Z79.01]           Comments:           Anticoagulation Care Providers     Provider Role Specialty Phone number    Lorelei Felix MD Referring Family Medicine [phone]

## 2021-11-10 ASSESSMENT — PATIENT HEALTH QUESTIONNAIRE - PHQ9: SUM OF ALL RESPONSES TO PHQ QUESTIONS 1-9: 0

## 2021-11-10 ASSESSMENT — ANXIETY QUESTIONNAIRES: GAD7 TOTAL SCORE: 0

## 2021-11-12 DIAGNOSIS — E78.2 MIXED HYPERLIPIDEMIA: ICD-10-CM

## 2021-11-12 RX ORDER — ROSUVASTATIN CALCIUM 5 MG/1
TABLET, COATED ORAL
Qty: 90 TABLET | Refills: 3 | Status: SHIPPED | OUTPATIENT
Start: 2021-11-12 | End: 2022-12-02

## 2021-11-12 NOTE — TELEPHONE ENCOUNTER
[facility]      Last Written Prescription Date:  7/13/21  Last Fill Quantity: 30,   # refills: 5  Last Office Visit: 11/9/21  Future Office visit:    Next 5 appointments (look out 90 days)    Feb 10, 2022 10:15 AM  (Arrive by 10:00 AM)  SHORT with Lorelei Felix MD  [facility] ([facility] ) [address]  [facility] [address]  [phone]

## 2021-11-16 ENCOUNTER — MYC MEDICAL ADVICE (OUTPATIENT)
Dept: EDUCATION SERVICES | Facility: HOSPITAL | Age: 76
End: 2021-11-16
Payer: COMMERCIAL

## 2021-11-29 ENCOUNTER — TELEPHONE (OUTPATIENT)
Dept: EDUCATION SERVICES | Facility: HOSPITAL | Age: 76
End: 2021-11-29
Payer: COMMERCIAL

## 2021-11-30 ENCOUNTER — ANTICOAGULATION THERAPY VISIT (OUTPATIENT)
Dept: ANTICOAGULATION | Facility: OTHER | Age: 76
End: 2021-11-30
Attending: FAMILY MEDICINE
Payer: MEDICARE

## 2021-11-30 ENCOUNTER — LAB (OUTPATIENT)
Dept: LAB | Facility: OTHER | Age: 76
End: 2021-11-30
Attending: FAMILY MEDICINE
Payer: MEDICARE

## 2021-11-30 DIAGNOSIS — I26.99 PULMONARY EMBOLISM AND INFARCTION (H): ICD-10-CM

## 2021-11-30 DIAGNOSIS — Z79.01 LONG TERM CURRENT USE OF ANTICOAGULANT THERAPY: ICD-10-CM

## 2021-11-30 DIAGNOSIS — I26.99 PULMONARY EMBOLISM AND INFARCTION (H): Primary | ICD-10-CM

## 2021-11-30 LAB — INR BLD: 3 (ref 0.9–1.1)

## 2021-11-30 PROCEDURE — 85610 PROTHROMBIN TIME: CPT | Mod: ZL

## 2021-11-30 PROCEDURE — 36416 COLLJ CAPILLARY BLOOD SPEC: CPT | Mod: ZL

## 2021-11-30 NOTE — PROGRESS NOTES
ANTICOAGULATION MANAGEMENT     Clement Voss 76 year old male is on warfarin with therapeutic INR result. (Goal INR 2.0-3.0)    Recent labs: (last 7 days)     11/30/21  0956   INR 3.0*       ASSESSMENT     Source(s): Chart Review and Patient/Caregiver Call       Warfarin doses taken: Warfarin taken as instructed    Diet: No new diet changes identified    New illness, injury, or hospitalization: No    Medication/supplement changes: None noted    Signs or symptoms of bleeding or clotting: No    Previous INR: Therapeutic last visit; previously outside of goal range    Additional findings: None     PLAN     Recommended plan for no diet, medication or health factor changes affecting INR     Dosing Instructions: Continue your current warfarin dose with next INR in 4 weeks       Summary  As of 11/30/2021    Full warfarin instructions:  2.5 mg every Mon, Wed, Fri; 5 mg all other days   Next INR check:  12/28/2021             Detailed voice message left for Clement with dosing instructions and follow up date.     Lab visit scheduled    Education provided: Please call back if any changes to your diet, medications or how you've been taking warfarin, Monitoring for bleeding signs and symptoms and Monitoring for clotting signs and symptoms    Plan made per [facility] anticoagulation protocol    Saba Delgadillo, RN  Anticoagulation Clinic  11/30/2021    _______________________________________________________________________     Anticoagulation Episode Summary     Current INR goal:  2.0-3.0   TTR:  58.3 % (1 y)   Target end date:  Indefinite   Send INR reminders to:  [facility]    Indications    Pulmonary embolism and infarction (H) [I26.99]  Long-term (current) use of anticoagulants [Z79.01] [Z79.01]           Comments:           Anticoagulation Care Providers     Provider Role Specialty Phone number    Lorelei Felix MD Referring Family Medicine [phone]

## 2021-12-07 ENCOUNTER — MYC MEDICAL ADVICE (OUTPATIENT)
Dept: EDUCATION SERVICES | Facility: HOSPITAL | Age: 76
End: 2021-12-07
Payer: COMMERCIAL

## 2021-12-08 ENCOUNTER — HOSPITAL ENCOUNTER (OUTPATIENT)
Dept: EDUCATION SERVICES | Facility: HOSPITAL | Age: 76
Discharge: HOME | End: 2021-12-08
Attending: NURSE PRACTITIONER | Admitting: FAMILY MEDICINE
Payer: MEDICARE

## 2021-12-08 VITALS
OXYGEN SATURATION: 98 % | RESPIRATION RATE: 16 BRPM | HEIGHT: 67 IN | DIASTOLIC BLOOD PRESSURE: 75 MMHG | WEIGHT: 200.2 LBS | HEART RATE: 80 BPM | BODY MASS INDEX: 31.42 KG/M2 | SYSTOLIC BLOOD PRESSURE: 138 MMHG

## 2021-12-08 DIAGNOSIS — Z79.4 TYPE 2 DIABETES MELLITUS WITH HYPERGLYCEMIA, WITH LONG-TERM CURRENT USE OF INSULIN (H): ICD-10-CM

## 2021-12-08 DIAGNOSIS — E11.65 TYPE 2 DIABETES MELLITUS WITH HYPERGLYCEMIA, WITH LONG-TERM CURRENT USE OF INSULIN (H): ICD-10-CM

## 2021-12-08 PROCEDURE — G0108 DIAB MANAGE TRN  PER INDIV: HCPCS

## 2021-12-08 RX ORDER — DULAGLUTIDE 1.5 MG/.5ML
1.5 INJECTION, SOLUTION SUBCUTANEOUS
Qty: 8 ML | Refills: 0
Start: 2021-12-08

## 2021-12-08 ASSESSMENT — MIFFLIN-ST. JEOR: SCORE: 1591.73

## 2021-12-08 ASSESSMENT — PAIN SCALES - GENERAL: PAINLEVEL: NO PAIN (0)

## 2021-12-08 NOTE — PROGRESS NOTES
"Diabetes Self-Management Education & Support    Presents for: Follow-up    SUBJECTIVE/OBJECTIVE:  Presents for: Follow-up  Accompanied by: Self  Diabetes education in the past 24mo: Yes  Focus of Visit: Taking Medication,Monitoring  Diabetes type: Type 2  Disease course: Stable  Diabetes management related comments/concerns: Completing PAP for Trulicity  Transportation concerns: No  Difficulty affording diabetes medication?: Sometimes  Difficulty affording diabetes testing supplies?: No  Other concerns:: None  Cultural Influences/Ethnic Background:  American      Diabetes Symptoms & Complications:  Fatigue: No  Neuropathy: No  Polydipsia: No  Polyphagia: No  Polyuria: No  Visual change: No  Slow healing wounds: No  Symptom course: Stable  Weight trend: Stable  Complications assessed today?: Yes  Autonomic neuropathy: No  CVA: No  Heart disease: Yes  Nephropathy: Yes  Peripheral neuropathy: No  Peripheral Vascular Disease: No  Retinopathy:  (unknown)    Patient Problem List and Family Medical History reviewed for relevant medical history, current medical status, and diabetes risk factors.    Vitals:  /75   Pulse 80   Resp 16   Ht 1.702 m (5' 7\")   Wt 90.8 kg (200 lb 3.2 oz)   SpO2 98%   BMI 31.36 kg/m    Estimated body mass index is 31.36 kg/m  as calculated from the following:    Height as of this encounter: 1.702 m (5' 7\").    Weight as of this encounter: 90.8 kg (200 lb 3.2 oz).   Last 3 BP:   BP Readings from Last 3 Encounters:   12/08/21 138/75   11/09/21 130/66   10/14/21 139/80       History   Smoking Status     Former Smoker     Types: Cigarettes     Quit date: 8/12/1964   Smokeless Tobacco     Never Used       Labs:  Lab Results   Component Value Date    A1C 8.8 11/05/2021    A1C 9.7 04/27/2021     Lab Results   Component Value Date     11/05/2021     04/27/2021     Lab Results   Component Value Date    LDL 12 11/05/2021    LDL 12 04/27/2021     HDL Cholesterol   Date Value Ref " Range Status   04/27/2021 37 (L) >39 mg/dL Final     Direct Measure HDL   Date Value Ref Range Status   11/05/2021 36 (L) >=40 mg/dL Final   ]  GFR Estimate   Date Value Ref Range Status   11/05/2021 45 (L) >60 mL/min/1.73m2 Final     Comment:     As of July 11, 2021, eGFR is calculated by the CKD-EPI creatinine equation, without race adjustment. eGFR can be influenced by muscle mass, exercise, and diet. The reported eGFR is an estimation only and is only applicable if the renal function is stable.   04/27/2021 51 (L) >60 mL/min/[1.73_m2] Final     Comment:     Non  GFR Calc  Starting 12/18/2018, serum creatinine based estimated GFR (eGFR) will be   calculated using the Chronic Kidney Disease Epidemiology Collaboration   (CKD-EPI) equation.       GFR Estimate If Black   Date Value Ref Range Status   04/27/2021 59 (L) >60 mL/min/[1.73_m2] Final     Comment:      GFR Calc  Starting 12/18/2018, serum creatinine based estimated GFR (eGFR) will be   calculated using the Chronic Kidney Disease Epidemiology Collaboration   (CKD-EPI) equation.       Lab Results   Component Value Date    CR 1.48 11/05/2021    CR 1.34 04/27/2021     No results found for: MICROALBUMIN    Healthy Eating:  Healthy Eating Assessed Today: Yes  Meal planning/habits: Avoiding sweets,Smaller portions  Meals include: Breakfast,Lunch,Dinner  Breakfast: oatmeal or honey nut cheerios with milk  Lunch: leftovers or bagel  Dinner: meat, potatoes, vegetable  Snacks: fruit, vegetables  Beverages: Coffee,Milk,Juice,Other (Propel, chocolate milk)  Has patient met with a dietitian in the past?: Yes    Being Active:  Being Active Assessed Today: Yes  Exercise:: Yes  Days per week of moderate to strenuous exercise (like a brisk walk): 3  On average, minutes per day of exercise at this level: 10  How intense was your typical exercise? : Light (like stretching or slow walking)  Exercise Minutes per Week: 30  Barrier to exercise:  Access    Monitoring:  Monitoring Assessed Today: Yes  Did patient bring glucose meter to appointment? : No (sent readings via [facility])  Blood Glucose Meter: Accu-chek  Times checking blood sugar at home (number): 3  Times checking blood sugar at home (per): Day  Blood glucose trend: Decreasing        Taking Medications:  Diabetes Medication(s)     Insulin       insulin glargine (LANTUS SOLOSTAR) 100 UNIT/ML pen    Inject 40 units daily    Incretin Mimetic Agents (GLP-1 Receptor Agonists)       dulaglutide (TRULICITY) 1.5 MG/0.5ML pen    Inject 1.5 mg Subcutaneous every 7 days Do not send to pharmacy          Taking Medication Assessed Today: Yes  Current Treatments: Insulin Injections,Non-insulin Injectables  Dose schedule: At bedtime  Given by: Patient  Injection/Infusion sites: Abdomen  Problems taking diabetes medications regularly?: Yes  Diabetes medication side effects?: No    Problem Solving:  Problem Solving Assessed Today: Yes  Is the patient at risk for hypoglycemia?: Yes  Hypoglycemia Frequency: Monthly  Hypoglycemia Treatment: Other food              Reducing Risks:  Has dilated eye exam at least once a year?: Yes  Feet checked by healthcare provider in the last year?: Yes    Healthy Coping:  Healthy Coping Assessed Today: Yes  Emotional response to diabetes: Ready to learn,Confidence diabetes can be controlled  Informal Support system:: Family  Stage of change: ACTION (Actively working towards change)  Support resources: Other (PAP)  Patient Activation Measure Survey Score:  ALLEN Score (Last Two) 1/6/2020 1/10/2020   ALLEN Raw Score 30 30   Activation Score 56 56   ALLEN Level 3 3       Diabetes knowledge and skills assessment:   Patient is knowledgeable in diabetes management concepts related to: Healthy Eating, Being Active, Monitoring and Taking Medication    Patient needs further education on the following diabetes management concepts: Monitoring and Taking Medication    Based on learning assessment  above, most appropriate setting for further diabetes education would be: Individual setting.      INTERVENTIONS:    Education provided today on:  AADE Self-Care Behaviors:  Monitoring: log and interpret results, individual blood glucose targets and frequency of monitoring  Taking Medication: action of prescribed medication, drawing up, administering and storing injectable diabetes medications, proper site selection and rotation for injections, side effects of prescribed medications and when to take medications      Opportunities for ongoing education and support in diabetes-self management were discussed.    Pt verbalized understanding of concepts discussed and recommendations provided today.       Education Materials Provided:  No new materials provided today      ASSESSMENT:  Readings range as follows: fastin-137, 153, pre-supper: 137-200, post-supper: 247-334    Huy is here to complete his PAP  application for [employer] for his Trulicity.    We discussed increasing the Trulicity dose with this application. He had started the Trulicity 1.5 mg at the end of September, with A1C on 21 of 8.8%. So he was on the higher dose for only one month before that A1C. Will continue the current dose of Trulicity until we see his next A1C in February.        Patient's most recent   Lab Results   Component Value Date    A1C 8.8 2021    A1C 9.7 2021    is not meeting goal of <8.0    PLAN  See Patient Instructions for co-developed, patient-stated behavior change goals.  We will complete provider order and fax uControl Application for Trulicity for     Continue Lantus 17 units daily and Trulicity 1.5 mg weekly.    Will see what your A1C is in February before changing Trulicity dose.    AVS printed and provided to patient today. See Follow-Up section for recommended follow-up.      Time Spent: 30 minutes  Encounter Type: Individual    Any diabetes medication dose changes were made via the CDE  Protocol and Collaborative Practice Agreement with the patient's primary care provider. A copy of this encounter was shared with the provider.

## 2021-12-08 NOTE — PATIENT INSTRUCTIONS
We will complete provider order and fax [employer] [facility] Application for Trulicity for 2022    Continue Lantus 17 units daily and Trulicity 1.5 mg weekly.    Will see what your A1C is in February before changing Trulicity dose.

## 2021-12-15 ENCOUNTER — MYC MEDICAL ADVICE (OUTPATIENT)
Dept: EDUCATION SERVICES | Facility: HOSPITAL | Age: 76
End: 2021-12-15
Payer: COMMERCIAL

## 2021-12-21 ENCOUNTER — MYC MEDICAL ADVICE (OUTPATIENT)
Dept: EDUCATION SERVICES | Facility: HOSPITAL | Age: 76
End: 2021-12-21
Payer: COMMERCIAL

## 2021-12-28 ENCOUNTER — MYC MEDICAL ADVICE (OUTPATIENT)
Dept: EDUCATION SERVICES | Facility: HOSPITAL | Age: 76
End: 2021-12-28

## 2021-12-28 ENCOUNTER — ANTICOAGULATION THERAPY VISIT (OUTPATIENT)
Dept: ANTICOAGULATION | Facility: OTHER | Age: 76
End: 2021-12-28
Attending: FAMILY MEDICINE
Payer: MEDICARE

## 2021-12-28 ENCOUNTER — LAB (OUTPATIENT)
Dept: LAB | Facility: OTHER | Age: 76
End: 2021-12-28
Attending: FAMILY MEDICINE
Payer: MEDICARE

## 2021-12-28 DIAGNOSIS — I26.99 PULMONARY EMBOLISM AND INFARCTION (H): ICD-10-CM

## 2021-12-28 DIAGNOSIS — Z79.01 LONG TERM CURRENT USE OF ANTICOAGULANT THERAPY: ICD-10-CM

## 2021-12-28 DIAGNOSIS — I26.99 PULMONARY EMBOLISM AND INFARCTION (H): Primary | ICD-10-CM

## 2021-12-28 LAB — INR BLD: 1.8 (ref 0.9–1.1)

## 2021-12-28 PROCEDURE — 36415 COLL VENOUS BLD VENIPUNCTURE: CPT | Mod: ZL

## 2021-12-28 PROCEDURE — 85610 PROTHROMBIN TIME: CPT | Mod: ZL

## 2021-12-28 NOTE — PROGRESS NOTES
ANTICOAGULATION MANAGEMENT     Clement Voss 76 year old male is on warfarin with subtherapeutic INR result. (Goal INR 2.0-3.0)    Recent labs: (last 7 days)     12/28/21  1008   INR 1.8*       ASSESSMENT     Source(s): Chart Review and Patient/Caregiver Call       Warfarin doses taken: Warfarin taken as instructed    Diet: No new diet changes identified    New illness, injury, or hospitalization: No    Medication/supplement changes: None noted    Signs or symptoms of bleeding or clotting: No    Previous INR: Therapeutic last 2(+) visits    Additional findings: None     PLAN     Recommended plan for no diet, medication or health factor changes affecting INR     Dosing Instructions: 8.75 mg on 12/28 then resume maintenance dose of 2.5 mg every mon,wed,fri; 5 mg all other days. with next INR in 2 weeks       Summary  As of 12/28/2021    Full warfarin instructions:  12/28: 8.75 mg; Otherwise 2.5 mg every Mon, Wed, Fri; 5 mg all other days   Next INR check:  1/11/2022             Detailed voice message left for Clement with dosing instructions and follow up date.     Lab visit scheduled    Education provided: Please call back if any changes to your diet, medications or how you've been taking warfarin    Plan made per [facility] anticoagulation protocol    Lisa Blandon RN  Anticoagulation Clinic  12/28/2021    _______________________________________________________________________     Anticoagulation Episode Summary     Current INR goal:  2.0-3.0   TTR:  64.7 % (1 y)   Target end date:  Indefinite   Send INR reminders to:  [facility]    Indications    Pulmonary embolism and infarction (H) [I26.99]  Long-term (current) use of anticoagulants [Z79.01] [Z79.01]           Comments:           Anticoagulation Care Providers     Provider Role Specialty Phone number    Lorelei Felix MD Referring Family Medicine [phone]

## 2022-01-02 DIAGNOSIS — D64.9 ANEMIA, UNSPECIFIED TYPE: ICD-10-CM

## 2022-01-03 RX ORDER — DOCUSATE SODIUM 100 MG
CAPSULE ORAL
Qty: 60 CAPSULE | Refills: 11 | Status: SHIPPED | OUTPATIENT
Start: 2022-01-03

## 2022-01-03 NOTE — TELEPHONE ENCOUNTER
Stool soft      Last Written Prescription Date:  12/18/2020  Last Fill Quantity: 60,   # refills: 11  Last Office Visit: 12/9/21  Future Office visit:    Next 5 appointments (look out 90 days)    Feb 10, 2022 10:15 AM  (Arrive by 10:00 AM)  SHORT with Lorelei Felix MD  [facility] ([facility] ) [address]  [facility] [address]  [phone]

## 2022-01-04 ENCOUNTER — TELEPHONE (OUTPATIENT)
Dept: EDUCATION SERVICES | Facility: HOSPITAL | Age: 77
End: 2022-01-04
Payer: COMMERCIAL

## 2022-01-04 NOTE — TELEPHONE ENCOUNTER
I called the pt and notified him that we have received his Tulicity from the [facility] PAP. This is available for  at the  of the [facility]. It is stored in the fridge of the [facility] dept.

## 2022-01-05 ENCOUNTER — MYC MEDICAL ADVICE (OUTPATIENT)
Dept: EDUCATION SERVICES | Facility: HOSPITAL | Age: 77
End: 2022-01-05
Payer: COMMERCIAL

## 2022-01-11 ENCOUNTER — ANTICOAGULATION THERAPY VISIT (OUTPATIENT)
Dept: ANTICOAGULATION | Facility: OTHER | Age: 77
End: 2022-01-11
Attending: FAMILY MEDICINE
Payer: MEDICARE

## 2022-01-11 ENCOUNTER — LAB (OUTPATIENT)
Dept: LAB | Facility: OTHER | Age: 77
End: 2022-01-11
Attending: FAMILY MEDICINE
Payer: MEDICARE

## 2022-01-11 ENCOUNTER — MYC MEDICAL ADVICE (OUTPATIENT)
Dept: EDUCATION SERVICES | Facility: HOSPITAL | Age: 77
End: 2022-01-11

## 2022-01-11 DIAGNOSIS — Z79.01 LONG TERM CURRENT USE OF ANTICOAGULANT THERAPY: ICD-10-CM

## 2022-01-11 DIAGNOSIS — I26.99 PULMONARY EMBOLISM AND INFARCTION (H): ICD-10-CM

## 2022-01-11 DIAGNOSIS — I26.99 PULMONARY EMBOLISM AND INFARCTION (H): Primary | ICD-10-CM

## 2022-01-11 LAB — INR BLD: 1.8 (ref 0.9–1.1)

## 2022-01-11 PROCEDURE — 36415 COLL VENOUS BLD VENIPUNCTURE: CPT | Mod: ZL

## 2022-01-11 PROCEDURE — 85610 PROTHROMBIN TIME: CPT | Mod: ZL

## 2022-01-11 NOTE — PROGRESS NOTES
ANTICOAGULATION MANAGEMENT     Clement Voss 76 year old male is on warfarin with subtherapeutic INR result. (Goal INR 2.0-3.0)    Recent labs: (last 7 days)     01/11/22  0954   INR 1.8*       ASSESSMENT     Source(s): Chart Review and Patient/Caregiver Call       Warfarin doses taken: Warfarin taken as instructed    Diet: No new diet changes identified    New illness, injury, or hospitalization: No    Medication/supplement changes: None noted    Signs or symptoms of bleeding or clotting: No    Previous INR: Subtherapeutic    Additional findings: None     PLAN     Recommended plan for no diet, medication or health factor changes affecting INR     Dosing Instructions:  Increase your warfarin dose (9% change) with next INR in 4 weeks       Summary  As of 1/11/2022    Full warfarin instructions:  2.5 mg every Mon, Fri; 5 mg all other days   Next INR check:  2/8/2022             Detailed voice message left for Clement with dosing instructions and follow up date.     Lab visit scheduled    Education provided: Please call back if any changes to your diet, medications or how you've been taking warfarin, Monitoring for bleeding signs and symptoms and Monitoring for clotting signs and symptoms    Plan made per [facility] anticoagulation protocol    Saba Delgadillo RN  Anticoagulation Clinic  1/11/2022    _______________________________________________________________________     Anticoagulation Episode Summary     Current INR goal:  2.0-3.0   TTR:  64.7 % (1 y)   Target end date:  Indefinite   Send INR reminders to:  [facility]    Indications    Pulmonary embolism and infarction (H) [I26.99]  Long-term (current) use of anticoagulants [Z79.01] [Z79.01]           Comments:           Anticoagulation Care Providers     Provider Role Specialty Phone number    Lorelei Felix MD Referring Family Medicine [phone]

## 2022-01-14 DIAGNOSIS — G25.81 RESTLESS LEGS SYNDROME: ICD-10-CM

## 2022-01-14 DIAGNOSIS — D64.9 ANEMIA, UNSPECIFIED TYPE: ICD-10-CM

## 2022-01-17 RX ORDER — FERROUS SULFATE 325(65) MG
TABLET ORAL
Qty: 30 TABLET | Refills: 6 | Status: SHIPPED | OUTPATIENT
Start: 2022-01-17 | End: 2022-09-14

## 2022-02-01 ENCOUNTER — MYC MEDICAL ADVICE (OUTPATIENT)
Dept: EDUCATION SERVICES | Facility: HOSPITAL | Age: 77
End: 2022-02-01
Payer: COMMERCIAL

## 2022-02-08 ENCOUNTER — ANTICOAGULATION THERAPY VISIT (OUTPATIENT)
Dept: ANTICOAGULATION | Facility: OTHER | Age: 77
End: 2022-02-08
Attending: FAMILY MEDICINE
Payer: MEDICARE

## 2022-02-08 ENCOUNTER — LAB (OUTPATIENT)
Dept: LAB | Facility: OTHER | Age: 77
End: 2022-02-08
Attending: FAMILY MEDICINE
Payer: MEDICARE

## 2022-02-08 DIAGNOSIS — Z79.01 LONG TERM CURRENT USE OF ANTICOAGULANT THERAPY: ICD-10-CM

## 2022-02-08 DIAGNOSIS — E78.2 MIXED HYPERLIPIDEMIA: ICD-10-CM

## 2022-02-08 DIAGNOSIS — I10 BENIGN ESSENTIAL HYPERTENSION: ICD-10-CM

## 2022-02-08 DIAGNOSIS — I26.99 PULMONARY EMBOLISM AND INFARCTION (H): Primary | ICD-10-CM

## 2022-02-08 DIAGNOSIS — N18.32 TYPE 2 DIABETES MELLITUS WITH STAGE 3B CHRONIC KIDNEY DISEASE, WITH LONG-TERM CURRENT USE OF INSULIN (H): ICD-10-CM

## 2022-02-08 DIAGNOSIS — E11.22 TYPE 2 DIABETES MELLITUS WITH STAGE 3B CHRONIC KIDNEY DISEASE, WITH LONG-TERM CURRENT USE OF INSULIN (H): ICD-10-CM

## 2022-02-08 DIAGNOSIS — I26.99 PULMONARY EMBOLISM AND INFARCTION (H): ICD-10-CM

## 2022-02-08 DIAGNOSIS — Z79.4 TYPE 2 DIABETES MELLITUS WITH STAGE 3B CHRONIC KIDNEY DISEASE, WITH LONG-TERM CURRENT USE OF INSULIN (H): ICD-10-CM

## 2022-02-08 LAB
ALT SERPL W P-5'-P-CCNC: 93 U/L (ref 0–70)
ANION GAP SERPL CALCULATED.3IONS-SCNC: 5 MMOL/L (ref 3–14)
BUN SERPL-MCNC: 18 MG/DL (ref 7–30)
CALCIUM SERPL-MCNC: 9.3 MG/DL (ref 8.5–10.1)
CHLORIDE BLD-SCNC: 102 MMOL/L (ref 94–109)
CHOLEST SERPL-MCNC: 92 MG/DL
CO2 SERPL-SCNC: 29 MMOL/L (ref 20–32)
CREAT SERPL-MCNC: 1.35 MG/DL (ref 0.66–1.25)
EST. AVERAGE GLUCOSE BLD GHB EST-MCNC: 180 MG/DL
FASTING STATUS PATIENT QL REPORTED: YES
GFR SERPL CREATININE-BSD FRML MDRD: 54 ML/MIN/1.73M2
GLUCOSE BLD-MCNC: 125 MG/DL (ref 70–99)
HBA1C MFR BLD: 7.9 % (ref 0–5.6)
HDLC SERPL-MCNC: 34 MG/DL
INR BLD: 2.3 (ref 0.9–1.1)
LDLC SERPL CALC-MCNC: 13 MG/DL
NONHDLC SERPL-MCNC: 58 MG/DL
POTASSIUM BLD-SCNC: 3.5 MMOL/L (ref 3.4–5.3)
SODIUM SERPL-SCNC: 136 MMOL/L (ref 133–144)
TRIGL SERPL-MCNC: 227 MG/DL

## 2022-02-08 PROCEDURE — 80061 LIPID PANEL: CPT | Mod: ZL

## 2022-02-08 PROCEDURE — 80048 BASIC METABOLIC PNL TOTAL CA: CPT | Mod: ZL

## 2022-02-08 PROCEDURE — 84460 ALANINE AMINO (ALT) (SGPT): CPT | Mod: ZL

## 2022-02-08 PROCEDURE — 36415 COLL VENOUS BLD VENIPUNCTURE: CPT | Mod: ZL

## 2022-02-08 PROCEDURE — 83036 HEMOGLOBIN GLYCOSYLATED A1C: CPT | Mod: ZL

## 2022-02-08 PROCEDURE — 85610 PROTHROMBIN TIME: CPT | Mod: ZL

## 2022-02-08 NOTE — PROGRESS NOTES
Assessment & Plan     1. Type 2 diabetes mellitus with stage 3b chronic kidney disease, with long-term current use of insulin (H)  Recent labs reviewed, no medication changes recommended.  Follow-up in three months, future lab orders placed./  - Hemoglobin A1c; Future  - Magnesium; Future  - Albumin Random Urine Quantitative with Creat Ratio; Future  - TSH with free T4 reflex; Future    2. Mixed hyperlipidemia  As above.  - Lipid Profile (Chol, Trig, HDL, LDL calc); Future  - ALT; Future    3. Benign essential hypertension  As above.  - Basic metabolic panel; Future  - Magnesium; Future    4. Stage 3b chronic kidney disease (H)  No changes, patient does wonder about magnesium level, so we will check that next time.  - Magnesium; Future    5. Congestive heart failure, unspecified HF chronicity, unspecified heart failure type (H)  No changes      Return in about 3 months (around 5/10/2022) for Diabetic Follow-up, Chronic Disease Management, Medication review.    Lorelei Felix MD  [facility] - RUTHANN Son is a 76 year old who presents for the following health issues     HPI     Diabetes Follow-up    How often are you checking your blood sugar? Three times daily  Blood sugar testing frequency justification:  Uncontrolled diabetes  What time of day are you checking your blood sugars (select all that apply)?  Before and after meals  Have you had any blood sugars above 200?  Yes evenings sometimes  Have you had any blood sugars below 70?  No    What symptoms do you notice when your blood sugar is low?  Shaky, Dizzy and Weak    What concerns do you have today about your diabetes? None     Do you have any of these symptoms? (Select all that apply)  No numbness or tingling in feet.  No redness, sores or blisters on feet.  No complaints of excessive thirst.  No reports of blurry vision.  No significant changes to weight.    Have you had a diabetic eye exam in the last 12 months?  No        Hyperlipidemia Follow-Up      Are you regularly taking any medication or supplement to lower your cholesterol?   Yes- Crestor    Are you having muscle aches or other side effects that you think could be caused by your cholesterol lowering medication?  No    Hypertension Follow-up      Do you check your blood pressure regularly outside of the clinic? Yes     Are you following a low salt diet? Yes    Are your blood pressures ever more than 140 on the top number (systolic) OR more   than 90 on the bottom number (diastolic), for example 140/90? Yes    BP Readings from Last 2 Encounters:   02/10/22 130/72   12/08/21 138/75     Hemoglobin A1C POCT (%)   Date Value   04/27/2021 9.7 (H)   01/26/2021 9.4 (H)     Hemoglobin A1C (%)   Date Value   02/08/2022 7.9 (H)   11/05/2021 8.8 (H)     LDL Cholesterol Calculated (mg/dL)   Date Value   02/08/2022 13   11/05/2021 12   04/27/2021 12   01/26/2021 26       Heart Failure Follow-up    Are you experiencing any shortness of breath? No    Are you experiencing any swelling in your legs or feet?  Stable    Are you using more pillows than usual? No    Do you cough at night?  No    Do you check your weight daily?  No    Have you had a weight change recently?  No    Are you having any of the following side effects from your medications? (Select all that apply)  The patient does not report symptoms of dizziness, fatigue, cough, swelling, or slow heart beat.    Since your last visit, how many times have you gone to the cardiologist, urgent care, emergency room, or hospital because of your heart failure?   None      How many servings of fruits and vegetables do you eat daily?  0-1    On average, how many sweetened beverages do you drink each day (Examples: soda, juice, sweet tea, etc.  Do NOT count diet or artificially sweetened beverages)?   0    How many days per week do you exercise enough to make your heart beat faster? 7    How many minutes a day do you exercise enough to  "make your heart beat faster? 30 - 60    How many days per week do you miss taking your medication? 0    Heart Failure Follow-up    Are you experiencing any shortness of breath? No    Are you experiencing any swelling in your legs or feet?  No    Are you using more pillows than usual? No    Do you cough at night?  No    Do you check your weight daily?  Yes    Have you had a weight change recently?  No    Are you having any of the following side effects from your medications? (Select all that apply)  The patient does not report symptoms of dizziness, fatigue, cough, swelling, or slow heart beat.    Since your last visit, how many times have you gone to the cardiologist, urgent care, emergency room, or hospital because of your heart failure?   None      Review of Systems   Constitutional, HEENT, cardiovascular, pulmonary, gi and gu systems are negative, except as otherwise noted.      Objective    /72 (BP Location: Right arm, Patient Position: Sitting, Cuff Size: Adult Regular)   Pulse 76   Temp 97.7  F (36.5  C) (Tympanic)   Resp 16   Ht 1.702 m (5' 7\")   Wt 92.6 kg (204 lb 3.2 oz)   SpO2 97%   BMI 31.98 kg/m    Body mass index is 31.98 kg/m .  Physical Exam   GENERAL: healthy, alert and no distress  PSYCH: mentation appears normal, affect normal/bright    Lab on 02/08/2022   Component Date Value Ref Range Status     Cholesterol 02/08/2022 92  <200 mg/dL Final     Triglycerides 02/08/2022 227* <150 mg/dL Final     Direct Measure HDL 02/08/2022 34* >=40 mg/dL Final     LDL Cholesterol Calculated 02/08/2022 13  <=100 mg/dL Final     Non HDL Cholesterol 02/08/2022 58  <130 mg/dL Final     Patient Fasting > 8hrs? 02/08/2022 Yes   Final     Estimated Average Glucose 02/08/2022 180  mg/dL Final     Hemoglobin A1C 02/08/2022 7.9* 0.0 - 5.6 % Final    Normal <5.7%   Prediabetes 5.7-6.4%    Diabetes 6.5% or higher     Note: Adopted from ADA consensus guidelines.     Sodium 02/08/2022 136  133 - 144 mmol/L Final "     Potassium 02/08/2022 3.5  3.4 - 5.3 mmol/L Final     Chloride 02/08/2022 102  94 - 109 mmol/L Final     Carbon Dioxide (CO2) 02/08/2022 29  20 - 32 mmol/L Final     Anion Gap 02/08/2022 5  3 - 14 mmol/L Final     Urea Nitrogen 02/08/2022 18  7 - 30 mg/dL Final     Creatinine 02/08/2022 1.35* 0.66 - 1.25 mg/dL Final     Calcium 02/08/2022 9.3  8.5 - 10.1 mg/dL Final     Glucose 02/08/2022 125* 70 - 99 mg/dL Final     GFR Estimate 02/08/2022 54* >60 mL/min/1.73m2 Final    Effective December 21, 2021 eGFRcr in adults is calculated using the 2021 CKD-EPI creatinine equation which includes age and gender (Virgilio et al., NEJM, DOI: 10.1056/NWEExc6373760)     ALT 02/08/2022 93* 0 - 70 U/L Final     INR 02/08/2022 2.3* 0.9 - 1.1 Final

## 2022-02-08 NOTE — PROGRESS NOTES
ANTICOAGULATION MANAGEMENT     Clement Voss 76 year old male is on warfarin with therapeutic INR result. (Goal INR 2.0-3.0)    Recent labs: (last 7 days)     02/08/22  0928   INR 2.3*       ASSESSMENT     Source(s): Chart Review and Patient/Caregiver Call       Warfarin doses taken: Warfarin taken as instructed    Diet: No new diet changes identified    New illness, injury, or hospitalization: No    Medication/supplement changes: None noted    Signs or symptoms of bleeding or clotting: No    Previous INR: Subtherapeutic    Additional findings: None     PLAN     Recommended plan for no diet, medication or health factor changes affecting INR     Dosing Instructions: Continue your current warfarin dose with next INR in 5 weeks       Summary  As of 2/8/2022    Full warfarin instructions:  2.5 mg every Mon, Fri; 5 mg all other days   Next INR check:               Detailed voice message left for Clement with dosing instructions and follow up date.     Lab visit scheduled    Education provided: Please call back if any changes to your diet, medications or how you've been taking warfarin    Plan made per [facility] anticoagulation protocol    Lisa Blandon RN  Anticoagulation Clinic  2/8/2022    _______________________________________________________________________     Anticoagulation Episode Summary     Current INR goal:  2.0-3.0   TTR:  69.3 % (1 y)   Target end date:  Indefinite   Send INR reminders to:  [facility]    Indications    Pulmonary embolism and infarction (H) [I26.99]  Long-term (current) use of anticoagulants [Z79.01] [Z79.01]           Comments:           Anticoagulation Care Providers     Provider Role Specialty Phone number    Lorelei Felix MD Referring Family Medicine [phone]

## 2022-02-10 ENCOUNTER — OFFICE VISIT (OUTPATIENT)
Dept: FAMILY MEDICINE | Facility: OTHER | Age: 77
End: 2022-02-10
Attending: FAMILY MEDICINE
Payer: COMMERCIAL

## 2022-02-10 VITALS
HEIGHT: 67 IN | DIASTOLIC BLOOD PRESSURE: 72 MMHG | WEIGHT: 204.2 LBS | OXYGEN SATURATION: 97 % | SYSTOLIC BLOOD PRESSURE: 130 MMHG | HEART RATE: 76 BPM | BODY MASS INDEX: 32.05 KG/M2 | TEMPERATURE: 97.7 F | RESPIRATION RATE: 16 BRPM

## 2022-02-10 DIAGNOSIS — N18.32 STAGE 3B CHRONIC KIDNEY DISEASE (H): ICD-10-CM

## 2022-02-10 DIAGNOSIS — I10 BENIGN ESSENTIAL HYPERTENSION: ICD-10-CM

## 2022-02-10 DIAGNOSIS — E78.2 MIXED HYPERLIPIDEMIA: ICD-10-CM

## 2022-02-10 DIAGNOSIS — I50.9 CONGESTIVE HEART FAILURE, UNSPECIFIED HF CHRONICITY, UNSPECIFIED HEART FAILURE TYPE (H): ICD-10-CM

## 2022-02-10 DIAGNOSIS — N18.32 TYPE 2 DIABETES MELLITUS WITH STAGE 3B CHRONIC KIDNEY DISEASE, WITH LONG-TERM CURRENT USE OF INSULIN (H): Primary | ICD-10-CM

## 2022-02-10 DIAGNOSIS — Z79.4 TYPE 2 DIABETES MELLITUS WITH STAGE 3B CHRONIC KIDNEY DISEASE, WITH LONG-TERM CURRENT USE OF INSULIN (H): Primary | ICD-10-CM

## 2022-02-10 DIAGNOSIS — E11.22 TYPE 2 DIABETES MELLITUS WITH STAGE 3B CHRONIC KIDNEY DISEASE, WITH LONG-TERM CURRENT USE OF INSULIN (H): Primary | ICD-10-CM

## 2022-02-10 PROCEDURE — 99214 OFFICE O/P EST MOD 30 MIN: CPT | Performed by: FAMILY MEDICINE

## 2022-02-10 ASSESSMENT — PAIN SCALES - GENERAL: PAINLEVEL: NO PAIN (0)

## 2022-02-10 ASSESSMENT — MIFFLIN-ST. JEOR: SCORE: 1609.88

## 2022-02-10 NOTE — NURSING NOTE
"Chief Complaint   Patient presents with     Diabetes     Lipids     Hypertension       Initial /72 (BP Location: Right arm, Patient Position: Sitting, Cuff Size: Adult Regular)   Pulse 76   Temp 97.7  F (36.5  C) (Tympanic)   Resp 16   Ht 1.702 m (5' 7\")   Wt 92.6 kg (204 lb 3.2 oz)   SpO2 97%   BMI 31.98 kg/m   Estimated body mass index is 31.98 kg/m  as calculated from the following:    Height as of this encounter: 1.702 m (5' 7\").    Weight as of this encounter: 92.6 kg (204 lb 3.2 oz).  Medication Reconciliation: complete  Tracey Blanco MA  "

## 2022-02-27 DIAGNOSIS — I10 BENIGN ESSENTIAL HYPERTENSION: ICD-10-CM

## 2022-02-27 NOTE — TELEPHONE ENCOUNTER
LOPRESSOR      Last Written Prescription Date:  8-5-2021  Last Fill Quantity: 360,   # refills: 1  Last Office Visit: 2-  Future Office visit:    Next 5 appointments (look out 90 days)    May 10, 2022 10:15 AM  (Arrive by 10:00 AM)  SHORT with Lorelei Felix MD  [facility] ([facility] ) [address]  [facility] [address]  [phone]           Routing refill request to provider for review/approval because:

## 2022-02-28 RX ORDER — METOPROLOL TARTRATE 25 MG/1
TABLET, FILM COATED ORAL
Qty: 360 TABLET | Refills: 3 | Status: SHIPPED | OUTPATIENT
Start: 2022-02-28 | End: 2023-02-24

## 2022-03-08 ENCOUNTER — MYC MEDICAL ADVICE (OUTPATIENT)
Dept: EDUCATION SERVICES | Facility: HOSPITAL | Age: 77
End: 2022-03-08
Payer: COMMERCIAL

## 2022-03-10 DIAGNOSIS — E11.22 TYPE 2 DIABETES MELLITUS WITH STAGE 3B CHRONIC KIDNEY DISEASE, WITH LONG-TERM CURRENT USE OF INSULIN (H): ICD-10-CM

## 2022-03-10 DIAGNOSIS — N18.32 TYPE 2 DIABETES MELLITUS WITH STAGE 3B CHRONIC KIDNEY DISEASE, WITH LONG-TERM CURRENT USE OF INSULIN (H): ICD-10-CM

## 2022-03-10 DIAGNOSIS — Z79.4 TYPE 2 DIABETES MELLITUS WITH STAGE 3B CHRONIC KIDNEY DISEASE, WITH LONG-TERM CURRENT USE OF INSULIN (H): ICD-10-CM

## 2022-03-11 RX ORDER — INSULIN GLARGINE 100 [IU]/ML
INJECTION, SOLUTION SUBCUTANEOUS
Qty: 15 ML | Refills: 3 | Status: SHIPPED | OUTPATIENT
Start: 2022-03-11 | End: 2022-07-14 | Stop reason: DRUGHIGH

## 2022-03-15 ENCOUNTER — ANTICOAGULATION THERAPY VISIT (OUTPATIENT)
Dept: ANTICOAGULATION | Facility: OTHER | Age: 77
End: 2022-03-15
Attending: FAMILY MEDICINE
Payer: MEDICARE

## 2022-03-15 ENCOUNTER — MYC MEDICAL ADVICE (OUTPATIENT)
Dept: EDUCATION SERVICES | Facility: HOSPITAL | Age: 77
End: 2022-03-15

## 2022-03-15 ENCOUNTER — LAB (OUTPATIENT)
Dept: LAB | Facility: OTHER | Age: 77
End: 2022-03-15
Attending: FAMILY MEDICINE
Payer: MEDICARE

## 2022-03-15 DIAGNOSIS — Z79.01 LONG TERM CURRENT USE OF ANTICOAGULANT THERAPY: ICD-10-CM

## 2022-03-15 DIAGNOSIS — I26.99 PULMONARY EMBOLISM AND INFARCTION (H): Primary | ICD-10-CM

## 2022-03-15 DIAGNOSIS — I26.99 PULMONARY EMBOLISM AND INFARCTION (H): ICD-10-CM

## 2022-03-15 LAB — INR BLD: 2.5 (ref 0.9–1.1)

## 2022-03-15 PROCEDURE — 36415 COLL VENOUS BLD VENIPUNCTURE: CPT | Mod: ZL

## 2022-03-15 PROCEDURE — 85610 PROTHROMBIN TIME: CPT | Mod: ZL

## 2022-03-15 NOTE — PROGRESS NOTES
ANTICOAGULATION FOLLOW-UP CLINIC VISIT    Patient Name:  Clement Voss  Date:  3/15/2022  Contact Type:  Telephone    SUBJECTIVE:         OBJECTIVE    Recent labs: (last 7 days)     03/15/22  0936   INR 2.5*       ASSESSMENT / PLAN  No question data found.  Anticoagulation Summary  As of 3/15/2022    INR goal:  2.0-3.0   TTR:  78.9 % (1 y)   INR used for dosin.5 (3/15/2022)   Warfarin maintenance plan:  2.5 mg (2.5 mg x 1) every Mon, Fri; 5 mg (2.5 mg x 2) all other days   Full warfarin instructions:  2.5 mg every Mon, Fri; 5 mg all other days   Weekly warfarin total:  30 mg   No change documented:  Nicole Rider RN   Plan last modified:  Saba Delgadillo RN (2022)   Next INR check:  2022   Priority:  Maintenance   Target end date:  Indefinite    Indications    Pulmonary embolism and infarction (H) [I26.99]  Long-term (current) use of anticoagulants [Z79.01] [Z79.01]             Anticoagulation Episode Summary     INR check location:      Preferred lab:      Send INR reminders to:  THOMAS RUELAS    Comments:        Anticoagulation Care Providers     Provider Role Specialty Phone number    Lorelei Felix MD Referring Family Medicine [phone]            See the Encounter Report to view Anticoagulation Flowsheet and Dosing Calendar (Go to Encounters tab in chart review, and find the Anticoagulation Therapy Visit)    No changes. Patient wanted next INR to coincide with lab appt 22. Will call sooner if experiences any symptoms or changes occur before then.     Nicole Rider, ROBI

## 2022-03-22 ENCOUNTER — MYC MEDICAL ADVICE (OUTPATIENT)
Dept: EDUCATION SERVICES | Facility: HOSPITAL | Age: 77
End: 2022-03-22
Payer: COMMERCIAL

## 2022-03-25 DIAGNOSIS — Z79.01 LONG TERM CURRENT USE OF ANTICOAGULANT THERAPY: ICD-10-CM

## 2022-03-25 DIAGNOSIS — I26.99 PULMONARY EMBOLISM AND INFARCTION (H): ICD-10-CM

## 2022-03-25 RX ORDER — WARFARIN SODIUM 2.5 MG/1
TABLET ORAL
Qty: 140 TABLET | Refills: 1 | Status: SHIPPED | OUTPATIENT
Start: 2022-03-25 | End: 2022-03-28

## 2022-03-25 NOTE — TELEPHONE ENCOUNTER
Warfarin      Last Written Prescription Date:  7/13/21  Last Fill Quantity: 140,   # refills: 1  Last Office Visit: 3/15/22  Future Office visit:    Next 5 appointments (look out 90 days)    May 10, 2022 10:15 AM  (Arrive by 10:00 AM)  SHORT with Lorelei Felix MD  [facility] ([facility] ) [address]  [facility] [address]  [phone]           Routing refill request to provider for review/approval because:

## 2022-03-26 DIAGNOSIS — I26.99 PULMONARY EMBOLISM AND INFARCTION (H): ICD-10-CM

## 2022-03-28 DIAGNOSIS — I26.99 PULMONARY EMBOLISM AND INFARCTION (H): ICD-10-CM

## 2022-03-28 DIAGNOSIS — Z79.01 LONG TERM CURRENT USE OF ANTICOAGULANT THERAPY: ICD-10-CM

## 2022-03-28 RX ORDER — WARFARIN SODIUM 2.5 MG/1
TABLET ORAL
Qty: 155 TABLET | Refills: 1 | Status: SHIPPED | OUTPATIENT
Start: 2022-03-28 | End: 2022-09-15

## 2022-03-28 RX ORDER — WARFARIN SODIUM 5 MG/1
TABLET ORAL
Qty: 90 TABLET | Refills: 3 | OUTPATIENT
Start: 2022-03-28

## 2022-04-12 ENCOUNTER — MYC MEDICAL ADVICE (OUTPATIENT)
Dept: EDUCATION SERVICES | Facility: HOSPITAL | Age: 77
End: 2022-04-12
Payer: COMMERCIAL

## 2022-04-19 ENCOUNTER — MYC MEDICAL ADVICE (OUTPATIENT)
Dept: EDUCATION SERVICES | Facility: HOSPITAL | Age: 77
End: 2022-04-19
Payer: COMMERCIAL

## 2022-04-26 ENCOUNTER — MYC MEDICAL ADVICE (OUTPATIENT)
Dept: EDUCATION SERVICES | Facility: HOSPITAL | Age: 77
End: 2022-04-26
Payer: COMMERCIAL

## 2022-05-01 DIAGNOSIS — I10 BENIGN ESSENTIAL HYPERTENSION: ICD-10-CM

## 2022-05-03 ENCOUNTER — MYC MEDICAL ADVICE (OUTPATIENT)
Dept: EDUCATION SERVICES | Facility: HOSPITAL | Age: 77
End: 2022-05-03
Payer: COMMERCIAL

## 2022-05-04 RX ORDER — CHLORTHALIDONE 25 MG/1
TABLET ORAL
Qty: 30 TABLET | Refills: 5 | Status: SHIPPED | OUTPATIENT
Start: 2022-05-04 | End: 2022-05-10

## 2022-05-04 NOTE — TELEPHONE ENCOUNTER
donnie      Last Written Prescription Date:  8/30/21  Last Fill Quantity: 30,   # refills: 5  Last Office Visit: 2/10/22  Future Office visit:    Next 5 appointments (look out 90 days)    May 10, 2022 10:15 AM  (Arrive by 10:00 AM)  SHORT with Lorelei Felix MD  [facility] ([facility] ) [address]  [facility] [address]  [phone]

## 2022-05-06 ENCOUNTER — LAB (OUTPATIENT)
Dept: LAB | Facility: OTHER | Age: 77
End: 2022-05-06
Payer: MEDICARE

## 2022-05-06 ENCOUNTER — ANTICOAGULATION THERAPY VISIT (OUTPATIENT)
Dept: ANTICOAGULATION | Facility: OTHER | Age: 77
End: 2022-05-06
Attending: FAMILY MEDICINE
Payer: COMMERCIAL

## 2022-05-06 DIAGNOSIS — N18.32 TYPE 2 DIABETES MELLITUS WITH STAGE 3B CHRONIC KIDNEY DISEASE, WITH LONG-TERM CURRENT USE OF INSULIN (H): ICD-10-CM

## 2022-05-06 DIAGNOSIS — Z79.01 LONG TERM CURRENT USE OF ANTICOAGULANT THERAPY: ICD-10-CM

## 2022-05-06 DIAGNOSIS — Z79.4 TYPE 2 DIABETES MELLITUS WITH STAGE 3B CHRONIC KIDNEY DISEASE, WITH LONG-TERM CURRENT USE OF INSULIN (H): ICD-10-CM

## 2022-05-06 DIAGNOSIS — E78.2 MIXED HYPERLIPIDEMIA: ICD-10-CM

## 2022-05-06 DIAGNOSIS — I26.99 PULMONARY EMBOLISM AND INFARCTION (H): Primary | ICD-10-CM

## 2022-05-06 DIAGNOSIS — N18.32 STAGE 3B CHRONIC KIDNEY DISEASE (H): ICD-10-CM

## 2022-05-06 DIAGNOSIS — I26.99 PULMONARY EMBOLISM AND INFARCTION (H): ICD-10-CM

## 2022-05-06 DIAGNOSIS — E11.22 TYPE 2 DIABETES MELLITUS WITH STAGE 3B CHRONIC KIDNEY DISEASE, WITH LONG-TERM CURRENT USE OF INSULIN (H): ICD-10-CM

## 2022-05-06 DIAGNOSIS — I10 BENIGN ESSENTIAL HYPERTENSION: ICD-10-CM

## 2022-05-06 LAB
ALT SERPL W P-5'-P-CCNC: 58 U/L (ref 0–70)
ANION GAP SERPL CALCULATED.3IONS-SCNC: 8 MMOL/L (ref 3–14)
BUN SERPL-MCNC: 16 MG/DL (ref 7–30)
CALCIUM SERPL-MCNC: 9.3 MG/DL (ref 8.5–10.1)
CHLORIDE BLD-SCNC: 100 MMOL/L (ref 94–109)
CHOLEST SERPL-MCNC: 85 MG/DL
CO2 SERPL-SCNC: 29 MMOL/L (ref 20–32)
CREAT SERPL-MCNC: 1.34 MG/DL (ref 0.66–1.25)
CREAT UR-MCNC: 56 MG/DL
EST. AVERAGE GLUCOSE BLD GHB EST-MCNC: 154 MG/DL
FASTING STATUS PATIENT QL REPORTED: YES
GFR SERPL CREATININE-BSD FRML MDRD: 55 ML/MIN/1.73M2
GLUCOSE BLD-MCNC: 149 MG/DL (ref 70–99)
HBA1C MFR BLD: 7 % (ref 0–5.6)
HDLC SERPL-MCNC: 35 MG/DL
INR BLD: 2.1 (ref 0.9–1.1)
LDLC SERPL CALC-MCNC: 16 MG/DL
MAGNESIUM SERPL-MCNC: 2 MG/DL (ref 1.6–2.3)
MICROALBUMIN UR-MCNC: <5 MG/L
MICROALBUMIN/CREAT UR: NORMAL MG/G{CREAT}
NONHDLC SERPL-MCNC: 50 MG/DL
POTASSIUM BLD-SCNC: 4 MMOL/L (ref 3.4–5.3)
SODIUM SERPL-SCNC: 137 MMOL/L (ref 133–144)
TRIGL SERPL-MCNC: 172 MG/DL
TSH SERPL DL<=0.005 MIU/L-ACNC: 2.09 MU/L (ref 0.4–4)

## 2022-05-06 PROCEDURE — 80061 LIPID PANEL: CPT | Mod: ZL

## 2022-05-06 PROCEDURE — 80048 BASIC METABOLIC PNL TOTAL CA: CPT | Mod: ZL

## 2022-05-06 PROCEDURE — 82043 UR ALBUMIN QUANTITATIVE: CPT | Mod: ZL

## 2022-05-06 PROCEDURE — 85610 PROTHROMBIN TIME: CPT | Mod: ZL

## 2022-05-06 PROCEDURE — 84460 ALANINE AMINO (ALT) (SGPT): CPT | Mod: ZL

## 2022-05-06 PROCEDURE — 36415 COLL VENOUS BLD VENIPUNCTURE: CPT | Mod: ZL

## 2022-05-06 PROCEDURE — 83735 ASSAY OF MAGNESIUM: CPT | Mod: ZL

## 2022-05-06 PROCEDURE — 83036 HEMOGLOBIN GLYCOSYLATED A1C: CPT | Mod: ZL

## 2022-05-06 PROCEDURE — 84443 ASSAY THYROID STIM HORMONE: CPT | Mod: ZL

## 2022-05-06 NOTE — PROGRESS NOTES
"  Assessment & Plan     1. Type 2 diabetes mellitus with stage 3b chronic kidney disease, with long-term current use of insulin (H)  No changes to current medication, follow-up in three months, will place future orders.    2. Mixed hyperlipidemia  As above.    3. Benign essential hypertension  Refilled  - losartan (COZAAR) 100 MG tablet; Take 1 tablet (100 mg) by mouth daily  Dispense: 90 tablet; Refill: 3  - chlorthalidone (HYGROTON) 25 MG tablet; Take 1 tablet (25 mg) by mouth daily  Dispense: 90 tablet; Refill: 3    4. ASCVD (arteriosclerotic cardiovascular disease)  No changes    5. Chronic left ventricular systolic dysfunction    6. Stage 3b chronic kidney disease (H)  No changes    7. Numbness and tingling in right hand  Referral ordered.  - Orthopedic  Referral; Future    8. Morbid obesity (H)       BMI:   Estimated body mass index is 32.06 kg/m  as calculated from the following:    Height as of this encounter: 1.702 m (5' 7\").    Weight as of this encounter: 92.9 kg (204 lb 11.2 oz).     Return in about 3 months (around 8/10/2022) for Diabetic Follow-up, Chronic Disease Management, Medication review.    Lorelei Felix MD  [facility] - RUTHANN Son is a 76 year old who presents for the following health issues     HPI     Diabetes Follow-up    How often are you checking your blood sugar? Three times daily  Blood sugar testing frequency justification:  Uncontrolled diabetes  What time of day are you checking your blood sugars (select all that apply)?  Before and after meals  Have you had any blood sugars above 200?  No  Have you had any blood sugars below 70?  No    What symptoms do you notice when your blood sugar is low?  Shaky and Weak    What concerns do you have today about your diabetes? None     Do you have any of these symptoms? (Select all that apply)  No numbness or tingling in feet.  No redness, sores or blisters on feet.  No complaints of excessive thirst.  " "No reports of blurry vision.  No significant changes to weight.    Have you had a diabetic eye exam in the last 12 months? 4/14/2022        Hyperlipidemia Follow-Up      Are you regularly taking any medication or supplement to lower your cholesterol?   Yes- Crestor    Are you having muscle aches or other side effects that you think could be caused by your cholesterol lowering medication?  No    Hypertension Follow-up      Do you check your blood pressure regularly outside of the clinic? Yes     Are you following a low salt diet? No    Are your blood pressures ever more than 140 on the top number (systolic) OR more   than 90 on the bottom number (diastolic), for example 140/90? Yes    BP Readings from Last 2 Encounters:   05/10/22 134/68   02/10/22 130/72     Hemoglobin A1C POCT (%)   Date Value   04/27/2021 9.7 (H)   01/26/2021 9.4 (H)     Hemoglobin A1C (%)   Date Value   05/06/2022 7.0 (H)   02/08/2022 7.9 (H)     LDL Cholesterol Calculated (mg/dL)   Date Value   05/06/2022 16   02/08/2022 13   04/27/2021 12   01/26/2021 26         Patient has noted some pain and tingling in his right hand.  The numbness is mainly in the ulnar distribution of the hand, but he does have pain going all the way up to his shoulder.      Review of Systems   Constitutional, HEENT, cardiovascular, pulmonary, gi and gu systems are negative, except as otherwise noted.      Objective    /68 (BP Location: Right arm, Patient Position: Sitting, Cuff Size: Adult Regular)   Pulse 76   Temp 97.1  F (36.2  C) (Tympanic)   Resp 16   Ht 1.702 m (5' 7\")   Wt 92.9 kg (204 lb 11.2 oz)   SpO2 99%   BMI 32.06 kg/m    Body mass index is 32.06 kg/m .  Physical Exam   GENERAL: healthy, alert and no distress  PSYCH: mentation appears normal, affect normal/bright    Lab on 05/06/2022   Component Date Value Ref Range Status     INR 05/06/2022 2.1 (A) 0.9 - 1.1 Final     TSH 05/06/2022 2.09  0.40 - 4.00 mU/L Final     Magnesium 05/06/2022 2.0  1.6 " - 2.3 mg/dL Final     ALT 05/06/2022 58  0 - 70 U/L Final     Cholesterol 05/06/2022 85  <200 mg/dL Final     Triglycerides 05/06/2022 172 (A) <150 mg/dL Final     Direct Measure HDL 05/06/2022 35 (A) >=40 mg/dL Final     LDL Cholesterol Calculated 05/06/2022 16  <=100 mg/dL Final     Non HDL Cholesterol 05/06/2022 50  <130 mg/dL Final     Patient Fasting > 8hrs? 05/06/2022 Yes   Final     Sodium 05/06/2022 137  133 - 144 mmol/L Final     Potassium 05/06/2022 4.0  3.4 - 5.3 mmol/L Final     Chloride 05/06/2022 100  94 - 109 mmol/L Final     Carbon Dioxide (CO2) 05/06/2022 29  20 - 32 mmol/L Final     Anion Gap 05/06/2022 8  3 - 14 mmol/L Final     Urea Nitrogen 05/06/2022 16  7 - 30 mg/dL Final     Creatinine 05/06/2022 1.34 (A) 0.66 - 1.25 mg/dL Final     Calcium 05/06/2022 9.3  8.5 - 10.1 mg/dL Final     Glucose 05/06/2022 149 (A) 70 - 99 mg/dL Final     GFR Estimate 05/06/2022 55 (A) >60 mL/min/1.73m2 Final    Effective December 21, 2021 eGFRcr in adults is calculated using the 2021 CKD-EPI creatinine equation which includes age and gender (Virgilio guzmán al., [place], DOI: 10.1056/PMZOof1460528)     Estimated Average Glucose 05/06/2022 154  mg/dL Final     Hemoglobin A1C 05/06/2022 7.0 (A) 0.0 - 5.6 % Final    Normal <5.7%   Prediabetes 5.7-6.4%    Diabetes 6.5% or higher     Note: Adopted from ADA consensus guidelines.     Creatinine Urine mg/dL 05/06/2022 56  mg/dL Final     Albumin Urine mg/L 05/06/2022 <5  mg/L Final     Albumin Urine mg/g Cr 05/06/2022    Final    Unable to calculate:  Urine creatinine or albumin value below detectable level

## 2022-05-06 NOTE — PROGRESS NOTES
ANTICOAGULATION FOLLOW-UP CLINIC VISIT    Patient Name:  Clement Voss  Date:  2022  Contact Type:  Called and left a detailed message on answering service and sent a [employer] message.     SUBJECTIVE:  Patient Findings         Clinical Outcomes     Negatives:  Major bleeding event, Thromboembolic event, Anticoagulation-related hospital admission, Anticoagulation-related ED visit, Anticoagulation-related fatality           OBJECTIVE    Recent labs: (last 7 days)     22  0932   INR 2.1*       ASSESSMENT / PLAN  INR assessment THER    Recheck INR In: 6 WEEKS    INR Location Outside lab      Anticoagulation Summary  As of 2022    INR goal:  2.0-3.0   TTR:  83.5 % (1 y)   INR used for dosin.1 (2022)   Warfarin maintenance plan:  2.5 mg (2.5 mg x 1) every Mon, Fri; 5 mg (2.5 mg x 2) all other days   Full warfarin instructions:  2.5 mg every Mon, Fri; 5 mg all other days   Weekly warfarin total:  30 mg   No change documented:  Darcy Chavez RN   Plan last modified:  Saba Delgadillo RN (2022)   Next INR check:  2022   Priority:  Maintenance   Target end date:  Indefinite    Indications    Pulmonary embolism and infarction (H) [I26.99]  Long-term (current) use of anticoagulants [Z79.01] [Z79.01]             Anticoagulation Episode Summary     INR check location:      Preferred lab:      Send INR reminders to:  [facility]    Comments:  Lab in [facility]      Anticoagulation Care Providers     Provider Role Specialty Phone number    Lorelei Felix MD Referring Family Medicine [phone]            See the Encounter Report to view Anticoagulation Flowsheet and Dosing Calendar (Go to Encounters tab in chart review, and find the Anticoagulation Therapy Visit)        Darcy Chavez, RN

## 2022-05-10 ENCOUNTER — OFFICE VISIT (OUTPATIENT)
Dept: FAMILY MEDICINE | Facility: OTHER | Age: 77
End: 2022-05-10
Attending: FAMILY MEDICINE
Payer: COMMERCIAL

## 2022-05-10 VITALS
TEMPERATURE: 97.1 F | OXYGEN SATURATION: 99 % | BODY MASS INDEX: 32.13 KG/M2 | HEART RATE: 76 BPM | HEIGHT: 67 IN | RESPIRATION RATE: 16 BRPM | DIASTOLIC BLOOD PRESSURE: 68 MMHG | WEIGHT: 204.7 LBS | SYSTOLIC BLOOD PRESSURE: 134 MMHG

## 2022-05-10 DIAGNOSIS — Z79.4 TYPE 2 DIABETES MELLITUS WITH STAGE 3B CHRONIC KIDNEY DISEASE, WITH LONG-TERM CURRENT USE OF INSULIN (H): Primary | ICD-10-CM

## 2022-05-10 DIAGNOSIS — E78.2 MIXED HYPERLIPIDEMIA: ICD-10-CM

## 2022-05-10 DIAGNOSIS — E66.01 MORBID OBESITY (H): ICD-10-CM

## 2022-05-10 DIAGNOSIS — R20.2 NUMBNESS AND TINGLING IN RIGHT HAND: ICD-10-CM

## 2022-05-10 DIAGNOSIS — E11.22 TYPE 2 DIABETES MELLITUS WITH STAGE 3B CHRONIC KIDNEY DISEASE, WITH LONG-TERM CURRENT USE OF INSULIN (H): Primary | ICD-10-CM

## 2022-05-10 DIAGNOSIS — R20.0 NUMBNESS AND TINGLING IN RIGHT HAND: ICD-10-CM

## 2022-05-10 DIAGNOSIS — N18.32 TYPE 2 DIABETES MELLITUS WITH STAGE 3B CHRONIC KIDNEY DISEASE, WITH LONG-TERM CURRENT USE OF INSULIN (H): Primary | ICD-10-CM

## 2022-05-10 DIAGNOSIS — I25.10 ASCVD (ARTERIOSCLEROTIC CARDIOVASCULAR DISEASE): ICD-10-CM

## 2022-05-10 DIAGNOSIS — I10 BENIGN ESSENTIAL HYPERTENSION: ICD-10-CM

## 2022-05-10 DIAGNOSIS — N18.32 STAGE 3B CHRONIC KIDNEY DISEASE (H): ICD-10-CM

## 2022-05-10 DIAGNOSIS — I51.9 CHRONIC LEFT VENTRICULAR SYSTOLIC DYSFUNCTION: ICD-10-CM

## 2022-05-10 PROCEDURE — 99214 OFFICE O/P EST MOD 30 MIN: CPT | Performed by: FAMILY MEDICINE

## 2022-05-10 PROCEDURE — G0463 HOSPITAL OUTPT CLINIC VISIT: HCPCS

## 2022-05-10 RX ORDER — CHLORTHALIDONE 25 MG/1
25 TABLET ORAL DAILY
Qty: 90 TABLET | Refills: 3 | Status: SHIPPED | OUTPATIENT
Start: 2022-05-10 | End: 2023-02-24

## 2022-05-10 RX ORDER — LOSARTAN POTASSIUM 100 MG/1
100 TABLET ORAL DAILY
Qty: 90 TABLET | Refills: 3 | Status: SHIPPED | OUTPATIENT
Start: 2022-05-10 | End: 2023-02-24

## 2022-05-10 ASSESSMENT — ANXIETY QUESTIONNAIRES
GAD7 TOTAL SCORE: 0
6. BECOMING EASILY ANNOYED OR IRRITABLE: NOT AT ALL
5. BEING SO RESTLESS THAT IT IS HARD TO SIT STILL: NOT AT ALL
7. FEELING AFRAID AS IF SOMETHING AWFUL MIGHT HAPPEN: NOT AT ALL
3. WORRYING TOO MUCH ABOUT DIFFERENT THINGS: NOT AT ALL
IF YOU CHECKED OFF ANY PROBLEMS ON THIS QUESTIONNAIRE, HOW DIFFICULT HAVE THESE PROBLEMS MADE IT FOR YOU TO DO YOUR WORK, TAKE CARE OF THINGS AT HOME, OR GET ALONG WITH OTHER PEOPLE: NOT DIFFICULT AT ALL
1. FEELING NERVOUS, ANXIOUS, OR ON EDGE: NOT AT ALL
4. TROUBLE RELAXING: NOT AT ALL
2. NOT BEING ABLE TO STOP OR CONTROL WORRYING: NOT AT ALL

## 2022-05-10 ASSESSMENT — PAIN SCALES - GENERAL: PAINLEVEL: NO PAIN (0)

## 2022-05-10 ASSESSMENT — PATIENT HEALTH QUESTIONNAIRE - PHQ9: SUM OF ALL RESPONSES TO PHQ QUESTIONS 1-9: 0

## 2022-05-10 NOTE — NURSING NOTE
"Chief Complaint   Patient presents with     Diabetes     Lipids     Hypertension       Initial /68 (BP Location: Right arm, Patient Position: Sitting, Cuff Size: Adult Regular)   Pulse 76   Temp 97.1  F (36.2  C) (Tympanic)   Resp 16   Ht 1.702 m (5' 7\")   Wt 92.9 kg (204 lb 11.2 oz)   SpO2 99%   BMI 32.06 kg/m   Estimated body mass index is 32.06 kg/m  as calculated from the following:    Height as of this encounter: 1.702 m (5' 7\").    Weight as of this encounter: 92.9 kg (204 lb 11.2 oz).  Medication Reconciliation: complete  Tracey Blanco MA  "

## 2022-05-11 ASSESSMENT — ANXIETY QUESTIONNAIRES: GAD7 TOTAL SCORE: 0

## 2022-05-11 NOTE — TELEPHONE ENCOUNTER
Messaged the [employer] phone number to Huy so he can call them to ask for notification of shipment. I also told him that we could change shipment to us at [facility]. Will follow with [employer].

## 2022-05-13 ENCOUNTER — OFFICE VISIT (OUTPATIENT)
Dept: ORTHOPEDICS | Facility: OTHER | Age: 77
End: 2022-05-13
Attending: FAMILY MEDICINE
Payer: COMMERCIAL

## 2022-05-13 VITALS
HEART RATE: 73 BPM | OXYGEN SATURATION: 97 % | HEIGHT: 67 IN | BODY MASS INDEX: 32.02 KG/M2 | SYSTOLIC BLOOD PRESSURE: 100 MMHG | WEIGHT: 204 LBS | DIASTOLIC BLOOD PRESSURE: 64 MMHG

## 2022-05-13 DIAGNOSIS — R20.2 NUMBNESS AND TINGLING IN RIGHT HAND: ICD-10-CM

## 2022-05-13 DIAGNOSIS — R20.0 NUMBNESS AND TINGLING IN RIGHT HAND: ICD-10-CM

## 2022-05-13 PROCEDURE — 99203 OFFICE O/P NEW LOW 30 MIN: CPT | Performed by: ORTHOPAEDIC SURGERY

## 2022-05-13 PROCEDURE — G0463 HOSPITAL OUTPT CLINIC VISIT: HCPCS

## 2022-05-13 RX ORDER — POTASSIUM CHLORIDE 750 MG/1
TABLET, EXTENDED RELEASE ORAL
COMMUNITY
Start: 2022-03-08 | End: 2023-05-26

## 2022-05-13 ASSESSMENT — PAIN SCALES - GENERAL: PAINLEVEL: NO PAIN (0)

## 2022-05-13 NOTE — PROGRESS NOTES
Service Date: 05/13/2022    HISTORY:  This pleasant 76-year-old male comes in with a history of numbness and tingling to his right upper extremity.  I think it does bother him at night, and he described his symptoms as he noticed it when he woke up this morning with this numbness.  He did not give me a precise amount of time it has been going on, but I think it has been bothering him recently.  He does not have any symptoms on his other hand as far as numbness or paresthesias goes, and he is right-hand dominant.  His past medical history in discussion with him is that he has diabetes since 2004 and in February of 2020, a little over 2 years ago, he underwent 3-vessel coronary artery bypass graft surgery with a new aortic valve.  He got through that surgery, it sounds like quite well.  He also described to me some eye issues with some upcoming eye appointment in July, but he said he wanted to move it because he is having some issues with ocular pressure and he has had previous surgery.  So I did look at him.  He is a pleasant man.  He seems to be a pretty vigorous man in his upper 70s.  He describes paresthesias that would likely be compatible with carpal tunnel syndrome to look at him clinically.  Initially, I thought his thenar musculature was fairly flat, consistent maybe of some atrophy, but as I compared it to the two hands, it did not seem to make a large amount of difference.  His Tinel sign was positive at the right wrist over the median nerve.    IMPRESSION:   1.  He probably has carpal tunnel syndrome on his right upper extremity.  2.  History of diabetes x18 years.  Of note, he said he did not have paresthesias into his lower extremities and did not describe anything that sounded like diabetic neuropathy.    PLAN:  In this clinical setting, I think it is very appropriate to have him see Dr. Alexandra for right upper extremity EMGs.  I described to the patient where Dr. Alexandra's office is on the campus of the  [facility], and I think we will go ahead and have it scheduled for him there as the patient is from [place], [place].    I will try to review the results with him accordingly.  I did show him my incision for my left endoscopic carpal tunnel release, and I think an endoscopic release would be very reasonable and appropriate if Dr. Alexandra does find carpal tunnel.  Thus we will have this appointment set up for him.  I did take cell number but he typically does not answer that because how much spam type of calls he gets.  He said typically he would  messages on his home answering machine, and I did verify that at [phone].     Cornel Henry MD        D: 2022   T: 2022   MT: MALENA    Name:     MARILYN TOLENTINO  MRN:      [id number]        Account:      [id number]   :      1945           Service Date: 2022       Document: [id number]    cc:  Broderick Alexandra MD

## 2022-05-13 NOTE — NURSING NOTE
"Chief Complaint   Patient presents with     Consult     Numbness and tingling in right hand       Initial /64   Pulse 73   Ht 1.702 m (5' 7\")   Wt 92.5 kg (204 lb)   SpO2 97%   BMI 31.95 kg/m   Estimated body mass index is 31.95 kg/m  as calculated from the following:    Height as of this encounter: 1.702 m (5' 7\").    Weight as of this encounter: 92.5 kg (204 lb).  Medication Reconciliation: complete  Brenda Rasmussen LPN    "

## 2022-05-14 ENCOUNTER — HEALTH MAINTENANCE LETTER (OUTPATIENT)
Age: 77
End: 2022-05-14

## 2022-05-24 ENCOUNTER — MYC MEDICAL ADVICE (OUTPATIENT)
Dept: EDUCATION SERVICES | Facility: HOSPITAL | Age: 77
End: 2022-05-24
Payer: COMMERCIAL

## 2022-06-08 DIAGNOSIS — N18.32 TYPE 2 DIABETES MELLITUS WITH STAGE 3B CHRONIC KIDNEY DISEASE, WITH LONG-TERM CURRENT USE OF INSULIN (H): ICD-10-CM

## 2022-06-08 DIAGNOSIS — E11.22 TYPE 2 DIABETES MELLITUS WITH STAGE 3B CHRONIC KIDNEY DISEASE, WITH LONG-TERM CURRENT USE OF INSULIN (H): ICD-10-CM

## 2022-06-08 DIAGNOSIS — Z79.4 TYPE 2 DIABETES MELLITUS WITH STAGE 3B CHRONIC KIDNEY DISEASE, WITH LONG-TERM CURRENT USE OF INSULIN (H): ICD-10-CM

## 2022-06-09 RX ORDER — BLOOD SUGAR DIAGNOSTIC
STRIP MISCELLANEOUS
Qty: 200 STRIP | Refills: 3 | Status: SHIPPED | OUTPATIENT
Start: 2022-06-09 | End: 2023-04-25

## 2022-06-14 ENCOUNTER — MYC MEDICAL ADVICE (OUTPATIENT)
Dept: EDUCATION SERVICES | Facility: HOSPITAL | Age: 77
End: 2022-06-14
Payer: COMMERCIAL

## 2022-06-15 ENCOUNTER — PATIENT OUTREACH (OUTPATIENT)
Dept: CARE COORDINATION | Facility: OTHER | Age: 77
End: 2022-06-15
Payer: COMMERCIAL

## 2022-06-15 NOTE — PROGRESS NOTES
Clinic Care Coordination Contact  Care Team Conversations    Patient was being called by RN CC for Covid.  Notes do not suggest any further issue. Patient navigating well with and has had current PCP and Diabetes visit.  No further outreach by RN CC.  Lisa Blandon RN  Care Coordination

## 2022-06-17 ENCOUNTER — LAB (OUTPATIENT)
Dept: LAB | Facility: OTHER | Age: 77
End: 2022-06-17
Payer: MEDICARE

## 2022-06-17 ENCOUNTER — ANTICOAGULATION THERAPY VISIT (OUTPATIENT)
Dept: ANTICOAGULATION | Facility: OTHER | Age: 77
End: 2022-06-17
Attending: FAMILY MEDICINE
Payer: MEDICARE

## 2022-06-17 DIAGNOSIS — I26.99 PULMONARY EMBOLISM AND INFARCTION (H): ICD-10-CM

## 2022-06-17 DIAGNOSIS — Z79.01 LONG TERM CURRENT USE OF ANTICOAGULANT THERAPY: ICD-10-CM

## 2022-06-17 DIAGNOSIS — I26.99 PULMONARY EMBOLISM AND INFARCTION (H): Primary | ICD-10-CM

## 2022-06-17 LAB — INR BLD: 2.1 (ref 0.9–1.1)

## 2022-06-17 PROCEDURE — 36416 COLLJ CAPILLARY BLOOD SPEC: CPT | Mod: ZL

## 2022-06-17 PROCEDURE — 85610 PROTHROMBIN TIME: CPT | Mod: ZL

## 2022-06-17 NOTE — PROGRESS NOTES
ANTICOAGULATION MANAGEMENT     Clement Voss 77 year old male is on warfarin with therapeutic INR result. (Goal INR 2.0-3.0)    Recent labs: (last 7 days)     06/17/22  0925   INR 2.1*       ASSESSMENT       Source(s): Chart Review    Previous INR was Therapeutic last 2(+) visits    Medication, diet, health changes since last INR chart reviewed; none identified           PLAN     Recommended plan for no diet, medication or health factor changes affecting INR     Dosing Instructions: continue your current warfarin dose with next INR in 6 weeks       Summary  As of 6/17/2022    Full warfarin instructions:  2.5 mg every Mon, Wed, Fri; 5 mg all other days   Next INR check:  7/29/2022             Sent [employer] message with dosing and follow up instructions    Contact [phone] to schedule and with any changes, questions or concerns.     Education provided: Contact [phone] with any changes, questions or concerns.     Plan made per [facility] anticoagulation protocol    Latosha Paris RN  Anticoagulation Clinic  6/17/2022    _______________________________________________________________________     Anticoagulation Episode Summary     Current INR goal:  2.0-3.0   TTR:  83.5 % (1 y)   Target end date:  Indefinite   Send INR reminders to:  [facility]    Indications    Pulmonary embolism and infarction (H) [I26.99]  Long-term (current) use of anticoagulants [Z79.01] [Z79.01]           Comments:  Lab in [facility] Arrives home in the afternoon after 2 PM         Anticoagulation Care Providers     Provider Role Specialty Phone number    Lorelei Felix MD Referring Family Medicine [phone]

## 2022-07-08 PROBLEM — I20.0 UNSTABLE ANGINA (H): Status: ACTIVE | Noted: 2020-01-28

## 2022-07-08 NOTE — PROGRESS NOTES
[facility]  [address]  [facility] [address]  Phone: [phone]  Primary Provider: Lorelei Felix  Pre-op Performing Provider: MEHRAN REYES      PREOPERATIVE EVALUATION:  Today's date: 7/14/2022    Clement Voss is a 77 year old male who presents for a preoperative evaluation.    Surgical Information:  Surgery/Procedure:  Right Wrist Open Carpal Tunnel Release  Surgery Location: [place], [place]  Surgeon: Dr. Henry  Surgery Date: 7/20/22  Time of Surgery: TBD  Where patient plans to recover: At home with family  Fax number for surgical facility: [phone]    Type of Anesthesia Anticipated: to be determined    Assessment & Plan     The proposed surgical procedure is considered LOW risk.    1. Pre-operative examination  - Comprehensive metabolic panel; Future  - CBC with platelets; Future  - EKG 12-lead complete w/read - (Clinic Performed)  - Asymptomatic COVID-19 Virus (Coronavirus) by PCR; Future    2. Carpal tunnel syndrome of right wrist    3. Type 2 diabetes mellitus with stage 3b chronic kidney disease, with long-term current use of insulin (H)    4. Mixed hyperlipidemia    5. Benign essential hypertension    6. ASCVD (arteriosclerotic cardiovascular disease)    7. Congestive heart failure, unspecified HF chronicity, unspecified heart failure type (H)    8. Pulmonary embolism and infarction (H)    9. Morbid obesity (H)    10. Anticoagulated on Coumadin  Will hold per coumadin clinic.  Resume the next day.          Risks and Recommendations:  The patient has the following additional risks and recommendations for perioperative complications:  Cardiovascular:    Diabetes:  - Patient is on insulin therapy; diabetic NPO guidelines provided and discussed.    Medication Instructions:   - Bleeding risk is low for this procedure (e.g. dental, skin, cataract).   - warfarin: Thromboembolic risk is low (<4%/year). HOLD warfarin for 5 days  without bridging before procedure.    - ACE/ARB: HOLD    - Beta Blockers: Continue taking on the day of surgery.   - Long acting insulin (e.g. glargine, detemir): Take 80% of the usual evening or morning dose before surgery.    RECOMMENDATION:  APPROVAL GIVEN to proceed with proposed procedure, without further diagnostic evaluation.          Subjective     HPI related to upcoming procedure: chronic pain and numbness of right hand due to carpal tunnel syndrome.        Preop Questions 7/14/2022   1. Have you ever had a heart attack or stroke? No   2. Have you ever had surgery on your heart or blood vessels, such as a stent placement, a coronary artery bypass, or surgery on an artery in your head, neck, heart, or legs? YES - replaced a valve in 2/2020.  S/P CABG 2 vessel   3. Do you have chest pain with activity? No   4. Do you have a history of  heart failure? No   5. Do you currently have a cold, bronchitis or symptoms of other infection? No   6. Do you have a cough, shortness of breath, or wheezing? No   7. Do you or anyone in your family have previous history of blood clots? YES - he's on warfarin   8. Do you or does anyone in your family have a serious bleeding problem such as prolonged bleeding following surgeries or cuts? No   9. Have you ever had problems with anemia or been told to take iron pills? No   10. Have you had any abnormal blood loss such as black, tarry or bloody stools? No   11. Have you ever had a blood transfusion? No   12. Are you willing to have a blood transfusion if it is medically needed before, during, or after your surgery? Yes   13. Have you or any of your relatives ever had problems with anesthesia? No   14. Do you have sleep apnea, excessive snoring or daytime drowsiness? No   15. Do you have any artifical heart valves or other implanted medical devices like a pacemaker, defibrillator, or continuous glucose monitor? Yes-pig's valve   16. Do you have artificial joints? No   17. Are you  allergic to latex? No     Health Care Directive:  Patient does not have a Health Care Directive or Living Will: Discussed advance care planning with patient; however, patient declined at this time.    Preoperative Review of :   reviewed - no record of controlled substances prescribed.      Status of Chronic Conditions:  CHF - Patient has a longstanding history of moderate-severe CHF. Exacerbating conditions include ischemic heart disease and valvular disease. Currently the patient's condition is same. Current treatment regimen includes ACE inhibitor, beta blocker and diuretic. The patient denies chest pain, edema, orthopnea, SOB or recent weight gain.      DIABETES - Patient has a longstanding history of DiabetesType Type II . Patient is being treated with diet, oral agents and insulin injections and denies significant side effects. Control has been good. Complicating factors include but are not limited to: hypertension and hyperlipidemia.   Lab Results   Component Value Date    A1C 7.0 05/06/2022    A1C 7.9 02/08/2022    A1C 8.8 11/05/2021    A1C 8.8 08/02/2021    A1C 9.7 04/27/2021    A1C 9.4 01/26/2021    A1C 7.3 10/27/2020    A1C 7.3 07/16/2020    A1C 7.5 01/02/2020         HYPERLIPIDEMIA - Patient has a long history of significant Hyperlipidemia requiring medication for treatment with recent good control. Patient reports no problems or side effects with the medication.     HYPERTENSION - Patient has longstanding history of HTN , currently denies any symptoms referable to elevated blood pressure. Specifically denies chest pain, palpitations, dyspnea, orthopnea, PND or peripheral edema. Blood pressure readings have been in normal range. Current medication regimen is as listed below. Patient denies any side effects of medication.   BP Readings from Last 3 Encounters:   07/14/22 122/76   05/13/22 100/64   05/10/22 134/68         RENAL INSUFFICIENCY - Patient has a longstanding history of moderate-severe  chronic renal insufficiency.   Creatinine   Date Value Ref Range Status   05/06/2022 1.34 (H) 0.66 - 1.25 mg/dL Final   04/27/2021 1.34 (H) 0.66 - 1.25 mg/dL Final            Review of Systems  CONSTITUTIONAL: NEGATIVE for fever, chills, change in weight  INTEGUMENTARY/SKIN: NEGATIVE for worrisome rashes, moles or lesions  EYES: NEGATIVE for vision changes or irritation  ENT/MOUTH: NEGATIVE for ear, mouth and throat problems  RESP: NEGATIVE for significant cough or SOB  CV: NEGATIVE for chest pain, palpitations or peripheral edema  GI: NEGATIVE for nausea, abdominal pain, heartburn, or change in bowel habits  : NEGATIVE for frequency, dysuria, or hematuria  MUSCULOSKELETAL: NEGATIVE for significant arthralgias or myalgia  NEURO: NEGATIVE for weakness, dizziness or paresthesias  ENDOCRINE: NEGATIVE for temperature intolerance, skin/hair changes  HEME: NEGATIVE for bleeding problems  PSYCHIATRIC: NEGATIVE for changes in mood or affect    Patient Active Problem List    Diagnosis Date Noted     Congestive heart failure, unspecified HF chronicity, unspecified heart failure type (H) 01/28/2021     Priority: Medium     Chronic left ventricular systolic dysfunction 10/27/2020     Priority: Medium     Other dysphagia 02/21/2020     Priority: Medium     Abnormal stress test on 1/10/2020 02/18/2020     Priority: Medium     Regional wall motion abnormality of heart 02/18/2020     Priority: Medium     Diastolic dysfunction grade 2 on 1/20/2020 02/18/2020     Priority: Medium     Severe aortic valve regurgitation 02/18/2020     Priority: Medium     Moderate mitral regurgitation 02/18/2020     Priority: Medium     Ascending aortic aneurysm-severe 02/18/2020     Priority: Medium     Anticoagulated on Coumadin 02/18/2020     Priority: Medium     History of DVT on 12/30/2013 02/18/2020     Priority: Medium     History of pulmonary embolism 02/18/2020     Priority: Medium     History of tobacco abuse quitting 8/12/1964 02/18/2020      Priority: Medium     S/P aortic valve replacement 02/17/2020     Priority: Medium     Postoperative anemia due to acute blood loss 02/10/2020     Priority: Medium     S/P CABG x 2 02/10/2020     Priority: Medium     ASCVD (arteriosclerotic cardiovascular disease) 02/03/2020     Priority: Medium     Aortic root aneurysm (H) 01/28/2020     Priority: Medium     Added automatically from request for surgery [id number]       CALDERON (dyspnea on exertion) 01/28/2020     Priority: Medium     Added automatically from request for surgery [id number]       Murmur 01/28/2020     Priority: Medium     Added automatically from request for surgery [id number]       Aortic valve insufficiency, etiology of cardiac valve disease unspecified 01/28/2020     Priority: Medium     Added automatically from request for surgery [id number]       Unstable angina (H) 01/28/2020     Priority: Medium     Formatting of this note might be different from the original.  Added automatically from request for surgery [id number]       CKD (chronic kidney disease) stage 3, GFR 30-59 ml/min (H) 09/24/2019     Priority: Medium     Morbid obesity (H) 05/26/2017     Priority: Medium     Long-term (current) use of anticoagulants [Z79.01] 08/04/2016     Priority: Medium     ACP (advance care planning) 03/14/2013     Priority: Medium     Advance Care Planning 8/26/2016: ACP Review of Chart / Resources Provided:  Reviewed chart for advance care plan.  Clement Voss has no plan or code status on file. Discussed available resources and provided with information. Confirmed code status reflects current choices pending further ACP discussions.  Confirmed/documented legally designated decision makers.  Added by Jenifer Villa             Pulmonary embolism and infarction (H) 09/24/2012     Priority: Medium     Gout 01/10/2011     Priority: Medium     Optic nerve disease 10/19/2009     Priority: Medium     Formatting of this note might be different from the original.  IMO Update 10/11        Primary open-angle glaucoma 04/13/2009     Priority: Medium     Formatting of this note might be different from the original.  IMO Update 10/11       Mixed hyperlipidemia 10/10/2005     Priority: Medium     Benign essential hypertension 01/03/2005     Priority: Medium     Type 2 diabetes mellitus with chronic kidney disease, with long-term current use of insulin (H) 08/02/2004     Priority: Medium      Past Medical History:   Diagnosis Date     DVT (deep venous thrombosis) 9/24/2012     DVT (deep venous thrombosis) (H)      Gout, unspecified 1/10/2011     Other and unspecified hyperlipidemia 10/10/2005     Pulmonary embolism 9/24/2012     Pulmonary embolism (H)      Type II or unspecified type diabetes mellitus without mention of complication, not stated as uncontrolled 8/2/2004     Unspecified essential hypertension 1/3/2005     Past Surgical History:   Procedure Laterality Date     APPENDECTOMY  2008     CHOLECYSTECTOMY  1984     COLONOSCOPY N/A 5/16/2019    Procedure: COLONOSCOPY;  Surgeon: Benito Saldana MD;  Location: [facility] OR     History of PE of right lung 3/2013[       left eye glacoma  11/30/21     PHACOEMULSIFICATION WITH STANDARD INTRAOCULAR LENS IMPLANT Left 4/19/2018    Procedure: PHACOEMULSIFICATION WITH STANDARD INTRAOCULAR LENS IMPLANT;  CATARACT EXTRACTION LEFT EYE WITH IMPLANT, ISTENT LEFT EYE;  Surgeon: Kush Almaraz MD;  Location: [facility]     PHACOEMULSIFICATION WITH STANDARD INTRAOCULAR LENS IMPLANT Right 5/3/2018    Procedure: PHACOEMULSIFICATION WITH STANDARD INTRAOCULAR LENS IMPLANT;  CATARACT EXTRACTION RIGHT EYE WITH IMPLANT,WITH ISTENT ATTEMPTED;  Surgeon: Kush Almaraz MD;  Location: [facility]     TRABECULAR MICRO-BYPASS WITH ISTENT Left 4/19/2018    Procedure: TRABECULAR MICRO-BYPASS WITH ISTENT;;  Surgeon: Kush Almaraz MD;  Location: [facility]     TRABECULAR MICRO-BYPASS WITH ISTENT  5/3/2018    Procedure: TRABECULAR MICRO-BYPASS WITH ISTENT;;  Surgeon: Kush Almaraz MD;   Location: [facility]     Current Outpatient Medications   Medication Sig Dispense Refill     ACCU-CHEK VLAD PLUS test strip USE TO TEST BLOOD SUGARS THREE TIMES DAILY OR AS DIRECTED 200 strip 3     acetaminophen (TYLENOL) 500 MG tablet Take 1-2 tablets (500-1,000 mg) by mouth every 6 hours as needed       albuterol (PROAIR HFA/PROVENTIL HFA/VENTOLIN HFA) 108 (90 Base) MCG/ACT inhaler Inhale 2 puffs into the lungs every 4 hours as needed for shortness of breath / dyspnea or wheezing 18 g 1     aspirin 81 MG EC tablet Take 1 tablet (81 mg) by mouth daily 90 tablet 3     B-D U/F insulin pen needle USE AS DIRECTED 100 each 3     chlorthalidone (HYGROTON) 25 MG tablet Take 1 tablet (25 mg) by mouth daily 90 tablet 3     COMBIGAN 0.2-0.5 % ophthalmic solution        dulaglutide (TRULICITY) 1.5 MG/0.5ML pen Inject 1.5 mg Subcutaneous every 7 days Do not send to pharmacy 8 mL 0     FEROSUL 325 (65 Fe) MG tablet TAKE 1 TABLET(325 MG) BY MOUTH DAILY WITH BREAKFAST 30 tablet 6     fluticasone (FLONASE) 50 MCG/ACT nasal spray Spray 2 sprays into both nostrils daily (Patient taking differently: Spray 2 sprays into both nostrils daily as needed) 1 Package 0     furosemide (LASIX) 20 MG tablet Take 20 mg by mouth       indomethacin (INDOCIN) 50 MG capsule Take 1 capsule (50 mg) by mouth 3 times daily With food as needed 60 capsule 1     insulin glargine (LANTUS PEN) 100 UNIT/ML pen Inject 36 Units Subcutaneous       loratadine (CLARITIN) 10 MG tablet Take 10 mg by mouth daily.       losartan (COZAAR) 100 MG tablet Take 1 tablet (100 mg) by mouth daily 90 tablet 3     metoprolol tartrate (LOPRESSOR) 25 MG tablet TAKE 2 TABLETS(50 MG) BY MOUTH TWICE DAILY 360 tablet 3     order for DME Equipment being ordered: Knee high compression stockings - 20-30 mmHg 2 Device 1     potassium chloride ER (K-TAB/KLOR-CON) 10 MEQ CR tablet TAKE 1 TABLET BY MOUTH EVERY DAY. DO NOT CRUSH       rosuvastatin (CRESTOR) 5 MG tablet TAKE 1 TABLET(5 MG) BY  MOUTH DAILY 90 tablet 3     STOOL SOFTENER 100 MG capsule TAKE 1 CAPSULE(100 MG) BY MOUTH TWICE DAILY 60 capsule 11     travoprost DARION FREE (TRAVATAN Z) 0.004 % ophthalmic solution        warfarin ANTICOAGULANT (COUMADIN) 2.5 MG tablet TAKE 1 TABLET BY MOUTH MONDAY AND FRIDAY AND 2 TABLETS BY MOUTH ON ALL OTHER DAYS OF THE WEEK OR AS DIRECTED PER WARFARIN CLINIC 155 tablet 1     warfarin ANTICOAGULANT (COUMADIN) 5 MG tablet 2.5mg (1/2 pill) Mon, Fri and 5mg (1 pill) all other days or as directed by warfarin clinic 90 tablet 3       Allergies   Allergen Reactions     Atorvastatin      Other reaction(s): Other  Caused increased creatinine.      Atorvastatin Calcium Other (See Comments)     Lipitor  Increase CR     Barley Grass Unknown     Iodine      Penicillins      Procaine Unknown     Shellfish-Derived Products Unknown     Sulfa Drugs      Sulfa (sulfonamide antibiotics)     Sulfacetamide         Social History     Tobacco Use     Smoking status: Former Smoker     Types: Cigarettes     Quit date: 1964     Years since quittin.9     Smokeless tobacco: Never Used   Substance Use Topics     Alcohol use: No       History   Drug Use No         Objective     /76 (BP Location: Left arm, Patient Position: Chair, Cuff Size: Adult Regular)   Pulse 63   Temp (!) 96.5  F (35.8  C) (Tympanic)   Wt 91.2 kg (201 lb)   SpO2 99%   BMI 31.48 kg/m      Physical Exam    GENERAL APPEARANCE: alert, no distress very pleasant elderly male.      EYES: EOMI,  PERRL     HENT: ear canals and TM's normal and nose and mouth without ulcers or lesions     NECK: no adenopathy, no asymmetry, masses, or scars and thyroid normal to palpation     RESP: lungs clear to auscultation - no rales, rhonchi or wheezes     CV: regular rates and rhythm, normal S1 S2, no S3 or S4 and no murmur, click or rub     ABDOMEN:  soft, nontender, no HSM or masses and bowel sounds normal     MS: extremities normal- no gross deformities noted, no  evidence of inflammation in joints, FROM in all extremities.     SKIN: no suspicious lesions or rashes     NEURO: Normal strength and tone, sensory exam grossly normal, mentation intact and speech normal     PSYCH: mentation appears normal. and affect normal/bright     LYMPHATICS: No cervical adenopathy    Recent Labs   Lab Test 06/17/22  0925 05/06/22  0932 03/15/22  0936 02/08/22  0928 08/25/21  0908 08/19/21  1640 12/07/20  1000 11/24/20  0945   HGB  --   --   --   --   --  13.1*  --  14.2   PLT  --   --   --   --   --  124*  --  104*   INR 2.1* 2.1*   < > 2.3*   < > 2.20*   < >  --    NA  --  137  --  136   < > 138   < >  --    POTASSIUM  --  4.0  --  3.5   < > 3.3*   < >  --    CR  --  1.34*  --  1.35*   < > 1.53*   < >  --    A1C  --  7.0*  --  7.9*   < >  --    < >  --     < > = values in this interval not displayed.        Diagnostics:  Results for orders placed or performed in visit on 07/14/22   Comprehensive metabolic panel     Status: Abnormal   Result Value Ref Range    Sodium 139 133 - 144 mmol/L    Potassium 3.9 3.4 - 5.3 mmol/L    Chloride 104 94 - 109 mmol/L    Carbon Dioxide (CO2) 30 20 - 32 mmol/L    Anion Gap 5 3 - 14 mmol/L    Urea Nitrogen 17 7 - 30 mg/dL    Creatinine 1.36 (H) 0.66 - 1.25 mg/dL    Calcium 9.4 8.5 - 10.1 mg/dL    Glucose 154 (H) 70 - 99 mg/dL    Alkaline Phosphatase 119 40 - 150 U/L    AST 54 (H) 0 - 45 U/L    ALT 86 (H) 0 - 70 U/L    Protein Total 6.8 6.8 - 8.8 g/dL    Albumin 3.6 3.4 - 5.0 g/dL    Bilirubin Total 1.0 0.2 - 1.3 mg/dL    GFR Estimate 53 (L) >60 mL/min/1.73m2   CBC with platelets     Status: Abnormal   Result Value Ref Range    WBC Count 6.7 4.0 - 11.0 10e3/uL    RBC Count 4.00 (L) 4.40 - 5.90 10e6/uL    Hemoglobin 13.6 13.3 - 17.7 g/dL    Hematocrit 37.3 (L) 40.0 - 53.0 %    MCV 93 78 - 100 fL    MCH 34.0 (H) 26.5 - 33.0 pg    MCHC 36.5 31.5 - 36.5 g/dL    RDW 13.8 10.0 - 15.0 %    Platelet Count 112 (L) 150 - 450 10e3/uL   Asymptomatic COVID-19 Virus  (Coronavirus) by PCR Nose     Status: Normal    Specimen: Nose; Swab   Result Value Ref Range    SARS CoV2 PCR Negative Negative, Testing sent to reference lab. Results will be returned via unsolicited result    Narrative    Testing was performed using the hazel SARS-CoV-2 assay on the hazel  Project Playlist0 System. This test should be ordered for the detection of  SARS-CoV-2 in individuals who meet SARS-CoV-2 clinical and/or  epidemiological criteria. Test performance is unknown in asymptomatic  patients. This test is for in vitro diagnostic use under the FDA EUA  for laboratories certified under CLIA to perform high and/or moderate  complexity testing. This test has not been FDA cleared or approved. A  negative result does not rule out the presence of PCR inhibitors in  the specimen or target RNA in concentration below the limit of  detection for the assay. The possibility of a false negative should  be considered if the patient's recent exposure or clinical  presentation suggests COVID-19. This test was validated by the [facility]. This  laboratory is certified under the Clinical Laboratory Improvement  Amendments of 1988 (CLIA-88) as qualified to perform high and/or  moderate complexity laboratory testing.        EKG: sinus bradycardia, Right Bundle Branch Block, unchanged from previous tracings    Revised Cardiac Risk Index (RCRI):  The patient has the following serious cardiovascular risks for perioperative complications:   - Coronary Artery Disease (MI, positive stress test, angina, Qs on EKG) = 1 point   - Congestive Heart Failure (pulmonary edema, PND, s3 cornelius, CXR with pulmonary congestion, basilar rales) = 1 point     RCRI Interpretation: 2 points: Class III (moderate risk - 6.6% complication rate)     Estimated Functional Capacity: Performs 4 METS exercise without symptoms (e.g., light housework, stairs, 4 mph walk, 7 mph bike, slow step dance)           Signed  Electronically by: Portia Joseph NP  Copy of this evaluation report is provided to requesting physician.

## 2022-07-12 ENCOUNTER — TELEPHONE (OUTPATIENT)
Dept: EDUCATION SERVICES | Facility: HOSPITAL | Age: 77
End: 2022-07-12

## 2022-07-12 NOTE — TELEPHONE ENCOUNTER
Called patient to inform him that Trulicity medicaiton he gets from the Patient assistance program has been delivered to the [facility] for him to  at his convenience. He states understanding and states He should be able to get it WEdnesday 7-  Carol Sheehan LPN on 7/12/2022 at 2:46 PM

## 2022-07-14 ENCOUNTER — OFFICE VISIT (OUTPATIENT)
Dept: FAMILY MEDICINE | Facility: OTHER | Age: 77
End: 2022-07-14
Attending: NURSE PRACTITIONER
Payer: MEDICARE

## 2022-07-14 VITALS
DIASTOLIC BLOOD PRESSURE: 76 MMHG | SYSTOLIC BLOOD PRESSURE: 122 MMHG | WEIGHT: 201 LBS | BODY MASS INDEX: 31.48 KG/M2 | OXYGEN SATURATION: 99 % | TEMPERATURE: 96.5 F | HEART RATE: 63 BPM

## 2022-07-14 DIAGNOSIS — G56.01 CARPAL TUNNEL SYNDROME OF RIGHT WRIST: ICD-10-CM

## 2022-07-14 DIAGNOSIS — N18.32 TYPE 2 DIABETES MELLITUS WITH STAGE 3B CHRONIC KIDNEY DISEASE, WITH LONG-TERM CURRENT USE OF INSULIN (H): ICD-10-CM

## 2022-07-14 DIAGNOSIS — E78.2 MIXED HYPERLIPIDEMIA: ICD-10-CM

## 2022-07-14 DIAGNOSIS — E11.22 TYPE 2 DIABETES MELLITUS WITH STAGE 3B CHRONIC KIDNEY DISEASE, WITH LONG-TERM CURRENT USE OF INSULIN (H): ICD-10-CM

## 2022-07-14 DIAGNOSIS — I25.10 ASCVD (ARTERIOSCLEROTIC CARDIOVASCULAR DISEASE): ICD-10-CM

## 2022-07-14 DIAGNOSIS — E66.01 MORBID OBESITY (H): ICD-10-CM

## 2022-07-14 DIAGNOSIS — I26.99 PULMONARY EMBOLISM AND INFARCTION (H): ICD-10-CM

## 2022-07-14 DIAGNOSIS — Z79.01 ANTICOAGULATED ON COUMADIN: ICD-10-CM

## 2022-07-14 DIAGNOSIS — Z01.818 PRE-OPERATIVE EXAMINATION: Primary | ICD-10-CM

## 2022-07-14 DIAGNOSIS — I50.9 CONGESTIVE HEART FAILURE, UNSPECIFIED HF CHRONICITY, UNSPECIFIED HEART FAILURE TYPE (H): ICD-10-CM

## 2022-07-14 DIAGNOSIS — Z79.4 TYPE 2 DIABETES MELLITUS WITH STAGE 3B CHRONIC KIDNEY DISEASE, WITH LONG-TERM CURRENT USE OF INSULIN (H): ICD-10-CM

## 2022-07-14 DIAGNOSIS — I10 BENIGN ESSENTIAL HYPERTENSION: ICD-10-CM

## 2022-07-14 LAB
ALBUMIN SERPL-MCNC: 3.6 G/DL (ref 3.4–5)
ALP SERPL-CCNC: 119 U/L (ref 40–150)
ALT SERPL W P-5'-P-CCNC: 86 U/L (ref 0–70)
ANION GAP SERPL CALCULATED.3IONS-SCNC: 5 MMOL/L (ref 3–14)
AST SERPL W P-5'-P-CCNC: 54 U/L (ref 0–45)
BILIRUB SERPL-MCNC: 1 MG/DL (ref 0.2–1.3)
BUN SERPL-MCNC: 17 MG/DL (ref 7–30)
CALCIUM SERPL-MCNC: 9.4 MG/DL (ref 8.5–10.1)
CHLORIDE BLD-SCNC: 104 MMOL/L (ref 94–109)
CO2 SERPL-SCNC: 30 MMOL/L (ref 20–32)
CREAT SERPL-MCNC: 1.36 MG/DL (ref 0.66–1.25)
ERYTHROCYTE [DISTWIDTH] IN BLOOD BY AUTOMATED COUNT: 13.8 % (ref 10–15)
GFR SERPL CREATININE-BSD FRML MDRD: 53 ML/MIN/1.73M2
GLUCOSE BLD-MCNC: 154 MG/DL (ref 70–99)
HCT VFR BLD AUTO: 37.3 % (ref 40–53)
HGB BLD-MCNC: 13.6 G/DL (ref 13.3–17.7)
MCH RBC QN AUTO: 34 PG (ref 26.5–33)
MCHC RBC AUTO-ENTMCNC: 36.5 G/DL (ref 31.5–36.5)
MCV RBC AUTO: 93 FL (ref 78–100)
PLATELET # BLD AUTO: 112 10E3/UL (ref 150–450)
POTASSIUM BLD-SCNC: 3.9 MMOL/L (ref 3.4–5.3)
PROT SERPL-MCNC: 6.8 G/DL (ref 6.8–8.8)
RBC # BLD AUTO: 4 10E6/UL (ref 4.4–5.9)
SODIUM SERPL-SCNC: 139 MMOL/L (ref 133–144)
WBC # BLD AUTO: 6.7 10E3/UL (ref 4–11)

## 2022-07-14 PROCEDURE — 99214 OFFICE O/P EST MOD 30 MIN: CPT | Mod: 25 | Performed by: NURSE PRACTITIONER

## 2022-07-14 PROCEDURE — 80053 COMPREHEN METABOLIC PANEL: CPT | Mod: ZL | Performed by: NURSE PRACTITIONER

## 2022-07-14 PROCEDURE — 93005 ELECTROCARDIOGRAM TRACING: CPT | Performed by: NURSE PRACTITIONER

## 2022-07-14 PROCEDURE — G0463 HOSPITAL OUTPT CLINIC VISIT: HCPCS

## 2022-07-14 PROCEDURE — G0463 HOSPITAL OUTPT CLINIC VISIT: HCPCS | Mod: 25

## 2022-07-14 PROCEDURE — 36415 COLL VENOUS BLD VENIPUNCTURE: CPT | Mod: ZL | Performed by: NURSE PRACTITIONER

## 2022-07-14 PROCEDURE — 85027 COMPLETE CBC AUTOMATED: CPT | Mod: ZL | Performed by: NURSE PRACTITIONER

## 2022-07-14 PROCEDURE — U0005 INFEC AGEN DETEC AMPLI PROBE: HCPCS | Mod: ZL | Performed by: NURSE PRACTITIONER

## 2022-07-14 PROCEDURE — 93010 ELECTROCARDIOGRAM REPORT: CPT | Mod: 77 | Performed by: INTERNAL MEDICINE

## 2022-07-14 ASSESSMENT — PAIN SCALES - GENERAL: PAINLEVEL: NO PAIN (0)

## 2022-07-14 NOTE — NURSING NOTE
"Chief Complaint   Patient presents with     Pre-Op Exam       Initial /76 (BP Location: Left arm, Patient Position: Chair, Cuff Size: Adult Regular)   Pulse 63   Temp (!) 96.5  F (35.8  C) (Tympanic)   Wt 91.2 kg (201 lb)   SpO2 99%   BMI 31.48 kg/m   Estimated body mass index is 31.48 kg/m  as calculated from the following:    Height as of 5/13/22: 1.702 m (5' 7\").    Weight as of this encounter: 91.2 kg (201 lb).  Medication Reconciliation: complete  Anupama John    "

## 2022-07-15 ENCOUNTER — TELEPHONE (OUTPATIENT)
Dept: FAMILY MEDICINE | Facility: OTHER | Age: 77
End: 2022-07-15

## 2022-07-15 LAB — SARS-COV-2 RNA RESP QL NAA+PROBE: NEGATIVE

## 2022-07-15 NOTE — TELEPHONE ENCOUNTER
Pt calling and needs directions on his Coumadin for surgery. His surgery is 7.20.2022 which is next Wednesday in [place] with .    Call back [phone]      Sharmila Gao RN

## 2022-07-18 ENCOUNTER — TELEPHONE (OUTPATIENT)
Dept: FAMILY MEDICINE | Facility: OTHER | Age: 77
End: 2022-07-18

## 2022-07-18 NOTE — TELEPHONE ENCOUNTER
Planned hold for Coumadin for procedure on 7/20/22 per provider Portia Joseph, NP.    7/14/22, Take last dose of warfarin  7/15/22, NO warfarin  7/16/22, NO warfarin  7/17/22, NO warfarin  7/18/22, NO warfarin   7/19/22, NO warfarin    7/20/22, DAY OF PROCEDURE, Restart warfarin 5 mg in the evening, if okay with provider doing the procedure.    Make sure to ask the provider doing your procedure if it is okay to restart your warfarin and enoxaparin as planned     7/21/22,  Warfarin 5 mg in the evening.   7/22/22,  Warfarin 2.5 mg in the evening.  7/23/22,  Warfarin 5 mg in the evening.  7/24/22,  warfarin 5 mg in the evening.  7/25/22  warfarin 2.5 mg in the evening.  7/26/22,  warfarin 5 mg in the evening.  7/27/22, Recheck INR. Anticoagulation clinic to call with further dosing instructions.   Message sent back to Portia Joseph and to patient via My Chart. Darcy Chavez RN on 7/18/2022 at 8:52 AM

## 2022-07-26 ENCOUNTER — ANTICOAGULATION THERAPY VISIT (OUTPATIENT)
Dept: ANTICOAGULATION | Facility: OTHER | Age: 77
End: 2022-07-26
Attending: FAMILY MEDICINE
Payer: MEDICARE

## 2022-07-26 ENCOUNTER — LAB (OUTPATIENT)
Dept: LAB | Facility: OTHER | Age: 77
End: 2022-07-26
Payer: MEDICARE

## 2022-07-26 DIAGNOSIS — I26.99 PULMONARY EMBOLISM AND INFARCTION (H): ICD-10-CM

## 2022-07-26 DIAGNOSIS — Z79.01 LONG TERM CURRENT USE OF ANTICOAGULANT THERAPY: ICD-10-CM

## 2022-07-26 DIAGNOSIS — I26.99 PULMONARY EMBOLISM AND INFARCTION (H): Primary | ICD-10-CM

## 2022-07-26 LAB — INR BLD: 1.7 (ref 0.9–1.1)

## 2022-07-26 PROCEDURE — 36416 COLLJ CAPILLARY BLOOD SPEC: CPT | Mod: ZL

## 2022-07-26 PROCEDURE — 85610 PROTHROMBIN TIME: CPT | Mod: ZL

## 2022-07-26 NOTE — PROGRESS NOTES
ANTICOAGULATION MANAGEMENT     Clement Voss 77 year old male is on warfarin with subtherapeutic INR result. (Goal INR 2.0-3.0)    Recent labs: (last 7 days)     07/26/22  0937   INR 1.7*       ASSESSMENT       Source(s): Chart Review and Patient/Caregiver Call       Warfarin doses taken: Held for carpal tunnel  recently which may be affecting INR    Diet: No new diet changes identified    New illness, injury, or hospitalization: No    Medication/supplement changes: None noted    Signs or symptoms of bleeding or clotting: No    Previous INR: Therapeutic last 2(+) visits    Additional findings: None       PLAN     Recommended plan for temporary change(s) affecting INR     Dosing Instructions: Continue your current warfarin dose with next INR in 3 weeks       Summary  As of 7/26/2022    Full warfarin instructions:  2.5 mg every Mon, Wed, Fri; 5 mg all other days   Next INR check:  8/15/2022             Telephone call with Huy who verbalizes understanding and agrees to plan    Lab visit scheduled    Education provided: None required    Plan made per [facility] anticoagulation protocol    Latosha Paris, RN  Anticoagulation Clinic  7/26/2022    _______________________________________________________________________     Anticoagulation Episode Summary     Current INR goal:  2.0-3.0   TTR:  75.5 % (1 y)   Target end date:  Indefinite   Send INR reminders to:  [facility]    Indications    Pulmonary embolism and infarction (H) [I26.99]  Long-term (current) use of anticoagulants [Z79.01] [Z79.01]           Comments:  Lab in [facility] Arrives home in the afternoon after 2 PM         Anticoagulation Care Providers     Provider Role Specialty Phone number    Lorelei Felix MD Referring Family Medicine [phone]

## 2022-08-15 ENCOUNTER — LAB (OUTPATIENT)
Dept: LAB | Facility: OTHER | Age: 77
End: 2022-08-15
Payer: MEDICARE

## 2022-08-15 ENCOUNTER — ANTICOAGULATION THERAPY VISIT (OUTPATIENT)
Dept: ANTICOAGULATION | Facility: OTHER | Age: 77
End: 2022-08-15
Attending: FAMILY MEDICINE
Payer: MEDICARE

## 2022-08-15 DIAGNOSIS — I10 BENIGN ESSENTIAL HYPERTENSION: ICD-10-CM

## 2022-08-15 DIAGNOSIS — Z79.01 LONG TERM CURRENT USE OF ANTICOAGULANT THERAPY: ICD-10-CM

## 2022-08-15 DIAGNOSIS — I26.99 PULMONARY EMBOLISM AND INFARCTION (H): Primary | ICD-10-CM

## 2022-08-15 DIAGNOSIS — Z79.4 TYPE 2 DIABETES MELLITUS WITH STAGE 3B CHRONIC KIDNEY DISEASE, WITH LONG-TERM CURRENT USE OF INSULIN (H): ICD-10-CM

## 2022-08-15 DIAGNOSIS — I26.99 PULMONARY EMBOLISM AND INFARCTION (H): ICD-10-CM

## 2022-08-15 DIAGNOSIS — N18.32 TYPE 2 DIABETES MELLITUS WITH STAGE 3B CHRONIC KIDNEY DISEASE, WITH LONG-TERM CURRENT USE OF INSULIN (H): ICD-10-CM

## 2022-08-15 DIAGNOSIS — E78.2 MIXED HYPERLIPIDEMIA: ICD-10-CM

## 2022-08-15 DIAGNOSIS — E11.22 TYPE 2 DIABETES MELLITUS WITH STAGE 3B CHRONIC KIDNEY DISEASE, WITH LONG-TERM CURRENT USE OF INSULIN (H): ICD-10-CM

## 2022-08-15 LAB
ALT SERPL W P-5'-P-CCNC: 60 U/L (ref 0–70)
ANION GAP SERPL CALCULATED.3IONS-SCNC: 5 MMOL/L (ref 3–14)
BUN SERPL-MCNC: 20 MG/DL (ref 7–30)
CALCIUM SERPL-MCNC: 8.9 MG/DL (ref 8.5–10.1)
CHLORIDE BLD-SCNC: 105 MMOL/L (ref 94–109)
CHOLEST SERPL-MCNC: 101 MG/DL
CO2 SERPL-SCNC: 28 MMOL/L (ref 20–32)
CREAT SERPL-MCNC: 1.43 MG/DL (ref 0.66–1.25)
EST. AVERAGE GLUCOSE BLD GHB EST-MCNC: 160 MG/DL
FASTING STATUS PATIENT QL REPORTED: YES
GFR SERPL CREATININE-BSD FRML MDRD: 50 ML/MIN/1.73M2
GLUCOSE BLD-MCNC: 144 MG/DL (ref 70–99)
HBA1C MFR BLD: 7.2 % (ref 0–5.6)
HDLC SERPL-MCNC: 27 MG/DL
INR BLD: 2.7 (ref 0.9–1.1)
LDLC SERPL CALC-MCNC: ABNORMAL MG/DL
NONHDLC SERPL-MCNC: 74 MG/DL
POTASSIUM BLD-SCNC: 3.7 MMOL/L (ref 3.4–5.3)
SODIUM SERPL-SCNC: 138 MMOL/L (ref 133–144)
TRIGL SERPL-MCNC: 440 MG/DL

## 2022-08-15 PROCEDURE — 80061 LIPID PANEL: CPT | Mod: ZL

## 2022-08-15 PROCEDURE — 36415 COLL VENOUS BLD VENIPUNCTURE: CPT | Mod: ZL

## 2022-08-15 PROCEDURE — 80048 BASIC METABOLIC PNL TOTAL CA: CPT | Mod: ZL

## 2022-08-15 PROCEDURE — 85610 PROTHROMBIN TIME: CPT | Mod: ZL

## 2022-08-15 PROCEDURE — 83036 HEMOGLOBIN GLYCOSYLATED A1C: CPT | Mod: ZL

## 2022-08-15 PROCEDURE — 84460 ALANINE AMINO (ALT) (SGPT): CPT | Mod: ZL

## 2022-08-15 NOTE — PROGRESS NOTES
ANTICOAGULATION MANAGEMENT     Clement Voss 77 year old male is on warfarin with therapeutic INR result. (Goal INR 2.0-3.0)    Recent labs: (last 7 days)     08/15/22  0954   INR 2.7*       ASSESSMENT       Source(s): Chart Review       Warfarin doses taken: Warfarin taken as instructed    Diet: No new diet changes identified    New illness, injury, or hospitalization: No    Medication/supplement changes: None noted    Signs or symptoms of bleeding or clotting: No    Previous INR: Therapeutic last 2(+) visits    Additional findings: eye surgery on 9/7/22: needs to set up pre-op visit, Patient will speak with PCP this week.        PLAN     Recommended plan for no diet, medication or health factor changes affecting INR     Dosing Instructions: Continue your current warfarin dose with next INR in 4 weeks       Summary  As of 8/15/2022    Full warfarin instructions:  2.5 mg every Mon, Wed, Fri; 5 mg all other days   Next INR check:  9/12/2022             Detailed voice message left for Huy with dosing instructions and follow up date.     Contact [phone] to schedule and with any changes, questions or concerns.     Education provided: Please call back if any changes to your diet, medications or how you've been taking warfarin    Plan made per [facility] anticoagulation protocol    Corry Leon, RN  Anticoagulation Clinic  8/15/2022    _______________________________________________________________________     Anticoagulation Episode Summary     Current INR goal:  2.0-3.0   TTR:  73.8 % (1 y)   Target end date:  Indefinite   Send INR reminders to:  [facility]    Indications    Pulmonary embolism and infarction (H) [I26.99]  Long-term (current) use of anticoagulants [Z79.01] [Z79.01]           Comments:  Lab in [facility] Arrives home in the afternoon after 2 PM         Anticoagulation Care Providers     Provider Role Specialty Phone number    Lorelei Felix MD Referring Family Medicine [phone]

## 2022-08-15 NOTE — PROGRESS NOTES
"  Assessment & Plan     1. Type 2 diabetes mellitus with stage 3b chronic kidney disease, with long-term current use of insulin (H)  Medication refilled, no dosing changes at this time.  Follow-up in three months, future lab orders will be placed.  - insulin glargine (LANTUS PEN) 100 UNIT/ML pen; Inject 36 Units Subcutaneous At Bedtime  Dispense: 30 mL; Refill: 3    2. Mixed hyperlipidemia  No changes, will place orders as above    3. Benign essential hypertension  As above    4. Stage 3b chronic kidney disease (H)  No changes    5. Chronic atrial fibrillation (H)  No changes       BMI:   Estimated body mass index is 31.83 kg/m  as calculated from the following:    Height as of 5/13/22: 1.702 m (5' 7\").    Weight as of this encounter: 92.2 kg (203 lb 3.2 oz).     Return in about 3 months (around 11/18/2022) for Diabetic Follow-up, Chronic Disease Management, Medication review.    Lorelei Felix MD  [facility] RUTHANN Son is a 77 year old, presenting for the following health issues:  Diabetes, Lipids, Hypertension, and Chronic Kidney Disease      HPI     Diabetes Follow-up    How often are you checking your blood sugar? Three times daily  Blood sugar testing frequency justification:  was instucted to check it that many times  What time of day are you checking your blood sugars (select all that apply)?  Before he eats and after supper  Have you had any blood sugars above 200?  Yes   Have you had any blood sugars below 70?  No    What symptoms do you notice when your blood sugar is low?  Clammy, chills, light headed    What concerns do you have today about your diabetes? None     Do you have any of these symptoms? (Select all that apply)  No numbness or tingling in feet.  No redness, sores or blisters on feet.  No complaints of excessive thirst.  No reports of blurry vision.  No significant changes to weight.     Patient did send [employer] message with most recent BG readings      BP " Readings from Last 2 Encounters:   08/18/22 118/66   07/14/22 122/76     Hemoglobin A1C (%)   Date Value   08/15/2022 7.2 (H)   05/06/2022 7.0 (H)   04/27/2021 9.7 (H)   01/26/2021 9.4 (H)     LDL Cholesterol Calculated   Date Value   08/15/2022      Comment:     Cannot estimate LDL when triglyceride exceeds 400 mg/dL   05/06/2022 16 mg/dL   04/27/2021 12 mg/dL   01/26/2021 26 mg/dL       Hyperlipidemia Follow-Up      Are you regularly taking any medication or supplement to lower your cholesterol?   Yes- Rosuvastatin    Are you having muscle aches or other side effects that you think could be caused by your cholesterol lowering medication?  No    Hypertension Follow-up      Do you check your blood pressure regularly outside of the clinic? Yes     Are you following a low salt diet? Yes    Are your blood pressures ever more than 140 on the top number (systolic) OR more   than 90 on the bottom number (diastolic), for example 140/90? No    Chronic Kidney Disease Follow-up  Do you take any over the counter pain medicine?: Yes  What over the counter medicine are you taking for your pain?:  Tylenol      How often do you take this medicine?:  As needed      How many servings of fruits and vegetables do you eat daily?  2-3    On average, how many sweetened beverages do you drink each day (Examples: soda, juice, sweet tea, etc.  Do NOT count diet or artificially sweetened beverages)?   0    How many days per week do you exercise enough to make your heart beat faster? 7    How many minutes a day do you exercise enough to make your heart beat faster? 10 - 19    How many days per week do you miss taking your medication? 0      Review of Systems   Constitutional, HEENT, cardiovascular, pulmonary, gi and gu systems are negative, except as otherwise noted.      Objective    /66 (BP Location: Left arm, Patient Position: Chair, Cuff Size: Adult Regular)   Pulse 66   Temp 97.5  F (36.4  C) (Tympanic)   Wt 92.2 kg (203 lb 3.2  oz)   SpO2 97%   BMI 31.83 kg/m    Body mass index is 31.83 kg/m .  Physical Exam   GENERAL: healthy, alert and no distress  PSYCH: mentation appears normal, affect normal/bright    Lab on 08/15/2022   Component Date Value Ref Range Status     INR 08/15/2022 2.7 (A) 0.9 - 1.1 Final     Cholesterol 08/15/2022 101  <200 mg/dL Final     Triglycerides 08/15/2022 440 (A) <150 mg/dL Final     Direct Measure HDL 08/15/2022 27 (A) >=40 mg/dL Final     LDL Cholesterol Calculated 08/15/2022    Final    Cannot estimate LDL when triglyceride exceeds 400 mg/dL     Non HDL Cholesterol 08/15/2022 74  <130 mg/dL Final     Patient Fasting > 8hrs? 08/15/2022 Yes   Final     Estimated Average Glucose 08/15/2022 160  mg/dL Final     Hemoglobin A1C 08/15/2022 7.2 (A) 0.0 - 5.6 % Final    Normal <5.7%   Prediabetes 5.7-6.4%    Diabetes 6.5% or higher     Note: Adopted from ADA consensus guidelines.     Sodium 08/15/2022 138  133 - 144 mmol/L Final     Potassium 08/15/2022 3.7  3.4 - 5.3 mmol/L Final     Chloride 08/15/2022 105  94 - 109 mmol/L Final     Carbon Dioxide (CO2) 08/15/2022 28  20 - 32 mmol/L Final     Anion Gap 08/15/2022 5  3 - 14 mmol/L Final     Urea Nitrogen 08/15/2022 20  7 - 30 mg/dL Final     Creatinine 08/15/2022 1.43 (A) 0.66 - 1.25 mg/dL Final     Calcium 08/15/2022 8.9  8.5 - 10.1 mg/dL Final     Glucose 08/15/2022 144 (A) 70 - 99 mg/dL Final     GFR Estimate 08/15/2022 50 (A) >60 mL/min/1.73m2 Final    Effective December 21, 2021 eGFRcr in adults is calculated using the 2021 CKD-EPI creatinine equation which includes age and gender (Virgilio guzmán al., NEJ, DOI: 10.1056/QHIIrk7685805)     ALT 08/15/2022 60  0 - 70 U/L Final                 .  ..

## 2022-08-16 ENCOUNTER — MYC MEDICAL ADVICE (OUTPATIENT)
Dept: EDUCATION SERVICES | Facility: HOSPITAL | Age: 77
End: 2022-08-16

## 2022-08-18 ENCOUNTER — OFFICE VISIT (OUTPATIENT)
Dept: FAMILY MEDICINE | Facility: OTHER | Age: 77
End: 2022-08-18
Attending: FAMILY MEDICINE
Payer: COMMERCIAL

## 2022-08-18 VITALS
SYSTOLIC BLOOD PRESSURE: 118 MMHG | OXYGEN SATURATION: 97 % | HEART RATE: 66 BPM | TEMPERATURE: 97.5 F | WEIGHT: 203.2 LBS | BODY MASS INDEX: 31.83 KG/M2 | DIASTOLIC BLOOD PRESSURE: 66 MMHG

## 2022-08-18 DIAGNOSIS — E11.22 TYPE 2 DIABETES MELLITUS WITH STAGE 3B CHRONIC KIDNEY DISEASE, WITH LONG-TERM CURRENT USE OF INSULIN (H): Primary | ICD-10-CM

## 2022-08-18 DIAGNOSIS — I48.20 CHRONIC ATRIAL FIBRILLATION (H): ICD-10-CM

## 2022-08-18 DIAGNOSIS — I10 BENIGN ESSENTIAL HYPERTENSION: ICD-10-CM

## 2022-08-18 DIAGNOSIS — N18.32 TYPE 2 DIABETES MELLITUS WITH STAGE 3B CHRONIC KIDNEY DISEASE, WITH LONG-TERM CURRENT USE OF INSULIN (H): Primary | ICD-10-CM

## 2022-08-18 DIAGNOSIS — N18.32 STAGE 3B CHRONIC KIDNEY DISEASE (H): ICD-10-CM

## 2022-08-18 DIAGNOSIS — Z79.4 TYPE 2 DIABETES MELLITUS WITH STAGE 3B CHRONIC KIDNEY DISEASE, WITH LONG-TERM CURRENT USE OF INSULIN (H): Primary | ICD-10-CM

## 2022-08-18 DIAGNOSIS — E78.2 MIXED HYPERLIPIDEMIA: ICD-10-CM

## 2022-08-18 PROCEDURE — G0463 HOSPITAL OUTPT CLINIC VISIT: HCPCS

## 2022-08-18 PROCEDURE — 99214 OFFICE O/P EST MOD 30 MIN: CPT | Performed by: FAMILY MEDICINE

## 2022-08-18 RX ORDER — NEOMYCIN SULFATE, POLYMYXIN B SULFATE, AND DEXAMETHASONE 3.5; 10000; 1 MG/G; [USP'U]/G; MG/G
OINTMENT OPHTHALMIC
COMMUNITY
Start: 2022-08-08 | End: 2022-12-01

## 2022-08-18 RX ORDER — BRIMONIDINE TARTRATE 2 MG/ML
SOLUTION/ DROPS OPHTHALMIC
COMMUNITY
Start: 2022-07-26 | End: 2022-08-18

## 2022-08-18 RX ORDER — TIMOLOL MALEATE 5 MG/ML
SOLUTION/ DROPS OPHTHALMIC
COMMUNITY
Start: 2022-07-26 | End: 2022-08-18

## 2022-08-18 RX ORDER — PREDNISOLONE ACETATE 10 MG/ML
SUSPENSION/ DROPS OPHTHALMIC
COMMUNITY
Start: 2022-08-08 | End: 2023-02-24

## 2022-08-18 RX ORDER — TRAMADOL HYDROCHLORIDE 50 MG/1
TABLET ORAL
COMMUNITY
Start: 2022-07-26 | End: 2022-08-18

## 2022-08-18 RX ORDER — OFLOXACIN 3 MG/ML
SOLUTION/ DROPS OPHTHALMIC
COMMUNITY
Start: 2022-08-08 | End: 2022-08-18

## 2022-08-18 RX ORDER — ATROPINE SULFATE 10 MG/ML
SOLUTION/ DROPS OPHTHALMIC
COMMUNITY
Start: 2022-08-08 | End: 2022-08-18

## 2022-08-18 ASSESSMENT — PAIN SCALES - GENERAL: PAINLEVEL: NO PAIN (1)

## 2022-08-18 NOTE — PATIENT INSTRUCTIONS
I think the triglycerides are elevated as you are no longer in cardiac rehab and on a treadmill regularly.  I would recommend starting to ride your stationary bike at least a few times a week to help get your heart rate up.

## 2022-08-18 NOTE — NURSING NOTE
"Chief Complaint   Patient presents with     Diabetes     Lipids     Hypertension     Chronic Kidney Disease       Initial /66 (BP Location: Left arm, Patient Position: Chair, Cuff Size: Adult Regular)   Pulse 66   Temp 97.5  F (36.4  C) (Tympanic)   Wt 92.2 kg (203 lb 3.2 oz)   SpO2 97%   BMI 31.83 kg/m   Estimated body mass index is 31.83 kg/m  as calculated from the following:    Height as of 5/13/22: 1.702 m (5' 7\").    Weight as of this encounter: 92.2 kg (203 lb 3.2 oz).  Medication Reconciliation: complete  Anupama John    "

## 2022-08-19 DIAGNOSIS — N18.32 TYPE 2 DIABETES MELLITUS WITH STAGE 3B CHRONIC KIDNEY DISEASE, WITH LONG-TERM CURRENT USE OF INSULIN (H): Primary | ICD-10-CM

## 2022-08-19 DIAGNOSIS — Z79.4 TYPE 2 DIABETES MELLITUS WITH STAGE 3B CHRONIC KIDNEY DISEASE, WITH LONG-TERM CURRENT USE OF INSULIN (H): Primary | ICD-10-CM

## 2022-08-19 DIAGNOSIS — E11.22 TYPE 2 DIABETES MELLITUS WITH STAGE 3B CHRONIC KIDNEY DISEASE, WITH LONG-TERM CURRENT USE OF INSULIN (H): Primary | ICD-10-CM

## 2022-08-19 RX ORDER — INSULIN GLARGINE 100 [IU]/ML
INJECTION, SOLUTION SUBCUTANEOUS
Qty: 15 ML | OUTPATIENT
Start: 2022-08-19

## 2022-08-23 ENCOUNTER — MYC MEDICAL ADVICE (OUTPATIENT)
Dept: EDUCATION SERVICES | Facility: HOSPITAL | Age: 77
End: 2022-08-23

## 2022-08-23 NOTE — PROGRESS NOTES
[facility]  [address]  [facility] [address]  Phone: [phone]  Primary Provider: Saumya Felix  Pre-op Performing Provider: SAUMYA FELIX      PREOPERATIVE EVALUATION:  Today's date: 8/24/2022    Clement Voss is a 77 year old male who presents for a preoperative evaluation.    Surgical Information:  Surgery/Procedure: Right Trabeculectomy with Mitomycin C  Surgery Location: [place], [place]   Surgeon: Dr. Zeynep Oden  Surgery Date: 9/7/22  Time of Surgery: TBD  Where patient plans to recover: At home with family  Fax number for surgical facility: please fax to both [phone] and [phone]    Type of Anesthesia Anticipated: to be determined    Assessment & Plan     The proposed surgical procedure is considered LOW risk.      ICD-10-CM    1. Pre-operative general physical examination  Z01.818 CBC with platelets     *UA reflex to Microscopic and Culture - [facility]     CBC with platelets     *UA reflex to Microscopic and Culture - [facility]   2. Primary open angle glaucoma of right eye, unspecified glaucoma stage  H40.1110    3. Chronic atrial fibrillation (H)  I48.20    4. ASCVD (arteriosclerotic cardiovascular disease)  I25.10    5. Congestive heart failure, unspecified HF chronicity, unspecified heart failure type (H)  I50.9    6. Type 2 diabetes mellitus with stage 3b chronic kidney disease, with long-term current use of insulin (H)  E11.22     N18.32     Z79.4    7. Mixed hyperlipidemia  E78.2    8. Benign essential hypertension  I10 CBC with platelets     *UA reflex to Microscopic and Culture - [facility]     CBC with platelets     *UA reflex to Microscopic and Culture - [facility]   9. Stage 3b chronic kidney disease (H)  N18.32               Risks and Recommendations:  The patient has the following additional risks and recommendations for perioperative complications:   - No identified additional  risk factors other than previously addressed    Medication Instructions:  Patient is to take all scheduled medications on the day of surgery    RECOMMENDATION:  APPROVAL GIVEN to proceed with proposed procedure, without further diagnostic evaluation.        Subjective     HPI related to upcoming procedure: Elevated eye pressure      Preop Questions 8/24/2022   1. Have you ever had a heart attack or stroke? No   2. Have you ever had surgery on your heart or blood vessels, such as a stent placement, a coronary artery bypass, or surgery on an artery in your head, neck, heart, or legs? YES - previous CAD with CABG 2020, no new symptoms   3. Do you have chest pain with activity? No   4. Do you have a history of  heart failure? No   5. Do you currently have a cold, bronchitis or symptoms of other infection? No   6. Do you have a cough, shortness of breath, or wheezing? No   7. Do you or anyone in your family have previous history of blood clots? YES - personal history of PE, on anticoagulation   8. Do you or does anyone in your family have a serious bleeding problem such as prolonged bleeding following surgeries or cuts? No   9. Have you ever had problems with anemia or been told to take iron pills? YES - currently on iron supplement   10. Have you had any abnormal blood loss such as black, tarry or bloody stools? No   11. Have you ever had a blood transfusion? No   12. Are you willing to have a blood transfusion if it is medically needed before, during, or after your surgery? NO    13. Have you or any of your relatives ever had problems with anesthesia? No   14. Do you have sleep apnea, excessive snoring or daytime drowsiness? No   15. Do you have any artifical heart valves or other implanted medical devices like a pacemaker, defibrillator, or continuous glucose monitor? No   16. Do you have artificial joints? No   17. Are you allergic to latex? No     Health Care Directive:  Patient does not have a Health Care Directive  or Living Will: Advance Directive received and scanned. Click on Code in the patient header to view.    Preoperative Review of :   reviewed - previous post-operative medications noted, no chronic medication noted      Status of Chronic Conditions:  A-FIB - Patient has a longstanding history of chronic A-fib currently on rate control. Current treatment regimen includes Warfarin for stroke prevention and denies significant symptoms of lightheadedness, palpitations or dyspnea.     CAD - Patient has a longstanding history of moderate-severe CAD. Patient denies recent chest pain or NTG use, denies exercise induced dyspnea or PND.     CHF - Patient has a longstanding history of moderate-severe CHF. Exacerbating conditions include ischemic heart disease and atrial fibrillation. Currently the patient's condition is same. Current treatment regimen includes Angiotensin 2 receptor blocker, beta blocker and diuretic. The patient denies chest pain, edema, orthopnea, SOB or recent weight gain.     DIABETES - Patient has a longstanding history of DiabetesType Type II . Patient is being treated with diet, insulin injections and weekly injectable and denies significant side effects. Control has been good. Complicating factors include but are not limited to: hypertension, hyperlipidemia and CAD/PVD.     HYPERLIPIDEMIA - Patient has a long history of significant Hyperlipidemia requiring medication for treatment with recent good control. Patient reports no problems or side effects with the medication.     HYPERTENSION - Patient has longstanding history of HTN , currently denies any symptoms referable to elevated blood pressure. Specifically denies chest pain, palpitations, dyspnea, orthopnea, PND or peripheral edema. Blood pressure readings have been in normal range. Current medication regimen is as listed below. Patient denies any side effects of medication.     RENAL INSUFFICIENCY - Patient has a longstanding history of  moderate-severe chronic renal insufficiency.        Review of Systems  Constitutional, neuro, ENT, endocrine, pulmonary, cardiac, gastrointestinal, genitourinary, musculoskeletal, integument and psychiatric systems are negative, except as otherwise noted.    Patient Active Problem List    Diagnosis Date Noted     Chronic atrial fibrillation (H) 08/18/2022     Priority: Medium     Congestive heart failure, unspecified HF chronicity, unspecified heart failure type (H) 01/28/2021     Priority: Medium     Chronic left ventricular systolic dysfunction 10/27/2020     Priority: Medium     Other dysphagia 02/21/2020     Priority: Medium     Abnormal stress test on 1/10/2020 02/18/2020     Priority: Medium     Regional wall motion abnormality of heart 02/18/2020     Priority: Medium     Diastolic dysfunction grade 2 on 1/20/2020 02/18/2020     Priority: Medium     Severe aortic valve regurgitation 02/18/2020     Priority: Medium     Moderate mitral regurgitation 02/18/2020     Priority: Medium     Ascending aortic aneurysm-severe 02/18/2020     Priority: Medium     Anticoagulated on Coumadin 02/18/2020     Priority: Medium     History of DVT on 12/30/2013 02/18/2020     Priority: Medium     History of pulmonary embolism 02/18/2020     Priority: Medium     History of tobacco abuse quitting 8/12/1964 02/18/2020     Priority: Medium     S/P aortic valve replacement 02/17/2020     Priority: Medium     Postoperative anemia due to acute blood loss 02/10/2020     Priority: Medium     S/P CABG x 2 02/10/2020     Priority: Medium     ASCVD (arteriosclerotic cardiovascular disease) 02/03/2020     Priority: Medium     Aortic root aneurysm (H) 01/28/2020     Priority: Medium     CALDERON (dyspnea on exertion) 01/28/2020     Priority: Medium     Murmur 01/28/2020     Priority: Medium     Aortic valve insufficiency, etiology of cardiac valve disease unspecified 01/28/2020     Priority: Medium     Unstable angina (H) 01/28/2020     Priority:  Medium     CKD (chronic kidney disease) stage 3, GFR 30-59 ml/min (H) 09/24/2019     Priority: Medium     Morbid obesity (H) 05/26/2017     Priority: Medium     Long-term (current) use of anticoagulants [Z79.01] 08/04/2016     Priority: Medium     ACP (advance care planning) 03/14/2013     Priority: Medium     Advance Care Planning 8/26/2016: ACP Review of Chart / Resources Provided:  Reviewed chart for advance care plan.  Clement R Shanika has no plan or code status on file. Discussed available resources and provided with information. Confirmed code status reflects current choices pending further ACP discussions.  Confirmed/documented legally designated decision makers.  Added by Jenifer Villa             Pulmonary embolism and infarction (H) 09/24/2012     Priority: Medium     Gout 01/10/2011     Priority: Medium     Optic nerve disease 10/19/2009     Priority: Medium     Primary open-angle glaucoma 04/13/2009     Priority: Medium     Mixed hyperlipidemia 10/10/2005     Priority: Medium     Benign essential hypertension 01/03/2005     Priority: Medium     Type 2 diabetes mellitus with chronic kidney disease, with long-term current use of insulin (H) 08/02/2004     Priority: Medium      Past Medical History:   Diagnosis Date     Carpal tunnel syndrome of right wrist 07/20/2022     Chronic atrial fibrillation (H) 8/18/2022     Chronic left ventricular systolic dysfunction 10/27/2020     CKD (chronic kidney disease) stage 3, GFR 30-59 ml/min (H) 9/24/2019     DVT (deep venous thrombosis) (H)      Gout 1/10/2011     Gout, unspecified 01/10/2011     Optic nerve disease 10/19/2009     Other and unspecified hyperlipidemia 10/10/2005     Pulmonary embolism (H)      Type II or unspecified type diabetes mellitus without mention of complication, not stated as uncontrolled 08/02/2004     Unspecified essential hypertension 01/03/2005     Past Surgical History:   Procedure Laterality Date     APPENDECTOMY  2008     CABG, MIN INV, 2  ARTERL GRFT  02/10/2020    2 vessel bypass     CARPAL TUNNEL RELEASE RT/LT Right 07/20/2022     CHOLECYSTECTOMY  1984     COLONOSCOPY N/A 05/16/2019    Procedure: COLONOSCOPY;  Surgeon: Benito Saldana MD;  Location: [facility]     History of PE of right lung 3/2013[       left eye glacoma  11/30/21     PHACOEMULSIFICATION WITH STANDARD INTRAOCULAR LENS IMPLANT Left 04/19/2018    Procedure: PHACOEMULSIFICATION WITH STANDARD INTRAOCULAR LENS IMPLANT;  CATARACT EXTRACTION LEFT EYE WITH IMPLANT, ISTENT LEFT EYE;  Surgeon: Kush Almaraz MD;  Location: [facility]     PHACOEMULSIFICATION WITH STANDARD INTRAOCULAR LENS IMPLANT Right 05/03/2018    Procedure: PHACOEMULSIFICATION WITH STANDARD INTRAOCULAR LENS IMPLANT;  CATARACT EXTRACTION RIGHT EYE WITH IMPLANT,WITH ISTENT ATTEMPTED;  Surgeon: Kush Almaraz MD;  Location: [facility]     TRABECULAR MICRO-BYPASS WITH ISTENT Left 04/19/2018    Procedure: TRABECULAR MICRO-BYPASS WITH ISTENT;;  Surgeon: Kush Almaraz MD;  Location: [facility]     TRABECULAR MICRO-BYPASS WITH ISTENT  05/03/2018    Procedure: TRABECULAR MICRO-BYPASS WITH ISTENT;;  Surgeon: Kush Almaraz MD;  Location: [facility]     Current Outpatient Medications   Medication Sig Dispense Refill     ACCU-CHEK VLAD PLUS test strip USE TO TEST BLOOD SUGARS THREE TIMES DAILY OR AS DIRECTED 200 strip 3     acetaminophen (TYLENOL) 500 MG tablet Take 1-2 tablets (500-1,000 mg) by mouth every 6 hours as needed       albuterol (PROAIR HFA/PROVENTIL HFA/VENTOLIN HFA) 108 (90 Base) MCG/ACT inhaler Inhale 2 puffs into the lungs every 4 hours as needed for shortness of breath / dyspnea or wheezing 18 g 1     aspirin 81 MG EC tablet Take 1 tablet (81 mg) by mouth daily 90 tablet 3     B-D U/F insulin pen needle USE AS DIRECTED 100 each 3     chlorthalidone (HYGROTON) 25 MG tablet Take 1 tablet (25 mg) by mouth daily 90 tablet 3     COMBIGAN 0.2-0.5 % ophthalmic solution        dulaglutide (TRULICITY) 1.5 MG/0.5ML pen Inject  1.5 mg Subcutaneous every 7 days Do not send to pharmacy 8 mL 0     FEROSUL 325 (65 Fe) MG tablet TAKE 1 TABLET(325 MG) BY MOUTH DAILY WITH BREAKFAST 30 tablet 6     fluticasone (FLONASE) 50 MCG/ACT nasal spray Spray 2 sprays into both nostrils daily (Patient taking differently: Spray 2 sprays into both nostrils daily as needed) 1 Package 0     furosemide (LASIX) 20 MG tablet Take 20 mg by mouth       indomethacin (INDOCIN) 50 MG capsule Take 1 capsule (50 mg) by mouth 3 times daily With food as needed 60 capsule 1     insulin glargine (LANTUS PEN) 100 UNIT/ML pen Inject 36 Units Subcutaneous At Bedtime 30 mL 3     loratadine (CLARITIN) 10 MG tablet Take 10 mg by mouth daily.       losartan (COZAAR) 100 MG tablet Take 1 tablet (100 mg) by mouth daily 90 tablet 3     metoprolol tartrate (LOPRESSOR) 25 MG tablet TAKE 2 TABLETS(50 MG) BY MOUTH TWICE DAILY 360 tablet 3     neomycin-polymyxin-dexamethasone (MAXITROL) 3.5-97127-2.1 ophthalmic ointment APPLY A SMALL AMOUNT INTO SURGICAL EYE AT BEDTIME AFTER EYE SURGERY       order for DME Equipment being ordered: Knee high compression stockings - 20-30 mmHg 2 Device 1     potassium chloride ER (K-TAB/KLOR-CON) 10 MEQ CR tablet TAKE 1 TABLET BY MOUTH EVERY DAY. DO NOT CRUSH       prednisoLONE acetate (PRED FORTE) 1 % ophthalmic suspension INSTILL 1 DROP INTO YOUR SURGICAL EYE EVERY 2 HOURS WHILE AWAKE AFTER SURGERY       rosuvastatin (CRESTOR) 5 MG tablet TAKE 1 TABLET(5 MG) BY MOUTH DAILY 90 tablet 3     STOOL SOFTENER 100 MG capsule TAKE 1 CAPSULE(100 MG) BY MOUTH TWICE DAILY 60 capsule 11     warfarin ANTICOAGULANT (COUMADIN) 2.5 MG tablet TAKE 1 TABLET BY MOUTH MONDAY AND FRIDAY AND 2 TABLETS BY MOUTH ON ALL OTHER DAYS OF THE WEEK OR AS DIRECTED PER WARFARIN CLINIC 155 tablet 1     warfarin ANTICOAGULANT (COUMADIN) 5 MG tablet 2.5mg (1/2 pill) Mon, Fri and 5mg (1 pill) all other days or as directed by warfarin clinic 90 tablet 3       Allergies   Allergen Reactions      "Atorvastatin      Other reaction(s): Other  Caused increased creatinine.      Atorvastatin Calcium Other (See Comments)     Lipitor  Increase CR     Barley Grass Unknown     Iodine      Penicillins      Procaine Unknown     Shellfish-Derived Products Unknown     Sulfa Drugs      Sulfa (sulfonamide antibiotics)     Sulfacetamide         Social History     Tobacco Use     Smoking status: Former Smoker     Types: Cigarettes     Quit date: 1964     Years since quittin.0     Smokeless tobacco: Never Used   Substance Use Topics     Alcohol use: No     Family History   Problem Relation Age of Onset     Heart Disease Father 71        heart disease; cause of death     Other - See Comments Mother 79        old age; cause of death     History   Drug Use No         Objective     /68 (BP Location: Right arm, Patient Position: Sitting, Cuff Size: Adult Regular)   Pulse 65   Temp (!) 96.7  F (35.9  C) (Tympanic)   Resp 18   Ht 1.702 m (5' 7\")   Wt 91 kg (200 lb 11.2 oz)   SpO2 97%   BMI 31.43 kg/m      Physical Exam    GENERAL APPEARANCE: healthy, alert and no distress     EYES: EOMI,  PERRL     HENT: ear canals and TM's normal and nose and mouth without ulcers or lesions     NECK: no adenopathy     RESP: lungs clear to auscultation - no rales, rhonchi or wheezes     CV: fairly regular, grade 2/6 systolic murmur heard best over the RUSB     MS: extremities normal- no gross deformities noted, surgical incision healing well     SKIN: no suspicious lesions or rashes     PSYCH: mentation appears normal. and affect normal/bright    Recent Labs   Lab Test 08/15/22  0954 22  0937 22  1134 22  0925 22  0932 21  0908 21  1640   HGB  --   --  13.6  --   --   --  13.1*   PLT  --   --  112*  --   --   --  124*   INR 2.7* 1.7*  --    < > 2.1*   < > 2.20*     --  139  --  137   < > 138   POTASSIUM 3.7  --  3.9  --  4.0   < > 3.3*   CR 1.43*  --  1.36*  --  1.34*   < > 1.53*   A1C " 7.2*  --   --   --  7.0*   < >  --     < > = values in this interval not displayed.        Diagnostics:  Recent Results (from the past 24 hour(s))   CBC with platelets    Collection Time: 08/24/22 10:27 AM   Result Value Ref Range    WBC Count 6.4 4.0 - 11.0 10e3/uL    RBC Count 4.12 (L) 4.40 - 5.90 10e6/uL    Hemoglobin 13.4 13.3 - 17.7 g/dL    Hematocrit 37.8 (L) 40.0 - 53.0 %    MCV 92 78 - 100 fL    MCH 32.5 26.5 - 33.0 pg    MCHC 35.4 31.5 - 36.5 g/dL    RDW 13.4 10.0 - 15.0 %    Platelet Count 103 (L) 150 - 450 10e3/uL   Extra Serum Separator Tube (SST)    Collection Time: 08/24/22 10:27 AM   Result Value Ref Range    Hold Specimen JIC    *UA reflex to Microscopic and Culture - [facility]    Collection Time: 08/24/22 10:43 AM    Specimen: Urine, Midstream   Result Value Ref Range    Color Urine Yellow Colorless, Straw, Light Yellow, Yellow    Appearance Urine Clear Clear    Glucose Urine Negative Negative mg/dL    Bilirubin Urine Negative Negative    Ketones Urine Negative Negative mg/dL    Specific Gravity Urine 1.010 1.003 - 1.035    Blood Urine Negative Negative    pH Urine 5.5 5.0 - 7.0    Protein Albumin Urine Negative Negative mg/dL    Urobilinogen Urine 0.2 0.2, 1.0 E.U./dL    Nitrite Urine Negative Negative    Leukocyte Esterase Urine Negative Negative        No EKG this visit, completed in the last 90 days.    Revised Cardiac Risk Index (RCRI):  The patient has the following serious cardiovascular risks for perioperative complications:   - Coronary Artery Disease (MI, positive stress test, angina, Qs on EKG) = 1 point   - Diabetes Mellitus (on Insulin) = 1 point     RCRI Interpretation: 2 points: Class III (moderate risk - 6.6% complication rate)     Estimated Functional Capacity: Performs 4 METS exercise without symptoms (e.g., light housework, stairs, 4 mph walk, 7 mph bike, slow step dance)           Signed Electronically by: Lorelei Felix MD  Copy of this evaluation report is provided to  requesting physician.

## 2022-08-24 ENCOUNTER — TELEPHONE (OUTPATIENT)
Dept: FAMILY MEDICINE | Facility: OTHER | Age: 77
End: 2022-08-24

## 2022-08-24 ENCOUNTER — OFFICE VISIT (OUTPATIENT)
Dept: FAMILY MEDICINE | Facility: OTHER | Age: 77
End: 2022-08-24
Attending: FAMILY MEDICINE
Payer: COMMERCIAL

## 2022-08-24 VITALS
OXYGEN SATURATION: 97 % | TEMPERATURE: 96.7 F | WEIGHT: 200.7 LBS | DIASTOLIC BLOOD PRESSURE: 68 MMHG | HEART RATE: 65 BPM | BODY MASS INDEX: 31.5 KG/M2 | SYSTOLIC BLOOD PRESSURE: 136 MMHG | RESPIRATION RATE: 18 BRPM | HEIGHT: 67 IN

## 2022-08-24 DIAGNOSIS — I25.10 ASCVD (ARTERIOSCLEROTIC CARDIOVASCULAR DISEASE): ICD-10-CM

## 2022-08-24 DIAGNOSIS — I50.9 CONGESTIVE HEART FAILURE, UNSPECIFIED HF CHRONICITY, UNSPECIFIED HEART FAILURE TYPE (H): ICD-10-CM

## 2022-08-24 DIAGNOSIS — I10 BENIGN ESSENTIAL HYPERTENSION: ICD-10-CM

## 2022-08-24 DIAGNOSIS — Z79.4 TYPE 2 DIABETES MELLITUS WITH STAGE 3B CHRONIC KIDNEY DISEASE, WITH LONG-TERM CURRENT USE OF INSULIN (H): ICD-10-CM

## 2022-08-24 DIAGNOSIS — I48.20 CHRONIC ATRIAL FIBRILLATION (H): ICD-10-CM

## 2022-08-24 DIAGNOSIS — N18.32 TYPE 2 DIABETES MELLITUS WITH STAGE 3B CHRONIC KIDNEY DISEASE, WITH LONG-TERM CURRENT USE OF INSULIN (H): ICD-10-CM

## 2022-08-24 DIAGNOSIS — E11.22 TYPE 2 DIABETES MELLITUS WITH STAGE 3B CHRONIC KIDNEY DISEASE, WITH LONG-TERM CURRENT USE OF INSULIN (H): ICD-10-CM

## 2022-08-24 DIAGNOSIS — Z01.818 PRE-OPERATIVE GENERAL PHYSICAL EXAMINATION: Primary | ICD-10-CM

## 2022-08-24 DIAGNOSIS — H40.1110 PRIMARY OPEN ANGLE GLAUCOMA OF RIGHT EYE, UNSPECIFIED GLAUCOMA STAGE: ICD-10-CM

## 2022-08-24 DIAGNOSIS — N18.32 STAGE 3B CHRONIC KIDNEY DISEASE (H): ICD-10-CM

## 2022-08-24 DIAGNOSIS — E78.2 MIXED HYPERLIPIDEMIA: ICD-10-CM

## 2022-08-24 PROBLEM — I35.1 AORTIC VALVE INSUFFICIENCY, ETIOLOGY OF CARDIAC VALVE DISEASE UNSPECIFIED: Status: ACTIVE | Noted: 2020-01-28

## 2022-08-24 LAB
ALBUMIN UR-MCNC: NEGATIVE MG/DL
APPEARANCE UR: CLEAR
BILIRUB UR QL STRIP: NEGATIVE
COLOR UR AUTO: YELLOW
ERYTHROCYTE [DISTWIDTH] IN BLOOD BY AUTOMATED COUNT: 13.4 % (ref 10–15)
GLUCOSE UR STRIP-MCNC: NEGATIVE MG/DL
HCT VFR BLD AUTO: 37.8 % (ref 40–53)
HGB BLD-MCNC: 13.4 G/DL (ref 13.3–17.7)
HGB UR QL STRIP: NEGATIVE
HOLD SPECIMEN: NORMAL
KETONES UR STRIP-MCNC: NEGATIVE MG/DL
LEUKOCYTE ESTERASE UR QL STRIP: NEGATIVE
MCH RBC QN AUTO: 32.5 PG (ref 26.5–33)
MCHC RBC AUTO-ENTMCNC: 35.4 G/DL (ref 31.5–36.5)
MCV RBC AUTO: 92 FL (ref 78–100)
NITRATE UR QL: NEGATIVE
PH UR STRIP: 5.5 [PH] (ref 5–7)
PLATELET # BLD AUTO: 103 10E3/UL (ref 150–450)
RBC # BLD AUTO: 4.12 10E6/UL (ref 4.4–5.9)
SP GR UR STRIP: 1.01 (ref 1–1.03)
UROBILINOGEN UR STRIP-ACNC: 0.2 E.U./DL
WBC # BLD AUTO: 6.4 10E3/UL (ref 4–11)

## 2022-08-24 PROCEDURE — 99214 OFFICE O/P EST MOD 30 MIN: CPT | Performed by: FAMILY MEDICINE

## 2022-08-24 PROCEDURE — 36415 COLL VENOUS BLD VENIPUNCTURE: CPT | Mod: ZL | Performed by: FAMILY MEDICINE

## 2022-08-24 PROCEDURE — 81003 URINALYSIS AUTO W/O SCOPE: CPT | Mod: ZL | Performed by: FAMILY MEDICINE

## 2022-08-24 PROCEDURE — 85027 COMPLETE CBC AUTOMATED: CPT | Mod: ZL | Performed by: FAMILY MEDICINE

## 2022-08-24 PROCEDURE — G0463 HOSPITAL OUTPT CLINIC VISIT: HCPCS

## 2022-08-24 ASSESSMENT — PAIN SCALES - GENERAL: PAINLEVEL: NO PAIN (0)

## 2022-08-24 NOTE — NURSING NOTE
"Chief Complaint   Patient presents with     Pre-Op Exam       Initial /68 (BP Location: Right arm, Patient Position: Sitting, Cuff Size: Adult Regular)   Pulse 65   Temp (!) 96.7  F (35.9  C) (Tympanic)   Resp 18   Ht 1.702 m (5' 7\")   Wt 91 kg (200 lb 11.2 oz)   SpO2 97%   BMI 31.43 kg/m   Estimated body mass index is 31.43 kg/m  as calculated from the following:    Height as of this encounter: 1.702 m (5' 7\").    Weight as of this encounter: 91 kg (200 lb 11.2 oz).  Medication Reconciliation: complete  Tracey Blanco MA  "

## 2022-08-24 NOTE — TELEPHONE ENCOUNTER
-Pre op, EKG faxed to [facility] [phone].  -Pre-op, faxed to Dr. Oden at [facility] at [phone].  -Pre-op faxed to [facility] at [phone].    Per request of Dr. Zeynep Oden

## 2022-08-25 ENCOUNTER — HOSPITAL ENCOUNTER (OUTPATIENT)
Dept: EDUCATION SERVICES | Facility: HOSPITAL | Age: 77
Discharge: HOME | End: 2022-08-25
Attending: NURSE PRACTITIONER | Admitting: NURSE PRACTITIONER
Payer: MEDICARE

## 2022-08-25 VITALS
SYSTOLIC BLOOD PRESSURE: 126 MMHG | HEIGHT: 67 IN | OXYGEN SATURATION: 98 % | DIASTOLIC BLOOD PRESSURE: 70 MMHG | WEIGHT: 200.4 LBS | BODY MASS INDEX: 31.45 KG/M2 | HEART RATE: 65 BPM

## 2022-08-25 DIAGNOSIS — Z79.4 TYPE 2 DIABETES MELLITUS WITH STAGE 3B CHRONIC KIDNEY DISEASE, WITH LONG-TERM CURRENT USE OF INSULIN (H): ICD-10-CM

## 2022-08-25 DIAGNOSIS — N18.32 TYPE 2 DIABETES MELLITUS WITH STAGE 3B CHRONIC KIDNEY DISEASE, WITH LONG-TERM CURRENT USE OF INSULIN (H): ICD-10-CM

## 2022-08-25 DIAGNOSIS — E11.22 TYPE 2 DIABETES MELLITUS WITH STAGE 3B CHRONIC KIDNEY DISEASE, WITH LONG-TERM CURRENT USE OF INSULIN (H): ICD-10-CM

## 2022-08-25 PROCEDURE — G0108 DIAB MANAGE TRN  PER INDIV: HCPCS

## 2022-08-25 ASSESSMENT — PAIN SCALES - GENERAL: PAINLEVEL: NO PAIN (0)

## 2022-08-25 NOTE — PATIENT INSTRUCTIONS
Return to Diabetes Ed on 12/1/22 at 10:00 am - patient assistance program paperwork     Continue with current plan.

## 2022-08-29 NOTE — PROGRESS NOTES
Diabetes Self-Management Education & Support    Presents for: Follow-up    CDE VISIT MODE: In Person    ASSESSMENT:  Readings range as follows: fastin-160, pre-supper: 130-206 and post-supper: 181-312, 350. Reports elevated post-supper readings due to food.    Patient's most recent   Lab Results   Component Value Date    A1C 7.2 08/15/2022    A1C 9.7 2021     is meeting goal of <8.0    Diabetes knowledge and skills assessment:   Patient is knowledgeable in diabetes management concepts related to: Healthy Eating, Monitoring and Taking Medication    Continue education with the following diabetes management concepts: Monitoring and Taking Medication    Based on learning assessment above, most appropriate setting for further diabetes education would be: Individual setting.      PLAN  Return to Diabetes Ed on 22 at 10:00 am - patient assistance program paperwork     Continue with current plan.    Topics to cover at upcoming visits: Monitoring, Taking Medication and Patent Assistance Program for Trulicity    Follow-up: 22 at 10:00 am    See Care Plan for co-developed, patient-state behavior change goals.  AVS provided for patient today.    Education Materials Provided:  No new materials provided today      SUBJECTIVE/OBJECTIVE:  Presents for: Follow-up  Accompanied by: Self  Diabetes education in the past 24mo: Yes  Focus of Visit: Taking Medication, Monitoring  Diabetes type: Type 2  Disease course: Stable  Diabetes management related comments/concerns: Upcoming eye surgery  Transportation concerns: No  Difficulty affording diabetes medication?: Sometimes  Difficulty affording diabetes testing supplies?: No  Other concerns:: None  Cultural Influences/Ethnic Background:  Not  or     Diabetes Symptoms & Complications:  Fatigue: No  Neuropathy: No  Polydipsia: No  Polyphagia: No  Polyuria: No  Visual change: No  Slow healing wounds: No  Symptom course: Stable  Weight trend:  "Stable  Complications assessed today?: Yes  Autonomic neuropathy: No  CVA: No  Heart disease: Yes  Nephropathy: Yes  Peripheral neuropathy: No  Peripheral Vascular Disease: No  Retinopathy:  (unknown)    Patient Problem List and Family Medical History reviewed for relevant medical history, current medical status, and diabetes risk factors.    Vitals:  /70 (BP Location: Right arm, Patient Position: Sitting, Cuff Size: Adult Large)   Pulse 65   Ht 1.702 m (5' 7\")   Wt 90.9 kg (200 lb 6.4 oz)   SpO2 98%   BMI 31.39 kg/m    Estimated body mass index is 31.39 kg/m  as calculated from the following:    Height as of this encounter: 1.702 m (5' 7\").    Weight as of this encounter: 90.9 kg (200 lb 6.4 oz).   Last 3 BP:   BP Readings from Last 3 Encounters:   08/25/22 126/70   08/24/22 136/68   08/18/22 118/66       History   Smoking Status     Former Smoker     Types: Cigarettes     Quit date: 8/12/1964   Smokeless Tobacco     Never Used       Labs:  Lab Results   Component Value Date    A1C 7.2 08/15/2022    A1C 9.7 04/27/2021     Lab Results   Component Value Date     08/15/2022     04/27/2021     Lab Results   Component Value Date    LDL  08/15/2022      Comment:      Cannot estimate LDL when triglyceride exceeds 400 mg/dL    LDL 12 04/27/2021     HDL Cholesterol   Date Value Ref Range Status   04/27/2021 37 (L) >39 mg/dL Final     Direct Measure HDL   Date Value Ref Range Status   08/15/2022 27 (L) >=40 mg/dL Final   ]  GFR Estimate   Date Value Ref Range Status   08/15/2022 50 (L) >60 mL/min/1.73m2 Final     Comment:     Effective December 21, 2021 eGFRcr in adults is calculated using the 2021 CKD-EPI creatinine equation which includes age and gender (Virgilio et al., NEJ, DOI: 10.1056/YFNWoz9578893)   04/27/2021 51 (L) >60 mL/min/[1.73_m2] Final     Comment:     Non  GFR Calc  Starting 12/18/2018, serum creatinine based estimated GFR (eGFR) will be   calculated using the Chronic " Kidney Disease Epidemiology Collaboration   (CKD-EPI) equation.       GFR Estimate If Black   Date Value Ref Range Status   04/27/2021 59 (L) >60 mL/min/[1.73_m2] Final     Comment:      GFR Calc  Starting 12/18/2018, serum creatinine based estimated GFR (eGFR) will be   calculated using the Chronic Kidney Disease Epidemiology Collaboration   (CKD-EPI) equation.       Lab Results   Component Value Date    CR 1.43 08/15/2022    CR 1.34 04/27/2021     No results found for: MICROALBUMIN    Healthy Eating:  Healthy Eating Assessed Today: Yes  Meal planning/habits: Avoiding sweets, Smaller portions  Meals include: Breakfast, Lunch, Dinner  Breakfast: oatmeal or honey nut cheerios with milk and blueberries  Lunch: leftovers or bagel or sandwich  Dinner: meat, potatoes, vegetable or beef stew - sometimes rice or pasta  Snacks: fruit, vegetables, grapes, beefsticks  Beverages: Coffee, Milk, Juice, Other, Tea (Propel, chocolate milk, green tea)  Has patient met with a dietitian in the past?: Yes    Being Active:  Being Active Assessed Today: Yes  Exercise:: Yes  Days per week of moderate to strenuous exercise (like a brisk walk): 3  On average, minutes per day of exercise at this level: 10  How intense was your typical exercise? : Light (like stretching or slow walking)  Exercise Minutes per Week: 30  Barrier to exercise: Access    Monitoring:  Monitoring Assessed Today: Yes  Did patient bring glucose meter to appointment? : Yes  Blood Glucose Meter: Accu-chek  Times checking blood sugar at home (number): 3  Times checking blood sugar at home (per): Day  Blood glucose trend: No change    Taking Medications:  Diabetes Medication(s)     Insulin       insulin glargine (LANTUS PEN) 100 UNIT/ML pen    Inject 36 Units Subcutaneous At Bedtime    Incretin Mimetic Agents (GLP-1 Receptor Agonists)       dulaglutide (TRULICITY) 1.5 MG/0.5ML pen    Inject 1.5 mg Subcutaneous every 7 days Do not send to pharmacy           Taking Medication Assessed Today: Yes  Current Treatments: Insulin Injections, Non-insulin Injectables  Dose schedule: At bedtime  Given by: Patient  Injection/Infusion sites: Abdomen  Problems taking diabetes medications regularly?: Yes  Diabetes medication side effects?: No    Problem Solving:  Problem Solving Assessed Today: Yes  Is the patient at risk for hypoglycemia?: Yes  Hypoglycemia Frequency: Monthly  Hypoglycemia Treatment: Other food    Hypoglycemia symptoms  Sweats: Yes  Feeling shaky: Yes    Hypoglycemia Complications  Blackouts: No  Hospitalization: No  Nocturnal hypoglycemia: No  Required assistance: No  Required glucagon injection: No  Seizures: No    Reducing Risks:  Reducing Risks Assessed Today: Yes  Has dilated eye exam at least once a year?: Yes  Feet checked by healthcare provider in the last year?: No (due)    Healthy Coping:  Healthy Coping Assessed Today: Yes  Emotional response to diabetes: Ready to learn, Confidence diabetes can be controlled  Informal Support system:: Family  Stage of change: ACTION (Actively working towards change)  Support resources: Other (PAP)  Patient Activation Measure Survey Score:  ALLEN Score (Last Two) 1/6/2020 1/10/2020   ALLEN Raw Score 30 30   Activation Score 56 56   ALLEN Level 3 3         Care Plan and Education Provided:  Care Plan: Diabetes   Updates made by Anamaria Benítez RN since 8/28/2022 12:00 AM      Problem: HbA1C Not In Goal       Goal: Establish Regular Follow-Ups with PCP       Task: Discuss with PCP the recommended timing for patient's next follow up visit(s)    Responsible User: Anamaria Benítez RN      Task: Discuss schedule for PCP visits with patient    Responsible User: Anamaria Benítez RN      Goal: Get HbA1C Level in Goal       Task: Educate patient on diabetes education self-management topics    Responsible User: Anamaria Benítez RN      Task: Educate patient on benefits of regular glucose monitoring    Responsible User: Anamaria Benítez RN      Task: Refer  patient to appropriate extended care team member, as needed (Medication Therapy Management, Behavioral Health, Physical Therapy, etc.)    Responsible User: Anamaria Benítez RN      Task: Discuss diabetes treatment plan with patient    Responsible User: Anamaria Benítez RN      Problem: Diabetes Self-Management Education Needed to Optimize Self-Care Behaviors       Goal: Understand diabetes pathophysiology and disease progression       Task: Provide education on diabetes pathophysiology and disease progression specfic to patient's diabetes type    Responsible User: Anamaria Benítez RN      Goal: Healthy Eating - follow a healthy eating pattern for diabetes       Task: Provide education on portion control and consistency in amount, composition and timing of food intake    Responsible User: Anamaria Benítez RN      Task: Provide education on managing carbohydrate intake (carbohydrate counting, plate planning method, etc.)    Responsible User: Anamaria Benítez RN      Task: Provide education on weight management    Responsible User: Anamaria Benítez RN      Task: Provide education on heart healthy eating    Responsible User: Anamaria Benítez RN      Task: Provide education on eating out    Responsible User: Anamaria Benítez RN      Task: Develop individualized healthy eating plan with patient    Responsible User: Anamaria Benítez RN      Goal: Being Active - get regular physical activity, working up to at least 150 minutes per week       Task: Provide education on relationship of activity to glucose and precautions to take if at risk for low glucose    Responsible User: Anamaria Benítez RN      Task: Discuss barriers to physical activity with patient    Responsible User: Anamaria Benítez RN      Task: Develop physical activity plan with patient    Responsible User: Anamaria Benítez RN      Task: Explore community resources including walking groups, assistance programs, and home videos    Responsible User: Anamaria Benítez RN      Goal: Monitoring - monitor glucose and ketones as  directed       Task: Provide education on blood glucose monitoring (purpose, proper technique, frequency, glucose targets, interpreting results, when to use glucose control solution, sharps disposal) Completed 8/28/2022   Responsible User: Anamaria Benítez RN      Task: Provide education on continuous glucose monitoring (sensor placement, use of keira or /reader, understanding glucose trends, alerts and alarms, differences between sensor glucose and blood glucose)    Responsible User: Anamaria Benítez RN      Task: Provide education on ketone monitoring (when to monitor, frequency, etc.)    Responsible User: Anamaria Benítez RN      Goal: Taking Medication - patient is consistently taking medications as directed    Start Date: 8/25/2022   Expected End Date: 12/1/2022   Note:    Patient is taking medication - Trulicity and Lantus as directed     Task: Provide education on action of prescribed medication, including when to take and possible side effects Completed 8/28/2022   Responsible User: Anamaria Benítez RN      Task: Provide education on insulin and injectable diabetes medications, including administration, storage, site selection and rotation for injection sites Completed 8/28/2022   Responsible User: Anamaria Benítez RN      Task: Discuss barriers to medication adherence with patient and provide management technique ideas as appropriate    Responsible User: Anamaria Benítez RN      Task: Provide education on frequency and refill details of medications    Responsible User: Anamaria Benítez RN      Goal: Problem Solving - know how to prevent and manage short-term diabetes complications       Task: Provide education on high blood glucose - causes, signs/symptoms, prevention and treatment    Responsible User: Anamaria Benítez RN      Task: Provide education on low blood glucose - causes, signs/symptoms, prevention, treatment, carrying a carbohydrate source at all times, and medical identification    Responsible User: Anamaria Benítez RN      Task:  Provide education on safe travel with diabetes    Responsible User: Anamaria Benítez RN      Task: Provide education on how to care for diabetes on sick days    Responsible User: Anamaria Benítez RN      Task: Provide education on when to call a health care provider    Responsible User: Anamaria Benítez RN      Goal: Reducing Risks - know how to prevent and treat long-term diabetes complications       Task: Provide education on major complications of diabetes, prevention, early diagnostic measures and treatment of complications    Responsible User: Anamaria Benítez RN      Task: Provide education on recommended care for dental, eye and foot health    Responsible User: Anamaria Benítez RN      Task: Provide education on Hemoglobin A1c - goals and relationship to blood glucose levels    Responsible User: Anamaria Benítez RN      Task: Provide education on recommendations for heart health - lipid levels and goals, blood pressure and goals, and aspirin therapy, if indicated    Responsible User: Anamaria Benítez RN      Task: Provide education on tobacco cessation    Responsible User: Anamaria Benítez RN      Goal: Healthy Coping - use available resources to cope with the challenges of managing diabetes       Task: Discuss recognizing feelings about having diabetes    Responsible User: Anamaria Benítez RN      Task: Provide education on the benefits of making appropriate lifestyle changes    Responsible User: Anamaria Benítez RN      Task: Provide education on benefits of utilizing support systems    Responsible User: Anamaria Benítez RN      Task: Discuss methods for coping with stress    Responsible User: Anamaria Benítez RN      Task: Provide education on when to seek professional counseling    Responsible User: Anamaria Benítez RN            Time Spent: 30 minutes  Encounter Type: Individual    Any diabetes medication dose changes were made via the CDE Protocol per the patient's primary care provider. A copy of this encounter was shared with the provider.

## 2022-09-04 ENCOUNTER — HEALTH MAINTENANCE LETTER (OUTPATIENT)
Age: 77
End: 2022-09-04

## 2022-09-09 DIAGNOSIS — Z79.01 LONG TERM CURRENT USE OF ANTICOAGULANT THERAPY: ICD-10-CM

## 2022-09-09 DIAGNOSIS — I26.99 PULMONARY EMBOLISM AND INFARCTION (H): ICD-10-CM

## 2022-09-14 ENCOUNTER — LAB (OUTPATIENT)
Dept: LAB | Facility: OTHER | Age: 77
End: 2022-09-14
Payer: MEDICARE

## 2022-09-14 ENCOUNTER — ANTICOAGULATION THERAPY VISIT (OUTPATIENT)
Dept: ANTICOAGULATION | Facility: OTHER | Age: 77
End: 2022-09-14
Attending: FAMILY MEDICINE
Payer: MEDICARE

## 2022-09-14 DIAGNOSIS — Z79.01 LONG TERM CURRENT USE OF ANTICOAGULANT THERAPY: ICD-10-CM

## 2022-09-14 DIAGNOSIS — G25.81 RESTLESS LEGS SYNDROME: ICD-10-CM

## 2022-09-14 DIAGNOSIS — I26.99 PULMONARY EMBOLISM AND INFARCTION (H): ICD-10-CM

## 2022-09-14 DIAGNOSIS — I26.99 PULMONARY EMBOLISM AND INFARCTION (H): Primary | ICD-10-CM

## 2022-09-14 DIAGNOSIS — D64.9 ANEMIA, UNSPECIFIED TYPE: ICD-10-CM

## 2022-09-14 LAB — INR BLD: 2.6 (ref 0.9–1.1)

## 2022-09-14 PROCEDURE — 85610 PROTHROMBIN TIME: CPT | Mod: ZL

## 2022-09-14 PROCEDURE — 36415 COLL VENOUS BLD VENIPUNCTURE: CPT | Mod: ZL

## 2022-09-14 RX ORDER — FERROUS SULFATE 325(65) MG
TABLET ORAL
Qty: 30 TABLET | Refills: 6 | Status: SHIPPED | OUTPATIENT
Start: 2022-09-14

## 2022-09-14 NOTE — PROGRESS NOTES
ANTICOAGULATION MANAGEMENT     Clement Voss 77 year old male is on warfarin with therapeutic INR result. (Goal INR 2.0-3.0)    Recent labs: (last 7 days)     09/14/22  0926   INR 2.6*       ASSESSMENT       Source(s): Chart Review    Previous INR was Therapeutic last visit; previously outside of goal range    Medication, diet, health changes since last INR chart reviewed; none identified           PLAN     Recommended plan for no diet, medication or health factor changes affecting INR     Dosing Instructions: Continue your current warfarin dose with next INR in 5 weeks       Summary  As of 9/14/2022    Full warfarin instructions:  2.5 mg every Mon, Wed, Fri; 5 mg all other days   Next INR check:  10/19/2022             Detailed voice message left for Huy with dosing instructions and follow up date.   Sent eHarmony message with dosing and follow up instructions    Contact [phone] to schedule and with any changes, questions or concerns.     Education provided: Please call back if any changes to your diet, medications or how you've been taking warfarin and Contact [phone] with any changes, questions or concerns.     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 after 2 PM         Anticoagulation Care Providers     Provider Role Specialty Phone number    Lorelei Felix MD Referring Family Medicine [phone]

## 2022-09-14 NOTE — TELEPHONE ENCOUNTER
warfarin      Last Written Prescription Date:  3/28/22  Last Fill Quantity: 155,   # refills: 1  Last Office Visit: 8/24/22  Future Office visit:    Next 5 appointments (look out 90 days)    Nov 21, 2022 10:15 AM  (Arrive by 10:00 AM)  SHORT with Lorelei Felix MD  [facility] ([facility] ) [address]  [facility] [address]  [phone]

## 2022-09-15 DIAGNOSIS — I26.99 PULMONARY EMBOLISM AND INFARCTION (H): ICD-10-CM

## 2022-09-15 DIAGNOSIS — Z79.01 LONG TERM CURRENT USE OF ANTICOAGULANT THERAPY: ICD-10-CM

## 2022-09-15 RX ORDER — WARFARIN SODIUM 2.5 MG/1
TABLET ORAL
Qty: 166 TABLET | Refills: 1 | Status: SHIPPED | OUTPATIENT
Start: 2022-09-15 | End: 2023-03-23

## 2022-09-15 RX ORDER — WARFARIN SODIUM 2.5 MG/1
TABLET ORAL
Qty: 155 TABLET | Refills: 1 | Status: SHIPPED | OUTPATIENT
Start: 2022-09-15 | End: 2022-09-15

## 2022-09-15 NOTE — TELEPHONE ENCOUNTER
Warfarin ANTICOAGULANT      Last Written Prescription Date:  9/15/22  Last Fill Quantity: 155,   # refills: 1  Last Office Visit: 8/24/22  Future Office visit:    Next 5 appointments (look out 90 days)    Nov 21, 2022 10:15 AM  (Arrive by 10:00 AM)  SHORT with Lorelei Felix MD  [facility] ([facility] ) [address]  [facility] [address]  [phone]           Routing refill request to provider for review/approval because:

## 2022-09-16 DIAGNOSIS — I48.20 CHRONIC ATRIAL FIBRILLATION (H): ICD-10-CM

## 2022-09-16 DIAGNOSIS — I26.99 PULMONARY EMBOLISM AND INFARCTION (H): Primary | ICD-10-CM

## 2022-09-20 ENCOUNTER — TELEPHONE (OUTPATIENT)
Dept: EDUCATION SERVICES | Facility: HOSPITAL | Age: 77
End: 2022-09-20

## 2022-09-20 NOTE — TELEPHONE ENCOUNTER
I called the pt and notified his Trulicity has arrived from the [facility]. Pt will p/u at the  of the [facility].

## 2022-10-04 ENCOUNTER — MYC MEDICAL ADVICE (OUTPATIENT)
Dept: EDUCATION SERVICES | Facility: HOSPITAL | Age: 77
End: 2022-10-04

## 2022-10-05 NOTE — TELEPHONE ENCOUNTER
Note. Reviewed prior reports.   Stable.     Lantus- 36 units daily  Trulicity 1.5 mg weekly.   Mikayla Caldera, RN Diabetes Educator,  [phone]  10/5/2022 at 11:13 AM

## 2022-10-11 ENCOUNTER — MYC MEDICAL ADVICE (OUTPATIENT)
Dept: EDUCATION SERVICES | Facility: HOSPITAL | Age: 77
End: 2022-10-11

## 2022-10-13 NOTE — TELEPHONE ENCOUNTER
Noted. Continue to monitor, no change.  Mikayla Caldera, RN Diabetes Educator,  [phone]  10/13/2022 at 10:44 AM

## 2022-10-18 DIAGNOSIS — Z79.4 TYPE 2 DIABETES MELLITUS WITH STAGE 3B CHRONIC KIDNEY DISEASE, WITH LONG-TERM CURRENT USE OF INSULIN (H): ICD-10-CM

## 2022-10-18 DIAGNOSIS — N18.32 TYPE 2 DIABETES MELLITUS WITH STAGE 3B CHRONIC KIDNEY DISEASE, WITH LONG-TERM CURRENT USE OF INSULIN (H): ICD-10-CM

## 2022-10-18 DIAGNOSIS — E11.22 TYPE 2 DIABETES MELLITUS WITH STAGE 3B CHRONIC KIDNEY DISEASE, WITH LONG-TERM CURRENT USE OF INSULIN (H): ICD-10-CM

## 2022-10-19 ENCOUNTER — ANTICOAGULATION THERAPY VISIT (OUTPATIENT)
Dept: ANTICOAGULATION | Facility: OTHER | Age: 77
End: 2022-10-19
Attending: FAMILY MEDICINE
Payer: MEDICARE

## 2022-10-19 ENCOUNTER — LAB (OUTPATIENT)
Dept: LAB | Facility: OTHER | Age: 77
End: 2022-10-19
Payer: MEDICARE

## 2022-10-19 DIAGNOSIS — I26.99 PULMONARY EMBOLISM AND INFARCTION (H): ICD-10-CM

## 2022-10-19 DIAGNOSIS — I26.99 PULMONARY EMBOLISM AND INFARCTION (H): Primary | ICD-10-CM

## 2022-10-19 DIAGNOSIS — I48.20 CHRONIC ATRIAL FIBRILLATION (H): ICD-10-CM

## 2022-10-19 DIAGNOSIS — Z79.01 LONG TERM CURRENT USE OF ANTICOAGULANT THERAPY: ICD-10-CM

## 2022-10-19 LAB — INR BLD: 2.5 (ref 0.9–1.1)

## 2022-10-19 PROCEDURE — 36416 COLLJ CAPILLARY BLOOD SPEC: CPT | Mod: ZL

## 2022-10-19 PROCEDURE — 85610 PROTHROMBIN TIME: CPT | Mod: ZL

## 2022-10-19 NOTE — PROGRESS NOTES
ANTICOAGULATION MANAGEMENT     Clement Voss 77 year old male is on warfarin with therapeutic INR result. (Goal INR 2.0-3.0)    Recent labs: (last 7 days)     10/19/22  0934   INR 2.5*       ASSESSMENT       Source(s): Chart Review and Patient/Caregiver Call       Warfarin doses taken: Warfarin taken as instructed    Diet: No new diet changes identified    New illness, injury, or hospitalization: No    Medication/supplement changes: None noted    Signs or symptoms of bleeding or clotting: No    Previous INR: Therapeutic last 2(+) visits    Additional findings: None       PLAN     Recommended plan for no diet, medication or health factor changes affecting INR     Dosing Instructions: Continue your current warfarin dose with next INR in 6 weeks       Summary  As of 10/19/2022    Full warfarin instructions:  2.5 mg every Mon, Wed, Fri; 5 mg all other days   Next INR check:  12/1/2022             Telephone call with Huy who verbalizes understanding and agrees to plan    Lab visit scheduled    Education provided: None required    Plan made per [facility] anticoagulation protocol    Latosha Paris RN  Anticoagulation Clinic  10/19/2022    _______________________________________________________________________     Anticoagulation Episode Summary     Current INR goal:  2.0-3.0   TTR:  76.5 % (1 y)   Target end date:  Indefinite   Send INR reminders to:  [facility]    Indications    Pulmonary embolism and infarction (H) [I26.99]  Long-term (current) use of anticoagulants [Z79.01] [Z79.01]           Comments:  Lab in [facility] Arrives home in the afternoon after 2 PM         Anticoagulation Care Providers     Provider Role Specialty Phone number    Lorelei Felix MD Referring Family Medicine [phone]

## 2022-10-20 RX ORDER — FLURBIPROFEN SODIUM 0.3 MG/ML
SOLUTION/ DROPS OPHTHALMIC
Qty: 100 EACH | Refills: 3 | Status: SHIPPED | OUTPATIENT
Start: 2022-10-20 | End: 2023-11-28

## 2022-11-08 ENCOUNTER — MYC MEDICAL ADVICE (OUTPATIENT)
Dept: EDUCATION SERVICES | Facility: HOSPITAL | Age: 77
End: 2022-11-08

## 2022-11-09 NOTE — TELEPHONE ENCOUNTER
Noted, continue to monitor.   Mikayla Caldera, RN Diabetes Educator,  [phone]  11/9/2022 at 3:52 PM

## 2022-11-16 ENCOUNTER — LAB (OUTPATIENT)
Dept: LAB | Facility: OTHER | Age: 77
End: 2022-11-16
Payer: MEDICARE

## 2022-11-16 DIAGNOSIS — E11.22 TYPE 2 DIABETES MELLITUS WITH STAGE 3B CHRONIC KIDNEY DISEASE, WITH LONG-TERM CURRENT USE OF INSULIN (H): ICD-10-CM

## 2022-11-16 DIAGNOSIS — E78.2 MIXED HYPERLIPIDEMIA: ICD-10-CM

## 2022-11-16 DIAGNOSIS — I10 BENIGN ESSENTIAL HYPERTENSION: ICD-10-CM

## 2022-11-16 DIAGNOSIS — Z79.4 TYPE 2 DIABETES MELLITUS WITH STAGE 3B CHRONIC KIDNEY DISEASE, WITH LONG-TERM CURRENT USE OF INSULIN (H): ICD-10-CM

## 2022-11-16 DIAGNOSIS — I48.20 CHRONIC ATRIAL FIBRILLATION (H): ICD-10-CM

## 2022-11-16 DIAGNOSIS — N18.32 TYPE 2 DIABETES MELLITUS WITH STAGE 3B CHRONIC KIDNEY DISEASE, WITH LONG-TERM CURRENT USE OF INSULIN (H): ICD-10-CM

## 2022-11-16 DIAGNOSIS — I26.99 PULMONARY EMBOLISM AND INFARCTION (H): ICD-10-CM

## 2022-11-16 LAB
ALT SERPL W P-5'-P-CCNC: 65 U/L (ref 10–50)
ANION GAP SERPL CALCULATED.3IONS-SCNC: 11 MMOL/L (ref 7–15)
BUN SERPL-MCNC: 17.8 MG/DL (ref 8–23)
CALCIUM SERPL-MCNC: 9.2 MG/DL (ref 8.8–10.2)
CHLORIDE SERPL-SCNC: 101 MMOL/L (ref 98–107)
CHOLEST SERPL-MCNC: 95 MG/DL
CREAT SERPL-MCNC: 1.42 MG/DL (ref 0.67–1.17)
DEPRECATED HCO3 PLAS-SCNC: 27 MMOL/L (ref 22–29)
EST. AVERAGE GLUCOSE BLD GHB EST-MCNC: 180 MG/DL
GFR SERPL CREATININE-BSD FRML MDRD: 51 ML/MIN/1.73M2
GLUCOSE SERPL-MCNC: 124 MG/DL (ref 70–99)
HBA1C MFR BLD: 7.9 %
HDLC SERPL-MCNC: 36 MG/DL
LDLC SERPL CALC-MCNC: 29 MG/DL
NONHDLC SERPL-MCNC: 59 MG/DL
POTASSIUM SERPL-SCNC: 3.9 MMOL/L (ref 3.4–5.3)
SODIUM SERPL-SCNC: 139 MMOL/L (ref 136–145)
TRIGL SERPL-MCNC: 151 MG/DL

## 2022-11-16 PROCEDURE — 36415 COLL VENOUS BLD VENIPUNCTURE: CPT | Mod: ZL

## 2022-11-16 PROCEDURE — 80061 LIPID PANEL: CPT | Mod: ZL

## 2022-11-16 PROCEDURE — 83036 HEMOGLOBIN GLYCOSYLATED A1C: CPT | Mod: ZL

## 2022-11-16 PROCEDURE — 84460 ALANINE AMINO (ALT) (SGPT): CPT | Mod: ZL

## 2022-11-16 PROCEDURE — 80048 BASIC METABOLIC PNL TOTAL CA: CPT | Mod: ZL

## 2022-11-16 NOTE — PROGRESS NOTES
"  Assessment & Plan     1. Type 2 diabetes mellitus with stage 3b chronic kidney disease, with long-term current use of insulin (H)  No changes to current medication, patient is encouraged to work on diet.  Follow-up in three months, sooner as needed.  - [facility]  NO CHARGE  - Hemoglobin A1c; Future    2. Mixed hyperlipidemia  As above  - ALT; Future  - Lipid Profile; Future    3. Benign essential hypertension  As above  - Basic metabolic panel; Future    4. Stage 3b chronic kidney disease (H)  No changes    5. Chronic left ventricular systolic dysfunction  No changes    6. Chronic atrial fibrillation (H)  No changes    7. ASCVD (arteriosclerotic cardiovascular disease)  No changes      Tetanus vaccine recommended, patient will talk to wife as Medicare likely won't cover vaccine.      BMI:   Estimated body mass index is 31.9 kg/m  as calculated from the following:    Height as of this encounter: 1.702 m (5' 7\").    Weight as of this encounter: 92.4 kg (203 lb 11.2 oz).     Return in about 3 months (around 2/21/2023) for Diabetic Follow-up.    Lorelei Felix MD  [facility] - RUTHANN Son is a 77 year old, presenting for the following health issues:  Diabetes, Lipids, and Chronic Disease Management      HPI     Diabetes Follow-up    How often are you checking your blood sugar? Three times daily  Blood sugar testing frequency justification:  Uncontrolled diabetes  What time of day are you checking your blood sugars (select all that apply)?  Before and after meals  Have you had any blood sugars above 200?  Yes several  Have you had any blood sugars below 70?  No    What symptoms do you notice when your blood sugar is low?  None and cold sweat    What concerns do you have today about your diabetes? None and Blood sugar is often over 200     Do you have any of these symptoms? (Select all that apply)  No numbness or tingling in feet.  No redness, sores or blisters on feet.  No " "complaints of excessive thirst.  No reports of blurry vision.  No significant changes to weight.     Patient does note increased dietary indiscretions - ice cream, donuts, etc      Hyperlipidemia Follow-Up      Are you regularly taking any medication or supplement to lower your cholesterol?   Yes- Crestor    Are you having muscle aches or other side effects that you think could be caused by your cholesterol lowering medication?  No    Hypertension Follow-up      Do you check your blood pressure regularly outside of the clinic? Yes     Are you following a low salt diet? Yes    Are your blood pressures ever more than 140 on the top number (systolic) OR more   than 90 on the bottom number (diastolic), for example 140/90? No    BP Readings from Last 2 Encounters:   11/21/22 136/70   08/25/22 126/70     Hemoglobin A1C (%)   Date Value   11/16/2022 7.9 (H)   08/15/2022 7.2 (H)   04/27/2021 9.7 (H)   01/26/2021 9.4 (H)     LDL Cholesterol Calculated   Date Value   11/16/2022 29 mg/dL   08/15/2022      Comment:     Cannot estimate LDL when triglyceride exceeds 400 mg/dL   04/27/2021 12 mg/dL   01/26/2021 26 mg/dL       Chronic Kidney Disease Follow-up  Do you take any over the counter pain medicine?: Yes  What over the counter medicine are you taking for your pain?:  Aspirin daily low dose      How often do you take this medicine?:  Daily        Review of Systems   Constitutional, HEENT, cardiovascular, pulmonary, gi and gu systems are negative, except as otherwise noted.      Objective    /70 (BP Location: Right arm, Patient Position: Sitting, Cuff Size: Adult Regular)   Pulse 72   Temp 96.9  F (36.1  C) (Tympanic)   Resp 18   Ht 1.702 m (5' 7\")   Wt 92.4 kg (203 lb 11.2 oz)   SpO2 99%   BMI 31.90 kg/m    Body mass index is 31.9 kg/m .  Physical Exam   GENERAL: healthy, alert and no distress  PSYCH: mentation appears normal, affect normal/bright  Diabetic foot exam: normal DP and PT pulses, no trophic changes " or ulcerative lesions, normal monofilament exam and venous stasis dermatitis noted    Lab on 11/16/2022   Component Date Value Ref Range Status     Cholesterol 11/16/2022 95  <200 mg/dL Final     Triglycerides 11/16/2022 151 (H)  <150 mg/dL Final     Direct Measure HDL 11/16/2022 36 (L)  >=40 mg/dL Final     LDL Cholesterol Calculated 11/16/2022 29  <=100 mg/dL Final     Non HDL Cholesterol 11/16/2022 59  <130 mg/dL Final     Estimated Average Glucose 11/16/2022 180  mg/dL Final     Hemoglobin A1C 11/16/2022 7.9 (H)  <5.7 % Final    Normal <5.7%   Prediabetes 5.7-6.4%    Diabetes 6.5% or higher     Note: Adopted from ADA consensus guidelines.     Sodium 11/16/2022 139  136 - 145 mmol/L Final     Potassium 11/16/2022 3.9  3.4 - 5.3 mmol/L Final     Chloride 11/16/2022 101  98 - 107 mmol/L Final     Carbon Dioxide (CO2) 11/16/2022 27  22 - 29 mmol/L Final     Anion Gap 11/16/2022 11  7 - 15 mmol/L Final     Urea Nitrogen 11/16/2022 17.8  8.0 - 23.0 mg/dL Final     Creatinine 11/16/2022 1.42 (H)  0.67 - 1.17 mg/dL Final     Calcium 11/16/2022 9.2  8.8 - 10.2 mg/dL Final     Glucose 11/16/2022 124 (H)  70 - 99 mg/dL Final     GFR Estimate 11/16/2022 51 (L)  >60 mL/min/1.73m2 Final    Effective December 21, 2021 eGFRcr in adults is calculated using the 2021 CKD-EPI creatinine equation which includes age and gender (Virgilio et al., NEJM, DOI: 10.1056/YZIUbf7245412)     ALT 11/16/2022 65 (H)  10 - 50 U/L Final

## 2022-11-21 ENCOUNTER — OFFICE VISIT (OUTPATIENT)
Dept: FAMILY MEDICINE | Facility: OTHER | Age: 77
End: 2022-11-21
Attending: FAMILY MEDICINE
Payer: COMMERCIAL

## 2022-11-21 VITALS
BODY MASS INDEX: 31.97 KG/M2 | TEMPERATURE: 96.9 F | OXYGEN SATURATION: 99 % | SYSTOLIC BLOOD PRESSURE: 136 MMHG | RESPIRATION RATE: 18 BRPM | DIASTOLIC BLOOD PRESSURE: 70 MMHG | HEIGHT: 67 IN | WEIGHT: 203.7 LBS | HEART RATE: 72 BPM

## 2022-11-21 DIAGNOSIS — I25.10 ASCVD (ARTERIOSCLEROTIC CARDIOVASCULAR DISEASE): ICD-10-CM

## 2022-11-21 DIAGNOSIS — E11.22 TYPE 2 DIABETES MELLITUS WITH STAGE 3B CHRONIC KIDNEY DISEASE, WITH LONG-TERM CURRENT USE OF INSULIN (H): Primary | ICD-10-CM

## 2022-11-21 DIAGNOSIS — E78.2 MIXED HYPERLIPIDEMIA: ICD-10-CM

## 2022-11-21 DIAGNOSIS — N18.32 TYPE 2 DIABETES MELLITUS WITH STAGE 3B CHRONIC KIDNEY DISEASE, WITH LONG-TERM CURRENT USE OF INSULIN (H): Primary | ICD-10-CM

## 2022-11-21 DIAGNOSIS — I51.9 CHRONIC LEFT VENTRICULAR SYSTOLIC DYSFUNCTION: ICD-10-CM

## 2022-11-21 DIAGNOSIS — I48.20 CHRONIC ATRIAL FIBRILLATION (H): ICD-10-CM

## 2022-11-21 DIAGNOSIS — N18.32 STAGE 3B CHRONIC KIDNEY DISEASE (H): ICD-10-CM

## 2022-11-21 DIAGNOSIS — Z79.4 TYPE 2 DIABETES MELLITUS WITH STAGE 3B CHRONIC KIDNEY DISEASE, WITH LONG-TERM CURRENT USE OF INSULIN (H): Primary | ICD-10-CM

## 2022-11-21 DIAGNOSIS — I10 BENIGN ESSENTIAL HYPERTENSION: ICD-10-CM

## 2022-11-21 PROCEDURE — G0463 HOSPITAL OUTPT CLINIC VISIT: HCPCS | Performed by: FAMILY MEDICINE

## 2022-11-21 PROCEDURE — 99214 OFFICE O/P EST MOD 30 MIN: CPT | Performed by: FAMILY MEDICINE

## 2022-11-21 ASSESSMENT — PAIN SCALES - GENERAL: PAINLEVEL: NO PAIN (0)

## 2022-11-21 NOTE — PATIENT INSTRUCTIONS
Tetanus vaccine is recommended.  Medicare has not been covering this vaccine.  It is probably a little cheaper to get the vaccine done at a pharmacy (around $80, I believe).  If you would like to receive this vaccine at the clinic, please call for a vaccine appointment.

## 2022-11-23 ENCOUNTER — MYC MEDICAL ADVICE (OUTPATIENT)
Dept: EDUCATION SERVICES | Facility: HOSPITAL | Age: 77
End: 2022-11-23

## 2022-11-23 NOTE — TELEPHONE ENCOUNTER
Noted. Pt has appt 12/1. Discuss plan at that time.   Mikayla Caldera, RN Diabetes Educator,  [phone]  11/23/2022 at 4:11 PM

## 2022-11-30 DIAGNOSIS — E78.2 MIXED HYPERLIPIDEMIA: ICD-10-CM

## 2022-12-01 ENCOUNTER — ANTICOAGULATION THERAPY VISIT (OUTPATIENT)
Dept: ANTICOAGULATION | Facility: OTHER | Age: 77
End: 2022-12-01
Attending: FAMILY MEDICINE
Payer: MEDICARE

## 2022-12-01 ENCOUNTER — HOSPITAL ENCOUNTER (OUTPATIENT)
Dept: EDUCATION SERVICES | Facility: HOSPITAL | Age: 77
Discharge: HOME | End: 2022-12-01
Attending: NURSE PRACTITIONER
Payer: MEDICARE

## 2022-12-01 ENCOUNTER — APPOINTMENT (OUTPATIENT)
Dept: LAB | Facility: OTHER | Age: 77
End: 2022-12-01
Attending: FAMILY MEDICINE
Payer: MEDICARE

## 2022-12-01 VITALS
SYSTOLIC BLOOD PRESSURE: 110 MMHG | DIASTOLIC BLOOD PRESSURE: 72 MMHG | OXYGEN SATURATION: 98 % | HEART RATE: 76 BPM | BODY MASS INDEX: 32.71 KG/M2 | WEIGHT: 203.5 LBS | HEIGHT: 66 IN

## 2022-12-01 DIAGNOSIS — I26.99 PULMONARY EMBOLISM AND INFARCTION (H): Primary | ICD-10-CM

## 2022-12-01 DIAGNOSIS — Z79.01 LONG TERM CURRENT USE OF ANTICOAGULANT THERAPY: ICD-10-CM

## 2022-12-01 LAB — INR BLD: 2.3 (ref 0.9–1.1)

## 2022-12-01 PROCEDURE — 85610 PROTHROMBIN TIME: CPT | Mod: ZL

## 2022-12-01 PROCEDURE — G0108 DIAB MANAGE TRN  PER INDIV: HCPCS

## 2022-12-01 PROCEDURE — 36416 COLLJ CAPILLARY BLOOD SPEC: CPT | Mod: ZL

## 2022-12-01 ASSESSMENT — PAIN SCALES - GENERAL: PAINLEVEL: NO PAIN (0)

## 2022-12-01 NOTE — PROGRESS NOTES
ANTICOAGULATION MANAGEMENT     Clement Voss 77 year old male is on warfarin with therapeutic INR result. (Goal INR 2.0-3.0)    Recent labs: (last 7 days)     12/01/22  1054   INR 2.3*       ASSESSMENT       Source(s): Chart Review       Warfarin doses taken: Warfarin taken as instructed    Diet: No new diet changes identified    New illness, injury, or hospitalization: No    Medication/supplement changes: None noted    Signs or symptoms of bleeding or clotting: No    Previous INR: Therapeutic last 2(+) visits    Additional findings: None       PLAN     Recommended plan for no diet, medication or health factor changes affecting INR     Dosing Instructions: Continue your current warfarin dose with next INR in 6 weeks       Summary  As of 12/1/2022    Full warfarin instructions:  2.5 mg every Mon, Wed, Fri; 5 mg all other days; Starting 12/1/2022   Next INR check:  1/12/2023             Detailed voice message left for Huy with dosing instructions and follow up date.     Contact [phone] to schedule and with any changes, questions or concerns.     Education provided: Please call back if any changes to your diet, medications or how you've been taking warfarin    Plan made per [facility] anticoagulation protocol    Corry Leon, RN  Anticoagulation Clinic  12/1/2022    _______________________________________________________________________     Anticoagulation Episode Summary     Current INR goal:  2.0-3.0   TTR:  82.2 % (1 y)   Target end date:  Indefinite   Send INR reminders to:  [facility] [place]    Indications    Pulmonary embolism and infarction (H) [I26.99]  Long-term (current) use of anticoagulants [Z79.01] [Z79.01]           Comments:  Lab in [facility] Arrives home in the afternoon after 2 PM         Anticoagulation Care Providers     Provider Role Specialty Phone number    Lorelei Felix MD Referring Family Medicine [phone]

## 2022-12-01 NOTE — PATIENT INSTRUCTIONS
Recap of our visit:     Monitoring:  A1C review:  Your A1C was 7.9 on 11/16/2022. Goal is less than  <8.0%  Next A1C: February 16th 2023.     Check blood sugars 3x/day. Fasting in the morning, before meals, 2 hours after your largest meal are good times to check or at Bedtime.   Target blood sugar: Fasting or before meals target is . 2 hours after meal <180.     Medications:   Continue:Lantus 36 units daily. Trulicity 1.5 mg once weekly.     Healthy Eating:  Limit higher carbohydrate foods such as: rice, breads, cereal, pasta, sweets. Avoid sugary drinks.  Snacks: Try to  work on healthy, low carbohydrate food choices.   Good snack examples: meat, cheese, cottage cheese, nuts, hard boiled egg, veggies, fruit/berries, yogurt (Greek yogurt/protein).     Activity/Exercise:   Increase exercise as tolerated, even multiple short times during your day helps.   Adult goal is 150 minutes/week =  30 minutes 5 days of the week.   Weight: 203.5#    Foot exam: done 11/22/2022, yearly  Eye exam: yearly- done April 14/2022.     Patient assistance application completed today.     Follow up: Continue to send in your glucose readings.    Schedule follow at later date when you are ready to schedule.   Please bring your meter/reader to your appointment.     If you have any questions or concerns, please call me at [phone] or send Handy message.    Thank you for coming in today!  Mikayla Caldera RN Diabetes Educator

## 2022-12-02 RX ORDER — ROSUVASTATIN CALCIUM 5 MG/1
TABLET, COATED ORAL
Qty: 90 TABLET | Refills: 0 | Status: SHIPPED | OUTPATIENT
Start: 2022-12-02 | End: 2023-02-24

## 2022-12-02 NOTE — PROGRESS NOTES
Diabetes Self-Management Education & Support    Presents for: Follow-up    Type of Service: In Person Visit    Assessment Type:   ASSESSMENT:    Huy, 77 year old male. Here for BG review, A1C, and patient assistance program for 2023.   Huy sends BG via [employer], refer to encounters. Fasting BG mostly within target. BG elevated post meals. Pt prefers to continue same plan with Lantus 36 units at hs and Trulicity 1.5 mg once weekly- Wednesday ams for now. Rarely has lows. No longer able to use honey as carb source, gets stuck in throat. Has glucose tabs available.     Activity- rides stationary bike for 10 minutes twice daily. Hips begin to hurt if he walks a great distance, if able to rest, is able to resume walking.     Patient assistance application reviewed, completed today. Fax to [employer].  Bp 110/72  On statin, On ASA. Former tobacco use.     Wt Readings from Last 10 Encounters:   12/01/22 92.3 kg (203 lb 8 oz)   11/21/22 92.4 kg (203 lb 11.2 oz)   08/25/22 90.9 kg (200 lb 6.4 oz)   08/24/22 91 kg (200 lb 11.2 oz)   08/18/22 92.2 kg (203 lb 3.2 oz)   07/14/22 91.2 kg (201 lb)   05/13/22 92.5 kg (204 lb)   05/10/22 92.9 kg (204 lb 11.2 oz)   02/10/22 92.6 kg (204 lb 3.2 oz)   12/08/21 90.8 kg (200 lb 3.2 oz)       Patient's most recent   Lab Results   Component Value Date    A1C 7.9 11/16/2022    A1C 9.7 04/27/2021     is meeting goal of <8.0    Diabetes knowledge and skills assessment:   Patient is knowledgeable in diabetes management concepts related to: Being Active, Monitoring and Taking Medication  Continue education with the following diabetes management concepts: Healthy Eating, Being Active, Monitoring, Taking Medication, Problem Solving, Reducing Risks and Healthy Coping  Based on learning assessment above, most appropriate setting for further diabetes education would be: Individual setting.      PLAN  Monitoring:  A1C was 7.9 on 11/16/2022. Goal is less than  <8.0%  Next A1C: February 16th 2023.       Check blood sugars 3x/day. Fasting in the morning, before meals, 2 hours after your largest meal are good times to check or at Bedtime.   Target blood sugar: Fasting or before meals target is . 2 hours after meal <180.      Medications:   Continue:Lantus 36 units daily. Trulicity 1.5 mg once weekly.      Healthy Eating:  Limit higher carbohydrate foods such as: rice, breads, cereal, pasta, sweets. Avoid sugary drinks.  Snacks: Try to  work on healthy, low carbohydrate food choices.   Good snack examples: meat, cheese, cottage cheese, nuts, hard boiled egg, veggies, fruit/berries, yogurt (Greek yogurt/protein).      Activity/Exercise:   Increase exercise as tolerated, even multiple short times during your day helps.   Adult goal is 150 minutes/week =  30 minutes 5 days of the week.   Weight: 203.5#     Foot exam: done 11/22/2022, yearly  Eye exam: yearly- done April 14/2022.      Patient assistance application completed today.      Follow up: Continue to send in your glucose readings.    Schedule follow at later date when you are ready to schedule. 3-6 months.   Please bring your meter/reader to your appointment.      If you have any questions or concerns, please call me at [phone] or send [employer] message.  Topics to cover at upcoming visits: Healthy Eating, Being Active, Monitoring, Taking Medication, Problem Solving, Reducing Risks and Healthy Coping    See Care Plan for co-developed, patient-state behavior change goals.  AVS provided for patient today.    Education Materials Provided:  No new materials provided today    SUBJECTIVE/OBJECTIVE:  Presents for: Follow-up  Accompanied by: Self  Diabetes education in the past 24mo: Yes  Focus of Visit: Taking Medication, Monitoring  Diabetes type: Type 2  Disease course: Stable  How confident are you filling out medical forms by yourself:: A little bit  Diabetes management related comments/concerns: PAP application for 2023  Transportation concerns:  "No  Difficulty affording diabetes medication?: Sometimes (PAP- Trulicity)  Difficulty affording diabetes testing supplies?: No  Other concerns:: Glasses  Cultural Influences/Ethnic Background:  Not  or     Diabetes Symptoms & Complications:  Fatigue: Sometimes (cloudy, weather related.)  Neuropathy: No  Polydipsia: No  Polyphagia: No  Polyuria: No  Visual change: No  Slow healing wounds: No  Symptom course: Stable  Weight trend: Stable  Complications assessed today?: Yes  Autonomic neuropathy: No  CVA: No  Heart disease: Yes  Nephropathy: Yes  Peripheral neuropathy: No  Peripheral Vascular Disease: No  Retinopathy: No (unknown)    Patient Problem List and Family Medical History reviewed for relevant medical history, current medical status, and diabetes risk factors.    Vitals:  /72 (BP Location: Right arm, Patient Position: Sitting, Cuff Size: Adult Large)   Pulse 76   Ht 1.664 m (5' 5.5\")   Wt 92.3 kg (203 lb 8 oz)   SpO2 98%   BMI 33.35 kg/m    Estimated body mass index is 33.35 kg/m  as calculated from the following:    Height as of this encounter: 1.664 m (5' 5.5\").    Weight as of this encounter: 92.3 kg (203 lb 8 oz).   Last 3 BP:   BP Readings from Last 3 Encounters:   12/01/22 110/72   11/21/22 136/70   08/25/22 126/70       History   Smoking Status     Former     Types: Cigarettes     Quit date: 8/12/1964   Smokeless Tobacco     Never       Labs:  Lab Results   Component Value Date    A1C 7.9 11/16/2022    A1C 9.7 04/27/2021     Lab Results   Component Value Date     11/16/2022     08/15/2022     04/27/2021     Lab Results   Component Value Date    LDL 29 11/16/2022    LDL 12 04/27/2021     HDL Cholesterol   Date Value Ref Range Status   04/27/2021 37 (L) >39 mg/dL Final     Direct Measure HDL   Date Value Ref Range Status   11/16/2022 36 (L) >=40 mg/dL Final   ]  GFR Estimate   Date Value Ref Range Status   11/16/2022 51 (L) >60 mL/min/1.73m2 Final     Comment: "     Effective December 21, 2021 eGFRcr in adults is calculated using the 2021 CKD-EPI creatinine equation which includes age and gender (Virgilio guzmán al., NEJM, DOI: 10.1056/BACNgn1806142)   04/27/2021 51 (L) >60 mL/min/[1.73_m2] Final     Comment:     Non  GFR Calc  Starting 12/18/2018, serum creatinine based estimated GFR (eGFR) will be   calculated using the Chronic Kidney Disease Epidemiology Collaboration   (CKD-EPI) equation.       GFR Estimate If Black   Date Value Ref Range Status   04/27/2021 59 (L) >60 mL/min/[1.73_m2] Final     Comment:      GFR Calc  Starting 12/18/2018, serum creatinine based estimated GFR (eGFR) will be   calculated using the Chronic Kidney Disease Epidemiology Collaboration   (CKD-EPI) equation.       Lab Results   Component Value Date    CR 1.42 11/16/2022    CR 1.34 04/27/2021     No results found for: MICROALBUMIN    Healthy Eating:  Healthy Eating Assessed Today: Yes  Cultural/Christianity diet restrictions?: No  Meal planning/habits: Avoiding sweets, Smaller portions  How many times a week on average do you eat food made away from home (restaurant/take-out)?:  (not too often.)  Meals include: Breakfast, Lunch, Dinner  Breakfast: oatmeal or honey nut cheerios with milk and blueberries  Lunch: leftovers or bagel or sandwich  Dinner: meat, potatoes, vegetable or beef stew - sometimes rice or pasta  Snacks: fruit, vegetables, grapes, beefsticks  Beverages: Coffee, Milk, Juice, Other, Tea (Propel, chocolate milk, green tea)  Has patient met with a dietitian in the past?: Yes    Being Active:  Being Active Assessed Today: Yes  Exercise:: Yes (walks- but hips hurt. uses stationary bike twice daily 10 minutes each time)  Days per week of moderate to strenuous exercise (like a brisk walk): 3  On average, minutes per day of exercise at this level: 20 (sometimes 30)  How intense was your typical exercise? : Light (like stretching or slow walking)  Exercise Minutes  per Week: 60  Barrier to exercise: Access    Monitoring:  Monitoring Assessed Today: Yes  Did patient bring glucose meter to appointment? : Yes  Blood Glucose Meter: Accu-chek  Times checking blood sugar at home (number): 3  Times checking blood sugar at home (per): Day  Blood glucose trend: No change    Taking Medications:  Diabetes Medication(s)     Insulin       insulin glargine (LANTUS PEN) 100 UNIT/ML pen    Inject 36 Units Subcutaneous At Bedtime    Incretin Mimetic Agents       dulaglutide (TRULICITY) 1.5 MG/0.5ML pen    Inject 1.5 mg Subcutaneous every 7 days Do not send to pharmacy          Taking Medication Assessed Today: Yes  Current Treatments: Insulin Injections, Non-insulin Injectables (Trulicity 1.5 mg once weekly on Wednesday. Lantus 36 units daily at bedtime)  Dose schedule: At bedtime  Given by: Patient  Injection/Infusion sites: Abdomen  Problems taking diabetes medications regularly?: No  Diabetes medication side effects?: No    Problem Solving:  Problem Solving Assessed Today: Yes  Is the patient at risk for hypoglycemia?: Yes  Hypoglycemia Frequency: Rarely (feels low at 80.)  Hypoglycemia Treatment: Glucose (tablets or gel)    Hypoglycemia symptoms  Sweats: Yes  Feeling shaky: Yes    Hypoglycemia Complications  Blackouts: No  Hospitalization: No  Nocturnal hypoglycemia: No  Required assistance: No  Required glucagon injection: No  Seizures: No    Reducing Risks:  Reducing Risks Assessed Today: Yes  Diabetes Risks: Age over 45 years  CAD Risks: Diabetes Mellitus, Male sex, Obesity  Has dilated eye exam at least once a year?: Yes (4/14/2022.)  Sees dentist every 6 months?: No  Feet checked by healthcare provider in the last year?: Yes (11/21/2022)    Healthy Coping:  Healthy Coping Assessed Today: Yes  Emotional response to diabetes: Ready to learn, Confidence diabetes can be controlled  Informal Support system:: Family  Stage of change: ACTION (Actively working towards change)  Support  resources: Other (PAP)  Patient Activation Measure Survey Score:  ALLEN Score (Last Two) 1/6/2020 1/10/2020   ALLEN Raw Score 30 30   Activation Score 56 56   ALLEN Level 3 3         Care Plan and Education Provided:  Care Plan: Diabetes   Updates made by Mikayla Caldera RN since 12/1/2022 12:00 AM      Problem: HbA1C Not In Goal       Goal: Establish Regular Follow-Ups with PCP       Task: Discuss with PCP the recommended timing for patient's next follow up visit(s)    Responsible User: Mikayla Caldera RN      Task: Discuss schedule for PCP visits with patient    Responsible User: Mikayla Caldera RN      Goal: Get HbA1C Level in Goal       Task: Educate patient on diabetes education self-management topics    Responsible User: Mikayla Caldera RN      Task: Educate patient on benefits of regular glucose monitoring    Responsible User: Mikayla Caldera RN      Task: Refer patient to appropriate extended care team member, as needed (Medication Therapy Management, Behavioral Health, Physical Therapy, etc.)    Responsible User: Mkiayla Caldera RN      Task: Discuss diabetes treatment plan with patient    Responsible User: Mikayla Caldera RN      Problem: Diabetes Self-Management Education Needed to Optimize Self-Care Behaviors       Goal: Understand diabetes pathophysiology and disease progression       Task: Provide education on diabetes pathophysiology and disease progression specfic to patient's diabetes type Completed 12/1/2022   Responsible User: Mikayla Caldera RN      Goal: Healthy Eating - follow a healthy eating pattern for diabetes       Task: Provide education on portion control and consistency in amount, composition and timing of food intake Completed 12/1/2022   Responsible User: Mikayla Caldera RN      Task: Provide education on managing carbohydrate intake (carbohydrate counting, plate planning method, etc.)    Responsible User: Mikayla Caldera RN      Task: Provide education on weight management    Responsible User:  Staydohar, Mikayla, RN      Task: Provide education on heart healthy eating    Responsible User: Mikayla Caldera RN      Task: Provide education on eating out    Responsible User: Mikayla Caldera RN      Task: Develop individualized healthy eating plan with patient    Responsible User: Mikayla Caldera RN      Goal: Being Active - get regular physical activity, working up to at least 150 minutes per week    Note:    Rides stationary bike twice daily, 10 minutes each.   Hips hurt if walks distance.      Task: Provide education on relationship of activity to glucose and precautions to take if at risk for low glucose Completed 12/1/2022   Responsible User: Mikayla Caldera RN      Task: Discuss barriers to physical activity with patient Completed 12/1/2022   Responsible User: Mikayla Caldera RN      Task: Develop physical activity plan with patient    Responsible User: Mikayla Caldera RN      Task: Explore community resources including walking groups, assistance programs, and home videos    Responsible User: Mikayla Caldera RN      Goal: Monitoring - monitor glucose and ketones as directed    Note:    Monitoring 3x daily        Task: Provide education on blood glucose monitoring (purpose, proper technique, frequency, glucose targets, interpreting results, when to use glucose control solution, sharps disposal)    Responsible User: Mikayla Caldera RN      Task: Provide education on continuous glucose monitoring (sensor placement, use of keira or /reader, understanding glucose trends, alerts and alarms, differences between sensor glucose and blood glucose)    Responsible User: Mikayla Caldera RN      Task: Provide education on ketone monitoring (when to monitor, frequency, etc.)    Responsible User: Mikayla Caldera RN      Goal: Taking Medication - patient is consistently taking medications as directed    Note:    Trulicity  Lantus       Task: Provide education on action of prescribed medication, including when to take and possible  side effects    Responsible User: Mikayla Caldera RN      Task: Provide education on insulin and injectable diabetes medications, including administration, storage, site selection and rotation for injection sites    Responsible User: Mikayla Caldera RN      Task: Discuss barriers to medication adherence with patient and provide management technique ideas as appropriate    Responsible User: Mikayla Caldera RN      Task: Provide education on frequency and refill details of medications Completed 12/1/2022   Responsible User: Mikayla Caldera RN      Goal: Problem Solving - know how to prevent and manage short-term diabetes complications       Task: Provide education on high blood glucose - causes, signs/symptoms, prevention and treatment    Responsible User: Mikayla Caldera RN      Task: Provide education on low blood glucose - causes, signs/symptoms, prevention, treatment, carrying a carbohydrate source at all times, and medical identification    Responsible User: Mikayla Caldera RN      Task: Provide education on safe travel with diabetes    Responsible User: Mikayla Caldera RN      Task: Provide education on how to care for diabetes on sick days    Responsible User: Mikayla Caldera RN      Task: Provide education on when to call a health care provider    Responsible User: Mikayla Caldera RN      Goal: Reducing Risks - know how to prevent and treat long-term diabetes complications       Task: Provide education on major complications of diabetes, prevention, early diagnostic measures and treatment of complications    Responsible User: Mikayla Caldera RN      Task: Provide education on recommended care for dental, eye and foot health Completed 12/1/2022   Responsible User: Mikayla Caldera RN      Task: Provide education on Hemoglobin A1c - goals and relationship to blood glucose levels Completed 12/1/2022   Responsible User: Mikayla Caldera RN      Task: Provide education on recommendations for heart health - lipid levels and  goals, blood pressure and goals, and aspirin therapy, if indicated    Responsible User: Mikayla Caldera RN      Task: Provide education on tobacco cessation    Responsible User: Mikayla Caldera RN      Goal: Healthy Coping - use available resources to cope with the challenges of managing diabetes       Task: Discuss recognizing feelings about having diabetes    Responsible User: Mikayla Caldera RN      Task: Provide education on the benefits of making appropriate lifestyle changes    Responsible User: Mikayla Caldera RN      Task: Provide education on benefits of utilizing support systems Completed 12/1/2022   Responsible User: Mikayla Caldera RN      Task: Discuss methods for coping with stress    Responsible User: Mikayla Caldera RN      Task: Provide education on when to seek professional counseling    Responsible User: Mikayla Caldera RN          Time Spent: 30 minutes  Encounter Type: Individual    Any diabetes medication dose changes were made via the CDE Protocol per the patient's primary care provider. A copy of this encounter was shared with the provider.  Mikayla Caldera RN Diabetes Educator,  [phone]  12/1/2022 at 7:20 PM

## 2022-12-07 NOTE — DISCHARGE INSTRUCTIONS
Return to the emergency department if you have worsening of your symptoms or new concerning symptoms, please follow-up with your primary care provider within the next 1 week.  Call to schedule an appointment.  A video visit is reasonable in this case.  Continue to treat your symptoms in a reasonable way as you have already started doing, acetaminophen or Tylenol is reasonable for body aches and fevers.  As we discussed you should avoid NSAIDs right now because you are on blood thinners.  You can continue to use your humidifier, and decongestants.    dr hollis

## 2023-01-05 ENCOUNTER — PATIENT OUTREACH (OUTPATIENT)
Dept: EDUCATION SERVICES | Facility: HOSPITAL | Age: 78
End: 2023-01-05

## 2023-01-05 ENCOUNTER — MEDICAL CORRESPONDENCE (OUTPATIENT)
Dept: HEALTH INFORMATION MANAGEMENT | Facility: HOSPITAL | Age: 78
End: 2023-01-05

## 2023-01-05 NOTE — PROGRESS NOTES
Diabetes Self-Management Education & Support    Spoke with Basia at [facility] Customer Service. They received the patient portion of the application but did not receive the provider portion. Was advised be Basia to refax documents and call [facility] with the phone number the fax was sent from and they will be able to pull and process his application following the refax. Otherwise, the fax cue will be a couple of days.     Mikayla Caldera RN Diabetes Educator,  [phone]  1/5/2023 at 9:51 AM

## 2023-01-12 ENCOUNTER — LAB (OUTPATIENT)
Dept: LAB | Facility: OTHER | Age: 78
End: 2023-01-12
Attending: FAMILY MEDICINE
Payer: MEDICARE

## 2023-01-12 ENCOUNTER — ANTICOAGULATION THERAPY VISIT (OUTPATIENT)
Dept: ANTICOAGULATION | Facility: OTHER | Age: 78
End: 2023-01-12
Attending: FAMILY MEDICINE
Payer: MEDICARE

## 2023-01-12 DIAGNOSIS — I26.99 PULMONARY EMBOLISM AND INFARCTION (H): ICD-10-CM

## 2023-01-12 DIAGNOSIS — Z79.01 LONG TERM CURRENT USE OF ANTICOAGULANT THERAPY: ICD-10-CM

## 2023-01-12 DIAGNOSIS — I26.99 PULMONARY EMBOLISM AND INFARCTION (H): Primary | ICD-10-CM

## 2023-01-12 DIAGNOSIS — I48.20 CHRONIC ATRIAL FIBRILLATION (H): ICD-10-CM

## 2023-01-12 LAB — INR BLD: 2.1 (ref 0.9–1.1)

## 2023-01-12 PROCEDURE — 85610 PROTHROMBIN TIME: CPT | Mod: ZL

## 2023-01-12 PROCEDURE — 36415 COLL VENOUS BLD VENIPUNCTURE: CPT | Mod: ZL

## 2023-01-12 NOTE — PROGRESS NOTES
ANTICOAGULATION MANAGEMENT     Clement Voss 77 year old male is on warfarin with therapeutic INR result. (Goal INR 2.0-3.0)    Recent labs: (last 7 days)     01/12/23  0852   INR 2.1*       ASSESSMENT       Source(s): Chart Review and Patient/Caregiver Call       Warfarin doses taken: Warfarin taken as instructed    Diet: No new diet changes identified    New illness, injury, or hospitalization: No    Medication/supplement changes: None noted    Signs or symptoms of bleeding or clotting: No    Previous INR: Therapeutic last 2(+) visits    Additional findings: None       PLAN     Recommended plan for no diet, medication or health factor changes affecting INR     Dosing Instructions: Continue your current warfarin dose with next INR in 6 weeks       Summary  As of 1/12/2023    Full warfarin instructions:  2.5 mg every Mon, Wed, Fri; 5 mg all other days   Next INR check:  2/21/2023             Telephone call with Huy who verbalizes understanding and agrees to plan    Lab visit scheduled    Education provided:     None required    Plan made per [facility] anticoagulation protocol    Latosha Paris RN  Anticoagulation Clinic  1/12/2023    _______________________________________________________________________     Anticoagulation Episode Summary     Current INR goal:  2.0-3.0   TTR:  87.4 % (1 y)   Target end date:  Indefinite   Send INR reminders to:  [facility] [place]    Indications    Pulmonary embolism and infarction (H) [I26.99]  Long-term (current) use of anticoagulants [Z79.01] [Z79.01]           Comments:  Lab in [facility] Arrives home in the afternoon after 2 PM         Anticoagulation Care Providers     Provider Role Specialty Phone number    Lorelei Felix MD Referring Family Medicine [phone]

## 2023-01-17 NOTE — PROGRESS NOTES
ANTICOAGULATION FOLLOW-UP CLINIC VISIT    Patient Name:  Clement Voss  Date:  11/28/2017  Contact Type  Telephone message left on home phone voicemail    SUBJECTIVE:     Patient Findings     Positives No Problem Findings    Comments Call placed to patient and message left on listed home phone re: INR result, warfarin dosing and INR recheck date. He is to  Notify warfarin clinic if any bleeding/bruising, changes in diet/meds/activity or questions.            OBJECTIVE    INR Point of Care   Date Value Ref Range Status   11/28/2017 2.1 (H) 0.86 - 1.14 Final     Comment:     This test is intended for monitoring Coumadin therapy.  Results are not   accurate in patients with prolonged INR due to factor deficiency.         ASSESSMENT / PLAN  INR assessment THER    Recheck INR In: 6 WEEKS    INR Location Clinic      Anticoagulation Summary as of 11/28/2017     INR goal 2.0-3.0   Today's INR 2.1   Maintenance plan 2.5 mg (5 mg x 0.5) on Mon, Wed, Fri; 5 mg (5 mg x 1) all other days   Full instructions 2.5 mg on Mon, Wed, Fri; 5 mg all other days   Weekly total 27.5 mg   No change documented Jodie Elena RN   Plan last modified Jodie Gill RN (8/4/2016)   Next INR check 1/9/2018   Priority INR   Target end date Indefinite    Indications   Pulmonary embolism and infarction (H) [I26.99]  Acute thromboembolism of deep veins of lower extremity (H) [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 [phone]            See the Encounter Report to view Anticoagulation Flowsheet and Dosing Calendar (Go to Encounters tab in chart review, and find the Anticoagulation Therapy Visit)        Jodie Elena, RN                  Child/Adolescent Psychiatry Progress Note      Patient Name:  Claribel Gallegos   MR#:  [id number]    Date of Session:  1/17/2023       Start time/Stop time:  10:30 a.m./11:00 a.m.     Patient was seen for 30 minutes and also included review of EMR.This visit was performed via live two-way video with patient's verbal consent.   Clinician Location:  [facility].  Patient Location:  Home.    Claribel is in [place] and identity has been established.     She was informed that consent to treat includes permission to submit charges to the applicable insurance on file.  Claribel was advised regarding the potential risk inherent in video visits, as the assessment may be limited due to what can be seen on the screen which potentially results in an incomplete assessment; as well as either of us may discontinue the video visit if it is.    Chief Complaint    Chief Complaint   Patient presents with   • Video Visit       Risk Assessment    (Homicide/Suicide Ideation/Plan/Threat of Harm to Self/Others)  Minimal on today's date    Update on School    Patient reports she is currently looking for a job.  Unsuccessful to date.  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  She stop taking the Cymbalta months ago.    Response to Medications    (Including compliance, side effects, lab results)  Compliant with clonidine but taking 0.1 take 3 at night.  Previously she had been on clonidine 0.3 mg take 1 at night.  Appetite fair    Mental Status Exam    Orientation:  X3  Appearance:  Average height and weight  Speech:  Clear  Eye contact:  Fair  Psychomotor:  No tics observed or reported  Mood:  Neutral   Affect:  Bright  Thought Process:  Goal directed  Thought Content:  No voices or visions      Suicidal Ideation:  None reported      Homicidal Ideation:  Not applicable    Diagnoses:  Major depression, full remission      Informed Consent for Psychotropic Medications Prescribed  Signed by Patient/Parent/Legal Guardian    Plan    Options discussed.  Patient believes mood and anxiety under control.  She would like to continue with the clonidine 0.3 mg at night for sleep though instead of taking 3 of the 0.1 mg.  Next med check 2-3 months    Reno Daniel MD

## 2023-01-21 ENCOUNTER — MYC MEDICAL ADVICE (OUTPATIENT)
Dept: FAMILY MEDICINE | Facility: OTHER | Age: 78
End: 2023-01-21

## 2023-02-21 ENCOUNTER — ANTICOAGULATION THERAPY VISIT (OUTPATIENT)
Dept: ANTICOAGULATION | Facility: OTHER | Age: 78
End: 2023-02-21
Attending: FAMILY MEDICINE
Payer: MEDICARE

## 2023-02-21 ENCOUNTER — LAB (OUTPATIENT)
Dept: LAB | Facility: OTHER | Age: 78
End: 2023-02-21
Payer: MEDICARE

## 2023-02-21 DIAGNOSIS — Z79.4 TYPE 2 DIABETES MELLITUS WITH STAGE 3B CHRONIC KIDNEY DISEASE, WITH LONG-TERM CURRENT USE OF INSULIN (H): ICD-10-CM

## 2023-02-21 DIAGNOSIS — E11.22 TYPE 2 DIABETES MELLITUS WITH STAGE 3B CHRONIC KIDNEY DISEASE, WITH LONG-TERM CURRENT USE OF INSULIN (H): ICD-10-CM

## 2023-02-21 DIAGNOSIS — I10 BENIGN ESSENTIAL HYPERTENSION: ICD-10-CM

## 2023-02-21 DIAGNOSIS — N18.32 TYPE 2 DIABETES MELLITUS WITH STAGE 3B CHRONIC KIDNEY DISEASE, WITH LONG-TERM CURRENT USE OF INSULIN (H): ICD-10-CM

## 2023-02-21 DIAGNOSIS — I26.99 PULMONARY EMBOLISM AND INFARCTION (H): Primary | ICD-10-CM

## 2023-02-21 DIAGNOSIS — I26.99 PULMONARY EMBOLISM AND INFARCTION (H): ICD-10-CM

## 2023-02-21 DIAGNOSIS — Z79.01 LONG TERM CURRENT USE OF ANTICOAGULANT THERAPY: ICD-10-CM

## 2023-02-21 DIAGNOSIS — I48.20 CHRONIC ATRIAL FIBRILLATION (H): ICD-10-CM

## 2023-02-21 DIAGNOSIS — E78.2 MIXED HYPERLIPIDEMIA: ICD-10-CM

## 2023-02-21 LAB
ALT SERPL W P-5'-P-CCNC: 35 U/L (ref 10–50)
ANION GAP SERPL CALCULATED.3IONS-SCNC: 10 MMOL/L (ref 7–15)
BUN SERPL-MCNC: 15.5 MG/DL (ref 8–23)
CALCIUM SERPL-MCNC: 9.4 MG/DL (ref 8.8–10.2)
CHLORIDE SERPL-SCNC: 98 MMOL/L (ref 98–107)
CHOLEST SERPL-MCNC: 81 MG/DL
CREAT SERPL-MCNC: 1.4 MG/DL (ref 0.67–1.17)
DEPRECATED HCO3 PLAS-SCNC: 28 MMOL/L (ref 22–29)
EST. AVERAGE GLUCOSE BLD GHB EST-MCNC: 169 MG/DL
GFR SERPL CREATININE-BSD FRML MDRD: 51 ML/MIN/1.73M2
GLUCOSE SERPL-MCNC: 118 MG/DL (ref 70–99)
HBA1C MFR BLD: 7.5 %
HDLC SERPL-MCNC: 38 MG/DL
INR BLD: 2.1 (ref 0.9–1.1)
LDLC SERPL CALC-MCNC: 17 MG/DL
NONHDLC SERPL-MCNC: 43 MG/DL
POTASSIUM SERPL-SCNC: 3.7 MMOL/L (ref 3.4–5.3)
SODIUM SERPL-SCNC: 136 MMOL/L (ref 136–145)
TRIGL SERPL-MCNC: 128 MG/DL

## 2023-02-21 PROCEDURE — 80061 LIPID PANEL: CPT | Mod: ZL

## 2023-02-21 PROCEDURE — 85610 PROTHROMBIN TIME: CPT | Mod: ZL

## 2023-02-21 PROCEDURE — 83036 HEMOGLOBIN GLYCOSYLATED A1C: CPT | Mod: ZL

## 2023-02-21 PROCEDURE — 80048 BASIC METABOLIC PNL TOTAL CA: CPT | Mod: ZL

## 2023-02-21 PROCEDURE — 84460 ALANINE AMINO (ALT) (SGPT): CPT | Mod: ZL

## 2023-02-21 PROCEDURE — 36415 COLL VENOUS BLD VENIPUNCTURE: CPT | Mod: ZL

## 2023-02-21 NOTE — PROGRESS NOTES
ANTICOAGULATION MANAGEMENT     Clement Voss 77 year old male is on warfarin with therapeutic INR result. (Goal INR 2.0-3.0)    Recent labs: (last 7 days)     02/21/23  0927   INR 2.1*       ASSESSMENT       Source(s): Chart Review and Patient/Caregiver Call       Warfarin doses taken: Warfarin taken as instructed    Diet: No new diet changes identified    New illness, injury, or hospitalization: No    Medication/supplement changes: None noted    Signs or symptoms of bleeding or clotting: No    Previous INR: Therapeutic last 2(+) visits    Additional findings: None       PLAN     Recommended plan for no diet, medication or health factor changes affecting INR     Dosing Instructions: Continue your current warfarin dose with next INR in 6 weeks       Summary  As of 2/21/2023    Full warfarin instructions:  2.5 mg every Mon, Wed, Fri; 5 mg all other days   Next INR check:  4/4/2023             Telephone call with Huy who verbalizes understanding and agrees to plan    Lab visit scheduled    Education provided:     None required    Plan made per [facility] anticoagulation protocol    Latosha Paris RN  Anticoagulation Clinic  2/21/2023    _______________________________________________________________________     Anticoagulation Episode Summary     Current INR goal:  2.0-3.0   TTR:  90.4 % (1 y)   Target end date:  Indefinite   Send INR reminders to:  [facility]    Indications    Pulmonary embolism and infarction (H) [I26.99]  Long-term (current) use of anticoagulants [Z79.01] [Z79.01]           Comments:  Lab in [facility] Arrives home in the afternoon after 2 PM         Anticoagulation Care Providers     Provider Role Specialty Phone number    Lorelei Felix MD Referring Family Medicine [phone]

## 2023-02-22 DIAGNOSIS — E11.22 TYPE 2 DIABETES MELLITUS WITH STAGE 3B CHRONIC KIDNEY DISEASE, WITH LONG-TERM CURRENT USE OF INSULIN (H): ICD-10-CM

## 2023-02-22 DIAGNOSIS — N18.32 TYPE 2 DIABETES MELLITUS WITH STAGE 3B CHRONIC KIDNEY DISEASE, WITH LONG-TERM CURRENT USE OF INSULIN (H): ICD-10-CM

## 2023-02-22 DIAGNOSIS — I10 BENIGN ESSENTIAL HYPERTENSION: ICD-10-CM

## 2023-02-22 DIAGNOSIS — Z79.4 TYPE 2 DIABETES MELLITUS WITH STAGE 3B CHRONIC KIDNEY DISEASE, WITH LONG-TERM CURRENT USE OF INSULIN (H): ICD-10-CM

## 2023-02-24 ENCOUNTER — OFFICE VISIT (OUTPATIENT)
Dept: FAMILY MEDICINE | Facility: OTHER | Age: 78
End: 2023-02-24
Attending: FAMILY MEDICINE
Payer: COMMERCIAL

## 2023-02-24 VITALS
DIASTOLIC BLOOD PRESSURE: 64 MMHG | OXYGEN SATURATION: 97 % | HEIGHT: 66 IN | RESPIRATION RATE: 18 BRPM | TEMPERATURE: 97.5 F | WEIGHT: 205 LBS | BODY MASS INDEX: 32.95 KG/M2 | SYSTOLIC BLOOD PRESSURE: 122 MMHG | HEART RATE: 76 BPM

## 2023-02-24 DIAGNOSIS — Z79.4 TYPE 2 DIABETES MELLITUS WITH STAGE 3B CHRONIC KIDNEY DISEASE, WITH LONG-TERM CURRENT USE OF INSULIN (H): Primary | ICD-10-CM

## 2023-02-24 DIAGNOSIS — Z12.5 SCREENING FOR MALIGNANT NEOPLASM OF PROSTATE: ICD-10-CM

## 2023-02-24 DIAGNOSIS — N18.32 STAGE 3B CHRONIC KIDNEY DISEASE (H): ICD-10-CM

## 2023-02-24 DIAGNOSIS — I10 BENIGN ESSENTIAL HYPERTENSION: ICD-10-CM

## 2023-02-24 DIAGNOSIS — E11.22 TYPE 2 DIABETES MELLITUS WITH STAGE 3B CHRONIC KIDNEY DISEASE, WITH LONG-TERM CURRENT USE OF INSULIN (H): Primary | ICD-10-CM

## 2023-02-24 DIAGNOSIS — N18.32 TYPE 2 DIABETES MELLITUS WITH STAGE 3B CHRONIC KIDNEY DISEASE, WITH LONG-TERM CURRENT USE OF INSULIN (H): Primary | ICD-10-CM

## 2023-02-24 DIAGNOSIS — E78.2 MIXED HYPERLIPIDEMIA: ICD-10-CM

## 2023-02-24 PROCEDURE — 99214 OFFICE O/P EST MOD 30 MIN: CPT | Performed by: FAMILY MEDICINE

## 2023-02-24 PROCEDURE — G0463 HOSPITAL OUTPT CLINIC VISIT: HCPCS

## 2023-02-24 RX ORDER — CHLORTHALIDONE 25 MG/1
25 TABLET ORAL DAILY
Qty: 90 TABLET | Refills: 3 | Status: SHIPPED | OUTPATIENT
Start: 2023-02-24 | End: 2024-04-11

## 2023-02-24 RX ORDER — ROSUVASTATIN CALCIUM 5 MG/1
5 TABLET, COATED ORAL DAILY
Qty: 90 TABLET | Refills: 3 | Status: SHIPPED | OUTPATIENT
Start: 2023-02-24 | End: 2024-02-09

## 2023-02-24 RX ORDER — LOSARTAN POTASSIUM 100 MG/1
100 TABLET ORAL DAILY
Qty: 90 TABLET | Refills: 3 | Status: SHIPPED | OUTPATIENT
Start: 2023-02-24 | End: 2024-04-04

## 2023-02-24 RX ORDER — METOPROLOL TARTRATE 25 MG/1
50 TABLET, FILM COATED ORAL 2 TIMES DAILY
Qty: 360 TABLET | Refills: 3 | Status: SHIPPED | OUTPATIENT
Start: 2023-02-24 | End: 2024-02-09

## 2023-02-24 ASSESSMENT — PAIN SCALES - GENERAL: PAINLEVEL: NO PAIN (0)

## 2023-02-24 NOTE — TELEPHONE ENCOUNTER
ACCU-CHEK VLAD PLUS test strip      Last Written Prescription Date:  6/09/2022  Last Fill Quantity: 200,   # refills: 3  Last Office Visit: 2/24/2023    metoprolol tartrate (LOPRESSOR) 25 MG tablet      Last Written Prescription Date:  2/24/2023  Last Fill Quantity: 360,   # refills: 3

## 2023-02-24 NOTE — PROGRESS NOTES
"  Assessment & Plan     1. Type 2 diabetes mellitus with stage 3b chronic kidney disease, with long-term current use of insulin (H)  No changes to current medication.  Future lab orders placed, follow-up in three months.  - Albumin Random Urine Quantitative with Creat Ratio; Future  - Hemoglobin A1c; Future  - TSH with free T4 reflex; Future    2. Mixed hyperlipidemia  As above.  - rosuvastatin (CRESTOR) 5 MG tablet; Take 1 tablet (5 mg) by mouth daily  Dispense: 90 tablet; Refill: 3  - ALT; Future  - Lipid Profile; Future    3. Benign essential hypertension  As above.  - losartan (COZAAR) 100 MG tablet; Take 1 tablet (100 mg) by mouth daily  Dispense: 90 tablet; Refill: 3  - metoprolol tartrate (LOPRESSOR) 25 MG tablet; Take 2 tablets (50 mg) by mouth 2 times daily  Dispense: 360 tablet; Refill: 3  - chlorthalidone (HYGROTON) 25 MG tablet; Take 1 tablet (25 mg) by mouth daily  Dispense: 90 tablet; Refill: 3  - Basic metabolic panel; Future    4. Stage 3b chronic kidney disease (H)  As above.  - Basic metabolic panel; Future         BMI:   Estimated body mass index is 33.59 kg/m  as calculated from the following:    Height as of this encounter: 1.664 m (5' 5.5\").    Weight as of this encounter: 93 kg (205 lb).     Return in about 3 months (around 5/24/2023) for Diabetic Follow-up, Chronic Disease Management, Medication review.    Lorelei Felix MD  [facility] [place] Son is a 77 year old presenting for the following health issues:  Diabetes, Lipids, and Hypertension      HPI     Diabetes Follow-up    How often are you checking your blood sugar? Three times daily  Blood sugar testing frequency justification:  Uncontrolled diabetes  What time of day are you checking your blood sugars (select all that apply)?  Before and after meals  Have you had any blood sugars above 200?  Yes a few  Have you had any blood sugars below 70?  No    What symptoms do you notice when your blood sugar " "is low?  Shaky and Weak    What concerns do you have today about your diabetes? None     Do you have any of these symptoms? (Select all that apply)  No numbness or tingling in feet.  No redness, sores or blisters on feet.  No complaints of excessive thirst.  No reports of blurry vision.  No significant changes to weight.      Hyperlipidemia Follow-Up      Are you regularly taking any medication or supplement to lower your cholesterol?   Yes- Crestor    Are you having muscle aches or other side effects that you think could be caused by your cholesterol lowering medication?  No    Hypertension Follow-up      Do you check your blood pressure regularly outside of the clinic? Yes     Are you following a low salt diet? Yes    Are your blood pressures ever more than 140 on the top number (systolic) OR more   than 90 on the bottom number (diastolic), for example 140/90? No    BP Readings from Last 2 Encounters:   02/24/23 122/64   12/01/22 110/72     Hemoglobin A1C (%)   Date Value   02/21/2023 7.5 (H)   11/16/2022 7.9 (H)   04/27/2021 9.7 (H)   01/26/2021 9.4 (H)     LDL Cholesterol Calculated (mg/dL)   Date Value   02/21/2023 17   11/16/2022 29   04/27/2021 12   01/26/2021 26         Review of Systems   Constitutional, HEENT, cardiovascular, pulmonary, gi and gu systems are negative, except as otherwise noted.      Objective    /64 (BP Location: Right arm, Patient Position: Sitting, Cuff Size: Adult Regular)   Pulse 76   Temp 97.5  F (36.4  C) (Tympanic)   Resp 18   Ht 1.664 m (5' 5.5\")   Wt 93 kg (205 lb)   SpO2 97%   BMI 33.59 kg/m    Body mass index is 33.59 kg/m .  Physical Exam   GENERAL: healthy, alert and no distress  PSYCH: mentation appears normal, affect normal/bright    Lab on 02/21/2023   Component Date Value Ref Range Status     Cholesterol 02/21/2023 81  <200 mg/dL Final     Triglycerides 02/21/2023 128  <150 mg/dL Final     Direct Measure HDL 02/21/2023 38 (L)  >=40 mg/dL Final     LDL " Cholesterol Calculated 02/21/2023 17  <=100 mg/dL Final     Non HDL Cholesterol 02/21/2023 43  <130 mg/dL Final     Estimated Average Glucose 02/21/2023 169  mg/dL Final     Hemoglobin A1C 02/21/2023 7.5 (H)  <5.7 % Final    Normal <5.7%   Prediabetes 5.7-6.4%    Diabetes 6.5% or higher     Note: Adopted from ADA consensus guidelines.     Sodium 02/21/2023 136  136 - 145 mmol/L Final     Potassium 02/21/2023 3.7  3.4 - 5.3 mmol/L Final     Chloride 02/21/2023 98  98 - 107 mmol/L Final     Carbon Dioxide (CO2) 02/21/2023 28  22 - 29 mmol/L Final     Anion Gap 02/21/2023 10  7 - 15 mmol/L Final     Urea Nitrogen 02/21/2023 15.5  8.0 - 23.0 mg/dL Final     Creatinine 02/21/2023 1.40 (H)  0.67 - 1.17 mg/dL Final     Calcium 02/21/2023 9.4  8.8 - 10.2 mg/dL Final     Glucose 02/21/2023 118 (H)  70 - 99 mg/dL Final     GFR Estimate 02/21/2023 51 (L)  >60 mL/min/1.73m2 Final    eGFR calculated using 2021 CKD-EPI equation.     ALT 02/21/2023 35  10 - 50 U/L Final     INR 02/21/2023 2.1 (H)  0.9 - 1.1 Final

## 2023-03-01 RX ORDER — METOPROLOL TARTRATE 25 MG/1
TABLET, FILM COATED ORAL
Qty: 360 TABLET | Refills: 3 | OUTPATIENT
Start: 2023-03-01

## 2023-03-01 RX ORDER — BLOOD SUGAR DIAGNOSTIC
STRIP MISCELLANEOUS
Qty: 200 STRIP | Refills: 3 | OUTPATIENT
Start: 2023-03-01

## 2023-03-01 RX ORDER — LOSARTAN POTASSIUM 100 MG/1
TABLET ORAL
Qty: 90 TABLET | Refills: 3 | OUTPATIENT
Start: 2023-03-01

## 2023-03-20 DIAGNOSIS — Z79.01 LONG TERM CURRENT USE OF ANTICOAGULANT THERAPY: ICD-10-CM

## 2023-03-20 DIAGNOSIS — I26.99 PULMONARY EMBOLISM AND INFARCTION (H): ICD-10-CM

## 2023-03-22 NOTE — TELEPHONE ENCOUNTER
warfarin ANTICOAGULANT (COUMADIN) 2.5 MG tablet  Last Written Prescription Date:  9-15-22  Last Fill Quantity: 166,   # refills: 1  Last Office Visit: 2-24-23  Future Office visit:    Next 5 appointments (look out 90 days)    May 26, 2023  9:30 AM  (Arrive by 9:15 AM)  SHORT with Lorelei Felix MD  [facility] ([facility] ) [address]  [facility] [address]  [phone]           Routing refill request to provider for review/approval because:  Drug not on the [facility], [facility] or [facility] refill protocol or controlled substance

## 2023-03-22 NOTE — TELEPHONE ENCOUNTER
Vitamin K Antagonists Failed      INR is within goal in the past 6 weeks    Confirm INR is within goal in the past 6 weeks.          Recent Labs   Lab Test 02/21/23  0927   INR 2.1*

## 2023-03-23 RX ORDER — WARFARIN SODIUM 2.5 MG/1
TABLET ORAL
Qty: 155 TABLET | Refills: 3 | Status: SHIPPED | OUTPATIENT
Start: 2023-03-23 | End: 2024-03-12

## 2023-04-04 ENCOUNTER — ANTICOAGULATION THERAPY VISIT (OUTPATIENT)
Dept: ANTICOAGULATION | Facility: OTHER | Age: 78
End: 2023-04-04
Payer: MEDICARE

## 2023-04-04 ENCOUNTER — LAB (OUTPATIENT)
Dept: LAB | Facility: OTHER | Age: 78
End: 2023-04-04
Payer: MEDICARE

## 2023-04-04 DIAGNOSIS — I26.99 PULMONARY EMBOLISM AND INFARCTION (H): Primary | ICD-10-CM

## 2023-04-04 DIAGNOSIS — Z79.01 LONG TERM CURRENT USE OF ANTICOAGULANT THERAPY: ICD-10-CM

## 2023-04-04 DIAGNOSIS — I48.20 CHRONIC ATRIAL FIBRILLATION (H): ICD-10-CM

## 2023-04-04 DIAGNOSIS — I26.99 PULMONARY EMBOLISM AND INFARCTION (H): ICD-10-CM

## 2023-04-04 LAB — INR BLD: 2.1 (ref 0.9–1.1)

## 2023-04-04 PROCEDURE — 36415 COLL VENOUS BLD VENIPUNCTURE: CPT | Mod: ZL

## 2023-04-04 PROCEDURE — 85610 PROTHROMBIN TIME: CPT | Mod: ZL

## 2023-04-04 NOTE — PROGRESS NOTES
ANTICOAGULATION MANAGEMENT     Clement Voss 77 year old male is on warfarin with therapeutic INR result. (Goal INR 2.0-3.0)    Recent labs: (last 7 days)     04/04/23  0900   INR 2.1*       ASSESSMENT       Source(s): Chart Review and Patient/Caregiver Call       Warfarin doses taken: Warfarin taken as instructed    Diet: No new diet changes identified    New illness, injury, or hospitalization: No    Medication/supplement changes: None noted    Signs or symptoms of bleeding or clotting: No    Previous INR: Therapeutic last 2(+) visits    Additional findings: None       PLAN     Recommended plan for no diet, medication or health factor changes affecting INR     Dosing Instructions: Continue your current warfarin dose with next INR in 6 weeks       Summary  As of 4/4/2023    Full warfarin instructions:  2.5 mg every Mon, Wed, Fri; 5 mg all other days   Next INR check:  5/16/2023             Telephone call with Huy who verbalizes understanding and agrees to plan    Lab visit scheduled    Education provided: Please call back if any changes to your diet, medications or how you've been taking warfarin    Plan made per [facility] anticoagulation protocol    Corry Leon RN  Anticoagulation Clinic  4/4/2023    _______________________________________________________________________     Anticoagulation Episode Summary     Current INR goal:  2.0-3.0   TTR:  90.3 % (1 y)   Target end date:  Indefinite   Send INR reminders to:  [facility]    Indications    Pulmonary embolism and infarction (H) [I26.99]  Long-term (current) use of anticoagulants [Z79.01] [Z79.01]           Comments:  Lab in [facility] Arrives home in the afternoon after 2 PM         Anticoagulation Care Providers     Provider Role Specialty Phone number    Lorelei Felix MD Referring Family Medicine [phone]

## 2023-04-25 DIAGNOSIS — Z79.4 TYPE 2 DIABETES MELLITUS WITH STAGE 3B CHRONIC KIDNEY DISEASE, WITH LONG-TERM CURRENT USE OF INSULIN (H): ICD-10-CM

## 2023-04-25 DIAGNOSIS — N18.32 TYPE 2 DIABETES MELLITUS WITH STAGE 3B CHRONIC KIDNEY DISEASE, WITH LONG-TERM CURRENT USE OF INSULIN (H): ICD-10-CM

## 2023-04-25 DIAGNOSIS — E11.22 TYPE 2 DIABETES MELLITUS WITH STAGE 3B CHRONIC KIDNEY DISEASE, WITH LONG-TERM CURRENT USE OF INSULIN (H): ICD-10-CM

## 2023-04-25 RX ORDER — BLOOD SUGAR DIAGNOSTIC
STRIP MISCELLANEOUS
Qty: 200 STRIP | Refills: 1 | Status: SHIPPED | OUTPATIENT
Start: 2023-04-25 | End: 2023-09-29

## 2023-05-16 ENCOUNTER — ANTICOAGULATION THERAPY VISIT (OUTPATIENT)
Dept: ANTICOAGULATION | Facility: OTHER | Age: 78
End: 2023-05-16
Payer: MEDICARE

## 2023-05-16 ENCOUNTER — LAB (OUTPATIENT)
Dept: LAB | Facility: OTHER | Age: 78
End: 2023-05-16
Payer: MEDICARE

## 2023-05-16 DIAGNOSIS — I26.99 PULMONARY EMBOLISM AND INFARCTION (H): ICD-10-CM

## 2023-05-16 DIAGNOSIS — Z79.01 LONG TERM CURRENT USE OF ANTICOAGULANT THERAPY: ICD-10-CM

## 2023-05-16 DIAGNOSIS — I48.20 CHRONIC ATRIAL FIBRILLATION (H): ICD-10-CM

## 2023-05-16 DIAGNOSIS — I26.99 PULMONARY EMBOLISM AND INFARCTION (H): Primary | ICD-10-CM

## 2023-05-16 LAB — INR BLD: 1.9 (ref 0.9–1.1)

## 2023-05-16 PROCEDURE — 36416 COLLJ CAPILLARY BLOOD SPEC: CPT | Mod: ZL

## 2023-05-16 PROCEDURE — 85610 PROTHROMBIN TIME: CPT | Mod: ZL

## 2023-05-16 NOTE — PROGRESS NOTES
ANTICOAGULATION MANAGEMENT     Clement Voss 77 year old male is on warfarin with subtherapeutic INR result. (Goal INR 2.0-3.0)    Recent labs: (last 7 days)     05/16/23  0901   INR 1.9*       ASSESSMENT       Source(s): Chart Review and Patient/Caregiver Call       Warfarin doses taken: Warfarin taken as instructed    Diet: No new diet changes identified    Medication/supplement changes: None noted    New illness, injury, or hospitalization: No    Signs or symptoms of bleeding or clotting: No    Previous result: Therapeutic last 2(+) visits    Additional findings: None         PLAN     Recommended plan for no diet, medication or health factor changes affecting INR     Dosing Instructions: Continue your current warfarin dose with next INR in 6 weeks       Summary  As of 5/16/2023    Full warfarin instructions:  2.5 mg every Mon, Wed, Fri; 5 mg all other days   Next INR check:  6/27/2023             Telephone call with Huy who verbalizes understanding and agrees to plan    Lab visit scheduled    Education provided:     None required    Plan made per [facility] anticoagulation protocol    Latosha Paris, RN  Anticoagulation Clinic  5/16/2023    _______________________________________________________________________     Anticoagulation Episode Summary     Current INR goal:  2.0-3.0   TTR:  84.6 % (1 y)   Target end date:  Indefinite   Send INR reminders to:  [facility]    Indications    Pulmonary embolism and infarction (H) [I26.99]  Long-term (current) use of anticoagulants [Z79.01] [Z79.01]           Comments:  Lab in [facility] Arrives home in the afternoon after 2 PM         Anticoagulation Care Providers     Provider Role Specialty Phone number    Lorelei Felix MD Referring Family Medicine [phone]

## 2023-05-24 ENCOUNTER — LAB (OUTPATIENT)
Dept: LAB | Facility: OTHER | Age: 78
End: 2023-05-24
Payer: MEDICARE

## 2023-05-24 DIAGNOSIS — N18.32 TYPE 2 DIABETES MELLITUS WITH STAGE 3B CHRONIC KIDNEY DISEASE, WITH LONG-TERM CURRENT USE OF INSULIN (H): ICD-10-CM

## 2023-05-24 DIAGNOSIS — N18.32 STAGE 3B CHRONIC KIDNEY DISEASE (H): ICD-10-CM

## 2023-05-24 DIAGNOSIS — E11.22 TYPE 2 DIABETES MELLITUS WITH STAGE 3B CHRONIC KIDNEY DISEASE, WITH LONG-TERM CURRENT USE OF INSULIN (H): ICD-10-CM

## 2023-05-24 DIAGNOSIS — I10 BENIGN ESSENTIAL HYPERTENSION: ICD-10-CM

## 2023-05-24 DIAGNOSIS — E78.2 MIXED HYPERLIPIDEMIA: ICD-10-CM

## 2023-05-24 DIAGNOSIS — Z12.5 SCREENING FOR MALIGNANT NEOPLASM OF PROSTATE: ICD-10-CM

## 2023-05-24 DIAGNOSIS — Z79.4 TYPE 2 DIABETES MELLITUS WITH STAGE 3B CHRONIC KIDNEY DISEASE, WITH LONG-TERM CURRENT USE OF INSULIN (H): ICD-10-CM

## 2023-05-24 LAB
ALT SERPL W P-5'-P-CCNC: 35 U/L (ref 10–50)
ANION GAP SERPL CALCULATED.3IONS-SCNC: 12 MMOL/L (ref 7–15)
BUN SERPL-MCNC: 16.2 MG/DL (ref 8–23)
CALCIUM SERPL-MCNC: 9.7 MG/DL (ref 8.8–10.2)
CHLORIDE SERPL-SCNC: 98 MMOL/L (ref 98–107)
CHOLEST SERPL-MCNC: 94 MG/DL
CREAT SERPL-MCNC: 1.43 MG/DL (ref 0.67–1.17)
CREAT UR-MCNC: 34.8 MG/DL
DEPRECATED HCO3 PLAS-SCNC: 28 MMOL/L (ref 22–29)
EST. AVERAGE GLUCOSE BLD GHB EST-MCNC: 183 MG/DL
GFR SERPL CREATININE-BSD FRML MDRD: 50 ML/MIN/1.73M2
GLUCOSE SERPL-MCNC: 109 MG/DL (ref 70–99)
HBA1C MFR BLD: 8 %
HDLC SERPL-MCNC: 39 MG/DL
LDLC SERPL CALC-MCNC: 23 MG/DL
MICROALBUMIN UR-MCNC: <12 MG/L
MICROALBUMIN/CREAT UR: NORMAL MG/G{CREAT}
NONHDLC SERPL-MCNC: 55 MG/DL
POTASSIUM SERPL-SCNC: 3.8 MMOL/L (ref 3.4–5.3)
PSA SERPL DL<=0.01 NG/ML-MCNC: 8.67 NG/ML (ref 0–6.5)
SODIUM SERPL-SCNC: 138 MMOL/L (ref 136–145)
TRIGL SERPL-MCNC: 162 MG/DL
TSH SERPL DL<=0.005 MIU/L-ACNC: 2.77 UIU/ML (ref 0.3–4.2)

## 2023-05-24 PROCEDURE — 36415 COLL VENOUS BLD VENIPUNCTURE: CPT | Mod: ZL

## 2023-05-24 PROCEDURE — 83036 HEMOGLOBIN GLYCOSYLATED A1C: CPT | Mod: ZL

## 2023-05-24 PROCEDURE — 80048 BASIC METABOLIC PNL TOTAL CA: CPT | Mod: ZL

## 2023-05-24 PROCEDURE — G0103 PSA SCREENING: HCPCS | Mod: ZL

## 2023-05-24 PROCEDURE — 82570 ASSAY OF URINE CREATININE: CPT | Mod: ZL

## 2023-05-24 PROCEDURE — 84443 ASSAY THYROID STIM HORMONE: CPT | Mod: ZL

## 2023-05-24 PROCEDURE — 80061 LIPID PANEL: CPT | Mod: ZL

## 2023-05-24 PROCEDURE — 84460 ALANINE AMINO (ALT) (SGPT): CPT | Mod: ZL

## 2023-05-26 ENCOUNTER — OFFICE VISIT (OUTPATIENT)
Dept: FAMILY MEDICINE | Facility: OTHER | Age: 78
End: 2023-05-26
Attending: FAMILY MEDICINE
Payer: COMMERCIAL

## 2023-05-26 VITALS
SYSTOLIC BLOOD PRESSURE: 136 MMHG | BODY MASS INDEX: 32.42 KG/M2 | RESPIRATION RATE: 18 BRPM | WEIGHT: 201.7 LBS | HEART RATE: 72 BPM | OXYGEN SATURATION: 99 % | TEMPERATURE: 97.9 F | HEIGHT: 66 IN | DIASTOLIC BLOOD PRESSURE: 70 MMHG

## 2023-05-26 DIAGNOSIS — E66.01 MORBID OBESITY (H): ICD-10-CM

## 2023-05-26 DIAGNOSIS — I10 BENIGN ESSENTIAL HYPERTENSION: ICD-10-CM

## 2023-05-26 DIAGNOSIS — E78.2 MIXED HYPERLIPIDEMIA: ICD-10-CM

## 2023-05-26 DIAGNOSIS — E11.22 TYPE 2 DIABETES MELLITUS WITH STAGE 3B CHRONIC KIDNEY DISEASE, WITH LONG-TERM CURRENT USE OF INSULIN (H): Primary | ICD-10-CM

## 2023-05-26 DIAGNOSIS — I50.9 CONGESTIVE HEART FAILURE, UNSPECIFIED HF CHRONICITY, UNSPECIFIED HEART FAILURE TYPE (H): ICD-10-CM

## 2023-05-26 DIAGNOSIS — I48.20 CHRONIC ATRIAL FIBRILLATION (H): ICD-10-CM

## 2023-05-26 DIAGNOSIS — Z79.4 TYPE 2 DIABETES MELLITUS WITH STAGE 3B CHRONIC KIDNEY DISEASE, WITH LONG-TERM CURRENT USE OF INSULIN (H): Primary | ICD-10-CM

## 2023-05-26 DIAGNOSIS — N18.32 TYPE 2 DIABETES MELLITUS WITH STAGE 3B CHRONIC KIDNEY DISEASE, WITH LONG-TERM CURRENT USE OF INSULIN (H): Primary | ICD-10-CM

## 2023-05-26 DIAGNOSIS — N18.32 STAGE 3B CHRONIC KIDNEY DISEASE (H): ICD-10-CM

## 2023-05-26 DIAGNOSIS — I26.99 PULMONARY EMBOLISM AND INFARCTION (H): ICD-10-CM

## 2023-05-26 PROCEDURE — G0463 HOSPITAL OUTPT CLINIC VISIT: HCPCS

## 2023-05-26 PROCEDURE — 99214 OFFICE O/P EST MOD 30 MIN: CPT | Performed by: FAMILY MEDICINE

## 2023-05-26 RX ORDER — POTASSIUM CHLORIDE 750 MG/1
10 TABLET, EXTENDED RELEASE ORAL DAILY
Qty: 90 TABLET | Refills: 3 | Status: SHIPPED | OUTPATIENT
Start: 2023-05-26 | End: 2024-05-15

## 2023-05-26 ASSESSMENT — PAIN SCALES - GENERAL: PAINLEVEL: NO PAIN (0)

## 2023-06-03 ENCOUNTER — HEALTH MAINTENANCE LETTER (OUTPATIENT)
Age: 78
End: 2023-06-03

## 2023-06-09 ENCOUNTER — TELEPHONE (OUTPATIENT)
Dept: EDUCATION SERVICES | Facility: HOSPITAL | Age: 78
End: 2023-06-09

## 2023-06-15 ENCOUNTER — PATIENT OUTREACH (OUTPATIENT)
Dept: EDUCATION SERVICES | Facility: HOSPITAL | Age: 78
End: 2023-06-15

## 2023-06-16 DIAGNOSIS — Z79.4 TYPE 2 DIABETES MELLITUS WITH STAGE 3B CHRONIC KIDNEY DISEASE, WITH LONG-TERM CURRENT USE OF INSULIN (H): ICD-10-CM

## 2023-06-16 DIAGNOSIS — N18.32 TYPE 2 DIABETES MELLITUS WITH STAGE 3B CHRONIC KIDNEY DISEASE, WITH LONG-TERM CURRENT USE OF INSULIN (H): ICD-10-CM

## 2023-06-16 DIAGNOSIS — E11.22 TYPE 2 DIABETES MELLITUS WITH STAGE 3B CHRONIC KIDNEY DISEASE, WITH LONG-TERM CURRENT USE OF INSULIN (H): ICD-10-CM

## 2023-06-16 RX ORDER — INSULIN GLARGINE 100 [IU]/ML
INJECTION, SOLUTION SUBCUTANEOUS
Qty: 30 ML | Refills: 0 | Status: SHIPPED | OUTPATIENT
Start: 2023-06-16 | End: 2023-09-08

## 2023-06-16 NOTE — TELEPHONE ENCOUNTER
lantus 100 units inj pen      Last Written Prescription Date:  8-18-22  Last Fill Quantity: 30ML,   # refills: 3  Last Office Visit: 5-26-23  Future Office visit:    Next 5 appointments (look out 90 days)    Aug 28, 2023  9:30 AM  (Arrive by 9:15 AM)  SHORT with Lorelei Felix MD  [facility] ([facility] ) [address]  [place] [address]  [phone]

## 2023-06-27 ENCOUNTER — LAB (OUTPATIENT)
Dept: LAB | Facility: OTHER | Age: 78
End: 2023-06-27
Payer: MEDICARE

## 2023-06-27 ENCOUNTER — ANTICOAGULATION THERAPY VISIT (OUTPATIENT)
Dept: ANTICOAGULATION | Facility: OTHER | Age: 78
End: 2023-06-27
Attending: FAMILY MEDICINE
Payer: MEDICARE

## 2023-06-27 DIAGNOSIS — Z79.01 LONG TERM CURRENT USE OF ANTICOAGULANT THERAPY: ICD-10-CM

## 2023-06-27 DIAGNOSIS — I26.99 PULMONARY EMBOLISM AND INFARCTION (H): ICD-10-CM

## 2023-06-27 DIAGNOSIS — I26.99 PULMONARY EMBOLISM AND INFARCTION (H): Primary | ICD-10-CM

## 2023-06-27 DIAGNOSIS — I48.20 CHRONIC ATRIAL FIBRILLATION (H): ICD-10-CM

## 2023-06-27 LAB — INR BLD: 2.4 (ref 0.9–1.1)

## 2023-06-27 PROCEDURE — 36416 COLLJ CAPILLARY BLOOD SPEC: CPT | Mod: ZL

## 2023-06-27 PROCEDURE — 85610 PROTHROMBIN TIME: CPT | Mod: ZL

## 2023-06-27 NOTE — PROGRESS NOTES
ANTICOAGULATION MANAGEMENT     Clement Voss 78 year old male is on warfarin with therapeutic INR result. (Goal INR 2.0-3.0)    Recent labs: (last 7 days)     06/27/23  0926   INR 2.4*       ASSESSMENT       Source(s): Chart Review and Patient/Caregiver Call       Warfarin doses taken: Warfarin taken as instructed    Diet: No new diet changes identified    Medication/supplement changes: None noted    New illness, injury, or hospitalization: No    Signs or symptoms of bleeding or clotting: No    Previous result: Therapeutic last 2(+) visits    Additional findings: None         PLAN     Recommended plan for no diet, medication or health factor changes affecting INR     Dosing Instructions: Continue your current warfarin dose with next INR in 6 weeks       Summary  As of 6/27/2023    Full warfarin instructions:  2.5 mg every Mon, Wed, Fri; 5 mg all other days   Next INR check:  8/8/2023             Telephone call with Huy who verbalizes understanding and agrees to plan    Lab visit scheduled    Education provided:     None required    Plan made per [facility] anticoagulation protocol    Latosha Paris RN  Anticoagulation Clinic  6/27/2023    _______________________________________________________________________     Anticoagulation Episode Summary     Current INR goal:  2.0-3.0   TTR:  82.3 % (1 y)   Target end date:  Indefinite   Send INR reminders to:  [facility]    Indications    Pulmonary embolism and infarction (H) [I26.99]  Long-term (current) use of anticoagulants [Z79.01] [Z79.01]           Comments:  Lab in [facility] Arrives home in the afternoon after 2 PM         Anticoagulation Care Providers     Provider Role Specialty Phone number    Lorelei Felix MD Referring Family Medicine [phone]

## 2023-07-11 ENCOUNTER — MYC MEDICAL ADVICE (OUTPATIENT)
Dept: EDUCATION SERVICES | Facility: HOSPITAL | Age: 78
End: 2023-07-11

## 2023-07-12 NOTE — TELEPHONE ENCOUNTER
Noted.   Continue plan.   Mikayla Caldera, RN Diabetes Educator,  [phone]  7/12/2023 at 12:07 PM

## 2023-07-20 NOTE — LETTER
"    12/8/2021        RE: Clement Voss  [address] [facility]  [address]  [place] [address]        Diabetes Self-Management Education & Support    Presents for: Follow-up    SUBJECTIVE/OBJECTIVE:  Presents for: Follow-up  Accompanied by: Self  Diabetes education in the past 24mo: Yes  Focus of Visit: Taking Medication,Monitoring  Diabetes type: Type 2  Disease course: Stable  Diabetes management related comments/concerns: Completing PAP for Trulicity  Transportation concerns: No  Difficulty affording diabetes medication?: Sometimes  Difficulty affording diabetes testing supplies?: No  Other concerns:: None  Cultural Influences/Ethnic Background:  American      Diabetes Symptoms & Complications:  Fatigue: No  Neuropathy: No  Polydipsia: No  Polyphagia: No  Polyuria: No  Visual change: No  Slow healing wounds: No  Symptom course: Stable  Weight trend: Stable  Complications assessed today?: Yes  Autonomic neuropathy: No  CVA: No  Heart disease: Yes  Nephropathy: Yes  Peripheral neuropathy: No  Peripheral Vascular Disease: No  Retinopathy:  (unknown)    Patient Problem List and Family Medical History reviewed for relevant medical history, current medical status, and diabetes risk factors.    Vitals:  /75   Pulse 80   Resp 16   Ht 1.702 m (5' 7\")   Wt 90.8 kg (200 lb 3.2 oz)   SpO2 98%   BMI 31.36 kg/m    Estimated body mass index is 31.36 kg/m  as calculated from the following:    Height as of this encounter: 1.702 m (5' 7\").    Weight as of this encounter: 90.8 kg (200 lb 3.2 oz).   Last 3 BP:   BP Readings from Last 3 Encounters:   12/08/21 138/75   11/09/21 130/66   10/14/21 139/80       History   Smoking Status     Former Smoker     Types: Cigarettes     Quit date: 8/12/1964   Smokeless Tobacco     Never Used       Labs:  Lab Results   Component Value Date    A1C 8.8 11/05/2021    A1C 9.7 04/27/2021     Lab Results   Component Value Date     11/05/2021     04/27/2021     Lab Results   Component " Review of Systems Health Update:   What is your biggest concern for today's visit?     GENERAL / CONSTITUTIONAL:  []  YES    [x]  NO   Excessive fatigue.  []  YES    [x]  NO   Unexplained weight loss   []  YES    [x]  NO   Have you traveled outside of the [place] in the past year?   If so, where:     EARS, NOSE, MOUTH AND THROAT:  []  YES    [x]  NO   Hoarseness or voice change.  []  YES    [x]  NO   Difficult or painful swallowing.    HEART:  []  YES    [x]  NO   Chest pain  []  YES    [x]  NO   Palpitation or irregular heart beat.    LUNGS:   []  YES    [x]  NO   Coughing up blood.   []  YES    [x]  NO   Chronic cough.  []  YES    [x]  NO   Wheezing.  []  YES    [x]  NO   Shortness of Breath    INTESTINAL SYSTEM:  []  YES    [x]  NO   Tarry (black) stools.  []  YES    [x]  NO   Recurrent abdominal pain.  []  YES    [x]  NO   Frequent nausea or vomiting.    URINARY SYSTEM:   []  YES    [x]  NO   Difficult or painful urination.  []  YES    [x]  NO   Urination more than once a night.  []  YES    [x]  NO   Bloody Urine    SKELETON AND JOINTS:  []  YES    [x]  NO   Swollen or painful joints.   []  YES    [x]  NO   Gout.   Which joints:     SKIN:  []  YES    [x]  NO   Recurrent skin rash.   []  YES    [x]  NO   Moles that have changed in size or color.    NERVOUS SYSTEM:   []  YES    [x]  NO   Frequent or severe headaches.  []  YES    [x]  NO   Loss of consciousness/concussion.  []  YES    [x]  NO   Weakness or recurrent numbness or tingling in your arms or legs.    PSYCHIATRIC:  Depression Screening  Over the last two weeks, how often have you been bothered by any of the following problems?  []  YES    [x]  NO   Little interest or pleasure in doing things.    []  YES    [x]  NO   Feeling down, depressed or hopeless.   []  YES    [x]  NO   Feeling nervous, anxious or on edge.  []  YES    [x]  NO   Not being able to stop or control worrying.     ENDOCRINE:  []  YES    [x]  NO   History of diabetes.  []  YES    [x]  NO    History of thyroid disease.     HEMATOLOGIC:   []  YES    [x]  NO   Swollen glands or lymph nodes.  []  YES    [x]  NO   History of anemia.   []  YES    [x]  NO   History of blood clots.    IMMUNE SYSTEM:  []  YES    [x]  NO   History of AIDS.  []  YES    [x]  NO   Asthma.    MALE HEALTH UPDATE:  []  YES    [x]  NO   Any problem with sexual function?  []  YES    [x]  NO   Weak urine stream.     LIFESTYLE HABITS:    []  YES    [x]  NO   Do you drink alcohol?   Quantity consumed per week on average: Beer 0 Drinks 0  []  YES    [x]  NO   In the past year have you ever drank or used drugs more than you meant to?  [x]  YES    []  NO   Have you felt you wanted or needed to cut down on your drinking or drug use in the past year?  []  YES    [x]  NO   Have you been annoyed by friends or family that suggested you cut back on your drinking or use of drugs?  []  YES    [x]  NO   Have you ever shared hypodermic needles?   []  YES    [x]  NO   Have you used street drugs in the past 2 years?   How many days per week do you exercise for 30 minutes or more: 7days  []  YES    [x]  NO   Do you smoke?   If you are a former smoker when did you quit?    If you smoke, how many packs per day?   []  YES    [x]  NO   Are you interested in and/or ready to quit smoking?  [x]  YES    []  NO   Do you wear your seatbelt?    SEXUAL AND GENDER HISTORY:  []  MALE    []  FEMALE   Sex assigned at birth.  []  MALE    []  FEMALE   Present gender identity.   Do you identify as N/A     []  YES    [x]  NO  []  PAST   Hormonal therapy  []  YES    [x]  NO   Do you have concerns about your sexual practices that you wish to discuss?  []  YES    [x]  NO   Do you want to be screened for AIDS?     SCREENING FOR INTIMATE PARTNER VIOLENCE:  How often does your partner physically hurt you? 1  How often does your partner insult or talk down to you? 1  How often does your partner threaten you with physical harm? 1  How often does your partner scream at you? 1  [x]   Value Date    LDL 12 11/05/2021    LDL 12 04/27/2021     HDL Cholesterol   Date Value Ref Range Status   04/27/2021 37 (L) >39 mg/dL Final     Direct Measure HDL   Date Value Ref Range Status   11/05/2021 36 (L) >=40 mg/dL Final   ]  GFR Estimate   Date Value Ref Range Status   11/05/2021 45 (L) >60 mL/min/1.73m2 Final     Comment:     As of July 11, 2021, eGFR is calculated by the CKD-EPI creatinine equation, without race adjustment. eGFR can be influenced by muscle mass, exercise, and diet. The reported eGFR is an estimation only and is only applicable if the renal function is stable.   04/27/2021 51 (L) >60 mL/min/[1.73_m2] Final     Comment:     Non  GFR Calc  Starting 12/18/2018, serum creatinine based estimated GFR (eGFR) will be   calculated using the Chronic Kidney Disease Epidemiology Collaboration   (CKD-EPI) equation.       GFR Estimate If Black   Date Value Ref Range Status   04/27/2021 59 (L) >60 mL/min/[1.73_m2] Final     Comment:      GFR Calc  Starting 12/18/2018, serum creatinine based estimated GFR (eGFR) will be   calculated using the Chronic Kidney Disease Epidemiology Collaboration   (CKD-EPI) equation.       Lab Results   Component Value Date    CR 1.48 11/05/2021    CR 1.34 04/27/2021     No results found for: MICROALBUMIN    Healthy Eating:  Healthy Eating Assessed Today: Yes  Meal planning/habits: Avoiding sweets,Smaller portions  Meals include: Breakfast,Lunch,Dinner  Breakfast: oatmeal or honey nut cheerios with milk  Lunch: leftovers or bagel  Dinner: meat, potatoes, vegetable  Snacks: fruit, vegetables  Beverages: Coffee,Milk,Juice,Other (Propel, chocolate milk)  Has patient met with a dietitian in the past?: Yes    Being Active:  Being Active Assessed Today: Yes  Exercise:: Yes  Days per week of moderate to strenuous exercise (like a brisk walk): 3  On average, minutes per day of exercise at this level: 10  How intense was your typical exercise? : Light  (like stretching or slow walking)  Exercise Minutes per Week: 30  Barrier to exercise: Access    Monitoring:  Monitoring Assessed Today: Yes  Did patient bring glucose meter to appointment? : No (sent readings via NICE[employer])  Blood Glucose Meter: Accu-chek  Times checking blood sugar at home (number): 3  Times checking blood sugar at home (per): Day  Blood glucose trend: Decreasing        Taking Medications:  Diabetes Medication(s)     Insulin       insulin glargine (LANTUS SOLOSTAR) 100 UNIT/ML pen    Inject 40 units daily    Incretin Mimetic Agents (GLP-1 Receptor Agonists)       dulaglutide (TRULICITY) 1.5 MG/0.5ML pen    Inject 1.5 mg Subcutaneous every 7 days Do not send to pharmacy          Taking Medication Assessed Today: Yes  Current Treatments: Insulin Injections,Non-insulin Injectables  Dose schedule: At bedtime  Given by: Patient  Injection/Infusion sites: Abdomen  Problems taking diabetes medications regularly?: Yes  Diabetes medication side effects?: No    Problem Solving:  Problem Solving Assessed Today: Yes  Is the patient at risk for hypoglycemia?: Yes  Hypoglycemia Frequency: Monthly  Hypoglycemia Treatment: Other food              Reducing Risks:  Has dilated eye exam at least once a year?: Yes  Feet checked by healthcare provider in the last year?: Yes    Healthy Coping:  Healthy Coping Assessed Today: Yes  Emotional response to diabetes: Ready to learn,Confidence diabetes can be controlled  Informal Support system:: Family  Stage of change: ACTION (Actively working towards change)  Support resources: Other (PAP)  Patient Activation Measure Survey Score:  ALLEN Score (Last Two) 1/6/2020 1/10/2020   ALLEN Raw Score 30 30   Activation Score 56 56   ALLEN Level 3 3       Diabetes knowledge and skills assessment:   Patient is knowledgeable in diabetes management concepts related to: Healthy Eating, Being Active, Monitoring and Taking Medication    Patient needs further education on the following diabetes  YES    []  NO   Do you currently feel safe in your present living situation?                            management concepts: Monitoring and Taking Medication    Based on learning assessment above, most appropriate setting for further diabetes education would be: Individual setting.      INTERVENTIONS:    Education provided today on:  AADE Self-Care Behaviors:  Monitoring: log and interpret results, individual blood glucose targets and frequency of monitoring  Taking Medication: action of prescribed medication, drawing up, administering and storing injectable diabetes medications, proper site selection and rotation for injections, side effects of prescribed medications and when to take medications      Opportunities for ongoing education and support in diabetes-self management were discussed.    Pt verbalized understanding of concepts discussed and recommendations provided today.       Education Materials Provided:  No new materials provided today      ASSESSMENT:  Readings range as follows: fastin-137, 153, pre-supper: 137-200, post-supper: 247-334    Huy is here to complete his PAP  application for [employer] for his Trulicity.    We discussed increasing the Trulicity dose with this application. He had started the Trulicity 1.5 mg at the end of September, with A1C on 21 of 8.8%. So he was on the higher dose for only one month before that A1C. Will continue the current dose of Trulicity until we see his next A1C in February.        Patient's most recent   Lab Results   Component Value Date    A1C 8.8 2021    A1C 9.7 2021    is not meeting goal of <8.0    PLAN  See Patient Instructions for co-developed, patient-stated behavior change goals.  We will complete provider order and fax Act[employer]Views Application for Trulicity for     Continue Lantus 17 units daily and Trulicity 1.5 mg weekly.    Will see what your A1C is in February before changing Trulicity dose.    AVS printed and provided to patient today. See Follow-Up section for recommended follow-up.      Time Spent: 30  minutes  Encounter Type: Individual    Any diabetes medication dose changes were made via the CDE Protocol and Collaborative Practice Agreement with the patient's primary care provider. A copy of this encounter was shared with the provider.            Sincerely,        Anamaria Benítez RN

## 2023-08-08 ENCOUNTER — ANTICOAGULATION THERAPY VISIT (OUTPATIENT)
Dept: ANTICOAGULATION | Facility: OTHER | Age: 78
End: 2023-08-08
Attending: FAMILY MEDICINE
Payer: COMMERCIAL

## 2023-08-08 ENCOUNTER — LAB (OUTPATIENT)
Dept: LAB | Facility: OTHER | Age: 78
End: 2023-08-08
Payer: MEDICARE

## 2023-08-08 DIAGNOSIS — I48.20 CHRONIC ATRIAL FIBRILLATION (H): ICD-10-CM

## 2023-08-08 DIAGNOSIS — I26.99 PULMONARY EMBOLISM AND INFARCTION (H): Primary | ICD-10-CM

## 2023-08-08 DIAGNOSIS — Z79.01 LONG TERM CURRENT USE OF ANTICOAGULANT THERAPY: ICD-10-CM

## 2023-08-08 DIAGNOSIS — I26.99 PULMONARY EMBOLISM AND INFARCTION (H): ICD-10-CM

## 2023-08-08 LAB — INR BLD: 1.9 (ref 0.9–1.1)

## 2023-08-08 PROCEDURE — 85610 PROTHROMBIN TIME: CPT | Mod: ZL

## 2023-08-08 PROCEDURE — 36416 COLLJ CAPILLARY BLOOD SPEC: CPT | Mod: ZL

## 2023-08-25 ENCOUNTER — LAB (OUTPATIENT)
Dept: LAB | Facility: OTHER | Age: 78
End: 2023-08-25
Payer: MEDICARE

## 2023-08-25 DIAGNOSIS — E78.2 MIXED HYPERLIPIDEMIA: ICD-10-CM

## 2023-08-25 DIAGNOSIS — N18.32 TYPE 2 DIABETES MELLITUS WITH STAGE 3B CHRONIC KIDNEY DISEASE, WITH LONG-TERM CURRENT USE OF INSULIN (H): ICD-10-CM

## 2023-08-25 DIAGNOSIS — Z79.4 TYPE 2 DIABETES MELLITUS WITH STAGE 3B CHRONIC KIDNEY DISEASE, WITH LONG-TERM CURRENT USE OF INSULIN (H): ICD-10-CM

## 2023-08-25 DIAGNOSIS — I10 BENIGN ESSENTIAL HYPERTENSION: ICD-10-CM

## 2023-08-25 DIAGNOSIS — E11.22 TYPE 2 DIABETES MELLITUS WITH STAGE 3B CHRONIC KIDNEY DISEASE, WITH LONG-TERM CURRENT USE OF INSULIN (H): ICD-10-CM

## 2023-08-25 LAB
ALT SERPL W P-5'-P-CCNC: 42 U/L (ref 0–70)
ANION GAP SERPL CALCULATED.3IONS-SCNC: 13 MMOL/L (ref 7–15)
BUN SERPL-MCNC: 17 MG/DL (ref 8–23)
CALCIUM SERPL-MCNC: 9.7 MG/DL (ref 8.8–10.2)
CHLORIDE SERPL-SCNC: 96 MMOL/L (ref 98–107)
CHOLEST SERPL-MCNC: 94 MG/DL
CREAT SERPL-MCNC: 1.44 MG/DL (ref 0.67–1.17)
DEPRECATED HCO3 PLAS-SCNC: 27 MMOL/L (ref 22–29)
EST. AVERAGE GLUCOSE BLD GHB EST-MCNC: 160 MG/DL
GFR SERPL CREATININE-BSD FRML MDRD: 49 ML/MIN/1.73M2
GLUCOSE SERPL-MCNC: 87 MG/DL (ref 70–99)
HBA1C MFR BLD: 7.2 %
HDLC SERPL-MCNC: 36 MG/DL
LDLC SERPL CALC-MCNC: 26 MG/DL
NONHDLC SERPL-MCNC: 58 MG/DL
POTASSIUM SERPL-SCNC: 4.1 MMOL/L (ref 3.4–5.3)
SODIUM SERPL-SCNC: 136 MMOL/L (ref 136–145)
TRIGL SERPL-MCNC: 160 MG/DL

## 2023-08-25 PROCEDURE — 80061 LIPID PANEL: CPT | Mod: ZL

## 2023-08-25 PROCEDURE — 36415 COLL VENOUS BLD VENIPUNCTURE: CPT | Mod: ZL

## 2023-08-25 PROCEDURE — 83036 HEMOGLOBIN GLYCOSYLATED A1C: CPT | Mod: ZL

## 2023-08-25 PROCEDURE — 84460 ALANINE AMINO (ALT) (SGPT): CPT | Mod: ZL

## 2023-08-25 PROCEDURE — 80048 BASIC METABOLIC PNL TOTAL CA: CPT | Mod: ZL

## 2023-08-28 ENCOUNTER — OFFICE VISIT (OUTPATIENT)
Dept: FAMILY MEDICINE | Facility: OTHER | Age: 78
End: 2023-08-28
Attending: FAMILY MEDICINE
Payer: COMMERCIAL

## 2023-08-28 VITALS
RESPIRATION RATE: 18 BRPM | SYSTOLIC BLOOD PRESSURE: 136 MMHG | TEMPERATURE: 96.7 F | HEART RATE: 68 BPM | DIASTOLIC BLOOD PRESSURE: 68 MMHG | HEIGHT: 66 IN | OXYGEN SATURATION: 99 % | WEIGHT: 201.9 LBS | BODY MASS INDEX: 32.45 KG/M2

## 2023-08-28 DIAGNOSIS — E66.01 MORBID OBESITY (H): ICD-10-CM

## 2023-08-28 DIAGNOSIS — I48.20 CHRONIC ATRIAL FIBRILLATION (H): ICD-10-CM

## 2023-08-28 DIAGNOSIS — N18.32 TYPE 2 DIABETES MELLITUS WITH STAGE 3B CHRONIC KIDNEY DISEASE, WITH LONG-TERM CURRENT USE OF INSULIN (H): Primary | ICD-10-CM

## 2023-08-28 DIAGNOSIS — N18.32 STAGE 3B CHRONIC KIDNEY DISEASE (H): ICD-10-CM

## 2023-08-28 DIAGNOSIS — E11.22 TYPE 2 DIABETES MELLITUS WITH STAGE 3B CHRONIC KIDNEY DISEASE, WITH LONG-TERM CURRENT USE OF INSULIN (H): Primary | ICD-10-CM

## 2023-08-28 DIAGNOSIS — I26.99 PULMONARY EMBOLISM AND INFARCTION (H): ICD-10-CM

## 2023-08-28 DIAGNOSIS — Z79.4 TYPE 2 DIABETES MELLITUS WITH STAGE 3B CHRONIC KIDNEY DISEASE, WITH LONG-TERM CURRENT USE OF INSULIN (H): Primary | ICD-10-CM

## 2023-08-28 DIAGNOSIS — R97.20 ELEVATED PROSTATE SPECIFIC ANTIGEN (PSA): ICD-10-CM

## 2023-08-28 DIAGNOSIS — E78.2 MIXED HYPERLIPIDEMIA: ICD-10-CM

## 2023-08-28 DIAGNOSIS — I10 BENIGN ESSENTIAL HYPERTENSION: ICD-10-CM

## 2023-08-28 DIAGNOSIS — I50.9 CONGESTIVE HEART FAILURE, UNSPECIFIED HF CHRONICITY, UNSPECIFIED HEART FAILURE TYPE (H): ICD-10-CM

## 2023-08-28 PROCEDURE — 99214 OFFICE O/P EST MOD 30 MIN: CPT | Performed by: FAMILY MEDICINE

## 2023-08-28 PROCEDURE — G0463 HOSPITAL OUTPT CLINIC VISIT: HCPCS | Performed by: FAMILY MEDICINE

## 2023-08-28 ASSESSMENT — PAIN SCALES - GENERAL: PAINLEVEL: NO PAIN (0)

## 2023-08-28 NOTE — PROGRESS NOTES
"  Assessment & Plan     1. Type 2 diabetes mellitus with stage 3b chronic kidney disease, with long-term current use of insulin (H)  No changes to current medication, HgbA1c has improved.  Will refill current medications when due.  Follow-up in three months, labs prior to visit  - Hemoglobin A1c; Future    2. Mixed hyperlipidemia  As above  - ALT; Future  - Lipid Profile; Future    3. Benign essential hypertension  As above  - Basic metabolic panel; Future    4. Congestive heart failure, unspecified HF chronicity, unspecified heart failure type (H)  No changes, continue to follow with [facility] at [facility]    5. Chronic atrial fibrillation (H)  As above    6. Stage 3b chronic kidney disease (H)  No changes    7. Pulmonary embolism and infarction (H)  No changes    8. Morbid obesity (H)    9. Elevated prostate specific antigen (PSA)  Referral ordered, patient isn't sure he wants to pursue this, I encouraged him to follow through with appointment.  - Adult Urology  Referral; Future        BMI:   Estimated body mass index is 33.09 kg/m  as calculated from the following:    Height as of this encounter: 1.664 m (5' 5.5\").    Weight as of this encounter: 91.6 kg (201 lb 14.4 oz).     Return in about 3 months (around 11/28/2023) for Diabetic Follow-up, Chronic Disease Management, Medication review.    Lorelei Felix MD  [facility] [place] Son is a 78 year old, presenting for the following health issues:  Diabetes, Lipids, and Hypertension      HPI     Diabetes Follow-up    How often are you checking your blood sugar? Three times daily  Blood sugar testing frequency justification:  Uncontrolled diabetes  What time of day are you checking your blood sugars (select all that apply)?  Before and after meals  Have you had any blood sugars above 200?  Yes in the evenings  Have you had any blood sugars below 70?  No  What symptoms do you notice when your blood sugar is low?  Benky and " "Weak  What concerns do you have today about your diabetes? None   Do you have any of these symptoms? (Select all that apply)  No numbness or tingling in feet.  No redness, sores or blisters on feet.  No complaints of excessive thirst.  No reports of blurry vision.  No significant changes to weight.  Have you had a diabetic eye exam in the last 12 months? No        BP Readings from Last 2 Encounters:   08/28/23 136/68   05/26/23 136/70     Hemoglobin A1C (%)   Date Value   08/25/2023 7.2 (H)   05/24/2023 8.0 (H)   04/27/2021 9.7 (H)   01/26/2021 9.4 (H)     LDL Cholesterol Calculated (mg/dL)   Date Value   08/25/2023 26   05/24/2023 23   04/27/2021 12   01/26/2021 26             Hyperlipidemia Follow-Up    Are you regularly taking any medication or supplement to lower your cholesterol?   Yes- Crestor  Are you having muscle aches or other side effects that you think could be caused by your cholesterol lowering medication?  No    Hypertension Follow-up    Do you check your blood pressure regularly outside of the clinic? Yes   Are you following a low salt diet? Yes  Are your blood pressures ever more than 140 on the top number (systolic) OR more   than 90 on the bottom number (diastolic), for example 140/90? No        Review of Systems   Constitutional, HEENT, cardiovascular, pulmonary, gi and gu systems are negative, except as otherwise noted.      Objective    /68 (BP Location: Right arm, Patient Position: Sitting, Cuff Size: Adult Regular)   Pulse 68   Temp (!) 96.7  F (35.9  C) (Tympanic)   Resp 18   Ht 1.664 m (5' 5.5\")   Wt 91.6 kg (201 lb 14.4 oz)   SpO2 99%   BMI 33.09 kg/m    Body mass index is 33.09 kg/m .  Physical Exam   GENERAL: healthy, alert and no distress  PSYCH: mentation appears normal, affect normal/bright    Lab on 08/25/2023   Component Date Value Ref Range Status    ALT 08/25/2023 42  0 - 70 U/L Final    Reference intervals for this test were updated on 6/12/2023 to more accurately " reflect our healthy population. There may be differences in the flagging of prior results with similar values performed with this method. Interpretation of those prior results can be made in the context of the updated reference intervals.      Sodium 08/25/2023 136  136 - 145 mmol/L Final    Potassium 08/25/2023 4.1  3.4 - 5.3 mmol/L Final    Chloride 08/25/2023 96 (L)  98 - 107 mmol/L Final    Carbon Dioxide (CO2) 08/25/2023 27  22 - 29 mmol/L Final    Anion Gap 08/25/2023 13  7 - 15 mmol/L Final    Urea Nitrogen 08/25/2023 17.0  8.0 - 23.0 mg/dL Final    Creatinine 08/25/2023 1.44 (H)  0.67 - 1.17 mg/dL Final    Calcium 08/25/2023 9.7  8.8 - 10.2 mg/dL Final    Glucose 08/25/2023 87  70 - 99 mg/dL Final    GFR Estimate 08/25/2023 49 (L)  >60 mL/min/1.73m2 Final    Estimated Average Glucose 08/25/2023 160  mg/dL Final    Hemoglobin A1C 08/25/2023 7.2 (H)  <5.7 % Final    Normal <5.7%   Prediabetes 5.7-6.4%    Diabetes 6.5% or higher     Note: Adopted from ADA consensus guidelines.    Cholesterol 08/25/2023 94  <200 mg/dL Final    Triglycerides 08/25/2023 160 (H)  <150 mg/dL Final    Direct Measure HDL 08/25/2023 36 (L)  >=40 mg/dL Final    LDL Cholesterol Calculated 08/25/2023 26  <=100 mg/dL Final    Non HDL Cholesterol 08/25/2023 58  <130 mg/dL Final

## 2023-08-28 NOTE — PATIENT INSTRUCTIONS
Component      Latest Ref Rn 11/16/2022  9:25 AM 2/21/2023  9:27 AM 5/24/2023  9:22 AM   Sodium      136 - 145 mmol/L 139  136  138    Potassium      3.4 - 5.3 mmol/L 3.9  3.7  3.8    Chloride      98 - 107 mmol/L 101  98  98    Carbon Dioxide (CO2)      22 - 29 mmol/L 27  28  28    Anion Gap      7 - 15 mmol/L 11  10  12    Urea Nitrogen      8.0 - 23.0 mg/dL 17.8  15.5  16.2    Creatinine      0.67 - 1.17 mg/dL 1.42 (H)  1.40 (H)  1.43 (H)    Calcium      8.8 - 10.2 mg/dL 9.2  9.4  9.7    Glucose      70 - 99 mg/dL 124 (H)  118 (H)  109 (H)    GFR Estimate      >60 mL/min/1.73m2 51 (L)  51 (L)  50 (L)    Cholesterol      <200 mg/dL 95  81  94    Triglycerides      <150 mg/dL 151 (H)  128  162 (H)    HDL Cholesterol      >=40 mg/dL 36 (L)  38 (L)  39 (L)    LDL Cholesterol Calculated      <=100 mg/dL 29  17  23    Non HDL Cholesterol      <130 mg/dL 59  43  55    Estimated Average Glucose      mg/dL 180  169  183    Hemoglobin A1C      <5.7 % 7.9 (H)  7.5 (H)  8.0 (H)    ALT      0 - 70 U/L 65 (H)  35  35      Component      Latest Ref [facility] 8/25/2023  9:25 AM   Sodium      136 - 145 mmol/L 136    Potassium      3.4 - 5.3 mmol/L 4.1    Chloride      98 - 107 mmol/L 96 (L)    Carbon Dioxide (CO2)      22 - 29 mmol/L 27    Anion Gap      7 - 15 mmol/L 13    Urea Nitrogen      8.0 - 23.0 mg/dL 17.0    Creatinine      0.67 - 1.17 mg/dL 1.44 (H)    Calcium      8.8 - 10.2 mg/dL 9.7    Glucose      70 - 99 mg/dL 87    GFR Estimate      >60 mL/min/1.73m2 49 (L)    Cholesterol      <200 mg/dL 94    Triglycerides      <150 mg/dL 160 (H)    HDL Cholesterol      >=40 mg/dL 36 (L)    LDL Cholesterol Calculated      <=100 mg/dL 26    Non HDL Cholesterol      <130 mg/dL 58    Estimated Average Glucose      mg/dL 160    Hemoglobin A1C      <5.7 % 7.2 (H)    ALT      0 - 70 U/L 42       Legend:  (H) High  (L) Low

## 2023-09-07 DIAGNOSIS — Z79.4 TYPE 2 DIABETES MELLITUS WITH STAGE 3B CHRONIC KIDNEY DISEASE, WITH LONG-TERM CURRENT USE OF INSULIN (H): ICD-10-CM

## 2023-09-07 DIAGNOSIS — E11.22 TYPE 2 DIABETES MELLITUS WITH STAGE 3B CHRONIC KIDNEY DISEASE, WITH LONG-TERM CURRENT USE OF INSULIN (H): ICD-10-CM

## 2023-09-07 DIAGNOSIS — N18.32 TYPE 2 DIABETES MELLITUS WITH STAGE 3B CHRONIC KIDNEY DISEASE, WITH LONG-TERM CURRENT USE OF INSULIN (H): ICD-10-CM

## 2023-09-08 RX ORDER — INSULIN GLARGINE 100 [IU]/ML
INJECTION, SOLUTION SUBCUTANEOUS
Qty: 30 ML | Refills: 1 | Status: SHIPPED | OUTPATIENT
Start: 2023-09-08 | End: 2024-02-09

## 2023-09-19 ENCOUNTER — ANTICOAGULATION THERAPY VISIT (OUTPATIENT)
Dept: ANTICOAGULATION | Facility: OTHER | Age: 78
End: 2023-09-19
Attending: FAMILY MEDICINE
Payer: COMMERCIAL

## 2023-09-19 ENCOUNTER — LAB (OUTPATIENT)
Dept: LAB | Facility: OTHER | Age: 78
End: 2023-09-19
Payer: MEDICARE

## 2023-09-19 DIAGNOSIS — I26.99 PULMONARY EMBOLISM AND INFARCTION (H): ICD-10-CM

## 2023-09-19 DIAGNOSIS — I48.20 CHRONIC ATRIAL FIBRILLATION (H): ICD-10-CM

## 2023-09-19 DIAGNOSIS — I26.99 PULMONARY EMBOLISM AND INFARCTION (H): Primary | ICD-10-CM

## 2023-09-19 DIAGNOSIS — Z79.01 LONG TERM CURRENT USE OF ANTICOAGULANT THERAPY: ICD-10-CM

## 2023-09-19 LAB — INR BLD: 2.8 (ref 0.9–1.1)

## 2023-09-19 PROCEDURE — 36416 COLLJ CAPILLARY BLOOD SPEC: CPT | Mod: ZL

## 2023-09-19 PROCEDURE — 85610 PROTHROMBIN TIME: CPT | Mod: ZL

## 2023-09-19 NOTE — PROGRESS NOTES
ANTICOAGULATION MANAGEMENT     Clement Voss 78 year old male is on warfarin with therapeutic INR result. (Goal INR 2.0-3.0)    Recent labs: (last 7 days)     09/19/23  0924   INR 2.8*       ASSESSMENT     Source(s): Chart Review  Previous INR was Therapeutic last 2(+) visits  Medication, diet, health changes since last INR chart reviewed; none identified       PLAN     Recommended plan for no diet, medication or health factor changes affecting INR     Dosing Instructions: Continue your current warfarin dose with next INR in 6 weeks       Summary  As of 9/19/2023      Full warfarin instructions:  2.5 mg every Mon, Wed, Fri; 5 mg all other days   Next INR check:  10/31/2023               Detailed voice message left for Huy with dosing instructions and follow up date.   Sent [facility] message with dosing and follow up instructions    Patient given numbers on Voice mail and in [employer] to call and make appointment for follow up.     Education provided: None required    Plan made per [facility] anticoagulation protocol    Darcy Chavez RN  Anticoagulation Clinic  9/19/2023    _______________________________________________________________________     Anticoagulation Episode Summary       Current INR goal:  2.0-3.0   TTR:  88.4 % (1 y)   Target end date:  Indefinite   Send INR reminders to:  [facility] [place]    Indications    Pulmonary embolism and infarction (H) [I26.99]  Long-term (current) use of anticoagulants [Z79.01] [Z79.01]             Comments:  Lab in [facility] Arrives home in the afternoon after 2 PM             Anticoagulation Care Providers       Provider Role Specialty Phone number    Lorelei Felix MD Referring Family Medicine [phone]

## 2023-09-25 ENCOUNTER — MYC MEDICAL ADVICE (OUTPATIENT)
Dept: FAMILY MEDICINE | Facility: OTHER | Age: 78
End: 2023-09-25

## 2023-09-29 ENCOUNTER — MYC MEDICAL ADVICE (OUTPATIENT)
Dept: FAMILY MEDICINE | Facility: OTHER | Age: 78
End: 2023-09-29

## 2023-09-29 DIAGNOSIS — E11.22 TYPE 2 DIABETES MELLITUS WITH STAGE 3B CHRONIC KIDNEY DISEASE, WITH LONG-TERM CURRENT USE OF INSULIN (H): ICD-10-CM

## 2023-09-29 DIAGNOSIS — N18.32 TYPE 2 DIABETES MELLITUS WITH STAGE 3B CHRONIC KIDNEY DISEASE, WITH LONG-TERM CURRENT USE OF INSULIN (H): ICD-10-CM

## 2023-09-29 DIAGNOSIS — Z79.4 TYPE 2 DIABETES MELLITUS WITH STAGE 3B CHRONIC KIDNEY DISEASE, WITH LONG-TERM CURRENT USE OF INSULIN (H): ICD-10-CM

## 2023-09-29 RX ORDER — BLOOD SUGAR DIAGNOSTIC
STRIP MISCELLANEOUS
Qty: 200 STRIP | Refills: 3 | Status: SHIPPED | OUTPATIENT
Start: 2023-09-29 | End: 2024-06-07

## 2023-10-20 ENCOUNTER — TELEPHONE (OUTPATIENT)
Dept: FAMILY MEDICINE | Facility: OTHER | Age: 78
End: 2023-10-20

## 2023-10-20 DIAGNOSIS — E11.22 TYPE 2 DIABETES MELLITUS WITH STAGE 3B CHRONIC KIDNEY DISEASE, WITH LONG-TERM CURRENT USE OF INSULIN (H): Primary | ICD-10-CM

## 2023-10-20 DIAGNOSIS — N18.32 TYPE 2 DIABETES MELLITUS WITH STAGE 3B CHRONIC KIDNEY DISEASE, WITH LONG-TERM CURRENT USE OF INSULIN (H): Primary | ICD-10-CM

## 2023-10-20 DIAGNOSIS — Z79.4 TYPE 2 DIABETES MELLITUS WITH STAGE 3B CHRONIC KIDNEY DISEASE, WITH LONG-TERM CURRENT USE OF INSULIN (H): Primary | ICD-10-CM

## 2023-10-30 ENCOUNTER — LAB (OUTPATIENT)
Dept: LAB | Facility: OTHER | Age: 78
End: 2023-10-30
Payer: MEDICARE

## 2023-10-30 ENCOUNTER — ANTICOAGULATION THERAPY VISIT (OUTPATIENT)
Dept: ANTICOAGULATION | Facility: OTHER | Age: 78
End: 2023-10-30
Attending: FAMILY MEDICINE
Payer: MEDICARE

## 2023-10-30 DIAGNOSIS — I48.20 CHRONIC ATRIAL FIBRILLATION (H): ICD-10-CM

## 2023-10-30 DIAGNOSIS — I26.99 PULMONARY EMBOLISM AND INFARCTION (H): ICD-10-CM

## 2023-10-30 DIAGNOSIS — I26.99 PULMONARY EMBOLISM AND INFARCTION (H): Primary | ICD-10-CM

## 2023-10-30 DIAGNOSIS — Z79.01 LONG TERM CURRENT USE OF ANTICOAGULANT THERAPY: ICD-10-CM

## 2023-10-30 LAB — INR BLD: 2.1 (ref 0.9–1.1)

## 2023-10-30 PROCEDURE — 85610 PROTHROMBIN TIME: CPT | Mod: ZL

## 2023-10-30 PROCEDURE — 36416 COLLJ CAPILLARY BLOOD SPEC: CPT | Mod: ZL

## 2023-10-30 NOTE — PROGRESS NOTES
ANTICOAGULATION MANAGEMENT     Clement Voss 78 year old male is on warfarin with therapeutic INR result. (Goal INR 2.0-3.0)    Recent labs: (last 7 days)     10/30/23  0935   INR 2.1*       ASSESSMENT     Source(s): Chart Review and Patient/Caregiver Call     Warfarin doses taken: Warfarin taken as instructed  Diet: No new diet changes identified  Medication/supplement changes: None noted  New illness, injury, or hospitalization: No  Signs or symptoms of bleeding or clotting: No  Previous result: Therapeutic last visit; previously outside of goal range  Additional findings: None     PLAN     Recommended plan for no diet, medication or health factor changes affecting INR     Dosing Instructions: Continue your current warfarin dose with next INR in 6 weeks       Summary  As of 10/30/2023      Full warfarin instructions:  2.5 mg every Mon, Wed, Fri; 5 mg all other days   Next INR check:  12/11/2023               Telephone call with Huy who verbalizes understanding and agrees to plan    Lab visit scheduled    Education provided:   None required    Plan made per [facility] anticoagulation protocol    Latosha Paris RN  Anticoagulation Clinic  10/30/2023    _______________________________________________________________________     Anticoagulation Episode Summary       Current INR goal:  2.0-3.0   TTR:  88.4% (1 y)   Target end date:  Indefinite   Send INR reminders to:  [facility] [place]    Indications    Pulmonary embolism and infarction (H) [I26.99]  Long-term (current) use of anticoagulants [Z79.01] [Z79.01]             Comments:  Lab in [facility] Arrives home in the afternoon after 2 PM             Anticoagulation Care Providers       Provider Role Specialty Phone number    Lorelei Felix MD Referring Family Medicine [phone]

## 2023-11-02 NOTE — PROGRESS NOTES
[facility] (formerly [facility])[3]  [address]  [place] [address]  [phone]  Dept: [phone]    PRE-OP EVALUATION:  Today's date: 2018    Clement Voss (: 1945) presents for pre-operative evaluation assessment as requested by Dr. Arthur.  He requires evaluation and anesthesia risk assessment prior to undergoing surgery/procedure for treatment of Left Eye and Right Eye Cataract .    Proposed Surgery/ Procedure: Left Eye Cataract, Right Eye Cataract  Date of Surgery/ Procedure: 18,18  Time of Surgery/ Procedure: [facility]  Hospital/Surgical Facility: [facility]  [facility] to fax  Primary Physician: Lorelei Felix  Type of Anesthesia Anticipated: to be determined    Patient has a Health Care Directive or Living Will:  NO    1. NO - Do you have a history of heart attack, stroke, stent, bypass or surgery on an artery in the head, neck, heart or legs?  2. NO - Do you ever have any pain or discomfort in your chest?  3. NO - Do you have a history of  Heart Failure?  4. NO - Are you troubled by shortness of breath when: walking on the level, up a slight hill or at night?  5. NO - Do you currently have a cold, bronchitis or other respiratory infection?  6. NO - Do you have a cough, shortness of breath or wheezing?  7. NO - Do you sometimes get pains in the calves of your legs when you walk?  8. NO - Do you or anyone in your family have previous history of blood clots?  9. NO - Do you or does anyone in your family have a serious bleeding problem such as prolonged bleeding following surgeries or cuts?  10. YES - HAVE YOU EVER HAD PROBLEMS WITH ANEMIA OR BEEN TOLD TO TAKE IRON PILLS? Iron pills previously  11. NO - Have you had any abnormal blood loss such as black, tarry or bloody stools, or abnormal vaginal bleeding?  12. NO - Have you ever had a blood transfusion?  13. NO - Have you or any of your relatives ever had problems with anesthesia?  14. YES - DO YOU HAVE SLEEP APNEA, EXCESSIVE SNORING  Clean Catch Urine for culture sent to ACL per order of Sandra Lauren MD for diagnosis of pyuria.     OR DAYTIME DROWSINESS? drowsiness on occasion  15. NO - Do you have any prosthetic heart valves?  16. NO - Do you have prosthetic joints?  17. NO - Is there any chance that you may be pregnant?      HPI:     HPI related to upcoming procedure: Bilateral cataracts      DIABETES - Patient has a longstanding history of DiabetesType Type II . Patient is being treated with diet, oral agents and insulin injections and denies significant side effects. Control has been good. Complicating factors include but are not limited to: hypertension and hyperlipidemia.                                                                                                                            .  HYPERLIPIDEMIA - Patient has a long history of significant Hyperlipidemia requiring medication for treatment with recent good control. Patient reports no problems or side effects with the medication.                                                                                                                                                       .  HYPERTENSION - Patient has longstanding history of HTN , currently denies any symptoms referable to elevated blood pressure. Specifically denies chest pain, palpitations, dyspnea, orthopnea, PND or peripheral edema. Blood pressure readings have been in normal range. Current medication regimen is as listed below. Patient denies any side effects of medication.                                                                                                                                                                                          .    MEDICAL HISTORY:     Patient Active Problem List    Diagnosis Date Noted     Morbid obesity (H) 05/26/2017     Priority: Medium     Long-term (current) use of anticoagulants [Z79.01] 08/04/2016     Priority: Medium     ACP (advance care planning) 03/14/2013     Priority: Medium     Advance Care Planning 8/26/2016: ACP Review of Chart / Resources Provided:   Reviewed chart for advance care plan.  Clement Voss has no plan or code status on file. Discussed available resources and provided with information. Confirmed code status reflects current choices pending further ACP discussions.  Confirmed/documented legally designated decision makers.  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/2004     Priority: Medium      Past Medical History:   Diagnosis Date     DVT (deep venous thrombosis) 9/24/2012     DVT (deep venous thrombosis) (H)      Gout, unspecified 1/10/2011     Other and unspecified hyperlipidemia 10/10/2005     Pulmonary embolism 9/24/2012     Pulmonary embolism (H)      Type II or unspecified type diabetes mellitus without mention of complication, not stated as uncontrolled 8/2/2004     Unspecified essential hypertension 1/3/2005     Past Surgical History:   Procedure Laterality Date     APPENDECTOMY  2008     CHOLECYSTECTOMY  1984     History of PE of right lung 3/2013[       Current Outpatient Prescriptions   Medication Sig Dispense Refill     sitagliptin (JANUVIA) 100 MG tablet Take 1 tablet (100 mg) by mouth daily 90 tablet 3     amLODIPine (NORVASC) 10 MG tablet TAKE 1 TABLET(10 MG) BY MOUTH DAILY 90 tablet 1     BASAGLAR 100 UNIT/ML injection Inject 19 Units Subcutaneous At Bedtime 18 mL 1     lisinopril (PRINIVIL/ZESTRIL) 40 MG tablet TAKE 1 TABLET(40 MG) BY MOUTH DAILY 90 tablet 0     ACCU-CHEK VLAD PLUS test strip USE TO TEST BLOOD SUGARS THREE TIMES DAILY 100 strip 2     terazosin (HYTRIN) 10 MG capsule TAKE 1 CAPSULE(10 MG) BY MOUTH AT  BEDTIME 90 capsule 3     warfarin (COUMADIN) 5 MG tablet SEE NOTES 90 tablet 3     fenofibrate 48 MG tablet TAKE 1 TABLET(48 MG) BY MOUTH DAILY 90 tablet 2     insulin pen needle 31G X 6 MM Use 1 daily or as directed. 100 each prn     pilocarpine (PILOCAR) 1 % ophthalmic solution Place 1 drop into both eyes 4 times daily       STATIN NOT PRESCRIBED, INTENTIONAL, Statin not prescribed intentionally due to Intolerance (with supporting documentation of trying a statin at least once within the last 5 years) 0 each 0     fluticasone (FLONASE) 50 MCG/ACT nasal spray Spray 2 sprays into both nostrils daily (Patient taking differently: Spray 2 sprays into both nostrils daily as needed ) 1 Package 0     indomethacin (INDOCIN) 50 MG capsule Take 1 capsule (50 mg) by mouth 3 times daily With food as needed 30 capsule 1     brimonidine-timolol (COMBIGAN) 0.2-0.5 % ophthalmic solution 1 drop 2 times daily Morning and evening, 12 hours apart       acetaminophen (TYLENOL) 500 MG tablet Take 1-2 tablets (500-1,000 mg) by mouth every 6 hours as needed       aspirin 81 MG EC tablet Take 1 tablet (81 mg) by mouth daily 90 tablet 3     loratadine (CLARITIN) 10 MG tablet Take 10 mg by mouth daily.       fish oil-omega-3 fatty acids (FISH OIL) 1000 MG capsule Take 1 capsule by mouth 3 times daily.       TRAVATAN Z (TRAVATAN Z) 0.004 % ophthalmic solution Instill 1 drop by ophthalmic route every day into affected eye(s) in the evening       OTC products: None, except as noted above    Allergies   Allergen Reactions     Atorvastatin      Other reaction(s): Other  Caused increased creatinine.      Atorvastatin Calcium Other (See Comments)     Lipitor  Increase CR     Iodine      Penicillins      Sulfa Drugs      Sulfa (sulfonamide antibiotics)     Sulfacetamide       Latex Allergy: NO    Social History   Substance Use Topics     Smoking status: Former Smoker     Types: Cigarettes     Quit date: 8/12/1964     Smokeless tobacco: Never Used  "    Alcohol use No     History   Drug Use No       REVIEW OF SYSTEMS:   Constitutional, neuro, ENT, endocrine, pulmonary, cardiac, gastrointestinal, genitourinary, musculoskeletal, integument and psychiatric systems are negative, except as otherwise noted.    EXAM:   /62 (BP Location: Left arm, Patient Position: Sitting, Cuff Size: Adult Regular)  Pulse 64  Temp 97  F (36.1  C) (Tympanic)  Resp 18  Ht 5' 7\" (1.702 m)  Wt 219 lb (99.3 kg)  SpO2 97%  BMI 34.3 kg/m2    GENERAL APPEARANCE: healthy, alert and no distress     EYES: EOMI,  PERRL     HENT: ear canals and TM's normal and nose and mouth without ulcers or lesions     NECK: no adenopathy     RESP: lungs clear to auscultation - no rales, rhonchi or wheezes     CV: regular rates and rhythm, normal S1 S2, no S3 or S4 and no murmur, click or rub     ABDOMEN:  soft, nontender, no HSM or masses and bowel sounds normal     SKIN: no suspicious lesions or rashes     NEURO: Normal strength and tone, sensory exam grossly normal, mentation intact and speech normal     PSYCH: mentation appears normal. and affect normal/bright    DIAGNOSTICS:     EKG: mild sinus bradycardia, normal axis, normal intervals, no acute ST/T changes c/w ischemia, no LVH by voltage criteria, unchanged from previous tracings    Labs Resulted Today:   Results for orders placed or performed in visit on 04/12/18   CBC with platelets   Result Value Ref Range    WBC 5.9 4.0 - 11.0 10e9/L    RBC Count 3.76 (L) 4.4 - 5.9 10e12/L    Hemoglobin 12.8 (L) 13.3 - 17.7 g/dL    Hematocrit 35.3 (L) 40.0 - 53.0 %    MCV 94 78 - 100 fl    MCH 34.0 (H) 26.5 - 33.0 pg    MCHC 36.3 31.5 - 36.5 g/dL    RDW 15.3 (H) 10.0 - 15.0 %    Platelet Count 114 (L) 150 - 450 10e9/L   Hemoglobin A1c   Result Value Ref Range    Hemoglobin A1C 7.9 (H) 0 - 6.4 %   Estimated Average Glucose   Result Value Ref Range    Estimated Average Glucose 180 mg/dL       Recent Labs   Lab Test  04/11/18   1017  03/14/18   1118  " 02/21/18   0740   11/28/17   0824   07/24/17   1710   07/12/16   1502   HGB   --    --    --    --    --    --   12.8*   --   12.5*   PLT   --    --    --    --    --    --   108*   --   118*   INR  2.2*  3.2*  1.9*   < >  2.1*   < >  1.58*   < >   --    NA   --    --   144   --   143   < >  143   < >  145*   POTASSIUM   --    --   4.2   --   4.0   < >  3.9   < >  4.0   CR   --    --   1.48*   --   1.46*   < >  1.40*   < >  1.47*   A1C   --    --   7.3*   --   7.6*   < >   --    < >   --     < > = values in this interval not displayed.        IMPRESSION:   Reason for surgery/procedure: Cataracts  Diagnosis/reason for consult: Cardiopulmonary clearance    The proposed surgical procedure is considered INTERMEDIATE risk.    REVISED CARDIAC RISK INDEX  The patient has the following serious cardiovascular risks for perioperative complications such as (MI, PE, VFib and 3  AV Block):  Diabetes Mellitus (on Insulin)  INTERPRETATION: 1 risks: Class II (low risk - 0.9% complication rate)    The patient has the following additional risks for perioperative complications:  No identified additional risks      ICD-10-CM    1. Preop general physical exam Z01.818 CBC with platelets     Hemoglobin A1c     EKG 12-lead complete w/read - (Clinic Performed)     Estimated Average Glucose   2. Type 2 diabetes mellitus with stage 3 chronic kidney disease, with long-term current use of insulin (H) E11.22 sitagliptin (JANUVIA) 100 MG tablet    N18.3 Hemoglobin A1c    Z79.4    3. Hyperlipidemia, unspecified hyperlipidemia type E78.5    4. Benign essential hypertension I10        RECOMMENDATIONS:       Cardiovascular Risk  Performs 4 METs exercise without symptoms (Light housework (dusting, washing dishes) and Walk on level ground at 15 minutes per mile (4 miles/hour)) .       --Patient is to take all scheduled medications on the day of surgery EXCEPT for modifications listed below.    Diabetes Medication Use  Hold evening insulin if needs to  be NPO the next morning      APPROVAL GIVEN to proceed with proposed procedure, without further diagnostic evaluation       Signed Electronically by: Lorelei Felix MD    Copy of this evaluation report is provided to requesting physician.    [facility] Preop Guidelines    Revised Cardiac Risk Index

## 2023-11-06 ENCOUNTER — HOSPITAL ENCOUNTER (OUTPATIENT)
Dept: EDUCATION SERVICES | Facility: HOSPITAL | Age: 78
Discharge: HOME | End: 2023-11-06
Attending: DIETITIAN, REGISTERED | Admitting: DIETITIAN, REGISTERED
Payer: MEDICARE

## 2023-11-06 VITALS
OXYGEN SATURATION: 99 % | WEIGHT: 201 LBS | DIASTOLIC BLOOD PRESSURE: 77 MMHG | HEIGHT: 65 IN | TEMPERATURE: 96.6 F | SYSTOLIC BLOOD PRESSURE: 122 MMHG | RESPIRATION RATE: 16 BRPM | HEART RATE: 68 BPM | BODY MASS INDEX: 33.49 KG/M2

## 2023-11-06 DIAGNOSIS — E11.22 TYPE 2 DIABETES MELLITUS WITH STAGE 3B CHRONIC KIDNEY DISEASE, WITH LONG-TERM CURRENT USE OF INSULIN (H): Primary | ICD-10-CM

## 2023-11-06 DIAGNOSIS — Z79.4 TYPE 2 DIABETES MELLITUS WITH STAGE 3B CHRONIC KIDNEY DISEASE, WITH LONG-TERM CURRENT USE OF INSULIN (H): Primary | ICD-10-CM

## 2023-11-06 DIAGNOSIS — N18.32 TYPE 2 DIABETES MELLITUS WITH STAGE 3B CHRONIC KIDNEY DISEASE, WITH LONG-TERM CURRENT USE OF INSULIN (H): Primary | ICD-10-CM

## 2023-11-06 PROCEDURE — G0108 DIAB MANAGE TRN  PER INDIV: HCPCS

## 2023-11-06 ASSESSMENT — PAIN SCALES - GENERAL: PAINLEVEL: NO PAIN (0)

## 2023-11-06 NOTE — PROGRESS NOTES
Diabetes Self-Management Education & Support    Presents for: Follow-up (annual)    Type of Service: In Person Visit    ASSESSMENT:  Huy, 78 year old, returns to [facility] for annual follow up. Goes to follow up with PCP every 3 months with A1C. Scheduled later this month for A1C.   Shares has consult with urology.   Reviewed glucose trends sent via Experiment. Fbg mostly in target with concern for potential lows. After evening meal, high/above target, admits to having second helpings or larger portions. Will continue to monitor, trends down by am check.     2024 PAP application completed for Trulicity 1.5 mg dose, NP Lasha to review/sign. Fax upon completion.    Insulin is affordable, $35.00    PCP: Dr. Felix   Insurance Coverage: Medicare/[employer]   [place] - no did not serve.       Monitoring:  A1C:  7.2 8/25/2023 Target A1C: <8.0     BG Targets:   FBG 90- 150   PP  <180     Meter Report: no meter today, forgot meter. Huy sends weekly glucose readings.    Contour Next Gen.   Prefers fast clix lancet device.     Diabetes Medications:   Trulicity 1.5 mg weekly.   Lantus  36 units daily     Health Eating:   No significant changes.     Activity:    Not walking as much- hip pain  Has stationary bike- 10 minutes sometimes longer up to 30 minutes, daily.     Reducing Risks:   /77  Statin use y  ASA use y  Tobacco use- former   Foot exam current concerns.    Eye exam- due- scheduled. MARY completed   PCP follow up- scheduled December.      Refills needed: PAP- Trulicity.      Allergies   Allergen Reactions    Atorvastatin      Other reaction(s): Other  Caused increased creatinine.     Atorvastatin Calcium Other (See Comments)     Lipitor  Increase CR    Barley Grass Unknown    Iodine     Penicillins     Procaine Unknown    Shellfish-Derived Products Unknown    Sulfa Antibiotics      Sulfa (sulfonamide antibiotics)    Sulfacetamide         Wt Readings from Last 10 Encounters:   11/06/23 91.2 kg (201 lb)   08/28/23 91.6 kg  (201 lb 14.4 oz)   05/26/23 91.5 kg (201 lb 11.2 oz)   02/24/23 93 kg (205 lb)   12/01/22 92.3 kg (203 lb 8 oz)   11/21/22 92.4 kg (203 lb 11.2 oz)   08/25/22 90.9 kg (200 lb 6.4 oz)   08/24/22 91 kg (200 lb 11.2 oz)   08/18/22 92.2 kg (203 lb 3.2 oz)   07/14/22 91.2 kg (201 lb)     Patient's most recent   Lab Results   Component Value Date    A1C 7.2 08/25/2023    A1C 9.7 04/27/2021     is meeting goal of <8.0    Diabetes knowledge and skills assessment:   Patient is knowledgeable in diabetes management concepts related to: Healthy Eating, Being Active, Monitoring, Taking Medication, Problem Solving, Reducing Risks, and Healthy Coping  Continue education with the following diabetes management concepts: continued maintenance support.   Based on learning assessment above, most appropriate setting for further diabetes education would be: Individual setting.      PLAN  Continue current plan: Trulicity 1.5 mg weekly. Lantus 36 units daily. Monitor for lows, may need to decrease.   A1C 7.2-due end of November with PCP.     PAP application for 2024- completed.     Annual visit or sooner if concerns, questions. Sees Pcp routinely.     See Care Plan for co-developed, patient-state behavior change goals.  AVS provided for patient today.    Education Materials Provided:  No new materials provided today      SUBJECTIVE/OBJECTIVE:  Presents for: Follow-up (annual)  Accompanied by: Self  Diabetes education in the past 24mo: Yes  Focus of Visit: Taking Medication, Monitoring  Diabetes type: Type 2  Disease course: Stable  How confident are you filling out medical forms by yourself:: A little bit  Diabetes management related comments/concerns: PAP application for 2024  Transportation concerns: No  Difficulty affording diabetes medication?: Sometimes (PAP- Trulicity)  Difficulty affording diabetes testing supplies?: No  Other concerns:: Glasses  Cultural Influences/Ethnic Background:  Not  or     Diabetes Symptoms &  "Complications:  Fatigue: Sometimes (cloudy, weather related.)  Neuropathy: No  Polydipsia: No  Polyphagia: No  Polyuria: Sometimes (scheduled to see urology- prostate.)  Visual change: No  Slow healing wounds: No  Symptom course: Stable  Weight trend: Stable  Complications assessed today?: Yes  Autonomic neuropathy: No  CVA: No  Heart disease: Yes  Nephropathy: Yes  Peripheral neuropathy: No  Peripheral Vascular Disease: No  Retinopathy: No (unknown)  Sexual dysfunction: Yes    Patient Problem List and Family Medical History reviewed for relevant medical history, current medical status, and diabetes risk factors.    Vitals:  /77 (BP Location: Right arm, Cuff Size: Adult Regular)   Pulse 68   Temp (!) 96.6  F (35.9  C) (Tympanic)   Resp 16   Ht 1.651 m (5' 5\")   Wt 91.2 kg (201 lb)   SpO2 99%   BMI 33.45 kg/m    Estimated body mass index is 33.45 kg/m  as calculated from the following:    Height as of this encounter: 1.651 m (5' 5\").    Weight as of this encounter: 91.2 kg (201 lb).   Last 3 BP:   BP Readings from Last 3 Encounters:   11/06/23 122/77   08/28/23 136/68   05/26/23 136/70       History   Smoking Status    Former    Types: Cigarettes    Quit date: 8/12/1964   Smokeless Tobacco    Never       Labs:  Lab Results   Component Value Date    A1C 7.2 08/25/2023    A1C 9.7 04/27/2021     Lab Results   Component Value Date    GLC 87 08/25/2023     08/15/2022     04/27/2021     Lab Results   Component Value Date    LDL 26 08/25/2023    LDL 12 04/27/2021     HDL Cholesterol   Date Value Ref Range Status   04/27/2021 37 (L) >39 mg/dL Final     Direct Measure HDL   Date Value Ref Range Status   08/25/2023 36 (L) >=40 mg/dL Final   ]  GFR Estimate   Date Value Ref Range Status   08/25/2023 49 (L) >60 mL/min/1.73m2 Final   04/27/2021 51 (L) >60 mL/min/[1.73_m2] Final     Comment:     Non  GFR Calc  Starting 12/18/2018, serum creatinine based estimated GFR (eGFR) will be " "  calculated using the Chronic Kidney Disease Epidemiology Collaboration   (CKD-EPI) equation.       GFR Estimate If Black   Date Value Ref Range Status   04/27/2021 59 (L) >60 mL/min/[1.73_m2] Final     Comment:      GFR Calc  Starting 12/18/2018, serum creatinine based estimated GFR (eGFR) will be   calculated using the Chronic Kidney Disease Epidemiology Collaboration   (CKD-EPI) equation.       Lab Results   Component Value Date    CR 1.44 08/25/2023    CR 1.34 04/27/2021     No results found for: \"MICROALBUMIN\"    Healthy Eating:  Healthy Eating Assessed Today: Yes  Cultural/Mu-ism diet restrictions?: No  Meal planning/habits: Avoiding sweets, Smaller portions  How many times a week on average do you eat food made away from home (restaurant/take-out)?:  (not too often.)  Meals include: Breakfast, Lunch, Dinner  Breakfast: oatmeal or honey nut cheerios with milk and blueberries  Lunch: leftovers or bagel or sandwich  Dinner: meat, potatoes, vegetable or beef stew - sometimes rice or pasta  Snacks: fruit, vegetables, grapes, beefsticks  Beverages: Coffee, Milk, Juice, Other, Tea (Propel, chocolate milk, green tea)  Has patient met with a dietitian in the past?: Yes    Being Active:  Being Active Assessed Today: Yes  Exercise:: Yes (uses stationary bike once daily 10-30 minutes each time. not walking as much- but hips hurt.)  Days per week of moderate to strenuous exercise (like a brisk walk): 7  On average, minutes per day of exercise at this level: 10 (sometimes 30)  How intense was your typical exercise? : Light (like stretching or slow walking)  Exercise Minutes per Week: 70  Barrier to exercise: Access    Monitoring:  Monitoring Assessed Today: Yes  Did patient bring glucose meter to appointment? : No (sends BG via [employer].)  Blood Glucose Meter: Accu-chek  Times checking blood sugar at home (number): 3  Times checking blood sugar at home (per): Day  Blood glucose trend: No change " (stable.)    Taking Medications:  Diabetes Medication(s)       Insulin       LANTUS SOLOSTAR 100 UNIT/ML soln    ADMINISTER 36 UNITS UNDER THE SKIN AT BEDTIME      Incretin Mimetic Agents       dulaglutide (TRULICITY) 1.5 MG/0.5ML pen    Inject 1.5 mg Subcutaneous every 7 days Do not send to pharmacy            Taking Medication Assessed Today: Yes  Current Treatments: Insulin Injections, Non-insulin Injectables (Trulicity 1.5 mg once weekly on Wednesday. Lantus 36 units daily at bedtime)  Dose schedule: At bedtime  Given by: Patient  Injection/Infusion sites: Abdomen  Problems taking diabetes medications regularly?: No  Diabetes medication side effects?: No    Problem Solving:  Problem Solving Assessed Today: Yes  Is the patient at risk for hypoglycemia?: Yes  Hypoglycemia Frequency: Rarely (feels low at 80.)  Hypoglycemia Treatment: Glucose (tablets or gel)    Hypoglycemia symptoms  Sweats: Yes  Feeling shaky: Yes    Hypoglycemia Complications  Blackouts: No  Hospitalization: No  Nocturnal hypoglycemia: No  Required assistance: No  Required glucagon injection: No  Seizures: No    Reducing Risks:  Reducing Risks Assessed Today: Yes  Diabetes Risks: Age over 45 years  CAD Risks: Diabetes Mellitus, Male sex, Obesity  Has dilated eye exam at least once a year?: No (scheduled- this week.)  Sees dentist every 6 months?: No  Feet checked by healthcare provider in the last year?: Yes (11/21/2022)    Healthy Coping:  Healthy Coping Assessed Today: Yes  Emotional response to diabetes: Ready to learn, Confidence diabetes can be controlled  Informal Support system:: Family  Stage of change: MAINTENANCE (Working to maintain change, with risk of relapse)  Support resources: Other (PAP)  Patient Activation Measure Survey Score:      1/6/2020    10:00 AM 1/10/2020     9:00 AM   ALLEN Score (Last Two)   ALLEN Raw Score 30 30   Activation Score 56 56   ALLEN Level 3 3     Care Plan and Education Provided:  Care Plan: Diabetes   Updates  made by Mikayla Caldera RN since 11/6/2023 12:00 AM        Problem: Diabetes Self-Management Education Needed to Optimize Self-Care Behaviors         Goal: Understand diabetes pathophysiology and disease progression    This Visit's Progress: 100%        Goal: Healthy Eating - follow a healthy eating pattern for diabetes    This Visit's Progress: 90%        Task: Provide education on managing carbohydrate intake (carbohydrate counting, plate planning method, etc.) Completed 11/6/2023   Responsible User: Mikayla Caldera RN        Goal: Being Active - get regular physical activity, working up to at least 150 minutes per week    This Visit's Progress: 100%   Note:    Rides stationary bike once daily, 10 minutes - 30 minutes.   Hips hurt if walks distance.        Goal: Monitoring - monitor glucose and ketones as directed    This Visit's Progress: 100%   Note:    Monitoring 3x daily          Goal: Taking Medication - patient is consistently taking medications as directed    This Visit's Progress: 100%   Note:    Trulicfarhad  Lantus         Goal: Problem Solving - know how to prevent and manage short-term diabetes complications    This Visit's Progress: 90%        Task: Provide education on low blood glucose - causes, signs/symptoms, prevention, treatment, carrying a carbohydrate source at all times, and medical identification Completed 11/6/2023   Responsible User: Mikayla Caldera RN        Task: Provide education on when to call a health care provider Completed 11/6/2023   Responsible User: Mikayla Caldera RN        Goal: Reducing Risks - know how to prevent and treat long-term diabetes complications    This Visit's Progress: On track   Note:    A1C Target <8.0  ADA BP target <130/80  Statin- yes  Asa- yes  Tobacco use- former  Foot exam- due this month  Eye exam - scheduled          Time Spent: 30 minutes  Encounter Type: Individual    Any diabetes medication dose changes were made via the CDE Protocol per the patient's  primary care provider. A copy of this encounter was shared with the provider.    Mikayla Caldera RN Diabetes Educator,  [phone]  11/6/2023 at 7:37 PM

## 2023-11-07 NOTE — PATIENT INSTRUCTIONS
Continue current plan: Trulicity 1.5 mg weekly. Lantus 36 units daily.   A1C 7.2-due end of November with PCP.     PAP application for 2024- completed.       Annual visit or sooner if concerns, questions. Sees Pcp routinely.

## 2023-11-09 ENCOUNTER — TRANSFERRED RECORDS (OUTPATIENT)
Dept: HEALTH INFORMATION MANAGEMENT | Facility: CLINIC | Age: 78
End: 2023-11-09
Payer: COMMERCIAL

## 2023-11-09 LAB — RETINOPATHY: NEGATIVE

## 2023-11-25 DIAGNOSIS — E11.22 TYPE 2 DIABETES MELLITUS WITH STAGE 3B CHRONIC KIDNEY DISEASE, WITH LONG-TERM CURRENT USE OF INSULIN (H): ICD-10-CM

## 2023-11-25 DIAGNOSIS — Z79.4 TYPE 2 DIABETES MELLITUS WITH STAGE 3B CHRONIC KIDNEY DISEASE, WITH LONG-TERM CURRENT USE OF INSULIN (H): ICD-10-CM

## 2023-11-25 DIAGNOSIS — N18.32 TYPE 2 DIABETES MELLITUS WITH STAGE 3B CHRONIC KIDNEY DISEASE, WITH LONG-TERM CURRENT USE OF INSULIN (H): ICD-10-CM

## 2023-11-28 ENCOUNTER — LAB (OUTPATIENT)
Dept: LAB | Facility: OTHER | Age: 78
End: 2023-11-28
Payer: MEDICARE

## 2023-11-28 DIAGNOSIS — I10 BENIGN ESSENTIAL HYPERTENSION: ICD-10-CM

## 2023-11-28 DIAGNOSIS — Z79.4 TYPE 2 DIABETES MELLITUS WITH STAGE 3B CHRONIC KIDNEY DISEASE, WITH LONG-TERM CURRENT USE OF INSULIN (H): ICD-10-CM

## 2023-11-28 DIAGNOSIS — E11.22 TYPE 2 DIABETES MELLITUS WITH STAGE 3B CHRONIC KIDNEY DISEASE, WITH LONG-TERM CURRENT USE OF INSULIN (H): ICD-10-CM

## 2023-11-28 DIAGNOSIS — N18.32 TYPE 2 DIABETES MELLITUS WITH STAGE 3B CHRONIC KIDNEY DISEASE, WITH LONG-TERM CURRENT USE OF INSULIN (H): ICD-10-CM

## 2023-11-28 DIAGNOSIS — E78.2 MIXED HYPERLIPIDEMIA: ICD-10-CM

## 2023-11-28 LAB
ALT SERPL W P-5'-P-CCNC: 34 U/L (ref 0–70)
ANION GAP SERPL CALCULATED.3IONS-SCNC: 11 MMOL/L (ref 7–15)
BUN SERPL-MCNC: 17 MG/DL (ref 8–23)
CALCIUM SERPL-MCNC: 9.6 MG/DL (ref 8.8–10.2)
CHLORIDE SERPL-SCNC: 98 MMOL/L (ref 98–107)
CHOLEST SERPL-MCNC: 97 MG/DL
CREAT SERPL-MCNC: 1.47 MG/DL (ref 0.67–1.17)
DEPRECATED HCO3 PLAS-SCNC: 29 MMOL/L (ref 22–29)
EGFRCR SERPLBLD CKD-EPI 2021: 48 ML/MIN/1.73M2
EST. AVERAGE GLUCOSE BLD GHB EST-MCNC: 166 MG/DL
GLUCOSE SERPL-MCNC: 138 MG/DL (ref 70–99)
HBA1C MFR BLD: 7.4 %
HDLC SERPL-MCNC: 39 MG/DL
LDLC SERPL CALC-MCNC: 24 MG/DL
NONHDLC SERPL-MCNC: 58 MG/DL
POTASSIUM SERPL-SCNC: 3.8 MMOL/L (ref 3.4–5.3)
SODIUM SERPL-SCNC: 138 MMOL/L (ref 135–145)
TRIGL SERPL-MCNC: 169 MG/DL

## 2023-11-28 PROCEDURE — 83036 HEMOGLOBIN GLYCOSYLATED A1C: CPT | Mod: ZL

## 2023-11-28 PROCEDURE — 82947 ASSAY GLUCOSE BLOOD QUANT: CPT | Mod: ZL

## 2023-11-28 PROCEDURE — 80061 LIPID PANEL: CPT | Mod: ZL

## 2023-11-28 PROCEDURE — 84460 ALANINE AMINO (ALT) (SGPT): CPT | Mod: ZL

## 2023-11-28 PROCEDURE — 36415 COLL VENOUS BLD VENIPUNCTURE: CPT | Mod: ZL

## 2023-11-28 PROCEDURE — 82435 ASSAY OF BLOOD CHLORIDE: CPT | Mod: ZL

## 2023-11-28 RX ORDER — FLURBIPROFEN SODIUM 0.3 MG/ML
SOLUTION/ DROPS OPHTHALMIC
Qty: 100 EACH | Refills: 1 | Status: SHIPPED | OUTPATIENT
Start: 2023-11-28 | End: 2023-11-29

## 2023-11-29 ENCOUNTER — MYC MEDICAL ADVICE (OUTPATIENT)
Dept: FAMILY MEDICINE | Facility: OTHER | Age: 78
End: 2023-11-29

## 2023-11-29 DIAGNOSIS — N18.32 TYPE 2 DIABETES MELLITUS WITH STAGE 3B CHRONIC KIDNEY DISEASE, WITH LONG-TERM CURRENT USE OF INSULIN (H): ICD-10-CM

## 2023-11-29 DIAGNOSIS — E11.22 TYPE 2 DIABETES MELLITUS WITH STAGE 3B CHRONIC KIDNEY DISEASE, WITH LONG-TERM CURRENT USE OF INSULIN (H): ICD-10-CM

## 2023-11-29 DIAGNOSIS — Z79.4 TYPE 2 DIABETES MELLITUS WITH STAGE 3B CHRONIC KIDNEY DISEASE, WITH LONG-TERM CURRENT USE OF INSULIN (H): ICD-10-CM

## 2023-11-29 RX ORDER — FLURBIPROFEN SODIUM 0.3 MG/ML
SOLUTION/ DROPS OPHTHALMIC DAILY
Qty: 100 EACH | Refills: 1 | Status: SHIPPED | OUTPATIENT
Start: 2023-11-29 | End: 2024-06-14

## 2023-11-30 ENCOUNTER — TELEPHONE (OUTPATIENT)
Dept: FAMILY MEDICINE | Facility: OTHER | Age: 78
End: 2023-11-30

## 2023-11-30 NOTE — TELEPHONE ENCOUNTER
8:27 AM    Reason for Call: Phone Call    Description: Patient called in to cancel/reschedule his appointment as he believes he has Covid as he got sick last night and everyone else in his house is sick. Patient's wife asked if one throws up when they have Covid and to what extent. He would like for Dr Felix's nurse to call him back.     Was an appointment offered for this call? No  If yes : Appointment type              Date    Preferred method for responding to this message: Telephone Call  What is your phone number ? [phone]     If we cannot reach you directly, may we leave a detailed response at the number you provided? Yes    Can this message wait until your PCP/provider returns, if available today? Not applicable, provider in clinic    Tayler West

## 2023-12-06 NOTE — PROGRESS NOTES
"  Assessment & Plan     1. Type 2 diabetes mellitus with stage 3b chronic kidney disease, with long-term current use of insulin (H)  No changes to current medications, patient continues to work with Diabetes Education.  Follow-up in three months, future lab orders entered.  - RI FOOT EXAM NO CHARGE  - Hemoglobin A1c; Future    2. Mixed hyperlipidemia  As above  - ALT; Future  - Lipid Profile; Future    3. Benign essential hypertension  As above  - Basic metabolic panel; Future    4. Chronic atrial fibrillation (H)  No changes    5. Congestive heart failure, unspecified HF chronicity, unspecified heart failure type (H)  No changes    6. ASCVD (arteriosclerotic cardiovascular disease)  No changes    7. Stage 3b chronic kidney disease (H)  No changes        BMI:   Estimated body mass index is 33.8 kg/m  as calculated from the following:    Height as of 11/6/23: 1.651 m (5' 5\").    Weight as of this encounter: 92.1 kg (203 lb 1.6 oz).     Return in about 3 months (around 3/7/2024) for Diabetic Follow-up, Chronic Disease Management, Medication review.    Lorelei Felix MD  [facility] [place] ALINA Son is a 78 year old, presenting for the following health issues:  Chronic Disease Management      HPI     Diabetes Follow-up    How often are you checking your blood sugar? Three times daily  Blood sugar testing frequency justification:  Adjustment of medication(s)  What time of day are you checking your blood sugars (select all that apply)?   Fasting am. Before dinner and bedtime  Have you had any blood sugars above 200?  Yes   Have you had any blood sugars below 70?  No  What symptoms do you notice when your blood sugar is low?  Shaky  What concerns do you have today about your diabetes? None   Do you have any of these symptoms? (Select all that apply)  No numbness or tingling in feet.  No redness, sores or blisters on feet.  No complaints of excessive thirst.  No reports of blurry vision.  No " significant changes to weight.      BP Readings from Last 2 Encounters:   12/07/23 138/68   11/06/23 122/77     Hemoglobin A1C (%)   Date Value   11/28/2023 7.4 (H)   08/25/2023 7.2 (H)   04/27/2021 9.7 (H)   01/26/2021 9.4 (H)     LDL Cholesterol Calculated (mg/dL)   Date Value   11/28/2023 24   08/25/2023 26   04/27/2021 12   01/26/2021 26             Hyperlipidemia Follow-Up    Are you regularly taking any medication or supplement to lower your cholesterol?   Yes- rosuvastatin 5 mg daily  Are you having muscle aches or other side effects that you think could be caused by your cholesterol lowering medication?  No    Hypertension Follow-up    Do you check your blood pressure regularly outside of the clinic? Yes   Are you following a low salt diet? Yes  Are your blood pressures ever more than 140 on the top number (systolic) OR more   than 90 on the bottom number (diastolic), for example 140/90? No    Atrial Fibrillation Follow-up    Symptoms: no recent chest pain, significant palpitations, dizziness/lightheadedness, dyspnea, or increased peripheral edema.  Stroke prevention: Coumadin (Warfarin)        2/24/2023    10:15 AM 5/26/2023     9:12 AM 8/28/2023     9:27 AM 11/6/2023     8:49 AM 12/7/2023     9:19 AM   Date   Pulse 76 72 68 68 68       Heart Failure Follow-up   Are you experiencing any shortness of breath? No  Are you experiencing any swelling in your legs or feet?  No  Are you using more pillows than usual? No  Do you cough at night?  Yes, sometimes  Do you check your weight daily?  No  Have you had a weight change recently?  No  Are you having any of the following side effects from your medications? (Select all that apply)  The patient does not report symptoms of dizziness, fatigue, cough, swelling, or slow heart beat.  Since your last visit, how many times have you gone to the cardiologist, urgent care, emergency room, or hospital because of your heart failure?   None  Last Echo: No results  found.      Chronic Kidney Disease Follow-up  Do you take any over the counter pain medicine?: Yes  What over the counter medicine are you taking for your pain?:  tylenol    How often do you take this medicine?:  A few times a month        Review of Systems   Constitutional, HEENT, cardiovascular, pulmonary, gi and gu systems are negative, except as otherwise noted.      Objective    /68 (BP Location: Right arm, Patient Position: Sitting, Cuff Size: Adult Regular)   Pulse 68   Temp 97.9  F (36.6  C) (Tympanic)   Resp 18   Wt 92.1 kg (203 lb 1.6 oz)   SpO2 97%   BMI 33.80 kg/m    Body mass index is 33.8 kg/m .  Physical Exam   GENERAL: healthy, alert and no distress  PSYCH: mentation appears normal, affect normal/bright  Diabetic foot exam: normal sensory exam, DP reduced bilateral, PT reduced bilateral, venous stasis dermatitis noted, and dependent rubor present    Lab on 11/28/2023   Component Date Value Ref Range Status    ALT 11/28/2023 34  0 - 70 U/L Final    Reference intervals for this test were updated on 6/12/2023 to more accurately reflect our healthy population. There may be differences in the flagging of prior results with similar values performed with this method. Interpretation of those prior results can be made in the context of the updated reference intervals.      Sodium 11/28/2023 138  135 - 145 mmol/L Final    Reference intervals for this test were updated on 09/26/2023 to more accurately reflect our healthy population. There may be differences in the flagging of prior results with similar values performed with this method. Interpretation of those prior results can be made in the context of the updated reference intervals.     Potassium 11/28/2023 3.8  3.4 - 5.3 mmol/L Final    Chloride 11/28/2023 98  98 - 107 mmol/L Final    Carbon Dioxide (CO2) 11/28/2023 29  22 - 29 mmol/L Final    Anion Gap 11/28/2023 11  7 - 15 mmol/L Final    Urea Nitrogen 11/28/2023 17.0  8.0 - 23.0 mg/dL Final     Creatinine 11/28/2023 1.47 (H)  0.67 - 1.17 mg/dL Final    GFR Estimate 11/28/2023 48 (L)  >60 mL/min/1.73m2 Final    Calcium 11/28/2023 9.6  8.8 - 10.2 mg/dL Final    Glucose 11/28/2023 138 (H)  70 - 99 mg/dL Final    Estimated Average Glucose 11/28/2023 166  mg/dL Final    Hemoglobin A1C 11/28/2023 7.4 (H)  <5.7 % Final    Normal <5.7%   Prediabetes 5.7-6.4%    Diabetes 6.5% or higher     Note: Adopted from ADA consensus guidelines.    Cholesterol 11/28/2023 97  <200 mg/dL Final    Triglycerides 11/28/2023 169 (H)  <150 mg/dL Final    Direct Measure HDL 11/28/2023 39 (L)  >=40 mg/dL Final    LDL Cholesterol Calculated 11/28/2023 24  <=100 mg/dL Final    Non HDL Cholesterol 11/28/2023 58  <130 mg/dL Final

## 2023-12-07 ENCOUNTER — OFFICE VISIT (OUTPATIENT)
Dept: FAMILY MEDICINE | Facility: OTHER | Age: 78
End: 2023-12-07
Attending: FAMILY MEDICINE
Payer: COMMERCIAL

## 2023-12-07 VITALS
OXYGEN SATURATION: 97 % | WEIGHT: 203.1 LBS | TEMPERATURE: 97.9 F | BODY MASS INDEX: 33.8 KG/M2 | HEART RATE: 68 BPM | RESPIRATION RATE: 18 BRPM | DIASTOLIC BLOOD PRESSURE: 68 MMHG | SYSTOLIC BLOOD PRESSURE: 138 MMHG

## 2023-12-07 DIAGNOSIS — Z79.4 TYPE 2 DIABETES MELLITUS WITH STAGE 3B CHRONIC KIDNEY DISEASE, WITH LONG-TERM CURRENT USE OF INSULIN (H): Primary | ICD-10-CM

## 2023-12-07 DIAGNOSIS — E78.2 MIXED HYPERLIPIDEMIA: ICD-10-CM

## 2023-12-07 DIAGNOSIS — N18.32 TYPE 2 DIABETES MELLITUS WITH STAGE 3B CHRONIC KIDNEY DISEASE, WITH LONG-TERM CURRENT USE OF INSULIN (H): Primary | ICD-10-CM

## 2023-12-07 DIAGNOSIS — I25.10 ASCVD (ARTERIOSCLEROTIC CARDIOVASCULAR DISEASE): ICD-10-CM

## 2023-12-07 DIAGNOSIS — I48.20 CHRONIC ATRIAL FIBRILLATION (H): ICD-10-CM

## 2023-12-07 DIAGNOSIS — N18.32 STAGE 3B CHRONIC KIDNEY DISEASE (H): ICD-10-CM

## 2023-12-07 DIAGNOSIS — I50.9 CONGESTIVE HEART FAILURE, UNSPECIFIED HF CHRONICITY, UNSPECIFIED HEART FAILURE TYPE (H): ICD-10-CM

## 2023-12-07 DIAGNOSIS — I10 BENIGN ESSENTIAL HYPERTENSION: ICD-10-CM

## 2023-12-07 DIAGNOSIS — E11.22 TYPE 2 DIABETES MELLITUS WITH STAGE 3B CHRONIC KIDNEY DISEASE, WITH LONG-TERM CURRENT USE OF INSULIN (H): Primary | ICD-10-CM

## 2023-12-07 PROCEDURE — G0463 HOSPITAL OUTPT CLINIC VISIT: HCPCS

## 2023-12-07 PROCEDURE — 99214 OFFICE O/P EST MOD 30 MIN: CPT | Performed by: FAMILY MEDICINE

## 2023-12-07 ASSESSMENT — PAIN SCALES - GENERAL: PAINLEVEL: NO PAIN (0)

## 2023-12-12 ENCOUNTER — LAB (OUTPATIENT)
Dept: LAB | Facility: OTHER | Age: 78
End: 2023-12-12
Payer: MEDICARE

## 2023-12-12 ENCOUNTER — ANTICOAGULATION THERAPY VISIT (OUTPATIENT)
Dept: ANTICOAGULATION | Facility: OTHER | Age: 78
End: 2023-12-12
Attending: FAMILY MEDICINE
Payer: MEDICARE

## 2023-12-12 DIAGNOSIS — I48.20 CHRONIC ATRIAL FIBRILLATION (H): ICD-10-CM

## 2023-12-12 DIAGNOSIS — I26.99 PULMONARY EMBOLISM AND INFARCTION (H): Primary | ICD-10-CM

## 2023-12-12 DIAGNOSIS — Z79.01 LONG TERM CURRENT USE OF ANTICOAGULANT THERAPY: ICD-10-CM

## 2023-12-12 DIAGNOSIS — I26.99 PULMONARY EMBOLISM AND INFARCTION (H): ICD-10-CM

## 2023-12-12 LAB — INR BLD: 2.3 (ref 0.9–1.1)

## 2023-12-12 PROCEDURE — 36416 COLLJ CAPILLARY BLOOD SPEC: CPT | Mod: ZL

## 2023-12-12 PROCEDURE — 85610 PROTHROMBIN TIME: CPT | Mod: ZL

## 2023-12-12 NOTE — PROGRESS NOTES
ANTICOAGULATION MANAGEMENT     Clement Voss 78 year old male is on warfarin with therapeutic INR result. (Goal INR 2.0-3.0)    Recent labs: (last 7 days)     12/12/23  0930   INR 2.3*       ASSESSMENT     Source(s): Chart Review and Patient/Caregiver Call     Warfarin doses taken: Warfarin taken as instructed  Diet: No new diet changes identified  Medication/supplement changes: None noted  New illness, injury, or hospitalization: No  Signs or symptoms of bleeding or clotting: No  Previous result: Therapeutic last 2(+) visits  Additional findings: None       PLAN     Recommended plan for no diet, medication or health factor changes affecting INR     Dosing Instructions: Continue your current warfarin dose with next INR in 6 weeks       Summary  As of 12/12/2023      Full warfarin instructions:  2.5 mg every Mon, Wed, Fri; 5 mg all other days   Next INR check:  1/23/2024               Telephone call with Huy who verbalizes understanding and agrees to plan    Lab visit scheduled    Education provided:   None required    Plan made per [facility] anticoagulation protocol    Latosha Paris RN  Anticoagulation Clinic  12/12/2023    _______________________________________________________________________     Anticoagulation Episode Summary       Current INR goal:  2.0-3.0   TTR:  88.4% (1 y)   Target end date:  Indefinite   Send INR reminders to:  [facility] [place]    Indications    Pulmonary embolism and infarction (H) [I26.99]  Long-term (current) use of anticoagulants [Z79.01] [Z79.01]             Comments:  Lab in [facility] Arrives home in the afternoon after 2 PM             Anticoagulation Care Providers       Provider Role Specialty Phone number    Lorelei Felix MD Referring Family Medicine [phone]

## 2024-01-23 ENCOUNTER — ANTICOAGULATION THERAPY VISIT (OUTPATIENT)
Dept: ANTICOAGULATION | Facility: OTHER | Age: 79
End: 2024-01-23
Payer: MEDICARE

## 2024-01-23 ENCOUNTER — LAB (OUTPATIENT)
Dept: LAB | Facility: OTHER | Age: 79
End: 2024-01-23
Payer: MEDICARE

## 2024-01-23 DIAGNOSIS — Z79.01 LONG TERM CURRENT USE OF ANTICOAGULANT THERAPY: ICD-10-CM

## 2024-01-23 DIAGNOSIS — I48.20 CHRONIC ATRIAL FIBRILLATION (H): ICD-10-CM

## 2024-01-23 DIAGNOSIS — I26.99 PULMONARY EMBOLISM AND INFARCTION (H): Primary | ICD-10-CM

## 2024-01-23 DIAGNOSIS — I26.99 PULMONARY EMBOLISM AND INFARCTION (H): ICD-10-CM

## 2024-01-23 LAB — INR BLD: 2.2 (ref 0.9–1.1)

## 2024-01-23 PROCEDURE — 85610 PROTHROMBIN TIME: CPT | Mod: ZL

## 2024-01-23 PROCEDURE — 36416 COLLJ CAPILLARY BLOOD SPEC: CPT | Mod: ZL

## 2024-01-23 NOTE — PROGRESS NOTES
ANTICOAGULATION MANAGEMENT     Clement Voss 78 year old male is on warfarin with therapeutic INR result. (Goal INR 2.0-3.0)    Recent labs: (last 7 days)     01/23/24  0932   INR 2.2*       ASSESSMENT     Source(s): Patient/ Care giver call     Warfarin doses taken: Warfarin taken as instructed  Diet: No new diet changes identified  New illness, injury, or hospitalization: No  Medication/supplement changes: None noted  Signs or symptoms of bleeding or clotting: No  Previous INR: Therapeutic last 2(+) visits  Additional findings: Upcoming surgery/procedure 3/1/24-prostate biopsy-patient verbalized that he will contact the urologist that is doing the procedure in [place] and ask him if he would like him to hold his warfarin for the procedure. Patient in agreement to call us back when he gets an answer     PLAN     Recommended plan for no diet, medication or health factor changes affecting INR     Dosing Instructions: Continue your current warfarin dose with next INR in 6 weeks       Summary  As of 1/23/2024      Full warfarin instructions:  2.5 mg every Mon, Wed, Fri; 5 mg all other days   Next INR check:  3/5/2024               Telephone call with Huy who verbalizes understanding and agrees to plan    Patient elected to schedule next visit 3/8/24    Education provided: Please call back if any changes to your diet, medications or how you've been taking warfarin    Plan made per [facility] anticoagulation protocol    Corry Avelar RN  Anticoagulation Clinic  1/23/2024    _______________________________________________________________________     Anticoagulation Episode Summary       Current INR goal:  2.0-3.0   TTR:  88.4% (1 y)   Target end date:  Indefinite   Send INR reminders to:  [facility] [place]    Indications    Pulmonary embolism and infarction (H) [I26.99]  Long-term (current) use of anticoagulants [Z79.01] [Z79.01]             Comments:  Lab in [facility] Arrives home in the afternoon after 2 PM              Anticoagulation Care Providers       Provider Role Specialty Phone number    Lorelei Felix MD Referring Family Medicine [phone]

## 2024-02-09 DIAGNOSIS — E11.22 TYPE 2 DIABETES MELLITUS WITH STAGE 3B CHRONIC KIDNEY DISEASE, WITH LONG-TERM CURRENT USE OF INSULIN (H): ICD-10-CM

## 2024-02-09 DIAGNOSIS — I10 BENIGN ESSENTIAL HYPERTENSION: ICD-10-CM

## 2024-02-09 DIAGNOSIS — N18.32 TYPE 2 DIABETES MELLITUS WITH STAGE 3B CHRONIC KIDNEY DISEASE, WITH LONG-TERM CURRENT USE OF INSULIN (H): ICD-10-CM

## 2024-02-09 DIAGNOSIS — E78.2 MIXED HYPERLIPIDEMIA: ICD-10-CM

## 2024-02-09 DIAGNOSIS — Z79.4 TYPE 2 DIABETES MELLITUS WITH STAGE 3B CHRONIC KIDNEY DISEASE, WITH LONG-TERM CURRENT USE OF INSULIN (H): ICD-10-CM

## 2024-02-09 RX ORDER — METOPROLOL TARTRATE 25 MG/1
TABLET, FILM COATED ORAL
Qty: 360 TABLET | Refills: 2 | Status: SHIPPED | OUTPATIENT
Start: 2024-02-09

## 2024-02-09 RX ORDER — ROSUVASTATIN CALCIUM 5 MG/1
5 TABLET, COATED ORAL DAILY
Qty: 90 TABLET | Refills: 2 | Status: SHIPPED | OUTPATIENT
Start: 2024-02-09

## 2024-02-09 RX ORDER — INSULIN GLARGINE 100 [IU]/ML
INJECTION, SOLUTION SUBCUTANEOUS
Qty: 30 ML | Refills: 1 | Status: SHIPPED | OUTPATIENT
Start: 2024-02-09 | End: 2024-07-15

## 2024-02-09 NOTE — TELEPHONE ENCOUNTER
Crestor  Last Written Prescription Date: 2/24/23  Last Fill Quantity: 90 # of Refills: 3  Last Office Visit: 12/7/23    Metoprolol  Last Written Prescription Date: 2/24/23  Last Fill Quantity: 360 # of Refills: 3  Last Office Visit: 12/7/23    Lantus  Last Written Prescription Date: 9/8/23  Last Fill Quantity: 30 ml # of Refills: 1  Last Office Visit: 12/7/23

## 2024-02-09 NOTE — TELEPHONE ENCOUNTER
Lantus     Insulin Protocol [id number]/09/2024 08:06 AM   Protocol Details Has GFR on file in past 12 months and most recent value is normal    Chart Review Required       GFR Estimate   Date Value Ref Range Status   11/28/2023 48 (L) >60 mL/min/1.73m2 Final   04/27/2021 51 (L) >60 mL/min/[1.73_m2] Final     Comment:     Non  GFR Calc  Starting 12/18/2018, serum creatinine based estimated GFR (eGFR) will be   calculated using the Chronic Kidney Disease Epidemiology Collaboration   (CKD-EPI) equation.       GFR Estimate If Black   Date Value Ref Range Status   04/27/2021 59 (L) >60 mL/min/[1.73_m2] Final     Comment:      GFR Calc  Starting 12/18/2018, serum creatinine based estimated GFR (eGFR) will be   calculated using the Chronic Kidney Disease Epidemiology Collaboration   (CKD-EPI) equation.

## 2024-03-08 ENCOUNTER — ANTICOAGULATION THERAPY VISIT (OUTPATIENT)
Dept: ANTICOAGULATION | Facility: OTHER | Age: 79
End: 2024-03-08
Attending: FAMILY MEDICINE
Payer: COMMERCIAL

## 2024-03-08 ENCOUNTER — LAB (OUTPATIENT)
Dept: LAB | Facility: OTHER | Age: 79
End: 2024-03-08
Payer: MEDICARE

## 2024-03-08 DIAGNOSIS — N18.32 TYPE 2 DIABETES MELLITUS WITH STAGE 3B CHRONIC KIDNEY DISEASE, WITH LONG-TERM CURRENT USE OF INSULIN (H): ICD-10-CM

## 2024-03-08 DIAGNOSIS — E11.22 TYPE 2 DIABETES MELLITUS WITH STAGE 3B CHRONIC KIDNEY DISEASE, WITH LONG-TERM CURRENT USE OF INSULIN (H): ICD-10-CM

## 2024-03-08 DIAGNOSIS — Z79.01 LONG TERM CURRENT USE OF ANTICOAGULANT THERAPY: ICD-10-CM

## 2024-03-08 DIAGNOSIS — I26.99 PULMONARY EMBOLISM AND INFARCTION (H): ICD-10-CM

## 2024-03-08 DIAGNOSIS — I10 BENIGN ESSENTIAL HYPERTENSION: ICD-10-CM

## 2024-03-08 DIAGNOSIS — I48.20 CHRONIC ATRIAL FIBRILLATION (H): ICD-10-CM

## 2024-03-08 DIAGNOSIS — Z79.4 TYPE 2 DIABETES MELLITUS WITH STAGE 3B CHRONIC KIDNEY DISEASE, WITH LONG-TERM CURRENT USE OF INSULIN (H): ICD-10-CM

## 2024-03-08 DIAGNOSIS — I26.99 PULMONARY EMBOLISM AND INFARCTION (H): Primary | ICD-10-CM

## 2024-03-08 DIAGNOSIS — E78.2 MIXED HYPERLIPIDEMIA: ICD-10-CM

## 2024-03-08 LAB
ALT SERPL W P-5'-P-CCNC: 47 U/L (ref 0–70)
ANION GAP SERPL CALCULATED.3IONS-SCNC: 10 MMOL/L (ref 7–15)
BUN SERPL-MCNC: 16.9 MG/DL (ref 8–23)
CALCIUM SERPL-MCNC: 9.7 MG/DL (ref 8.8–10.2)
CHLORIDE SERPL-SCNC: 97 MMOL/L (ref 98–107)
CHOLEST SERPL-MCNC: 88 MG/DL
CREAT SERPL-MCNC: 1.43 MG/DL (ref 0.67–1.17)
DEPRECATED HCO3 PLAS-SCNC: 31 MMOL/L (ref 22–29)
EGFRCR SERPLBLD CKD-EPI 2021: 50 ML/MIN/1.73M2
EST. AVERAGE GLUCOSE BLD GHB EST-MCNC: 166 MG/DL
FASTING STATUS PATIENT QL REPORTED: YES
GLUCOSE SERPL-MCNC: 116 MG/DL (ref 70–99)
HBA1C MFR BLD: 7.4 %
HDLC SERPL-MCNC: 38 MG/DL
INR BLD: 1 (ref 0.9–1.1)
LDLC SERPL CALC-MCNC: 17 MG/DL
NONHDLC SERPL-MCNC: 50 MG/DL
POTASSIUM SERPL-SCNC: 3.9 MMOL/L (ref 3.4–5.3)
SODIUM SERPL-SCNC: 138 MMOL/L (ref 135–145)
TRIGL SERPL-MCNC: 164 MG/DL

## 2024-03-08 PROCEDURE — 84460 ALANINE AMINO (ALT) (SGPT): CPT | Mod: ZL

## 2024-03-08 PROCEDURE — 85610 PROTHROMBIN TIME: CPT | Mod: ZL

## 2024-03-08 PROCEDURE — 36415 COLL VENOUS BLD VENIPUNCTURE: CPT | Mod: ZL

## 2024-03-08 PROCEDURE — 83036 HEMOGLOBIN GLYCOSYLATED A1C: CPT | Mod: ZL

## 2024-03-08 PROCEDURE — 80061 LIPID PANEL: CPT | Mod: ZL

## 2024-03-08 PROCEDURE — 80048 BASIC METABOLIC PNL TOTAL CA: CPT | Mod: ZL

## 2024-03-08 NOTE — PROGRESS NOTES
ANTICOAGULATION MANAGEMENT     Clement Voss 78 year old male is on warfarin with subtherapeutic INR result. (Goal INR 2.0-3.0)    Recent labs: (last 7 days)     03/08/24  0931   INR 1.0       ASSESSMENT     Source(s): Chart Review and Patient/Caregiver Call     Warfarin doses taken: Held for biopsy on 3/1 patient did not resume after procedure  recently which may be affecting INR  Diet: No new diet changes identified  Medication/supplement changes: None noted  New illness, injury, or hospitalization: No  Signs or symptoms of bleeding or clotting: No  Previous result: Subtherapeutic  Additional findings: None       PLAN     Recommended plan for temporary change(s) affecting INR     Dosing Instructions: booster dose then continue your current warfarin dose with next INR in 1 week       Summary  As of 3/8/2024      Full warfarin instructions:  3/8: 7.5 mg; Otherwise 2.5 mg every Mon, Wed, Fri; 5 mg all other days   Next INR check:  3/18/2024               Telephone call with Huy who verbalizes understanding and agrees to plan    Lab visit scheduled    Education provided:   None required    Plan made per [facility] anticoagulation protocol    Latosha Paris, RN  Anticoagulation Clinic  3/8/2024    _______________________________________________________________________     Anticoagulation Episode Summary       Current INR goal:  2.0-3.0   TTR:  78.1% (1 y)   Target end date:  Indefinite   Send INR reminders to:  [facility]    Indications    Pulmonary embolism and infarction (H) [I26.99]  Long-term (current) use of anticoagulants [Z79.01] [Z79.01]             Comments:  Lab in [place]. Arrives home in the afternoon after 2 PM             Anticoagulation Care Providers       Provider Role Specialty Phone number    Lorelei Felix MD Referring Family Medicine [phone]

## 2024-03-11 DIAGNOSIS — Z79.01 LONG TERM CURRENT USE OF ANTICOAGULANT THERAPY: ICD-10-CM

## 2024-03-11 DIAGNOSIS — I26.99 PULMONARY EMBOLISM AND INFARCTION (H): ICD-10-CM

## 2024-03-11 NOTE — TELEPHONE ENCOUNTER
WARFARIN SOD 2.5MG TABLETS (GREEN)       Last Written Prescription Date:  10/27/20  Last Fill Quantity: 90 ,   # refills: 3  Last Office Visit: 12/7/23  Future Office visit:    Next 5 appointments (look out 90 days)      Mar 18, 2024 10:00 AM  (Arrive by 9:45 AM)  SHORT with Lorelei Felix MD  [facility] ([facility] ) [address]  [facility] [address]  [phone]        Routing refill request to provider for review/approval because:  Vitamin K Antagonists [id number]/11/2024 03:18 PM   Protocol Details INR is within goal in the past 6 weeks    Always fail criteria - route to anticoagulation team     INR   Date Value Ref Range Status   03/08/2024 1.0 0.9 - 1.1 Final   08/19/2021 2.20 (H) 0.85 - 1.15 Final     Comment:     Effective 7/11/2021, the reference range for this assay has changed.   07/02/2020 1.5 (A) 0.90 - 1.10 Final     INR Point of Care   Date Value Ref Range Status   06/08/2021 2.3 (H) 0.86 - 1.14 Final     Comment:     This test is intended for monitoring Coumadin therapy.  Results are not   accurate in patients with prolonged INR due to factor deficiency.

## 2024-03-12 RX ORDER — WARFARIN SODIUM 2.5 MG/1
TABLET ORAL
Qty: 180 TABLET | Refills: 1 | Status: SHIPPED | OUTPATIENT
Start: 2024-03-12

## 2024-03-12 NOTE — PROGRESS NOTES
"  Assessment & Plan     1. Type 2 diabetes mellitus with stage 3b chronic kidney disease, with long-term current use of insulin (H)  With known CAD/CHF, will add Farxiga.  Future lab orders placed, no changes to other medications.  Follow-up in three months, sooner as needed.  - dapagliflozin (FARXIGA) 10 MG TABS tablet; Take 1 tablet (10 mg) by mouth daily  Dispense: 90 tablet; Refill: 1  - Albumin Random Urine Quantitative with Creat Ratio; Future  - Hemoglobin A1c; Future  - TSH with free T4 reflex; Future    2. Mixed hyperlipidemia  No changes, follow-up in three months.  - ALT; Future  - Lipid Profile; Future    3. Benign essential hypertension  As above  - Basic metabolic panel; Future    4. Stage 3b chronic kidney disease (H)  No changes, will add Farxiga as above    5. Chronic atrial fibrillation (H)  No changes, monitor INR    6. ASCVD (arteriosclerotic cardiovascular disease)  Keep Cardiology appointment in June 7. Congestive heart failure, unspecified HF chronicity, unspecified heart failure type (H)  As above  - dapagliflozin (FARXIGA) 10 MG TABS tablet; Take 1 tablet (10 mg) by mouth daily  Dispense: 90 tablet; Refill: 1    8. Pulmonary embolism and infarction (H)  Monitor INR    9. Morbid obesity (H)  Diet and activity discussed       The longitudinal plan of care for the diagnosis(es)/condition(s) as documented were addressed during this visit. Due to the added complexity in care, I will continue to support Huy in the subsequent management and with ongoing continuity of care.       BMI  Estimated body mass index is 33.78 kg/m  as calculated from the following:    Height as of 11/6/23: 1.651 m (5' 5\").    Weight as of this encounter: 92.1 kg (203 lb).   Weight management plan: Discussed healthy diet and exercise guidelines      Return in about 3 months (around 6/18/2024) for Diabetic Follow-up, Chronic Disease Management, Medication review.      Subjective   Hyu is a 78 year old, presenting for " the following health issues:  Chronic Disease Management    HPI     Diabetes Follow-up    How often are you checking your blood sugar? Two times daily  Blood sugar testing frequency justification:  Adjustment of medication(s)  What time of day are you checking your blood sugars (select all that apply)?  Before and after meals  Have you had any blood sugars above 200?  Yes 399  Have you had any blood sugars below 70?  No  What symptoms do you notice when your blood sugar is low?  Shaky  What concerns do you have today about your diabetes? None   Do you have any of these symptoms? (Select all that apply)  No numbness or tingling in feet.  No redness, sores or blisters on feet.  No complaints of excessive thirst.  No reports of blurry vision.  No significant changes to weight.          Hyperlipidemia Follow-Up    Are you regularly taking any medication or supplement to lower your cholesterol?   Yes- Crestor  Are you having muscle aches or other side effects that you think could be caused by your cholesterol lowering medication?  No    Hypertension Follow-up    Do you check your blood pressure regularly outside of the clinic? Yes   Are you following a low salt diet? Yes  Are your blood pressures ever more than 140 on the top number (systolic) OR more   than 90 on the bottom number (diastolic), for example 140/90? No    BP Readings from Last 2 Encounters:   03/18/24 (!) 146/76   12/07/23 138/68     Hemoglobin A1C (%)   Date Value   03/08/2024 7.4 (H)   11/28/2023 7.4 (H)   04/27/2021 9.7 (H)   01/26/2021 9.4 (H)     LDL Cholesterol Calculated (mg/dL)   Date Value   03/08/2024 17   11/28/2023 24   04/27/2021 12   01/26/2021 26         Chronic Kidney Disease Follow-up    Do you take any over the counter pain medicine?: Yes  What over the counter medicine are you taking for your pain?:  Tylenol     How often do you take this medicine?:  A few times a week    Atrial Fibrillation Follow-up    Symptoms: no recent chest pain,  significant palpitations, dizziness/lightheadedness, dyspnea, or increased peripheral edema.  Stroke prevention: Coumadin (Warfarin)        5/26/2023     9:12 AM 8/28/2023     9:27 AM 11/6/2023     8:49 AM 12/7/2023     9:19 AM 3/18/2024     9:28 AM   Date   Pulse 72 68 68 68 67       Vascular Disease Follow-up    How often do you take nitroglycerin? Never  Do you take an aspirin every day? Yes    Heart Failure Follow-up   Are you experiencing any shortness of breath? No  Are you experiencing any swelling in your legs or feet?  No  Are you using more pillows than usual? No  Do you cough at night?  No  Do you check your weight daily?  Yes  Have you had a weight change recently?  No  Are you having any of the following side effects from your medications? (Select all that apply)  The patient does not report symptoms of dizziness, fatigue, cough, swelling, or slow heart beat.  Since your last visit, how many times have you gone to the cardiologist, urgent care, emergency room, or hospital because of your heart failure?   None  Last Echo: No results found.  Patient does have Cardiology follow-up in June          Review of Systems  Constitutional, HEENT, cardiovascular, pulmonary, gi and gu systems are negative, except as otherwise noted.      Objective    BP (!) 146/76 (BP Location: Left arm, Patient Position: Sitting, Cuff Size: Adult Large)   Pulse 67   Temp 96.8  F (36  C) (Tympanic)   Resp 16   Wt 92.1 kg (203 lb)   SpO2 99%   BMI 33.78 kg/m    Body mass index is 33.78 kg/m .  Physical Exam   GENERAL: alert and no distress  PSYCH: mentation appears normal, affect normal/bright    Lab on 03/18/2024   Component Date Value Ref Range Status    INR 03/18/2024 1.8 (H)  0.9 - 1.1 Final           Signed Electronically by: Lorelei Felix MD

## 2024-03-17 ENCOUNTER — MYC MEDICAL ADVICE (OUTPATIENT)
Dept: FAMILY MEDICINE | Facility: OTHER | Age: 79
End: 2024-03-17

## 2024-03-18 ENCOUNTER — ANTICOAGULATION THERAPY VISIT (OUTPATIENT)
Dept: ANTICOAGULATION | Facility: OTHER | Age: 79
End: 2024-03-18
Payer: COMMERCIAL

## 2024-03-18 ENCOUNTER — TELEPHONE (OUTPATIENT)
Dept: FAMILY MEDICINE | Facility: OTHER | Age: 79
End: 2024-03-18

## 2024-03-18 ENCOUNTER — LAB (OUTPATIENT)
Dept: LAB | Facility: OTHER | Age: 79
End: 2024-03-18
Attending: FAMILY MEDICINE
Payer: COMMERCIAL

## 2024-03-18 ENCOUNTER — OFFICE VISIT (OUTPATIENT)
Dept: FAMILY MEDICINE | Facility: OTHER | Age: 79
End: 2024-03-18
Attending: FAMILY MEDICINE
Payer: MEDICARE

## 2024-03-18 VITALS
BODY MASS INDEX: 33.78 KG/M2 | TEMPERATURE: 96.8 F | HEART RATE: 67 BPM | DIASTOLIC BLOOD PRESSURE: 76 MMHG | OXYGEN SATURATION: 99 % | WEIGHT: 203 LBS | RESPIRATION RATE: 16 BRPM | SYSTOLIC BLOOD PRESSURE: 146 MMHG

## 2024-03-18 DIAGNOSIS — N18.32 STAGE 3B CHRONIC KIDNEY DISEASE (H): ICD-10-CM

## 2024-03-18 DIAGNOSIS — I26.99 PULMONARY EMBOLISM AND INFARCTION (H): ICD-10-CM

## 2024-03-18 DIAGNOSIS — I25.10 ASCVD (ARTERIOSCLEROTIC CARDIOVASCULAR DISEASE): ICD-10-CM

## 2024-03-18 DIAGNOSIS — E78.2 MIXED HYPERLIPIDEMIA: ICD-10-CM

## 2024-03-18 DIAGNOSIS — I48.20 CHRONIC ATRIAL FIBRILLATION (H): ICD-10-CM

## 2024-03-18 DIAGNOSIS — E66.01 MORBID OBESITY (H): ICD-10-CM

## 2024-03-18 DIAGNOSIS — E11.22 TYPE 2 DIABETES MELLITUS WITH STAGE 3B CHRONIC KIDNEY DISEASE, WITH LONG-TERM CURRENT USE OF INSULIN (H): Primary | ICD-10-CM

## 2024-03-18 DIAGNOSIS — I26.99 PULMONARY EMBOLISM AND INFARCTION (H): Primary | ICD-10-CM

## 2024-03-18 DIAGNOSIS — I10 BENIGN ESSENTIAL HYPERTENSION: ICD-10-CM

## 2024-03-18 DIAGNOSIS — Z79.4 TYPE 2 DIABETES MELLITUS WITH STAGE 3B CHRONIC KIDNEY DISEASE, WITH LONG-TERM CURRENT USE OF INSULIN (H): Primary | ICD-10-CM

## 2024-03-18 DIAGNOSIS — N18.32 TYPE 2 DIABETES MELLITUS WITH STAGE 3B CHRONIC KIDNEY DISEASE, WITH LONG-TERM CURRENT USE OF INSULIN (H): Primary | ICD-10-CM

## 2024-03-18 DIAGNOSIS — Z79.01 LONG TERM CURRENT USE OF ANTICOAGULANT THERAPY: ICD-10-CM

## 2024-03-18 DIAGNOSIS — I50.9 CONGESTIVE HEART FAILURE, UNSPECIFIED HF CHRONICITY, UNSPECIFIED HEART FAILURE TYPE (H): ICD-10-CM

## 2024-03-18 LAB — INR BLD: 1.8 (ref 0.9–1.1)

## 2024-03-18 PROCEDURE — 85610 PROTHROMBIN TIME: CPT | Mod: ZL

## 2024-03-18 PROCEDURE — 36416 COLLJ CAPILLARY BLOOD SPEC: CPT | Mod: ZL

## 2024-03-18 PROCEDURE — G0463 HOSPITAL OUTPT CLINIC VISIT: HCPCS

## 2024-03-18 PROCEDURE — 99214 OFFICE O/P EST MOD 30 MIN: CPT | Performed by: FAMILY MEDICINE

## 2024-03-18 PROCEDURE — G2211 COMPLEX E/M VISIT ADD ON: HCPCS | Performed by: FAMILY MEDICINE

## 2024-03-18 RX ORDER — DAPAGLIFLOZIN 10 MG/1
10 TABLET, FILM COATED ORAL DAILY
Qty: 90 TABLET | Refills: 1 | Status: SHIPPED | OUTPATIENT
Start: 2024-03-18 | End: 2024-06-18

## 2024-03-18 ASSESSMENT — PAIN SCALES - GENERAL: PAINLEVEL: NO PAIN (0)

## 2024-03-18 NOTE — TELEPHONE ENCOUNTER
Received an APPROVAL from [facility] for  dapagliflozin (FARXIGA) 10 MG TABS tablet. Effective dates 2/17/24-3/18/25.

## 2024-03-18 NOTE — PROGRESS NOTES
ANTICOAGULATION MANAGEMENT     Clement Voss 78 year old male is on warfarin with subtherapeutic INR result. (Goal INR 2.0-3.0)    Recent labs: (last 7 days)     03/18/24  0921   INR 1.8*       ASSESSMENT     Source(s): Chart review     Warfarin doses taken: Warfarin taken as instructed  Diet: No new diet changes identified  New illness, injury, or hospitalization: No  Medication/supplement changes: starting Farxiga has not received yet waiting for prescription  Signs or symptoms of bleeding or clotting: No  Previous INR: Subtherapeutic  Additional findings: None     PLAN     Recommended plan for temporary change(s) affecting INR     Dosing Instructions: booster dose then continue your current warfarin dose with next INR in 1 week       Summary  As of 3/18/2024      Full warfarin instructions:  3/18: 5 mg; Otherwise 2.5 mg every Mon, Wed, Fri; 5 mg all other days   Next INR check:  4/1/2024               Spoke with patient reviewed his dose and scheduled him for next INR appt  Sent [employer] message with dosing and follow up instructions    Patient elected to schedule next visit [phone]    Education provided: Please call back if any changes to your diet, medications or how you've been taking warfarin and Monitoring for clotting signs and symptoms    Plan made per [facility] anticoagulation protocol    Allison Rice RN  Anticoagulation Clinic  3/18/2024    _______________________________________________________________________     Anticoagulation Episode Summary       Current INR goal:  2.0-3.0   TTR:  75.4% (1 y)   Target end date:  Indefinite   Send INR reminders to:  [facility] [place]    Indications    Pulmonary embolism and infarction (H) [I26.99]  Long-term (current) use of anticoagulants [Z79.01] [Z79.01]             Comments:  Lab in [facility] Arrives home in the afternoon after 2 PM             Anticoagulation Care Providers       Provider Role Specialty Phone number    Lorelei Felix MD Referring Family  Medicine [phone]

## 2024-03-18 NOTE — PATIENT INSTRUCTIONS
We will start Farxiga.  This is a diabetic medication that also has protective effects for your heart.  Cardiologists are starting this type of medication, so we will get this started now before your upcoming Cardiology appointment.

## 2024-03-18 NOTE — TELEPHONE ENCOUNTER
Received a PA request from [email or web address] for dapagliflozin (FARXIGA) 10 MG TABS tablet. Submitted on CMM. Waiting for a response.

## 2024-03-19 ENCOUNTER — TELEPHONE (OUTPATIENT)
Dept: FAMILY MEDICINE | Facility: OTHER | Age: 79
End: 2024-03-19

## 2024-03-19 NOTE — TELEPHONE ENCOUNTER
Talked to pharmacy and they are sending in for a prior authorization for farxiga name brand covered but is over $600 so they are doing a pa for generic. Noted will await

## 2024-03-19 NOTE — TELEPHONE ENCOUNTER
11:50 AM    Reason for Call: Phone Call    Description: Patient called in asking that Dr Felix call him back about the new medication he's supposed to be taking. Patient states the prescription Dr Felix prescribed at his last appointment isn't at [facility] in [facility]. Please call patient back.     Was an appointment offered for this call? No  If yes : Appointment type              Date    Preferred method for responding to this message: Telephone Call  What is your phone number ? [phone]     If we cannot reach you directly, may we leave a detailed response at the number you provided? Yes patient will be waiting for the call.     Can this message wait until your PCP/provider returns, if available today? Not applicable, PROVIDER IN CLINIC    Tayler West

## 2024-04-01 ENCOUNTER — ANTICOAGULATION THERAPY VISIT (OUTPATIENT)
Dept: ANTICOAGULATION | Facility: OTHER | Age: 79
End: 2024-04-01
Attending: FAMILY MEDICINE
Payer: COMMERCIAL

## 2024-04-01 ENCOUNTER — LAB (OUTPATIENT)
Dept: LAB | Facility: OTHER | Age: 79
End: 2024-04-01
Payer: MEDICARE

## 2024-04-01 DIAGNOSIS — I26.99 PULMONARY EMBOLISM AND INFARCTION (H): Primary | ICD-10-CM

## 2024-04-01 DIAGNOSIS — I26.99 PULMONARY EMBOLISM AND INFARCTION (H): ICD-10-CM

## 2024-04-01 DIAGNOSIS — I48.20 CHRONIC ATRIAL FIBRILLATION (H): ICD-10-CM

## 2024-04-01 DIAGNOSIS — Z79.01 LONG TERM CURRENT USE OF ANTICOAGULANT THERAPY: ICD-10-CM

## 2024-04-01 LAB — INR BLD: 1.9 (ref 0.9–1.1)

## 2024-04-01 PROCEDURE — 36416 COLLJ CAPILLARY BLOOD SPEC: CPT | Mod: ZL

## 2024-04-01 PROCEDURE — 85610 PROTHROMBIN TIME: CPT | Mod: ZL

## 2024-04-01 NOTE — PROGRESS NOTES
ANTICOAGULATION MANAGEMENT     Clement Voss 78 year old male is on warfarin with subtherapeutic INR result. (Goal INR 2.0-3.0)    Recent labs: (last 7 days)     04/01/24  0941   INR 1.9*       ASSESSMENT     Source(s): Chart Review  Previous INR was Subtherapeutic  Medication, diet, health changes since last INR Resumed warfarin after a hold 3/8          PLAN     Recommended plan for ongoing change(s) affecting INR     Dosing Instructions: Continue your current warfarin dose with next INR in 4 weeks       Summary  As of 4/1/2024      Full warfarin instructions:  2.5 mg every Mon, Wed, Fri; 5 mg all other days   Next INR check:  4/29/2024               Detailed voice message left for Huy with dosing instructions and follow up date.   Sent [employer] message with dosing and follow up instructions    Contact [phone] to schedule and with any changes, questions or concerns.     Education provided:   Contact [phone] with any changes, questions or concerns.     Plan made per [facility] anticoagulation protocol    Latosha Paris RN  Anticoagulation Clinic  4/1/2024    _______________________________________________________________________     Anticoagulation Episode Summary       Current INR goal:  2.0-3.0   TTR:  71.5% (1 y)   Target end date:  Indefinite   Send INR reminders to:  [facility] [place]    Indications    Pulmonary embolism and infarction (H) [I26.99]  Long-term (current) use of anticoagulants [Z79.01] [Z79.01]             Comments:  [facility] home in the afternoon after 2 PM             Anticoagulation Care Providers       Provider Role Specialty Phone number    Lorelei Felix MD Referring Family Medicine [phone]

## 2024-04-04 ENCOUNTER — MYC MEDICAL ADVICE (OUTPATIENT)
Dept: FAMILY MEDICINE | Facility: OTHER | Age: 79
End: 2024-04-04

## 2024-04-04 DIAGNOSIS — I10 BENIGN ESSENTIAL HYPERTENSION: ICD-10-CM

## 2024-04-04 RX ORDER — LOSARTAN POTASSIUM 100 MG/1
100 TABLET ORAL DAILY
Qty: 90 TABLET | Refills: 3 | Status: SHIPPED | OUTPATIENT
Start: 2024-04-04

## 2024-04-04 NOTE — TELEPHONE ENCOUNTER
Losartan      Last Written Prescription Date:  2/24/23  Last Fill Quantity: 90,   # refills: 3  Last Office Visit: 3/18/24  Future Office visit:    Next 5 appointments (look out 90 days)      Jun 18, 2024  9:30 AM  (Arrive by 9:15 AM)  SHORT with Lorelei Felix MD  [facility] ([facility] ) [address]  [facility] [address]  [phone]             Routing refill request to provider for review/approval because:

## 2024-04-11 DIAGNOSIS — I10 BENIGN ESSENTIAL HYPERTENSION: ICD-10-CM

## 2024-04-11 RX ORDER — CHLORTHALIDONE 25 MG/1
25 TABLET ORAL DAILY
Qty: 90 TABLET | Refills: 3 | Status: SHIPPED | OUTPATIENT
Start: 2024-04-11

## 2024-04-11 NOTE — TELEPHONE ENCOUNTER
Chlorthalidone      Last Written Prescription Date:  2/24/23  Last Fill Quantity: 90,   # refills: 3  Last Office Visit: 3/18/24  Future Office visit:    Next 5 appointments (look out 90 days)      Jun 18, 2024  9:30 AM  (Arrive by 9:15 AM)  SHORT with Lorelei Felix MD  [facility] ([facility] ) [address]  [facility] [address]  [phone]             Routing refill request to provider for review/approval because:

## 2024-04-11 NOTE — TELEPHONE ENCOUNTER
Chlorthalidone 25mg      Routing refill request to provider for review/approval because:  Diuretics (Including Combos) Protocol [id number]/11/2024 07:50 AM   Protocol Details Blood pressure under 140/90 in past 12 months    Has GFR on file in past 12 months and most recent value is normal     BP Readings from Last 3 Encounters:   03/18/24 (!) 146/76   12/07/23 138/68   11/06/23 122/77     GFR Estimate   Date Value Ref Range Status   03/08/2024 50 (L) >60 mL/min/1.73m2 Final   04/27/2021 51 (L) >60 mL/min/[1.73_m2] Final     Comment:     Non  GFR Calc  Starting 12/18/2018, serum creatinine based estimated GFR (eGFR) will be   calculated using the Chronic Kidney Disease Epidemiology Collaboration   (CKD-EPI) equation.       GFR Estimate If Black   Date Value Ref Range Status   04/27/2021 59 (L) >60 mL/min/[1.73_m2] Final     Comment:      GFR Calc  Starting 12/18/2018, serum creatinine based estimated GFR (eGFR) will be   calculated using the Chronic Kidney Disease Epidemiology Collaboration   (CKD-EPI) equation.

## 2024-04-29 ENCOUNTER — ANTICOAGULATION THERAPY VISIT (OUTPATIENT)
Dept: ANTICOAGULATION | Facility: OTHER | Age: 79
End: 2024-04-29
Attending: FAMILY MEDICINE
Payer: COMMERCIAL

## 2024-04-29 ENCOUNTER — LAB (OUTPATIENT)
Dept: LAB | Facility: OTHER | Age: 79
End: 2024-04-29
Attending: FAMILY MEDICINE
Payer: MEDICARE

## 2024-04-29 DIAGNOSIS — I26.99 PULMONARY EMBOLISM AND INFARCTION (H): ICD-10-CM

## 2024-04-29 DIAGNOSIS — Z79.01 LONG TERM CURRENT USE OF ANTICOAGULANT THERAPY: ICD-10-CM

## 2024-04-29 DIAGNOSIS — I48.20 CHRONIC ATRIAL FIBRILLATION (H): ICD-10-CM

## 2024-04-29 DIAGNOSIS — I26.99 PULMONARY EMBOLISM AND INFARCTION (H): Primary | ICD-10-CM

## 2024-04-29 LAB — INR BLD: 2.3 (ref 0.9–1.1)

## 2024-04-29 PROCEDURE — 85610 PROTHROMBIN TIME: CPT | Mod: ZL

## 2024-04-29 PROCEDURE — 36416 COLLJ CAPILLARY BLOOD SPEC: CPT | Mod: ZL

## 2024-04-29 NOTE — PROGRESS NOTES
ANTICOAGULATION MANAGEMENT     Cleemnt Voss 78 year old male is on warfarin with therapeutic INR result. (Goal INR 2.0-3.0)    Recent labs: (last 7 days)     04/29/24  0930   INR 2.3*       ASSESSMENT     Source(s): Patient/ Care giver call     Warfarin doses taken: Warfarin taken as instructed  Diet: No new diet changes identified  New illness, injury, or hospitalization: No  Medication/supplement changes: None noted  Signs or symptoms of bleeding or clotting: No  Previous INR: Subtherapeutic  Additional findings: None     PLAN     Recommended plan for no diet, medication or health factor changes affecting INR     Dosing Instructions: Continue your current warfarin dose with next INR in 4 weeks       Summary  As of 4/29/2024      Full warfarin instructions:  2.5 mg every Mon, Wed, Fri; 5 mg all other days   Next INR check:  5/28/2024               Telephone call with Huy who verbalizes understanding and agrees to plan    Lab visit scheduled    Education provided: Please call back if any changes to your diet, medications or how you've been taking warfarin    Plan made per [facility] anticoagulation protocol    Regina Fair RN  Anticoagulation Clinic  4/29/2024    _______________________________________________________________________     Anticoagulation Episode Summary       Current INR goal:  2.0-3.0   TTR:  70.9% (1 y)   Target end date:  Indefinite   Send INR reminders to:  [facility] [place]    Indications    Pulmonary embolism and infarction (H) [I26.99]  Long-term (current) use of anticoagulants [Z79.01] [Z79.01]             Comments:  Lab in [facility] Arrives home in the afternoon after 2 PM             Anticoagulation Care Providers       Provider Role Specialty Phone number    Lorelei Felix MD Referring Family Medicine [phone]

## 2024-05-15 DIAGNOSIS — I10 BENIGN ESSENTIAL HYPERTENSION: ICD-10-CM

## 2024-05-15 RX ORDER — POTASSIUM CHLORIDE 750 MG/1
10 TABLET, EXTENDED RELEASE ORAL DAILY
Qty: 90 TABLET | Refills: 2 | Status: SHIPPED | OUTPATIENT
Start: 2024-05-15

## 2024-05-15 NOTE — TELEPHONE ENCOUNTER
Potassium      Last Written Prescription Date:  5/26/23  Last Fill Quantity: 90,   # refills: 3  Last Office Visit: 3/18/24  Future Office visit:    Next 5 appointments (look out 90 days)      Jun 18, 2024  9:30 AM  (Arrive by 9:15 AM)  SHORT with Lorelei Felix MD  [facility] ([facility] ) [address]  [facility] [address]  [phone]

## 2024-05-22 ENCOUNTER — TRANSFERRED RECORDS (OUTPATIENT)
Dept: HEALTH INFORMATION MANAGEMENT | Facility: CLINIC | Age: 79
End: 2024-05-22
Payer: COMMERCIAL

## 2024-05-28 ENCOUNTER — ANTICOAGULATION THERAPY VISIT (OUTPATIENT)
Dept: ANTICOAGULATION | Facility: OTHER | Age: 79
End: 2024-05-28
Attending: FAMILY MEDICINE
Payer: COMMERCIAL

## 2024-05-28 ENCOUNTER — LAB (OUTPATIENT)
Dept: LAB | Facility: OTHER | Age: 79
End: 2024-05-28
Payer: MEDICARE

## 2024-05-28 DIAGNOSIS — Z79.01 LONG TERM CURRENT USE OF ANTICOAGULANT THERAPY: ICD-10-CM

## 2024-05-28 DIAGNOSIS — I26.99 PULMONARY EMBOLISM AND INFARCTION (H): ICD-10-CM

## 2024-05-28 DIAGNOSIS — I48.20 CHRONIC ATRIAL FIBRILLATION (H): ICD-10-CM

## 2024-05-28 DIAGNOSIS — I26.99 PULMONARY EMBOLISM AND INFARCTION (H): Primary | ICD-10-CM

## 2024-05-28 LAB — INR BLD: 2.8 (ref 0.9–1.1)

## 2024-05-28 PROCEDURE — 36416 COLLJ CAPILLARY BLOOD SPEC: CPT | Mod: ZL

## 2024-05-28 PROCEDURE — 85610 PROTHROMBIN TIME: CPT | Mod: ZL

## 2024-05-28 NOTE — PROGRESS NOTES
ANTICOAGULATION MANAGEMENT     Clement Voss 79 year old male is on warfarin with therapeutic INR result. (Goal INR 2.0-3.0)    Recent labs: (last 7 days)     05/28/24  0949   INR 2.8*       ASSESSMENT     Source(s): Chart Review and Patient/Caregiver Call     Warfarin doses taken: Warfarin taken as instructed  Diet: No new diet changes identified  Medication/supplement changes: None noted  New illness, injury, or hospitalization: No  Signs or symptoms of bleeding or clotting: No  Previous result: Therapeutic last visit; previously outside of goal range  Additional findings: None       PLAN     Recommended plan for no diet, medication or health factor changes affecting INR     Dosing Instructions: Continue your current warfarin dose with next INR in 6 weeks       Summary  As of 5/28/2024      Full warfarin instructions:  2.5 mg every Mon, Wed, Fri; 5 mg all other days   Next INR check:  7/9/2024               Telephone call with Huy who verbalizes understanding and agrees to plan    Lab visit scheduled    Education provided:   None required    Plan made per [facility] anticoagulation protocol    Latosha Paris RN  Anticoagulation Clinic  5/28/2024    _______________________________________________________________________     Anticoagulation Episode Summary       Current INR goal:  2.0-3.0   TTR:  77.7% (1 y)   Target end date:  Indefinite   Send INR reminders to:  [facility] [place]    Indications    Pulmonary embolism and infarction (H) [I26.99]  Long-term (current) use of anticoagulants [Z79.01] [Z79.01]             Comments:  Lab in [facility] Arrives home in the afternoon after 2 PM             Anticoagulation Care Providers       Provider Role Specialty Phone number    Lorelei Felix MD Referring Family Medicine [phone]

## 2024-06-07 DIAGNOSIS — Z79.4 TYPE 2 DIABETES MELLITUS WITH STAGE 3B CHRONIC KIDNEY DISEASE, WITH LONG-TERM CURRENT USE OF INSULIN (H): ICD-10-CM

## 2024-06-07 DIAGNOSIS — N18.32 TYPE 2 DIABETES MELLITUS WITH STAGE 3B CHRONIC KIDNEY DISEASE, WITH LONG-TERM CURRENT USE OF INSULIN (H): ICD-10-CM

## 2024-06-07 DIAGNOSIS — E11.22 TYPE 2 DIABETES MELLITUS WITH STAGE 3B CHRONIC KIDNEY DISEASE, WITH LONG-TERM CURRENT USE OF INSULIN (H): ICD-10-CM

## 2024-06-07 NOTE — TELEPHONE ENCOUNTER
Test Strips      Last Written Prescription Date:  9/29/23  Last Fill Quantity: 200,   # refills: 3  Last Office Visit: 3/18/24  Future Office visit:    Next 5 appointments (look out 90 days)      Jun 18, 2024  9:30 AM  (Arrive by 9:15 AM)  SHORT with Lorelei Felix MD  [facility] ([facility] ) [address]  [facility] [address]  [phone]             Routing refill request to provider for review/approval because:

## 2024-06-12 DIAGNOSIS — Z79.4 TYPE 2 DIABETES MELLITUS WITH STAGE 3B CHRONIC KIDNEY DISEASE, WITH LONG-TERM CURRENT USE OF INSULIN (H): ICD-10-CM

## 2024-06-12 DIAGNOSIS — E11.22 TYPE 2 DIABETES MELLITUS WITH STAGE 3B CHRONIC KIDNEY DISEASE, WITH LONG-TERM CURRENT USE OF INSULIN (H): ICD-10-CM

## 2024-06-12 DIAGNOSIS — N18.32 TYPE 2 DIABETES MELLITUS WITH STAGE 3B CHRONIC KIDNEY DISEASE, WITH LONG-TERM CURRENT USE OF INSULIN (H): ICD-10-CM

## 2024-06-12 NOTE — TELEPHONE ENCOUNTER
Pen Needles      Last Written Prescription Date:  11/29/23  Last Fill Quantity: 100,   # refills: 1  Last Office Visit: 3/18/24  Future Office visit:    Next 5 appointments (look out 90 days)      Jun 18, 2024  9:30 AM  (Arrive by 9:15 AM)  SHORT with Lorelei Felix MD  [facility] ([facility] ) [address]  [facility] [address]  [phone]             Routing refill request to provider for review/approval because:

## 2024-06-14 ENCOUNTER — LAB (OUTPATIENT)
Dept: LAB | Facility: OTHER | Age: 79
End: 2024-06-14
Payer: MEDICARE

## 2024-06-14 DIAGNOSIS — E78.2 MIXED HYPERLIPIDEMIA: ICD-10-CM

## 2024-06-14 DIAGNOSIS — I10 BENIGN ESSENTIAL HYPERTENSION: ICD-10-CM

## 2024-06-14 DIAGNOSIS — N18.32 TYPE 2 DIABETES MELLITUS WITH STAGE 3B CHRONIC KIDNEY DISEASE, WITH LONG-TERM CURRENT USE OF INSULIN (H): ICD-10-CM

## 2024-06-14 DIAGNOSIS — Z79.4 TYPE 2 DIABETES MELLITUS WITH STAGE 3B CHRONIC KIDNEY DISEASE, WITH LONG-TERM CURRENT USE OF INSULIN (H): ICD-10-CM

## 2024-06-14 DIAGNOSIS — E11.22 TYPE 2 DIABETES MELLITUS WITH STAGE 3B CHRONIC KIDNEY DISEASE, WITH LONG-TERM CURRENT USE OF INSULIN (H): ICD-10-CM

## 2024-06-14 LAB
ALT SERPL W P-5'-P-CCNC: 50 U/L (ref 0–70)
ANION GAP SERPL CALCULATED.3IONS-SCNC: 9 MMOL/L (ref 7–15)
BUN SERPL-MCNC: 15.5 MG/DL (ref 8–23)
CALCIUM SERPL-MCNC: 9.6 MG/DL (ref 8.8–10.2)
CHLORIDE SERPL-SCNC: 94 MMOL/L (ref 98–107)
CHOLEST SERPL-MCNC: 93 MG/DL
CREAT SERPL-MCNC: 1.47 MG/DL (ref 0.67–1.17)
CREAT UR-MCNC: 83 MG/DL
DEPRECATED HCO3 PLAS-SCNC: 29 MMOL/L (ref 22–29)
EGFRCR SERPLBLD CKD-EPI 2021: 48 ML/MIN/1.73M2
EST. AVERAGE GLUCOSE BLD GHB EST-MCNC: 163 MG/DL
FASTING STATUS PATIENT QL REPORTED: YES
FASTING STATUS PATIENT QL REPORTED: YES
GLUCOSE SERPL-MCNC: 98 MG/DL (ref 70–99)
HBA1C MFR BLD: 7.3 %
HDLC SERPL-MCNC: 35 MG/DL
LDLC SERPL CALC-MCNC: 19 MG/DL
MICROALBUMIN UR-MCNC: <12 MG/L
MICROALBUMIN/CREAT UR: NORMAL MG/G{CREAT}
NONHDLC SERPL-MCNC: 58 MG/DL
POTASSIUM SERPL-SCNC: 3.8 MMOL/L (ref 3.4–5.3)
SODIUM SERPL-SCNC: 132 MMOL/L (ref 135–145)
TRIGL SERPL-MCNC: 195 MG/DL
TSH SERPL DL<=0.005 MIU/L-ACNC: 3.17 UIU/ML (ref 0.3–4.2)

## 2024-06-14 PROCEDURE — 84460 ALANINE AMINO (ALT) (SGPT): CPT | Mod: ZL

## 2024-06-14 PROCEDURE — 83036 HEMOGLOBIN GLYCOSYLATED A1C: CPT | Mod: ZL

## 2024-06-14 PROCEDURE — 80061 LIPID PANEL: CPT | Mod: ZL

## 2024-06-14 PROCEDURE — 36415 COLL VENOUS BLD VENIPUNCTURE: CPT | Mod: ZL

## 2024-06-14 PROCEDURE — 84443 ASSAY THYROID STIM HORMONE: CPT | Mod: ZL

## 2024-06-14 PROCEDURE — 80048 BASIC METABOLIC PNL TOTAL CA: CPT | Mod: ZL

## 2024-06-14 PROCEDURE — 82570 ASSAY OF URINE CREATININE: CPT | Mod: ZL

## 2024-06-14 RX ORDER — PEN NEEDLE, DIABETIC 31 GX3/16"
NEEDLE, DISPOSABLE MISCELLANEOUS
Qty: 100 EACH | Refills: 3 | Status: SHIPPED | OUTPATIENT
Start: 2024-06-14 | End: 2024-09-20

## 2024-06-17 NOTE — PROGRESS NOTES
Assessment & Plan     1. Type 2 diabetes mellitus with stage 3b chronic kidney disease, with long-term current use of insulin (H)  No changes to current medication, will refill at current dose when due.  Follow-up in three months, future labs ordered.  - Hemoglobin A1c; Future    2. Mixed hyperlipidemia  As above  - ALT; Future  - Lipid Profile; Future    3. Benign essential hypertension  As above  - Basic metabolic panel; Future    4. Chronic atrial fibrillation (H)  No changes    5. ASCVD (arteriosclerotic cardiovascular disease)  No changes, continue to follow with [facility]    6. Congestive heart failure, unspecified HF chronicity, unspecified heart failure type (H)  As above    7. Stage 3b chronic kidney disease (H)  No changes    8. Prostate cancer (H)  Will recheck with next visit, Urology notes reviewed.  - PSA Diagnostic; Future           The longitudinal plan of care for the diagnosis(es)/condition(s) as documented were addressed during this visit. Due to the added complexity in care, I will continue to support Huy in the subsequent management and with ongoing continuity of care.     Return in about 3 months (around 9/18/2024) for Diabetic Follow-up, Chronic Disease Management, Medication review.      Subjective   Huy is a 79 year old, presenting for the following health issues:  Diabetes, Hypertension, and Lipids    HPI     Diabetes Follow-up    How often are you checking your blood sugar? Two times daily  Blood sugar testing frequency justification:  Adjustment of medication(s)  What time of day are you checking your blood sugars (select all that apply)?  Before and after meals  Have you had any blood sugars above 200?  Yes evenings  Have you had any blood sugars below 70?  No  What symptoms do you notice when your blood sugar is low?  Benky  What concerns do you have today about your diabetes? None   Do you have any of these symptoms? (Select all that apply)  No numbness or tingling in  feet.  No redness, sores or blisters on feet.  No complaints of excessive thirst.  No reports of blurry vision.  No significant changes to weight.      BP Readings from Last 2 Encounters:   06/18/24 138/80   03/18/24 (!) 146/76     Hemoglobin A1C (%)   Date Value   06/14/2024 7.3 (H)   03/08/2024 7.4 (H)   04/27/2021 9.7 (H)   01/26/2021 9.4 (H)     LDL Cholesterol Calculated (mg/dL)   Date Value   06/14/2024 19   03/08/2024 17   04/27/2021 12   01/26/2021 26             Hyperlipidemia Follow-Up    Are you regularly taking any medication or supplement to lower your cholesterol?   Yes- Crestor 5 mg  Are you having muscle aches or other side effects that you think could be caused by your cholesterol lowering medication?  No    Hypertension Follow-up    Do you check your blood pressure regularly outside of the clinic? Yes   Are you following a low salt diet? Yes  Are your blood pressures ever more than 140 on the top number (systolic) OR more   than 90 on the bottom number (diastolic), for example 140/90? Yes    Atrial Fibrillation Follow-up    Symptoms: no recent chest pain, significant palpitations, dizziness/lightheadedness, dyspnea, or increased peripheral edema.  Stroke prevention: Coumadin (Warfarin)        8/28/2023     9:27 AM 11/6/2023     8:49 AM 12/7/2023     9:19 AM 3/18/2024     9:28 AM 6/18/2024     9:24 AM   Date   Pulse 68 68 68 67 65       Vascular Disease Follow-up    How often do you take nitroglycerin? Never  Do you take an aspirin every day? Yes    Heart Failure Follow-up   Are you experiencing any shortness of breath? No  Are you experiencing any swelling in your legs or feet?  No  Are you using more pillows than usual? No  Do you cough at night?  No  Do you check your weight daily?  Yes  Have you had a weight change recently?  No  Are you having any of the following side effects from your medications? (Select all that apply)  The patient does not report symptoms of dizziness, fatigue, cough,  swelling, or slow heart beat.  Since your last visit, how many times have you gone to the cardiologist, urgent care, emergency room, or hospital because of your heart failure?   None  Last Echo: No results found.    Chronic Kidney Disease Follow-up    Do you take any over the counter pain medicine?: Yes  What over the counter medicine are you taking for your pain?:  Tylenol    How often do you take this medicine?:   as needed        Review of Systems  Constitutional, HEENT, cardiovascular, pulmonary, gi and gu systems are negative, except as otherwise noted.      Objective    /80 (BP Location: Left arm, Patient Position: Sitting, Cuff Size: Adult Large)   Pulse 65   Temp 97.1  F (36.2  C) (Tympanic)   Resp 16   Wt 92.2 kg (203 lb 4.8 oz)   SpO2 99%   BMI 33.83 kg/m    Body mass index is 33.83 kg/m .  Physical Exam   GENERAL: alert and no distress  PSYCH: mentation appears normal, affect normal/bright    Lab on 06/14/2024   Component Date Value Ref Range Status    ALT 06/14/2024 50  0 - 70 U/L Final    Sodium 06/14/2024 132 (L)  135 - 145 mmol/L Final    Reference intervals for this test were updated on 09/26/2023 to more accurately reflect our healthy population. There may be differences in the flagging of prior results with similar values performed with this method. Interpretation of those prior results can be made in the context of the updated reference intervals.     Potassium 06/14/2024 3.8  3.4 - 5.3 mmol/L Final    Chloride 06/14/2024 94 (L)  98 - 107 mmol/L Final    Carbon Dioxide (CO2) 06/14/2024 29  22 - 29 mmol/L Final    Anion Gap 06/14/2024 9  7 - 15 mmol/L Final    Urea Nitrogen 06/14/2024 15.5  8.0 - 23.0 mg/dL Final    Creatinine 06/14/2024 1.47 (H)  0.67 - 1.17 mg/dL Final    GFR Estimate 06/14/2024 48 (L)  >60 mL/min/1.73m2 Final    eGFR calculated using 2021 CKD-EPI equation.    Calcium 06/14/2024 9.6  8.8 - 10.2 mg/dL Final    Glucose 06/14/2024 98  70 - 99 mg/dL Final    Patient  Fasting > 8hrs? 06/14/2024 Yes   Final    Estimated Average Glucose 06/14/2024 163  mg/dL Final    Hemoglobin A1C 06/14/2024 7.3 (H)  <5.7 % Final    Normal <5.7%   Prediabetes 5.7-6.4%    Diabetes 6.5% or higher     Note: Adopted from ADA consensus guidelines.    Cholesterol 06/14/2024 93  <200 mg/dL Final    Triglycerides 06/14/2024 195 (H)  <150 mg/dL Final    Direct Measure HDL 06/14/2024 35 (L)  >=40 mg/dL Final    LDL Cholesterol Calculated 06/14/2024 19  <=100 mg/dL Final    Non HDL Cholesterol 06/14/2024 58  <130 mg/dL Final    Patient Fasting > 8hrs? 06/14/2024 Yes   Final    TSH 06/14/2024 3.17  0.30 - 4.20 uIU/mL Final    Creatinine Urine mg/dL 06/14/2024 83.0  mg/dL Final    The reference ranges have not been established in urine creatinine. The results should be integrated into the clinical context for interpretation.    Albumin Urine mg/L 06/14/2024 <12.0  mg/L Final    The reference ranges have not been established in urine albumin. The results should be integrated into the clinical context for interpretation.    Albumin Urine mg/g Cr 06/14/2024    Final    Unable to calculate, urine albumin and/or urine creatinine is outside detectable limits.  Microalbuminuria is defined as an albumin:creatinine ratio of 17 to 299 for males and 25 to 299 for females. A ratio of albumin:creatinine of 300 or higher is indicative of overt proteinuria.  Due to biologic variability, positive results should be confirmed by a second, first-morning random or 24-hour timed urine specimen. If there is discrepancy, a third specimen is recommended. When 2 out of 3 results are in the microalbuminuria range, this is evidence for incipient nephropathy and warrants increased efforts at glucose control, blood pressure control, and institution of therapy with an angiotensin-converting-enzyme (ACE) inhibitor (if the patient can tolerate it).             Signed Electronically by: Lorelei Felix MD

## 2024-06-18 ENCOUNTER — OFFICE VISIT (OUTPATIENT)
Dept: FAMILY MEDICINE | Facility: OTHER | Age: 79
End: 2024-06-18
Attending: FAMILY MEDICINE
Payer: COMMERCIAL

## 2024-06-18 VITALS
SYSTOLIC BLOOD PRESSURE: 138 MMHG | OXYGEN SATURATION: 99 % | RESPIRATION RATE: 16 BRPM | BODY MASS INDEX: 33.83 KG/M2 | WEIGHT: 203.3 LBS | TEMPERATURE: 97.1 F | HEART RATE: 65 BPM | DIASTOLIC BLOOD PRESSURE: 80 MMHG

## 2024-06-18 DIAGNOSIS — N18.32 TYPE 2 DIABETES MELLITUS WITH STAGE 3B CHRONIC KIDNEY DISEASE, WITH LONG-TERM CURRENT USE OF INSULIN (H): Primary | ICD-10-CM

## 2024-06-18 DIAGNOSIS — I50.9 CONGESTIVE HEART FAILURE, UNSPECIFIED HF CHRONICITY, UNSPECIFIED HEART FAILURE TYPE (H): ICD-10-CM

## 2024-06-18 DIAGNOSIS — Z79.4 TYPE 2 DIABETES MELLITUS WITH STAGE 3B CHRONIC KIDNEY DISEASE, WITH LONG-TERM CURRENT USE OF INSULIN (H): Primary | ICD-10-CM

## 2024-06-18 DIAGNOSIS — I10 BENIGN ESSENTIAL HYPERTENSION: ICD-10-CM

## 2024-06-18 DIAGNOSIS — I48.20 CHRONIC ATRIAL FIBRILLATION (H): ICD-10-CM

## 2024-06-18 DIAGNOSIS — E78.2 MIXED HYPERLIPIDEMIA: ICD-10-CM

## 2024-06-18 DIAGNOSIS — E11.22 TYPE 2 DIABETES MELLITUS WITH STAGE 3B CHRONIC KIDNEY DISEASE, WITH LONG-TERM CURRENT USE OF INSULIN (H): Primary | ICD-10-CM

## 2024-06-18 DIAGNOSIS — N18.32 STAGE 3B CHRONIC KIDNEY DISEASE (H): ICD-10-CM

## 2024-06-18 DIAGNOSIS — I25.10 ASCVD (ARTERIOSCLEROTIC CARDIOVASCULAR DISEASE): ICD-10-CM

## 2024-06-18 DIAGNOSIS — C61 PROSTATE CANCER (H): ICD-10-CM

## 2024-06-18 PROCEDURE — G2211 COMPLEX E/M VISIT ADD ON: HCPCS | Performed by: FAMILY MEDICINE

## 2024-06-18 PROCEDURE — 99214 OFFICE O/P EST MOD 30 MIN: CPT | Performed by: FAMILY MEDICINE

## 2024-06-18 PROCEDURE — G0463 HOSPITAL OUTPT CLINIC VISIT: HCPCS

## 2024-06-18 ASSESSMENT — PAIN SCALES - GENERAL: PAINLEVEL: NO PAIN (0)

## 2024-07-07 ENCOUNTER — HEALTH MAINTENANCE LETTER (OUTPATIENT)
Age: 79
End: 2024-07-07

## 2024-07-09 ENCOUNTER — ANTICOAGULATION THERAPY VISIT (OUTPATIENT)
Dept: ANTICOAGULATION | Facility: OTHER | Age: 79
End: 2024-07-09
Attending: FAMILY MEDICINE
Payer: COMMERCIAL

## 2024-07-09 ENCOUNTER — LAB (OUTPATIENT)
Dept: LAB | Facility: OTHER | Age: 79
End: 2024-07-09
Attending: FAMILY MEDICINE
Payer: MEDICARE

## 2024-07-09 DIAGNOSIS — I48.20 CHRONIC ATRIAL FIBRILLATION (H): ICD-10-CM

## 2024-07-09 DIAGNOSIS — I26.99 PULMONARY EMBOLISM AND INFARCTION (H): Primary | ICD-10-CM

## 2024-07-09 DIAGNOSIS — I26.99 PULMONARY EMBOLISM AND INFARCTION (H): ICD-10-CM

## 2024-07-09 DIAGNOSIS — Z79.01 LONG TERM CURRENT USE OF ANTICOAGULANT THERAPY: ICD-10-CM

## 2024-07-09 LAB — INR BLD: 1.9 (ref 0.9–1.1)

## 2024-07-09 PROCEDURE — 36416 COLLJ CAPILLARY BLOOD SPEC: CPT | Mod: ZL

## 2024-07-09 PROCEDURE — 85610 PROTHROMBIN TIME: CPT | Mod: ZL

## 2024-07-09 NOTE — PROGRESS NOTES
ANTICOAGULATION MANAGEMENT     Clement Voss 79 year old male is on warfarin with subtherapeutic INR result. (Goal INR 2.0-3.0)    Recent labs: (last 7 days)     07/09/24  0937   INR 1.9*       ASSESSMENT     Source(s): Chart Review and Patient/Caregiver Call     Warfarin doses taken: Warfarin taken as instructed  Diet: No new diet changes identified  Medication/supplement changes: None noted  New illness, injury, or hospitalization: No  Signs or symptoms of bleeding or clotting: No  Previous result: Therapeutic last 2(+) visits  Additional findings: None       PLAN     Recommended plan for no diet, medication or health factor changes affecting INR     Dosing Instructions: Continue your current warfarin dose with next INR in 4 weeks       Summary  As of 7/9/2024      Full warfarin instructions:  2.5 mg every Mon, Wed, Fri; 5 mg all other days   Next INR check:  8/6/2024               Telephone call with Huy who verbalizes understanding and agrees to plan    Lab visit scheduled    Education provided: None required    Plan made per [facility] anticoagulation protocol    Latosha Paris, RN  Anticoagulation Clinic  7/9/2024    _______________________________________________________________________     Anticoagulation Episode Summary       Current INR goal:  2.0-3.0   TTR:  76.4% (1 y)   Target end date:  Indefinite   Send INR reminders to:  [facility]    Indications    Pulmonary embolism and infarction (H) [I26.99]  Long-term (current) use of anticoagulants [Z79.01] [Z79.01]             Comments:  Lab in [facility] Arrives home in the afternoon after 2 PM             Anticoagulation Care Providers       Provider Role Specialty Phone number    Lorelei Felix MD Referring Family Medicine [phone]

## 2024-07-12 DIAGNOSIS — N18.32 TYPE 2 DIABETES MELLITUS WITH STAGE 3B CHRONIC KIDNEY DISEASE, WITH LONG-TERM CURRENT USE OF INSULIN (H): ICD-10-CM

## 2024-07-12 DIAGNOSIS — E11.22 TYPE 2 DIABETES MELLITUS WITH STAGE 3B CHRONIC KIDNEY DISEASE, WITH LONG-TERM CURRENT USE OF INSULIN (H): ICD-10-CM

## 2024-07-12 DIAGNOSIS — Z79.4 TYPE 2 DIABETES MELLITUS WITH STAGE 3B CHRONIC KIDNEY DISEASE, WITH LONG-TERM CURRENT USE OF INSULIN (H): ICD-10-CM

## 2024-07-15 RX ORDER — INSULIN GLARGINE 100 [IU]/ML
INJECTION, SOLUTION SUBCUTANEOUS
Qty: 30 ML | Refills: 3 | Status: SHIPPED | OUTPATIENT
Start: 2024-07-15

## 2024-07-15 NOTE — TELEPHONE ENCOUNTER
LANTUS SOLOSTAR PEN INJ 3ML         Last Written Prescription Date:  2/9/24  Last Fill Quantity: 30 mL,   # refills: 1  Last Office Visit: 6/18/24  Future Office visit:    Next 5 appointments (look out 90 days)      Sep 20, 2024 9:00 AM  (Arrive by 8:45 AM)  Adult Preventative Visit with Lorelei Felix MD  [facility] ([facility] ) [address]  [facility] [address]  [phone]             Routing refill request to provider for review/approval because:      Insulin Protocol [id number]/12/2024 05:48 PM   Protocol Details Has GFR on file in past 12 months and most recent value is normal    Chart Review Required     GFR Estimate   Date Value Ref Range Status   06/14/2024 48 (L) >60 mL/min/1.73m2 Final     Comment:     eGFR calculated using 2021 CKD-EPI equation.   04/27/2021 51 (L) >60 mL/min/[1.73_m2] Final     Comment:     Non  GFR Calc  Starting 12/18/2018, serum creatinine based estimated GFR (eGFR) will be   calculated using the Chronic Kidney Disease Epidemiology Collaboration   (CKD-EPI) equation.

## 2024-08-06 ENCOUNTER — LAB (OUTPATIENT)
Dept: LAB | Facility: OTHER | Age: 79
End: 2024-08-06
Attending: FAMILY MEDICINE
Payer: MEDICARE

## 2024-08-06 ENCOUNTER — ANTICOAGULATION THERAPY VISIT (OUTPATIENT)
Dept: ANTICOAGULATION | Facility: OTHER | Age: 79
End: 2024-08-06
Attending: FAMILY MEDICINE
Payer: MEDICARE

## 2024-08-06 DIAGNOSIS — I48.20 CHRONIC ATRIAL FIBRILLATION (H): ICD-10-CM

## 2024-08-06 DIAGNOSIS — Z79.01 LONG TERM CURRENT USE OF ANTICOAGULANT THERAPY: ICD-10-CM

## 2024-08-06 DIAGNOSIS — I26.99 PULMONARY EMBOLISM AND INFARCTION (H): ICD-10-CM

## 2024-08-06 DIAGNOSIS — I26.99 PULMONARY EMBOLISM AND INFARCTION (H): Primary | ICD-10-CM

## 2024-08-06 LAB — INR BLD: 2.2 (ref 0.9–1.1)

## 2024-08-06 PROCEDURE — 85610 PROTHROMBIN TIME: CPT | Mod: ZL

## 2024-08-06 PROCEDURE — 36416 COLLJ CAPILLARY BLOOD SPEC: CPT | Mod: ZL

## 2024-08-06 NOTE — PROGRESS NOTES
ANTICOAGULATION MANAGEMENT     Clement Voss 79 year old male is on warfarin with therapeutic INR result. (Goal INR 2.0-3.0)    Recent labs: (last 7 days)     08/06/24  0947   INR 2.2*       ASSESSMENT     Source(s): Chart Review  Previous INR was Subtherapeutic  Medication, diet, health changes since last INR chart reviewed; none identified         PLAN     Recommended plan for no diet, medication or health factor changes affecting INR     Dosing Instructions: Continue your current warfarin dose with next INR in 6 weeks       Summary  As of 8/6/2024      Full warfarin instructions:  2.5 mg every Mon, Wed, Fri; 5 mg all other days   Next INR check:  9/18/2024               Detailed voice message left for Huy with dosing instructions and follow up date.   Sent [employer] message with dosing and follow up instructions    Check at provider office visit    Education provided: None required    Plan made per [facility] anticoagulation protocol    Latosha Paris, RN  Anticoagulation Clinic  8/6/2024    _______________________________________________________________________     Anticoagulation Episode Summary       Current INR goal:  2.0-3.0   TTR:  75.8% (1 y)   Target end date:  Indefinite   Send INR reminders to:  [facility]    Indications    Pulmonary embolism and infarction (H) [I26.99]  Long-term (current) use of anticoagulants [Z79.01] [Z79.01]             Comments:  Lab in [facility] Arrives home in the afternoon after 2 PM             Anticoagulation Care Providers       Provider Role Specialty Phone number    Lorelei Felix MD Referring Family Medicine [phone]

## 2024-08-26 NOTE — PROGRESS NOTES
V2.0  Discharge Summary    Name:  Pao Wahl /Age/Sex: 1947 (77 y.o. female)   Admit Date: 2024  Discharge Date: 24    MRN & CSN:  [id number] & [id number] Encounter Date and Time 24 12:43 PM EDT    Attending:  Rakesh Maldonado* Discharging Provider: Rakesh Flores MD       Hospital Course:       77-year-old female presenting with neck pain and admitted for possible pneumobilia and possible ischemic colitis.  She has a history of follicular lymphoma in remission, hypertension, dyslipidemia, A-fib on anticoagulation.  CKD.  Lactic acid levels were elevated on presentation.  Significant leukocytosis with abdominal imaging showing possible ischemic colitis.  Was taken urgently to the OR for diagnostic laparoscopy.  Had lysis of adhesions.  Also with a metabolic acidosis possibly secondary to colitis.  Received IV fluids.  Lactic acidosis resolved.  Did have persistent loose bowel movements but were formed on the day of discharge.  Was slowly introduced to a diet and tolerated adequately.  Vascular surgery in case not thinking patient would need additional surgery.  Patient was on a heparin drip and on the day of discharge was started on a Coumadin bridge with Lovenox for goal INR of 2.5 through 3.5 secondary to multiple valvular replacements.  Of note, patient is at risk of coming back with worsening symptoms due to her history of ischemic colitis.  On 2024 patient was deemed stable for discharge.     Possible ischemic colitis  History of ischemic colitis  Elevated lactic acid  Abdominal pain  -Status post exploratory laparoscopy.  Appreciate surgery recommendations.    Diet as per surgery's recommendations.  -Continue regular diet at facility     Valvular heart disease  Status post mitral valve replacement  -Was on a heparin drip.  On the day of discharge bridged to Coumadin was started.  Goal INR of 2.5-3.5 needs close outpatient follow-up at nursing    SUBJECTIVE:   Clement Voss is a 72 year old male who presents to clinic today for the following health issues:    Diabetes Follow-up    Patient is checking blood sugars: three times daily.   Blood sugar testing frequency justification: Uncontrolled diabetes  Results are as follows:  Overall improved, no more 200s in the morning since starting insulin    Diabetic concerns: None     Symptoms of hypoglycemia (low blood sugar): shaky, dizzy     Paresthesias (numbness or burning in feet) or sores: No     Date of last diabetic eye exam: 12/2016    Patient is doing well with insulin start and is happy with his numbers.    Hyperlipidemia Follow-Up      Rate your low fat/cholesterol diet?: fair    Taking statin?  No    Other lipid medications/supplements?:  Fenofibrate, without side effects    Hypertension Follow-up      Outpatient blood pressures are not being checked.  Low Salt Diet: no added salt          Problem list and histories reviewed & adjusted, as indicated.  Additional history: as documented    Patient Active Problem List   Diagnosis     Type 2 diabetes mellitus with chronic kidney disease, with long-term current use of insulin (H)     Hyperlipidemia     Benign essential hypertension     Gout     Pulmonary embolism and infarction (H)     Acute thromboembolism of deep veins of lower extremity (H)     ACP (advance care planning)     Long-term (current) use of anticoagulants [Z79.01]     Morbid obesity (H)     Past Surgical History:   Procedure Laterality Date     APPENDECTOMY  2008     CHOLECYSTECTOMY  1984     History of PE of right lung 3/2013[         Social History   Substance Use Topics     Smoking status: Former Smoker     Types: Cigarettes     Quit date: 8/12/1964     Smokeless tobacco: Never Used     Alcohol use No     Family History   Problem Relation Age of Onset     HEART DISEASE Father 71     heart disease; cause of death     Other - See Comments Mother 79     old age; cause of death         Current  Outpatient Prescriptions   Medication Sig Dispense Refill     glyBURIDE (DIABETA /MICRONASE) 5 MG tablet 1 tablet in the am 1 tablet in the pm       fenofibrate 48 MG tablet TAKE 1 TABLET(48 MG) BY MOUTH DAILY 90 tablet 2     insulin glargine (LANTUS SOLOSTAR) 100 UNIT/ML injection Inject 18 Units Subcutaneous At Bedtime 15 mL 1     JANUVIA 100 MG tablet TAKE 1 TABLET(100 MG) BY MOUTH DAILY 90 tablet 0     insulin pen needle 31G X 6 MM Use 1 daily or as directed. 100 each prn     ACCU-CHEK VLAD PLUS test strip USE TO TEST BLOOD SUGARS THREE TIMES DAILY 100 strip 2     amLODIPine (NORVASC) 10 MG tablet Take 1 tablet (10 mg) by mouth daily 90 tablet 3     lisinopril (PRINIVIL,ZESTRIL) 40 MG tablet Take 1 tablet (40 mg) by mouth daily 90 tablet 3     terazosin (HYTRIN) 10 MG capsule Take 1 capsule (10 mg) by mouth At Bedtime 90 capsule 3     warfarin (COUMADIN) 5 MG tablet Take 2.5mg Mon/Wed/Fri; 5mg all other days or as directed by [facility] 90 tablet 4     pilocarpine (PILOCAR) 1 % ophthalmic solution Place 1 drop into both eyes 4 times daily       STATIN NOT PRESCRIBED, INTENTIONAL, Statin not prescribed intentionally due to Intolerance (with supporting documentation of trying a statin at least once within the last 5 years) 0 each 0     fluticasone (FLONASE) 50 MCG/ACT nasal spray Spray 2 sprays into both nostrils daily (Patient taking differently: Spray 2 sprays into both nostrils daily as needed ) 1 Package 0     indomethacin (INDOCIN) 50 MG capsule Take 1 capsule (50 mg) by mouth 3 times daily With food as needed 30 capsule 1     brimonidine-timolol (COMBIGAN) 0.2-0.5 % ophthalmic solution 1 drop 2 times daily Morning and evening, 12 hours apart       acetaminophen (TYLENOL) 500 MG tablet Take 1-2 tablets (500-1,000 mg) by mouth every 6 hours as needed       aspirin 81 MG EC tablet Take 1 tablet (81 mg) by mouth daily 90 tablet 3     loratadine (CLARITIN) 10 MG tablet Take 10 mg by mouth daily.        "fish oil-omega-3 fatty acids (FISH OIL) 1000 MG capsule Take 1 capsule by mouth 3 times daily.       TRAVATAN Z (TRAVATAN Z) 0.004 % ophthalmic solution Instill 1 drop by ophthalmic route every day into affected eye(s) in the evening       Allergies   Allergen Reactions     Atorvastatin Calcium Other (See Comments)     Lipitor  Increase CR     Iodine      Penicillins      Sulfa Drugs      Sulfa (sulfonamide antibiotics)         Reviewed and updated as needed this visit by clinical staffTobacco  Allergies  Meds  Med Hx  Surg Hx  Fam Hx  Soc Hx      Reviewed and updated as needed this visit by Provider         ROS:  Constitutional, HEENT, cardiovascular, pulmonary, gi and gu systems are negative, except as otherwise noted.      OBJECTIVE:   /52 (BP Location: Left arm, Patient Position: Sitting, Cuff Size: Adult Regular)  Pulse 56  Temp 97.6  F (36.4  C) (Tympanic)  Resp 16  Ht 5' 7\" (1.702 m)  Wt 216 lb (98 kg)  SpO2 96%  BMI 33.83 kg/m2  Body mass index is 33.83 kg/(m^2).  GENERAL: healthy, alert and no distress  PSYCH: mentation appears normal, affect normal/bright    Diagnostic Test Results:  Results for orders placed or performed in visit on 08/24/17   Basic metabolic panel   Result Value Ref Range    Sodium 143 133 - 144 mmol/L    Potassium 4.0 3.4 - 5.3 mmol/L    Chloride 110 (H) 94 - 109 mmol/L    Carbon Dioxide 27 20 - 32 mmol/L    Anion Gap 6 3 - 14 mmol/L    Glucose 120 (H) 70 - 99 mg/dL    Urea Nitrogen 13 7 - 30 mg/dL    Creatinine 1.51 (H) 0.66 - 1.25 mg/dL    GFR Estimate 45 (L) >60 mL/min/1.7m2    GFR Estimate If Black 55 (L) >60 mL/min/1.7m2    Calcium 8.8 8.5 - 10.1 mg/dL   Hemoglobin A1c   Result Value Ref Range    Hemoglobin A1C 7.9 (H) 4.3 - 6.0 %   Lipid Profile   Result Value Ref Range    Cholesterol 131 <200 mg/dL    Triglycerides 131 <150 mg/dL    HDL Cholesterol 36 (L) >39 mg/dL    LDL Cholesterol Calculated 69 <100 mg/dL    Non HDL Cholesterol 95 <130 mg/dL   INR point of " care (  clinic ONLY)   Result Value Ref Range    INR Point of Care 2.8 (H) 0.86 - 1.14   Estimated Average Glucose   Result Value Ref Range    Estimated Average Glucose 180 mg/dL       ASSESSMENT/PLAN:     Diabetes Type II, A1c Controlled, insulin dependent   Associated with the following complications    Renal Complications:  CKD    Ophthalmologic Complications: None    Neurologic Complications: None    Peripheral Vascular Complications:  None    Other: None   Plan:  No changes in the patient's current treatment plan    Hyperlipidemia; controlled   Plan:  No changes in the patient's current treatment plan    Hypertension; controlled   Associated with the following complications:    CKD and Diabetes   Plan:  No changes in the patient's current treatment plan              ICD-10-CM    1. Type 2 diabetes mellitus with stage 3 chronic kidney disease, with long-term current use of insulin (H) E11.22     N18.3     Z79.4    2. Benign essential hypertension I10    3. Hyperlipidemia, unspecified hyperlipidemia type E78.5        FUTURE APPOINTMENTS:       - Follow-up visit in 3 months, future lab orders will be placed.    Lorelei Felix MD  [facility]     PANCREAS: Normal. SPLEEN: Lesion in the spleen measures 2.6 x 2.4 cm, unchanged from prior studies.. ADRENAL GLANDS: Normal. KIDNEYS AND URETERS: No hydronephrosis.  Symmetrical normal enhancement. URINARY BLADDER: Normal. REPRODUCTIVE ORGANS: No associated masses. BOWEL: Wall thickening within the ascending colon as well as the terminal ileum, new from comparison CT. LYMPH NODES: No abnormally enlarged nodes. PERITONEUM/RETROPERITONEUM: There is scattered stranding of the fat within the mesentery. Small areas of free fluid have developed within the peritoneal cavity. VESSELS: Aorta and IVC without significant abnormality.  Splenic vein, SMV, PV and hepatic veins demonstrate enhancement. ABDOMINAL WALL: Normal. BONES: No significant abnormality. OTHER FINDINGS: None.     New wall thickening within the ascending colon as well as the terminal ileum with adjacent inflammatory change. In addition there is free fluid within the peritoneal cavity. These findings are highly concerning for ischemic enterocolitis. Urgent surgical evaluation is suggested. Unchanged low-density lesion within the spleen. Electronically signed by Joe Sheikh    CT CHEST WO CONTRAST    Result Date: 8/21/2024  CT CHEST WITHOUT CONTRAST HISTORY: Pneumonia Comparison: 10/6/2023 TECHNICAL FACTORS: Noncontrast axial imaging was performed of the chest. Up-to-date CT equipment and radiation dose reduction techniques were employed. CT CHEST: There is mild hazy bibasilar groundglass opacity with prominent septal thickening noted. The appearance is suggestive of atypical pneumonia or edema. No suspicious parenchymal masses are identified. No mediastinal or hilar adenopathy is identified. No axillary adenopathy is identified. Arch size is prominent with coronary calcification noted. Limited views of the upper abdomen show no acute abnormality. Osseous structures are unremarkable.     Patchy groundglass airspace disease with septal thickening

## 2024-09-17 NOTE — PROGRESS NOTES
ANTICOAGULATION MANAGEMENT     Clement Voss 78 year old male is on warfarin with subtherapeutic INR result. (Goal INR 2.0-3.0)    Recent labs: (last 7 days)     08/08/23  0937   INR 1.9*       ASSESSMENT     Source(s): Chart Review  Previous INR was Therapeutic last 2(+) visits  Medication, diet, health changes since last INR chart reviewed; none identified         PLAN     Recommended plan for no diet, medication or health factor changes affecting INR     Dosing Instructions: Continue your current warfarin dose with next INR in 6 weeks       Summary  As of 8/8/2023      Full warfarin instructions:  2.5 mg every Mon, Wed, Fri; 5 mg all other days   Next INR check:  9/19/2023               Detailed voice message left for Huy with dosing instructions and follow up date.   Sent [employer] message with dosing and follow up instructions    Contact [phone] to schedule and with any changes, questions or concerns.     Education provided:   Contact [phone] with any changes, questions or concerns.     Plan made per [facility] anticoagulation protocol    Latosha Paris RN  Anticoagulation Clinic  8/8/2023    _______________________________________________________________________     Anticoagulation Episode Summary       Current INR goal:  2.0-3.0   TTR:  89.7 % (1 y)   Target end date:  Indefinite   Send INR reminders to:  [facility]    Indications    Pulmonary embolism and infarction (H) [I26.99]  Long-term (current) use of anticoagulants [Z79.01] [Z79.01]             Comments:  Lab in [place]. Arrives home in the afternoon after 2 PM             Anticoagulation Care Providers       Provider Role Specialty Phone number    Lorelei Felix MD Referring Family Medicine [phone]               [] : Resident

## 2024-09-18 ENCOUNTER — LAB (OUTPATIENT)
Dept: LAB | Facility: OTHER | Age: 79
End: 2024-09-18
Payer: MEDICARE

## 2024-09-18 ENCOUNTER — ANTICOAGULATION THERAPY VISIT (OUTPATIENT)
Dept: ANTICOAGULATION | Facility: OTHER | Age: 79
End: 2024-09-18
Attending: FAMILY MEDICINE
Payer: COMMERCIAL

## 2024-09-18 DIAGNOSIS — C61 PROSTATE CANCER (H): ICD-10-CM

## 2024-09-18 DIAGNOSIS — E78.2 MIXED HYPERLIPIDEMIA: ICD-10-CM

## 2024-09-18 DIAGNOSIS — E11.22 TYPE 2 DIABETES MELLITUS WITH STAGE 3B CHRONIC KIDNEY DISEASE, WITH LONG-TERM CURRENT USE OF INSULIN (H): ICD-10-CM

## 2024-09-18 DIAGNOSIS — N18.32 TYPE 2 DIABETES MELLITUS WITH STAGE 3B CHRONIC KIDNEY DISEASE, WITH LONG-TERM CURRENT USE OF INSULIN (H): ICD-10-CM

## 2024-09-18 DIAGNOSIS — Z79.01 LONG TERM CURRENT USE OF ANTICOAGULANT THERAPY: ICD-10-CM

## 2024-09-18 DIAGNOSIS — Z79.4 TYPE 2 DIABETES MELLITUS WITH STAGE 3B CHRONIC KIDNEY DISEASE, WITH LONG-TERM CURRENT USE OF INSULIN (H): ICD-10-CM

## 2024-09-18 DIAGNOSIS — I10 BENIGN ESSENTIAL HYPERTENSION: ICD-10-CM

## 2024-09-18 DIAGNOSIS — I26.99 PULMONARY EMBOLISM AND INFARCTION (H): ICD-10-CM

## 2024-09-18 DIAGNOSIS — I26.99 PULMONARY EMBOLISM AND INFARCTION (H): Primary | ICD-10-CM

## 2024-09-18 DIAGNOSIS — I48.20 CHRONIC ATRIAL FIBRILLATION (H): ICD-10-CM

## 2024-09-18 LAB
ALT SERPL W P-5'-P-CCNC: 45 U/L (ref 0–70)
ANION GAP SERPL CALCULATED.3IONS-SCNC: 13 MMOL/L (ref 7–15)
BUN SERPL-MCNC: 15.6 MG/DL (ref 8–23)
CALCIUM SERPL-MCNC: 9.3 MG/DL (ref 8.8–10.4)
CHLORIDE SERPL-SCNC: 94 MMOL/L (ref 98–107)
CHOLEST SERPL-MCNC: 95 MG/DL
CREAT SERPL-MCNC: 1.43 MG/DL (ref 0.67–1.17)
EGFRCR SERPLBLD CKD-EPI 2021: 50 ML/MIN/1.73M2
EST. AVERAGE GLUCOSE BLD GHB EST-MCNC: 171 MG/DL
FASTING STATUS PATIENT QL REPORTED: YES
FASTING STATUS PATIENT QL REPORTED: YES
GLUCOSE SERPL-MCNC: 109 MG/DL (ref 70–99)
HBA1C MFR BLD: 7.6 %
HCO3 SERPL-SCNC: 27 MMOL/L (ref 22–29)
HDLC SERPL-MCNC: 33 MG/DL
INR BLD: 2.3 (ref 0.9–1.1)
LDLC SERPL CALC-MCNC: 18 MG/DL
NONHDLC SERPL-MCNC: 62 MG/DL
POTASSIUM SERPL-SCNC: 3.5 MMOL/L (ref 3.4–5.3)
PSA SERPL DL<=0.01 NG/ML-MCNC: 9.19 NG/ML
SODIUM SERPL-SCNC: 134 MMOL/L (ref 135–145)
TRIGL SERPL-MCNC: 220 MG/DL

## 2024-09-18 PROCEDURE — 85610 PROTHROMBIN TIME: CPT | Mod: ZL

## 2024-09-18 PROCEDURE — 84153 ASSAY OF PSA TOTAL: CPT | Mod: ZL

## 2024-09-18 PROCEDURE — 84460 ALANINE AMINO (ALT) (SGPT): CPT | Mod: ZL

## 2024-09-18 PROCEDURE — 80061 LIPID PANEL: CPT | Mod: ZL

## 2024-09-18 PROCEDURE — 82565 ASSAY OF CREATININE: CPT | Mod: ZL

## 2024-09-18 PROCEDURE — 36415 COLL VENOUS BLD VENIPUNCTURE: CPT | Mod: ZL

## 2024-09-18 PROCEDURE — 83036 HEMOGLOBIN GLYCOSYLATED A1C: CPT | Mod: ZL

## 2024-09-18 NOTE — PROGRESS NOTES
ANTICOAGULATION MANAGEMENT     Clement Voss 79 year old male is on warfarin with therapeutic INR result. (Goal INR 2.0-3.0)    Recent labs: (last 7 days)     09/18/24  0853   INR 2.3*       ASSESSMENT     Source(s): Patient/ Care giver call     Warfarin doses taken: Warfarin taken as instructed  Diet: No new diet changes identified  New illness, injury, or hospitalization: No  Medication/supplement changes: None noted  Signs or symptoms of bleeding or clotting: No  Previous INR: Therapeutic last 2(+) visits  Additional findings: None     PLAN     Recommended plan for no diet, medication or health factor changes affecting INR     Dosing Instructions: Continue your current warfarin dose with next INR in 6 weeks       Summary  As of 9/18/2024      Full warfarin instructions:  2.5 mg every Mon, Wed, Fri; 5 mg all other days   Next INR check:  10/30/2024               Telephone call with Huy who verbalizes understanding and agrees to plan    Lab visit scheduled    Education provided: Please call back if any changes to your diet, medications or how you've been taking warfarin    Plan made per [facility] anticoagulation protocol    Corry Avelar RN  Anticoagulation Clinic  9/18/2024    _______________________________________________________________________     Anticoagulation Episode Summary       Current INR goal:  2.0-3.0   TTR:  77.4% (1 y)   Target end date:  Indefinite   Send INR reminders to:  [facility]    Indications    Pulmonary embolism and infarction (H) [I26.99]  Long-term (current) use of anticoagulants [Z79.01] [Z79.01]             Comments:  Lab in [facility] Arrives home in the afternoon after 2 PM             Anticoagulation Care Providers       Provider Role Specialty Phone number    Lorelei Felix MD Referring Family Medicine [phone]

## 2024-09-20 ENCOUNTER — MYC MEDICAL ADVICE (OUTPATIENT)
Dept: FAMILY MEDICINE | Facility: OTHER | Age: 79
End: 2024-09-20

## 2024-09-20 ENCOUNTER — OFFICE VISIT (OUTPATIENT)
Dept: FAMILY MEDICINE | Facility: OTHER | Age: 79
End: 2024-09-20
Attending: FAMILY MEDICINE
Payer: COMMERCIAL

## 2024-09-20 VITALS
TEMPERATURE: 96.8 F | BODY MASS INDEX: 33.83 KG/M2 | OXYGEN SATURATION: 97 % | WEIGHT: 203.3 LBS | RESPIRATION RATE: 16 BRPM | HEART RATE: 60 BPM | DIASTOLIC BLOOD PRESSURE: 68 MMHG | SYSTOLIC BLOOD PRESSURE: 128 MMHG

## 2024-09-20 DIAGNOSIS — N18.32 STAGE 3B CHRONIC KIDNEY DISEASE (H): ICD-10-CM

## 2024-09-20 DIAGNOSIS — I48.20 CHRONIC ATRIAL FIBRILLATION (H): ICD-10-CM

## 2024-09-20 DIAGNOSIS — Z00.00 ENCOUNTER FOR MEDICARE ANNUAL WELLNESS EXAM: Primary | ICD-10-CM

## 2024-09-20 DIAGNOSIS — I10 BENIGN ESSENTIAL HYPERTENSION: ICD-10-CM

## 2024-09-20 DIAGNOSIS — I25.10 ASCVD (ARTERIOSCLEROTIC CARDIOVASCULAR DISEASE): ICD-10-CM

## 2024-09-20 DIAGNOSIS — I50.9 CONGESTIVE HEART FAILURE, UNSPECIFIED HF CHRONICITY, UNSPECIFIED HEART FAILURE TYPE (H): ICD-10-CM

## 2024-09-20 DIAGNOSIS — Z79.4 TYPE 2 DIABETES MELLITUS WITH STAGE 3B CHRONIC KIDNEY DISEASE, WITH LONG-TERM CURRENT USE OF INSULIN (H): ICD-10-CM

## 2024-09-20 DIAGNOSIS — C61 PROSTATE CANCER (H): ICD-10-CM

## 2024-09-20 DIAGNOSIS — E11.22 TYPE 2 DIABETES MELLITUS WITH STAGE 3B CHRONIC KIDNEY DISEASE, WITH LONG-TERM CURRENT USE OF INSULIN (H): ICD-10-CM

## 2024-09-20 DIAGNOSIS — E78.2 MIXED HYPERLIPIDEMIA: ICD-10-CM

## 2024-09-20 DIAGNOSIS — N18.32 TYPE 2 DIABETES MELLITUS WITH STAGE 3B CHRONIC KIDNEY DISEASE, WITH LONG-TERM CURRENT USE OF INSULIN (H): ICD-10-CM

## 2024-09-20 PROCEDURE — G0439 PPPS, SUBSEQ VISIT: HCPCS | Performed by: FAMILY MEDICINE

## 2024-09-20 PROCEDURE — G0463 HOSPITAL OUTPT CLINIC VISIT: HCPCS

## 2024-09-20 PROCEDURE — 99214 OFFICE O/P EST MOD 30 MIN: CPT | Mod: 25 | Performed by: FAMILY MEDICINE

## 2024-09-20 RX ORDER — PEN NEEDLE, DIABETIC 31 GX3/16"
NEEDLE, DISPOSABLE MISCELLANEOUS
Qty: 100 EACH | Refills: 3 | Status: SHIPPED | OUTPATIENT
Start: 2024-09-20

## 2024-09-20 ASSESSMENT — PAIN SCALES - GENERAL: PAINLEVEL: NO PAIN (0)

## 2024-09-20 NOTE — PROGRESS NOTES
"Preventive Care Visit  [facility]  Lorelei St Steve MD, Family Medicine  Sep 20, 2024      Assessment & Plan       ICD-10-CM    1. Encounter for Medicare annual wellness exam  Z00.00       2. Type 2 diabetes mellitus with stage 3b chronic kidney disease, with long-term current use of insulin (H)  E11.22 insulin pen needle (EASY TOUCH PEN NEEDLES) 31G X 5 MM miscellaneous    N18.32 Hemoglobin A1c    Z79.4       3. Mixed hyperlipidemia  E78.2 ALT     Lipid Profile      4. Benign essential hypertension  I10 Basic metabolic panel      5. Chronic atrial fibrillation (H)  I48.20       6. ASCVD (arteriosclerotic cardiovascular disease)  I25.10       7. Congestive heart failure, unspecified HF chronicity, unspecified heart failure type (H)  I50.9       8. Stage 3b chronic kidney disease (H)  N18.32 Basic metabolic panel      9. Prostate cancer (H)  C61          Labs for chronic conditions reviewed - no medication changes are recommended, conditions are overall stable.  Will refill medications when due.  Will send PSA results to patient's Urologist.  Follow-up in three months for diabetes, will place future lab orders    Patient has been advised of split billing requirements and indicates understanding: Yes        BMI  Estimated body mass index is 33.83 kg/m  as calculated from the following:    Height as of 11/6/23: 1.651 m (5' 5\").    Weight as of this encounter: 92.2 kg (203 lb 4.8 oz).   Weight management plan: Discussed healthy diet and exercise guidelines    Counseling  Appropriate preventive services were addressed with this patient via screening, questionnaire, or discussion as appropriate for fall prevention, nutrition, physical activity, Tobacco-use cessation, social engagement, weight loss and cognition.  Checklist reviewing preventive services available has been given to the patient.  Reviewed patient's diet, addressing concerns and/or questions.   He is at risk for lack of exercise and has been provided with " information to increase physical activity for the benefit of his well-being.   He is at risk for psychosocial distress and has been provided with information to reduce risk.       Return in about 3 months (around 12/20/2024) for Diabetic Follow-up, Chronic Disease Management, Medication review.      Fernanda Son is a 79 year old, presenting for the following:  Physical          Health Care Directive  Patient does not have a Health Care Directive or Living Will: Patient states has Advance Directive and will bring in a copy to clinic.    HPI    Diabetes Follow-up    How often are you checking your blood sugar? Three times daily  Blood sugar testing frequency justification:  Uncontrolled diabetes  What time of day are you checking your blood sugars (select all that apply)?  Before and after meals  Have you had any blood sugars above 200?  Yes   Have you had any blood sugars below 70?  No  What symptoms do you notice when your blood sugar is low?  Shaky and Dizzy  What concerns do you have today about your diabetes? None   Do you have any of these symptoms? (Select all that apply)  No numbness or tingling in feet.  No redness, sores or blisters on feet.  No complaints of excessive thirst.  No reports of blurry vision.  No significant changes to weight.      BP Readings from Last 2 Encounters:   09/20/24 128/68   06/18/24 138/80     Hemoglobin A1C (%)   Date Value   09/18/2024 7.6 (H)   06/14/2024 7.3 (H)   04/27/2021 9.7 (H)   01/26/2021 9.4 (H)     LDL Cholesterol Calculated (mg/dL)   Date Value   09/18/2024 18   06/14/2024 19   04/27/2021 12   01/26/2021 26             Hyperlipidemia Follow-Up    Are you regularly taking any medication or supplement to lower your cholesterol?   Yes- Crestor  Are you having muscle aches or other side effects that you think could be caused by your cholesterol lowering medication?  No    Hypertension Follow-up    Do you check your blood pressure regularly outside of the clinic? Yes    Are you following a low salt diet? Yes  Are your blood pressures ever more than 140 on the top number (systolic) OR more   than 90 on the bottom number (diastolic), for example 140/90? No    Atrial Fibrillation Follow-up    Symptoms: no recent chest pain, significant palpitations, dizziness/lightheadedness, dyspnea, or increased peripheral edema.  Stroke prevention: Coumadin (Warfarin)        11/6/2023     8:49 AM 12/7/2023     9:19 AM 3/18/2024     9:28 AM 6/18/2024     9:24 AM 9/20/2024     8:54 AM   Date   Pulse 68 68 67 65 60     Current        Vascular Disease Follow-up    How often do you take nitroglycerin? Never  Do you take an aspirin every day? Yes    Heart Failure Follow-up   Are you experiencing any shortness of breath? No  Are you experiencing any swelling in your legs or feet?  No  Are you using more pillows than usual? No  Do you cough at night?  No  Do you check your weight daily?  Yes  Have you had a weight change recently?  No  Are you having any of the following side effects from your medications? (Select all that apply)  The patient does not report symptoms of dizziness, fatigue, cough, swelling, or slow heart beat.  Since your last visit, how many times have you gone to the cardiologist, urgent care, emergency room, or hospital because of your heart failure?   Just routine visits, no acute visits  Last Echo: No results found.    Chronic Kidney Disease Follow-up    Do you take any over the counter pain medicine?: No        9/20/2024   General Health   How would you rate your overall physical health? Good   Feel stress (tense, anxious, or unable to sleep) Only a little      (!) STRESS CONCERN      9/20/2024   Nutrition   Diet: Low salt    Carbohydrate counting       Multiple values from one day are sorted in reverse-chronological order         9/20/2024   Exercise   Days per week of moderate/strenous exercise 3 days            9/20/2024   Social Factors   Frequency of gathering with friends or  relatives Once a week   Worry food won't last until get money to buy more Patient declined   Food not last or not have enough money for food? No   Do you have housing? (Housing is defined as stable permanent housing and does not include staying ouside in a car, in a tent, in an abandoned building, in an overnight shelter, or couch-surfing.) Yes   Are you worried about losing your housing? No   Lack of transportation? No   Unable to get utilities (heat,electricity)? No            2024   Fall Risk   Fallen 2 or more times in the past year? No   Trouble with walking or balance? No             2024   Activities of Daily Living- Home Safety   Needs help with the following daily activites None of the above   Safety concerns in the home None of the above            2024   Dental   Dentist two times every year? (!) DECLINE            2024   Hearing Screening   Hearing concerns? None of the above            2024   Driving Risk Screening   Patient/family members have concerns about driving No            2024   General Alertness/Fatigue Screening   Have you been more tired than usual lately? No            2024   Urinary Incontinence Screening   Bothered by leaking urine in past 6 months No            2024   TB Screening   Were you born outside of the [place]? No                  2024   Substance Use   Alcohol more than 3/day or more than 7/wk No   Do you have a current opioid prescription? No   How severe/bad is pain from 1 to 10? 0/10 (No Pain)   Do you use any other substances recreationally? No        Social History     Tobacco Use    Smoking status: Former     Current packs/day: 0.00     Types: Cigarettes     Quit date: 1964     Years since quittin.1     Passive exposure: Past    Smokeless tobacco: Never   Vaping Use    Vaping status: Never Used   Substance Use Topics    Alcohol use: No    Drug use: No               Reviewed and updated as needed this visit by Provider    Tobacco  Allergies  Meds  Problems  Med Hx  Surg Hx  Fam Hx            Patient Active Problem List   Diagnosis    Type 2 diabetes mellitus with chronic kidney disease, with long-term current use of insulin (H)    Mixed hyperlipidemia    Benign essential hypertension    Gout    Pulmonary embolism and infarction (H)    ACP (advance care planning)    Long-term (current) use of anticoagulants [Z79.01]    Morbid obesity (H)    CKD (chronic kidney disease) stage 3, GFR 30-59 ml/min (H)    Abnormal stress test on 1/10/2020    Regional wall motion abnormality of heart    Diastolic dysfunction grade 2 on 1/20/2020    Severe aortic valve regurgitation    Moderate mitral regurgitation    Ascending aortic aneurysm-severe    Anticoagulated on Coumadin    History of DVT on 12/30/2013    History of pulmonary embolism    History of tobacco abuse quitting 8/12/1964    Aortic root aneurysm (H24)    ASCVD (arteriosclerotic cardiovascular disease)    CALDERON (dyspnea on exertion)    Murmur    Other dysphagia    Postoperative anemia due to acute blood loss    S/P aortic valve replacement    S/P CABG x 2    Aortic valve insufficiency, etiology of cardiac valve disease unspecified    Chronic left ventricular systolic dysfunction    Congestive heart failure, unspecified HF chronicity, unspecified heart failure type (H)    Optic nerve disease    Primary open-angle glaucoma    Unstable angina (H)    Chronic atrial fibrillation (H)     Past Surgical History:   Procedure Laterality Date    APPENDECTOMY  2008    CABG, MIN INV, 2 ARTERL GRFT  02/10/2020    2 vessel bypass    CARPAL TUNNEL RELEASE RT/LT Right 07/20/2022    CHOLECYSTECTOMY  1984    COLONOSCOPY N/A 05/16/2019    Procedure: COLONOSCOPY;  Surgeon: Benito Saldana MD;  Location: [facility] OR    History of PE of right lung 3/2013[      left eye glacoma  11/30/21    PHACOEMULSIFICATION WITH STANDARD INTRAOCULAR LENS IMPLANT Left 04/19/2018    Procedure: PHACOEMULSIFICATION WITH  STANDARD INTRAOCULAR LENS IMPLANT;  CATARACT EXTRACTION LEFT EYE WITH IMPLANT, ISTENT LEFT EYE;  Surgeon: Kush Almaraz MD;  Location: [facility] OR    PHACOEMULSIFICATION WITH STANDARD INTRAOCULAR LENS IMPLANT Right 2018    Procedure: PHACOEMULSIFICATION WITH STANDARD INTRAOCULAR LENS IMPLANT;  CATARACT EXTRACTION RIGHT EYE WITH IMPLANT,WITH ISTENT ATTEMPTED;  Surgeon: Kush Almaraz MD;  Location: [facility] OR    TRABECULAR MICRO-BYPASS WITH ISTENT Left 2018    Procedure: TRABECULAR MICRO-BYPASS WITH ISTENT;;  Surgeon: Kush Almaraz MD;  Location: [facility]    TRABECULAR MICRO-BYPASS WITH ISTENT  2018    Procedure: TRABECULAR MICRO-BYPASS WITH ISTENT;;  Surgeon: Kush Almaraz MD;  Location: [facility]       Social History     Tobacco Use    Smoking status: Former     Current packs/day: 0.00     Types: Cigarettes     Quit date: 1964     Years since quittin.1     Passive exposure: Past    Smokeless tobacco: Never   Substance Use Topics    Alcohol use: No     Family History   Problem Relation Age of Onset    Heart Disease Father 71        heart disease; cause of death    Other - See Comments Mother 79        old age; cause of death         Current Outpatient Medications   Medication Sig Dispense Refill    acetaminophen (TYLENOL) 500 MG tablet Take 1-2 tablets (500-1,000 mg) by mouth every 6 hours as needed      aspirin 81 MG EC tablet Take 1 tablet (81 mg) by mouth daily 90 tablet 3    blood glucose monitoring (NO BRAND SPECIFIED) meter device kit Use to test blood sugar 3 times daily or as directed. 1 kit 1    chlorthalidone (HYGROTON) 25 MG tablet TAKE 1 TABLET(25 MG) BY MOUTH DAILY 90 tablet 3    CONTOUR NEXT TEST test strip USE TO TEST BLOOD SUGARS THREE TIMES DAILY 200 strip 3    dulaglutide (TRULICITY) 1.5 MG/0.5ML pen Inject 1.5 mg Subcutaneous every 7 days Do not send to pharmacy 8 mL 0    FEROSUL 325 (65 Fe) MG tablet TAKE 1 TABLET(325 MG) BY MOUTH DAILY WITH BREAKFAST 30 tablet 6     fluticasone (FLONASE) 50 MCG/ACT nasal spray Spray 2 sprays into both nostrils daily 1 Package 0    furosemide (LASIX) 20 MG tablet Take 20 mg by mouth      indomethacin (INDOCIN) 50 MG capsule Take 1 capsule (50 mg) by mouth 3 times daily With food as needed 60 capsule 1    insulin pen needle (EASY TOUCH PEN NEEDLES) 31G X 5 MM miscellaneous Use 1 pen needles daily or as directed. 100 each 3    LANTUS SOLOSTAR 100 UNIT/ML soln ADMINISTER 36 UNITS UNDER THE SKIN AT BEDTIME 30 mL 3    loratadine (CLARITIN) 10 MG tablet Take 10 mg by mouth daily.      losartan (COZAAR) 100 MG tablet TAKE 1 TABLET(100 MG) BY MOUTH DAILY 90 tablet 3    metoprolol tartrate (LOPRESSOR) 25 MG tablet TAKE 2 TABLETS(50 MG) BY MOUTH TWICE DAILY 360 tablet 2    potassium chloride ER (K-TAB/KLOR-CON) 10 MEQ CR tablet TAKE 1 TABLET(10 MEQ) BY MOUTH DAILY 90 tablet 2    rosuvastatin (CRESTOR) 5 MG tablet TAKE 1 TABLET(5 MG) BY MOUTH DAILY 90 tablet 2    STOOL SOFTENER 100 MG capsule TAKE 1 CAPSULE(100 MG) BY MOUTH TWICE DAILY 60 capsule 11    warfarin ANTICOAGULANT (COUMADIN) 2.5 MG tablet TAKE 1 TABLET BY MOUTH MONDAY, WEDNESDAY AND FRIDAY AND 2 TABLETS BY MOUTH ALL OTHER DAYS 180 tablet 1     Allergies   Allergen Reactions    Atorvastatin      Other reaction(s): Other  Caused increased creatinine.     Atorvastatin Calcium Other (See Comments)     Lipitor  Increase CR    Barley Grass Unknown    Iodine     Penicillins     Procaine Unknown    Shellfish-Derived Products Unknown    Sulfa Antibiotics      Sulfa (sulfonamide antibiotics)    Sulfacetamide      Current providers sharing in care for this patient include:  Patient Care Team:  Lorelei Felix MD as PCP - General  Lorelei Felix MD as Assigned PCP  Hattie Sheehan LPN as Mikayla Pro RN as Diabetes Educator (Diabetes Education)    The following health maintenance items are reviewed in Epic and correct as of today:  Health Maintenance   Topic Date Due    HF ACTION PLAN   "Never done    ZOSTER IMMUNIZATION (1 of 2) Never done    MEDICARE ANNUAL WELLNESS VISIT  Never done    RSV VACCINE (1 - 1-dose 75+ series) Never done    CBC  08/24/2023    HEMOGLOBIN  08/24/2023    INFLUENZA VACCINE (1) 09/01/2024    COVID-19 Vaccine (1 - 2024-25 season) Never done    EYE EXAM  11/09/2024    DIABETIC FOOT EXAM  12/07/2024    A1C  03/18/2025    BMP  03/18/2025    MICROALBUMIN  06/14/2025    ALT  09/18/2025    LIPID  09/18/2025    FALL RISK ASSESSMENT  09/20/2025    ADVANCE CARE PLANNING  09/20/2029    DTAP/TDAP/TD IMMUNIZATION (4 - Td or Tdap) 04/04/2034    TSH W/FREE T4 REFLEX  Completed    PHQ-2 (once per calendar year)  Completed    Pneumococcal Vaccine: 65+ Years  Completed    URINALYSIS  Completed    HPV IMMUNIZATION  Aged Out    MENINGITIS IMMUNIZATION  Aged Out    RSV MONOCLONAL ANTIBODY  Aged Out    COLORECTAL CANCER SCREENING  Discontinued         Review of Systems  Constitutional, HEENT, cardiovascular, pulmonary, gi and gu systems are negative, except as otherwise noted.     Objective    Exam  /68 (BP Location: Right arm, Patient Position: Sitting, Cuff Size: Adult Regular)   Pulse 60   Temp 96.8  F (36  C) (Tympanic)   Resp 16   Wt 92.2 kg (203 lb 4.8 oz)   SpO2 97%   BMI 33.83 kg/m     Estimated body mass index is 33.83 kg/m  as calculated from the following:    Height as of 11/6/23: 1.651 m (5' 5\").    Weight as of this encounter: 92.2 kg (203 lb 4.8 oz).    Physical Exam  GENERAL: alert and no distress  EYES: Eyes grossly normal to inspection, PERRL and conjunctivae and sclerae normal  HENT: ear canals and TM's normal, nose and mouth without ulcers or lesions  NECK: no adenopathy and no carotid bruits  RESP: lungs clear to auscultation - no rales, rhonchi or wheezes  CV: irregularly irregular rhythm and normal S1 S2, no S3 or S4  MS: no gross musculoskeletal defects noted, no edema  SKIN: no suspicious lesions or rashes  NEURO: Normal strength and tone, mentation intact and " speech normal  PSYCH: mentation appears normal, affect normal/bright         9/20/2024   Mini Cog   Clock Draw Score 2 Normal   3 Item Recall 3 objects recalled   Mini Cog Total Score 5                 Signed Electronically by: Lorelei Felix MD

## 2024-10-23 ENCOUNTER — TRANSFERRED RECORDS (OUTPATIENT)
Dept: HEALTH INFORMATION MANAGEMENT | Facility: CLINIC | Age: 79
End: 2024-10-23

## 2024-10-23 LAB — RETINOPATHY: NORMAL

## 2024-10-28 ENCOUNTER — HOSPITAL ENCOUNTER (EMERGENCY)
Facility: HOSPITAL | Age: 79
Discharge: HOME | End: 2024-10-28
Attending: NURSE PRACTITIONER | Admitting: NURSE PRACTITIONER
Payer: MEDICARE

## 2024-10-28 VITALS
HEART RATE: 72 BPM | OXYGEN SATURATION: 96 % | SYSTOLIC BLOOD PRESSURE: 153 MMHG | RESPIRATION RATE: 20 BRPM | TEMPERATURE: 97.1 F | DIASTOLIC BLOOD PRESSURE: 78 MMHG

## 2024-10-28 DIAGNOSIS — J06.9 UPPER RESPIRATORY INFECTION: Primary | ICD-10-CM

## 2024-10-28 LAB
FLUAV RNA SPEC QL NAA+PROBE: NEGATIVE
FLUBV RNA RESP QL NAA+PROBE: NEGATIVE
GROUP A STREP BY PCR: NOT DETECTED
RSV RNA SPEC NAA+PROBE: NEGATIVE
SARS-COV-2 RNA RESP QL NAA+PROBE: NEGATIVE

## 2024-10-28 PROCEDURE — 87651 STREP A DNA AMP PROBE: CPT | Performed by: NURSE PRACTITIONER

## 2024-10-28 PROCEDURE — 99213 OFFICE O/P EST LOW 20 MIN: CPT | Performed by: NURSE PRACTITIONER

## 2024-10-28 PROCEDURE — 87637 SARSCOV2&INF A&B&RSV AMP PRB: CPT | Performed by: NURSE PRACTITIONER

## 2024-10-28 PROCEDURE — G0463 HOSPITAL OUTPT CLINIC VISIT: HCPCS

## 2024-10-28 ASSESSMENT — ACTIVITIES OF DAILY LIVING (ADL)
ADLS_ACUITY_SCORE: 0
ADLS_ACUITY_SCORE: 0

## 2024-10-28 ASSESSMENT — ENCOUNTER SYMPTOMS
RHINORRHEA: 1
FEVER: 0
COUGH: 1
SORE THROAT: 1
CHILLS: 0

## 2024-10-28 NOTE — ED TRIAGE NOTES
Pt presents with c/o having increased cough and sore throat that started 2 days ago   Denies any known sick contact   Pt reports has been using nasal spray with no relief

## 2024-10-28 NOTE — DISCHARGE INSTRUCTIONS
You tested negative for COVID-19, influenza, RSV and strep throat.  Your symptoms are consistent with a cold.    Continue using the nasal spray as needed.  Tylenol as needed for pain.  Drink plenty of fluids.  Also consider the use of cough drops or throat lozenges.    Follow-up with your doctor as needed.    Return to urgent care or Emergency Department for any worsening or concerning symptoms.

## 2024-10-28 NOTE — ED PROVIDER NOTES
History     Chief Complaint   Patient presents with    URI     HPI  Clement Voss is a 79 year old male who presents with his wife for evaluation of URI symptoms that started yesterday.  Patient reports a cough, rhinorrhea and notes that he has been occasionally coughing up some phlegm.  Reports slight chest pain with coughing yesterday that is since resolved.  No fevers or chills.  Eating and drinking okay.  He reports waking up with nasal congestion so he started using Flonase nasal spray.  He ran out of it and has now switched to Afrin.  Reports that he used to most smoke cigarettes about 50 years ago.  No history of asthma or COPD.  No known recent ill contacts.    Allergies:  Allergies   Allergen Reactions    Atorvastatin      Other reaction(s): Other  Caused increased creatinine.     Atorvastatin Calcium Other (See Comments)     Lipitor  Increase CR    Barley Grass Unknown    Iodine     Penicillins     Procaine Unknown    Shellfish-Derived Products Unknown    Sulfa Antibiotics      Sulfa (sulfonamide antibiotics)    Sulfacetamide        Problem List:    Patient Active Problem List    Diagnosis Date Noted    Chronic atrial fibrillation (H) 08/18/2022     Priority: Medium    Congestive heart failure, unspecified HF chronicity, unspecified heart failure type (H) 01/28/2021     Priority: Medium    Chronic left ventricular systolic dysfunction 10/27/2020     Priority: Medium    Other dysphagia 02/21/2020     Priority: Medium    Abnormal stress test on 1/10/2020 02/18/2020     Priority: Medium    Regional wall motion abnormality of heart 02/18/2020     Priority: Medium    Diastolic dysfunction grade 2 on 1/20/2020 02/18/2020     Priority: Medium    Severe aortic valve regurgitation 02/18/2020     Priority: Medium    Moderate mitral regurgitation 02/18/2020     Priority: Medium    Ascending aortic aneurysm-severe 02/18/2020     Priority: Medium    Anticoagulated on Coumadin 02/18/2020     Priority: Medium     History of DVT on 12/30/2013 02/18/2020     Priority: Medium    History of pulmonary embolism 02/18/2020     Priority: Medium    History of tobacco abuse quitting 8/12/1964 02/18/2020     Priority: Medium    S/P aortic valve replacement 02/17/2020     Priority: Medium    Postoperative anemia due to acute blood loss 02/10/2020     Priority: Medium    S/P CABG x 2 02/10/2020     Priority: Medium    ASCVD (arteriosclerotic cardiovascular disease) 02/03/2020     Priority: Medium    Aortic root aneurysm 01/28/2020     Priority: Medium    CALDERON (dyspnea on exertion) 01/28/2020     Priority: Medium    Murmur 01/28/2020     Priority: Medium    Aortic valve insufficiency, etiology of cardiac valve disease unspecified 01/28/2020     Priority: Medium    Unstable angina (H) 01/28/2020     Priority: Medium    CKD (chronic kidney disease) stage 3, GFR 30-59 ml/min (H) 09/24/2019     Priority: Medium    Morbid obesity (H) 05/26/2017     Priority: Medium    Long-term (current) use of anticoagulants [Z79.01] 08/04/2016     Priority: Medium    ACP (advance care planning) 03/14/2013     Priority: Medium     Advance Care Planning 8/26/2016: ACP Review of Chart / Resources Provided:  Reviewed chart for advance care plan.  Clement Voss has no plan or code status on file. Discussed available resources and provided with information. Confirmed code status reflects current choices pending further ACP discussions.  Confirmed/documented legally designated decision makers.  Added by Jenifer Villa            Pulmonary embolism and infarction (H) 09/24/2012     Priority: Medium    Gout 01/10/2011     Priority: Medium    Optic nerve disease 10/19/2009     Priority: Medium    Primary open-angle glaucoma 04/13/2009     Priority: Medium    Mixed hyperlipidemia 10/10/2005     Priority: Medium    Benign essential hypertension 01/03/2005     Priority: Medium    Type 2 diabetes mellitus with chronic kidney disease, with long-term current use of insulin  (H) 08/02/2004     Priority: Medium        Past Medical History:    Past Medical History:   Diagnosis Date    Carpal tunnel syndrome of right wrist 07/20/2022    Chronic atrial fibrillation (H) 8/18/2022    Chronic left ventricular systolic dysfunction 10/27/2020    CKD (chronic kidney disease) stage 3, GFR 30-59 ml/min (H) 9/24/2019    DVT (deep venous thrombosis) (H)     Gout 1/10/2011    Gout, unspecified 01/10/2011    Optic nerve disease 10/19/2009    Other and unspecified hyperlipidemia 10/10/2005    Pulmonary embolism (H)     Type II or unspecified type diabetes mellitus without mention of complication, not stated as uncontrolled 08/02/2004    Unspecified essential hypertension 01/03/2005       Past Surgical History:    Past Surgical History:   Procedure Laterality Date    APPENDECTOMY  2008    CABG, MIN INV, 2 ARTERL GRFT  02/10/2020    2 vessel bypass    CARPAL TUNNEL RELEASE RT/LT Right 07/20/2022    CHOLECYSTECTOMY  1984    COLONOSCOPY N/A 05/16/2019    Procedure: COLONOSCOPY;  Surgeon: Benito Saldana MD;  Location: [facility]    History of PE of right lung 3/2013[      left eye glacoma  11/30/21    PHACOEMULSIFICATION WITH STANDARD INTRAOCULAR LENS IMPLANT Left 04/19/2018    Procedure: PHACOEMULSIFICATION WITH STANDARD INTRAOCULAR LENS IMPLANT;  CATARACT EXTRACTION LEFT EYE WITH IMPLANT, ISTENT LEFT EYE;  Surgeon: Kush Almaraz MD;  Location: [facility]    PHACOEMULSIFICATION WITH STANDARD INTRAOCULAR LENS IMPLANT Right 05/03/2018    Procedure: PHACOEMULSIFICATION WITH STANDARD INTRAOCULAR LENS IMPLANT;  CATARACT EXTRACTION RIGHT EYE WITH IMPLANT,WITH ISTENT ATTEMPTED;  Surgeon: Kush Almaraz MD;  Location: [facility]    TRABECULAR MICRO-BYPASS WITH ISTENT Left 04/19/2018    Procedure: TRABECULAR MICRO-BYPASS WITH ISTENT;;  Surgeon: Kush Almaraz MD;  Location: [facility]    TRABECULAR MICRO-BYPASS WITH ISTENT  05/03/2018    Procedure: TRABECULAR MICRO-BYPASS WITH ISTENT;;  Surgeon: Kush Almaraz  MD;  Location: [facility]       Family History:    Family History   Problem Relation Age of Onset    Heart Disease Father 71        heart disease; cause of death    Other - See Comments Mother 79        old age; cause of death       Social History:  Marital Status:   [2]  Social History     Tobacco Use    Smoking status: Former     Current packs/day: 0.00     Types: Cigarettes     Quit date: 1964     Years since quittin.2     Passive exposure: Past    Smokeless tobacco: Never   Vaping Use    Vaping status: Never Used   Substance Use Topics    Alcohol use: No    Drug use: No        Medications:    acetaminophen (TYLENOL) 500 MG tablet  aspirin 81 MG EC tablet  blood glucose monitoring (NO BRAND SPECIFIED) meter device kit  chlorthalidone (HYGROTON) 25 MG tablet  CONTOUR NEXT TEST test strip  dulaglutide (TRULICITY) 1.5 MG/0.5ML pen  FEROSUL 325 (65 Fe) MG tablet  fluticasone (FLONASE) 50 MCG/ACT nasal spray  indomethacin (INDOCIN) 50 MG capsule  insulin pen needle (EASY TOUCH PEN NEEDLES) 31G X 5 MM miscellaneous  LANTUS SOLOSTAR 100 UNIT/ML soln  losartan (COZAAR) 100 MG tablet  metoprolol tartrate (LOPRESSOR) 25 MG tablet  potassium chloride ER (K-TAB/KLOR-CON) 10 MEQ CR tablet  rosuvastatin (CRESTOR) 5 MG tablet  warfarin ANTICOAGULANT (COUMADIN) 2.5 MG tablet  furosemide (LASIX) 20 MG tablet  loratadine (CLARITIN) 10 MG tablet  STOOL SOFTENER 100 MG capsule          Review of Systems   Constitutional:  Negative for chills and fever.   HENT:  Positive for congestion, postnasal drip, rhinorrhea and sore throat.    Respiratory:  Positive for cough.    Cardiovascular:  Negative for chest pain (With coughing-resolved).   All other systems reviewed and are negative.      Physical Exam   BP: 153/78  Pulse: 72  Temp: 97.1  F (36.2  C)  Resp: 20  SpO2: 96 %      Physical Exam  Vitals and nursing note reviewed.   Constitutional:       General: He is not in acute distress.     Appearance: Normal appearance. He  is well-developed. He is not diaphoretic.   HENT:      Head: Normocephalic and atraumatic.      Right Ear: Tympanic membrane and ear canal normal.      Left Ear: Tympanic membrane and ear canal normal.      Nose: Nose normal.      Mouth/Throat:      Mouth: Mucous membranes are moist.   Eyes:      Pupils: Pupils are equal, round, and reactive to light.   Cardiovascular:      Rate and Rhythm: Normal rate and regular rhythm.      Heart sounds: Normal heart sounds.   Pulmonary:      Effort: Pulmonary effort is normal. No respiratory distress.      Breath sounds: Normal breath sounds. No wheezing, rhonchi or rales.   Musculoskeletal:      Cervical back: Normal range of motion and neck supple.   Skin:     General: Skin is warm and dry.      Coloration: Skin is not pale.   Neurological:      Mental Status: He is alert and oriented to person, place, and time.         ED Course        Procedures       Results for orders placed or performed during the hospital encounter of 10/28/24 (from the past 24 hours)   Group A Streptococcus PCR Throat Swab    Specimen: Throat; Swab   Result Value Ref Range    Group A strep by PCR Not Detected Not Detected    Narrative    The Xpert Xpress Strep A test, performed on the [employer], is a rapid, qualitative in vitro diagnostic test for the detection of Streptococcus pyogenes (Group A ß-hemolytic Streptococcus, Strep A) in throat swab specimens from patients with signs and symptoms of pharyngitis. The Xpert Xpress Strep A test can be used as an aid in the diagnosis of Group A Streptococcal pharyngitis. The assay is not intended to monitor treatment for Group A Streptococcus infections. The Xpert Xpress Strep A test utilizes an automated real-time polymerase chain reaction (PCR) to detect Streptococcus pyogenes DNA.   Symptomatic Influenza A/B, RSV, & SARS-CoV2 PCR (COVID-19) Nasopharyngeal    Specimen: Nasopharyngeal; Swab   Result Value Ref Range    Influenza A PCR  Negative Negative    Influenza B PCR Negative Negative    RSV PCR Negative Negative    SARS CoV2 PCR Negative Negative    Narrative    Testing was performed using the Xpert Xpress CoV2/Flu/RSV Assay on the [employer] Instrument. This test should be ordered for the detection of SARS-CoV2, influenza, and RSV viruses in individuals with signs and symptoms of respiratory tract infection. This test is for in vitro diagnostic use under the [place] FDA for laboratories certified under CLIA to perform high or moderate complexity testing. This test has been [place] FDA cleared. A negative result does not rule out the presence of PCR inhibitors in the specimen or target RNA in concentration below the limit of detection for the assay. If only one viral target is positive but coinfection with multiple targets is suspected, the sample should be re-tested with another FDA cleared, approved, or authorized test, if coninfection would change clinical management. This test was validated by the [facility] [employer]. These laboratories are certified under the Clinical Laboratory Improvement Amendments of 1988 (CLIA-88) as qualified to perfom high complexity laboratory testing.     *Note: Due to a large number of results and/or encounters for the requested time period, some results have not been displayed. A complete set of results can be found in Results Review.       Medications - No data to display    Assessments & Plan (with Medical Decision Making)   Pleasant and well-appearing 79-year-old male that presented for evaluation of URI symptoms that started yesterday.  Heart rate and rhythm regular.  Respirations even and nonlabored.  Speech is clear.  His oxygen saturation is 96% on room air.  No adventitious lung sounds auscultated.  He tested negative for strep throat, COVID-19, influenza and RSV.  His vital signs are within normal limits.    Discussed these findings with patient.  Patient has symptoms that are consistent with  a viral upper respiratory infection.  Discussed continued supportive cares at home.  Follow-up with primary doctor if no improvement in symptoms.  Return to urgent care or emergency department for any worsening or concerning symptoms.    I have reviewed the nursing notes.    I have reviewed the findings, diagnosis, plan and need for follow up with the patient.  This document was prepared using a combination of typing and voice generated software.  While every attempt was made for accuracy, spelling and grammatical errors may exist.         New Prescriptions    No medications on file       Final diagnoses:   Upper respiratory infection       10/28/2024   [facility]       Mpofu, Prudence, CNP  10/28/24 [id number]

## 2024-10-30 ENCOUNTER — ANTICOAGULATION THERAPY VISIT (OUTPATIENT)
Dept: ANTICOAGULATION | Facility: OTHER | Age: 79
End: 2024-10-30
Attending: FAMILY MEDICINE
Payer: MEDICARE

## 2024-10-30 ENCOUNTER — LAB (OUTPATIENT)
Dept: LAB | Facility: OTHER | Age: 79
End: 2024-10-30
Attending: FAMILY MEDICINE
Payer: MEDICARE

## 2024-10-30 DIAGNOSIS — I26.99 PULMONARY EMBOLISM AND INFARCTION (H): Primary | ICD-10-CM

## 2024-10-30 DIAGNOSIS — I48.20 CHRONIC ATRIAL FIBRILLATION (H): ICD-10-CM

## 2024-10-30 DIAGNOSIS — Z79.01 LONG TERM CURRENT USE OF ANTICOAGULANT THERAPY: ICD-10-CM

## 2024-10-30 DIAGNOSIS — I26.99 PULMONARY EMBOLISM AND INFARCTION (H): ICD-10-CM

## 2024-10-30 LAB — INR BLD: 4.5 (ref 0.9–1.1)

## 2024-10-30 PROCEDURE — 36416 COLLJ CAPILLARY BLOOD SPEC: CPT | Mod: ZL

## 2024-10-30 PROCEDURE — 85610 PROTHROMBIN TIME: CPT | Mod: ZL

## 2024-10-30 NOTE — PROGRESS NOTES
ANTICOAGULATION MANAGEMENT     Clement Voss 79 year old male is on warfarin with supratherapeutic INR result. (Goal INR 2.0-3.0)    Recent labs: (last 7 days)     10/30/24  0855   INR 4.5*       ASSESSMENT     Source(s): Chart Review and Patient/Caregiver Call     Warfarin doses taken: Warfarin taken as instructed  Diet: No new diet changes identified  Medication/supplement changes:  flonase, and increase tylenol  New illness, injury, or hospitalization: Yes: 10/28 ED visit for URI sx  Signs or symptoms of bleeding or clotting: No  Previous result: Therapeutic last 2(+) visits  Additional findings: None       PLAN     Recommended plan for temporary change(s) affecting INR     Dosing Instructions: hold dose then continue your current warfarin dose with next INR in 10 days       Summary  As of 10/30/2024      Full warfarin instructions:  10/30: Hold; Otherwise 2.5 mg every Mon, Wed, Fri; 5 mg all other days   Next INR check:  11/13/2024               Telephone call with Huy who verbalizes understanding and agrees to plan    Lab visit scheduled    Education provided: None required    Plan made per [facility] anticoagulation protocol    Latosha Paris RN  10/30/2024  Anticoagulation Clinic  [employer] for routing messages: p THOMAS RUELAS          _______________________________________________________________________     Anticoagulation Episode Summary       Current INR goal:  2.0-3.0   TTR:  69.6% (1 y)   Target end date:  Indefinite   Send INR reminders to:  THOMAS RUELAS    Indications    Pulmonary embolism and infarction (H) [I26.99]  Long-term (current) use of anticoagulants [Z79.01] [Z79.01]             Comments:  Lab in [facility] Arrives home in the afternoon after 2 PM             Anticoagulation Care Providers       Provider Role Specialty Phone number    Lorelei Felix MD Referring Family Medicine [phone]

## 2024-10-31 DIAGNOSIS — E78.2 MIXED HYPERLIPIDEMIA: ICD-10-CM

## 2024-10-31 DIAGNOSIS — I10 BENIGN ESSENTIAL HYPERTENSION: ICD-10-CM

## 2024-10-31 RX ORDER — ROSUVASTATIN CALCIUM 5 MG/1
5 TABLET, COATED ORAL DAILY
Qty: 90 TABLET | Refills: 2 | Status: SHIPPED | OUTPATIENT
Start: 2024-10-31

## 2024-10-31 RX ORDER — METOPROLOL TARTRATE 25 MG/1
TABLET, FILM COATED ORAL
Qty: 360 TABLET | Refills: 3 | Status: SHIPPED | OUTPATIENT
Start: 2024-10-31

## 2024-10-31 NOTE — TELEPHONE ENCOUNTER
METOPROLOL TARTRATE 25MG TABLETS         Last Written Prescription Date:  2/9/24  Last Fill Quantity: 360,   # refills: 2  Last Office Visit: 9/20/24  Future Office visit:    Next 5 appointments (look out 90 days)      Dec 20, 2024 9:00 AM  (Arrive by 8:45 AM)  Provider Visit with Lorelei Felix MD  [facility] ([facility] ) [address]  [facility] [address]  [phone]             Routing refill request to provider for review/approval because:  Beta-Blockers Protocol Iyuzlv16/31/2024 08:59 AM   Protocol Details Most recent blood pressure under 140/90 in past 12 months       BP Readings from Last 3 Encounters:   10/28/24 153/78   09/20/24 128/68   06/18/24 138/80

## 2024-11-06 ENCOUNTER — TELEPHONE (OUTPATIENT)
Dept: EDUCATION SERVICES | Facility: HOSPITAL | Age: 79
End: 2024-11-06

## 2024-11-06 DIAGNOSIS — N18.32 TYPE 2 DIABETES MELLITUS WITH STAGE 3B CHRONIC KIDNEY DISEASE, WITH LONG-TERM CURRENT USE OF INSULIN (H): Primary | ICD-10-CM

## 2024-11-06 DIAGNOSIS — Z79.4 TYPE 2 DIABETES MELLITUS WITH STAGE 3B CHRONIC KIDNEY DISEASE, WITH LONG-TERM CURRENT USE OF INSULIN (H): Primary | ICD-10-CM

## 2024-11-06 DIAGNOSIS — E11.22 TYPE 2 DIABETES MELLITUS WITH STAGE 3B CHRONIC KIDNEY DISEASE, WITH LONG-TERM CURRENT USE OF INSULIN (H): Primary | ICD-10-CM

## 2024-11-13 ENCOUNTER — LAB (OUTPATIENT)
Dept: LAB | Facility: OTHER | Age: 79
End: 2024-11-13
Payer: MEDICARE

## 2024-11-13 ENCOUNTER — ANTICOAGULATION THERAPY VISIT (OUTPATIENT)
Dept: ANTICOAGULATION | Facility: OTHER | Age: 79
End: 2024-11-13
Payer: COMMERCIAL

## 2024-11-13 DIAGNOSIS — Z79.01 LONG TERM CURRENT USE OF ANTICOAGULANT THERAPY: ICD-10-CM

## 2024-11-13 DIAGNOSIS — I26.99 PULMONARY EMBOLISM AND INFARCTION (H): Primary | ICD-10-CM

## 2024-11-13 DIAGNOSIS — I48.20 CHRONIC ATRIAL FIBRILLATION (H): ICD-10-CM

## 2024-11-13 DIAGNOSIS — I26.99 PULMONARY EMBOLISM AND INFARCTION (H): ICD-10-CM

## 2024-11-13 LAB — INR BLD: 2.1 (ref 0.9–1.1)

## 2024-11-13 PROCEDURE — 36416 COLLJ CAPILLARY BLOOD SPEC: CPT | Mod: ZL

## 2024-11-13 PROCEDURE — 85610 PROTHROMBIN TIME: CPT | Mod: ZL

## 2024-11-13 NOTE — PROGRESS NOTES
ANTICOAGULATION MANAGEMENT     Clement Voss 79 year old male is on warfarin with therapeutic INR result. (Goal INR 2.0-3.0)    Recent labs: (last 7 days)     11/13/24  1019   INR 2.1*       ASSESSMENT     Source(s): Chart Review and Patient/Caregiver Call     Warfarin doses taken: Less warfarin taken than planned which may be affecting INR and Missed dose(s) may be affecting INR Took only 1 tab days he should of done two and missed dose x 2 days.   Diet: No new diet changes identified  Medication/supplement changes: None noted  New illness, injury, or hospitalization: No  Signs or symptoms of bleeding or clotting: No  Previous result: Supratherapeutic  Additional findings: Upcoming surgery/procedure upcoming dentist appt needed hasn't scheduled yet may have 1 tooth coming out       PLAN     Recommended plan for temporary change(s) affecting INR     Dosing Instructions: Continue your current warfarin dose with next INR in 1 week       Summary  As of 11/13/2024      Full warfarin instructions:  2.5 mg every Mon, Wed, Fri; 5 mg all other days   Next INR check:  11/20/2024               Telephone call with Huy who verbalizes understanding and agrees to plan    Lab visit scheduled    Education provided: None required    Plan made per [facility] anticoagulation protocol    Latosha Paris RN  11/13/2024  Anticoagulation Clinic  [employer] for routing messages: mannie RUELAS  [facility] patient phone line: [phone]        _______________________________________________________________________     Anticoagulation Episode Summary       Current INR goal:  2.0-3.0   TTR:  67.2% (1 y)   Target end date:  Indefinite   Send INR reminders to:  THOMAS RUELAS    Indications    Pulmonary embolism and infarction (H) [I26.99]  Long-term (current) use of anticoagulants [Z79.01] [Z79.01]             Comments:  Lab in [facility] Arrives home in the afternoon after 2 PM             Anticoagulation Care Providers       Provider Role  Specialty Phone number    Lorelei Felix MD Referring Family Medicine [phone]

## 2024-11-14 ENCOUNTER — HOSPITAL ENCOUNTER (OUTPATIENT)
Dept: EDUCATION SERVICES | Facility: HOSPITAL | Age: 79
Discharge: HOME | End: 2024-11-14
Attending: FAMILY MEDICINE
Payer: MEDICARE

## 2024-11-14 VITALS
RESPIRATION RATE: 16 BRPM | SYSTOLIC BLOOD PRESSURE: 131 MMHG | WEIGHT: 201.2 LBS | BODY MASS INDEX: 31.58 KG/M2 | HEIGHT: 67 IN | HEART RATE: 58 BPM | OXYGEN SATURATION: 99 % | DIASTOLIC BLOOD PRESSURE: 71 MMHG

## 2024-11-14 DIAGNOSIS — E11.22 TYPE 2 DIABETES MELLITUS WITH STAGE 3B CHRONIC KIDNEY DISEASE, WITH LONG-TERM CURRENT USE OF INSULIN (H): Primary | ICD-10-CM

## 2024-11-14 DIAGNOSIS — Z79.4 TYPE 2 DIABETES MELLITUS WITH STAGE 3B CHRONIC KIDNEY DISEASE, WITH LONG-TERM CURRENT USE OF INSULIN (H): Primary | ICD-10-CM

## 2024-11-14 DIAGNOSIS — N18.32 TYPE 2 DIABETES MELLITUS WITH STAGE 3B CHRONIC KIDNEY DISEASE, WITH LONG-TERM CURRENT USE OF INSULIN (H): Primary | ICD-10-CM

## 2024-11-14 PROCEDURE — G0108 DIAB MANAGE TRN  PER INDIV: HCPCS

## 2024-11-14 ASSESSMENT — PAIN SCALES - GENERAL: PAINLEVEL_OUTOF10: NO PAIN (0)

## 2024-11-14 NOTE — PROGRESS NOTES
Diabetes Self-Management Education & Support    Presents for: Follow-up (annual)    Type of Service: In Person Visit    ASSESSMENT:    Huy, 79 year old, returns to [facility] for annual visit and 2025 [facility] application for 2025.  Pt shares he had a cold recently, BG fluctuating.  Mentions concerns for higher evening levels.     Lantus 36 units once daily.   Trucilty 1.5 mg weekly.     Son lives with pt and spouse. He does their cooking and cleaning.     Does report some drowsiness after eating.  Explained, could be sugars have spiked up and causing this symptoms. Discussed could increase Trulicity to 3 mg weekly to help with those meal time levels. Pt declines at this time. Pt aware to contact [facility] RN or PCP if he would like to increase his dose in the future.    Thinking about selling motorcycle next year- not able to turn bike with left shoulder ROM/pain. Once going, is fine but has hard time getting bike out of the garage.     A1C:  7.6  9/18/2024  Target A1C: <8.0     Didn't bring meter today, does send readings via [employer] (Livebook[employer])t every 1-2 weeks.     BP upon recheck, meeting ADA target guideline. Statin use.  ASA use.  Tobacco use- former use.    Foot exam - current denies new  concerns.  Eye exam- current Location Dr Almaraz- report requested.    PCP: Dr Felix    PCP follow up-  scheduled.   Insurance Coverage: Medicare, [employer]    Refills needed: none today           Allergies   Allergen Reactions    Atorvastatin      Other reaction(s): Other  Caused increased creatinine.     Atorvastatin Calcium Other (See Comments)     Lipitor  Increase CR    Barley Grass Unknown    Iodine     Penicillins     Procaine Unknown    Shellfish-Derived Products Unknown    Sulfa Antibiotics      Sulfa (sulfonamide antibiotics)    Sulfacetamide       Wt Readings from Last 10 Encounters:   11/14/24 91.3 kg (201 lb 3.2 oz)   09/20/24 92.2 kg (203 lb 4.8 oz)   06/18/24 92.2 kg (203 lb 4.8 oz)   03/18/24 92.1 kg (203 lb)   12/07/23 92.1 kg  (203 lb 1.6 oz)   11/06/23 91.2 kg (201 lb)   08/28/23 91.6 kg (201 lb 14.4 oz)   05/26/23 91.5 kg (201 lb 11.2 oz)   02/24/23 93 kg (205 lb)   12/01/22 92.3 kg (203 lb 8 oz)     Patient's most recent   Lab Results   Component Value Date    A1C 7.6 09/18/2024    A1C 9.7 04/27/2021     is meeting goal of <8.0    Diabetes knowledge and skills assessment:   Patient is knowledgeable in diabetes management concepts related to: Healthy Eating, Being Active, Monitoring, Taking Medication, and Problem Solving  Education and support with the following diabetes management concepts: Taking Medication (discussed adjusting medication doses) and health maintenance.   Based on learning assessment above, most appropriate setting for further diabetes education would be: Individual setting.      PLAN  Continue with Lantus 36 units- may need to adjust of has low glucose overnight/ams.   Trulicity- 1.5 mg weekly. Discussed option to go up in dose to 3 mg, pt declines increase dose.   2025 [facility] keira completed for Trulicity.   Lantus- copay is reasonable at this time.   Regularly sees PCP. Pt would like to return 1/year, would like call to schedule closer to time.   Pt aware to call or scheduled visit with concerns- if anything changes.      See Care Plan for co-developed, patient-state behavior change goals.  AVS provided for patient today.    Education Materials Provided:  PAP application.     SUBJECTIVE/OBJECTIVE:  Presents for: Follow-up (annual)  Accompanied by: Self  Diabetes education in the past 24mo: Yes  Focus of Visit: Taking Medication, Monitoring  Diabetes type: Type 2  Disease course: Stable  How confident are you filling out medical forms by yourself:: A little bit  Diabetes management related comments/concerns: PAP application for 2025. higher in the evenings.  Transportation concerns: No  Difficulty affording diabetes medication?: Sometimes (PAP- Trulicity)  Difficulty affording diabetes testing supplies?:  "No  Other concerns:: Glasses  Cultural Influences/Ethnic Background:  Not  or   Diabetes Symptoms & Complications:  Diabetes Related Symptoms: Fatigue  Weight trend: Stable  Symptom course: Stable  Disease course: Stable  Complications assessed today?: Yes    Patient Problem List and Family Medical History reviewed for relevant medical history, current medical status, and diabetes risk factors.    Vitals:  /71   Pulse 58   Resp 16   Ht 1.702 m (5' 7\")   Wt 91.3 kg (201 lb 3.2 oz)   SpO2 99%   BMI 31.51 kg/m    Estimated body mass index is 31.51 kg/m  as calculated from the following:    Height as of this encounter: 1.702 m (5' 7\").    Weight as of this encounter: 91.3 kg (201 lb 3.2 oz).   Last 3 BP:   BP Readings from Last 3 Encounters:   11/14/24 131/71   10/28/24 153/78   09/20/24 128/68       History   Smoking Status    Former    Types: Cigarettes   Smokeless Tobacco    Never       Labs:  Lab Results   Component Value Date    A1C 7.6 09/18/2024    A1C 9.7 04/27/2021     Lab Results   Component Value Date     09/18/2024     08/15/2022     04/27/2021     Lab Results   Component Value Date    LDL 18 09/18/2024    LDL 12 04/27/2021     HDL Cholesterol   Date Value Ref Range Status   04/27/2021 37 (L) >39 mg/dL Final     Direct Measure HDL   Date Value Ref Range Status   09/18/2024 33 (L) >=40 mg/dL Final   ]  GFR Estimate   Date Value Ref Range Status   09/18/2024 50 (L) >60 mL/min/1.73m2 Final     Comment:     eGFR calculated using 2021 CKD-EPI equation.   04/27/2021 51 (L) >60 mL/min/[1.73_m2] Final     Comment:     Non  GFR Calc  Starting 12/18/2018, serum creatinine based estimated GFR (eGFR) will be   calculated using the Chronic Kidney Disease Epidemiology Collaboration   (CKD-EPI) equation.       GFR Estimate If Black   Date Value Ref Range Status   04/27/2021 59 (L) >60 mL/min/[1.73_m2] Final     Comment:      GFR Calc  Starting " "12/18/2018, serum creatinine based estimated GFR (eGFR) will be   calculated using the Chronic Kidney Disease Epidemiology Collaboration   (CKD-EPI) equation.       Lab Results   Component Value Date    CR 1.43 09/18/2024    CR 1.34 04/27/2021     No results found for: \"MICROALBUMIN\"    Healthy Eating:  Healthy Eating Assessed Today: Yes  Cultural/Cheondoism diet restrictions?: No  Do you have any food allergies or intolerances?: Yes  Please list your food allergies / intolerances: shellfish.  Meal planning/habits: Avoiding sweets, Smaller portions  Who cooks/prepares meals for you?: Son, Self  Who purchases food in  your home?: Spouse  How many times a week on average do you eat food made away from home (restaurant/take-out)?:  (not too often.)  Meals include: Breakfast, Lunch, Dinner  Breakfast: oatmeal or honey nut cheerios with milk and blueberries  Lunch: leftovers or bagel or sandwich  Dinner: meat, potatoes, vegetable or beef stew - sometimes rice or pasta  Snacks: fruit, vegetables, beefsticks  Beverages: Water, Tea, Other (see Comments), Coffee  Please elaborate:: propel. green tea.  Has patient met with a dietitian in the past?: Yes    Being Active:  Being Active Assessed Today: Yes  Exercise:: Yes (uses stationary bike once daily 10-30 minutes each time. not walking as much- but hips hurt.)  Days per week of moderate to strenuous exercise (like a brisk walk): 3  On average, minutes per day of exercise at this level: 30  How intense was your typical exercise? : Light (like stretching or slow walking)  Exercise Minutes per Week: 90    Monitoring:  Monitoring Assessed Today: Yes  Did patient bring glucose meter to appointment? : No (sends BG via [employer] [place].)  Blood Glucose Meter: Accu-chek  Times checking blood sugar at home (number): 3  Times checking blood sugar at home (per): Day  Blood glucose trend: Fluctuating (stable.)    Taking Medications:  Diabetes Medication(s)       Insulin       LANTUS SOLOSTAR 100 " UNIT/ML soln ADMINISTER 36 UNITS UNDER THE SKIN AT BEDTIME       Incretin Mimetic Agents       dulaglutide (TRULICITY) 1.5 MG/0.5ML pen Inject 1.5 mg Subcutaneous every 7 days Do not send to pharmacy            Taking Medication Assessed Today: Yes  Current Treatments: Insulin Injections, Non-insulin Injectables (Trulicity 1.5 mg once weekly on Wednesday. Lantus 36 units daily at bedtime)  Given by: Patient  Injection/Infusion sites: Abdomen  Problems taking diabetes medications regularly?: No  Diabetes medication side effects?: No    Problem Solving:  Problem Solving Assessed Today: Yes  Is the patient at risk for hypoglycemia?: Yes  Hypoglycemia Frequency: Rarely  Hypoglycemia Treatment: Juice, Glucose (tablets or gel)    Hypoglycemia Symptoms  Hypoglycemia: Feeling shaky    Hypoglycemia Complications  Hypoglycemia Complications: None    Reducing Risks:  Reducing Risks Assessed Today: Yes  Diabetes Risks: Age over 45 years  CAD Risks: Diabetes Mellitus, Male sex, Obesity  Has dilated [facility] at least [facility] a [facility]  Sees dentist every 6 months?: No  Feet checked by healthcare provider in the last year?: Yes    Healthy Coping:  Healthy Coping Assessed Today: Yes  Emotional response to diabetes: Ready to learn, Confidence diabetes can be controlled  Informal Support system:: Family  Stage of change: MAINTENANCE (Working to maintain change, with risk of relapse)  Support resources: Other (PAP)  Patient Activation Measure Survey Score:      1/6/2020    10:00 AM 1/10/2020     9:00 AM   ALLEN Score (Last Two)   ALLEN Raw Score 30 30   Activation Score 56 56   ALLEN Level 3 3     Time Spent: 30 minutes  Encounter Type: Individual    Any diabetes medication dose changes were made via the CDE Protocol per the patient's primary care provider. A copy of this encounter was shared with the provider.    Mikayla Caldera RN [facility] [place] [facility] [place]   [phone]  11/14/2024 at 10:51 AM

## 2024-11-14 NOTE — Clinical Note
Huy Moraes Dr was in today for 2025 assistance application for Trulicity. He mentions- morning levels okay but evening numbers are higher by the end of the day. I offered we could increase the Trulicity to 3 mg to help with these levels- he declined at this time. If you/he decide he should increase- I can help with [facility]. Otherwise- he didn't want to make any med/dose changes today. He sees you next month, thanks! Mikayla

## 2024-11-14 NOTE — PATIENT INSTRUCTIONS
"Five Goals to Manage Diabetes:  1) Control Blood Pressure: <140/90, <130/80 if able to safely reach  2) Lower LDL \"bad\" cholesterol  <70   3) Maintain blood sugar- individual targets vary  4) Be tobacco free  5) Take Aspirin as recommended- talk to your primary care provider if this is right for you.     Monitoring:  Your A1C was 7.6 9/18/2024. Goal is less than <8.0   Next A1C: next month with PCP.     Check blood sugars 2/day and as concerned. Alternating with fasting in the morning and/or 2 hours after your largest meal are good times to check.    Target blood sugar: Fasting or before meals target is 9-150. 2 hours after meal <200.    We only need 50% of these numbers to be within target for your A1c will be within/close to target.    Medications:   We completed the [employer] application for Trulicity 1.5 mg weeky.   We did talk about increasing the dose to 3 mg- as a possiblity.   Continue Lantus - let me know if you are having lows, we will decrease your insulin dose.     Healthy Eating:      [email or web address]     Target Carb:   Men 45-60 grams/meal 15-30 grams optional snacks.    Less if working towards weight loss.     Suggest having a protein and/or fiber with carb option (slows down how fast sugar/glucose is released into the blood stream causing the spikes).     Mediterranean Lifestyle: Fish/seafood 2 times a week, vegetables, fruit, nuts, legumes, whole grains, water, regular activity.    Eat a Healthy diet  Eat more vegetables/plants in your diet  Eat healthy fats  Olive oil  Avocados  Eat healthy proteins  Poultry without the skin  Fish  Limit red meat     Limit higher carbohydrate foods such as: rice, breads, cereal, pasta, sweets. (Mindful portion sizes).   Avoid sugary drinks: regular soda/pop, juice, sports drinks. (Sugar free, zero are okay).     Snacks: Try to  work on healthy, low carbohydrate food choices. Some good snack examples:   -Greek yogurt  -Protein shake  -Few crackers/slice of " cheese  -Apple/peanut butter  -Hardboiled egg/wheat thins  -Almonds or alternate nuts with berries  -Steak bites   -Small wrap sandwich  -Adult lunchable  -Serving of oatmeal with nuts or peanut butter   -Smoothie drink with portion of Greek yogurt, ice, fruit of choice  -Trail mix  -fresh veggie  -fruit/berries- pair with protein like Greek yogurt, cheese, cottage cheese, hardboiled egg.    Activity/Exercise:     Any prolonged sedentary/sitting activity should be interrupted every 30 minutes- not only diabetes benefits-it also lowers some cancer risk too!  Consider walking or other form of activity for 5-10 minutes after eating meals.      Adult goal is 150 minutes/week =  30 minutes 5 days of the week.   Aerobic activity 150 minute a week  2 days of resistance exercising     Start low and slow, work up to 30 min, 5x/week.   Increase exercise as tolerated, even multiple short times during your day helps.   A SMART Goal can help support your journey.      Healthy Coping:     Practicing health promotion can help improve diabetes (suggested by the ADA, March 2019)              Meditation                          Apps: for NicePeopleAt[employer]hone and AndOpenText                          -Calm                          -Headspace                          -Insight Timer              Gentle stretching              Mindful eating       Other:   Foot exam: yearly. In the meantime, check your feet daily looking for new blisters or wounds, wearing shoes at all times when walking including around the house, and avoiding lotion application between the toes. If there are any signs of infection, contact your primary care provider.  Infections of the foot can be life threatening or lead to amputations of the foot or leg. You can use a mirror to look over your feet is you are not able to bend to get a good look.     Eye exam: yearly.    Encourage regular dental check ups.    Consider setting a goal: (SMART) specific, measurable, attainable, realistic  and Timely) goals (suggested by ADA 2023)       Follow up: annually- or more often if anything changes.      Please bring your meter/reader to your appointment.     If you have any questions or concerns, please call me at [phone] (Vaishali Nurse directly) or send Filecubed message.    Scheduling Number: [phone]    Thank you for coming in today!  Mikayla Caldera RN [facility]  Diabetes Educator

## 2024-11-20 ENCOUNTER — ANTICOAGULATION THERAPY VISIT (OUTPATIENT)
Dept: ANTICOAGULATION | Facility: OTHER | Age: 79
End: 2024-11-20
Attending: FAMILY MEDICINE
Payer: MEDICARE

## 2024-11-20 ENCOUNTER — LAB (OUTPATIENT)
Dept: LAB | Facility: OTHER | Age: 79
End: 2024-11-20
Attending: FAMILY MEDICINE
Payer: MEDICARE

## 2024-11-20 DIAGNOSIS — I48.20 CHRONIC ATRIAL FIBRILLATION (H): ICD-10-CM

## 2024-11-20 DIAGNOSIS — I26.99 PULMONARY EMBOLISM AND INFARCTION (H): Primary | ICD-10-CM

## 2024-11-20 DIAGNOSIS — Z79.01 LONG TERM CURRENT USE OF ANTICOAGULANT THERAPY: ICD-10-CM

## 2024-11-20 DIAGNOSIS — I26.99 PULMONARY EMBOLISM AND INFARCTION (H): ICD-10-CM

## 2024-11-20 LAB — INR BLD: 3.3 (ref 0.9–1.1)

## 2024-11-20 PROCEDURE — 85610 PROTHROMBIN TIME: CPT | Mod: ZL

## 2024-11-20 PROCEDURE — 36416 COLLJ CAPILLARY BLOOD SPEC: CPT | Mod: ZL

## 2024-11-20 NOTE — PROGRESS NOTES
ANTICOAGULATION MANAGEMENT     Clement Voss 79 year old male is on warfarin with supratherapeutic INR result. (Goal INR 2.0-3.0)    Recent labs: (last 7 days)     11/20/24  1028   INR 3.3*       ASSESSMENT     Source(s): Chart Review and Patient/Caregiver Call     Warfarin doses taken: Warfarin taken as instructed  Diet: No new diet changes identified  New illness, injury, or hospitalization: Yes: tooth ache   Medication/supplement changes:  tylenol as needed use which may be increasing INR today  Increased PRN use  Signs or symptoms of bleeding or clotting: No  Previous INR: Therapeutic last visit; previously outside of goal range  Additional findings:  Has not made a dental appt yet for his toothache       PLAN     Recommended plan for temporary change(s) affecting INR     Dosing Instructions: partial hold then continue your current warfarin dose with next INR in 2 weeks       Summary  As of 11/20/2024      Full warfarin instructions:  11/20: 1.25 mg; Otherwise 2.5 mg every Mon, Wed, Fri; 5 mg all other days   Next INR check:  12/4/2024               Telephone call with Huy who verbalizes understanding and agrees to plan    Lab visit scheduled    Education provided: Please call back if any changes to your diet, medications or how you've been taking warfarin    Plan made per [facility] anticoagulation protocol    Corry Avelar RN  Anticoagulation Clinic  11/20/2024    _______________________________________________________________________     Anticoagulation Episode Summary       Current INR goal:  2.0-3.0   TTR:  66.7% (1 y)   Target end date:  Indefinite   Send INR reminders to:  [facility] [place]    Indications    Pulmonary embolism and infarction (H) [I26.99]  Long-term (current) use of anticoagulants [Z79.01] [Z79.01]             Comments:  Lab in [facility] Arrives home in the afternoon after 2 PM             Anticoagulation Care Providers       Provider Role Specialty Phone number    Lorelei Felix MD  [facility] [phone]

## 2024-12-06 ENCOUNTER — LAB (OUTPATIENT)
Dept: LAB | Facility: OTHER | Age: 79
End: 2024-12-06
Payer: MEDICARE

## 2024-12-06 DIAGNOSIS — I48.20 CHRONIC ATRIAL FIBRILLATION (H): ICD-10-CM

## 2024-12-06 DIAGNOSIS — I26.99 PULMONARY EMBOLISM AND INFARCTION (H): ICD-10-CM

## 2024-12-06 LAB — INR BLD: 2.4 (ref 0.9–1.1)

## 2024-12-06 PROCEDURE — 36416 COLLJ CAPILLARY BLOOD SPEC: CPT | Mod: ZL

## 2024-12-06 PROCEDURE — 85610 PROTHROMBIN TIME: CPT | Mod: ZL

## 2024-12-18 ENCOUNTER — LAB (OUTPATIENT)
Dept: LAB | Facility: OTHER | Age: 79
End: 2024-12-18
Payer: MEDICARE

## 2024-12-18 DIAGNOSIS — I10 BENIGN ESSENTIAL HYPERTENSION: ICD-10-CM

## 2024-12-18 DIAGNOSIS — E11.22 TYPE 2 DIABETES MELLITUS WITH STAGE 3B CHRONIC KIDNEY DISEASE, WITH LONG-TERM CURRENT USE OF INSULIN (H): ICD-10-CM

## 2024-12-18 DIAGNOSIS — E78.2 MIXED HYPERLIPIDEMIA: ICD-10-CM

## 2024-12-18 DIAGNOSIS — N18.32 STAGE 3B CHRONIC KIDNEY DISEASE (H): ICD-10-CM

## 2024-12-18 DIAGNOSIS — Z79.4 TYPE 2 DIABETES MELLITUS WITH STAGE 3B CHRONIC KIDNEY DISEASE, WITH LONG-TERM CURRENT USE OF INSULIN (H): ICD-10-CM

## 2024-12-18 DIAGNOSIS — N18.32 TYPE 2 DIABETES MELLITUS WITH STAGE 3B CHRONIC KIDNEY DISEASE, WITH LONG-TERM CURRENT USE OF INSULIN (H): ICD-10-CM

## 2024-12-18 LAB
ALT SERPL W P-5'-P-CCNC: 28 U/L (ref 0–70)
ANION GAP SERPL CALCULATED.3IONS-SCNC: 11 MMOL/L (ref 7–15)
BUN SERPL-MCNC: 20.1 MG/DL (ref 8–23)
CALCIUM SERPL-MCNC: 9.7 MG/DL (ref 8.8–10.4)
CHLORIDE SERPL-SCNC: 95 MMOL/L (ref 98–107)
CHOLEST SERPL-MCNC: 87 MG/DL
CREAT SERPL-MCNC: 1.52 MG/DL (ref 0.67–1.17)
EGFRCR SERPLBLD CKD-EPI 2021: 46 ML/MIN/1.73M2
EST. AVERAGE GLUCOSE BLD GHB EST-MCNC: 146 MG/DL
FASTING STATUS PATIENT QL REPORTED: YES
FASTING STATUS PATIENT QL REPORTED: YES
GLUCOSE SERPL-MCNC: 100 MG/DL (ref 70–99)
HBA1C MFR BLD: 6.7 %
HCO3 SERPL-SCNC: 31 MMOL/L (ref 22–29)
HDLC SERPL-MCNC: 39 MG/DL
LDLC SERPL CALC-MCNC: 23 MG/DL
NONHDLC SERPL-MCNC: 48 MG/DL
POTASSIUM SERPL-SCNC: 3.6 MMOL/L (ref 3.4–5.3)
SODIUM SERPL-SCNC: 137 MMOL/L (ref 135–145)
TRIGL SERPL-MCNC: 124 MG/DL

## 2024-12-18 PROCEDURE — 36415 COLL VENOUS BLD VENIPUNCTURE: CPT | Mod: ZL

## 2024-12-18 PROCEDURE — 84460 ALANINE AMINO (ALT) (SGPT): CPT | Mod: ZL

## 2024-12-18 PROCEDURE — 83036 HEMOGLOBIN GLYCOSYLATED A1C: CPT | Mod: ZL

## 2024-12-18 PROCEDURE — 80048 BASIC METABOLIC PNL TOTAL CA: CPT | Mod: ZL

## 2024-12-18 PROCEDURE — 80061 LIPID PANEL: CPT | Mod: ZL

## 2024-12-18 NOTE — PROGRESS NOTES
{PROVIDER CHARTING PREFERENCE:042628}    Fernanda Son is a 79 year old, presenting for the following health issues:  Chronic Disease Management    HPI     Diabetes Follow-up    How often are you checking your blood sugar? Two times daily  Blood sugar testing frequency justification:  Adjustment of medication(s)  What time of day are you checking your blood sugars (select all that apply)?  Before and after meals  Have you had any blood sugars above 200?  Yes   Have you had any blood sugars below 70?  Yes 66  What symptoms do you notice when your blood sugar is low?  Shaky  What concerns do you have today about your diabetes? None   Do you have any of these symptoms? (Select all that apply)  No numbness or tingling in feet.  No redness, sores or blisters on feet.  No complaints of excessive thirst.  No reports of blurry vision.  No significant changes to weight.      {Reference  Diabetes Management Resources  Blood Glucose Log - 3 weeks  Blood Glucose Log with Food and Insulin Record :523117}    Hyperlipidemia Follow-Up    Are you regularly taking any medication or supplement to lower your cholesterol?   Yes- Crestor 5 mg  Are you having muscle aches or other side effects that you think could be caused by your cholesterol lowering medication?  No    Hypertension Follow-up    Do you check your blood pressure regularly outside of the clinic? Yes   Are you following a low salt diet? Yes  Are your blood pressures ever more than 140 on the top number (systolic) OR more   than 90 on the bottom number (diastolic), for example 140/90? No    BP Readings from Last 2 Encounters:   12/20/24 128/74   11/14/24 131/71     Hemoglobin A1C (%)   Date Value   12/18/2024 6.7 (H)   09/18/2024 7.6 (H)   04/27/2021 9.7 (H)   01/26/2021 9.4 (H)     LDL Cholesterol Calculated (mg/dL)   Date Value   12/18/2024 23   09/18/2024 18   04/27/2021 12   01/26/2021 26         Heart Failure Follow-up   Are you experiencing any shortness of  breath? No  Are you experiencing any swelling in your legs or feet?  No  Are you using more pillows than usual? No  Do you cough at night?  No  Do you check your weight daily?  No  Have you had a weight change recently?  Weight decrease  Are you having any of the following side effects from your medications? (Select all that apply)  The patient does not report symptoms of dizziness, fatigue, cough, swelling, or slow heart beat.  Since your last visit, how many times have you gone to the cardiologist, urgent care, emergency room, or hospital because of your heart failure?   None  Last Echo: No results found.    Chronic Kidney Disease Follow-up    Do you take any over the counter pain medicine?: Yes  What over the counter medicine are you taking for your pain?:  tylenol    How often do you take this medicine?:  A few times a month          Review of Systems  Constitutional, HEENT, cardiovascular, pulmonary, gi and gu systems are negative, except as otherwise noted.      Objective    /74 (BP Location: Right arm, Patient Position: Sitting, Cuff Size: Adult Large)   Pulse 70   Temp 97.8  F (36.6  C) (Tympanic)   Resp 16   Wt 88.9 kg (196 lb)   SpO2 99%   BMI 30.70 kg/m    Body mass index is 30.7 kg/m .  Physical Exam   GENERAL: alert and no distress  PSYCH: mentation appears normal, affect normal/bright    {Diagnostic Test Results (Optional):376934}        Signed Electronically by: Lorelei Felix MD  {Email feedback regarding this note to [email or web address]   :[id number]}

## 2024-12-20 ENCOUNTER — OFFICE VISIT (OUTPATIENT)
Dept: FAMILY MEDICINE | Facility: OTHER | Age: 79
End: 2024-12-20
Attending: FAMILY MEDICINE
Payer: MEDICARE

## 2024-12-20 VITALS
WEIGHT: 196 LBS | RESPIRATION RATE: 16 BRPM | TEMPERATURE: 97.8 F | OXYGEN SATURATION: 99 % | BODY MASS INDEX: 30.7 KG/M2 | SYSTOLIC BLOOD PRESSURE: 128 MMHG | HEART RATE: 70 BPM | DIASTOLIC BLOOD PRESSURE: 74 MMHG

## 2024-12-20 DIAGNOSIS — E11.22 TYPE 2 DIABETES MELLITUS WITH STAGE 3B CHRONIC KIDNEY DISEASE, WITH LONG-TERM CURRENT USE OF INSULIN (H): Primary | ICD-10-CM

## 2024-12-20 DIAGNOSIS — N18.32 STAGE 3B CHRONIC KIDNEY DISEASE (H): ICD-10-CM

## 2024-12-20 DIAGNOSIS — Z79.4 TYPE 2 DIABETES MELLITUS WITH STAGE 3B CHRONIC KIDNEY DISEASE, WITH LONG-TERM CURRENT USE OF INSULIN (H): Primary | ICD-10-CM

## 2024-12-20 DIAGNOSIS — I50.9 CONGESTIVE HEART FAILURE, UNSPECIFIED HF CHRONICITY, UNSPECIFIED HEART FAILURE TYPE (H): ICD-10-CM

## 2024-12-20 DIAGNOSIS — I10 BENIGN ESSENTIAL HYPERTENSION: ICD-10-CM

## 2024-12-20 DIAGNOSIS — N18.32 TYPE 2 DIABETES MELLITUS WITH STAGE 3B CHRONIC KIDNEY DISEASE, WITH LONG-TERM CURRENT USE OF INSULIN (H): Primary | ICD-10-CM

## 2024-12-20 DIAGNOSIS — I48.20 CHRONIC ATRIAL FIBRILLATION (H): ICD-10-CM

## 2024-12-20 DIAGNOSIS — E78.2 MIXED HYPERLIPIDEMIA: ICD-10-CM

## 2024-12-20 DIAGNOSIS — I25.10 ASCVD (ARTERIOSCLEROTIC CARDIOVASCULAR DISEASE): ICD-10-CM

## 2024-12-20 ASSESSMENT — PAIN SCALES - GENERAL: PAINLEVEL_OUTOF10: NO PAIN (0)

## 2024-12-23 DIAGNOSIS — C61 PROSTATE CANCER (H): Primary | ICD-10-CM

## 2024-12-31 ENCOUNTER — ANTICOAGULATION THERAPY VISIT (OUTPATIENT)
Dept: ANTICOAGULATION | Facility: OTHER | Age: 79
End: 2024-12-31
Attending: FAMILY MEDICINE
Payer: COMMERCIAL

## 2024-12-31 ENCOUNTER — LAB (OUTPATIENT)
Dept: LAB | Facility: OTHER | Age: 79
End: 2024-12-31
Attending: FAMILY MEDICINE
Payer: MEDICARE

## 2024-12-31 DIAGNOSIS — I48.20 CHRONIC ATRIAL FIBRILLATION (H): ICD-10-CM

## 2024-12-31 DIAGNOSIS — Z79.01 LONG TERM CURRENT USE OF ANTICOAGULANT THERAPY: ICD-10-CM

## 2024-12-31 DIAGNOSIS — I26.99 PULMONARY EMBOLISM AND INFARCTION (H): ICD-10-CM

## 2024-12-31 DIAGNOSIS — I26.99 PULMONARY EMBOLISM AND INFARCTION (H): Primary | ICD-10-CM

## 2024-12-31 LAB — INR BLD: 2.9 (ref 0.9–1.1)

## 2024-12-31 PROCEDURE — 36416 COLLJ CAPILLARY BLOOD SPEC: CPT | Mod: ZL

## 2024-12-31 PROCEDURE — 85610 PROTHROMBIN TIME: CPT | Mod: ZL

## 2024-12-31 NOTE — PROGRESS NOTES
ANTICOAGULATION MANAGEMENT     Clement Voss 79 year old male is on warfarin with therapeutic INR result. (Goal INR 2.0-3.0)    Recent labs: (last 7 days)     12/31/24  0951   INR 2.9*       ASSESSMENT     Source(s): Chart Review  Previous INR was Therapeutic last visit; previously outside of goal range  Medication, diet, health changes since last INR chart reviewed; none identified         PLAN     Recommended plan for no diet, medication or health factor changes affecting INR     Dosing Instructions: Continue your current warfarin dose with next INR in 6 weeks       Summary  As of 12/31/2024      Full warfarin instructions:  2.5 mg every Mon, Wed, Fri; 5 mg all other days   Next INR check:  2/12/2025               Detailed voice message left for Huy with dosing instructions and follow up date.   Sent mention message with dosing and follow up instructions    Contact [phone] to schedule and with any changes, questions or concerns.     Education provided: Please call back if any changes to your diet, medications or how you've been taking warfarin    Plan made per [facility] anticoagulation protocol    Latosha Paris RN  12/31/2024  Anticoagulation Clinic  [employer] [place] for routing messages: mannie RUELAS  [facility] patient phone line: [phone]        _______________________________________________________________________     Anticoagulation Episode Summary       Current INR goal:  2.0-3.0   TTR:  65.2% (1 y)   Target end date:  Indefinite   Send INR reminders to:  THOMAS RUELAS    Indications    Pulmonary embolism and infarction (H) [I26.99]  Long-term (current) use of anticoagulants [Z79.01] [Z79.01]             Comments:  --             Anticoagulation Care Providers       Provider Role Specialty Phone number    Lorelei Felix MD Referring Family Medicine [phone]

## 2025-01-29 DIAGNOSIS — I10 BENIGN ESSENTIAL HYPERTENSION: ICD-10-CM

## 2025-01-29 RX ORDER — POTASSIUM CHLORIDE 750 MG/1
10 TABLET, EXTENDED RELEASE ORAL DAILY
Qty: 90 TABLET | Refills: 2 | Status: SHIPPED | OUTPATIENT
Start: 2025-01-29

## 2025-02-12 ENCOUNTER — ANTICOAGULATION THERAPY VISIT (OUTPATIENT)
Dept: ANTICOAGULATION | Facility: OTHER | Age: 80
End: 2025-02-12
Attending: FAMILY MEDICINE
Payer: COMMERCIAL

## 2025-02-12 ENCOUNTER — LAB (OUTPATIENT)
Dept: LAB | Facility: OTHER | Age: 80
End: 2025-02-12
Attending: FAMILY MEDICINE
Payer: MEDICARE

## 2025-02-12 DIAGNOSIS — I26.99 PULMONARY EMBOLISM AND INFARCTION (H): ICD-10-CM

## 2025-02-12 DIAGNOSIS — I26.99 PULMONARY EMBOLISM AND INFARCTION (H): Primary | ICD-10-CM

## 2025-02-12 DIAGNOSIS — Z79.01 LONG TERM CURRENT USE OF ANTICOAGULANT THERAPY: ICD-10-CM

## 2025-02-12 DIAGNOSIS — I48.20 CHRONIC ATRIAL FIBRILLATION (H): ICD-10-CM

## 2025-02-12 LAB — INR BLD: 2.6 (ref 0.9–1.1)

## 2025-02-12 PROCEDURE — 85610 PROTHROMBIN TIME: CPT | Mod: ZL

## 2025-02-12 PROCEDURE — 36416 COLLJ CAPILLARY BLOOD SPEC: CPT | Mod: ZL

## 2025-02-12 NOTE — PROGRESS NOTES
ANTICOAGULATION MANAGEMENT     Clement Voss 79 year old male is on warfarin with therapeutic INR result. (Goal INR 2.0-3.0)    Recent labs: (last 7 days)     02/12/25  0927   INR 2.6*       ASSESSMENT     Source(s): Chart Review  Previous INR was Therapeutic last 2(+) visits  Medication, diet, health changes since last INR chart reviewed; none identified         PLAN     Recommended plan for no diet, medication or health factor changes affecting INR     Dosing Instructions: Continue your current warfarin dose with next INR in 5 weeks       Summary  As of 2/12/2025      Full warfarin instructions:  2.5 mg every Mon, Wed, Fri; 5 mg all other days   Next INR check:  3/19/2025               Detailed voice message left for Huy with dosing instructions and follow up date.   Sent [employer] message with dosing and follow up instructions    Check at provider office visit    Education provided: Please call back if any changes to your diet, medications or how you've been taking warfarin    Plan made per [facility] anticoagulation protocol    Latosha Paris RN  2/12/2025  Anticoagulation Clinic  [employer] for routing messages: mannie RUELAS  [facility] patient phone line: [phone]        _______________________________________________________________________     Anticoagulation Episode Summary       Current INR goal:  2.0-3.0   TTR:  68.8% (1 y)   Target end date:  Indefinite   Send INR reminders to:  THOMAS RUELAS    Indications    Pulmonary embolism and infarction (H) [I26.99]  Long-term (current) use of anticoagulants [Z79.01] [Z79.01]             Comments:  --             Anticoagulation Care Providers       Provider Role Specialty Phone number    Lorelei Felix MD Referring Family Medicine [phone]

## 2025-02-23 DIAGNOSIS — E11.22 TYPE 2 DIABETES MELLITUS WITH STAGE 3B CHRONIC KIDNEY DISEASE, WITH LONG-TERM CURRENT USE OF INSULIN (H): ICD-10-CM

## 2025-02-23 DIAGNOSIS — N18.32 TYPE 2 DIABETES MELLITUS WITH STAGE 3B CHRONIC KIDNEY DISEASE, WITH LONG-TERM CURRENT USE OF INSULIN (H): ICD-10-CM

## 2025-02-23 DIAGNOSIS — Z79.4 TYPE 2 DIABETES MELLITUS WITH STAGE 3B CHRONIC KIDNEY DISEASE, WITH LONG-TERM CURRENT USE OF INSULIN (H): ICD-10-CM

## 2025-03-11 ENCOUNTER — HOSPITAL ENCOUNTER (EMERGENCY)
Facility: HOSPITAL | Age: 80
Discharge: HOME | End: 2025-03-11
Attending: NURSE PRACTITIONER
Payer: MEDICARE

## 2025-03-11 VITALS
HEART RATE: 62 BPM | OXYGEN SATURATION: 100 % | TEMPERATURE: 98.4 F | RESPIRATION RATE: 16 BRPM | SYSTOLIC BLOOD PRESSURE: 184 MMHG | DIASTOLIC BLOOD PRESSURE: 83 MMHG

## 2025-03-11 DIAGNOSIS — M62.830 BACK MUSCLE SPASM: ICD-10-CM

## 2025-03-11 DIAGNOSIS — M54.50 ACUTE LEFT-SIDED LOW BACK PAIN WITHOUT SCIATICA: Primary | ICD-10-CM

## 2025-03-11 PROCEDURE — 99213 OFFICE O/P EST LOW 20 MIN: CPT | Performed by: NURSE PRACTITIONER

## 2025-03-11 PROCEDURE — G0463 HOSPITAL OUTPT CLINIC VISIT: HCPCS

## 2025-03-11 RX ORDER — METHOCARBAMOL 500 MG/1
500 TABLET, FILM COATED ORAL 3 TIMES DAILY PRN
Qty: 20 TABLET | Refills: 0 | Status: SHIPPED | OUTPATIENT
Start: 2025-03-11

## 2025-03-11 RX ORDER — PREDNISONE 20 MG/1
20 TABLET ORAL DAILY
Qty: 5 TABLET | Refills: 0 | Status: SHIPPED | OUTPATIENT
Start: 2025-03-11 | End: 2025-03-16

## 2025-03-11 ASSESSMENT — ACTIVITIES OF DAILY LIVING (ADL): ADLS_ACUITY_SCORE: 41

## 2025-03-11 ASSESSMENT — COLUMBIA-SUICIDE SEVERITY RATING SCALE - C-SSRS
2. HAVE YOU ACTUALLY HAD ANY THOUGHTS OF KILLING YOURSELF IN THE PAST MONTH?: NO
6. HAVE YOU EVER DONE ANYTHING, STARTED TO DO ANYTHING, OR PREPARED TO DO ANYTHING TO END YOUR LIFE?: NO
1. IN THE PAST MONTH, HAVE YOU WISHED YOU WERE DEAD OR WISHED YOU COULD GO TO SLEEP AND NOT WAKE UP?: NO

## 2025-03-11 ASSESSMENT — ENCOUNTER SYMPTOMS: BACK PAIN: 1

## 2025-03-11 NOTE — DISCHARGE INSTRUCTIONS
I prescribed a muscle relaxant for you to take as needed for your muscle spasms.  This is also along with a steroid to help with inflammation to the area.  As discussed alternate cold packs with warm packs to the area.    Follow-up with your primary doctor as needed.    Return to urgent care or emergency department for any worsening or concerning symptoms.

## 2025-03-11 NOTE — ED PROVIDER NOTES
History     Chief Complaint   Patient presents with    Back Pain     HPI  Clement Voss is a 79 year old male who presents with his wife for evaluation of left-sided low back pain that started last week.  Patient states he was showing his grandson how to change the tire of a car when he started having back pain.  He has been having some muscle spasms which are interrupting his sleep.  Pain does not radiate down his legs.  No saddle paresthesias, bowel or bladder incontinence.  Notes he has been taking some Tylenol for it.  Currently on a blood thinner therefore unable to take NSAIDs.  Reports some history of some chronic low back pain.  He has never had surgery of his back.  Ambulating okay.    Allergies:  Allergies   Allergen Reactions    Atorvastatin      Other reaction(s): Other  Caused increased creatinine.     Atorvastatin Calcium Other (See Comments)     Lipitor  Increase CR    Barley Grass Unknown    Iodine     Penicillins     Procaine Unknown    Shellfish-Derived Products Unknown    Sulfa Antibiotics      Sulfa (sulfonamide antibiotics)    Sulfacetamide        Problem List:    Patient Active Problem List    Diagnosis Date Noted    Chronic atrial fibrillation (H) 08/18/2022     Priority: Medium    Congestive heart failure, unspecified HF chronicity, unspecified heart failure type (H) 01/28/2021     Priority: Medium    Chronic left ventricular systolic dysfunction 10/27/2020     Priority: Medium    Other dysphagia 02/21/2020     Priority: Medium    Abnormal stress test on 1/10/2020 02/18/2020     Priority: Medium    Regional wall motion abnormality of heart 02/18/2020     Priority: Medium    Diastolic dysfunction grade 2 on 1/20/2020 02/18/2020     Priority: Medium    Severe aortic valve regurgitation 02/18/2020     Priority: Medium    Moderate mitral regurgitation 02/18/2020     Priority: Medium    Ascending aortic aneurysm-severe 02/18/2020     Priority: Medium    Anticoagulated on Coumadin 02/18/2020      Priority: Medium    History of DVT on 12/30/2013 02/18/2020     Priority: Medium    History of pulmonary embolism 02/18/2020     Priority: Medium    History of tobacco abuse quitting 8/12/1964 02/18/2020     Priority: Medium    S/P aortic valve replacement 02/17/2020     Priority: Medium    Postoperative anemia due to acute blood loss 02/10/2020     Priority: Medium    S/P CABG x 2 02/10/2020     Priority: Medium    ASCVD (arteriosclerotic cardiovascular disease) 02/03/2020     Priority: Medium    Aortic root aneurysm 01/28/2020     Priority: Medium    CALDERON (dyspnea on exertion) 01/28/2020     Priority: Medium    Murmur 01/28/2020     Priority: Medium    Aortic valve insufficiency, etiology of cardiac valve disease unspecified 01/28/2020     Priority: Medium    Unstable angina (H) 01/28/2020     Priority: Medium    CKD (chronic kidney disease) stage 3, GFR 30-59 ml/min (H) 09/24/2019     Priority: Medium    Morbid obesity (H) 05/26/2017     Priority: Medium    Long-term (current) use of anticoagulants [Z79.01] 08/04/2016     Priority: Medium    ACP (advance care planning) 03/14/2013     Priority: Medium     Advance Care Planning 8/26/2016: ACP Review of Chart / Resources Provided:  Reviewed chart for advance care plan.  Clement Voss has no plan or code status on file. Discussed available resources and provided with information. Confirmed code status reflects current choices pending further ACP discussions.  Confirmed/documented legally designated decision makers.  Added by Jenifer Villa            Pulmonary embolism and infarction (H) 09/24/2012     Priority: Medium    Gout 01/10/2011     Priority: Medium    Optic nerve disease 10/19/2009     Priority: Medium    Primary open-angle glaucoma 04/13/2009     Priority: Medium    Mixed hyperlipidemia 10/10/2005     Priority: Medium    Benign essential hypertension 01/03/2005     Priority: Medium    Type 2 diabetes mellitus with chronic kidney disease, with long-term  current use of insulin (H) 08/02/2004     Priority: Medium        Past Medical History:    Past Medical History:   Diagnosis Date    Carpal tunnel syndrome of right wrist 07/20/2022    Chronic atrial fibrillation (H) 8/18/2022    Chronic left ventricular systolic dysfunction 10/27/2020    CKD (chronic kidney disease) stage 3, GFR 30-59 ml/min (H) 9/24/2019    DVT (deep venous thrombosis) (H)     Gout 1/10/2011    Gout, unspecified 01/10/2011    Optic nerve disease 10/19/2009    Other and unspecified hyperlipidemia 10/10/2005    Pulmonary embolism (H)     Type II or unspecified type diabetes mellitus without mention of complication, not stated as uncontrolled 08/02/2004    Unspecified essential hypertension 01/03/2005       Past Surgical History:    Past Surgical History:   Procedure Laterality Date    APPENDECTOMY  2008    CABG, MIN INV, 2 ARTERL GRFT  02/10/2020    2 vessel bypass    CARPAL TUNNEL RELEASE RT/LT Right 07/20/2022    CHOLECYSTECTOMY  1984    COLONOSCOPY N/A 05/16/2019    Procedure: COLONOSCOPY;  Surgeon: Benito Saldana MD;  Location: [facility]    History of PE of right lung 3/2013[      left eye glacoma  11/30/21    PHACOEMULSIFICATION WITH STANDARD INTRAOCULAR LENS IMPLANT Left 04/19/2018    Procedure: PHACOEMULSIFICATION WITH STANDARD INTRAOCULAR LENS IMPLANT;  CATARACT EXTRACTION LEFT EYE WITH IMPLANT, ISTENT LEFT EYE;  Surgeon: Kush Almaraz MD;  Location: [facility]    PHACOEMULSIFICATION WITH STANDARD INTRAOCULAR LENS IMPLANT Right 05/03/2018    Procedure: PHACOEMULSIFICATION WITH STANDARD INTRAOCULAR LENS IMPLANT;  CATARACT EXTRACTION RIGHT EYE WITH IMPLANT,WITH ISTENT ATTEMPTED;  Surgeon: Kush Almaraz MD;  Location: [facility]    TRABECULAR MICRO-BYPASS WITH ISTENT Left 04/19/2018    Procedure: TRABECULAR MICRO-BYPASS WITH ISTENT;;  Surgeon: Kush Almaraz MD;  Location: [facility]    TRABECULAR MICRO-BYPASS WITH ISTENT  05/03/2018    Procedure: TRABECULAR MICRO-BYPASS WITH ISTENT;;   Surgeon: Kush Almaraz MD;  Location: [facility]       Family History:    Family History   Problem Relation Age of Onset    Heart Disease Father 71        heart disease; cause of death    Other - See Comments Mother 79        old age; cause of death       Social History:  Marital Status:   [2]  Social History     Tobacco Use    Smoking status: Former     Current packs/day: 0.00     Types: Cigarettes     Quit date: 1964     Years since quittin.6     Passive exposure: Past    Smokeless tobacco: Never   Vaping Use    Vaping status: Never Used   Substance Use Topics    Alcohol use: No    Drug use: No        Medications:    acetaminophen (TYLENOL) 500 MG tablet  aspirin 81 MG EC tablet  chlorthalidone (HYGROTON) 25 MG tablet  dulaglutide (TRULICITY) 1.5 MG/0.5ML pen  FEROSUL 325 (65 Fe) MG tablet  furosemide (LASIX) 20 MG tablet  indomethacin (INDOCIN) 50 MG capsule  LANTUS SOLOSTAR 100 UNIT/ML soln  loratadine (CLARITIN) 10 MG tablet  losartan (COZAAR) 100 MG tablet  methocarbamol (ROBAXIN) 500 MG tablet  metoprolol tartrate (LOPRESSOR) 25 MG tablet  potassium chloride ER (K-TAB/KLOR-CON) 10 MEQ CR tablet  predniSONE (DELTASONE) 20 MG tablet  rosuvastatin (CRESTOR) 5 MG tablet  warfarin ANTICOAGULANT (COUMADIN) 2.5 MG tablet  blood glucose monitoring (NO BRAND SPECIFIED) meter device kit  CONTOUR NEXT TEST test strip  fluticasone (FLONASE) 50 MCG/ACT nasal spray  insulin pen needle (EASY TOUCH PEN NEEDLES) 31G X 5 MM miscellaneous  STOOL SOFTENER 100 MG capsule          Review of Systems   Musculoskeletal:  Positive for back pain. Negative for gait problem.   All other systems reviewed and are negative.      Physical Exam   BP: (!) 184/83  Pulse: 62  Temp: 98.4  F (36.9  C)  Resp: 16  SpO2: 100 %      Physical Exam  Vitals and nursing note reviewed.   Constitutional:       General: He is not in acute distress.     Appearance: He is well-developed. He is not diaphoretic.   HENT:      Head: Normocephalic  and atraumatic.   Eyes:      Pupils: Pupils are equal, round, and reactive to light.   Cardiovascular:      Rate and Rhythm: Normal rate.   Pulmonary:      Effort: Pulmonary effort is normal. No respiratory distress.   Musculoskeletal:      Cervical back: Normal, normal range of motion and neck supple.      Thoracic back: Normal.        Back:       Comments: Negative straight leg raise test.  Left sided tenderness to palpation.  No step-offs or edema.  No swelling, erythema or bruising to the area of concern.   Skin:     General: Skin is warm and dry.      Coloration: Skin is not pale.   Neurological:      Mental Status: He is alert and oriented to person, place, and time.         ED Course        Procedures         No results found. However, due to the size of the patient record, not all encounters were searched. Please check Results Review for a complete set of results.    Medications - No data to display    Assessments & Plan (with Medical Decision Making)   Pleasant 79-year-old male that presented for evaluation of left lower back pain that started last week when he was showing his grandson had a change tire on a car.  Experiencing intermittent muscle spasms which prompted this visit.  Taking Tylenol for his pain.  Patient noted to have left sided low back tenderness to palpation.  Negative straight leg raise test.  No swelling, erythema or bruising to the area of concern.  Ambulating in the room independently.    Findings are consistent with muscular strain.  Unable to take NSAIDs due to being on anticoagulant.  I will treat with Robaxin and a low-dose of prednisone.  May continue Tylenol.  Recommended applying ice packs alternating with heat.  Follow-up with primary doctor next week if no improvement in symptoms.  Return to urgent care or Emergency Department for any worsening or concerning symptoms.    I have reviewed the nursing notes.    I have reviewed the findings, diagnosis, plan and need for follow up  with the patient.  This document was prepared using a combination of typing and voice generated software.  While every attempt was made for accuracy, spelling and grammatical errors may exist.         New Prescriptions    METHOCARBAMOL (ROBAXIN) 500 MG TABLET    Take 1 tablet (500 mg) by mouth 3 times daily as needed for muscle spasms.    PREDNISONE (DELTASONE) 20 MG TABLET    Take 1 tablet (20 mg) by mouth daily for 5 days.       Final diagnoses:   Acute left-sided low back pain without sciatica   Back muscle spasm       3/11/2025   [facility]       Mpofu, Prudence, CNP  03/11/25 [id number]

## 2025-03-11 NOTE — ED TRIAGE NOTES
Patient was helping to change a tire last week, did too much. Back pain is 3/10 but will spasm up to a 10/10.  Has been getting worse throughout the week. More difficulty moving around

## 2025-03-19 ENCOUNTER — ANTICOAGULATION THERAPY VISIT (OUTPATIENT)
Dept: ANTICOAGULATION | Facility: OTHER | Age: 80
End: 2025-03-19
Payer: COMMERCIAL

## 2025-03-19 ENCOUNTER — LAB (OUTPATIENT)
Dept: LAB | Facility: OTHER | Age: 80
End: 2025-03-19
Payer: MEDICARE

## 2025-03-19 DIAGNOSIS — C61 PROSTATE CANCER (H): ICD-10-CM

## 2025-03-19 DIAGNOSIS — E11.22 TYPE 2 DIABETES MELLITUS WITH STAGE 3B CHRONIC KIDNEY DISEASE, WITH LONG-TERM CURRENT USE OF INSULIN (H): ICD-10-CM

## 2025-03-19 DIAGNOSIS — I26.99 PULMONARY EMBOLISM AND INFARCTION (H): ICD-10-CM

## 2025-03-19 DIAGNOSIS — N18.32 STAGE 3B CHRONIC KIDNEY DISEASE (H): ICD-10-CM

## 2025-03-19 DIAGNOSIS — I10 BENIGN ESSENTIAL HYPERTENSION: ICD-10-CM

## 2025-03-19 DIAGNOSIS — E78.2 MIXED HYPERLIPIDEMIA: ICD-10-CM

## 2025-03-19 DIAGNOSIS — Z79.4 TYPE 2 DIABETES MELLITUS WITH STAGE 3B CHRONIC KIDNEY DISEASE, WITH LONG-TERM CURRENT USE OF INSULIN (H): ICD-10-CM

## 2025-03-19 DIAGNOSIS — I48.20 CHRONIC ATRIAL FIBRILLATION (H): ICD-10-CM

## 2025-03-19 DIAGNOSIS — N18.32 TYPE 2 DIABETES MELLITUS WITH STAGE 3B CHRONIC KIDNEY DISEASE, WITH LONG-TERM CURRENT USE OF INSULIN (H): ICD-10-CM

## 2025-03-19 DIAGNOSIS — Z79.01 LONG TERM CURRENT USE OF ANTICOAGULANT THERAPY: ICD-10-CM

## 2025-03-19 DIAGNOSIS — I26.99 PULMONARY EMBOLISM AND INFARCTION (H): Primary | ICD-10-CM

## 2025-03-19 LAB
ALT SERPL W P-5'-P-CCNC: 33 U/L (ref 0–70)
ANION GAP SERPL CALCULATED.3IONS-SCNC: 10 MMOL/L (ref 7–15)
BUN SERPL-MCNC: 20 MG/DL (ref 8–23)
CALCIUM SERPL-MCNC: 9.2 MG/DL (ref 8.8–10.4)
CHLORIDE SERPL-SCNC: 93 MMOL/L (ref 98–107)
CHOLEST SERPL-MCNC: 94 MG/DL
CREAT SERPL-MCNC: 1.45 MG/DL (ref 0.67–1.17)
EGFRCR SERPLBLD CKD-EPI 2021: 49 ML/MIN/1.73M2
EST. AVERAGE GLUCOSE BLD GHB EST-MCNC: 169 MG/DL
FASTING STATUS PATIENT QL REPORTED: NO
FASTING STATUS PATIENT QL REPORTED: NO
GLUCOSE SERPL-MCNC: 191 MG/DL (ref 70–99)
HBA1C MFR BLD: 7.5 %
HCO3 SERPL-SCNC: 30 MMOL/L (ref 22–29)
HDLC SERPL-MCNC: 42 MG/DL
INR BLD: 3.3 (ref 0.9–1.1)
LDLC SERPL CALC-MCNC: 13 MG/DL
NONHDLC SERPL-MCNC: 52 MG/DL
POTASSIUM SERPL-SCNC: 3.9 MMOL/L (ref 3.4–5.3)
PSA SERPL DL<=0.01 NG/ML-MCNC: 12.6 NG/ML
SODIUM SERPL-SCNC: 133 MMOL/L (ref 135–145)
TRIGL SERPL-MCNC: 196 MG/DL

## 2025-03-19 PROCEDURE — 80048 BASIC METABOLIC PNL TOTAL CA: CPT | Mod: ZL

## 2025-03-19 PROCEDURE — 85610 PROTHROMBIN TIME: CPT | Mod: ZL

## 2025-03-19 PROCEDURE — 36415 COLL VENOUS BLD VENIPUNCTURE: CPT | Mod: ZL

## 2025-03-19 PROCEDURE — 84153 ASSAY OF PSA TOTAL: CPT | Mod: ZL

## 2025-03-19 PROCEDURE — 80061 LIPID PANEL: CPT | Mod: ZL

## 2025-03-19 PROCEDURE — 84460 ALANINE AMINO (ALT) (SGPT): CPT | Mod: ZL

## 2025-03-19 PROCEDURE — 83036 HEMOGLOBIN GLYCOSYLATED A1C: CPT | Mod: ZL

## 2025-03-19 NOTE — PROGRESS NOTES
ANTICOAGULATION MANAGEMENT     Clement Voss 79 year old male is on warfarin with supratherapeutic INR result. (Goal INR 2.0-3.0)    Recent labs: (last 7 days)     03/19/25  0941   INR 3.3*       ASSESSMENT     Source(s): Chart Review  Previous INR was Therapeutic last 2(+) visits  Medication, diet, health changes since last INR was taking prednisone and methocarbamol ED visit for back pain on 3/11/25         PLAN     Recommended plan for temporary change(s) affecting INR     Dosing Instructions: partial hold then continue your current warfarin dose with next INR in 4 weeks       Summary  As of 3/19/2025      Full warfarin instructions:  3/19: 1.25 mg; Otherwise 2.5 mg every Mon, Wed, Fri; 5 mg all other days   Next INR check:  4/16/2025               Detailed voice message left for Huy with dosing instructions and follow up date.   Sent [employer] message with dosing and follow up instructions    Contact [phone] to schedule next INR lab appt.    Education provided: Please call back if any changes to your diet, medications or how you've been taking warfarin    Plan made per [facility] anticoagulation protocol    Latosha Paris RN  3/19/2025  Anticoagulation Clinic  [employer] for routing messages: mannie RUELAS  [facility] patient phone line: [phone]        _______________________________________________________________________     Anticoagulation Episode Summary       Current INR goal:  2.0-3.0   TTR:  74.3% (1 y)   Target end date:  Indefinite   Send INR reminders to:  THOMAS RUELAS    Indications    Pulmonary embolism and infarction (H) [I26.99]  Long-term (current) use of anticoagulants [Z79.01] [Z79.01]             Comments:  --             Anticoagulation Care Providers       Provider Role Specialty Phone number    Lorelei Felix MD Referring Family Medicine [phone]

## 2025-03-21 PROBLEM — E66.01 MORBID OBESITY (H): Status: RESOLVED | Noted: 2017-05-26 | Resolved: 2025-03-21

## 2025-03-23 DIAGNOSIS — I10 BENIGN ESSENTIAL HYPERTENSION: ICD-10-CM

## 2025-03-24 RX ORDER — CHLORTHALIDONE 25 MG/1
25 TABLET ORAL DAILY
Qty: 90 TABLET | Refills: 3 | Status: SHIPPED | OUTPATIENT
Start: 2025-03-24

## 2025-03-24 RX ORDER — LOSARTAN POTASSIUM 100 MG/1
100 TABLET ORAL DAILY
Qty: 90 TABLET | Refills: 3 | Status: SHIPPED | OUTPATIENT
Start: 2025-03-24

## 2025-03-24 NOTE — TELEPHONE ENCOUNTER
CHLORTHALIDONE 25MG TABLETS         Last Written Prescription Date:  4/11/24  Last Fill Quantity: 90,   # refills: 3  Last Office Visit: 3/21/25  Future Office visit:       Routing refill request to provider for review/approval because:  Diuretics (Including Combos) Protocol [id number]/23/2025 10:27 AM   Protocol Details Has GFR on file in past 12 months and most recent value is normal     GFR Estimate   Date Value Ref Range Status   03/19/2025 49 (L) >60 mL/min/1.73m2 Final     Comment:     eGFR calculated using 2021 CKD-EPI equation.   04/27/2021 51 (L) >60 mL/min/[1.73_m2] Final     Comment:     Non  GFR Calc  Starting 12/18/2018, serum creatinine based estimated GFR (eGFR) will be   calculated using the Chronic Kidney Disease Epidemiology Collaboration   (CKD-EPI) equation.           LOSARTAN 100MG TABLETS         Last Written Prescription Date:  4/4/24  Last Fill Quantity: 90,   # refills: 3  Last Office Visit: 3/21/25  Future Office visit:       Routing refill request to provider for review/approval because:  Angiotensin-II Receptors [id number]/23/2025 10:27 AM   Protocol Details Has GFR on file in past 12 months and most recent value is normal     See GFR above

## 2025-03-26 ENCOUNTER — PATIENT OUTREACH (OUTPATIENT)
Dept: EDUCATION SERVICES | Facility: HOSPITAL | Age: 80
End: 2025-03-26

## 2025-03-26 DIAGNOSIS — E11.22 TYPE 2 DIABETES MELLITUS WITH STAGE 3B CHRONIC KIDNEY DISEASE, WITH LONG-TERM CURRENT USE OF INSULIN (H): Primary | ICD-10-CM

## 2025-03-26 DIAGNOSIS — Z79.4 TYPE 2 DIABETES MELLITUS WITH STAGE 3B CHRONIC KIDNEY DISEASE, WITH LONG-TERM CURRENT USE OF INSULIN (H): Primary | ICD-10-CM

## 2025-03-26 DIAGNOSIS — N18.32 TYPE 2 DIABETES MELLITUS WITH STAGE 3B CHRONIC KIDNEY DISEASE, WITH LONG-TERM CURRENT USE OF INSULIN (H): Primary | ICD-10-CM

## 2025-03-26 RX ORDER — DULAGLUTIDE 3 MG/.5ML
3 INJECTION, SOLUTION SUBCUTANEOUS WEEKLY
Qty: 8 ML | Refills: 3 | Status: SHIPPED | OUTPATIENT
Start: 2025-03-26

## 2025-03-26 NOTE — PROGRESS NOTES
Diabetes Self-Management Education & Support    Left message for Huy, requesting call back.     RE: discuss medication plan.   Increasing Trulicity dose to 3 mg weekly.   Review mychart message- with BG levels.     Mikayla Caldera RN [facility]   [phone]  3/26/2025 at 1:15 PM    Spoke with Huy. Reviewed glucose trends and his recent visit with pcp.   7.5 A1C- okay is under 8%, if able, would prefer <7.5.     He is okay with increasing Trulicity to 3 mg weekly. Updated order sent to [facility] pharmacy via [employer].  Reviewed with Trulicity increase- may need to decrease insulin -this will depend on his glucose trends.   Will continue to follow. Huy sends BG weekly for review.   Will notify Huy upon new dose arrival.   He would like to wait to increase once he receives the dose increase to make sure he doesn't run out of medicine prior to receiving new order. He will take 2 doses of 1.5 mg to use up his supply- once the 3 mg arrives.   He takes his weekly injection on Wednesdays.     Mikayla Caldera RN [facility]   [phone]  3/26/2025 at 2:31 PM

## 2025-04-16 ENCOUNTER — ANTICOAGULATION THERAPY VISIT (OUTPATIENT)
Dept: ANTICOAGULATION | Facility: OTHER | Age: 80
End: 2025-04-16
Attending: FAMILY MEDICINE
Payer: MEDICARE

## 2025-04-16 ENCOUNTER — LAB (OUTPATIENT)
Dept: LAB | Facility: OTHER | Age: 80
End: 2025-04-16
Attending: FAMILY MEDICINE
Payer: MEDICARE

## 2025-04-16 DIAGNOSIS — I26.99 PULMONARY EMBOLISM AND INFARCTION (H): Primary | ICD-10-CM

## 2025-04-16 DIAGNOSIS — I48.20 CHRONIC ATRIAL FIBRILLATION (H): ICD-10-CM

## 2025-04-16 DIAGNOSIS — I26.99 PULMONARY EMBOLISM AND INFARCTION (H): ICD-10-CM

## 2025-04-16 DIAGNOSIS — Z79.01 LONG TERM CURRENT USE OF ANTICOAGULANT THERAPY: ICD-10-CM

## 2025-04-16 LAB — INR BLD: 2.3 (ref 0.9–1.1)

## 2025-04-16 PROCEDURE — 85610 PROTHROMBIN TIME: CPT | Mod: ZL

## 2025-04-16 PROCEDURE — 36416 COLLJ CAPILLARY BLOOD SPEC: CPT | Mod: ZL

## 2025-04-16 NOTE — PROGRESS NOTES
ANTICOAGULATION MANAGEMENT     Clement Voss 79 year old male is on warfarin with therapeutic INR result. (Goal INR 2.0-3.0)    Recent labs: (last 7 days)     04/16/25  0930   INR 2.3*       ASSESSMENT     Source(s): Chart Review and Patient/Caregiver Call     Warfarin doses taken: Warfarin taken as instructed  Diet: No new diet changes identified  Medication/supplement changes:  increase in trulity dose  New illness, injury, or hospitalization: No  Signs or symptoms of bleeding or clotting: No  Previous result: Supratherapeutic  Additional findings: None       PLAN     Recommended plan for ongoing change(s) affecting INR     Dosing Instructions: Continue your current warfarin dose with next INR in 6 weeks       Summary  As of 4/16/2025      Full warfarin instructions:  2.5 mg every Mon, Wed, Fri; 5 mg all other days   Next INR check:  5/28/2025               Telephone call with Huy who verbalizes understanding and agrees to plan    Lab visit scheduled    Education provided: None required    Plan made per [facility] anticoagulation protocol    Latosha Paris RN  4/16/2025  Anticoagulation Clinic  [employer] for routing messages: mannie RUELAS  [facility] patient phone line: [phone]        _______________________________________________________________________     Anticoagulation Episode Summary       Current INR goal:  2.0-3.0   TTR:  77.6% (1 y)   Target end date:  Indefinite   Send INR reminders to:  THOMAS RUELAS    Indications    Pulmonary embolism and infarction (H) [I26.99]  Long-term (current) use of anticoagulants [Z79.01] [Z79.01]             Comments:  --             Anticoagulation Care Providers       Provider Role Specialty Phone number    Lorelei Felix MD Referring Family Medicine [phone]

## 2025-05-17 DIAGNOSIS — E11.22 TYPE 2 DIABETES MELLITUS WITH STAGE 3B CHRONIC KIDNEY DISEASE, WITH LONG-TERM CURRENT USE OF INSULIN (H): ICD-10-CM

## 2025-05-17 DIAGNOSIS — Z79.4 TYPE 2 DIABETES MELLITUS WITH STAGE 3B CHRONIC KIDNEY DISEASE, WITH LONG-TERM CURRENT USE OF INSULIN (H): ICD-10-CM

## 2025-05-17 DIAGNOSIS — N18.32 TYPE 2 DIABETES MELLITUS WITH STAGE 3B CHRONIC KIDNEY DISEASE, WITH LONG-TERM CURRENT USE OF INSULIN (H): ICD-10-CM

## 2025-05-19 NOTE — TELEPHONE ENCOUNTER
Viry      Last Written Prescription Date:  7/15/24  Last Fill Quantity: 30mL,   # refills: 3  Last Office Visit: 3/21/25  Future Office visit:    Next 5 appointments (look out 90 days)      Jun 23, 2025 9:30 AM  (Arrive by 9:15 AM)  Provider Visit with Lorelei Felix MD  [facility] ([facility] ) [address]  [place] [address]  [phone]             Routing refill request to provider for review/approval because:

## 2025-05-20 RX ORDER — INSULIN GLARGINE 100 [IU]/ML
INJECTION, SOLUTION SUBCUTANEOUS
Qty: 30 ML | Refills: 3 | Status: SHIPPED | OUTPATIENT
Start: 2025-05-20

## 2025-05-28 ENCOUNTER — LAB (OUTPATIENT)
Dept: LAB | Facility: OTHER | Age: 80
End: 2025-05-28
Payer: MEDICARE

## 2025-05-28 ENCOUNTER — ANTICOAGULATION THERAPY VISIT (OUTPATIENT)
Dept: ANTICOAGULATION | Facility: OTHER | Age: 80
End: 2025-05-28
Attending: FAMILY MEDICINE
Payer: MEDICARE

## 2025-05-28 DIAGNOSIS — I48.20 CHRONIC ATRIAL FIBRILLATION (H): ICD-10-CM

## 2025-05-28 DIAGNOSIS — I26.99 PULMONARY EMBOLISM AND INFARCTION (H): ICD-10-CM

## 2025-05-28 DIAGNOSIS — I26.99 PULMONARY EMBOLISM AND INFARCTION (H): Primary | ICD-10-CM

## 2025-05-28 DIAGNOSIS — Z79.01 LONG TERM CURRENT USE OF ANTICOAGULANT THERAPY: ICD-10-CM

## 2025-05-28 LAB — INR BLD: 1.9 (ref 0.9–1.1)

## 2025-05-28 PROCEDURE — 36416 COLLJ CAPILLARY BLOOD SPEC: CPT | Mod: ZL

## 2025-05-28 PROCEDURE — 85610 PROTHROMBIN TIME: CPT | Mod: ZL

## 2025-05-28 NOTE — PROGRESS NOTES
ANTICOAGULATION MANAGEMENT     Clement Voss 80 year old male is on warfarin with subtherapeutic INR result. (Goal INR 2.0-3.0)    Recent labs: (last 7 days)     05/28/25  0928   INR 1.9*       ASSESSMENT     Source(s): Chart Review  Previous INR was Therapeutic last 2(+) visits  Medication, diet, health changes since last INR chart reviewed; none identified         PLAN     Recommended plan for no diet, medication or health factor changes affecting INR     Dosing Instructions: Continue your current warfarin dose with next INR in 3 weeks       Summary  As of 5/28/2025      Full warfarin instructions:  2.5 mg every Mon, Wed, Fri; 5 mg all other days   Next INR check:  6/20/2025               Detailed voice message left for Huy with dosing instructions and follow up date.   Sent [employer]t message with dosing and follow up instructions    Check at provider office visit    Education provided: Please call back if any changes to your diet, medications or how you've been taking warfarin    Plan made per [facility] anticoagulation protocol    Latosha Paris RN  5/28/2025  Anticoagulation Clinic  [employer] for routing messages: mannie RUELAS  [facility] patient phone line:[phone]        _______________________________________________________________________     Anticoagulation Episode Summary       Current INR goal:  2.0-3.0   TTR:  74.7% (1 y)   Target end date:  Indefinite   Send INR reminders to:  THOMAS RUELAS    Indications    Pulmonary embolism and infarction (H) [I26.99]  Long-term (current) use of anticoagulants [Z79.01] [Z79.01]             Comments:  --             Anticoagulation Care Providers       Provider Role Specialty Phone number    Lorelei Felix MD Referring Family Medicine [phone]

## 2025-05-29 DIAGNOSIS — Z79.01 LONG TERM CURRENT USE OF ANTICOAGULANT THERAPY: ICD-10-CM

## 2025-05-29 DIAGNOSIS — I26.99 PULMONARY EMBOLISM AND INFARCTION (H): ICD-10-CM

## 2025-05-29 RX ORDER — WARFARIN SODIUM 2.5 MG/1
TABLET ORAL
Qty: 140 TABLET | Refills: 1 | Status: SHIPPED | OUTPATIENT
Start: 2025-05-29

## 2025-05-29 NOTE — TELEPHONE ENCOUNTER
ANTICOAGULATION MANAGEMENT:  Medication Refill    Anticoagulation Summary  As of 5/28/2025      Warfarin maintenance plan:  2.5 mg (2.5 mg x 1) every Mon, Wed, Fri; 5 mg (2.5 mg x 2) all other days   Next INR check:  6/20/2025   Target end date:  Indefinite    Indications    Pulmonary embolism and infarction (H) [I26.99]  Long-term (current) use of anticoagulants [Z79.01] [Z79.01]                 Anticoagulation Care Providers       Provider Role Specialty Phone number    Lorelei Felix MD Referring Family Medicine [phone]            Visit with referring provider/group within last year: Yes 3/21/25    [facility] referral signed within last year: Yes    Clement meets all criteria for refill (current [facility] referral, visit with referring provider/group in last 15 months unless directed to return in 2 years in last referring provider visit note, lab monitoring up to date or not exceeding 2 weeks overdue). Rx instructions and quantity match patient's current dosing plan. Warfarin 90 day supply with 1 refill granted per [facility] protocol     Corry Avelar, RN  Anticoagulation Clinic

## 2025-06-18 NOTE — PROGRESS NOTES
Assessment & Plan     1. Type 2 diabetes mellitus with stage 3b chronic kidney disease, with long-term current use of insulin (H) (Primary)  Current labs reviewed, patient will continue working with Diabetic Education.  Follow-up in three months, sooner as needed.  - Hemoglobin A1c; Future    2. Mixed hyperlipidemia  No changes recommended, follow-up as above.  - ALT; Future  - Lipid Profile; Future    3. Benign essential hypertension  As above  - Basic metabolic panel; Future  - CBC with platelets; Future    4. Chronic atrial fibrillation (H)  Continue working with Cardiology    5. Stage 3a chronic kidney disease (H)  Future lab orders placed.  - Basic metabolic panel; Future  - CBC with platelets; Future    6. ASCVD (arteriosclerotic cardiovascular disease)  Continue with Cardiology    7. Congestive heart failure, unspecified HF chronicity, unspecified heart failure type (H)  As above    8. Toenail deformity  Referral ordered for further evaluation and help with nails  - Orthopedic  Referral; Future    9. Elevated prostate specific antigen (PSA)  Patient's current Urologist is leaving [facility], new referral ordered for [facility]  - Adult Urology  Referral; Future      The longitudinal plan of care for the diagnosis(es)/condition(s) as documented were addressed during this visit. Due to the added complexity in care, I will continue to support Huy in the subsequent management and with ongoing continuity of care.      Blood sugar testing frequency justification:  Uncontrolled diabetes    Follow-up  Return in about 3 months (around 9/23/2025) for Diabetic Follow-up, Chronic Disease Management, Medication review.    Subjective   Huy is a 80 year old, presenting for the following health issues:  Chronic Disease Management    HPI      Diabetes Follow-up    How often are you checking your blood sugar? Two times daily  Blood sugar testing frequency justification:  Adjustment of medication(s)  What time  of day are you checking your blood sugars (select all that apply)?  Before and after meals  Have you had any blood sugars above 200?  Yes 302  Have you had any blood sugars below 70?  No  What symptoms do you notice when your blood sugar is low?  Shaky  What concerns do you have today about your diabetes? None   Do you have any of these symptoms? (Select all that apply)  No numbness or tingling in feet.  No redness, sores or blisters on feet.  No complaints of excessive thirst.  No reports of blurry vision.  No significant changes to weight.    Diabetic Foot Screen:  Any complaints of increased pain or numbness ? No  Is there a foot ulcer now or a history of foot ulcer? No  Does the foot have an abnormal shape? No  Are the nails thick, too long or ingrown?  YES referral ordered  Are there any redness or open areas?  YES overall redness/purple discoloration, no ulceration         Sensation Testing done at all points on the diagram with monofilament     Right Foot: Sensation Normal at all points  Left Foot: Sensation Normal at all points     Risk Category: 0- No loss of protective sensation  Performed by Lorelei Felix MD    BP Readings from Last 2 Encounters:   06/23/25 138/74   03/21/25 134/68     Hemoglobin A1C (%)   Date Value   06/20/2025 7.7 (H)   03/19/2025 7.5 (H)   04/27/2021 9.7 (H)   01/26/2021 9.4 (H)     LDL Cholesterol Calculated (mg/dL)   Date Value   06/20/2025 20   03/19/2025 13   04/27/2021 12   01/26/2021 26             Hyperlipidemia Follow-Up    Are you regularly taking any medication or supplement to lower your cholesterol?   Yes- Crestor 50 mg  Are you having muscle aches or other side effects that you think could be caused by your cholesterol lowering medication?  No    Hypertension Follow-up    Do you check your blood pressure regularly outside of the clinic? Yes   Are you following a low salt diet? Yes  Are your blood pressures ever more than 140 on the top number (systolic) OR  "more   than 90 on the bottom number (diastolic), for example 140/90? No    BP Readings from Last 2 Encounters:   06/23/25 138/74   03/21/25 134/68     Atrial Fibrillation Follow-up    Symptoms: no recent chest pain, significant palpitations, dizziness/lightheadedness, dyspnea, or increased peripheral edema.  Stroke prevention: Coumadin (Warfarin)        11/14/2024     9:39 AM 12/20/2024     8:53 AM 3/11/2025     2:43 PM 3/21/2025     9:25 AM 6/23/2025     9:31 AM   Date   Pulse 58 70 62 72 76       Chronic Kidney Disease Follow-up    Do you take any over the counter pain medicine?: Yes  What over the counter medicine are you taking for your pain?:  Tylenol   How often do you take this medicine?:  A few times a week    Vascular Disease Follow-up    How often do you take nitroglycerin? Never  Do you take an aspirin every day? Yes    Heart Failure Follow-up   Are you experiencing any shortness of breath? No  Are you experiencing any swelling in your legs or feet?  No  Are you using more pillows than usual? No  Do you cough at night?  No  Do you check your weight daily?  Yes  Have you had a weight change recently?  No  Are you having any of the following side effects from your medications? (Select all that apply)  The patient does not report symptoms of dizziness, fatigue, cough, swelling, or slow heart beat.  Since your last visit, how many times have you gone to the cardiologist, urgent care, emergency room, or hospital because of your heart failure?   None  Last Echo: No results found.        Review of Systems  Constitutional, HEENT, cardiovascular, pulmonary, gi and gu systems are negative, except as otherwise noted.      Objective    /74 (BP Location: Right arm, Patient Position: Sitting, Cuff Size: Adult Large)   Pulse 76   Temp 97.5  F (36.4  C) (Tympanic)   Resp 16   Ht 1.702 m (5' 7\")   Wt 89.8 kg (198 lb)   SpO2 97%   BMI 31.01 kg/m    Body mass index is 31.01 kg/m .  Physical Exam   GENERAL: " alert and no distress  PSYCH: mentation appears normal, affect normal/bright  Diabetic foot exam: normal monofilament exam, DP reduced bilateral, PT reduced bilateral, and discoloration (unsure if venous stasis dermatitis or dependent rubor), normal capillary refill; thickened toenails    Lab on 06/20/2025   Component Date Value Ref Range Status    INR 06/20/2025 1.8 (H)  0.9 - 1.1 Final    ALT 06/20/2025 29  0 - 70 U/L Final    Sodium 06/20/2025 133 (L)  135 - 145 mmol/L Final    Potassium 06/20/2025 3.9  3.4 - 5.3 mmol/L Final    Chloride 06/20/2025 96 (L)  98 - 107 mmol/L Final    Carbon Dioxide (CO2) 06/20/2025 29  22 - 29 mmol/L Final    Anion Gap 06/20/2025 8  7 - 15 mmol/L Final    Urea Nitrogen 06/20/2025 13.9  8.0 - 23.0 mg/dL Final    Creatinine 06/20/2025 1.37 (H)  0.67 - 1.17 mg/dL Final    GFR Estimate 06/20/2025 52 (L)  >60 mL/min/1.73m2 Final    eGFR calculated using 2021 CKD-EPI equation.    Calcium 06/20/2025 9.6  8.8 - 10.4 mg/dL Final    Glucose 06/20/2025 128 (H)  70 - 99 mg/dL Final    Patient Fasting > 8hrs? 06/20/2025 Yes   Final    Estimated Average Glucose 06/20/2025 174 (H)  <117 mg/dL Final    Hemoglobin A1C 06/20/2025 7.7 (H)  <5.7 % Final    Normal <5.7%   Prediabetes 5.7-6.4%    Diabetes 6.5% or higher     Note: Adopted from ADA consensus guidelines.    Cholesterol 06/20/2025 85  <200 mg/dL Final    Triglycerides 06/20/2025 126  <150 mg/dL Final    Direct Measure HDL 06/20/2025 40  >=40 mg/dL Final    LDL Cholesterol Calculated 06/20/2025 20  <100 mg/dL Final    LDL calculated using the Friedewald equation.    Non HDL Cholesterol 06/20/2025 45  <130 mg/dL Final    Patient Fasting > 8hrs? 06/20/2025 Yes   Final    TSH 06/20/2025 2.74  0.30 - 4.20 uIU/mL Final    Creatinine Urine mg/dL 06/20/2025 64.4  mg/dL Final    The reference ranges have not been established in urine creatinine. The results should be integrated into the clinical context for interpretation.    Albumin Urine mg/L  06/20/2025 <12.0  mg/L Final    The reference ranges have not been established in urine albumin. The results should be integrated into the clinical context for interpretation.    Albumin Urine mg/g Cr 06/20/2025    Final    Unable to calculate, urine albumin and/or urine creatinine is outside detectable limits.  Microalbuminuria is defined as an albumin:creatinine ratio of 17 to 299 for males and 25 to 299 for females. A ratio of albumin:creatinine of 300 or higher is indicative of overt proteinuria.  Due to biologic variability, positive results should be confirmed by a second, first-morning random or 24-hour timed urine specimen. If there is discrepancy, a third specimen is recommended. When 2 out of 3 results are in the microalbuminuria range, this is evidence for incipient nephropathy and warrants increased efforts at glucose control, blood pressure control, and institution of therapy with an angiotensin-converting-enzyme (ACE) inhibitor (if the patient can tolerate it).             Signed Electronically by: Lorelei Felix MD

## 2025-06-20 ENCOUNTER — LAB (OUTPATIENT)
Dept: LAB | Facility: OTHER | Age: 80
End: 2025-06-20
Payer: MEDICARE

## 2025-06-20 DIAGNOSIS — N18.32 TYPE 2 DIABETES MELLITUS WITH STAGE 3B CHRONIC KIDNEY DISEASE, WITH LONG-TERM CURRENT USE OF INSULIN (H): ICD-10-CM

## 2025-06-20 DIAGNOSIS — E78.2 MIXED HYPERLIPIDEMIA: ICD-10-CM

## 2025-06-20 DIAGNOSIS — I10 BENIGN ESSENTIAL HYPERTENSION: ICD-10-CM

## 2025-06-20 DIAGNOSIS — Z79.4 TYPE 2 DIABETES MELLITUS WITH STAGE 3B CHRONIC KIDNEY DISEASE, WITH LONG-TERM CURRENT USE OF INSULIN (H): ICD-10-CM

## 2025-06-20 DIAGNOSIS — I26.99 PULMONARY EMBOLISM AND INFARCTION (H): ICD-10-CM

## 2025-06-20 DIAGNOSIS — I48.20 CHRONIC ATRIAL FIBRILLATION (H): ICD-10-CM

## 2025-06-20 DIAGNOSIS — E11.22 TYPE 2 DIABETES MELLITUS WITH STAGE 3B CHRONIC KIDNEY DISEASE, WITH LONG-TERM CURRENT USE OF INSULIN (H): ICD-10-CM

## 2025-06-20 LAB
ALT SERPL W P-5'-P-CCNC: 29 U/L (ref 0–70)
ANION GAP SERPL CALCULATED.3IONS-SCNC: 8 MMOL/L (ref 7–15)
BUN SERPL-MCNC: 13.9 MG/DL (ref 8–23)
CALCIUM SERPL-MCNC: 9.6 MG/DL (ref 8.8–10.4)
CHLORIDE SERPL-SCNC: 96 MMOL/L (ref 98–107)
CHOLEST SERPL-MCNC: 85 MG/DL
CREAT SERPL-MCNC: 1.37 MG/DL (ref 0.67–1.17)
CREAT UR-MCNC: 64.4 MG/DL
EGFRCR SERPLBLD CKD-EPI 2021: 52 ML/MIN/1.73M2
EST. AVERAGE GLUCOSE BLD GHB EST-MCNC: 174 MG/DL
FASTING STATUS PATIENT QL REPORTED: YES
FASTING STATUS PATIENT QL REPORTED: YES
GLUCOSE SERPL-MCNC: 128 MG/DL (ref 70–99)
HBA1C MFR BLD: 7.7 %
HCO3 SERPL-SCNC: 29 MMOL/L (ref 22–29)
HDLC SERPL-MCNC: 40 MG/DL
INR BLD: 1.8 (ref 0.9–1.1)
LDLC SERPL CALC-MCNC: 20 MG/DL
MICROALBUMIN UR-MCNC: <12 MG/L
MICROALBUMIN/CREAT UR: NORMAL MG/G{CREAT}
NONHDLC SERPL-MCNC: 45 MG/DL
POTASSIUM SERPL-SCNC: 3.9 MMOL/L (ref 3.4–5.3)
SODIUM SERPL-SCNC: 133 MMOL/L (ref 135–145)
TRIGL SERPL-MCNC: 126 MG/DL
TSH SERPL DL<=0.005 MIU/L-ACNC: 2.74 UIU/ML (ref 0.3–4.2)

## 2025-06-20 PROCEDURE — 82570 ASSAY OF URINE CREATININE: CPT | Mod: ZL

## 2025-06-20 PROCEDURE — 36415 COLL VENOUS BLD VENIPUNCTURE: CPT | Mod: ZL

## 2025-06-20 PROCEDURE — 84443 ASSAY THYROID STIM HORMONE: CPT | Mod: ZL

## 2025-06-20 PROCEDURE — 85610 PROTHROMBIN TIME: CPT | Mod: ZL

## 2025-06-20 PROCEDURE — 84460 ALANINE AMINO (ALT) (SGPT): CPT | Mod: ZL

## 2025-06-20 PROCEDURE — 3051F HG A1C>EQUAL 7.0%<8.0%: CPT

## 2025-06-20 PROCEDURE — 83036 HEMOGLOBIN GLYCOSYLATED A1C: CPT | Mod: ZL

## 2025-06-20 PROCEDURE — 82465 ASSAY BLD/SERUM CHOLESTEROL: CPT | Mod: ZL

## 2025-06-20 PROCEDURE — 80048 BASIC METABOLIC PNL TOTAL CA: CPT | Mod: ZL

## 2025-06-20 PROCEDURE — 3048F LDL-C <100 MG/DL: CPT

## 2025-06-23 ENCOUNTER — OFFICE VISIT (OUTPATIENT)
Dept: FAMILY MEDICINE | Facility: OTHER | Age: 80
End: 2025-06-23
Attending: FAMILY MEDICINE
Payer: MEDICARE

## 2025-06-23 ENCOUNTER — RESULTS FOLLOW-UP (OUTPATIENT)
Dept: FAMILY MEDICINE | Facility: OTHER | Age: 80
End: 2025-06-23

## 2025-06-23 VITALS
OXYGEN SATURATION: 97 % | BODY MASS INDEX: 31.08 KG/M2 | HEIGHT: 67 IN | SYSTOLIC BLOOD PRESSURE: 138 MMHG | WEIGHT: 198 LBS | DIASTOLIC BLOOD PRESSURE: 74 MMHG | TEMPERATURE: 97.5 F | RESPIRATION RATE: 16 BRPM | HEART RATE: 76 BPM

## 2025-06-23 DIAGNOSIS — Z79.4 TYPE 2 DIABETES MELLITUS WITH STAGE 3B CHRONIC KIDNEY DISEASE, WITH LONG-TERM CURRENT USE OF INSULIN (H): Primary | ICD-10-CM

## 2025-06-23 DIAGNOSIS — L60.8 TOENAIL DEFORMITY: ICD-10-CM

## 2025-06-23 DIAGNOSIS — R97.20 ELEVATED PROSTATE SPECIFIC ANTIGEN (PSA): ICD-10-CM

## 2025-06-23 DIAGNOSIS — E78.2 MIXED HYPERLIPIDEMIA: ICD-10-CM

## 2025-06-23 DIAGNOSIS — N18.32 TYPE 2 DIABETES MELLITUS WITH STAGE 3B CHRONIC KIDNEY DISEASE, WITH LONG-TERM CURRENT USE OF INSULIN (H): Primary | ICD-10-CM

## 2025-06-23 DIAGNOSIS — E11.22 TYPE 2 DIABETES MELLITUS WITH STAGE 3B CHRONIC KIDNEY DISEASE, WITH LONG-TERM CURRENT USE OF INSULIN (H): Primary | ICD-10-CM

## 2025-06-23 DIAGNOSIS — N18.31 STAGE 3A CHRONIC KIDNEY DISEASE (H): ICD-10-CM

## 2025-06-23 DIAGNOSIS — I50.9 CONGESTIVE HEART FAILURE, UNSPECIFIED HF CHRONICITY, UNSPECIFIED HEART FAILURE TYPE (H): ICD-10-CM

## 2025-06-23 DIAGNOSIS — I10 BENIGN ESSENTIAL HYPERTENSION: ICD-10-CM

## 2025-06-23 DIAGNOSIS — I48.20 CHRONIC ATRIAL FIBRILLATION (H): ICD-10-CM

## 2025-06-23 DIAGNOSIS — I25.10 ASCVD (ARTERIOSCLEROTIC CARDIOVASCULAR DISEASE): ICD-10-CM

## 2025-06-23 PROCEDURE — G0463 HOSPITAL OUTPT CLINIC VISIT: HCPCS

## 2025-06-23 ASSESSMENT — PAIN SCALES - GENERAL: PAINLEVEL_OUTOF10: NO PAIN (0)

## 2025-06-24 DIAGNOSIS — R97.20 ELEVATED PROSTATE SPECIFIC ANTIGEN (PSA): Primary | ICD-10-CM

## 2025-07-09 ENCOUNTER — LAB (OUTPATIENT)
Dept: LAB | Facility: OTHER | Age: 80
End: 2025-07-09
Attending: FAMILY MEDICINE
Payer: MEDICARE

## 2025-07-09 ENCOUNTER — ANTICOAGULATION THERAPY VISIT (OUTPATIENT)
Dept: ANTICOAGULATION | Facility: OTHER | Age: 80
End: 2025-07-09
Attending: FAMILY MEDICINE
Payer: COMMERCIAL

## 2025-07-09 DIAGNOSIS — I26.99 PULMONARY EMBOLISM AND INFARCTION (H): Primary | ICD-10-CM

## 2025-07-09 DIAGNOSIS — Z79.01 LONG TERM CURRENT USE OF ANTICOAGULANT THERAPY: ICD-10-CM

## 2025-07-09 DIAGNOSIS — I26.99 PULMONARY EMBOLISM AND INFARCTION (H): ICD-10-CM

## 2025-07-09 DIAGNOSIS — I48.20 CHRONIC ATRIAL FIBRILLATION (H): ICD-10-CM

## 2025-07-09 LAB — INR BLD: 2.3 (ref 0.9–1.1)

## 2025-07-09 PROCEDURE — 36416 COLLJ CAPILLARY BLOOD SPEC: CPT | Mod: ZL

## 2025-07-09 PROCEDURE — 85610 PROTHROMBIN TIME: CPT | Mod: ZL

## 2025-07-09 NOTE — PROGRESS NOTES
ANTICOAGULATION MANAGEMENT     Clement Voss 80 year old male is on warfarin with therapeutic INR result. (Goal INR 2.0-3.0)    Recent labs: (last 7 days)     07/09/25  0953   INR 2.3*       ASSESSMENT     Source(s): Chart Review and Patient/Caregiver Call     Warfarin doses taken: Warfarin taken as instructed  Diet: No new diet changes identified  Medication/supplement changes: None noted  New illness, injury, or hospitalization: No  Signs or symptoms of bleeding or clotting: No  Previous result: Subtherapeutic  Additional findings: None       PLAN     Recommended plan for no diet, medication or health factor changes affecting INR     Dosing Instructions: Continue your current warfarin dose with next INR in 3 weeks       Summary  As of 7/9/2025      Full warfarin instructions:  2.5 mg every Mon, Wed; 5 mg all other days   Next INR check:  7/30/2025               Telephone call with Huy who verbalizes understanding and agrees to plan    Lab visit scheduled    Education provided: Please call back if any changes to your diet, medications or how you've been taking warfarin    Plan made per [facility] anticoagulation protocol    Peggy Diallo RN  7/9/2025  Anticoagulation Clinic  [employer] for routing messages: mannie RUELAS  [facility] patient phone line: [phone]        _______________________________________________________________________     Anticoagulation Episode Summary       Current INR goal:  2.0-3.0   TTR:  67.7% (1 y)   Target end date:  Indefinite   Send INR reminders to:  THOMAS RUELAS    Indications    Pulmonary embolism and infarction (H) [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

## 2025-07-24 DIAGNOSIS — E78.2 MIXED HYPERLIPIDEMIA: ICD-10-CM

## 2025-07-24 RX ORDER — ROSUVASTATIN CALCIUM 5 MG/1
5 TABLET, COATED ORAL DAILY
Qty: 90 TABLET | Refills: 2 | Status: SHIPPED | OUTPATIENT
Start: 2025-07-24

## 2025-07-24 NOTE — TELEPHONE ENCOUNTER
ROSUVASTATIN 5MG TABLETS       Last Written Prescription Date:  10/31/24  Last Fill Quantity: 90,   # refills: 2  Last Office Visit: 6/23/25  Future Office visit:    Next 5 appointments (look out 90 days)      Sep 23, 2025 9:30 AM  (Arrive by 9:15 AM)  Provider Visit with Lorelei Felix MD  [facility] ([facility] ) [address]  [place] [address]  [phone]             Routing refill request to provider for review/approval because:

## 2025-07-30 ENCOUNTER — LAB (OUTPATIENT)
Dept: LAB | Facility: OTHER | Age: 80
End: 2025-07-30
Attending: FAMILY MEDICINE
Payer: MEDICARE

## 2025-07-30 ENCOUNTER — ANTICOAGULATION THERAPY VISIT (OUTPATIENT)
Dept: ANTICOAGULATION | Facility: OTHER | Age: 80
End: 2025-07-30
Attending: FAMILY MEDICINE
Payer: COMMERCIAL

## 2025-07-30 DIAGNOSIS — R97.20 ELEVATED PROSTATE SPECIFIC ANTIGEN (PSA): ICD-10-CM

## 2025-07-30 DIAGNOSIS — I26.99 PULMONARY EMBOLISM AND INFARCTION (H): ICD-10-CM

## 2025-07-30 DIAGNOSIS — Z79.01 LONG TERM CURRENT USE OF ANTICOAGULANT THERAPY: ICD-10-CM

## 2025-07-30 DIAGNOSIS — I26.99 PULMONARY EMBOLISM AND INFARCTION (H): Primary | ICD-10-CM

## 2025-07-30 DIAGNOSIS — I48.20 CHRONIC ATRIAL FIBRILLATION (H): ICD-10-CM

## 2025-07-30 LAB
INR BLD: 3 (ref 0.9–1.1)
PSA SERPL DL<=0.01 NG/ML-MCNC: 13.09 NG/ML

## 2025-07-30 PROCEDURE — 85610 PROTHROMBIN TIME: CPT | Mod: ZL

## 2025-07-30 PROCEDURE — 84153 ASSAY OF PSA TOTAL: CPT | Mod: ZL

## 2025-07-30 PROCEDURE — 36415 COLL VENOUS BLD VENIPUNCTURE: CPT | Mod: ZL

## 2025-07-30 NOTE — PROGRESS NOTES
ANTICOAGULATION MANAGEMENT     Clement Voss 80 year old male is on warfarin with therapeutic INR result. (Goal INR 2.0-3.0)    Recent labs: (last 7 days)     07/30/25  0939   INR 3.0*       ASSESSMENT     Source(s): Chart Review and Patient/Caregiver Call     Warfarin doses taken: Warfarin taken as instructed  Diet: No new diet changes identified  Medication/supplement changes: None noted  New illness, injury, or hospitalization: No  Signs or symptoms of bleeding or clotting: No  Previous result: Therapeutic last visit; previously outside of goal range  Additional findings: set up HC lab with provider appt.        PLAN     Recommended plan for no diet, medication or health factor changes affecting INR     Dosing Instructions: Continue your current warfarin dose with next INR in 3 weeks       Summary  As of 7/30/2025      Full warfarin instructions:  2.5 mg every Mon, Wed; 5 mg all other days   Next INR check:  8/20/2025               In person    Lab visit scheduled    Education provided: Please call back if any changes to your diet, medications or how you've been taking warfarin    Plan made per [facility] anticoagulation protocol    Corry Avelar RN  7/30/2025  Anticoagulation Clinic  [employer] for routing messages: mannie RUELAS  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

## 2025-08-11 ENCOUNTER — TRANSFERRED RECORDS (OUTPATIENT)
Dept: HEALTH INFORMATION MANAGEMENT | Facility: CLINIC | Age: 80
End: 2025-08-11
Payer: COMMERCIAL

## 2025-08-15 DIAGNOSIS — R97.20 ELEVATED PROSTATE SPECIFIC ANTIGEN (PSA): Primary | ICD-10-CM

## 2025-08-18 ENCOUNTER — MYC REFILL (OUTPATIENT)
Dept: FAMILY MEDICINE | Facility: OTHER | Age: 80
End: 2025-08-18

## 2025-08-18 DIAGNOSIS — M10.9 ACUTE GOUT, UNSPECIFIED CAUSE, UNSPECIFIED SITE: ICD-10-CM

## 2025-08-19 RX ORDER — INDOMETHACIN 50 MG/1
50 CAPSULE ORAL 3 TIMES DAILY
Qty: 60 CAPSULE | Refills: 0 | Status: SHIPPED | OUTPATIENT
Start: 2025-08-19

## 2025-08-20 ENCOUNTER — OFFICE VISIT (OUTPATIENT)
Dept: UROLOGY | Facility: OTHER | Age: 80
End: 2025-08-20
Attending: FAMILY MEDICINE
Payer: MEDICARE

## 2025-08-20 ENCOUNTER — LAB (OUTPATIENT)
Dept: LAB | Facility: OTHER | Age: 80
End: 2025-08-20
Attending: FAMILY MEDICINE
Payer: COMMERCIAL

## 2025-08-20 ENCOUNTER — ANTICOAGULATION THERAPY VISIT (OUTPATIENT)
Dept: ANTICOAGULATION | Facility: OTHER | Age: 80
End: 2025-08-20
Payer: MEDICARE

## 2025-08-20 ENCOUNTER — TELEPHONE (OUTPATIENT)
Dept: FAMILY MEDICINE | Facility: OTHER | Age: 80
End: 2025-08-20

## 2025-08-20 VITALS
RESPIRATION RATE: 16 BRPM | SYSTOLIC BLOOD PRESSURE: 129 MMHG | WEIGHT: 197.97 LBS | DIASTOLIC BLOOD PRESSURE: 77 MMHG | HEART RATE: 83 BPM | OXYGEN SATURATION: 96 % | HEIGHT: 67 IN | BODY MASS INDEX: 31.07 KG/M2

## 2025-08-20 DIAGNOSIS — R97.20 ELEVATED PROSTATE SPECIFIC ANTIGEN (PSA): ICD-10-CM

## 2025-08-20 DIAGNOSIS — C61 PROSTATE CANCER (H): Primary | ICD-10-CM

## 2025-08-20 DIAGNOSIS — I26.99 PULMONARY EMBOLISM AND INFARCTION (H): Primary | ICD-10-CM

## 2025-08-20 DIAGNOSIS — I48.20 CHRONIC ATRIAL FIBRILLATION (H): ICD-10-CM

## 2025-08-20 DIAGNOSIS — Z79.01 LONG TERM CURRENT USE OF ANTICOAGULANT THERAPY: ICD-10-CM

## 2025-08-20 DIAGNOSIS — I26.99 PULMONARY EMBOLISM AND INFARCTION (H): ICD-10-CM

## 2025-08-20 LAB
ALBUMIN UR-MCNC: NEGATIVE MG/DL
APPEARANCE UR: CLEAR
BILIRUB UR QL STRIP: NEGATIVE
COLOR UR AUTO: NORMAL
GLUCOSE UR STRIP-MCNC: NEGATIVE MG/DL
HGB UR QL STRIP: NEGATIVE
INR BLD: 3.4 (ref 0.9–1.1)
KETONES UR STRIP-MCNC: NEGATIVE MG/DL
LEUKOCYTE ESTERASE UR QL STRIP: NEGATIVE
NITRATE UR QL: NEGATIVE
PH UR STRIP: 7 [PH] (ref 4.7–8)
RBC URINE: 0 /HPF
SP GR UR STRIP: 1.01 (ref 1–1.03)
SQUAMOUS EPITHELIAL: 0 /HPF
UROBILINOGEN UR STRIP-MCNC: NORMAL MG/DL
WBC URINE: <1 /HPF

## 2025-08-20 PROCEDURE — 85610 PROTHROMBIN TIME: CPT | Mod: ZL

## 2025-08-20 PROCEDURE — 36416 COLLJ CAPILLARY BLOOD SPEC: CPT | Mod: ZL

## 2025-08-20 PROCEDURE — G0463 HOSPITAL OUTPT CLINIC VISIT: HCPCS

## 2025-08-20 PROCEDURE — 81003 URINALYSIS AUTO W/O SCOPE: CPT | Mod: ZL

## 2025-08-20 ASSESSMENT — PAIN SCALES - GENERAL: PAINLEVEL_OUTOF10: NO PAIN (0)

## (undated) DEVICE — BIN-CATARACT BIN

## (undated) DEVICE — KNIFE-LASEREDGE CLEAR CORNEAL 2.75MM ANGLED DOUBLE BEVEL

## (undated) DEVICE — DRSG-TEGADERM MEDIUM #1626

## (undated) DEVICE — BIN-LENS IMPLANT CART

## (undated) DEVICE — HANDPIECE-CAPSULEGUARD I/A STELLARIS

## (undated) DEVICE — CYSTOTOME-IRRIGATING  25G

## (undated) DEVICE — BIN-TECNIS DCB00 LENSES

## (undated) DEVICE — MALYUGIN 2.0 RING 6.25MM

## (undated) DEVICE — BETADINE 5% STERILE OPHTHALMIC SOLUTION 1 OZ.

## (undated) DEVICE — INSTRUMENT WIPE-VISIWIPE

## (undated) DEVICE — SENSOR-OXISENSOR II ADULT

## (undated) DEVICE — KNIFE-MVR 20GA/45 DEGREE ANGLED

## (undated) DEVICE — PACK-EYE-CUSTOM

## (undated) DEVICE — GLV-7.0 PROTEXIS PI CLASSIC LF/PF

## (undated) DEVICE — GLV-7.5 PROTEXIS PI CLASSIC LF/PF

## (undated) DEVICE — IRRIGATION-H2O 1000ML

## (undated) DEVICE — CONNECTOR-ERBEFLO 2 PORT

## (undated) DEVICE — IPRISM CLIPS-STABILIZING CLIP FOR PRISM

## (undated) DEVICE — PACK-PHACO STELLARIS

## (undated) DEVICE — SPONGE-SURGICAL SPEARS-WECKCEL

## (undated) DEVICE — CANISTER-SUCTION 2000CC

## (undated) DEVICE — TUBING-SUCTION 20FT

## (undated) DEVICE — MARKER-SKIN REG

## (undated) RX ORDER — LIDOCAINE HYDROCHLORIDE 20 MG/ML
INJECTION, SOLUTION EPIDURAL; INFILTRATION; INTRACAUDAL; PERINEURAL
Status: DISPENSED
Start: 2019-05-16

## (undated) RX ORDER — PROPOFOL 10 MG/ML
INJECTION, EMULSION INTRAVENOUS
Status: DISPENSED
Start: 2019-05-16